# Patient Record
Sex: MALE | Race: WHITE | NOT HISPANIC OR LATINO | Employment: OTHER | URBAN - METROPOLITAN AREA
[De-identification: names, ages, dates, MRNs, and addresses within clinical notes are randomized per-mention and may not be internally consistent; named-entity substitution may affect disease eponyms.]

---

## 2017-01-27 ENCOUNTER — OFFICE VISIT (OUTPATIENT)
Dept: HEPATOLOGY | Facility: CLINIC | Age: 62
End: 2017-01-27
Payer: COMMERCIAL

## 2017-01-27 ENCOUNTER — HOSPITAL ENCOUNTER (OUTPATIENT)
Dept: RADIOLOGY | Facility: HOSPITAL | Age: 62
Discharge: HOME | End: 2017-01-27
Attending: INTERNAL MEDICINE
Payer: COMMERCIAL

## 2017-01-27 VITALS
WEIGHT: 235.44 LBS | SYSTOLIC BLOOD PRESSURE: 128 MMHG | HEART RATE: 72 BPM | BODY MASS INDEX: 29.27 KG/M2 | HEIGHT: 75 IN | OXYGEN SATURATION: 98 % | TEMPERATURE: 97 F | RESPIRATION RATE: 18 BRPM | DIASTOLIC BLOOD PRESSURE: 72 MMHG

## 2017-01-27 DIAGNOSIS — F10.10: ICD-10-CM

## 2017-01-27 DIAGNOSIS — K74.60 CIRRHOSIS OF LIVER WITHOUT ASCITES, UNSPECIFIED HEPATIC CIRRHOSIS TYPE: Primary | ICD-10-CM

## 2017-01-27 DIAGNOSIS — Z86.19 HISTORY OF HEPATITIS C: ICD-10-CM

## 2017-01-27 DIAGNOSIS — K74.69 OTHER CIRRHOSIS OF LIVER: ICD-10-CM

## 2017-01-27 PROCEDURE — 3078F DIAST BP <80 MM HG: CPT | Mod: S$GLB,,, | Performed by: PHYSICIAN ASSISTANT

## 2017-01-27 PROCEDURE — 76705 ECHO EXAM OF ABDOMEN: CPT | Mod: TC

## 2017-01-27 PROCEDURE — 1159F MED LIST DOCD IN RCRD: CPT | Mod: S$GLB,,, | Performed by: PHYSICIAN ASSISTANT

## 2017-01-27 PROCEDURE — 76705 ECHO EXAM OF ABDOMEN: CPT | Mod: 26,,, | Performed by: RADIOLOGY

## 2017-01-27 PROCEDURE — 99213 OFFICE O/P EST LOW 20 MIN: CPT | Mod: S$GLB,,, | Performed by: PHYSICIAN ASSISTANT

## 2017-01-27 PROCEDURE — 3074F SYST BP LT 130 MM HG: CPT | Mod: S$GLB,,, | Performed by: PHYSICIAN ASSISTANT

## 2017-01-27 PROCEDURE — 99999 PR PBB SHADOW E&M-EST. PATIENT-LVL IV: CPT | Mod: PBBFAC,,, | Performed by: PHYSICIAN ASSISTANT

## 2017-01-27 NOTE — PROGRESS NOTES
"HEPATOLOGY CLINIC VISIT NOTE - HCV CLINIC     CHIEF COMPLAINT: HCV Cirrhosis    HISTORY OF PRESENT ILLNESS: This is a 61-year-old white male with well-compensated cirrhosis secondary to HCV and alcohol abuse, here for routine cirrhotic f/u.     Since last visit he has successfully completed HCV antiviral therapy. He has also started drinking alcohol again.    Transaminases today mildly elevated AST 43, ALT 49. TBili elevated 1.1.   Previously liver panel had normalized w/ alcohol cessation and HCV clearance.  Pt notes he is drinking "more than he should be."    Today states he is feeling okay  Had dark urine recently while he had a sinus infection. This has resolved.  Denies jaundice, hematemesis, melena, slowed mentation, abdominal distention.   No lower extremity edema      HCV history:  Completed 12 weeks Harvoni + Ribavirin w/ SVR12 11/2016; Labs for SVR24 were drawn today  - Genotype 1a  - Prior treatment -- Relapse following 48 wks of Pegasys, Ribapak, and Victrelis (required ribavirin dose reductions)     -- Relapse following PegIntron and ribavirin > 10 years ago.      -- Non-responder to standard interferon and ribavirin in the distant past     Cirrhosis monitoring:  Well-compensated. No ascites, jaundice, HE  MELD-Na score: 7 at 1/27/2017  7:50 AM  MELD score: 7 at 1/27/2017  7:50 AM  Calculated from:  Serum Creatinine: 0.8 mg/dL (Rounded to 1) at 1/27/2017  7:50 AM  Serum Sodium: 142 mmol/L (Rounded to 137) at 1/27/2017  7:50 AM  Total Bilirubin: 1.1 mg/dL at 1/27/2017  7:50 AM  INR(ratio): 1.0 at 1/27/2017  7:50 AM  Age: 61 years    - HCC screening - U/S today w/ no liver lesions, AFP today pending  - Varices screening - EGD 5/2016 - no varices. Due again 5/2018      Alcohol history:   (+) hx of alcohol abuse, previously stopped drinking for 2 years in 3/2012 so he could be treated for HCV. Unfortunately virus relapsed and he returned to drinking in 5/2014.   Stopped drinking 3/2016 so he could be " treated w/ Harvoni. Has started drinking again as noted above      Past Medical History   Diagnosis Date    Anxiety     Cirrhosis     Glaucoma     History of hepatitis C, s/p successful Rx w/ SVR12 - 10/2016      genotype 1;  relapse following PegIFN+RBV+Victrelis; prior relapse following PegIFN+RBV S/p 12 weeks harvoni + RBV w/ SVR    Hypertension     Paresthesia      feet, bilaterally     No past surgical history on file.      FAMILY HISTORY: Negative for liver disease.     SOCIAL HISTORY: He resides locally. He is . He does not have any children. He works as a   -- Hx of daily alcohol. See above    MEDICATIONS AND ALLERGIES: reviewed    REVIEW OF SYSTEMS: As per the HPI.   No fever, chills, wt gain, fatigue  No chest pain, dyspnea, dyspnea, cough  No abdominal pain, swelling, nausea, vomiting  No skin rash.   No headache  No bleeding.       PHYSICAL EXAMINATION:   GENERAL: Friendly white male in no acute distress. He is alert and oriented.   VITALS: reviewed.   HEENT: Sclerae anicteric.   LUNGS: Normal respiratory effort. Clear bilaterally  CVS: Regular rate and rhythm. No murmurs  ABDOMEN: Nondistended. Soft. Nontender. No organomegaly or masses. Good bowel sounds. No ascites, hernias, masses.   EXTREMITIES: No edema.   SKIN: No jaundice or spider telangectasias. No rashes on exposed skin.   NEURO/PSYCH: Normal gait. Memory intact. Thought and speech patterns appropriate. No obvious depression or anxiety.      PERTINENT LABORATORY DATA:   Lab Results   Component Value Date    WBC 5.69 01/27/2017    GRAN 3.2 01/27/2017    GRAN 56.2 01/27/2017    HGB 15.3 01/27/2017     01/27/2017     Lab Results   Component Value Date    AST 43 (H) 01/27/2017    ALT 49 (H) 01/27/2017    BILITOT 1.1 (H) 01/27/2017    ALBUMIN 3.8 01/27/2017    ALKPHOS 84 01/27/2017    CREATININE 0.8 01/27/2017     01/27/2017    K 4.5 01/27/2017    INR 1.0 01/27/2017    AFP 2.4 07/15/2016       PERTINENT  IMAGING:  U/S ABDOMEN 1/27/17  Narrative   Reason for study: History of hepatitis    Comparison: Ultrasound abdomen limited 07/29/2016    Findings: The liver is enlarged measuring 17.0cm.  the hepatic parenchyma as attenuating, with the uterine measuring 2.38.  There are no focal hepatic masses.  No intra- or extrahepatic biliary ductal dilatation. The common bile duct measures 0.4 cm.  The gallbladder appears normal. No evidence for cholelithiasis.  Sonographic Shaffer's sign is negative. The visualized portions of the pancreas appear normal. The spleen is enlarged and measures 14.7 x 5.7 cm. There are again 2 calcified granulomas within the spleen, with the largest measuring 0.8 cm. No ascites.   Impression   Hepatosplenomegaly  Increased attenuation of the hepatic parenchyma, suggestive of hepatic steatosis.  2 splenic calcifications, consistent with granulomas         ASSESSMENT: A 61-year-old white male with:   1. HISTORY OF CHRONIC HEPATITIS C, GENOTYPE 1a, s/p successful Rx  -- completed 12 weeks of Harvoni + Ribavirin w/ SVR12 - 11/2016  -- Relapse following 48 weeks of Pegasys, Ribavirin, Victrelis (was HCV RNA neg 4wks after adding victrelis)  -- Relapse following PegIntron and ribavirin 10 years ago.   -- Non-responder to standard interferon and ribavirin in the distant past   2. WELL-COMPENSATED CIRRHOSIS  -- MELD score 7 - 1/2017   -- HCC screening - AFP pending. U/S today w/ no liver lesions  -- EGD 5/2016 - No varices   -- splenomegaly  3. PRIOR ANEMIA from HCV treatment - resolved  4. ELEVATED TRANSAMINASES  -- likely due to alcohol use  5. ALCOHOL USE  -- pt has resumed alcohol use      DISCUSSION:  Discussed risk of liver disease progression and hepatic decompensation, liver failure, death due to continued alcohol use. Advised pt to d/c all alcohol  Discussed continued risk of HCC and need for longterm HCC screening    -- Signs and symptoms of hepatic decompensation were reviewed, including  jaundice, ascites, and slowed mentation due to hepatic encephalopathy. The patient should seek medical attention if any of these things occur. We discussed the potential for bleeding from esophageal varices with symptoms of hematemesis and melena. The patient should report to the Emergency Department for these symptoms.   -- Discussed portal hypertension, including potential development of esophageal varices. Role of EGD in screening for varices was reviewed.       RECOMMENDATIONS:   1. Discontinue alcohol  2. Await pending AFP and HCV RNA (SVR24)  3. F/u 6months w/ CBC, CMP, INR, AFP, U/S abdomen      Duration of visit: 25 minutes.   Majority of visit spent counseling pt on cirrhosis and alcohol cessation

## 2017-02-16 ENCOUNTER — OFFICE VISIT (OUTPATIENT)
Dept: PSYCHIATRY | Facility: CLINIC | Age: 62
End: 2017-02-16
Payer: COMMERCIAL

## 2017-02-16 DIAGNOSIS — F10.10 ALCOHOL ABUSE: Primary | ICD-10-CM

## 2017-02-16 PROCEDURE — 90834 PSYTX W PT 45 MINUTES: CPT | Mod: S$GLB,,, | Performed by: SOCIAL WORKER

## 2017-02-16 NOTE — PROGRESS NOTES
Individual Psychotherapy (PhD/LCSW)    2/16/2017    Site:  [facility]         Therapeutic Intervention: Met with patient.  Outpatient - Insight oriented psychotherapy 45 min - CPT code 37439    Chief complaint/reason for encounter: addictive disorder, depression, mood swings, anxiety, sleep, appetite, behavior and interpersonal     Interval history and content of current session: discussed Richmond Donald, AUBREY referred him for his own therapy, he was not very certain what changes he needed or wants to make other than he thinks everyone wants him to stop drinking. And he is thinking about this, sees Mr. Donald with his wife for marriage counseling and they are improving and want to stay together, so working on this, also he will see me a few times and decide what he wants to do and work on and if wants to stop drinking.    Treatment plan:  · Target symptoms: alcohol abuse, depression, anxiety   · Why chosen therapy is appropriate versus another modality: relevant to diagnosis, patient responds to this modality, evidence based practice  · Outcome monitoring methods: self-report, observation  · Therapeutic intervention type: insight oriented psychotherapy, behavior modifying psychotherapy, supportive psychotherapy    Risk parameters:  Patient reports no suicidal ideation  Patient reports no homicidal ideation  Patient reports no self-injurious behavior  Patient reports no violent behavior    Verbal deficits: None    Patient's response to intervention:  The patient's response to intervention is accepting.    Progress toward goals and other mental status changes:  The patient's progress toward goals is limited.    Diagnosis:     ICD-10-CM ICD-9-CM   1. Alcohol abuse F10.10 305.00       Plan:  individual psychotherapy    Return to clinic: 2 weeks    Length of Service (minutes): 45

## 2017-03-27 ENCOUNTER — OFFICE VISIT (OUTPATIENT)
Dept: PSYCHIATRY | Facility: CLINIC | Age: 62
End: 2017-03-27
Payer: COMMERCIAL

## 2017-03-27 DIAGNOSIS — F10.10 ALCOHOL ABUSE: Primary | ICD-10-CM

## 2017-03-27 PROCEDURE — 90834 PSYTX W PT 45 MINUTES: CPT | Mod: S$GLB,,, | Performed by: SOCIAL WORKER

## 2017-03-27 NOTE — PROGRESS NOTES
Individual Psychotherapy (PhD/LCSW)    3/27/2017    Site:  [facility]         Therapeutic Intervention: Met with patient.  Outpatient - Insight oriented psychotherapy 45 min - CPT code 31873    Chief complaint/reason for encounter: addictive disorder, depression, anxiety and interpersonal     Interval history and content of current session: he and wife are doing better, also she was inpatient for 15 days recently at [facility], doing better the last few days, he thinks he is close to stopping drinking and will likely do this soon and sees several advantages to doing so, including an improve marriage, mood, and his health and saving money and feeling better, job is stressful and he is handling it, and he and wife doing better on playing together and communications and he related to me she has a mental health diagnosis of some type, how to take care of himself, coping skills and encouraged his decision to stop drinking soon all addressed, see one more time and then they can see Richmond Donald LCSW again.    Treatment plan:  · Target symptoms: alcohol abuse, depression, recurrent depression, anxiety , adjustment, work stress  · Why chosen therapy is appropriate versus another modality: relevant to diagnosis, patient responds to this modality, evidence based practice  · Outcome monitoring methods: self-report, observation  · Therapeutic intervention type: insight oriented psychotherapy, behavior modifying psychotherapy, supportive psychotherapy    Risk parameters:  Patient reports no suicidal ideation  Patient reports no homicidal ideation  Patient reports no self-injurious behavior  Patient reports no violent behavior    Verbal deficits: None    Patient's response to intervention:  The patient's response to intervention is accepting.    Progress toward goals and other mental status changes:  The patient's progress toward goals is limited.    Diagnosis:     ICD-10-CM ICD-9-CM   1. Alcohol abuse F10.10  305.00       Plan:  individual psychotherapy and family psychotherapy    Return to clinic: 2 weeks    Length of Service (minutes): 45

## 2017-08-02 ENCOUNTER — TELEPHONE (OUTPATIENT)
Dept: HEPATOLOGY | Facility: CLINIC | Age: 62
End: 2017-08-02

## 2017-08-02 NOTE — TELEPHONE ENCOUNTER
----- Message from Drea Bruce sent at 8/2/2017 12:09 PM CDT -----  Contact: patient   Calling to schedule his appt. Please call

## 2017-08-14 ENCOUNTER — HOSPITAL ENCOUNTER (OUTPATIENT)
Dept: RADIOLOGY | Facility: HOSPITAL | Age: 62
Discharge: HOME | End: 2017-08-14
Attending: INTERNAL MEDICINE
Payer: COMMERCIAL

## 2017-08-14 DIAGNOSIS — K74.60 CIRRHOSIS OF LIVER WITHOUT ASCITES, UNSPECIFIED HEPATIC CIRRHOSIS TYPE: ICD-10-CM

## 2017-08-14 DIAGNOSIS — F10.10 ALCOHOL ABUSE: ICD-10-CM

## 2017-08-14 DIAGNOSIS — Z86.19 HISTORY OF HEPATITIS C: ICD-10-CM

## 2017-08-14 PROCEDURE — 76705 ECHO EXAM OF ABDOMEN: CPT | Mod: TC

## 2017-08-14 PROCEDURE — 76705 ECHO EXAM OF ABDOMEN: CPT | Mod: 26,,, | Performed by: RADIOLOGY

## 2017-08-21 ENCOUNTER — OFFICE VISIT (OUTPATIENT)
Dept: HEPATOLOGY | Facility: CLINIC | Age: 62
End: 2017-08-21
Payer: COMMERCIAL

## 2017-08-21 VITALS
TEMPERATURE: 100 F | SYSTOLIC BLOOD PRESSURE: 149 MMHG | OXYGEN SATURATION: 95 % | HEART RATE: 82 BPM | DIASTOLIC BLOOD PRESSURE: 81 MMHG | WEIGHT: 239.88 LBS | BODY MASS INDEX: 29.83 KG/M2 | HEIGHT: 75 IN

## 2017-08-21 DIAGNOSIS — R74.8 ABNORMAL TRANSAMINASES: ICD-10-CM

## 2017-08-21 DIAGNOSIS — K70.30 ALCOHOLIC CIRRHOSIS OF LIVER WITHOUT ASCITES: Primary | ICD-10-CM

## 2017-08-21 DIAGNOSIS — Z86.19 HISTORY OF HEPATITIS C: ICD-10-CM

## 2017-08-21 PROCEDURE — 99999 PR PBB SHADOW E&M-EST. PATIENT-LVL III: CPT | Mod: PBBFAC,,, | Performed by: PHYSICIAN ASSISTANT

## 2017-08-21 PROCEDURE — 99213 OFFICE O/P EST LOW 20 MIN: CPT | Mod: S$GLB,,, | Performed by: PHYSICIAN ASSISTANT

## 2017-08-21 PROCEDURE — 3077F SYST BP >= 140 MM HG: CPT | Mod: S$GLB,,, | Performed by: PHYSICIAN ASSISTANT

## 2017-08-21 PROCEDURE — 3079F DIAST BP 80-89 MM HG: CPT | Mod: S$GLB,,, | Performed by: PHYSICIAN ASSISTANT

## 2017-08-21 PROCEDURE — 3008F BODY MASS INDEX DOCD: CPT | Mod: S$GLB,,, | Performed by: PHYSICIAN ASSISTANT

## 2017-08-21 RX ORDER — SERTRALINE HYDROCHLORIDE 100 MG/1
100 TABLET, FILM COATED ORAL DAILY
Qty: 90 TABLET | Refills: 1 | Status: SHIPPED | OUTPATIENT
Start: 2017-08-21 | End: 2018-04-04 | Stop reason: SDUPTHER

## 2017-08-21 NOTE — PROGRESS NOTES
HEPATOLOGY CLINIC VISIT NOTE - HCV CLINIC     CHIEF COMPLAINT: HCV Cirrhosis  (here w/ wife)    HISTORY OF PRESENT ILLNESS: This is a 62-year-old white male with well-compensated cirrhosis secondary to HCV (s/p successful antiviral therapy) and alcohol abuse, here for routine cirrhotic f/u.     Doing okay  Denies jaundice, dark urine, hematemesis, melena, slowed mentation, abdominal distention.   No lower extremity edema    States he is still drinking alcohol.   Has been attending weekly AA meetings w/ his wife for few months. They both think it will be easier to quit drinking if they do it together.   We discussed importance of him discontinuing alcohol completely, especially in setting of cirrhosis. Discussed that liver function is well preserved at present but continued alcohol will likely lead to decompensation / liver failure. Discussed periods of time in the past when he has refrained from alcohol for up to 2 years. Discussed options of increasing AA meeting attendance as well as addiction psychiatry.    Recent labs stable other than mild transaminase elevation.  (reviewed w/ pt that transaminases had previously normalized w/ HCV clearance and alcohol cessation)  U/S shows no liver lesions      Cirrhosis monitoring:  Well-compensated. No ascites, jaundice, HE  MELD-Na score: 6 at 8/14/2017  8:25 AM  MELD score: 6 at 8/14/2017  8:25 AM  Calculated from:  Serum Creatinine: 0.9 mg/dL (Rounded to 1) at 8/14/2017  8:25 AM  Serum Sodium: 139 mmol/L (Rounded to 137) at 8/14/2017  8:25 AM  Total Bilirubin: 0.7 mg/dL (Rounded to 1) at 8/14/2017  8:25 AM  INR(ratio): 0.9 (Rounded to 1) at 8/14/2017  8:25 AM  Age: 62 years    - HCC screening - U/S 8/14/17 w/ no liver lesions, AFP 8/14/17 3.4  - Varices screening - EGD 5/2016 - no varices.    HCV history:  Completed 12 weeks Harvoni + Ribavirin w/ SVR12 11/2016; Labs for SVR24 were drawn today  - Genotype 1a  - Prior treatment -- Relapse following 48 wks of Pegasys,  Ribapak, and Victrelis (required ribavirin dose reductions)     -- Relapse following PegIntron and ribavirin > 10 years ago.      -- Non-responder to standard interferon and ribavirin in the distant past       Past Medical History:   Diagnosis Date    Alcohol abuse     Anxiety     Cirrhosis     Glaucoma     History of hepatitis C, s/p successful Rx w/ SVR24 - 1/2017     genotype 1;  relapse following PegIFN+RBV+Victrelis; prior relapse following PegIFN+RBV S/p 12 weeks harvoni + RBV w/ SVR    Hypertension     Paresthesia     feet, bilaterally     No past surgical history on file.      FAMILY HISTORY: Negative for liver disease.     SOCIAL HISTORY: He resides locally. He is . He does not have any children. He works as a     MEDICATIONS AND ALLERGIES: reviewed    REVIEW OF SYSTEMS: As per the HPI.   No fever, chills, wt gain, fatigue  No chest pain, dyspnea, dyspnea, cough  No abdominal pain / swelling, nausea, vomiting  No skin rash.   No headache  No bleeding. Few bruises from bumping into things at work       PHYSICAL EXAMINATION:   GENERAL: Friendly white male in no acute distress. He is alert and oriented.   VITALS: reviewed.   HEENT: Sclerae anicteric.   LUNGS: Normal respiratory effort. Clear bilaterally  CVS: Regular rate and rhythm. No murmurs  ABDOMEN: Nondistended. Soft. Nontender. No organomegaly or masses. Good bowel sounds. No ascites, hernias, masses.   EXTREMITIES: No edema.   SKIN: No jaundice or spider telangectasias. No rashes on exposed skin. (+) bruise on left knee  NEURO/PSYCH: Normal gait. Memory intact. Thought and speech patterns appropriate. No obvious depression or anxiety.      PERTINENT LABORATORY DATA:   Lab Results   Component Value Date    WBC 6.47 08/14/2017    GRAN 3.7 08/14/2017    GRAN 57.5 08/14/2017    HGB 15.0 08/14/2017     08/14/2017     Lab Results   Component Value Date    AST 53 (H) 08/14/2017    ALT 67 (H) 08/14/2017    BILITOT 0.7 08/14/2017     ALBUMIN 3.5 08/14/2017    ALKPHOS 69 08/14/2017    CREATININE 0.9 08/14/2017     08/14/2017    K 5.1 08/14/2017    INR 0.9 08/14/2017    AFP 3.4 08/14/2017       PERTINENT IMAGING:  Results for orders placed during the hospital encounter of 08/14/17   US Abdomen Limited    Narrative ULTRASOUND ABDOMEN LIMITED  INDICATION: Liver cirrhosis.  COMPARISON: Ultrasound abdomen 1/27/ 17.      TECHNIQUE:  Transabdominal ultrasonographic images of the pancreas, liver, gallbladder, common bile duct, and spleen were obtained.    FINDINGS:   The visualized portion of the pancreas is unremarkable.  The IVC is visualized.  The gallbladder demonstrates no evidence of gallstones.  Gallbladder wall thickness is 0.2 cm.  No pericholecystic fluid or hyperemia identified.  Sonographic Shaffer's sign is negative.  No evidence of intra or extrahepatic biliary ductal dilatation.  The common bile duct measures 0.3 cm.  The liver is enlarged measuring 18.0 cm and demonstrates nodular contour, suggestive of cirrhosis.  No evidence of focal hepatic lesions.  The hepatorenal index is elevated measuring 1.4, suggestive greater than 5% hepatic steatosis. The spleen is enlarged 14.2 x 4.7 cm.  Two calcified areas in the spleen are noted, likely representing granulomas.    Impression Enlarged cirrhotic liver.    Hepatic steatosis.    Splenomegaly.    Splenic granulomas.  ______________________________________   Electronically signed by resident: FRANCO THRASHER MD  Date:     08/14/17  Time:    09:38  As the supervising and teaching physician, I personally reviewed the images and resident's interpretation and I agree with the findings.  Electronically signed by: ALESSANDRA LARKIN MD  Date:     08/14/17  Time:    10:09            ASSESSMENT: A 62-year-old white male with:   1. WELL-COMPENSATED CIRRHOSIS  -- MELD score 6 - 8/2017  -- HCC screening - AFP normal, U/S w/ no lesions - 8/2017  -- EGD 5/2016 - No varices   -- splenomegaly  2. HISTORY  OF CHRONIC HEPATITIS C, GENOTYPE 1a, s/p successful Rx  -- completed 12 weeks of Harvoni + Ribavirin w/ SVR12 - 11/2016  -- Relapse following 48 weeks of Pegasys, Ribavirin, Victrelis (was HCV RNA neg 4wks after adding victrelis)  -- Relapse following PegIntron and ribavirin 10 years ago.   -- Non-responder to standard interferon and ribavirin in the distant past   3. ELEVATED TRANSAMINASES  -- likely due to alcohol use  4. ALCOHOL USE        DISCUSSION:  Discussed risk of liver disease progression and hepatic decompensation, liver failure, death due to continued alcohol use. Advised pt to d/c all alcohol  Discussed continued risk of HCC and need for longterm HCC screening    -- Signs and symptoms of hepatic decompensation were reviewed, including jaundice, ascites, and slowed mentation due to hepatic encephalopathy. The patient should seek medical attention if any of these things occur. We discussed the potential for bleeding from esophageal varices with symptoms of hematemesis and melena. The patient should report to the Emergency Department for these symptoms.   -- Discussed portal hypertension, including potential development of esophageal varices. Role of EGD in screening for varices was reviewed.       RECOMMENDATIONS:   1. Discontinue alcohol  2. EGD for varices screening due 5/2018  3. F/u visit w/ 6months w/ HCV RNA, CBC, CMP, INR, AFP, U/S abdomen      Duration of visit: 30 minutes.   Majority of visit spent counseling pt on cirrhosis and alcohol cessation

## 2017-10-12 DIAGNOSIS — I10 ESSENTIAL HYPERTENSION: ICD-10-CM

## 2017-10-12 RX ORDER — LOSARTAN POTASSIUM 50 MG/1
50 TABLET ORAL DAILY
Qty: 90 TABLET | Refills: 0 | Status: SHIPPED | OUTPATIENT
Start: 2017-10-12 | End: 2018-02-20 | Stop reason: SDUPTHER

## 2017-10-22 DIAGNOSIS — N52.9 ERECTILE DYSFUNCTION, UNSPECIFIED ERECTILE DYSFUNCTION TYPE: ICD-10-CM

## 2017-10-23 RX ORDER — SILDENAFIL CITRATE 100 MG/1
TABLET, FILM COATED ORAL
Qty: 10 TABLET | Refills: 1 | Status: SHIPPED | OUTPATIENT
Start: 2017-10-23 | End: 2017-10-31 | Stop reason: SDUPTHER

## 2017-10-31 DIAGNOSIS — N52.9 ERECTILE DYSFUNCTION, UNSPECIFIED ERECTILE DYSFUNCTION TYPE: ICD-10-CM

## 2017-10-31 RX ORDER — SILDENAFIL 100 MG/1
100 TABLET, FILM COATED ORAL DAILY
Qty: 10 TABLET | Refills: 0 | Status: SHIPPED | OUTPATIENT
Start: 2017-10-31 | End: 2017-11-01

## 2017-10-31 NOTE — TELEPHONE ENCOUNTER
----- Message from Keshia Yuridia sent at 10/31/2017  2:20 PM CDT -----  Contact: pt [phone]  RX request - refill or new RX.  Is this a refill or new RX: refill   RX name and strength: VIAGRA 100 mg tablet  Directions:   Is this a 30 day or 90 day RX:    Pharmacy name and phone #[id number] @[phone]  Comments:  Pt said his insurance would not cover this pt want to know if the Dr can override it,please advise pt

## 2017-10-31 NOTE — LETTER
October 31, 2017    Froylan Franksy  [address]  [place] [address]             [facility]  [address]  [place] [address]  Phone: [phone]  Fax: [phone] Dear Mr. Borja:    Your health is important to us.  It has been over a year since you last visit with Dr. Almonte.  Please make an appointment to come in to touch base on your medical issues.  You can schedule this in [facility], respond to this message with days/times that work best for you or call our office at [phone].      If you have any questions or concerns, please don't hesitate to call.    Sincerely,        Silvana Valdez MA

## 2017-11-01 ENCOUNTER — TELEPHONE (OUTPATIENT)
Dept: INTERNAL MEDICINE | Facility: CLINIC | Age: 62
End: 2017-11-01

## 2017-11-01 RX ORDER — TADALAFIL 20 MG/1
TABLET ORAL
Qty: 10 TABLET | Refills: 11 | Status: SHIPPED | OUTPATIENT
Start: 2017-11-01 | End: 2018-10-31 | Stop reason: SDUPTHER

## 2017-11-07 ENCOUNTER — OFFICE VISIT (OUTPATIENT)
Dept: INTERNAL MEDICINE | Facility: CLINIC | Age: 62
End: 2017-11-07
Payer: COMMERCIAL

## 2017-11-07 VITALS
HEIGHT: 75 IN | DIASTOLIC BLOOD PRESSURE: 70 MMHG | WEIGHT: 231.25 LBS | BODY MASS INDEX: 28.75 KG/M2 | SYSTOLIC BLOOD PRESSURE: 140 MMHG

## 2017-11-07 DIAGNOSIS — N52.9 ERECTILE DYSFUNCTION, UNSPECIFIED ERECTILE DYSFUNCTION TYPE: Primary | ICD-10-CM

## 2017-11-07 PROCEDURE — 99212 OFFICE O/P EST SF 10 MIN: CPT | Mod: S$GLB,,, | Performed by: NURSE PRACTITIONER

## 2017-11-07 PROCEDURE — 99999 PR PBB SHADOW E&M-EST. PATIENT-LVL III: CPT | Mod: PBBFAC,,, | Performed by: NURSE PRACTITIONER

## 2017-11-07 NOTE — PROGRESS NOTES
"Subjective:       Patient ID: Froylan Borja is a 62 y.o. male.    Chief Complaint: Medication Refill    Pt here for refill on Viagra.  Pt aware that his insurance will not cover.  Home phone number updated  No other complaints        Past Medical History:   Diagnosis Date    Alcohol abuse     Anxiety     Cirrhosis     Glaucoma     History of hepatitis C, s/p successful Rx w/ SVR24 - 1/2017     genotype 1;  relapse following PegIFN+RBV+Victrelis; prior relapse following PegIFN+RBV S/p 12 weeks harvoni + RBV w/ SVR    Hypertension     Paresthesia     feet, bilaterally     History reviewed. No pertinent surgical history.  Social History     Social History Narrative    ,  at [employer]. No kids. 2 dogs. No exercise.     Family History   Problem Relation Age of Onset    Diabetes Mellitus Father     Hypertension Father     Cancer Father      ? CRC    Cancer Mother      colon    Cancer Sister      ? CRC     Vitals:    11/07/17 1104   BP: (!) 140/70   Weight: 104.9 kg (231 lb 4.2 oz)   Height: 6' 3" (1.905 m)   PainSc: 0-No pain     Outpatient Encounter Prescriptions as of 11/7/2017   Medication Sig Dispense Refill    cyanocobalamin (VITAMIN B-12) 1000 MCG tablet Take 1,000 mcg by mouth once daily.       gabapentin (NEURONTIN) 800 MG tablet TAKE 1 TABLET THREE TIMES A  tablet 0    latanoprost (XALATAN) 0.005 % ophthalmic solution 1 drop every evening.       losartan (COZAAR) 50 MG tablet TAKE 1 TABLET (50 MG TOTAL) BY MOUTH ONCE DAILY. 90 tablet 0    milk thistle 175 mg tablet Take 175 mg by mouth once daily.      multivitamin (MULTIVITAMIN) per tablet Take 1 tablet by mouth once daily.        sertraline (ZOLOFT) 100 MG tablet Take 1 tablet (100 mg total) by mouth once daily. 90 tablet 1    timolol maleate 0.5% (TIMOPTIC) 0.5 % Drop Place 1 drop into both eyes once daily.       tadalafil (CIALIS) 20 MG Tab Use one tablet every 36 hours 10 tablet 11     No facility-administered encounter " "medications on file as of 11/7/2017.      Wt Readings from Last 3 Encounters:   11/07/17 104.9 kg (231 lb 4.2 oz)   08/21/17 108.8 kg (239 lb 13.8 oz)   01/27/17 106.8 kg (235 lb 7.2 oz)     Last 3 sets of Vitals    Vitals - 1 value per visit 1/27/2017 8/21/2017 11/7/2017   SYSTOLIC 128 149 140   DIASTOLIC 72 81 70   PULSE 72 82 -   TEMPERATURE 96.8 99.9 -   RESPIRATIONS 18 - -   SPO2 98 95 -   Weight (lb) 235.45 239.86 231.26   Weight (kg) 106.8 108.8 104.9   HEIGHT 6' 3" 6' 3" 6' 3"   BODY MASS INDEX 29.43 29.98 28.91   VISIT REPORT - - -   Pain Score  0 0 0   Some recent data might be hidden     No results found for: CBC  Review of Systems   Constitutional: Negative for appetite change and chills.   Genitourinary: Negative for difficulty urinating and hematuria.       Objective:      Physical Exam   Constitutional: He appears well-developed and well-nourished. No distress.   Eyes: No scleral icterus.   Skin: Skin is warm and dry. Capillary refill takes less than 2 seconds. He is not diaphoretic.   Vitals reviewed.      Assessment:       1. Erectile dysfunction, unspecified erectile dysfunction type        Plan:       Froylan was seen today for medication refill.    Diagnoses and all orders for this visit:    Erectile dysfunction, unspecified erectile dysfunction type  Comments:  D/W pt insurance will cover Cialis and scritp sent in on 1 Nov to [facility] per Dr Almonte        "

## 2018-02-20 DIAGNOSIS — I10 ESSENTIAL HYPERTENSION: ICD-10-CM

## 2018-02-20 RX ORDER — LOSARTAN POTASSIUM 50 MG/1
50 TABLET ORAL DAILY
Qty: 90 TABLET | Refills: 0 | Status: SHIPPED | OUTPATIENT
Start: 2018-02-20 | End: 2018-06-14 | Stop reason: SDUPTHER

## 2018-02-20 NOTE — TELEPHONE ENCOUNTER
Hi, please call patient and let the patient know that at [facility] we have developed a hypertension registry of patients and I see that this patient is due to see me back and have the high blood pressure evaluated along with overall health. Please offer an appointment.  Thank you, Vignesh Almonte

## 2018-02-23 NOTE — TELEPHONE ENCOUNTER
Attempted to contact pt to schedule HTN f/u with PCP. No answer, left voice mail for return call.

## 2018-04-04 ENCOUNTER — TELEPHONE (OUTPATIENT)
Dept: HEPATOLOGY | Facility: CLINIC | Age: 63
End: 2018-04-04

## 2018-04-04 RX ORDER — SERTRALINE HYDROCHLORIDE 100 MG/1
100 TABLET, FILM COATED ORAL DAILY
Qty: 90 TABLET | Refills: 0 | Status: SHIPPED | OUTPATIENT
Start: 2018-04-04 | End: 2018-08-21 | Stop reason: SDUPTHER

## 2018-04-04 NOTE — TELEPHONE ENCOUNTER
I just refilled pt's zoloft  He is overdue for cirrhosis monitoring / HCC screening    pls schedule F/U visit w/ HCV RNA, CBC, CMP, INR, AFP, U/S abdomen

## 2018-04-27 ENCOUNTER — TELEPHONE (OUTPATIENT)
Dept: HEPATOLOGY | Facility: CLINIC | Age: 63
End: 2018-04-27

## 2018-04-27 NOTE — TELEPHONE ENCOUNTER
----- Message from Jennifer B. Scheuermann, PA sent at 4/26/2018  5:11 PM CDT -----  pls call to r/s his appts from 4/26  CBC, CMP, INR, AFP, U/S  Thanks

## 2018-04-27 NOTE — TELEPHONE ENCOUNTER
Attempt made to reach patient.  Unable to LVM.  Letter sent asking that patient call to reschedule missed appts.

## 2018-05-19 DIAGNOSIS — I10 ESSENTIAL HYPERTENSION: ICD-10-CM

## 2018-05-21 RX ORDER — LOSARTAN POTASSIUM 50 MG/1
50 TABLET ORAL DAILY
Qty: 90 TABLET | Refills: 0 | OUTPATIENT
Start: 2018-05-21

## 2018-06-14 ENCOUNTER — OFFICE VISIT (OUTPATIENT)
Dept: INTERNAL MEDICINE | Facility: CLINIC | Age: 63
End: 2018-06-14
Payer: COMMERCIAL

## 2018-06-14 VITALS
WEIGHT: 243.63 LBS | HEIGHT: 74 IN | DIASTOLIC BLOOD PRESSURE: 78 MMHG | TEMPERATURE: 98 F | OXYGEN SATURATION: 94 % | SYSTOLIC BLOOD PRESSURE: 142 MMHG | HEART RATE: 74 BPM | BODY MASS INDEX: 31.27 KG/M2

## 2018-06-14 DIAGNOSIS — L03.119 CELLULITIS OF LOWER EXTREMITY, UNSPECIFIED LATERALITY: Primary | ICD-10-CM

## 2018-06-14 DIAGNOSIS — I10 ESSENTIAL HYPERTENSION: ICD-10-CM

## 2018-06-14 PROCEDURE — 99999 PR PBB SHADOW E&M-EST. PATIENT-LVL III: CPT | Mod: PBBFAC,,, | Performed by: INTERNAL MEDICINE

## 2018-06-14 PROCEDURE — 99213 OFFICE O/P EST LOW 20 MIN: CPT | Mod: 25,S$GLB,, | Performed by: INTERNAL MEDICINE

## 2018-06-14 PROCEDURE — 96372 THER/PROPH/DIAG INJ SC/IM: CPT | Mod: S$GLB,,, | Performed by: INTERNAL MEDICINE

## 2018-06-14 RX ORDER — LOSARTAN POTASSIUM 50 MG/1
50 TABLET ORAL DAILY
Qty: 90 TABLET | Refills: 0 | Status: SHIPPED | OUTPATIENT
Start: 2018-06-14 | End: 2018-10-17 | Stop reason: SDUPTHER

## 2018-06-14 RX ORDER — CEFTRIAXONE 500 MG/1
500 INJECTION, POWDER, FOR SOLUTION INTRAMUSCULAR; INTRAVENOUS
Status: COMPLETED | OUTPATIENT
Start: 2018-06-14 | End: 2018-06-14

## 2018-06-14 RX ORDER — HYDROCHLOROTHIAZIDE 25 MG/1
25 TABLET ORAL DAILY
Qty: 5 TABLET | Refills: 0 | Status: SHIPPED | OUTPATIENT
Start: 2018-06-14 | End: 2018-10-31

## 2018-06-14 RX ORDER — CEPHALEXIN 500 MG/1
500 CAPSULE ORAL EVERY 6 HOURS
Qty: 40 CAPSULE | Refills: 0 | Status: SHIPPED | OUTPATIENT
Start: 2018-06-14 | End: 2018-10-31

## 2018-06-14 RX ADMIN — CEFTRIAXONE 500 MG: 500 INJECTION, POWDER, FOR SOLUTION INTRAMUSCULAR; INTRAVENOUS at 02:06

## 2018-06-14 NOTE — PROGRESS NOTES
Subjective:       Patient ID: Froylan Borja is a 63 y.o. male.    Chief Complaint: Leg Swelling (both legs red and painful, medication refil)    Both calves have been swollen for about 2 weeks and now are red and hot to touch.  NO fever  No known injury      Review of Systems   Constitutional: Negative for activity change, appetite change and fever.   HENT: Negative for congestion, postnasal drip and sore throat.    Respiratory: Negative for cough, shortness of breath and wheezing.    Cardiovascular: Negative for chest pain and palpitations.   Gastrointestinal: Negative for abdominal pain, blood in stool, constipation, diarrhea, nausea and vomiting.   Genitourinary: Negative for decreased urine volume, difficulty urinating, flank pain and frequency.   Musculoskeletal: Negative for arthralgias.   Neurological: Negative for dizziness, weakness and headaches.       Objective:      Physical Exam   Skin:            Assessment:       1. Cellulitis of lower extremity, unspecified laterality    2. Essential hypertension        Plan:   Froylan was seen today for leg swelling.    Diagnoses and all orders for this visit:    Cellulitis of lower extremity, unspecified laterality    Essential hypertension  -     losartan (COZAAR) 50 MG tablet; Take 1 tablet (50 mg total) by mouth once daily. Needs followup appointment for further refills.    Other orders  -     cefTRIAXone injection 500 mg; Inject 0.5 g (500 mg total) into the muscle one time.  -     cephALEXin (KEFLEX) 500 MG capsule; Take 1 capsule (500 mg total) by mouth every 6 (six) hours.  -     hydroCHLOROthiazide (HYDRODIURIL) 25 MG tablet; Take 1 tablet (25 mg total) by mouth once daily.

## 2018-08-21 ENCOUNTER — TELEPHONE (OUTPATIENT)
Dept: HEPATOLOGY | Facility: CLINIC | Age: 63
End: 2018-08-21

## 2018-08-21 DIAGNOSIS — K74.60 CIRRHOSIS OF LIVER WITHOUT ASCITES, UNSPECIFIED HEPATIC CIRRHOSIS TYPE: Primary | ICD-10-CM

## 2018-08-21 RX ORDER — SERTRALINE HYDROCHLORIDE 100 MG/1
100 TABLET, FILM COATED ORAL DAILY
Qty: 30 TABLET | Refills: 0 | Status: SHIPPED | OUTPATIENT
Start: 2018-08-21 | End: 2018-10-31

## 2018-08-22 ENCOUNTER — TELEPHONE (OUTPATIENT)
Dept: HEPATOLOGY | Facility: CLINIC | Age: 63
End: 2018-08-22

## 2018-08-22 NOTE — TELEPHONE ENCOUNTER
Attempt made to reach patient.  Unable to LVM.  Msg from provider mailed to patient.  I stressed that he contact for scheduling.

## 2018-08-22 NOTE — TELEPHONE ENCOUNTER
I received refill request for zoloft but he is overdue for lab monitoring and liver cancer screening. I can give ONE MONTH only    pls call to schedule - CBC, CMP, INR, AFP, U/S and VISIT

## 2018-09-09 DIAGNOSIS — I10 ESSENTIAL HYPERTENSION: ICD-10-CM

## 2018-09-12 RX ORDER — LOSARTAN POTASSIUM 50 MG/1
50 TABLET ORAL DAILY
Qty: 90 TABLET | Refills: 0 | OUTPATIENT
Start: 2018-09-12

## 2018-09-12 NOTE — TELEPHONE ENCOUNTER
Please let patient know he was told in June to find a new PCP and that there would be no more refills from me

## 2018-10-17 ENCOUNTER — OFFICE VISIT (OUTPATIENT)
Dept: INTERNAL MEDICINE | Facility: CLINIC | Age: 63
End: 2018-10-17
Payer: COMMERCIAL

## 2018-10-17 VITALS
WEIGHT: 243.19 LBS | OXYGEN SATURATION: 97 % | HEIGHT: 75 IN | SYSTOLIC BLOOD PRESSURE: 140 MMHG | TEMPERATURE: 99 F | DIASTOLIC BLOOD PRESSURE: 70 MMHG | BODY MASS INDEX: 30.24 KG/M2 | HEART RATE: 85 BPM

## 2018-10-17 DIAGNOSIS — J32.9 SINUSITIS, UNSPECIFIED CHRONICITY, UNSPECIFIED LOCATION: Primary | ICD-10-CM

## 2018-10-17 DIAGNOSIS — I10 ESSENTIAL HYPERTENSION: ICD-10-CM

## 2018-10-17 PROCEDURE — 99213 OFFICE O/P EST LOW 20 MIN: CPT | Mod: S$GLB,,, | Performed by: INTERNAL MEDICINE

## 2018-10-17 PROCEDURE — 99999 PR PBB SHADOW E&M-EST. PATIENT-LVL III: CPT | Mod: PBBFAC,,, | Performed by: INTERNAL MEDICINE

## 2018-10-17 RX ORDER — AMOXICILLIN 875 MG/1
875 TABLET, FILM COATED ORAL EVERY 12 HOURS
Qty: 20 TABLET | Refills: 0 | Status: SHIPPED | OUTPATIENT
Start: 2018-10-17 | End: 2018-10-31

## 2018-10-17 RX ORDER — PROMETHAZINE HYDROCHLORIDE AND CODEINE PHOSPHATE 6.25; 1 MG/5ML; MG/5ML
5 SOLUTION ORAL EVERY 4 HOURS PRN
Qty: 120 ML | Refills: 0 | Status: SHIPPED | OUTPATIENT
Start: 2018-10-17 | End: 2018-10-27

## 2018-10-17 RX ORDER — LOSARTAN POTASSIUM 50 MG/1
50 TABLET ORAL DAILY
Qty: 90 TABLET | Refills: 0 | Status: SHIPPED | OUTPATIENT
Start: 2018-10-17 | End: 2018-10-31 | Stop reason: SDUPTHER

## 2018-10-17 NOTE — PROGRESS NOTES
Subjective:       Patient ID: Froylan Borja is a 63 y.o. male.    Chief Complaint: Nasal Congestion (head congestion cough)    Complains of facial pain and congestion and now has cough productive of thick green mucus.  No fever or wheeaing      Review of Systems   Constitutional: Negative for activity change, appetite change and fever.   HENT: Negative for congestion, postnasal drip and sore throat.    Respiratory: Negative for cough, shortness of breath and wheezing.    Cardiovascular: Negative for chest pain and palpitations.   Gastrointestinal: Negative for abdominal pain, blood in stool, constipation, diarrhea, nausea and vomiting.   Genitourinary: Negative for decreased urine volume, difficulty urinating, flank pain and frequency.   Musculoskeletal: Negative for arthralgias.   Neurological: Negative for dizziness, weakness and headaches.       Objective:      Physical Exam   Constitutional: He is oriented to person, place, and time. He appears well-developed and well-nourished. No distress.   HENT:   Head: Normocephalic and atraumatic.   Right Ear: External ear normal.   Left Ear: External ear normal.   Nose: Right sinus exhibits maxillary sinus tenderness and frontal sinus tenderness. Left sinus exhibits maxillary sinus tenderness and frontal sinus tenderness.   Mouth/Throat:       Eyes: Conjunctivae and EOM are normal. Pupils are equal, round, and reactive to light.   Neck: Normal range of motion. Neck supple. No thyromegaly present.   Cardiovascular: Normal rate and regular rhythm.   Pulmonary/Chest: Effort normal and breath sounds normal.   Abdominal: Soft. Bowel sounds are normal. He exhibits no mass. There is no tenderness. There is no rebound and no guarding.   Musculoskeletal: Normal range of motion.   Lymphadenopathy:     He has no cervical adenopathy.   Neurological: He is alert and oriented to person, place, and time. He has normal reflexes. He displays normal reflexes. No cranial nerve deficit. He  exhibits normal muscle tone. Coordination normal.   Skin: Skin is warm and dry.       Assessment:       1. Sinusitis, unspecified chronicity, unspecified location    2. Essential hypertension        Plan:   Froylan was seen today for nasal congestion.    Diagnoses and all orders for this visit:    Sinusitis, unspecified chronicity, unspecified location    Essential hypertension  -     losartan (COZAAR) 50 MG tablet; Take 1 tablet (50 mg total) by mouth once daily. Needs followup appointment for further refills.    Other orders  -     amoxicillin (AMOXIL) 875 MG tablet; Take 1 tablet (875 mg total) by mouth every 12 (twelve) hours.  -     promethazine-codeine 6.25-10 mg/5 ml (PHENERGAN WITH CODEINE) 6.25-10 mg/5 mL syrup; Take 5 mLs by mouth every 4 (four) hours as needed for Cough.

## 2018-10-31 ENCOUNTER — OFFICE VISIT (OUTPATIENT)
Dept: INTERNAL MEDICINE | Facility: CLINIC | Age: 63
End: 2018-10-31
Payer: COMMERCIAL

## 2018-10-31 ENCOUNTER — IMMUNIZATION (OUTPATIENT)
Dept: PHARMACY | Facility: CLINIC | Age: 63
End: 2018-10-31

## 2018-10-31 ENCOUNTER — IMMUNIZATION (OUTPATIENT)
Dept: PHARMACY | Facility: CLINIC | Age: 63
End: 2018-10-31
Payer: COMMERCIAL

## 2018-10-31 ENCOUNTER — LAB VISIT (OUTPATIENT)
Dept: LAB | Facility: HOSPITAL | Age: 63
End: 2018-10-31
Attending: INTERNAL MEDICINE
Payer: COMMERCIAL

## 2018-10-31 VITALS
BODY MASS INDEX: 29.88 KG/M2 | HEIGHT: 75 IN | HEART RATE: 75 BPM | WEIGHT: 240.31 LBS | DIASTOLIC BLOOD PRESSURE: 72 MMHG | SYSTOLIC BLOOD PRESSURE: 134 MMHG

## 2018-10-31 DIAGNOSIS — F32.A DEPRESSION, UNSPECIFIED DEPRESSION TYPE: ICD-10-CM

## 2018-10-31 DIAGNOSIS — I10 ESSENTIAL HYPERTENSION: ICD-10-CM

## 2018-10-31 DIAGNOSIS — Z00.00 ROUTINE GENERAL MEDICAL EXAMINATION AT A HEALTH CARE FACILITY: Primary | ICD-10-CM

## 2018-10-31 DIAGNOSIS — Z00.00 ROUTINE GENERAL MEDICAL EXAMINATION AT A HEALTH CARE FACILITY: ICD-10-CM

## 2018-10-31 DIAGNOSIS — N52.9 ERECTILE DYSFUNCTION, UNSPECIFIED ERECTILE DYSFUNCTION TYPE: ICD-10-CM

## 2018-10-31 DIAGNOSIS — K74.60 CIRRHOSIS OF LIVER WITHOUT ASCITES, UNSPECIFIED HEPATIC CIRRHOSIS TYPE: ICD-10-CM

## 2018-10-31 LAB
ALBUMIN SERPL BCP-MCNC: 4 G/DL
ALP SERPL-CCNC: 80 U/L
ALT SERPL W/O P-5'-P-CCNC: 38 U/L
ANION GAP SERPL CALC-SCNC: 9 MMOL/L
AST SERPL-CCNC: 40 U/L
BASOPHILS # BLD AUTO: 0.09 K/UL
BASOPHILS NFR BLD: 1 %
BILIRUB SERPL-MCNC: 1 MG/DL
BUN SERPL-MCNC: 13 MG/DL
CALCIUM SERPL-MCNC: 10 MG/DL
CHLORIDE SERPL-SCNC: 103 MMOL/L
CHOLEST SERPL-MCNC: 183 MG/DL
CHOLEST/HDLC SERPL: 3 {RATIO}
CO2 SERPL-SCNC: 25 MMOL/L
CREAT SERPL-MCNC: 0.8 MG/DL
DIFFERENTIAL METHOD: ABNORMAL
EOSINOPHIL # BLD AUTO: 0.5 K/UL
EOSINOPHIL NFR BLD: 5.6 %
ERYTHROCYTE [DISTWIDTH] IN BLOOD BY AUTOMATED COUNT: 12.4 %
EST. GFR  (AFRICAN AMERICAN): >60 ML/MIN/1.73 M^2
EST. GFR  (NON AFRICAN AMERICAN): >60 ML/MIN/1.73 M^2
ESTIMATED AVG GLUCOSE: 103 MG/DL
GLUCOSE SERPL-MCNC: 81 MG/DL
HBA1C MFR BLD HPLC: 5.2 %
HCT VFR BLD AUTO: 45.1 %
HDLC SERPL-MCNC: 62 MG/DL
HDLC SERPL: 33.9 %
HGB BLD-MCNC: 15 G/DL
LDLC SERPL CALC-MCNC: 99.2 MG/DL
LYMPHOCYTES # BLD AUTO: 2.2 K/UL
LYMPHOCYTES NFR BLD: 24.2 %
MCH RBC QN AUTO: 31.9 PG
MCHC RBC AUTO-ENTMCNC: 33.3 G/DL
MCV RBC AUTO: 96 FL
MONOCYTES # BLD AUTO: 0.9 K/UL
MONOCYTES NFR BLD: 9.9 %
NEUTROPHILS # BLD AUTO: 5.4 K/UL
NEUTROPHILS NFR BLD: 58.8 %
NONHDLC SERPL-MCNC: 121 MG/DL
PLATELET # BLD AUTO: 250 K/UL
PMV BLD AUTO: 10.1 FL
POTASSIUM SERPL-SCNC: 4.5 MMOL/L
PROT SERPL-MCNC: 7.6 G/DL
RBC # BLD AUTO: 4.7 M/UL
SODIUM SERPL-SCNC: 137 MMOL/L
TRIGL SERPL-MCNC: 109 MG/DL
WBC # BLD AUTO: 9.17 K/UL

## 2018-10-31 PROCEDURE — 80061 LIPID PANEL: CPT

## 2018-10-31 PROCEDURE — 85025 COMPLETE CBC W/AUTO DIFF WBC: CPT

## 2018-10-31 PROCEDURE — 36415 COLL VENOUS BLD VENIPUNCTURE: CPT

## 2018-10-31 PROCEDURE — 80053 COMPREHEN METABOLIC PANEL: CPT

## 2018-10-31 PROCEDURE — 83036 HEMOGLOBIN GLYCOSYLATED A1C: CPT

## 2018-10-31 PROCEDURE — 99999 PR PBB SHADOW E&M-EST. PATIENT-LVL III: CPT | Mod: PBBFAC,,, | Performed by: INTERNAL MEDICINE

## 2018-10-31 PROCEDURE — 99396 PREV VISIT EST AGE 40-64: CPT | Mod: S$GLB,,, | Performed by: INTERNAL MEDICINE

## 2018-10-31 RX ORDER — GABAPENTIN 800 MG/1
800 TABLET ORAL 3 TIMES DAILY
Qty: 270 TABLET | Refills: 0 | Status: CANCELLED | OUTPATIENT
Start: 2018-10-31

## 2018-10-31 RX ORDER — TADALAFIL 20 MG/1
TABLET ORAL
Qty: 10 TABLET | Refills: 11 | Status: SHIPPED | OUTPATIENT
Start: 2018-10-31 | End: 2020-03-23

## 2018-10-31 RX ORDER — SERTRALINE HYDROCHLORIDE 100 MG/1
100 TABLET, FILM COATED ORAL DAILY
Qty: 90 TABLET | Refills: 1 | Status: SHIPPED | OUTPATIENT
Start: 2018-10-31 | End: 2019-10-18 | Stop reason: SDUPTHER

## 2018-10-31 RX ORDER — LOSARTAN POTASSIUM 50 MG/1
50 TABLET ORAL DAILY
Qty: 90 TABLET | Refills: 11 | Status: SHIPPED | OUTPATIENT
Start: 2018-10-31 | End: 2019-11-18 | Stop reason: SDUPTHER

## 2018-10-31 NOTE — PROGRESS NOTES
Subjective:       Patient ID: Froylan Borja is a 63 y.o. male.    Chief Complaint: Annual Exam    Here for annual exam.    No new complaints.    Still drinking etoh and occ cigars.    No new significnat issues.      Review of Systems   Constitutional: Negative for activity change, appetite change, fatigue, fever and unexpected weight change.   HENT: Negative for congestion.    Eyes: Negative for visual disturbance.   Respiratory: Negative for cough, chest tightness and shortness of breath.    Cardiovascular: Negative for chest pain, palpitations and leg swelling.   Gastrointestinal: Negative for abdominal distention, abdominal pain, nausea and vomiting.   Genitourinary: Negative for decreased urine volume.   Musculoskeletal: Negative for arthralgias.   Skin: Negative for rash and wound.   Neurological: Negative for tremors, syncope and weakness.   Hematological: Negative for adenopathy. Does not bruise/bleed easily.   Psychiatric/Behavioral: Negative for dysphoric mood. The patient is not nervous/anxious.        Objective:      Physical Exam   Constitutional: He is oriented to person, place, and time. He appears well-developed and well-nourished. No distress.   HENT:   Head: Normocephalic and atraumatic.   Eyes: No scleral icterus.   Neck: Normal range of motion. No thyromegaly present.   Cardiovascular: Normal rate, regular rhythm and normal heart sounds. Exam reveals no gallop and no friction rub.   No murmur heard.  Pulmonary/Chest: Effort normal and breath sounds normal. No respiratory distress. He has no wheezes. He has no rales.   Abdominal: Soft. Bowel sounds are normal. He exhibits distension. He exhibits no mass. There is no tenderness. There is no rebound and no guarding.   Mild distension with some swollen abd wall veins   Musculoskeletal: Normal range of motion. He exhibits no edema.   Lymphadenopathy:     He has no cervical adenopathy.   Neurological: He is alert and oriented to person, place, and time.    Skin: No lesion noted. He is not diaphoretic.   Psychiatric: He has a normal mood and affect. Thought content normal.       Assessment:       1. Routine general medical examination at a health care facility    2. Essential hypertension    3. Depression, unspecified depression type    4. Erectile dysfunction, unspecified erectile dysfunction type    5. Cirrhosis of liver without ascites, unspecified hepatic cirrhosis type        Plan:       Froylan was seen today for annual exam.    Diagnoses and all orders for this visit:    Routine general medical examination at a health care facility  -     CBC auto differential; Future  -     Comprehensive metabolic panel; Future  -     Lipid panel; Future  -     Hemoglobin A1c; Future    Essential hypertension  -     losartan (COZAAR) 50 MG tablet; Take 1 tablet (50 mg total) by mouth once daily. Needs followup appointment for further refills.  At goal    Depression, unspecified depression type  -     sertraline (ZOLOFT) 100 MG tablet; Take 1 tablet (100 mg total) by mouth once daily.    Erectile dysfunction, unspecified erectile dysfunction type  -     tadalafil (CIALIS) 20 MG Tab; Use one tablet every 36 hours    Cirrhosis of liver without ascites, unspecified hepatic cirrhosis type  -     Ambulatory Referral to Hepatology    Other orders  -     Cancel: gabapentin (NEURONTIN) 800 MG tablet; Take 1 tablet (800 mg total) by mouth 3 (three) times daily.  Try to stay off    etoh use --  Lengthy discussion re importance of cessation, given a sponsors number as well, recommended AA    Health Maintenance       Date Due Completion Date    TETANUS VACCINE 04/01/1973 ---    Pneumococcal PPSV23 (Medium Risk) (1) 04/01/1973 ---    Zoster Vaccine 04/01/2015 ---    Influenza Vaccine 08/01/2018 11/15/2013    Lipid Panel 07/28/2019 7/28/2014    Colonoscopy 08/14/2024 8/14/2014      Flu and pneumovax 23 please      Follow-up in about 6 months (around 4/30/2019).    Future Appointments   Date  Time Provider Department Center   11/9/2018  1:40 PM Bryan Davalos PA-C [facility]

## 2018-11-05 ENCOUNTER — TELEPHONE (OUTPATIENT)
Dept: HEPATOLOGY | Facility: CLINIC | Age: 63
End: 2018-11-05

## 2018-11-05 NOTE — TELEPHONE ENCOUNTER
Patient scheduled to see ASIYA Davalos in error on 11/9/18 in hepatology clinic.  Attempt made to reach him by phone to schedule visit with PA Scheuermann along with ultrasound and labs unsuccessful; unable to LVM.  E-mail invalid.  Sent letter informing him of 11/9/18 appt cancellation and the  to schedule hcc screening.

## 2018-11-27 ENCOUNTER — TELEPHONE (OUTPATIENT)
Dept: HEPATOLOGY | Facility: CLINIC | Age: 63
End: 2018-11-27

## 2018-11-27 NOTE — TELEPHONE ENCOUNTER
Attempt to reach pt however number is not accepting calls at this time will send out another letter to the pt today to call our office back to schedule asap.

## 2018-11-27 NOTE — TELEPHONE ENCOUNTER
----- Message from Rayna Ramon sent at 11/26/2018  3:23 PM CST -----  Nope   ----- Message -----  From: Tawana Correa MA  Sent: 11/26/2018   2:38 PM  To: Rayna Ramon    We have attempted to reach him to let him know he needs to follow up however we never hear back from him but he does need follow up with labs and u/s with Gloria. Has he contacted you all back?  ----- Message -----  From: Rayna Ramon  Sent: 11/26/2018   2:22 PM  To: [facility] Hepatitis C Staff    ? His follow up with Janice

## 2018-12-28 ENCOUNTER — HOSPITAL ENCOUNTER (EMERGENCY)
Facility: HOSPITAL | Age: 63
Discharge: HOME | End: 2018-12-28
Attending: EMERGENCY MEDICINE
Payer: COMMERCIAL

## 2018-12-28 VITALS
OXYGEN SATURATION: 96 % | HEIGHT: 75 IN | DIASTOLIC BLOOD PRESSURE: 72 MMHG | RESPIRATION RATE: 16 BRPM | BODY MASS INDEX: 32.33 KG/M2 | WEIGHT: 260 LBS | HEART RATE: 84 BPM | SYSTOLIC BLOOD PRESSURE: 126 MMHG

## 2018-12-28 DIAGNOSIS — F10.920 ALCOHOLIC INTOXICATION WITHOUT COMPLICATION: Primary | ICD-10-CM

## 2018-12-28 PROCEDURE — 99282 EMERGENCY DEPT VISIT SF MDM: CPT | Mod: ,,, | Performed by: NURSE PRACTITIONER

## 2018-12-28 PROCEDURE — 99284 EMERGENCY DEPT VISIT MOD MDM: CPT

## 2018-12-28 PROCEDURE — 99282 PR EMERGENCY DEPT VISIT,LEVEL II: ICD-10-PCS | Mod: ,,, | Performed by: NURSE PRACTITIONER

## 2018-12-29 NOTE — ED PROVIDER NOTES
Encounter Date: 12/28/2018       History     Chief Complaint   Patient presents with    Alcohol Intoxication     Pt found on floor at the bar. Pt has no obvious injuries     Patient is a 63-year-old male with medical history of alcohol abuse, anxiety, cirrhosis, hepatitis-C presenting to the ED for alcohol intoxication.  Pt was at a bar this afternoon and was found sitting on the floor.  Patient denies any falls or trauma. No injury noted. Patient does appear intoxicated.          Review of patient's allergies indicates:  No Known Allergies  Past Medical History:   Diagnosis Date    Alcohol abuse     Anxiety     Cirrhosis     Glaucoma     History of hepatitis C, s/p successful Rx w/ SVR24 - 1/2017     genotype 1;  relapse following PegIFN+RBV+Victrelis; prior relapse following PegIFN+RBV S/p 12 weeks harvoni + RBV w/ SVR    Hypertension     Paresthesia     feet, bilaterally     Past Surgical History:   Procedure Laterality Date    BIOPSY, LIVER, WITH US GUIDANCE N/A 3/7/2014    Performed by [facility] Provider at [facility] OR 2ND FLR    COLONOSCOPY N/A 8/14/2014    Performed by Luis Miguel Ash MD at [facility] ENDO (4TH FLR)    EGD (ESOPHAGOGASTRODUODENOSCOPY) N/A 5/1/2014    Performed by Rach Souza MD at [facility] ENDO (4TH FLR)    ESOPHAGOGASTRODUODENOSCOPY (EGD) - varices screening, needs labs prior - due 5/2016 N/A 5/6/2016    Performed by Jonatan Pollard MD at [facility] ENDO (4TH FLR)     Family History   Problem Relation Age of Onset    Diabetes Mellitus Father     Hypertension Father     Cancer Father         ? CRC    Cancer Mother         colon vs polyps    Cancer Sister         ? CRC    Colonic polyp Brother      Social History     Tobacco Use    Smoking status: Current Some Day Smoker     Types: Cigars    Smokeless tobacco: Never Used    Tobacco comment: smokes cigars 20 per month   Substance Use Topics    Alcohol use: Yes     Comment: 1-2 beers per day    Drug use: No     Review of Systems    Constitutional: Negative for chills and fever.   HENT: Negative for congestion and sore throat.    Respiratory: Negative for cough, choking, chest tightness, shortness of breath and wheezing.    Cardiovascular: Negative for chest pain and palpitations.   Gastrointestinal: Negative for abdominal pain, constipation, diarrhea, nausea and vomiting.   Genitourinary: Negative for decreased urine volume, difficulty urinating, dysuria and urgency.   Musculoskeletal: Negative for arthralgias, back pain and myalgias.   Skin: Negative for color change, pallor, rash and wound.   Neurological: Negative for dizziness, syncope, weakness and headaches.   Hematological: Does not bruise/bleed easily.   All other systems reviewed and are negative.      Physical Exam     Initial Vitals [12/28/18 1758]   BP Pulse Resp Temp SpO2   126/72 84 16 -- 96 %      MAP       --         Physical Exam    Nursing note and vitals reviewed.  Constitutional: Vital signs are normal. He appears well-developed and well-nourished. He is cooperative.   HENT:   Head: Normocephalic and atraumatic. Head is without abrasion and without contusion.   Cardiovascular: Normal rate and regular rhythm.   Pulmonary/Chest: Effort normal.   Abdominal: Normal appearance.   Musculoskeletal: Normal range of motion.   Neurological: He is alert and oriented to person, place, and time. GCS eye subscore is 4. GCS verbal subscore is 5. GCS motor subscore is 6.   Skin: Skin is warm, dry and intact. No rash noted. No cyanosis. Nails show no clubbing.         ED Course   Procedures  Labs Reviewed   POCT GLUCOSE MONITORING CONTINUOUS          Imaging Results          CT Head Without Contrast (Final result)  Result time 12/28/18 20:44:22    Final result by Jb Mix MD (12/28/18 20:44:22)                 Impression:      1. No acute intracranial abnormalities.  2. Mild soft tissue induration overlying the posterior left parietal calvarium, clinical  correlation      Electronically signed by: Jb Mix MD  Date:    12/28/2018  Time:    20:44             Narrative:    EXAMINATION:  CT HEAD WITHOUT CONTRAST    CLINICAL HISTORY:  intoxicated, reports he fell, found of the floor of the bar;    TECHNIQUE:  Low dose axial images were obtained through the head.  Coronal and sagittal reformations were also performed. Contrast was not administered.    COMPARISON:  None.    FINDINGS:  There is generalized cerebral volume loss.  There is hypoattenuation in a periventricular fashion, likely sequela of chronic microvascular ischemic change.  There is no evidence of acute major vascular territory infarct, hemorrhage, or mass.  There is no hydrocephalus.  There are no abnormal extra-axial fluid collections.  There is mild opacification of the left ethmoid sinuses, otherwise the visualized paranasal sinuses and mastoid air cells are clear, and there is no evidence of calvarial fracture.  The visualized soft tissues are remarkable for mild soft tissue induration overlying the posterior left parietal calvarium..                                       APC / Resident Notes:   Emergent evaluation of a 62 yo female patient presenting to the ER with chief complaint of alcohol intoxication.  Patient states he was at a bar drinking with his wife when he sat on the floor and EMS was called.  On exam patient A&O x3. No trauma noted. Patient ambulating with an unsteady gait.  Pupils equal round reactive 3-2 mm.  Breath sounds clear bilaterally. I will get a head CT due to possible trauma and reassess.  Differential diagnoses include but are not limited to alcohol intoxication, overdose, fall, CVA, SDH, SAH.  I discussed the care of this patient with my Supervising Physician.      Patient's head CT negative for any acute abnormalities.  Patient's wife at bedside.  Patient and wife both appear intoxicated.  Will call for a cab.  Patient states he will pay.  Security has patient's keys  until patient is in the cab.  Patient and family updated on plan of care.  All questions and concerns addressed.  Patient is hemodynamically stable, vital signs are normal. Discharge instructions given. Return to ED precautions discussed. Follow up as directed. Pt verbalized understanding of this plan. Pt is stable for discharge.                          Clinical Impression:   The encounter diagnosis was Alcoholic intoxication without complication.      Disposition:   Disposition: Discharged  Condition: Stable                        Areli Ramsey NP  12/28/18 [id number]

## 2018-12-29 NOTE — ED NOTES
Pt ambulated out of ED with  to ensure pt and spouse go into cab and did not drive home.  Pt remains wobbly on feet but can maintain a gait without using help.  Pt ambulated out of ED without letting this RN take exit vitals and did not want to wait any longer to be sent home.

## 2019-01-14 ENCOUNTER — HOSPITAL ENCOUNTER (OUTPATIENT)
Dept: RADIOLOGY | Facility: HOSPITAL | Age: 64
Discharge: HOME | End: 2019-01-14
Attending: PHYSICIAN ASSISTANT
Payer: COMMERCIAL

## 2019-01-14 ENCOUNTER — OFFICE VISIT (OUTPATIENT)
Dept: INTERNAL MEDICINE | Facility: CLINIC | Age: 64
End: 2019-01-14
Payer: COMMERCIAL

## 2019-01-14 VITALS
HEART RATE: 78 BPM | BODY MASS INDEX: 30.94 KG/M2 | WEIGHT: 248.88 LBS | DIASTOLIC BLOOD PRESSURE: 92 MMHG | HEIGHT: 75 IN | SYSTOLIC BLOOD PRESSURE: 160 MMHG | OXYGEN SATURATION: 99 %

## 2019-01-14 DIAGNOSIS — M25.552 LEFT HIP PAIN: Primary | ICD-10-CM

## 2019-01-14 DIAGNOSIS — M25.552 LEFT HIP PAIN: ICD-10-CM

## 2019-01-14 DIAGNOSIS — G62.1 ALCOHOL-INDUCED POLYNEUROPATHY: ICD-10-CM

## 2019-01-14 DIAGNOSIS — I10 ESSENTIAL HYPERTENSION: ICD-10-CM

## 2019-01-14 PROCEDURE — 99999 PR PBB SHADOW E&M-EST. PATIENT-LVL IV: CPT | Mod: PBBFAC,,, | Performed by: PHYSICIAN ASSISTANT

## 2019-01-14 PROCEDURE — 73502 X-RAY EXAM HIP UNI 2-3 VIEWS: CPT | Mod: TC,LT

## 2019-01-14 PROCEDURE — 73502 X-RAY EXAM HIP UNI 2-3 VIEWS: CPT | Mod: 26,LT,, | Performed by: RADIOLOGY

## 2019-01-14 PROCEDURE — 99213 PR OFFICE/OUTPT VISIT, EST, LEVL III, 20-29 MIN: ICD-10-PCS | Mod: S$GLB,,, | Performed by: PHYSICIAN ASSISTANT

## 2019-01-14 PROCEDURE — 73502 XR HIP 2 VIEW LEFT: ICD-10-PCS | Mod: 26,LT,, | Performed by: RADIOLOGY

## 2019-01-14 PROCEDURE — 99999 PR PBB SHADOW E&M-EST. PATIENT-LVL IV: ICD-10-PCS | Mod: PBBFAC,,, | Performed by: PHYSICIAN ASSISTANT

## 2019-01-14 PROCEDURE — 99213 OFFICE O/P EST LOW 20 MIN: CPT | Mod: S$GLB,,, | Performed by: PHYSICIAN ASSISTANT

## 2019-01-14 RX ORDER — GABAPENTIN 600 MG/1
600 TABLET ORAL 3 TIMES DAILY
Qty: 90 TABLET | Refills: 1 | Status: SHIPPED | OUTPATIENT
Start: 2019-01-14 | End: 2021-07-01 | Stop reason: SDUPTHER

## 2019-01-14 NOTE — PROGRESS NOTES
Subjective:       Patient ID: Froylan Borja is a 63 y.o. male.    Chief Complaint: Hip Pain (L hip pain, on and off for a few months)    HPI     Established pt of Vignesh Almonte MD    C/o left sided hip pain on and off for the past 3-4 months. Pain is described as stiffness/aching, worse with prolonged standing. States he had a fall on his left side at work, tripped over something within a doorway, a few weeks ago.  He has tried tylenol in the past with some relief but none lately. He mentions knee injections in the past and inquires about the same for his hip. No bruising, weakness, or inability to bear weight.     Has hx of peripheral neuropathy 2/2 alcohol use disorder per chart review, previously on gabapentin, in need of refill.     Review of patient's allergies indicates:  No Known Allergies    Social History     Tobacco Use    Smoking status: Current Some Day Smoker     Types: Cigars    Smokeless tobacco: Never Used    Tobacco comment: smokes cigars 20 per month   Substance Use Topics    Alcohol use: Yes     Comment: 1-2 beers per day    Drug use: No     Past Medical History:   Diagnosis Date    Alcohol abuse     Anxiety     Cirrhosis     Glaucoma     History of hepatitis C, s/p successful Rx w/ SVR24 - 1/2017     genotype 1;  relapse following PegIFN+RBV+Victrelis; prior relapse following PegIFN+RBV S/p 12 weeks harvoni + RBV w/ SVR    Hypertension     Paresthesia     feet, bilaterally     Patient Active Problem List   Diagnosis    Glaucoma    Hypertension    History of hepatitis C, s/p successful Rx w/ SVR24 - 1/2017    Cirrhosis    Anxiety    Special screening for malignant neoplasms, colon    Erectile dysfunction    Alcohol abuse, unspecified    Essential hypertension    Alcohol abuse         Review of Systems   Constitutional: Negative for chills, diaphoresis, fatigue, fever and unexpected weight change.   Eyes: Negative for visual disturbance.   Respiratory: Negative for cough,  "shortness of breath and wheezing.    Cardiovascular: Negative for chest pain and leg swelling.   Gastrointestinal: Negative for abdominal pain, nausea and vomiting.   Musculoskeletal: Positive for arthralgias.   Skin: Negative for rash.   Neurological: Negative for weakness, light-headedness and headaches.       Objective: BP (!) 160/92 (BP Location: Right arm, Patient Position: Sitting, BP Method: Large (Manual))   Pulse 78   Ht 6' 3" (1.905 m)   Wt 112.9 kg (248 lb 14.4 oz)   SpO2 99%   BMI 31.11 kg/m²         Physical Exam   Constitutional: He is oriented to person, place, and time. He appears well-developed and well-nourished. No distress.   HENT:   Head: Normocephalic and atraumatic.   Mouth/Throat: Oropharynx is clear and moist.   Eyes: Pupils are equal, round, and reactive to light.   Cardiovascular: Normal rate and regular rhythm. Exam reveals no friction rub.   No murmur heard.  Pulmonary/Chest: Effort normal and breath sounds normal. He has no wheezes. He has no rales.   Abdominal: Soft. Bowel sounds are normal. There is no tenderness.   Musculoskeletal: He exhibits no edema.        Right hip: Normal. He exhibits normal range of motion, normal strength, no tenderness, no bony tenderness, no swelling and no crepitus.        Left hip: He exhibits normal range of motion, no tenderness, no bony tenderness and no crepitus.   Ambulates without difficulty     Neurological: He is alert and oriented to person, place, and time.   Skin: Skin is warm and dry. Capillary refill takes less than 2 seconds. No rash noted.   Psychiatric: He has a normal mood and affect.   Vitals reviewed.      Assessment:       1. Left hip pain    2. Essential hypertension    3. Alcohol-induced polyneuropathy        Plan:         Froylan was seen today for hip pain.    Diagnoses and all orders for this visit:    Left hip pain  Suspect DJD  Pt interested in joint injection  Referral to Ortho placed.   -     X-Ray Hip 2 View Left; Future  - "     Ambulatory Referral to Orthopedics    Essential hypertension  Elevated today, typically well controled  Pt asymptomatic  Advised to monitor BP, if persistent, schedule PCP f/u in Approx 2 weeks  ED precautions discussed    Alcohol-induced polyneuropathy  -     gabapentin (NEURONTIN) 600 MG tablet; Take 1 tablet (600 mg total) by mouth 3 (three) times daily.      RTC if symptoms worsen or fail to improve and PCP f/u prn.     Maria A Jean PA-C

## 2019-01-15 ENCOUNTER — TELEPHONE (OUTPATIENT)
Dept: INTERNAL MEDICINE | Facility: CLINIC | Age: 64
End: 2019-01-15

## 2019-01-17 ENCOUNTER — OFFICE VISIT (OUTPATIENT)
Dept: ORTHOPEDICS | Facility: CLINIC | Age: 64
End: 2019-01-17
Payer: COMMERCIAL

## 2019-01-17 VITALS — WEIGHT: 248.88 LBS | HEIGHT: 75 IN | BODY MASS INDEX: 30.94 KG/M2

## 2019-01-17 DIAGNOSIS — M54.42 LEFT-SIDED LOW BACK PAIN WITH LEFT-SIDED SCIATICA, UNSPECIFIED CHRONICITY: Primary | ICD-10-CM

## 2019-01-17 PROCEDURE — 99203 PR OFFICE/OUTPT VISIT, NEW, LEVL III, 30-44 MIN: ICD-10-PCS | Mod: S$GLB,,, | Performed by: PHYSICIAN ASSISTANT

## 2019-01-17 PROCEDURE — 99999 PR PBB SHADOW E&M-EST. PATIENT-LVL III: ICD-10-PCS | Mod: PBBFAC,,, | Performed by: PHYSICIAN ASSISTANT

## 2019-01-17 PROCEDURE — 99999 PR PBB SHADOW E&M-EST. PATIENT-LVL III: CPT | Mod: PBBFAC,,, | Performed by: PHYSICIAN ASSISTANT

## 2019-01-17 PROCEDURE — 99203 OFFICE O/P NEW LOW 30 MIN: CPT | Mod: S$GLB,,, | Performed by: PHYSICIAN ASSISTANT

## 2019-01-17 NOTE — LETTER
January 17, 2019      Maria A Jean PA-C  [address]  [facility] [address]           [facility]  [address]  [facility] [address]  Phone: [phone]          Patient: Froylan Borja   MR Number: [id number]   YOB: 1955   Date of Visit: 1/17/2019       Dear Maria A Jean:    Thank you for referring Froylan Borja to me for evaluation. Attached you will find relevant portions of my assessment and plan of care.    If you have questions, please do not hesitate to call me. I look forward to following Froylan Borja along with you.    Sincerely,    Chapo Nath PA-C    Enclosure  CC:  No Recipients    If you would like to receive this communication electronically, please contact [email or web address] or [phone] to request more information on [employer] access.    For providers and/or their staff who would like to refer a patient to [facility], please contact us through our one-stop-shop provider referral line, [facility], at [phone].    If you feel you have received this communication in error or would no longer like to receive these types of communications, please e-mail [email or web address]

## 2019-01-17 NOTE — PROGRESS NOTES
SUBJECTIVE:     Chief Complaint & History of Present Illness:  Froylan Borja is a 63 y.o. year old male, new patient referred to Orthopedics by Maria A Jean PA-C, presenting today for constant left hip pain which started years ago.  There is a history of trauma.  Patient reports numerous falls over the last year. The pain is located in the left buttock region.  The pain is described as sharp, 3/10 today in clinic, however has been 10/10.  It is is worse with weight bearing.  There is radiation into the leg.  Previous treatments include OTC NSAIDs which have provided minimal relief.  There is not a history of previous injury or surgery to the hip.  The patient does not use an assistive device.      Past Medical History:   Diagnosis Date    Alcohol abuse     Anxiety     Cirrhosis     Glaucoma     History of hepatitis C, s/p successful Rx w/ SVR24 - 1/2017     genotype 1;  relapse following PegIFN+RBV+Victrelis; prior relapse following PegIFN+RBV S/p 12 weeks harvoni + RBV w/ SVR    Hypertension     Paresthesia     feet, bilaterally       Past Surgical History:   Procedure Laterality Date    BIOPSY, LIVER, WITH US GUIDANCE N/A 3/7/2014    Performed by [facility] at [facility] OR 2ND FLR    COLONOSCOPY N/A 8/14/2014    Performed by Luis Miguel Ash MD at [facility] ENDO (4TH FLR)    EGD (ESOPHAGOGASTRODUODENOSCOPY) N/A 5/1/2014    Performed by Rach Souza MD at [facility] ENDO (4TH FLR)    ESOPHAGOGASTRODUODENOSCOPY (EGD) - varices screening, needs labs prior - due 5/2016 N/A 5/6/2016    Performed by Jonatan Pollard MD at [facility] ENDO (4TH FLR)       Family History   Problem Relation Age of Onset    Diabetes Mellitus Father     Hypertension Father     Cancer Father         ? CRC    Cancer Mother         colon vs polyps    Cancer Sister         ? CRC    Colonic polyp Brother        Review of patient's allergies indicates:  No Known Allergies      Current Outpatient Medications:     cyanocobalamin (VITAMIN  "B-12) 1000 MCG tablet, Take 1,000 mcg by mouth once daily. , Disp: , Rfl:     gabapentin (NEURONTIN) 600 MG tablet, Take 1 tablet (600 mg total) by mouth 3 (three) times daily., Disp: 90 tablet, Rfl: 1    latanoprost (XALATAN) 0.005 % ophthalmic solution, 1 drop every evening. , Disp: , Rfl:     losartan (COZAAR) 50 MG tablet, Take 1 tablet (50 mg total) by mouth once daily. Needs followup appointment for further refills., Disp: 90 tablet, Rfl: 11    milk thistle 175 mg tablet, Take 175 mg by mouth once daily., Disp: , Rfl:     multivitamin (MULTIVITAMIN) per tablet, Take 1 tablet by mouth once daily.  , Disp: , Rfl:     sertraline (ZOLOFT) 100 MG tablet, Take 1 tablet (100 mg total) by mouth once daily., Disp: 90 tablet, Rfl: 1    tadalafil (CIALIS) 20 MG Tab, Use one tablet every 36 hours, Disp: 10 tablet, Rfl: 11    timolol maleate 0.5% (TIMOPTIC) 0.5 % Drop, Place 1 drop into both eyes once daily. , Disp: , Rfl:     Review of Systems:  ROS:  Constitutional: no fever or chills  Eyes: no visual changes, positive for glaucoma  ENT: no nasal congestion or sore throat  Respiratory: no cough or shortness of breath  Cardiovascular: no chest pain or palpitations, positive for HTN  Gastrointestinal: no nausea or vomiting, tolerating diet  Genitourinary: no hematuria or dysuria  Integument/Breast: no rash or pruritis  Hematologic/Lymphatic: no easy bruising or lymphadenopathy, positive for Hx Hepatitis C  Musculoskeletal: positive for left low back/left hip pain  Neurological: no seizures or tremors  Behavioral/Psych: no auditory or visual hallucinations, positive for anxiety and alcohol abuse  Endocrine: no heat or cold intolerance      PE:  Ht 6' 3" (1.905 m)   Wt 112.9 kg (248 lb 14.4 oz)   BMI 31.11 kg/m²   General: Pleasant, cooperative, NAD   HEENT: NCAT, sclera nonicteric   Lungs: Respirations are equal and unlabored.   Abdomen: Soft and non-tender.  CV: 2+ bilateral upper and lower extremity pulses. "   Skin: Intact throughout LE with no rashes, erythema, or lesions  Extremities: No LE edema, NVI lower extremities  Hip Exam:  Left:  120 degrees flexion  110 degrees extension   45 degrees internal rotation  45 degrees external rotation  45 degrees abduction  45 degrees adduction   0 flexion contracture    Back Exam:  full range of motion, palpable spasm  tenderness noted on left buttock sciatic nerve pathway region  negative straight-leg raise  no kyphosis or scoliosis    RADIOGRAPHS:  Xray of left hip taken in clinic on 1/14/19, reviewed by me, demonstrates minimal narrowing of inferior hip joint, with no evidence of fracture or dislocation.  Also noted degenerative changes of lumbar spine.    ASSESSMENT/PLAN:     Plan: We discussed with the patient at length all the different treatment options available for arthrosis of the hip including anti-inflammatories, acetaminophen, rest, ice, lower extremity strengthening exercise, occasional cortisone injections for temporary relief, and finally total hip arthroplasty.   I will prescribe Physical Therapy 2x/week for 4 weeks for left sided low back pain with left side sciatica.  Patient is to follow up in 4 weeks with Orthopedic Spine if symptoms persist or worsen.  Patient agrees with plan.    Final Diagnosis: Left side low back pain with left side sciatica

## 2019-02-04 DIAGNOSIS — M51.36 DDD (DEGENERATIVE DISC DISEASE), LUMBAR: Primary | ICD-10-CM

## 2019-02-12 ENCOUNTER — TELEPHONE (OUTPATIENT)
Dept: ORTHOPEDICS | Facility: CLINIC | Age: 64
End: 2019-02-12

## 2019-09-17 ENCOUNTER — TELEPHONE (OUTPATIENT)
Dept: INTERNAL MEDICINE | Facility: CLINIC | Age: 64
End: 2019-09-17

## 2019-09-19 ENCOUNTER — PATIENT OUTREACH (OUTPATIENT)
Dept: ADMINISTRATIVE | Facility: HOSPITAL | Age: 64
End: 2019-09-19

## 2019-09-19 NOTE — PROGRESS NOTES
Attempted outreach to pt RE: need to schedule follow up w/PCP. No answer, left voice mail for return call. Pt also due for colonoscopy, tetanus & shingles.

## 2019-10-18 DIAGNOSIS — F32.A DEPRESSION, UNSPECIFIED DEPRESSION TYPE: ICD-10-CM

## 2019-10-18 RX ORDER — SERTRALINE HYDROCHLORIDE 100 MG/1
TABLET, FILM COATED ORAL
Qty: 90 TABLET | Refills: 0 | Status: SHIPPED | OUTPATIENT
Start: 2019-10-18 | End: 2019-11-18 | Stop reason: SDUPTHER

## 2019-10-18 NOTE — TELEPHONE ENCOUNTER
Hi, please call patient --  I will send in one more prescription of this medicine, but I would need to see patient back for any further prescriptions.  Thank you, Vignesh Almonte

## 2019-11-13 RX ORDER — GABAPENTIN 600 MG/1
TABLET ORAL
Qty: 90 TABLET | Refills: 1 | OUTPATIENT
Start: 2019-11-13

## 2019-11-18 ENCOUNTER — LAB VISIT (OUTPATIENT)
Dept: LAB | Facility: HOSPITAL | Age: 64
End: 2019-11-18
Attending: INTERNAL MEDICINE
Payer: COMMERCIAL

## 2019-11-18 ENCOUNTER — OFFICE VISIT (OUTPATIENT)
Dept: INTERNAL MEDICINE | Facility: CLINIC | Age: 64
End: 2019-11-18
Payer: COMMERCIAL

## 2019-11-18 VITALS
OXYGEN SATURATION: 95 % | WEIGHT: 258.38 LBS | DIASTOLIC BLOOD PRESSURE: 70 MMHG | BODY MASS INDEX: 32.13 KG/M2 | HEART RATE: 98 BPM | HEIGHT: 75 IN | SYSTOLIC BLOOD PRESSURE: 120 MMHG

## 2019-11-18 DIAGNOSIS — Z00.00 ROUTINE GENERAL MEDICAL EXAMINATION AT A HEALTH CARE FACILITY: Primary | ICD-10-CM

## 2019-11-18 DIAGNOSIS — F32.A DEPRESSION, UNSPECIFIED DEPRESSION TYPE: ICD-10-CM

## 2019-11-18 DIAGNOSIS — Z12.11 COLON CANCER SCREENING: ICD-10-CM

## 2019-11-18 DIAGNOSIS — Z00.00 ROUTINE GENERAL MEDICAL EXAMINATION AT A HEALTH CARE FACILITY: ICD-10-CM

## 2019-11-18 DIAGNOSIS — K74.60 CIRRHOSIS OF LIVER WITHOUT ASCITES, UNSPECIFIED HEPATIC CIRRHOSIS TYPE: ICD-10-CM

## 2019-11-18 DIAGNOSIS — I10 ESSENTIAL HYPERTENSION: ICD-10-CM

## 2019-11-18 LAB
AFP SERPL-MCNC: 3.7 NG/ML (ref 0–8.4)
ALBUMIN SERPL BCP-MCNC: 4.1 G/DL (ref 3.5–5.2)
ALP SERPL-CCNC: 83 U/L (ref 55–135)
ALT SERPL W/O P-5'-P-CCNC: 34 U/L (ref 10–44)
ANION GAP SERPL CALC-SCNC: 9 MMOL/L (ref 8–16)
AST SERPL-CCNC: 32 U/L (ref 10–40)
BASOPHILS # BLD AUTO: 0.07 K/UL (ref 0–0.2)
BASOPHILS NFR BLD: 0.8 % (ref 0–1.9)
BILIRUB SERPL-MCNC: 0.8 MG/DL (ref 0.1–1)
BUN SERPL-MCNC: 14 MG/DL (ref 8–23)
CALCIUM SERPL-MCNC: 9.7 MG/DL (ref 8.7–10.5)
CHLORIDE SERPL-SCNC: 101 MMOL/L (ref 95–110)
CHOLEST SERPL-MCNC: 182 MG/DL (ref 120–199)
CHOLEST/HDLC SERPL: 3.4 {RATIO} (ref 2–5)
CO2 SERPL-SCNC: 27 MMOL/L (ref 23–29)
COMPLEXED PSA SERPL-MCNC: 1.6 NG/ML (ref 0–4)
CREAT SERPL-MCNC: 1 MG/DL (ref 0.5–1.4)
DIFFERENTIAL METHOD: ABNORMAL
EOSINOPHIL # BLD AUTO: 0.5 K/UL (ref 0–0.5)
EOSINOPHIL NFR BLD: 5.4 % (ref 0–8)
ERYTHROCYTE [DISTWIDTH] IN BLOOD BY AUTOMATED COUNT: 12.8 % (ref 11.5–14.5)
EST. GFR  (AFRICAN AMERICAN): >60 ML/MIN/1.73 M^2
EST. GFR  (NON AFRICAN AMERICAN): >60 ML/MIN/1.73 M^2
ESTIMATED AVG GLUCOSE: 100 MG/DL (ref 68–131)
GLUCOSE SERPL-MCNC: 79 MG/DL (ref 70–110)
HBA1C MFR BLD HPLC: 5.1 % (ref 4–5.6)
HCT VFR BLD AUTO: 45.6 % (ref 40–54)
HDLC SERPL-MCNC: 53 MG/DL (ref 40–75)
HDLC SERPL: 29.1 % (ref 20–50)
HGB BLD-MCNC: 15.5 G/DL (ref 14–18)
INR PPP: 0.9 (ref 0.8–1.2)
LDLC SERPL CALC-MCNC: 97.6 MG/DL (ref 63–159)
LYMPHOCYTES # BLD AUTO: 1.7 K/UL (ref 1–4.8)
LYMPHOCYTES NFR BLD: 20.1 % (ref 18–48)
MCH RBC QN AUTO: 32.6 PG (ref 27–31)
MCHC RBC AUTO-ENTMCNC: 34 G/DL (ref 32–36)
MCV RBC AUTO: 96 FL (ref 82–98)
MONOCYTES # BLD AUTO: 0.8 K/UL (ref 0.3–1)
MONOCYTES NFR BLD: 9.6 % (ref 4–15)
NEUTROPHILS # BLD AUTO: 5.3 K/UL (ref 1.8–7.7)
NEUTROPHILS NFR BLD: 64.1 % (ref 38–73)
NONHDLC SERPL-MCNC: 129 MG/DL
PLATELET # BLD AUTO: 190 K/UL (ref 150–350)
PMV BLD AUTO: 10.4 FL (ref 9.2–12.9)
POTASSIUM SERPL-SCNC: 4.4 MMOL/L (ref 3.5–5.1)
PROT SERPL-MCNC: 7.3 G/DL (ref 6–8.4)
PROTHROMBIN TIME: 9.7 SEC (ref 9–12.5)
RBC # BLD AUTO: 4.76 M/UL (ref 4.6–6.2)
SODIUM SERPL-SCNC: 137 MMOL/L (ref 136–145)
TRIGL SERPL-MCNC: 157 MG/DL (ref 30–150)
WBC # BLD AUTO: 8.36 K/UL (ref 3.9–12.7)

## 2019-11-18 PROCEDURE — 84153 ASSAY OF PSA TOTAL: CPT

## 2019-11-18 PROCEDURE — 80053 COMPREHEN METABOLIC PANEL: CPT

## 2019-11-18 PROCEDURE — 99999 PR PBB SHADOW E&M-EST. PATIENT-LVL IV: CPT | Mod: PBBFAC,,, | Performed by: INTERNAL MEDICINE

## 2019-11-18 PROCEDURE — 36415 COLL VENOUS BLD VENIPUNCTURE: CPT

## 2019-11-18 PROCEDURE — 99396 PR PREVENTIVE VISIT,EST,40-64: ICD-10-PCS | Mod: S$GLB,,, | Performed by: INTERNAL MEDICINE

## 2019-11-18 PROCEDURE — 99396 PREV VISIT EST AGE 40-64: CPT | Mod: S$GLB,,, | Performed by: INTERNAL MEDICINE

## 2019-11-18 PROCEDURE — 99999 PR PBB SHADOW E&M-EST. PATIENT-LVL IV: ICD-10-PCS | Mod: PBBFAC,,, | Performed by: INTERNAL MEDICINE

## 2019-11-18 PROCEDURE — 82105 ALPHA-FETOPROTEIN SERUM: CPT

## 2019-11-18 PROCEDURE — 80061 LIPID PANEL: CPT

## 2019-11-18 PROCEDURE — 85025 COMPLETE CBC W/AUTO DIFF WBC: CPT

## 2019-11-18 PROCEDURE — 83036 HEMOGLOBIN GLYCOSYLATED A1C: CPT

## 2019-11-18 PROCEDURE — 85610 PROTHROMBIN TIME: CPT

## 2019-11-18 RX ORDER — SERTRALINE HYDROCHLORIDE 100 MG/1
100 TABLET, FILM COATED ORAL DAILY
Qty: 90 TABLET | Refills: 1 | Status: SHIPPED | OUTPATIENT
Start: 2019-11-18 | End: 2020-01-10

## 2019-11-18 RX ORDER — LOSARTAN POTASSIUM 50 MG/1
50 TABLET ORAL DAILY
Qty: 90 TABLET | Refills: 11 | Status: SHIPPED | OUTPATIENT
Start: 2019-11-18 | End: 2019-12-17 | Stop reason: DRUGHIGH

## 2019-11-18 NOTE — PATIENT INSTRUCTIONS
You should hear from the gastro dept about scheduling sometime in the next 7-10 days, you can also call on your own to schedule -- [phone].

## 2019-11-18 NOTE — LETTER
November 18, 2019    Froylan Borja  [address]  [facility] [address]             [facility] - Internal Medicine  [address]  [place] [address]  Phone: [phone]  Fax: [phone] Dear Mr. Borja/To whom it may concern:    Mr. Froylan Borja's blood pressure was well controlled today at 120/70. He has been adherent to his daily pressure pill losartan 50mg daily.    If you have any questions or concerns, please don't hesitate to call.    Sincerely,        Vignesh Almonte MD

## 2019-11-18 NOTE — PROGRESS NOTES
Subjective:       Patient ID: Froylan Borja is a 64 y.o. male.    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    Much stress of late, evicted since last yr.    Would like scrotal cyst re-examined. Has been present at least one year and no recent changes or enlarging.    Review of Systems   Constitutional: Negative for activity change, appetite change, fatigue, fever and unexpected weight change.   HENT: Negative for congestion.    Eyes: Negative for visual disturbance.   Respiratory: Negative for cough, chest tightness and shortness of breath.    Cardiovascular: Positive for leg swelling (chronic). Negative for chest pain and palpitations.   Gastrointestinal: Negative for abdominal distention, abdominal pain, nausea and vomiting.   Genitourinary: Negative for decreased urine volume.   Musculoskeletal: Negative for arthralgias.   Skin: Negative for rash and wound.   Neurological: Negative for tremors, syncope and weakness.   Hematological: Negative for adenopathy. Does not bruise/bleed easily.   Psychiatric/Behavioral: Negative for dysphoric mood. The patient is not nervous/anxious.        Objective:      Physical Exam   Constitutional: He is oriented to person, place, and time. He appears well-developed and well-nourished. No distress.   HENT:   Head: Normocephalic and atraumatic.   Eyes: No scleral icterus.   Neck: Normal range of motion. No thyromegaly present.   Cardiovascular: Normal rate, regular rhythm and normal heart sounds. Exam reveals no gallop and no friction rub.   No murmur heard.  Pulmonary/Chest: Effort normal and breath sounds normal. No respiratory distress. He has no wheezes. He has no rales.   Abdominal: Soft. Bowel sounds are normal. He exhibits distension (mild). He exhibits no mass. There is no tenderness. There is no rebound and no guarding.   Genitourinary:   Genitourinary Comments: Cyst coming from scrotum not related  to testicle, not tender.   moveable, nontender, lima bean sized, not hard.     Musculoskeletal: Normal range of motion. He exhibits edema.   1+ bilat lower leg swelling with chronic venous stasis   Lymphadenopathy:     He has no cervical adenopathy.   Neurological: He is alert and oriented to person, place, and time.   Skin: No lesion noted. He is not diaphoretic.   Psychiatric: He has a normal mood and affect. Thought content normal.       Assessment:       1. Colon cancer screening    2. Routine general medical examination at a health care facility    3. Cirrhosis of liver without ascites, unspecified hepatic cirrhosis type    4. Essential hypertension    5. Depression, unspecified depression type        Plan:       Froylan was seen today for follow-up.    Diagnoses and all orders for this visit:    Colon cancer screening  -     Case request GI: COLONOSCOPY    Routine general medical examination at a health care facility  -     CBC auto differential; Future  -     Comprehensive metabolic panel; Future  -     Lipid panel; Future  -     Hemoglobin A1c; Future  -     PSA, Screening; Future    Cirrhosis of liver without ascites, unspecified hepatic cirrhosis type  -     AFP tumor marker; Future  -     Protime-INR; Future  -     Ambulatory Referral to Hepatology    Essential hypertension  -     losartan (COZAAR) 50 MG tablet; Take 1 tablet (50 mg total) by mouth once daily.   controlled  Note for work written    Depression, unspecified depression type  -     sertraline (ZOLOFT) 100 MG tablet; Take 1 tablet (100 mg total) by mouth once daily.    Scrotal cyst -- benign feeling. Offered urology referral, he declines for now.      Health Maintenance       Date Due Completion Date    TETANUS VACCINE 04/01/1973 ---    Shingles Vaccine (1 of 2) 04/01/2005 ---    Colonoscopy 08/14/2019 8/14/2014    Influenza Vaccine (1) 09/01/2019 10/31/2018    Lipid Panel 10/31/2023 10/31/2018      Declines flu vax    Follow up in about 1 year  (around 11/18/2020) for Shingles vaccine please.    Future Appointments   Date Time Provider Department Center   11/20/2019  1:00 PM Marlys Marte NP [facility]

## 2019-11-19 ENCOUNTER — PATIENT OUTREACH (OUTPATIENT)
Dept: ADMINISTRATIVE | Facility: OTHER | Age: 64
End: 2019-11-19

## 2019-11-20 ENCOUNTER — OFFICE VISIT (OUTPATIENT)
Dept: HEPATOLOGY | Facility: CLINIC | Age: 64
End: 2019-11-20
Payer: COMMERCIAL

## 2019-11-20 VITALS
HEIGHT: 75 IN | BODY MASS INDEX: 31.55 KG/M2 | OXYGEN SATURATION: 94 % | RESPIRATION RATE: 18 BRPM | HEART RATE: 86 BPM | SYSTOLIC BLOOD PRESSURE: 142 MMHG | WEIGHT: 253.75 LBS | DIASTOLIC BLOOD PRESSURE: 80 MMHG

## 2019-11-20 DIAGNOSIS — Z86.19 HISTORY OF HEPATITIS C: ICD-10-CM

## 2019-11-20 DIAGNOSIS — K74.60 CIRRHOSIS OF LIVER WITHOUT ASCITES, UNSPECIFIED HEPATIC CIRRHOSIS TYPE: Primary | ICD-10-CM

## 2019-11-20 DIAGNOSIS — I10 ESSENTIAL HYPERTENSION: ICD-10-CM

## 2019-11-20 DIAGNOSIS — E66.9 OBESITY (BMI 30-39.9): ICD-10-CM

## 2019-11-20 DIAGNOSIS — F10.10 ALCOHOL ABUSE: ICD-10-CM

## 2019-11-20 PROCEDURE — 99999 PR PBB SHADOW E&M-EST. PATIENT-LVL V: ICD-10-PCS | Mod: PBBFAC,,, | Performed by: NURSE PRACTITIONER

## 2019-11-20 PROCEDURE — 99214 PR OFFICE/OUTPT VISIT, EST, LEVL IV, 30-39 MIN: ICD-10-PCS | Mod: S$GLB,,, | Performed by: NURSE PRACTITIONER

## 2019-11-20 PROCEDURE — 99214 OFFICE O/P EST MOD 30 MIN: CPT | Mod: S$GLB,,, | Performed by: NURSE PRACTITIONER

## 2019-11-20 PROCEDURE — 99999 PR PBB SHADOW E&M-EST. PATIENT-LVL V: CPT | Mod: PBBFAC,,, | Performed by: NURSE PRACTITIONER

## 2019-11-20 NOTE — PATIENT INSTRUCTIONS
1. US now  2. Follow up in June with labs and US before   3. Please call the endoscopy department at [phone] to schedule your procedures (EGD and colonoscopy) - EGD ordered by me, colonoscopy ordered by Dr. Almonte  4. NO alcohol (beer, wine, or liquor) for life, let me know if you want a referral to the alcohol treatment program at [facility]      Because you have cirrhosis, it is important to attend clinic visits every 6 months with an Ultrasound and blood tests every 6 months to screen for liver cancer (you are at risk of developing liver cancer due to scar tissue in the liver)    Signs and symptoms of worsening liver disease include jaundice, fluid in the belly (ascites), and confusion/disorientation/slowed thought processes due to hepatic encephalopathy (toxins building up because of liver problems).   You should seek medical attention if any of these things occur.    Also, possible bleeding from esophageal varices (blood vessels in the stomach and foodpipe can burst and cause fatal bleeding).  Therefore, if you have symptoms of vomiting blood, blood in your stool, dark or black stools or vomiting coffee ground vomit, YOU SHOULD GO TO THE EMERGENCY ROOM IMMEDIATELY.     Cirrhosis can increase the risk of liver cancer, liver failure, and death. However, we will watch your liver function score (MELD score) closely with each clinic visit. A normal MELD score is 6, highest is 40. Your last one was an 6. We will check this with every clinic visit. A MELD 15 or higher is when we start to consider transplant because MELD 15 or higher indicates that the liver is not functioning as well     Cirrhosis Counseling  - NO alcohol use (includes beer, wine, and/or liquor)  - avoid non-steroidal anti-inflammatory drugs (NSAIDs) such as ibuprofen, Motrin, naprosyn, Alleve due to the risk of kidney damage  - can take acetaminophen (Tylenol), no more than 2000 mg per day, do NOT ever take Tylenol and drink alcohol   - low sodium  (salt) 2 gram per day diet  - high protein diet: 140 grams per day to prevent muscle mass loss. Drink at least 1 protein shake daily (Premier Protein is best option because it is very high protein and low sugar). Ok to use this as nighttime snack to fit it in   - resistance exercises for muscle strength  - avoid raw seafoods due to the risk of fatal Vibrio vulnificus infection  - ultrasound of the liver every 6 months for liver cancer screening (you are at risk of developing liver cancer due to scar tissue in the liver)  - Upper endoscopy every 1-2 years to screen for varices in the stomach and foodpipe which can burst and cause fatal bleeding

## 2019-11-20 NOTE — LETTER
November 20, 2019      Vignesh Almonte MD  [address]  [facility] [address]           [facility]atology  [address]  [facility] [address]  Phone: [phone]  Fax: [phone]          Patient: Froylan Borja   MR Number: [id number]   YOB: 1955   Date of Visit: 11/20/2019       Dear Dr. Vignesh Almonte:    Thank you for referring Froylan Borja to me for evaluation. Attached you will find relevant portions of my assessment and plan of care.    If you have questions, please do not hesitate to call me. I look forward to following Froylan Borja along with you.    Sincerely,    Marlys Marte, MOHINI    Enclosure  CC:  No Recipients    If you would like to receive this communication electronically, please contact [email or web address] or [phone] to request more information on [employer] access.    For providers and/or their staff who would like to refer a patient to [facility], please contact us through our one-stop-shop provider referral line, [facility], at [phone].    If you feel you have received this communication in error or would no longer like to receive these types of communications, please e-mail [email or web address]

## 2019-11-20 NOTE — PROGRESS NOTES
[facility] Established Patient Visit    Reason for Visit:  Cirrhosis     PCP: Vignesh Almonte    HPI:  This is a 64 y.o. male with PMH noted below, here for follow up of   Well-compensated cirrhosis due to Hep C and alcohol use    Diagnosis of cirrhosis based on biopsy in 2014      Heavy alcohol use for many years, stopped for approx 2 years during time of Hep C treatment with Interferon but then resumed despite recommendations for abstinence for life.     Last seen in 2017 by J. Scheuermann, PA, did not return for f/u as recommended     HCV history:  Completed 12 weeks Harvoni + Ribavirin w/ SVR12 11/2016; SVR24 1/2017  - Genotype 1a  - Prior treatment -- Relapse following 48 wks of Pegasys, Ribapak, and Victrelis (required ribavirin dose reductions)                             -- Relapse following PegIntron and ribavirin > 10 years ago.                              -- Non-responder to standard interferon and ribavirin in the distant past     Interval HPI: Presents today with significant other. Currently drinking 2-3 beers daily despite recommendations previously to abstain completely, reports now seeing counseling for alcohol use through work. No s/s of hepatic decomp: no variceal bleeding, HE. + abd distension but does not appear to be ascites on exam    Last abd U/S in 2017 showed hepatomegaly (18cm), fatty liver, nodular contour. No lesions    Lab Results   Component Value Date    ALT 34 11/18/2019    AST 32 11/18/2019    ALKPHOS 83 11/18/2019    BILITOT 0.8 11/18/2019    ALBUMIN 4.1 11/18/2019    INR 0.9 11/18/2019     11/18/2019     MELD-Na score: 6 at 11/18/2019  4:01 PM  MELD score: 6 at 11/18/2019  4:01 PM  Calculated from:  Serum Creatinine: 1.0 mg/dL at 11/18/2019  4:01 PM  Serum Sodium: 137 mmol/L at 11/18/2019  4:01 PM  Total Bilirubin: 0.8 mg/dL (Rounded to 1 mg/dL) at 11/18/2019  4:01 PM  INR(ratio): 0.9 (Rounded to 1) at 11/18/2019  4:01 PM  Age: 64 years  MELD has been low,  indicating no benefit to liver transplant currently     Previous serologic w/u showed negative ABDIRAHMAN  Elevated ferritin but iron sat in 2012 was near normal (actively drinking during this time, BLUSH HEPATOCYTE IRON)  Viral hep B negative in 2007    Cirrhosis Health Maintenance:   -- Last EGD in 2016 noted no varices, overdue for repeat  -- Last colonoscopy : lasti n 2014, overdue  -- HCC screening   U/S overdue   AFP WNL, next due 6/2020  -- Immunity to Hep A and B - completed vaccine series    Risk factors for NAFLD include obesity, HTN, HLD    Denies family history of liver disease . + significant alcohol consumption in the past, continues to drink currently   Social History     Substance and Sexual Activity   Alcohol Use Yes    Comment: heavy alcohol use in the past, now drinking daily, 2-3 beers daily     Denies jaundice, dark urine, abdominal distention, hematemesis, melena, slowed mentation. No abnormal skin rashes. No generalized joint or muscle pain.     PMHX:  has a past medical history of Alcohol abuse, Anxiety, Cirrhosis, Glaucoma, History of hepatitis C, s/p successful Rx w/ SVR24 - 1/2017, Hypertension, and Paresthesia.    PSHX:  has a past surgical history that includes Liver biopsy.    The patient's social and family histories were reviewed by me and updated in the appropriate section of the electronic medical record.    Review of patient's allergies indicates:  No Known Allergies    Current Outpatient Medications on File Prior to Visit   Medication Sig Dispense Refill    cyanocobalamin (VITAMIN B-12) 1000 MCG tablet Take 1,000 mcg by mouth once daily.       gabapentin (NEURONTIN) 600 MG tablet Take 1 tablet (600 mg total) by mouth 3 (three) times daily. 90 tablet 1    latanoprost (XALATAN) 0.005 % ophthalmic solution 1 drop every evening.       losartan (COZAAR) 50 MG tablet Take 1 tablet (50 mg total) by mouth once daily. Needs followup appointment for further refills. 90 tablet 11    milk  "thistle 175 mg tablet Take 175 mg by mouth once daily.      multivitamin (MULTIVITAMIN) per tablet Take 1 tablet by mouth once daily.        sertraline (ZOLOFT) 100 MG tablet Take 1 tablet (100 mg total) by mouth once daily. 90 tablet 1    tadalafil (CIALIS) 20 MG Tab Use one tablet every 36 hours 10 tablet 11    timolol maleate 0.5% (TIMOPTIC) 0.5 % Drop Place 1 drop into both eyes once daily.        No current facility-administered medications on file prior to visit.        SOCIAL HISTORY:   Social History     Tobacco Use   Smoking Status Current Some Day Smoker    Types: Cigars   Smokeless Tobacco Never Used   Tobacco Comment    smokes cigars 20 per month       Social History     Substance and Sexual Activity   Alcohol Use Yes    Comment: heavy alcohol use in the past, now drinking daily, 2-3 beers daily       Social History     Substance and Sexual Activity   Drug Use No       ROS:   GENERAL: Denies fever, chills, weight loss/gain, fatigue  HEENT: Denies headaches, dizziness, vision/hearing changes  CARDIOVASCULAR: Denies chest pain, palpitations, or edema  RESPIRATORY: Denies dyspnea, cough  GI: Denies abdominal pain, rectal bleeding, nausea, vomiting. No change in bowel pattern or color  : Denies dysuria, hematuria   SKIN: Denies rash, itching   NEURO: Denies confusion, memory loss, or mood changes  PSYCH: Denies depression or anxiety  HEME/LYMPH: Denies easy bruising or bleeding    Objective Findings:    PHYSICAL EXAM:   Friendly White male, in no acute distress; alert and oriented to person, place and time  VITALS: BP (!) 142/80 (BP Location: Left arm, Patient Position: Sitting, BP Method: Medium (Automatic))   Pulse 86   Resp 18   Ht 6' 3" (1.905 m)   Wt 115.1 kg (253 lb 12 oz)   SpO2 (!) 94%   BMI 31.72 kg/m²   HENT: Normocephalic, without obvious abnormality. Oral mucosa pink and moist. Dentition good.  EYES: Sclerae anicteric. No conjunctival pallor.   NECK: Supple. No masses or cervical " adenopathy.  CARDIOVASCULAR: Regular rate and rhythm. No murmurs.  RESPIRATORY: Normal respiratory effort. BBS CTA. No wheezes or crackles.  GI: Soft, non-tender, + protubant abd, likely r/t obesity. Does not appear to be ascites on exam. No hepatosplenomegaly. No masses palpable. No ascites.  EXTREMITIES:  No clubbing, cyanosis or edema.  SKIN: Warm and dry. No jaundice. No rashes noted to exposed skin. No telangectasias noted. No palmar erythema.  NEURO:  Normal gait. No asterixis.  PSYCH:  Memory intact. Thought and speech pattern appropriate. Behavior normal. No depression or anxiety noted.    DIAGNOSTIC STUDIES:  EGD-  Needs to schedule     COLONOSCOPY-   Needs to schedule     ABD. U/S-    lasti n 2017, needs to schedule repeat     LIVER BIOPSY-   In 2014  LIVER; NEEDLE BIOPSY:  CHRONIC HEPATITIS C WITH MINIMAL ACTIVITY (GRADE 1 OUT OF 4)  CIRRHOSIS (STAGE 4 OUT OF 4)  NO STEATOSIS.  BLUSH HEPATOCYTE IRON.  IRON AND TRICHROME WITH APPROPRIATE CONTROLS.        EDUCATION:  The disease process and manifestations of cirrhosis were discussed.    Discussed implications of a cirrhosis diagnosis with HCC screenings and EGD and why it is important for us to properly monitor for potential complications.     Signs and symptoms of hepatic decompensation were reviewed, including jaundice, ascites, and slowed mentation due to hepatic encephalopathy. The patient should seek medical attention if any of these things occur.  We discussed the potential for bleeding from esophageal varices with symptoms of hematemesis and melena. The patient should report to the Emergency Department for these symptoms.    We discussed the increased risk of hepatocellular carcinoma due to cirrhosis. Continued screening every six months with ultrasound and AFP is recommended, discussed with patient.     Cirrhosis Counseling  - strict abstinence of alcohol use (includes beer, wine, and/or liquor)  - avoid non-steroidal anti-inflammatory drugs  (NSAIDs) such as ibuprofen, Motrin, naprosyn, Alleve due to the risk of kidney damage  - can take acetaminophen (Tylenol), no more than 2000 mg per day  - low sodium (salt) 2 gram per day diet  - high protein diet: 140 grams per day to prevent muscle mass loss. Recommended at least 1 protein shake daily using Premier Protein shakes    - resistance exercises for muscle strength  - avoid raw seafoods due to the risk of fatal Vibrio vulnificus infection  - ultrasound of the liver every 6 months for liver cancer screening  - Upper endoscopy every 1-2 years to screen for varices in the stomach and esophagus      ASSESSMENT & PLAN:  64 y.o. White male with:  1.  Cirrhosis due to Hep C + alcohol, well compensated, Biopsy proven  -- MELD-Na score: 6 at 11/18/2019  4:01 PM  MELD score: 6 at 11/18/2019  4:01 PM  Calculated from:  Serum Creatinine: 1.0 mg/dL at 11/18/2019  4:01 PM  Serum Sodium: 137 mmol/L at 11/18/2019  4:01 PM  Total Bilirubin: 0.8 mg/dL (Rounded to 1 mg/dL) at 11/18/2019  4:01 PM  INR(ratio): 0.9 (Rounded to 1) at 11/18/2019  4:01 PM  Age: 64 years  -- HCC screening: AFP and abd. U/S.. AFP WNL, US due now, overdue  -- Immunity to Hep A and B - s/p vaccines  -- Next EGD Due now, overdue, order placed, pt instructed to call and schedule . Can do along with colonoscopy ordered by PCP  --- Previous serologic w/u showed negative ABDIRAHMAN  Elevated ferritin but iron sat in 2012 was near normal (actively drinking during this time, BLUSH HEPATOCYTE IRON)  Viral hep B negative in 2007  -- Cirrhosis counseling as noted above and discussed with patient. Discussed with patient that do not recommend any elective abdominal surgery due to risk of hepatic decompensation.      2. H/o Hep C with SVR    3. Fatty liver  -- risk factors for fatty liver: alcohol use, obesity, HTN, HLD  Recommend:  1. Weight loss goal of 25-35 lbs, referral for [facility] if interested  2. Low carb/sugar, high fiber and protein diet  3.  Exercise, 5 days per week, 30 minutes per day, as tolerated  4. Recommend good cholesterol, blood pressure, blood sugar levels   5. NO alcohol use for life     4. Alcohol abuse   -- reports currently seeing counselor for alcohol use through work, not interested in addiction psych consult at this time   -- Recommended NO alcohol for life (beer, wine and liquor), pt aware  -- PETH with next labs  Social History     Substance and Sexual Activity   Alcohol Use Yes    Comment: 1-2 beers per day       Follow up in about 7 months (around 6/20/2020). US now, then labs and US 5/2020, then f/u visit 6/2020    Thank you for allowing me to participate in the care of Froylan Marte, MOHINI-C    CC'ed note to:   Vignesh Almonte MD

## 2019-11-29 ENCOUNTER — HOSPITAL ENCOUNTER (OUTPATIENT)
Dept: RADIOLOGY | Facility: HOSPITAL | Age: 64
Discharge: HOME | End: 2019-11-29
Attending: NURSE PRACTITIONER
Payer: COMMERCIAL

## 2019-11-29 DIAGNOSIS — K74.60 CIRRHOSIS OF LIVER WITHOUT ASCITES, UNSPECIFIED HEPATIC CIRRHOSIS TYPE: ICD-10-CM

## 2019-11-29 PROCEDURE — 76700 US EXAM ABDOM COMPLETE: CPT | Mod: 26,,, | Performed by: RADIOLOGY

## 2019-11-29 PROCEDURE — 76700 US ABDOMEN COMPLETE: ICD-10-PCS | Mod: 26,,, | Performed by: RADIOLOGY

## 2019-11-29 PROCEDURE — 76700 US EXAM ABDOM COMPLETE: CPT | Mod: TC

## 2019-12-02 ENCOUNTER — TELEPHONE (OUTPATIENT)
Dept: HEPATOLOGY | Facility: CLINIC | Age: 64
End: 2019-12-02

## 2019-12-03 NOTE — TELEPHONE ENCOUNTER
----- Message from Marlys Marte NP sent at 12/2/2019  9:19 AM CST -----  Please mail to patient: Ultrasound stable, no lesions concerning for liver cancer. Enlarged spleen due to cirrhosis. Repeat in 6 months as discussed   [FreeTextEntry2] : S/P Left computer-assisted TKR DOS:12/19/18.

## 2019-12-11 ENCOUNTER — OFFICE VISIT (OUTPATIENT)
Dept: INTERNAL MEDICINE | Facility: CLINIC | Age: 64
End: 2019-12-11
Payer: COMMERCIAL

## 2019-12-11 VITALS
DIASTOLIC BLOOD PRESSURE: 84 MMHG | HEART RATE: 77 BPM | SYSTOLIC BLOOD PRESSURE: 148 MMHG | OXYGEN SATURATION: 96 % | WEIGHT: 262.56 LBS | BODY MASS INDEX: 32.82 KG/M2

## 2019-12-11 DIAGNOSIS — I10 ESSENTIAL HYPERTENSION: Primary | ICD-10-CM

## 2019-12-11 PROCEDURE — 99999 PR PBB SHADOW E&M-EST. PATIENT-LVL IV: ICD-10-PCS | Mod: PBBFAC,,, | Performed by: PHYSICIAN ASSISTANT

## 2019-12-11 PROCEDURE — 99999 PR PBB SHADOW E&M-EST. PATIENT-LVL IV: CPT | Mod: PBBFAC,,, | Performed by: PHYSICIAN ASSISTANT

## 2019-12-11 PROCEDURE — 99213 PR OFFICE/OUTPT VISIT, EST, LEVL III, 20-29 MIN: ICD-10-PCS | Mod: S$GLB,,, | Performed by: PHYSICIAN ASSISTANT

## 2019-12-11 PROCEDURE — 99213 OFFICE O/P EST LOW 20 MIN: CPT | Mod: S$GLB,,, | Performed by: PHYSICIAN ASSISTANT

## 2019-12-11 NOTE — Clinical Note
Gabriel Almonte, Mr. Borja is planning to enroll in outpatient rehab at [facility] in [place]. He states he needs a letter from his PCP stating it is okay for him to proceed with outpatient rehab given his elevated BP today. I am having him come back on Monday for a BP check as I believe his BP was elevated due to not taking meds this morning. If it is persistently elevated on RTC I plan to increase his losartan. Maria A Jean PA-C

## 2019-12-16 ENCOUNTER — CLINICAL SUPPORT (OUTPATIENT)
Dept: INTERNAL MEDICINE | Facility: CLINIC | Age: 64
End: 2019-12-16
Payer: COMMERCIAL

## 2019-12-16 ENCOUNTER — TELEPHONE (OUTPATIENT)
Dept: INTERNAL MEDICINE | Facility: CLINIC | Age: 64
End: 2019-12-16

## 2019-12-16 DIAGNOSIS — I10 ESSENTIAL HYPERTENSION: ICD-10-CM

## 2019-12-16 NOTE — TELEPHONE ENCOUNTER
Pt is here for b/p check, 167/98, p 87. Pt took Losartan 50 mg one tablet this am as prescribed. Pt is asymptomatic, denies headache, dizziness.

## 2019-12-16 NOTE — LETTER
December 17, 2019         [facility]  Internal Medicine  [address]  [facility] [address]  Phone: [phone]  Fax: [phone]   December 17, 2019     Patient: Froylan Borja   YOB: 1955   Date of Visit: 12/16/2019       To Whom it May Concern:    Froylan Borja was seen in my clinic on 12/11/19 and 12/1619. Blood pressure medication adjustments were made. He may participate in outpatient rehab.       If you have any questions or concerns, please don't hesitate to call.    Sincerely,           Maria A Jean PA-C

## 2019-12-17 RX ORDER — LOSARTAN POTASSIUM 100 MG/1
100 TABLET ORAL DAILY
Qty: 90 TABLET | Refills: 1 | Status: SHIPPED | OUTPATIENT
Start: 2019-12-17 | End: 2020-01-10 | Stop reason: SDUPTHER

## 2019-12-17 NOTE — TELEPHONE ENCOUNTER
Spoke to patient.   Advised to increase losartan 50-->100mg  F/u with PCP in 4 weeks for repeat BP check.   Note faxed to [facility].

## 2019-12-18 ENCOUNTER — TELEPHONE (OUTPATIENT)
Dept: INTERNAL MEDICINE | Facility: CLINIC | Age: 64
End: 2019-12-18

## 2019-12-18 NOTE — TELEPHONE ENCOUNTER
"I called and spoke to Mariella Licensed  professional counselor, patient went there for ETOH services- severe. 12 /11 and 12/16/ 2019, letter came from us patient was seen on the above dates for BP medications  Which were then adjusted, the letter also indicated that "he may participate in outpatient".   The Concerns are as follows;  Extensive h/o ETOH, recommended IOP by his job "[employer]", after assessment was done d/t age, BP, Wt, and longevity of ETOH consumption and possibility of Seizures if the withdrawal from ETOH is not monitored correctly/medically, Dru is now recommending complete  short term detox program prior to going to IOP, I shared my concerns of HTN, the patients response was , "I forgot my Losartan that day." D/t al of the factors i'm not accepting him at this time unless he is willing to be an inpatient..  He interpreted that as, if his BP is under control he can do outpatient rehab.  I feel strongly and am standing by my suggestion that he needs inpatient before we can proceed. The name of my facility is,  [facility].   Question for Provider,  did patient disclose the level of drinking to Ted. Did he said he was going to stop the alcohol on his on? Can Ms Ted call me so we can have a conversation on the care of this patient so we can develop a plan going forward. Maria A can you call him back today please Dru's number is in the previous message. Dr Suzy ORTIZ.  "

## 2019-12-18 NOTE — TELEPHONE ENCOUNTER
Hi Joann, will you please call back Dru. If Dru requests that he have inpatient detox then I stand by Dru's recommendation.  Let me know if Dru has any more questions.  Thank you, Vignesh Almonte

## 2019-12-18 NOTE — TELEPHONE ENCOUNTER
Pt seen by me for BP check. I was informed by patient that rehab facility recommended he come in his for eval of his HTN control so he may participate in outpatient rehab. That was the scope of our visit. I will defer further concerns to patient's PCP Dr. Almonte. I informed Dru via phone of the above as well.

## 2019-12-18 NOTE — TELEPHONE ENCOUNTER
----- Message from Melinda Espinoza sent at 12/18/2019  9:36 AM CST -----  Contact: Dru [phone]  Dru would like to speak to the nurse in regards to Outpatient Abuse, please advise

## 2019-12-26 NOTE — TELEPHONE ENCOUNTER
Hi, please call him -- I understand that his blood pressures have been running high.    Please call and offer him a followup appt with me for 2-3 weeks from now to check his pressures.    Let me know if patient has any questions.  Thank you, Vignesh Almonte

## 2020-01-10 ENCOUNTER — OFFICE VISIT (OUTPATIENT)
Dept: INTERNAL MEDICINE | Facility: CLINIC | Age: 65
End: 2020-01-10
Payer: COMMERCIAL

## 2020-01-10 VITALS
SYSTOLIC BLOOD PRESSURE: 135 MMHG | BODY MASS INDEX: 33.81 KG/M2 | HEART RATE: 90 BPM | DIASTOLIC BLOOD PRESSURE: 75 MMHG | HEIGHT: 74 IN | WEIGHT: 263.44 LBS | OXYGEN SATURATION: 98 %

## 2020-01-10 DIAGNOSIS — G62.1 ALCOHOL-INDUCED POLYNEUROPATHY: Primary | ICD-10-CM

## 2020-01-10 DIAGNOSIS — I10 ESSENTIAL HYPERTENSION: ICD-10-CM

## 2020-01-10 DIAGNOSIS — F32.A DEPRESSION, UNSPECIFIED DEPRESSION TYPE: ICD-10-CM

## 2020-01-10 DIAGNOSIS — F10.10 ALCOHOL ABUSE: ICD-10-CM

## 2020-01-10 PROCEDURE — 99999 PR PBB SHADOW E&M-EST. PATIENT-LVL III: CPT | Mod: PBBFAC,,, | Performed by: INTERNAL MEDICINE

## 2020-01-10 PROCEDURE — 99214 OFFICE O/P EST MOD 30 MIN: CPT | Mod: S$GLB,,, | Performed by: INTERNAL MEDICINE

## 2020-01-10 PROCEDURE — 99999 PR PBB SHADOW E&M-EST. PATIENT-LVL III: ICD-10-PCS | Mod: PBBFAC,,, | Performed by: INTERNAL MEDICINE

## 2020-01-10 PROCEDURE — 99214 PR OFFICE/OUTPT VISIT, EST, LEVL IV, 30-39 MIN: ICD-10-PCS | Mod: S$GLB,,, | Performed by: INTERNAL MEDICINE

## 2020-01-10 RX ORDER — SERTRALINE HYDROCHLORIDE 100 MG/1
100 TABLET, FILM COATED ORAL DAILY
Qty: 90 TABLET | Refills: 1 | Status: SHIPPED | OUTPATIENT
Start: 2020-01-10 | End: 2020-06-17

## 2020-01-10 RX ORDER — LOSARTAN POTASSIUM 100 MG/1
100 TABLET ORAL DAILY
Qty: 90 TABLET | Refills: 11 | Status: SHIPPED | OUTPATIENT
Start: 2020-01-10 | End: 2021-02-11 | Stop reason: SDUPTHER

## 2020-01-10 NOTE — PROGRESS NOTES
Subjective:       Patient ID: Froylan Borja is a 64 y.o. male.    Chief Complaint: Follow-up (B/p)    Patient is here for followup for chronic conditions.    2-3 mos ago work required he receive etoh treatment. ? [facility]. They did not think he was appropriate for outpt detox, he has not been able to do inpt detox due to caring for his wife.  He cannot recall last day he went without etoh. He drinks at least 6 beers per day, usu from a bar. Wife has been drinking as well. Both have been sober in past and did AA in past.    He c/o chronic neuropathy.    Pressures have been karen of late and losartan dose has been increased.    No actual complaints.        Review of Systems   Constitutional: Negative for activity change, appetite change, fatigue, fever and unexpected weight change.   HENT: Negative for congestion.    Eyes: Negative for visual disturbance.   Respiratory: Negative for cough, chest tightness and shortness of breath.    Cardiovascular: Positive for leg swelling (chronic). Negative for chest pain and palpitations.   Gastrointestinal: Negative for abdominal distention, abdominal pain, nausea and vomiting.   Genitourinary: Negative for decreased urine volume.   Musculoskeletal: Negative for arthralgias.   Skin: Negative for rash and wound.   Neurological: Negative for tremors, syncope and weakness.   Hematological: Negative for adenopathy. Does not bruise/bleed easily.   Psychiatric/Behavioral: Negative for dysphoric mood. The patient is not nervous/anxious.        Objective:      Physical Exam   Constitutional: He is oriented to person, place, and time. He appears well-developed and well-nourished. No distress.   HENT:   Head: Normocephalic and atraumatic.   Eyes: No scleral icterus.   Neck: Normal range of motion. No thyromegaly present.   Cardiovascular: Normal rate, regular rhythm and normal heart sounds. Exam reveals no gallop and no friction rub.   No murmur heard.  Pulmonary/Chest: Effort  normal and breath sounds normal. No respiratory distress. He has no wheezes. He has no rales.   Abdominal: Soft. Bowel sounds are normal. He exhibits distension (mild). He exhibits no mass. There is no tenderness. There is no rebound and no guarding.   Musculoskeletal: Normal range of motion. He exhibits edema.   1+ bilat lower leg swelling with chronic venous stasis   Lymphadenopathy:     He has no cervical adenopathy.   Neurological: He is alert and oriented to person, place, and time.   Skin: No lesion noted. He is not diaphoretic.   Psychiatric: He has a normal mood and affect. His behavior is normal. Judgment and thought content normal.   Calm appearing, not intoxicated       Assessment:       1. Essential hypertension    2. Depression, unspecified depression type        Plan:       Froylan was seen today for follow-up.    Diagnoses and all orders for this visit:    Essential hypertension  -     losartan (COZAAR) 100 MG tablet; Take 1 tablet (100 mg total) by mouth once daily.  Now btr controlled    Depression, unspecified depression type  -     sertraline (ZOLOFT) 100 MG tablet; Take 1 tablet (100 mg total) by mouth once daily.  Stay on, to help with mood and maybe help him with etoh.      He will try to wean off etoh. Since not eligibl;e for outpt detox and cannot leave wife for inpt  Declines SW referral    Neuropathy from etoh, cut back on etoh instead of gabapentin rx      Health Maintenance       Date Due Completion Date    TETANUS VACCINE 04/01/1973 ---    Shingles Vaccine (1 of 2) 04/01/2005 ---    Colonoscopy 08/14/2019 8/14/2014    Influenza Vaccine (1) 09/01/2019 10/31/2018    Lipid Panel 11/18/2024 11/18/2019      Will try to get colo through GI, declines vaccines    Follow up in about 6 months (around 7/10/2020).    Future Appointments   Date Time Provider Department Center   5/20/2020  8:00 AM [facility]I-US1 [facility]   5/20/2020  9:10 AM LAB, APPOINTMENT [facility]   7/2/2020  8:40 AM [facility]   7/10/2020  1:00 PM Vignesh Almonte MD [facility]

## 2020-01-29 ENCOUNTER — TELEPHONE (OUTPATIENT)
Dept: HEPATOLOGY | Facility: CLINIC | Age: 65
End: 2020-01-29

## 2020-03-22 DIAGNOSIS — N52.9 ERECTILE DYSFUNCTION, UNSPECIFIED ERECTILE DYSFUNCTION TYPE: ICD-10-CM

## 2020-03-23 RX ORDER — TADALAFIL 20 MG/1
TABLET ORAL
Qty: 6 TABLET | Refills: 19 | Status: SHIPPED | OUTPATIENT
Start: 2020-03-23 | End: 2020-12-11 | Stop reason: SDUPTHER

## 2020-05-19 ENCOUNTER — TELEPHONE (OUTPATIENT)
Dept: HEPATOLOGY | Facility: CLINIC | Age: 65
End: 2020-05-19

## 2020-05-19 NOTE — TELEPHONE ENCOUNTER
Contacted patient and rescheduled appointment as instructed by Mrs. Frankel. Sent reminders in the mail.

## 2020-05-20 ENCOUNTER — HOSPITAL ENCOUNTER (OUTPATIENT)
Dept: RADIOLOGY | Facility: HOSPITAL | Age: 65
Discharge: HOME | End: 2020-05-20
Attending: NURSE PRACTITIONER
Payer: COMMERCIAL

## 2020-05-20 DIAGNOSIS — K74.60 CIRRHOSIS OF LIVER WITHOUT ASCITES, UNSPECIFIED HEPATIC CIRRHOSIS TYPE: ICD-10-CM

## 2020-05-20 PROCEDURE — 76700 US EXAM ABDOM COMPLETE: CPT | Mod: TC

## 2020-05-20 PROCEDURE — 76700 US EXAM ABDOM COMPLETE: CPT | Mod: 26,,, | Performed by: RADIOLOGY

## 2020-05-20 PROCEDURE — 76700 US ABDOMEN COMPLETE: ICD-10-PCS | Mod: 26,,, | Performed by: RADIOLOGY

## 2020-05-22 ENCOUNTER — TELEPHONE (OUTPATIENT)
Dept: HEPATOLOGY | Facility: CLINIC | Age: 65
End: 2020-05-22

## 2020-05-22 NOTE — TELEPHONE ENCOUNTER
----- Message from Marlys Marte NP sent at 5/21/2020  6:43 PM CDT -----  Please mail to patient: stable ultrasound, no concerns for liver cancer. See you at your appt in July

## 2020-05-27 ENCOUNTER — OFFICE VISIT (OUTPATIENT)
Dept: INTERNAL MEDICINE | Facility: CLINIC | Age: 65
End: 2020-05-27
Payer: COMMERCIAL

## 2020-05-27 VITALS
SYSTOLIC BLOOD PRESSURE: 144 MMHG | HEART RATE: 93 BPM | HEIGHT: 74 IN | WEIGHT: 256.63 LBS | OXYGEN SATURATION: 98 % | BODY MASS INDEX: 32.94 KG/M2 | TEMPERATURE: 100 F | DIASTOLIC BLOOD PRESSURE: 66 MMHG

## 2020-05-27 DIAGNOSIS — R31.0 GROSS HEMATURIA: Primary | ICD-10-CM

## 2020-05-27 DIAGNOSIS — I10 ESSENTIAL HYPERTENSION: ICD-10-CM

## 2020-05-27 DIAGNOSIS — R35.1 NOCTURIA: ICD-10-CM

## 2020-05-27 DIAGNOSIS — R68.82 DECREASED LIBIDO: ICD-10-CM

## 2020-05-27 LAB
BILIRUB UR QL STRIP: NEGATIVE
CLARITY UR REFRACT.AUTO: ABNORMAL
COLOR UR AUTO: ABNORMAL
GLUCOSE UR QL STRIP: NEGATIVE
HGB UR QL STRIP: NEGATIVE
KETONES UR QL STRIP: NEGATIVE
LEUKOCYTE ESTERASE UR QL STRIP: NEGATIVE
NITRITE UR QL STRIP: NEGATIVE
PH UR STRIP: 5 [PH] (ref 5–8)
PROT UR QL STRIP: NEGATIVE
SP GR UR STRIP: 1.02 (ref 1–1.03)
URN SPEC COLLECT METH UR: ABNORMAL

## 2020-05-27 PROCEDURE — 87086 URINE CULTURE/COLONY COUNT: CPT

## 2020-05-27 PROCEDURE — 99999 PR PBB SHADOW E&M-EST. PATIENT-LVL IV: CPT | Mod: PBBFAC,,, | Performed by: FAMILY MEDICINE

## 2020-05-27 PROCEDURE — 3077F PR MOST RECENT SYSTOLIC BLOOD PRESSURE >= 140 MM HG: ICD-10-PCS | Mod: CPTII,S$GLB,, | Performed by: FAMILY MEDICINE

## 2020-05-27 PROCEDURE — 81003 URINALYSIS AUTO W/O SCOPE: CPT

## 2020-05-27 PROCEDURE — 99214 PR OFFICE/OUTPT VISIT, EST, LEVL IV, 30-39 MIN: ICD-10-PCS | Mod: S$GLB,,, | Performed by: FAMILY MEDICINE

## 2020-05-27 PROCEDURE — 87186 SC STD MICRODIL/AGAR DIL: CPT

## 2020-05-27 PROCEDURE — 87088 URINE BACTERIA CULTURE: CPT

## 2020-05-27 PROCEDURE — 3008F BODY MASS INDEX DOCD: CPT | Mod: CPTII,S$GLB,, | Performed by: FAMILY MEDICINE

## 2020-05-27 PROCEDURE — 1101F PR PT FALLS ASSESS DOC 0-1 FALLS W/OUT INJ PAST YR: ICD-10-PCS | Mod: CPTII,S$GLB,, | Performed by: FAMILY MEDICINE

## 2020-05-27 PROCEDURE — 3008F PR BODY MASS INDEX (BMI) DOCUMENTED: ICD-10-PCS | Mod: CPTII,S$GLB,, | Performed by: FAMILY MEDICINE

## 2020-05-27 PROCEDURE — 3078F PR MOST RECENT DIASTOLIC BLOOD PRESSURE < 80 MM HG: ICD-10-PCS | Mod: CPTII,S$GLB,, | Performed by: FAMILY MEDICINE

## 2020-05-27 PROCEDURE — 87077 CULTURE AEROBIC IDENTIFY: CPT

## 2020-05-27 PROCEDURE — 99999 PR PBB SHADOW E&M-EST. PATIENT-LVL IV: ICD-10-PCS | Mod: PBBFAC,,, | Performed by: FAMILY MEDICINE

## 2020-05-27 PROCEDURE — 3078F DIAST BP <80 MM HG: CPT | Mod: CPTII,S$GLB,, | Performed by: FAMILY MEDICINE

## 2020-05-27 PROCEDURE — 99214 OFFICE O/P EST MOD 30 MIN: CPT | Mod: S$GLB,,, | Performed by: FAMILY MEDICINE

## 2020-05-27 PROCEDURE — 3077F SYST BP >= 140 MM HG: CPT | Mod: CPTII,S$GLB,, | Performed by: FAMILY MEDICINE

## 2020-05-27 PROCEDURE — 1101F PT FALLS ASSESS-DOCD LE1/YR: CPT | Mod: CPTII,S$GLB,, | Performed by: FAMILY MEDICINE

## 2020-05-27 NOTE — PROGRESS NOTES
Subjective:      Patient ID: Froylan Borja is a 65 y.o. male.    Chief Complaint: Hematuria (Saturday pass 1x only )      HPI:  Froylan Borja is a 65 year old male with anxiety, alcohol abuse, cirrhosis, hypertension, history of hepatitis, and obesity who presents to clinic today with a chief complaint of blood coming from penis.    Patient is new to me; PCP is Dr. Vignesh Almonte    States he noticed blood in his urine this past Friday or Saturday.  New problem for patient.  Denies associated dysuria.  Denies associated flank pain.  States he thinks he urinates too much especially at night.  Denies any difficult starting urinary stream or voiding.  Denies any abnormal urine odor.  Urine was a little darker yellow than typical.  Denies any history of UTIs.  Denies history of kidney stones.  Denies any further recurrence of hematuria.  States he does smoke cigars (couple this past weekend).  States he does have what appears to be a cyst on his scrotum--stable in size.       Blood pressure significantly elevated today.  Prescribed losartan 100 mg by mouth daily.    States he doesn't have much libido anymore either.      Past Medical History:   Diagnosis Date    Alcohol abuse     Anxiety     Cirrhosis     Glaucoma     History of hepatitis C, s/p successful Rx w/ SVR24 - 1/2017     genotype 1;  relapse following PegIFN+RBV+Victrelis; prior relapse following PegIFN+RBV S/p 12 weeks harvoni + RBV w/ SVR    Hypertension     Paresthesia     feet, bilaterally       Past Surgical History:   Procedure Laterality Date    LIVER BIOPSY         Family History   Problem Relation Age of Onset    Diabetes Mellitus Father     Hypertension Father     Cancer Father         ? CRC    Diabetes Father     Cancer Mother         colon vs polyps, colectomy    Cancer Sister         ? CRC    Colonic polyp Brother     Cirrhosis Neg Hx        Social History     Socioeconomic History    Marital status:      Spouse name: Not on file     Number of children: Not on file    Years of education: Not on file    Highest education level: Not on file   Occupational History    Not on file   Social Needs    Financial resource strain: Not on file    Food insecurity:     Worry: Not on file     Inability: Not on file    Transportation needs:     Medical: Not on file     Non-medical: Not on file   Tobacco Use    Smoking status: Current Some Day Smoker     Types: Cigars    Smokeless tobacco: Never Used    Tobacco comment: smokes cigars 20 per month   Substance and Sexual Activity    Alcohol use: Yes     Comment: heavy alcohol use in the past, now drinking daily, 2-3 beers daily    Drug use: No    Sexual activity: Not on file   Lifestyle    Physical activity:     Days per week: Not on file     Minutes per session: Not on file    Stress: Not on file   Relationships    Social connections:     Talks on phone: Not on file     Gets together: Not on file     Attends Mosque service: Not on file     Active member of club or organization: Not on file     Attends meetings of clubs or organizations: Not on file     Relationship status: Not on file   Other Topics Concern    Not on file   Social History Narrative     (healthy),  at [employer]. No kids. 2 dogs. No exercise.       Review of Systems   Constitutional: Negative for chills, fatigue and fever.        + Decreased libido   HENT: Negative for congestion, hearing loss, nosebleeds, rhinorrhea, sore throat and trouble swallowing.    Eyes: Negative for pain and visual disturbance.   Respiratory: Negative for cough, shortness of breath and wheezing.    Cardiovascular: Negative for chest pain and palpitations.   Gastrointestinal: Negative for abdominal distention, abdominal pain, constipation, diarrhea, nausea and vomiting.   Genitourinary: Positive for frequency and hematuria. Negative for difficulty urinating, dysuria and urgency.        + Nocturia   Musculoskeletal: Negative for arthralgias,  "back pain and myalgias.   Skin: Negative for color change and rash.   Neurological: Negative for dizziness, syncope, speech difficulty, weakness, numbness and headaches.   Psychiatric/Behavioral: Negative for agitation, behavioral problems and confusion. The patient is not nervous/anxious.      Objective:     Vitals:    05/27/20 1505 05/27/20 1551   BP: (!) 190/90 (!) 144/66   BP Location: Left arm Left arm   Patient Position: Sitting Sitting   BP Method: Medium (Manual) Medium (Manual)   Pulse: 93    Temp: 99.7 °F (37.6 °C)    TempSrc: Oral    SpO2: 98%    Weight: 116.4 kg (256 lb 9.9 oz)    Height: 6' 2" (1.88 m)        Physical Exam   Constitutional: He appears well-developed and well-nourished. He is cooperative. No distress.   HENT:   Head: Normocephalic and atraumatic.   Right Ear: Hearing and external ear normal.   Left Ear: Hearing and external ear normal.   Nose: Nose normal. No rhinorrhea. No epistaxis.   Mouth/Throat: Mucous membranes are normal. No oral lesions.   Eyes: Pupils are equal, round, and reactive to light. Conjunctivae and lids are normal. Right eye exhibits no discharge. Left eye exhibits no discharge.   Neck: Trachea normal. Neck supple. No tracheal deviation present.   Cardiovascular: Normal rate, regular rhythm and normal heart sounds. Exam reveals no gallop and no friction rub.   No murmur heard.  Pulmonary/Chest: Effort normal and breath sounds normal. No respiratory distress. He has no wheezes. He has no rales.   Abdominal: Soft. Bowel sounds are normal. He exhibits no distension. There is no tenderness. There is no rebound, no guarding and no CVA tenderness.   Musculoskeletal: He exhibits no deformity.   Neurological: He is alert. He exhibits normal muscle tone.   Skin: Skin is warm and dry. No rash noted.   Psychiatric: He has a normal mood and affect. His speech is normal and behavior is normal. Judgment and thought content normal. Cognition and memory are normal.   Nursing note and " vitals reviewed.     Assessment:      1. Gross hematuria    2. Nocturia    3. Decreased libido    4. Essential hypertension      Plan:   Froylan was seen today for hematuria.    Diagnoses and all orders for this visit:    Gross hematuria  -     URINALYSIS  -     Urine culture  -     Ambulatory referral/consult to Urology; Future; consider cystoscope in patient with cigar use on weekends; recommended tobacco cessation  -     US Retroperitoneal Complete (Kidney and; Future to rule out nephrolithiasis    Nocturia  -     Ambulatory referral/consult to Urology; Future; suspect BPH, most recent PSA Nov 2019 within normal limits    Decreased libido  -     Testosterone; Future    Essential hypertension         -      Significantly improved on recheck, unsure of accuracy of first BP recorded per MA.  Continue current regimen.  Recommended patient start checking BP daily, keep a running log, and call to notify PCP in 1 week of daily values.  Counseled patient on the importance of a low sodium diet and daily aerobic exercise.

## 2020-05-28 ENCOUNTER — TELEPHONE (OUTPATIENT)
Dept: INTERNAL MEDICINE | Facility: CLINIC | Age: 65
End: 2020-05-28

## 2020-05-28 ENCOUNTER — LAB VISIT (OUTPATIENT)
Dept: LAB | Facility: HOSPITAL | Age: 65
End: 2020-05-28
Attending: FAMILY MEDICINE
Payer: COMMERCIAL

## 2020-05-28 DIAGNOSIS — R68.82 DECREASED LIBIDO: ICD-10-CM

## 2020-05-28 DIAGNOSIS — R79.89 LOW TESTOSTERONE: Primary | ICD-10-CM

## 2020-05-28 LAB — TESTOST SERPL-MCNC: 214 NG/DL (ref 304–1227)

## 2020-05-28 PROCEDURE — 36415 COLL VENOUS BLD VENIPUNCTURE: CPT

## 2020-05-28 PROCEDURE — 84403 ASSAY OF TOTAL TESTOSTERONE: CPT

## 2020-05-28 NOTE — TELEPHONE ENCOUNTER
----- Message from Jabier Rivera MD sent at 5/28/2020 11:54 AM CDT -----  Please notify patient of the following:    Testosterone low at 214; recommend repeat again in 1 month (ordered, please process/assist with scheduling) and if persistently low then, can then follow up with urology to discuss supplementation.

## 2020-05-29 ENCOUNTER — TELEPHONE (OUTPATIENT)
Dept: INTERNAL MEDICINE | Facility: CLINIC | Age: 65
End: 2020-05-29

## 2020-05-29 ENCOUNTER — TELEPHONE (OUTPATIENT)
Dept: PRIMARY CARE CLINIC | Facility: CLINIC | Age: 65
End: 2020-05-29

## 2020-05-29 DIAGNOSIS — R31.9 URINARY TRACT INFECTION WITH HEMATURIA, SITE UNSPECIFIED: Primary | ICD-10-CM

## 2020-05-29 DIAGNOSIS — N39.0 URINARY TRACT INFECTION WITH HEMATURIA, SITE UNSPECIFIED: Primary | ICD-10-CM

## 2020-05-29 LAB — BACTERIA UR CULT: ABNORMAL

## 2020-05-29 RX ORDER — CIPROFLOXACIN 500 MG/1
500 TABLET ORAL EVERY 12 HOURS
Qty: 10 TABLET | Refills: 0 | Status: SHIPPED | OUTPATIENT
Start: 2020-05-29 | End: 2020-06-03

## 2020-05-29 NOTE — TELEPHONE ENCOUNTER
----- Message from Jabier Rivera MD sent at 5/29/2020  7:56 AM CDT -----  Please notify patient of the following:    Urinalysis was normal but urine culture is growing out gram negative bacteria indicating possible UTI.  Start cipro 500 mg PO every 12 hours for 5 days (rx sent electronically).  Will tailor antibiotics as needed pending sensitivities.

## 2020-05-29 NOTE — TELEPHONE ENCOUNTER
Spoke with pt, pt stated he has frequency at night. I explained your message.     I explained to start the cipro and if antibiotics needed to be changed, he would be notified.

## 2020-06-01 ENCOUNTER — PATIENT OUTREACH (OUTPATIENT)
Dept: ADMINISTRATIVE | Facility: OTHER | Age: 65
End: 2020-06-01

## 2020-06-02 ENCOUNTER — OFFICE VISIT (OUTPATIENT)
Dept: UROLOGY | Facility: CLINIC | Age: 65
End: 2020-06-02
Payer: COMMERCIAL

## 2020-06-02 VITALS
DIASTOLIC BLOOD PRESSURE: 77 MMHG | WEIGHT: 257.5 LBS | BODY MASS INDEX: 32.02 KG/M2 | HEIGHT: 75 IN | HEART RATE: 95 BPM | SYSTOLIC BLOOD PRESSURE: 145 MMHG

## 2020-06-02 DIAGNOSIS — R31.0 GROSS HEMATURIA: Primary | ICD-10-CM

## 2020-06-02 DIAGNOSIS — A49.9 BACTERIAL UTI: ICD-10-CM

## 2020-06-02 DIAGNOSIS — R35.0 FREQUENCY OF URINATION: ICD-10-CM

## 2020-06-02 DIAGNOSIS — N39.0 BACTERIAL UTI: ICD-10-CM

## 2020-06-02 DIAGNOSIS — R35.1 NOCTURIA: ICD-10-CM

## 2020-06-02 DIAGNOSIS — R39.15 URGENCY OF URINATION: ICD-10-CM

## 2020-06-02 DIAGNOSIS — R79.89 LOW TESTOSTERONE IN MALE: ICD-10-CM

## 2020-06-02 PROCEDURE — 51798 US URINE CAPACITY MEASURE: CPT | Mod: S$GLB,,, | Performed by: PHYSICIAN ASSISTANT

## 2020-06-02 PROCEDURE — 3008F BODY MASS INDEX DOCD: CPT | Mod: CPTII,S$GLB,, | Performed by: PHYSICIAN ASSISTANT

## 2020-06-02 PROCEDURE — 81002 PR URINALYSIS NONAUTO W/O SCOPE: ICD-10-PCS | Mod: S$GLB,,, | Performed by: PHYSICIAN ASSISTANT

## 2020-06-02 PROCEDURE — 99999 PR PBB SHADOW E&M-EST. PATIENT-LVL IV: CPT | Mod: PBBFAC,,, | Performed by: PHYSICIAN ASSISTANT

## 2020-06-02 PROCEDURE — 1101F PT FALLS ASSESS-DOCD LE1/YR: CPT | Mod: CPTII,S$GLB,, | Performed by: PHYSICIAN ASSISTANT

## 2020-06-02 PROCEDURE — 81002 URINALYSIS NONAUTO W/O SCOPE: CPT | Mod: S$GLB,,, | Performed by: PHYSICIAN ASSISTANT

## 2020-06-02 PROCEDURE — 99999 PR PBB SHADOW E&M-EST. PATIENT-LVL IV: ICD-10-PCS | Mod: PBBFAC,,, | Performed by: PHYSICIAN ASSISTANT

## 2020-06-02 PROCEDURE — 3078F DIAST BP <80 MM HG: CPT | Mod: CPTII,S$GLB,, | Performed by: PHYSICIAN ASSISTANT

## 2020-06-02 PROCEDURE — 3077F PR MOST RECENT SYSTOLIC BLOOD PRESSURE >= 140 MM HG: ICD-10-PCS | Mod: CPTII,S$GLB,, | Performed by: PHYSICIAN ASSISTANT

## 2020-06-02 PROCEDURE — 99204 OFFICE O/P NEW MOD 45 MIN: CPT | Mod: 25,S$GLB,, | Performed by: PHYSICIAN ASSISTANT

## 2020-06-02 PROCEDURE — 51798 PR MEAS,POST-VOID RES,US,NON-IMAGING: ICD-10-PCS | Mod: S$GLB,,, | Performed by: PHYSICIAN ASSISTANT

## 2020-06-02 PROCEDURE — 1101F PR PT FALLS ASSESS DOC 0-1 FALLS W/OUT INJ PAST YR: ICD-10-PCS | Mod: CPTII,S$GLB,, | Performed by: PHYSICIAN ASSISTANT

## 2020-06-02 PROCEDURE — 3077F SYST BP >= 140 MM HG: CPT | Mod: CPTII,S$GLB,, | Performed by: PHYSICIAN ASSISTANT

## 2020-06-02 PROCEDURE — 3078F PR MOST RECENT DIASTOLIC BLOOD PRESSURE < 80 MM HG: ICD-10-PCS | Mod: CPTII,S$GLB,, | Performed by: PHYSICIAN ASSISTANT

## 2020-06-02 PROCEDURE — 99204 PR OFFICE/OUTPT VISIT, NEW, LEVL IV, 45-59 MIN: ICD-10-PCS | Mod: 25,S$GLB,, | Performed by: PHYSICIAN ASSISTANT

## 2020-06-02 PROCEDURE — 3008F PR BODY MASS INDEX (BMI) DOCUMENTED: ICD-10-PCS | Mod: CPTII,S$GLB,, | Performed by: PHYSICIAN ASSISTANT

## 2020-06-02 PROCEDURE — 88112 CYTOPATH CELL ENHANCE TECH: CPT | Performed by: PATHOLOGY

## 2020-06-02 PROCEDURE — 88112 CYTOPATH CELL ENHANCE TECH: CPT | Mod: 26,,, | Performed by: PATHOLOGY

## 2020-06-02 PROCEDURE — 88112 PR  CYTOPATH, CELL ENHANCE TECH: ICD-10-PCS | Mod: 26,,, | Performed by: PATHOLOGY

## 2020-06-02 RX ORDER — LIDOCAINE HYDROCHLORIDE 20 MG/ML
JELLY TOPICAL ONCE
Status: CANCELLED | OUTPATIENT
Start: 2020-06-02 | End: 2020-06-02

## 2020-06-02 RX ORDER — DOXYCYCLINE HYCLATE 100 MG
100 TABLET ORAL ONCE
Status: CANCELLED | OUTPATIENT
Start: 2020-06-02 | End: 2020-06-02

## 2020-06-02 RX ORDER — TAMSULOSIN HYDROCHLORIDE 0.4 MG/1
0.4 CAPSULE ORAL NIGHTLY
Qty: 30 CAPSULE | Refills: 11 | Status: SHIPPED | OUTPATIENT
Start: 2020-06-02 | End: 2021-07-20

## 2020-06-02 NOTE — LETTER
June 2, 2020      Jabier Rivera MD  [address]  [facility] [address]           [facility] - Urology 4th Floor  [address]  [facility] [address]  Phone: [phone]          Patient: Froylan Borja   MR Number: [id number]   YOB: 1955   Date of Visit: 6/2/2020       Dear Dr. Jabier Rivera:    Thank you for referring Froylan Borja to me for evaluation. Attached you will find relevant portions of my assessment and plan of care.    If you have questions, please do not hesitate to call me. I look forward to following Froylan Borja along with you.    Sincerely,    Erlinda Martin PA-C    Enclosure  CC:  No Recipients    If you would like to receive this communication electronically, please contact [email or web address] or [phone] to request more information on [employer] Link access.    For providers and/or their staff who would like to refer a patient to [facility], please contact us through our one-stop-shop provider referral line, [facility], at [phone].    If you feel you have received this communication in error or would no longer like to receive these types of communications, please e-mail [email or web address]

## 2020-06-02 NOTE — PROGRESS NOTES
CHIEF COMPLAINT:    Mr. Borja is a 65 y.o. male presenting for gross hematuria.   PRESENTING ILLNESS:    Froylan Borja is a 65 y.o. male with a PMH of HTN who presents for gross hematuria    He reports gross hematuria x 1 episode last week.  He followed up with his pcp on 5/27/20 who did a urine culture.  Urine culture was + for e. Coli and he was treated with cipro which he is currently taking.    He reports a history of  daytime frequency q 2 hours, nocturia q 2 hours, and  sometimes urgency.  He drinks a 20oz coke a day and a few beers a day.    No dysuria, hesitancy or intermittency. No kidney pain.        Previous/Current Smoker: smoke cigars every once in a while   Radiation therapy to pelvis: no  Chemotherapy: no  Personal/ family history of bladder/ kidney/ prostate cancer: no  Exposure to harmful chemicals: no  History of kidney stones: no    He reports loss of libido and low energy.  His pcp recently checked his testosterone level which was low.  Testosterone (5/28/20): 214  He takes cialis for ED.  Lately it has not been effective.  In the past it was.      REVIEW OF SYSTEMS:    Constitutional: Negative for fever and chills.   HENT: Negative for hearing loss.   Eyes: Negative for visual disturbance.   Respiratory: Negative for shortness of breath.   Cardiovascular: Negative for chest pain.   Gastrointestinal: Negative for vomiting, and constipation.   Genitourinary: See HPI  Neurological: Negative for dizziness.   Hematological: Does not bruise/bleed easily.   Psychiatric/Behavioral: Negative for confusion.       PATIENT HISTORY:    Past Medical History:   Diagnosis Date    Alcohol abuse     Anxiety     Cirrhosis     Glaucoma     History of hepatitis C, s/p successful Rx w/ SVR24 - 1/2017     genotype 1;  relapse following PegIFN+RBV+Victrelis; prior relapse following PegIFN+RBV S/p 12 weeks harvoni + RBV w/ SVR    Hypertension     Paresthesia     feet, bilaterally       Past Surgical History:    Procedure Laterality Date    LIVER BIOPSY         Family History   Problem Relation Age of Onset    Diabetes Mellitus Father     Hypertension Father     Cancer Father         ? CRC    Diabetes Father     Cancer Mother         colon vs polyps, colectomy    Colon cancer Mother     Cancer Sister         ? CRC    Colonic polyp Brother     Cirrhosis Neg Hx        Social History     Socioeconomic History    Marital status:      Spouse name: Not on file    Number of children: Not on file    Years of education: Not on file    Highest education level: Not on file   Occupational History    Not on file   Social Needs    Financial resource strain: Not on file    Food insecurity:     Worry: Not on file     Inability: Not on file    Transportation needs:     Medical: Not on file     Non-medical: Not on file   Tobacco Use    Smoking status: Current Some Day Smoker     Types: Cigars    Smokeless tobacco: Never Used    Tobacco comment: smokes cigars 20 per month   Substance and Sexual Activity    Alcohol use: Yes     Comment: heavy alcohol use in the past, now drinking daily, 2-3 beers daily    Drug use: No    Sexual activity: Not on file   Lifestyle    Physical activity:     Days per week: Not on file     Minutes per session: Not on file    Stress: Not on file   Relationships    Social connections:     Talks on phone: Not on file     Gets together: Not on file     Attends Confucianism service: Not on file     Active member of club or organization: Not on file     Attends meetings of clubs or organizations: Not on file     Relationship status: Not on file   Other Topics Concern    Not on file   Social History Narrative     (healthy),  at [employer]. No kids. 2 dogs. No exercise.       Allergies:  Patient has no known allergies.    Medications:    Current Outpatient Medications:     ciprofloxacin HCl (CIPRO) 500 MG tablet, Take 1 tablet (500 mg total) by mouth every 12 (twelve) hours.  for 5 days, Disp: 10 tablet, Rfl: 0    cyanocobalamin (VITAMIN B-12) 1000 MCG tablet, Take 1,000 mcg by mouth once daily. , Disp: , Rfl:     gabapentin (NEURONTIN) 600 MG tablet, Take 1 tablet (600 mg total) by mouth 3 (three) times daily., Disp: 90 tablet, Rfl: 1    latanoprost (XALATAN) 0.005 % ophthalmic solution, 1 drop every evening. , Disp: , Rfl:     losartan (COZAAR) 100 MG tablet, Take 1 tablet (100 mg total) by mouth once daily., Disp: 90 tablet, Rfl: 11    milk thistle 175 mg tablet, Take 175 mg by mouth once daily., Disp: , Rfl:     multivitamin (MULTIVITAMIN) per tablet, Take 1 tablet by mouth once daily.  , Disp: , Rfl:     sertraline (ZOLOFT) 100 MG tablet, Take 1 tablet (100 mg total) by mouth once daily., Disp: 90 tablet, Rfl: 1    tadalafiL (CIALIS) 20 MG Tab, USE ONE TABLET EVERY 36 HOURS, Disp: 6 tablet, Rfl: 19    timolol maleate 0.5% (TIMOPTIC) 0.5 % Drop, Place 1 drop into both eyes once daily. , Disp: , Rfl:     tamsulosin (FLOMAX) 0.4 mg Cap, Take 1 capsule (0.4 mg total) by mouth every evening., Disp: 30 capsule, Rfl: 11    PHYSICAL EXAMINATION:    Constitutional: He appears well-developed and well-nourished.  He is in no apparent distress.    Eyes: No scleral icterus noted bilaterally. No discharge bilaterally.    Cardiovascular: Normal rate.      Pulmonary/Chest: Effort normal. No respiratory distress.     Abdominal:  He exhibits no distension.     Neurological: He is alert and oriented to person, place, and time.     Skin: Skin is warm and dry.     Psych: Cooperative with normal affect.    Genitourinary: Normal anal sphincter tone. No rectal mass.    The prostate is 20 g.  Prostate is smooth, non tender with no nodules noted.    Physical Exam    Bladder scan performed by Nurse Davis.  PVR 2ml    LABS:  Creatinine 11/18/2019 0.5 - 1.4 mg/dL 1.0           eGFR if non African American  11/18/19 >60 mL/min/1.73 m^2 >60        U/a: 1.020, pH 5, negative.    Lab Results   Component  Value Date    PSA 1.6 11/18/2019    PSA 1.1 07/28/2014    PSA 0.39 05/06/2013    PSA 0.42 05/12/2012    PSA 0.60 04/08/2011    PSA 0.50 02/24/2010    PSA 0.59 10/13/2008    PSA 0.5 11/03/2005       IMPRESSION:    Encounter Diagnoses   Name Primary?    Gross hematuria Yes    Nocturia     Low testosterone in male     Frequency of urination     Bacterial UTI     Urgency of urination          PLAN:  Gross hematuria:  I explained to the patient that the causes of hematuria, whether it be gross hematuria or microhematuria, are many. Fortunately, for patients with microhematuria, the likelihood of finding an underlying  malignancy as the cause of the hematuria is very low at 1-2%. In patients with gross hematuria, the chances of an underlying  malignancy are higher but still low at 15-20%.      Nevertheless, I explained to the patient that the evaluation in both cases consists of upper tract imaging followed by flexible cystoscopy. I described the rationale and procedure for both and answered all questions.    Hematuria work up discussed in detail.  Will proceed with hematuria work up:  Creatinine prior to CT urogram to ensure adequate kidney function.   Cysto  CT urogram   Urine cytology    UTI:  Complete cipro for UTI. CT urogram and cysto will also evaluate for a cause for UTI.      Low testosterone:  Repeat testosterone with prolactin    Frequency, nocturia, urgency:    Trial of flomax.  Instructed patient to take 1st dose at night when in bed due to potential 1st dose syncope episode.  Patient was also informed and educated on side effects including retrograde ejaculation.  Avoid bladder irritants such as caffeine and alcohol  Limit fluid intake 3 hours before bed.     Follow up in 6-8 weeks to reassess urinary symptoms with flomax.       I spent over 45 minutes with the patient. Over 50% of the visit was spent in counseling.      Erlinda Martin PA-C

## 2020-06-03 ENCOUNTER — LAB VISIT (OUTPATIENT)
Dept: LAB | Facility: HOSPITAL | Age: 65
End: 2020-06-03
Attending: PHYSICIAN ASSISTANT
Payer: COMMERCIAL

## 2020-06-03 DIAGNOSIS — R31.0 GROSS HEMATURIA: ICD-10-CM

## 2020-06-03 DIAGNOSIS — R79.89 LOW TESTOSTERONE IN MALE: ICD-10-CM

## 2020-06-03 LAB
CREAT SERPL-MCNC: 1.1 MG/DL (ref 0.5–1.4)
EST. GFR  (AFRICAN AMERICAN): >60 ML/MIN/1.73 M^2
EST. GFR  (NON AFRICAN AMERICAN): >60 ML/MIN/1.73 M^2
PROLACTIN SERPL IA-MCNC: 9.2 NG/ML (ref 3.5–19.4)
TESTOST SERPL-MCNC: 248 NG/DL (ref 304–1227)

## 2020-06-03 PROCEDURE — 84146 ASSAY OF PROLACTIN: CPT

## 2020-06-03 PROCEDURE — 36415 COLL VENOUS BLD VENIPUNCTURE: CPT

## 2020-06-03 PROCEDURE — 84403 ASSAY OF TOTAL TESTOSTERONE: CPT

## 2020-06-03 PROCEDURE — 82565 ASSAY OF CREATININE: CPT

## 2020-06-04 ENCOUNTER — TELEPHONE (OUTPATIENT)
Dept: UROLOGY | Facility: CLINIC | Age: 65
End: 2020-06-04

## 2020-06-04 LAB — FINAL PATHOLOGIC DIAGNOSIS: NORMAL

## 2020-06-05 ENCOUNTER — TELEPHONE (OUTPATIENT)
Dept: UROLOGY | Facility: CLINIC | Age: 65
End: 2020-06-05

## 2020-06-05 NOTE — TELEPHONE ENCOUNTER
----- Message from Erlinda Martin PA-C sent at 6/4/2020  4:23 PM CDT -----  Please inform patient that no bladder cancer cells were seen in urine.

## 2020-06-08 ENCOUNTER — TELEPHONE (OUTPATIENT)
Dept: UROLOGY | Facility: CLINIC | Age: 65
End: 2020-06-08

## 2020-06-08 NOTE — TELEPHONE ENCOUNTER
Spoke with pt in regards to phone message. Pt and  wife is concerned about test results bc they did not hear from the provider today I explained to them that she is not in office today and I will relay the message to her so she can get back with them in regards to his labs and treatment options.     ----- Message from Ludmila Rosales sent at 6/8/2020  1:06 PM CDT -----  Contact: Wife:   Mary Borja  tel:   [phone]   Did you need to see him today.   Wants medication for his testosterone level.   [facility] [address].    Pls call and discuss w/ wife, as per caller.   Wife says you were supposed to call in medication for him and you did not.

## 2020-06-09 DIAGNOSIS — R79.89 LOW TESTOSTERONE IN MALE: Primary | ICD-10-CM

## 2020-06-09 RX ORDER — TESTOSTERONE GEL, 1% 10 MG/G
5 GEL TRANSDERMAL DAILY
Qty: 30 PACKET | Refills: 2 | Status: SHIPPED | OUTPATIENT
Start: 2020-06-09 | End: 2021-07-20

## 2020-06-09 NOTE — PROGRESS NOTES
Patient notified of testosterone and prolactin results.    Discussed Discussed that the potential side effects and risks of TRT.    Discussed the risks including but not limited to dyslipidemia, polycythemia, increase risk of CV events (MI, CVA), hepatitis.  He was informed that he will need to follow up semiannually to check his labs and NAHOMI.  Patient will like to proceed with TRT. He was instructed on how to apply medication.  He will repeat testosterone in 1 month.  He will follow up in 3 months for routine labs and NAHOMI.    Admitted

## 2020-06-10 ENCOUNTER — TELEPHONE (OUTPATIENT)
Dept: UROLOGY | Facility: CLINIC | Age: 65
End: 2020-06-10

## 2020-06-10 ENCOUNTER — HOSPITAL ENCOUNTER (OUTPATIENT)
Dept: RADIOLOGY | Facility: HOSPITAL | Age: 65
Discharge: HOME | End: 2020-06-10
Attending: PHYSICIAN ASSISTANT
Payer: COMMERCIAL

## 2020-06-10 DIAGNOSIS — R31.0 GROSS HEMATURIA: ICD-10-CM

## 2020-06-10 DIAGNOSIS — A49.9 BACTERIAL UTI: ICD-10-CM

## 2020-06-10 DIAGNOSIS — N39.0 BACTERIAL UTI: ICD-10-CM

## 2020-06-10 PROCEDURE — 25500020 PHARM REV CODE 255: Performed by: PHYSICIAN ASSISTANT

## 2020-06-10 PROCEDURE — 74178 CT ABD&PLV WO CNTR FLWD CNTR: CPT | Mod: TC

## 2020-06-10 PROCEDURE — 74178 CT ABD&PLV WO CNTR FLWD CNTR: CPT | Mod: 26,,, | Performed by: RADIOLOGY

## 2020-06-10 PROCEDURE — 74178 CT UROGRAM ABD PELVIS W WO: ICD-10-PCS | Mod: 26,,, | Performed by: RADIOLOGY

## 2020-06-10 RX ADMIN — IOHEXOL 125 ML: 350 INJECTION, SOLUTION INTRAVENOUS at 08:06

## 2020-06-10 NOTE — TELEPHONE ENCOUNTER
Patient notified of CT urogram results.  Pt with history of cirrhosis.  Recommend he follow up with his pcp for additional findings including his enlarged liver and spleen.

## 2020-06-17 ENCOUNTER — PROCEDURE VISIT (OUTPATIENT)
Dept: UROLOGY | Facility: CLINIC | Age: 65
End: 2020-06-17
Payer: COMMERCIAL

## 2020-06-17 VITALS
BODY MASS INDEX: 31.91 KG/M2 | DIASTOLIC BLOOD PRESSURE: 73 MMHG | SYSTOLIC BLOOD PRESSURE: 150 MMHG | HEIGHT: 75 IN | RESPIRATION RATE: 17 BRPM | HEART RATE: 94 BPM | WEIGHT: 256.63 LBS | TEMPERATURE: 98 F

## 2020-06-17 DIAGNOSIS — K74.60 CIRRHOSIS OF LIVER WITHOUT ASCITES, UNSPECIFIED HEPATIC CIRRHOSIS TYPE: Primary | ICD-10-CM

## 2020-06-17 DIAGNOSIS — Z86.19 HISTORY OF HEPATITIS C: ICD-10-CM

## 2020-06-17 DIAGNOSIS — R31.0 GROSS HEMATURIA: ICD-10-CM

## 2020-06-17 DIAGNOSIS — A49.9 BACTERIAL UTI: ICD-10-CM

## 2020-06-17 DIAGNOSIS — N39.0 BACTERIAL UTI: ICD-10-CM

## 2020-06-17 PROCEDURE — 52000 CYSTOSCOPY: ICD-10-PCS | Mod: S$GLB,,, | Performed by: UROLOGY

## 2020-06-17 PROCEDURE — 52000 CYSTOURETHROSCOPY: CPT | Mod: S$GLB,,, | Performed by: UROLOGY

## 2020-06-17 RX ORDER — DOXYCYCLINE HYCLATE 100 MG
100 TABLET ORAL
Status: COMPLETED | OUTPATIENT
Start: 2020-06-17 | End: 2020-06-17

## 2020-06-17 RX ORDER — LIDOCAINE HYDROCHLORIDE 20 MG/ML
JELLY TOPICAL ONCE
Status: COMPLETED | OUTPATIENT
Start: 2020-06-17 | End: 2020-06-17

## 2020-06-17 RX ADMIN — LIDOCAINE HYDROCHLORIDE: 20 JELLY TOPICAL at 08:06

## 2020-06-17 RX ADMIN — Medication 100 MG: at 09:06

## 2020-06-17 NOTE — PATIENT INSTRUCTIONS
What to Expect After a Cystoscopy  For the next 24-48 hours, you may feel a mild burning when you urinate. This burning is normal and expected. Drink plenty of water to dilute the urine to help relieve the burning sensation. You may also see a small amount of blood in your urine after the procedure.    Unless you are already taking antibiotics, you may be given an antibiotic after the test to prevent infection.    Signs and Symptoms to Report  Call the [facility] at [phone] if you develop any of the following:  · Fever of 101 degrees or higher  · Chills or persistent bleeding  · Inability to urinate

## 2020-06-17 NOTE — PROCEDURES
"Cystoscopy    Date/Time: 6/17/2020 8:45 AM  Performed by: Georgette Isaac MD  Authorized by: Erlinda Martin PA-C     Consent Done?:  Yes (Written)  Time out: Immediately prior to procedure a "time out" was called to verify the correct patient, procedure, equipment, support staff and site/side marked as required.    Indications: hematuria    Position:  Supine  Anesthesia:  Lidocaine jelly  Patient sedated?: No    Preparation: Patient was prepped and draped in usual sterile fashion      Scope type:  Flexible cystoscope  Stent inserted: No    Stent removed: No    External exam normal: Yes    Digital exam performed: No    Urethra normal: No         Urethral Internal Findings:  Stricture  Prostate normal: Yes  Bladder neck normal: Bladder neck normal   Bladder normal: Yes      Patient tolerance:  Patient tolerated the procedure well with no immediate complications     Bulbourethral stricture dilated with scope - was about 15F. Otherwise bladder normal. H/o chlamydia in the past.   Follow up in 6 months.       "

## 2020-06-30 ENCOUNTER — PATIENT OUTREACH (OUTPATIENT)
Dept: ADMINISTRATIVE | Facility: OTHER | Age: 65
End: 2020-06-30

## 2020-06-30 NOTE — PROGRESS NOTES
Requested updates within Care Everywhere.  Patient's chart was reviewed for overdue SUZAN topics.  Immunizations reconciled.

## 2020-09-08 ENCOUNTER — PATIENT OUTREACH (OUTPATIENT)
Dept: ADMINISTRATIVE | Facility: OTHER | Age: 65
End: 2020-09-08

## 2020-09-08 NOTE — PROGRESS NOTES
LINKS immunization registry updated  Care Everywhere updated  Health Maintenance updated  Chart reviewed for overdue Proactive [facility] Encounters (SUZAN) health maintenance testing (CRS, Breast Ca, Diabetic Eye Exam)   Orders entered:N/A

## 2020-09-28 ENCOUNTER — HOSPITAL ENCOUNTER (OUTPATIENT)
Dept: RADIOLOGY | Facility: HOSPITAL | Age: 65
Discharge: HOME | End: 2020-09-28
Attending: FAMILY MEDICINE
Payer: MEDICARE

## 2020-09-28 DIAGNOSIS — R31.0 GROSS HEMATURIA: ICD-10-CM

## 2020-09-28 PROCEDURE — 76770 US EXAM ABDO BACK WALL COMP: CPT | Mod: 26,,, | Performed by: RADIOLOGY

## 2020-09-28 PROCEDURE — 76770 US EXAM ABDO BACK WALL COMP: CPT | Mod: TC

## 2020-09-28 PROCEDURE — 76770 US RETROPERITONEAL COMPLETE: ICD-10-PCS | Mod: 26,,, | Performed by: RADIOLOGY

## 2020-09-30 ENCOUNTER — TELEPHONE (OUTPATIENT)
Dept: INTERNAL MEDICINE | Facility: CLINIC | Age: 65
End: 2020-09-30

## 2020-09-30 NOTE — TELEPHONE ENCOUNTER
----- Message from Jabier Rivera MD sent at 9/30/2020  9:22 AM CDT -----  Please notify patient of the following:    Ultrasound ordered in May completed 9/28/20 showing a non-mobile focus in the bladder approximately 0.5 cm which may be a stone but other things such as polyp or mass are not excluded.  Recommend he follow up with urology for this for further evaluation; may need cystoscope.  Stable lesion in spleen which is likely a granuloma--benign lesion not requiring follow up.

## 2020-12-11 ENCOUNTER — OFFICE VISIT (OUTPATIENT)
Dept: INTERNAL MEDICINE | Facility: CLINIC | Age: 65
End: 2020-12-11
Payer: MEDICARE

## 2020-12-11 VITALS
BODY MASS INDEX: 33.49 KG/M2 | DIASTOLIC BLOOD PRESSURE: 80 MMHG | WEIGHT: 269.38 LBS | OXYGEN SATURATION: 97 % | HEART RATE: 87 BPM | SYSTOLIC BLOOD PRESSURE: 138 MMHG | HEIGHT: 75 IN

## 2020-12-11 DIAGNOSIS — F41.9 ANXIETY: ICD-10-CM

## 2020-12-11 DIAGNOSIS — N52.9 ERECTILE DYSFUNCTION, UNSPECIFIED ERECTILE DYSFUNCTION TYPE: ICD-10-CM

## 2020-12-11 DIAGNOSIS — Z76.0 ENCOUNTER FOR MEDICATION REFILL: Primary | ICD-10-CM

## 2020-12-11 PROCEDURE — 99999 PR PBB SHADOW E&M-EST. PATIENT-LVL III: CPT | Mod: PBBFAC,GC,, | Performed by: STUDENT IN AN ORGANIZED HEALTH CARE EDUCATION/TRAINING PROGRAM

## 2020-12-11 PROCEDURE — 99999 PR PBB SHADOW E&M-EST. PATIENT-LVL III: ICD-10-PCS | Mod: PBBFAC,GC,, | Performed by: STUDENT IN AN ORGANIZED HEALTH CARE EDUCATION/TRAINING PROGRAM

## 2020-12-11 PROCEDURE — 99213 OFFICE O/P EST LOW 20 MIN: CPT | Mod: PBBFAC | Performed by: STUDENT IN AN ORGANIZED HEALTH CARE EDUCATION/TRAINING PROGRAM

## 2020-12-11 PROCEDURE — 99213 PR OFFICE/OUTPT VISIT, EST, LEVL III, 20-29 MIN: ICD-10-PCS | Mod: S$PBB,GC,, | Performed by: STUDENT IN AN ORGANIZED HEALTH CARE EDUCATION/TRAINING PROGRAM

## 2020-12-11 PROCEDURE — 99213 OFFICE O/P EST LOW 20 MIN: CPT | Mod: S$PBB,GC,, | Performed by: STUDENT IN AN ORGANIZED HEALTH CARE EDUCATION/TRAINING PROGRAM

## 2020-12-11 RX ORDER — SERTRALINE HYDROCHLORIDE 100 MG/1
100 TABLET, FILM COATED ORAL DAILY
Qty: 90 TABLET | Refills: 1 | Status: SHIPPED | OUTPATIENT
Start: 2020-12-11 | End: 2021-07-01 | Stop reason: SDUPTHER

## 2020-12-11 RX ORDER — TADALAFIL 20 MG/1
20 TABLET ORAL DAILY
Qty: 6 TABLET | Refills: 19 | Status: SHIPPED | OUTPATIENT
Start: 2020-12-11 | End: 2022-08-11

## 2020-12-11 NOTE — PROGRESS NOTES
RESIDENT CLINIC PROGRESS NOTE    Name: Froylan Borja Jr.  : 1955  Date of Service: 2020   PCP: Vignesh Almonte MD    Reason for visit:   Chief Complaint   Patient presents with    Medication Refill       HPI: Froylan Borja Jr. is a 65 y.o. male with HTN, alcohol abuse, anxiety and cirrhosis who presents to clinic for medication refill. Patient states he has taken sertraline for several years for his anxiety. He most recently took sertraline yesterday but has now ran out. Today, he denies further anxiety and does not describe any mood changes or suicidality/homicidality. Additionally, he would like to have a refill of his cialis. Of note, patient continues to drink 1-2 beers a day.     Medications:   Current Outpatient Medications:     cyanocobalamin (VITAMIN B-12) 1000 MCG tablet, Take 1,000 mcg by mouth once daily. , Disp: , Rfl:     gabapentin (NEURONTIN) 600 MG tablet, Take 1 tablet (600 mg total) by mouth 3 (three) times daily., Disp: 90 tablet, Rfl: 1    latanoprost (XALATAN) 0.005 % ophthalmic solution, 1 drop every evening. , Disp: , Rfl:     losartan (COZAAR) 100 MG tablet, Take 1 tablet (100 mg total) by mouth once daily., Disp: 90 tablet, Rfl: 11    milk thistle 175 mg tablet, Take 175 mg by mouth once daily., Disp: , Rfl:     multivitamin (MULTIVITAMIN) per tablet, Take 1 tablet by mouth once daily.  , Disp: , Rfl:     sertraline (ZOLOFT) 100 MG tablet, Take 1 tablet (100 mg total) by mouth once daily., Disp: 90 tablet, Rfl: 1    tadalafiL (CIALIS) 20 MG Tab, Take 1 tablet (20 mg total) by mouth once daily., Disp: 6 tablet, Rfl: 19    tamsulosin (FLOMAX) 0.4 mg Cap, Take 1 capsule (0.4 mg total) by mouth every evening., Disp: 30 capsule, Rfl: 11    testosterone (ANDROGEL) 1 % (50 mg/5 gram) GlPk, Apply 5 g topically once daily. Place gel in palm of the hand and immediately apply it to clean, dry, intact skin of the shoulders.  Let dry before dressing.  Wash hands thoroughly after  "application., Disp: 30 packet, Rfl: 2    timolol maleate 0.5% (TIMOPTIC) 0.5 % Drop, Place 1 drop into both eyes once daily. , Disp: , Rfl:      Review of Systems:   Review of Systems   Constitutional: Negative for chills, diaphoresis, fever, malaise/fatigue and weight loss.   HENT: Negative for congestion, hearing loss, sinus pain, sore throat and tinnitus.    Eyes: Negative for blurred vision, pain, discharge and redness.   Respiratory: Negative for cough, hemoptysis, sputum production, shortness of breath and wheezing.    Cardiovascular: Negative for chest pain, palpitations, orthopnea, claudication and leg swelling.   Gastrointestinal: Negative for abdominal pain, blood in stool, constipation, diarrhea, heartburn, melena, nausea and vomiting.   Genitourinary: Negative for dysuria, frequency and urgency.   Musculoskeletal: Negative for back pain, joint pain, myalgias and neck pain.   Skin: Negative for itching and rash.   Neurological: Negative for dizziness, tremors, speech change, focal weakness, seizures, weakness and headaches.   Endo/Heme/Allergies: Negative for environmental allergies and polydipsia. Does not bruise/bleed easily.   Psychiatric/Behavioral: Negative for depression and suicidal ideas.       Vitals:   Vitals:    12/11/20 1417   BP: 138/80   BP Location: Right arm   Patient Position: Sitting   BP Method: Large (Manual)   Pulse: 87   SpO2: 97%   Weight: 122.2 kg (269 lb 6.4 oz)   Height: 6' 3" (1.905 m)     Body mass index is 33.67 kg/m².    Physical Exam:   Physical Exam  Constitutional:       General: He is not in acute distress.     Appearance: Normal appearance. He is normal weight. He is not ill-appearing, toxic-appearing or diaphoretic.   HENT:      Head: Normocephalic and atraumatic.      Nose: No congestion or rhinorrhea.      Mouth/Throat:      Mouth: Mucous membranes are moist.      Pharynx: No oropharyngeal exudate or posterior oropharyngeal erythema.   Eyes:      General: No scleral " icterus.        Right eye: No discharge.         Left eye: No discharge.   Cardiovascular:      Rate and Rhythm: Normal rate and regular rhythm.      Pulses: Normal pulses.      Heart sounds: Normal heart sounds. No murmur. No friction rub. No gallop.    Pulmonary:      Effort: Pulmonary effort is normal. No respiratory distress.      Breath sounds: Normal breath sounds. No stridor. No wheezing, rhonchi or rales.   Chest:      Chest wall: No tenderness.   Abdominal:      General: Abdomen is flat. Bowel sounds are normal. There is no distension.      Palpations: Abdomen is soft. There is no mass.      Tenderness: There is no abdominal tenderness. There is no guarding.   Musculoskeletal:      Right lower leg: Edema (scant) present.      Left lower leg: Edema (scant) present.   Skin:     General: Skin is warm and dry.      Coloration: Skin is not jaundiced.   Neurological:      General: No focal deficit present.      Mental Status: He is alert and oriented to person, place, and time.   Psychiatric:         Mood and Affect: Mood normal.         Labs: Previous labs reviewed.    Imaging: Previous imaging reviewed.     Assessment/Plan:   Encounter for medication refill  -     sertraline (ZOLOFT) 100 MG tablet; Take 1 tablet (100 mg total) by mouth once daily.  Dispense: 90 tablet; Refill: 1  -     tadalafiL (CIALIS) 20 MG Tab; Take 1 tablet (20 mg total) by mouth once daily.  Dispense: 6 tablet; Refill: 19    Anxiety  Stable anxiety on sertraline - counseled patient on abstaining from alcohol and exercise as methods to further improve mood symptoms.  -     sertraline (ZOLOFT) 100 MG tablet; Take 1 tablet (100 mg total) by mouth once daily.  Dispense: 90 tablet; Refill: 1    Erectile dysfunction, unspecified erectile dysfunction type  -     tadalafiL (CIALIS) 20 MG Tab; Take 1 tablet (20 mg total) by mouth once daily.  Dispense: 6 tablet; Refill: 19      Discussed with Dr. Alvarado

## 2021-02-11 ENCOUNTER — LAB VISIT (OUTPATIENT)
Dept: INTERNAL MEDICINE | Facility: CLINIC | Age: 66
End: 2021-02-11
Payer: MEDICARE

## 2021-02-11 ENCOUNTER — OFFICE VISIT (OUTPATIENT)
Dept: INTERNAL MEDICINE | Facility: CLINIC | Age: 66
End: 2021-02-11
Payer: MEDICARE

## 2021-02-11 ENCOUNTER — TELEPHONE (OUTPATIENT)
Dept: INTERNAL MEDICINE | Facility: CLINIC | Age: 66
End: 2021-02-11

## 2021-02-11 DIAGNOSIS — R05.9 COUGH: ICD-10-CM

## 2021-02-11 DIAGNOSIS — I10 ESSENTIAL HYPERTENSION: Primary | ICD-10-CM

## 2021-02-11 PROCEDURE — 99442 PR PHYSICIAN TELEPHONE EVALUATION 11-20 MIN: ICD-10-PCS | Mod: 95,,, | Performed by: INTERNAL MEDICINE

## 2021-02-11 PROCEDURE — 99442 PR PHYSICIAN TELEPHONE EVALUATION 11-20 MIN: CPT | Mod: 95,,, | Performed by: INTERNAL MEDICINE

## 2021-02-11 PROCEDURE — U0003 INFECTIOUS AGENT DETECTION BY NUCLEIC ACID (DNA OR RNA); SEVERE ACUTE RESPIRATORY SYNDROME CORONAVIRUS 2 (SARS-COV-2) (CORONAVIRUS DISEASE [COVID-19]), AMPLIFIED PROBE TECHNIQUE, MAKING USE OF HIGH THROUGHPUT TECHNOLOGIES AS DESCRIBED BY CMS-2020-01-R: HCPCS

## 2021-02-11 PROCEDURE — U0005 INFEC AGEN DETEC AMPLI PROBE: HCPCS

## 2021-02-11 RX ORDER — LOSARTAN POTASSIUM 100 MG/1
100 TABLET ORAL DAILY
Qty: 30 TABLET | Refills: 1 | Status: SHIPPED | OUTPATIENT
Start: 2021-02-11 | End: 2021-07-01 | Stop reason: SDUPTHER

## 2021-02-11 RX ORDER — FLUTICASONE PROPIONATE 50 MCG
2 SPRAY, SUSPENSION (ML) NASAL DAILY
Qty: 16 G | Refills: 1 | Status: SHIPPED | OUTPATIENT
Start: 2021-02-11 | End: 2021-03-13

## 2021-02-11 RX ORDER — LEVOCETIRIZINE DIHYDROCHLORIDE 5 MG/1
5 TABLET, FILM COATED ORAL NIGHTLY
Qty: 30 TABLET | Refills: 1 | Status: SHIPPED | OUTPATIENT
Start: 2021-02-11 | End: 2021-02-12

## 2021-02-12 ENCOUNTER — TELEPHONE (OUTPATIENT)
Dept: INTERNAL MEDICINE | Facility: CLINIC | Age: 66
End: 2021-02-12

## 2021-02-12 LAB — SARS-COV-2 RNA RESP QL NAA+PROBE: NOT DETECTED

## 2021-02-12 RX ORDER — LEVOCETIRIZINE DIHYDROCHLORIDE 5 MG/1
TABLET, FILM COATED ORAL
Qty: 90 TABLET | Refills: 0 | Status: SHIPPED | OUTPATIENT
Start: 2021-02-12 | End: 2022-06-30

## 2021-07-01 ENCOUNTER — LAB VISIT (OUTPATIENT)
Dept: LAB | Facility: HOSPITAL | Age: 66
End: 2021-07-01
Attending: INTERNAL MEDICINE
Payer: MEDICARE

## 2021-07-01 ENCOUNTER — OFFICE VISIT (OUTPATIENT)
Dept: INTERNAL MEDICINE | Facility: CLINIC | Age: 66
End: 2021-07-01
Payer: MEDICARE

## 2021-07-01 VITALS
HEIGHT: 75 IN | DIASTOLIC BLOOD PRESSURE: 78 MMHG | HEART RATE: 76 BPM | WEIGHT: 256.63 LBS | BODY MASS INDEX: 31.91 KG/M2 | SYSTOLIC BLOOD PRESSURE: 142 MMHG

## 2021-07-01 DIAGNOSIS — Z20.822 EXPOSURE TO COVID-19 VIRUS: ICD-10-CM

## 2021-07-01 DIAGNOSIS — Z86.19 HISTORY OF HEPATITIS C: ICD-10-CM

## 2021-07-01 DIAGNOSIS — E78.2 MIXED HYPERLIPIDEMIA: ICD-10-CM

## 2021-07-01 DIAGNOSIS — Z12.5 ENCOUNTER FOR SCREENING FOR MALIGNANT NEOPLASM OF PROSTATE: ICD-10-CM

## 2021-07-01 DIAGNOSIS — E53.8 LOW SERUM VITAMIN B12: ICD-10-CM

## 2021-07-01 DIAGNOSIS — K57.90 DIVERTICULOSIS: ICD-10-CM

## 2021-07-01 DIAGNOSIS — F41.9 ANXIETY: ICD-10-CM

## 2021-07-01 DIAGNOSIS — I10 ESSENTIAL HYPERTENSION: Primary | ICD-10-CM

## 2021-07-01 DIAGNOSIS — Z76.0 ENCOUNTER FOR MEDICATION REFILL: ICD-10-CM

## 2021-07-01 DIAGNOSIS — R31.0 GROSS HEMATURIA: ICD-10-CM

## 2021-07-01 DIAGNOSIS — K74.60 CIRRHOSIS OF LIVER WITHOUT ASCITES, UNSPECIFIED HEPATIC CIRRHOSIS TYPE: ICD-10-CM

## 2021-07-01 LAB
AFP SERPL-MCNC: 3.5 NG/ML (ref 0–8.4)
ALBUMIN SERPL BCP-MCNC: 3.6 G/DL (ref 3.5–5.2)
ALP SERPL-CCNC: 68 U/L (ref 55–135)
ALT SERPL W/O P-5'-P-CCNC: 21 U/L (ref 10–44)
ANION GAP SERPL CALC-SCNC: 7 MMOL/L (ref 8–16)
AST SERPL-CCNC: 27 U/L (ref 10–40)
BASOPHILS # BLD AUTO: 0.09 K/UL (ref 0–0.2)
BASOPHILS NFR BLD: 1.1 % (ref 0–1.9)
BILIRUB SERPL-MCNC: 1 MG/DL (ref 0.1–1)
BUN SERPL-MCNC: 11 MG/DL (ref 8–23)
CALCIUM SERPL-MCNC: 9.7 MG/DL (ref 8.7–10.5)
CHLORIDE SERPL-SCNC: 100 MMOL/L (ref 95–110)
CHOLEST SERPL-MCNC: 144 MG/DL (ref 120–199)
CHOLEST/HDLC SERPL: 3.4 {RATIO} (ref 2–5)
CO2 SERPL-SCNC: 30 MMOL/L (ref 23–29)
COMPLEXED PSA SERPL-MCNC: 3.5 NG/ML (ref 0–4)
CREAT SERPL-MCNC: 0.9 MG/DL (ref 0.5–1.4)
DIFFERENTIAL METHOD: ABNORMAL
EOSINOPHIL # BLD AUTO: 0.2 K/UL (ref 0–0.5)
EOSINOPHIL NFR BLD: 2.8 % (ref 0–8)
ERYTHROCYTE [DISTWIDTH] IN BLOOD BY AUTOMATED COUNT: 13.2 % (ref 11.5–14.5)
EST. GFR  (AFRICAN AMERICAN): >60 ML/MIN/1.73 M^2
EST. GFR  (NON AFRICAN AMERICAN): >60 ML/MIN/1.73 M^2
GLUCOSE SERPL-MCNC: 116 MG/DL (ref 70–110)
HCT VFR BLD AUTO: 46.3 % (ref 40–54)
HDLC SERPL-MCNC: 42 MG/DL (ref 40–75)
HDLC SERPL: 29.2 % (ref 20–50)
HGB BLD-MCNC: 15.4 G/DL (ref 14–18)
IMM GRANULOCYTES # BLD AUTO: 0.08 K/UL (ref 0–0.04)
IMM GRANULOCYTES NFR BLD AUTO: 1 % (ref 0–0.5)
INR PPP: 1 (ref 0.8–1.2)
LDLC SERPL CALC-MCNC: 80.8 MG/DL (ref 63–159)
LYMPHOCYTES # BLD AUTO: 1.2 K/UL (ref 1–4.8)
LYMPHOCYTES NFR BLD: 15.2 % (ref 18–48)
MCH RBC QN AUTO: 32.6 PG (ref 27–31)
MCHC RBC AUTO-ENTMCNC: 33.3 G/DL (ref 32–36)
MCV RBC AUTO: 98 FL (ref 82–98)
MONOCYTES # BLD AUTO: 0.9 K/UL (ref 0.3–1)
MONOCYTES NFR BLD: 11.4 % (ref 4–15)
NEUTROPHILS # BLD AUTO: 5.4 K/UL (ref 1.8–7.7)
NEUTROPHILS NFR BLD: 68.5 % (ref 38–73)
NONHDLC SERPL-MCNC: 102 MG/DL
NRBC BLD-RTO: 0 /100 WBC
PLATELET # BLD AUTO: 205 K/UL (ref 150–450)
PMV BLD AUTO: 9.9 FL (ref 9.2–12.9)
POTASSIUM SERPL-SCNC: 4.6 MMOL/L (ref 3.5–5.1)
PROT SERPL-MCNC: 7.4 G/DL (ref 6–8.4)
PROTHROMBIN TIME: 10.6 SEC (ref 9–12.5)
RBC # BLD AUTO: 4.72 M/UL (ref 4.6–6.2)
SARS-COV-2 RNA RESP QL NAA+PROBE: NOT DETECTED
SODIUM SERPL-SCNC: 137 MMOL/L (ref 136–145)
TRIGL SERPL-MCNC: 106 MG/DL (ref 30–150)
VIT B12 SERPL-MCNC: 393 PG/ML (ref 210–950)
WBC # BLD AUTO: 7.91 K/UL (ref 3.9–12.7)

## 2021-07-01 PROCEDURE — 82105 ALPHA-FETOPROTEIN SERUM: CPT | Performed by: INTERNAL MEDICINE

## 2021-07-01 PROCEDURE — U0005 INFEC AGEN DETEC AMPLI PROBE: HCPCS | Performed by: INTERNAL MEDICINE

## 2021-07-01 PROCEDURE — 85025 COMPLETE CBC W/AUTO DIFF WBC: CPT | Performed by: INTERNAL MEDICINE

## 2021-07-01 PROCEDURE — 80061 LIPID PANEL: CPT | Performed by: INTERNAL MEDICINE

## 2021-07-01 PROCEDURE — 84153 ASSAY OF PSA TOTAL: CPT | Performed by: INTERNAL MEDICINE

## 2021-07-01 PROCEDURE — 80053 COMPREHEN METABOLIC PANEL: CPT | Performed by: INTERNAL MEDICINE

## 2021-07-01 PROCEDURE — 85610 PROTHROMBIN TIME: CPT | Performed by: INTERNAL MEDICINE

## 2021-07-01 PROCEDURE — 99999 PR PBB SHADOW E&M-EST. PATIENT-LVL III: CPT | Mod: PBBFAC,,, | Performed by: INTERNAL MEDICINE

## 2021-07-01 PROCEDURE — U0003 INFECTIOUS AGENT DETECTION BY NUCLEIC ACID (DNA OR RNA); SEVERE ACUTE RESPIRATORY SYNDROME CORONAVIRUS 2 (SARS-COV-2) (CORONAVIRUS DISEASE [COVID-19]), AMPLIFIED PROBE TECHNIQUE, MAKING USE OF HIGH THROUGHPUT TECHNOLOGIES AS DESCRIBED BY CMS-2020-01-R: HCPCS | Performed by: INTERNAL MEDICINE

## 2021-07-01 PROCEDURE — 99214 OFFICE O/P EST MOD 30 MIN: CPT | Mod: S$PBB,,, | Performed by: INTERNAL MEDICINE

## 2021-07-01 PROCEDURE — 99214 PR OFFICE/OUTPT VISIT, EST, LEVL IV, 30-39 MIN: ICD-10-PCS | Mod: S$PBB,,, | Performed by: INTERNAL MEDICINE

## 2021-07-01 PROCEDURE — 99213 OFFICE O/P EST LOW 20 MIN: CPT | Mod: PBBFAC | Performed by: INTERNAL MEDICINE

## 2021-07-01 PROCEDURE — 99999 PR PBB SHADOW E&M-EST. PATIENT-LVL III: ICD-10-PCS | Mod: PBBFAC,,, | Performed by: INTERNAL MEDICINE

## 2021-07-01 PROCEDURE — 82607 VITAMIN B-12: CPT | Performed by: INTERNAL MEDICINE

## 2021-07-01 PROCEDURE — 87522 HEPATITIS C REVRS TRNSCRPJ: CPT | Performed by: INTERNAL MEDICINE

## 2021-07-01 RX ORDER — SERTRALINE HYDROCHLORIDE 100 MG/1
100 TABLET, FILM COATED ORAL DAILY
Qty: 30 TABLET | Refills: 0 | Status: SHIPPED | OUTPATIENT
Start: 2021-07-01 | End: 2021-07-01 | Stop reason: SDUPTHER

## 2021-07-01 RX ORDER — LOSARTAN POTASSIUM 100 MG/1
100 TABLET ORAL DAILY
Qty: 30 TABLET | Refills: 0 | Status: SHIPPED | OUTPATIENT
Start: 2021-07-01 | End: 2021-07-01

## 2021-07-01 RX ORDER — GABAPENTIN 600 MG/1
600 TABLET ORAL 3 TIMES DAILY
Qty: 90 TABLET | Refills: 0 | Status: SHIPPED | OUTPATIENT
Start: 2021-07-01 | End: 2021-07-20

## 2021-07-01 RX ORDER — SERTRALINE HYDROCHLORIDE 100 MG/1
100 TABLET, FILM COATED ORAL DAILY
Qty: 30 TABLET | Refills: 0 | Status: SHIPPED | OUTPATIENT
Start: 2021-07-01 | End: 2021-07-20

## 2021-07-03 LAB — HEPATITIS C VIRUS (HCV) RNA DETECTION/QUANTIFICATION RT-PCR: NORMAL IU/ML

## 2021-07-08 ENCOUNTER — TELEPHONE (OUTPATIENT)
Dept: INTERNAL MEDICINE | Facility: CLINIC | Age: 66
End: 2021-07-08

## 2021-07-08 DIAGNOSIS — R97.20 INCREASED PROSTATE SPECIFIC ANTIGEN (PSA) VELOCITY: Primary | ICD-10-CM

## 2021-07-16 ENCOUNTER — HOSPITAL ENCOUNTER (OUTPATIENT)
Dept: RADIOLOGY | Facility: HOSPITAL | Age: 66
Discharge: HOME | End: 2021-07-16
Attending: INTERNAL MEDICINE
Payer: MEDICARE

## 2021-07-16 DIAGNOSIS — K74.60 CIRRHOSIS OF LIVER WITHOUT ASCITES, UNSPECIFIED HEPATIC CIRRHOSIS TYPE: ICD-10-CM

## 2021-07-16 PROCEDURE — 76700 US EXAM ABDOM COMPLETE: CPT | Mod: 26,,, | Performed by: RADIOLOGY

## 2021-07-16 PROCEDURE — 76700 US EXAM ABDOM COMPLETE: CPT | Mod: TC

## 2021-07-16 PROCEDURE — 76700 US ABDOMEN COMPLETE: ICD-10-PCS | Mod: 26,,, | Performed by: RADIOLOGY

## 2021-07-20 ENCOUNTER — OFFICE VISIT (OUTPATIENT)
Dept: INTERNAL MEDICINE | Facility: CLINIC | Age: 66
End: 2021-07-20

## 2021-07-20 VITALS
OXYGEN SATURATION: 96 % | HEART RATE: 82 BPM | DIASTOLIC BLOOD PRESSURE: 72 MMHG | SYSTOLIC BLOOD PRESSURE: 136 MMHG | WEIGHT: 264.75 LBS | HEIGHT: 75 IN | BODY MASS INDEX: 32.92 KG/M2

## 2021-07-20 DIAGNOSIS — F41.9 ANXIETY: ICD-10-CM

## 2021-07-20 DIAGNOSIS — Z12.11 COLON CANCER SCREENING: ICD-10-CM

## 2021-07-20 DIAGNOSIS — R35.1 NOCTURIA: ICD-10-CM

## 2021-07-20 DIAGNOSIS — Z76.0 ENCOUNTER FOR MEDICATION REFILL: ICD-10-CM

## 2021-07-20 DIAGNOSIS — R35.0 FREQUENCY OF URINATION: ICD-10-CM

## 2021-07-20 DIAGNOSIS — I10 ESSENTIAL HYPERTENSION: ICD-10-CM

## 2021-07-20 DIAGNOSIS — L08.9 WOUND INFECTION: Primary | ICD-10-CM

## 2021-07-20 DIAGNOSIS — G62.1 ALCOHOL-INDUCED POLYNEUROPATHY: ICD-10-CM

## 2021-07-20 DIAGNOSIS — T14.8XXA WOUND INFECTION: Primary | ICD-10-CM

## 2021-07-20 PROCEDURE — 99214 OFFICE O/P EST MOD 30 MIN: CPT | Mod: S$PBB,,, | Performed by: INTERNAL MEDICINE

## 2021-07-20 PROCEDURE — 99999 PR PBB SHADOW E&M-EST. PATIENT-LVL IV: CPT | Mod: PBBFAC,,, | Performed by: INTERNAL MEDICINE

## 2021-07-20 PROCEDURE — 99214 PR OFFICE/OUTPT VISIT, EST, LEVL IV, 30-39 MIN: ICD-10-PCS | Mod: S$PBB,,, | Performed by: INTERNAL MEDICINE

## 2021-07-20 PROCEDURE — 99999 PR PBB SHADOW E&M-EST. PATIENT-LVL IV: ICD-10-PCS | Mod: PBBFAC,,, | Performed by: INTERNAL MEDICINE

## 2021-07-20 PROCEDURE — 99214 OFFICE O/P EST MOD 30 MIN: CPT | Mod: PBBFAC | Performed by: INTERNAL MEDICINE

## 2021-07-20 RX ORDER — SERTRALINE HYDROCHLORIDE 100 MG/1
100 TABLET, FILM COATED ORAL DAILY
Qty: 90 TABLET | Refills: 0 | Status: SHIPPED | OUTPATIENT
Start: 2021-07-20 | End: 2021-10-08

## 2021-07-20 RX ORDER — CEPHALEXIN 500 MG/1
500 CAPSULE ORAL EVERY 8 HOURS
Qty: 21 CAPSULE | Refills: 0 | Status: SHIPPED | OUTPATIENT
Start: 2021-07-20 | End: 2021-07-27

## 2021-07-20 RX ORDER — GABAPENTIN 600 MG/1
600 TABLET ORAL 3 TIMES DAILY
Qty: 90 TABLET | Refills: 5 | Status: SHIPPED | OUTPATIENT
Start: 2021-07-20 | End: 2021-07-29

## 2021-07-20 RX ORDER — TAMSULOSIN HYDROCHLORIDE 0.4 MG/1
0.4 CAPSULE ORAL NIGHTLY
Qty: 90 CAPSULE | Refills: 11 | Status: SHIPPED | OUTPATIENT
Start: 2021-07-20 | End: 2022-01-27 | Stop reason: SDUPTHER

## 2021-07-20 RX ORDER — LOSARTAN POTASSIUM 100 MG/1
100 TABLET ORAL DAILY
Qty: 90 TABLET | Refills: 0 | Status: SHIPPED | OUTPATIENT
Start: 2021-07-20 | End: 2021-07-29

## 2021-07-26 ENCOUNTER — OFFICE VISIT (OUTPATIENT)
Dept: UROLOGY | Facility: CLINIC | Age: 66
End: 2021-07-26

## 2021-07-26 VITALS
HEIGHT: 75 IN | WEIGHT: 260.13 LBS | BODY MASS INDEX: 32.34 KG/M2 | SYSTOLIC BLOOD PRESSURE: 169 MMHG | HEART RATE: 88 BPM | DIASTOLIC BLOOD PRESSURE: 96 MMHG

## 2021-07-26 DIAGNOSIS — R31.0 GROSS HEMATURIA: ICD-10-CM

## 2021-07-26 DIAGNOSIS — R97.20 INCREASED PROSTATE SPECIFIC ANTIGEN (PSA) VELOCITY: ICD-10-CM

## 2021-07-26 PROCEDURE — 99214 OFFICE O/P EST MOD 30 MIN: CPT | Mod: S$PBB,,, | Performed by: UROLOGY

## 2021-07-26 PROCEDURE — 99999 PR PBB SHADOW E&M-EST. PATIENT-LVL IV: ICD-10-PCS | Mod: PBBFAC,,, | Performed by: UROLOGY

## 2021-07-26 PROCEDURE — 99214 OFFICE O/P EST MOD 30 MIN: CPT | Mod: PBBFAC | Performed by: UROLOGY

## 2021-07-26 PROCEDURE — 99999 PR PBB SHADOW E&M-EST. PATIENT-LVL IV: CPT | Mod: PBBFAC,,, | Performed by: UROLOGY

## 2021-07-26 PROCEDURE — 99214 PR OFFICE/OUTPT VISIT, EST, LEVL IV, 30-39 MIN: ICD-10-PCS | Mod: S$PBB,,, | Performed by: UROLOGY

## 2021-11-03 ENCOUNTER — HOSPITAL ENCOUNTER (INPATIENT)
Facility: HOSPITAL | Age: 66
LOS: 14 days | Discharge: NURSING, REHAB OR LONG-TERM CARE | DRG: 641 | End: 2021-11-17
Attending: EMERGENCY MEDICINE | Admitting: INTERNAL MEDICINE
Payer: MEDICARE
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
## 2021-11-03 DIAGNOSIS — I48.91 ATRIAL FIBRILLATION: ICD-10-CM

## 2021-11-03 DIAGNOSIS — F10.20 ALCOHOL USE DISORDER, SEVERE, DEPENDENCE: Chronic | ICD-10-CM

## 2021-11-03 DIAGNOSIS — E87.1 HYPONATREMIA: Primary | ICD-10-CM

## 2021-11-03 DIAGNOSIS — S01.01XD LACERATION OF SCALP, SUBSEQUENT ENCOUNTER: ICD-10-CM

## 2021-11-03 DIAGNOSIS — G47.34 NOCTURNAL HYPOXIA: ICD-10-CM

## 2021-11-03 DIAGNOSIS — L03.119 CELLULITIS OF LOWER EXTREMITY, UNSPECIFIED LATERALITY: ICD-10-CM

## 2021-11-03 DIAGNOSIS — F10.930 ALCOHOL WITHDRAWAL, UNCOMPLICATED: ICD-10-CM

## 2021-11-03 DIAGNOSIS — G62.1 ALCOHOL-INDUCED POLYNEUROPATHY: ICD-10-CM

## 2021-11-03 DIAGNOSIS — Z59.00 HOMELESSNESS: ICD-10-CM
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
## 2021-11-03 DIAGNOSIS — F32.A DEPRESSION, UNSPECIFIED DEPRESSION TYPE: Chronic | ICD-10-CM

## 2021-11-03 DIAGNOSIS — E66.9 OBESITY (BMI 30-39.9): ICD-10-CM

## 2021-11-03 DIAGNOSIS — T69.021A TRENCH FOOT OF RIGHT LOWER EXTREMITY, INITIAL ENCOUNTER: ICD-10-CM

## 2021-11-03 PROBLEM — I10 HTN (HYPERTENSION): Status: ACTIVE | Noted: 2021-11-03

## 2021-11-03 PROBLEM — M00.9 SEPTIC ARTHRITIS OF RIGHT ANKLE: Status: ACTIVE | Noted: 2021-11-03

## 2021-11-03 PROBLEM — H10.9 BACTERIAL CONJUNCTIVITIS: Status: ACTIVE | Noted: 2021-11-03

## 2021-11-03 LAB
ALBUMIN SERPL BCP-MCNC: 3.5 G/DL (ref 3.5–5.2)
ALP SERPL-CCNC: 84 U/L (ref 55–135)
ALT SERPL W/O P-5'-P-CCNC: 26 U/L (ref 10–44)
AMMONIA PLAS-SCNC: 25 UMOL/L (ref 10–50)
AMPHET+METHAMPHET UR QL: NEGATIVE
ANION GAP SERPL CALC-SCNC: 11 MMOL/L (ref 8–16)
ANION GAP SERPL CALC-SCNC: 6 MMOL/L (ref 8–16)
ANION GAP SERPL CALC-SCNC: 9 MMOL/L (ref 8–16)
AST SERPL-CCNC: 45 U/L (ref 10–40)
BACTERIA #/AREA URNS AUTO: NORMAL /HPF
BARBITURATES UR QL SCN>200 NG/ML: NEGATIVE
BASOPHILS # BLD AUTO: 0.09 K/UL (ref 0–0.2)
BASOPHILS NFR BLD: 0.7 % (ref 0–1.9)
BENZODIAZ UR QL SCN>200 NG/ML: NEGATIVE
BILIRUB SERPL-MCNC: 1.3 MG/DL (ref 0.1–1)
BILIRUB UR QL STRIP: NEGATIVE
BNP SERPL-MCNC: 16 PG/ML (ref 0–99)
BUN SERPL-MCNC: 10 MG/DL (ref 8–23)
BUN SERPL-MCNC: 12 MG/DL (ref 8–23)
BUN SERPL-MCNC: 12 MG/DL (ref 8–23)
BUN SERPL-MCNC: 13 MG/DL (ref 6–30)
BZE UR QL SCN: NEGATIVE
CALCIUM SERPL-MCNC: 7.5 MG/DL (ref 8.7–10.5)
CALCIUM SERPL-MCNC: 8 MG/DL (ref 8.7–10.5)
CALCIUM SERPL-MCNC: 9 MG/DL (ref 8.7–10.5)
CANNABINOIDS UR QL SCN: NEGATIVE
CHLORIDE SERPL-SCNC: 76 MMOL/L (ref 95–110)
CHLORIDE SERPL-SCNC: 78 MMOL/L (ref 95–110)
CHLORIDE SERPL-SCNC: 81 MMOL/L (ref 95–110)
CHLORIDE SERPL-SCNC: 84 MMOL/L (ref 95–110)
CK SERPL-CCNC: 351 U/L (ref 20–200)
CLARITY UR REFRACT.AUTO: CLEAR
CO2 SERPL-SCNC: 20 MMOL/L (ref 23–29)
CO2 SERPL-SCNC: 20 MMOL/L (ref 23–29)
CO2 SERPL-SCNC: 21 MMOL/L (ref 23–29)
COLOR UR AUTO: YELLOW
CREAT SERPL-MCNC: 0.9 MG/DL (ref 0.5–1.4)
CREAT SERPL-MCNC: 1 MG/DL (ref 0.5–1.4)
CREAT UR-MCNC: 44 MG/DL (ref 23–375)
CTP QC/QA: YES
DIFFERENTIAL METHOD: ABNORMAL
EOSINOPHIL # BLD AUTO: 0.1 K/UL (ref 0–0.5)
EOSINOPHIL NFR BLD: 0.4 % (ref 0–8)
ERYTHROCYTE [DISTWIDTH] IN BLOOD BY AUTOMATED COUNT: 14.1 % (ref 11.5–14.5)
EST. GFR  (AFRICAN AMERICAN): >60 ML/MIN/1.73 M^2
EST. GFR  (NON AFRICAN AMERICAN): >60 ML/MIN/1.73 M^2
ETHANOL SERPL-MCNC: 62 MG/DL
FOLATE SERPL-MCNC: 4.9 NG/ML (ref 4–24)
GLUCOSE SERPL-MCNC: 104 MG/DL (ref 70–110)
GLUCOSE SERPL-MCNC: 93 MG/DL (ref 70–110)
GLUCOSE SERPL-MCNC: 94 MG/DL (ref 70–110)
GLUCOSE SERPL-MCNC: 99 MG/DL (ref 70–110)
GLUCOSE UR QL STRIP: NEGATIVE
HCT VFR BLD AUTO: 34.8 % (ref 40–54)
HCT VFR BLD CALC: 38 %PCV (ref 36–54)
HGB BLD-MCNC: 12.6 G/DL (ref 14–18)
HGB UR QL STRIP: ABNORMAL
IMM GRANULOCYTES # BLD AUTO: 0.59 K/UL (ref 0–0.04)
IMM GRANULOCYTES NFR BLD AUTO: 4.4 % (ref 0–0.5)
INR PPP: 0.9 (ref 0.8–1.2)
KETONES UR QL STRIP: NEGATIVE
LACTATE SERPL-SCNC: 1.9 MMOL/L (ref 0.5–2.2)
LEUKOCYTE ESTERASE UR QL STRIP: NEGATIVE
LYMPHOCYTES # BLD AUTO: 0.9 K/UL (ref 1–4.8)
LYMPHOCYTES NFR BLD: 6.7 % (ref 18–48)
MCH RBC QN AUTO: 32.9 PG (ref 27–31)
MCHC RBC AUTO-ENTMCNC: 36.2 G/DL (ref 32–36)
MCV RBC AUTO: 91 FL (ref 82–98)
METHADONE UR QL SCN>300 NG/ML: NEGATIVE
MICROSCOPIC COMMENT: NORMAL
MONOCYTES # BLD AUTO: 1.3 K/UL (ref 0.3–1)
MONOCYTES NFR BLD: 9.9 % (ref 4–15)
NEUTROPHILS # BLD AUTO: 10.5 K/UL (ref 1.8–7.7)
NEUTROPHILS NFR BLD: 77.9 % (ref 38–73)
NITRITE UR QL STRIP: NEGATIVE
NRBC BLD-RTO: 0 /100 WBC
OPIATES UR QL SCN: NEGATIVE
OSMOLALITY SERPL: 250 MOSM/KG (ref 280–300)
OSMOLALITY UR: 178 MOSM/KG (ref 50–1200)
PCP UR QL SCN>25 NG/ML: NEGATIVE
PH UR STRIP: 6 [PH] (ref 5–8)
PLATELET # BLD AUTO: 202 K/UL (ref 150–450)
PMV BLD AUTO: 8.5 FL (ref 9.2–12.9)
POC IONIZED CALCIUM: 1.04 MMOL/L (ref 1.06–1.42)
POC TCO2 (MEASURED): 21 MMOL/L (ref 23–29)
POTASSIUM BLD-SCNC: 5.2 MMOL/L (ref 3.5–5.1)
POTASSIUM SERPL-SCNC: 4.6 MMOL/L (ref 3.5–5.1)
POTASSIUM SERPL-SCNC: 4.7 MMOL/L (ref 3.5–5.1)
POTASSIUM SERPL-SCNC: 5 MMOL/L (ref 3.5–5.1)
PROT SERPL-MCNC: 6.8 G/DL (ref 6–8.4)
PROT UR QL STRIP: NEGATIVE
PROTHROMBIN TIME: 10.3 SEC (ref 9–12.5)
RBC # BLD AUTO: 3.83 M/UL (ref 4.6–6.2)
RBC #/AREA URNS AUTO: 0 /HPF (ref 0–4)
SAMPLE: ABNORMAL
SARS-COV-2 RDRP RESP QL NAA+PROBE: NEGATIVE
SODIUM BLD-SCNC: 109 MMOL/L (ref 136–145)
SODIUM SERPL-SCNC: <110 MMOL/L (ref 136–145)
SODIUM UR-SCNC: 20 MMOL/L (ref 20–250)
SP GR UR STRIP: 1 (ref 1–1.03)
SQUAMOUS #/AREA URNS AUTO: 0 /HPF
TOXICOLOGY INFORMATION: NORMAL
TSH SERPL DL<=0.005 MIU/L-ACNC: 3.69 UIU/ML (ref 0.4–4)
URN SPEC COLLECT METH UR: ABNORMAL
WBC # BLD AUTO: 13.49 K/UL (ref 3.9–12.7)
WBC #/AREA URNS AUTO: 0 /HPF (ref 0–5)

## 2021-11-03 PROCEDURE — 93010 EKG 12-LEAD: ICD-10-PCS | Mod: HCNC,,, | Performed by: INTERNAL MEDICINE

## 2021-11-03 PROCEDURE — 84443 ASSAY THYROID STIM HORMONE: CPT | Mod: HCNC | Performed by: STUDENT IN AN ORGANIZED HEALTH CARE EDUCATION/TRAINING PROGRAM

## 2021-11-03 PROCEDURE — 99285 EMERGENCY DEPT VISIT HI MDM: CPT | Mod: 25,HCNC

## 2021-11-03 PROCEDURE — 99285 PR EMERGENCY DEPT VISIT,LEVEL V: ICD-10-PCS | Mod: HCNC,25,CS, | Performed by: PHYSICIAN ASSISTANT

## 2021-11-03 PROCEDURE — 80053 COMPREHEN METABOLIC PANEL: CPT | Mod: HCNC | Performed by: PHYSICIAN ASSISTANT

## 2021-11-03 PROCEDURE — 82077 ASSAY SPEC XCP UR&BREATH IA: CPT | Mod: HCNC | Performed by: PHYSICIAN ASSISTANT

## 2021-11-03 PROCEDURE — 81001 URINALYSIS AUTO W/SCOPE: CPT | Mod: HCNC | Performed by: PHYSICIAN ASSISTANT

## 2021-11-03 PROCEDURE — 20000000 HC ICU ROOM: Mod: HCNC

## 2021-11-03 PROCEDURE — 82140 ASSAY OF AMMONIA: CPT | Mod: HCNC | Performed by: PHYSICIAN ASSISTANT

## 2021-11-03 PROCEDURE — 36415 COLL VENOUS BLD VENIPUNCTURE: CPT | Mod: HCNC | Performed by: INTERNAL MEDICINE

## 2021-11-03 PROCEDURE — 12002 PR RESUP NPTERF WND BODY 2.6-7.5 CM: ICD-10-PCS | Mod: ,,, | Performed by: PHYSICIAN ASSISTANT

## 2021-11-03 PROCEDURE — 93010 ELECTROCARDIOGRAM REPORT: CPT | Mod: HCNC,,, | Performed by: INTERNAL MEDICINE

## 2021-11-03 PROCEDURE — 99291 PR CRITICAL CARE, E/M 30-74 MINUTES: ICD-10-PCS | Mod: HCNC,GC,, | Performed by: INTERNAL MEDICINE

## 2021-11-03 PROCEDURE — 84300 ASSAY OF URINE SODIUM: CPT | Mod: HCNC | Performed by: PHYSICIAN ASSISTANT

## 2021-11-03 PROCEDURE — 80307 DRUG TEST PRSMV CHEM ANLYZR: CPT | Mod: HCNC | Performed by: PHYSICIAN ASSISTANT

## 2021-11-03 PROCEDURE — 96361 HYDRATE IV INFUSION ADD-ON: CPT | Mod: HCNC

## 2021-11-03 PROCEDURE — 93005 ELECTROCARDIOGRAM TRACING: CPT | Mod: HCNC

## 2021-11-03 PROCEDURE — 63600175 PHARM REV CODE 636 W HCPCS: Mod: HCNC | Performed by: STUDENT IN AN ORGANIZED HEALTH CARE EDUCATION/TRAINING PROGRAM

## 2021-11-03 PROCEDURE — 83880 ASSAY OF NATRIURETIC PEPTIDE: CPT | Mod: HCNC | Performed by: PHYSICIAN ASSISTANT

## 2021-11-03 PROCEDURE — 99291 CRITICAL CARE FIRST HOUR: CPT | Mod: HCNC,GC,, | Performed by: INTERNAL MEDICINE

## 2021-11-03 PROCEDURE — 83930 ASSAY OF BLOOD OSMOLALITY: CPT | Mod: HCNC | Performed by: PHYSICIAN ASSISTANT

## 2021-11-03 PROCEDURE — 85610 PROTHROMBIN TIME: CPT | Mod: HCNC | Performed by: PHYSICIAN ASSISTANT

## 2021-11-03 PROCEDURE — 83935 ASSAY OF URINE OSMOLALITY: CPT | Mod: HCNC | Performed by: PHYSICIAN ASSISTANT

## 2021-11-03 PROCEDURE — 12002 RPR S/N/AX/GEN/TRNK2.6-7.5CM: CPT | Mod: ,,, | Performed by: PHYSICIAN ASSISTANT

## 2021-11-03 PROCEDURE — 25000003 PHARM REV CODE 250: Mod: HCNC | Performed by: STUDENT IN AN ORGANIZED HEALTH CARE EDUCATION/TRAINING PROGRAM

## 2021-11-03 PROCEDURE — 87389 HIV-1 AG W/HIV-1&-2 AB AG IA: CPT | Mod: HCNC | Performed by: EMERGENCY MEDICINE

## 2021-11-03 PROCEDURE — 87040 BLOOD CULTURE FOR BACTERIA: CPT | Mod: 59,HCNC | Performed by: PHYSICIAN ASSISTANT

## 2021-11-03 PROCEDURE — 82550 ASSAY OF CK (CPK): CPT | Mod: HCNC | Performed by: PHYSICIAN ASSISTANT

## 2021-11-03 PROCEDURE — 82746 ASSAY OF FOLIC ACID SERUM: CPT | Mod: HCNC | Performed by: STUDENT IN AN ORGANIZED HEALTH CARE EDUCATION/TRAINING PROGRAM

## 2021-11-03 PROCEDURE — U0002 COVID-19 LAB TEST NON-CDC: HCPCS | Mod: HCNC | Performed by: PHYSICIAN ASSISTANT

## 2021-11-03 PROCEDURE — 85025 COMPLETE CBC W/AUTO DIFF WBC: CPT | Mod: HCNC | Performed by: PHYSICIAN ASSISTANT

## 2021-11-03 PROCEDURE — 96365 THER/PROPH/DIAG IV INF INIT: CPT | Mod: HCNC

## 2021-11-03 PROCEDURE — 25000003 PHARM REV CODE 250: Mod: HCNC | Performed by: PHYSICIAN ASSISTANT

## 2021-11-03 PROCEDURE — 80048 BASIC METABOLIC PNL TOTAL CA: CPT | Mod: HCNC | Performed by: INTERNAL MEDICINE

## 2021-11-03 PROCEDURE — 99285 EMERGENCY DEPT VISIT HI MDM: CPT | Mod: HCNC,25,CS, | Performed by: PHYSICIAN ASSISTANT

## 2021-11-03 PROCEDURE — 12001 RPR S/N/AX/GEN/TRNK 2.5CM/<: CPT | Mod: HCNC

## 2021-11-03 PROCEDURE — 80048 BASIC METABOLIC PNL TOTAL CA: CPT | Mod: 91,HCNC | Performed by: STUDENT IN AN ORGANIZED HEALTH CARE EDUCATION/TRAINING PROGRAM

## 2021-11-03 PROCEDURE — 83605 ASSAY OF LACTIC ACID: CPT | Mod: HCNC | Performed by: PHYSICIAN ASSISTANT

## 2021-11-03 PROCEDURE — 80047 BASIC METABLC PNL IONIZED CA: CPT | Mod: HCNC

## 2021-11-03 RX ORDER — LORAZEPAM 2 MG/ML
2 INJECTION INTRAMUSCULAR EVERY 4 HOURS PRN
Status: DISCONTINUED | OUTPATIENT
Start: 2021-11-03 | End: 2021-11-11

## 2021-11-03 RX ORDER — SERTRALINE HYDROCHLORIDE 50 MG/1
100 TABLET, FILM COATED ORAL DAILY
Status: DISCONTINUED | OUTPATIENT
Start: 2021-11-04 | End: 2021-11-04

## 2021-11-03 RX ORDER — LORAZEPAM 2 MG/ML
2 INJECTION INTRAMUSCULAR
Status: DISCONTINUED | OUTPATIENT
Start: 2021-11-03 | End: 2021-11-03

## 2021-11-03 RX ORDER — TALC
6 POWDER (GRAM) TOPICAL NIGHTLY PRN
Status: DISCONTINUED | OUTPATIENT
Start: 2021-11-04 | End: 2021-11-11

## 2021-11-03 RX ORDER — GABAPENTIN 300 MG/1
300 CAPSULE ORAL 3 TIMES DAILY
Status: DISCONTINUED | OUTPATIENT
Start: 2021-11-03 | End: 2021-11-17 | Stop reason: HOSPADM

## 2021-11-03 RX ORDER — THIAMINE HCL 100 MG
100 TABLET ORAL DAILY
Status: DISCONTINUED | OUTPATIENT
Start: 2021-11-04 | End: 2021-11-17 | Stop reason: HOSPADM

## 2021-11-03 RX ORDER — 3% SODIUM CHLORIDE 3 G/100ML
50 INJECTION, SOLUTION INTRAVENOUS CONTINUOUS
Status: DISCONTINUED | OUTPATIENT
Start: 2021-11-04 | End: 2021-11-04

## 2021-11-03 RX ORDER — LANOLIN ALCOHOL/MO/W.PET/CERES
1000 CREAM (GRAM) TOPICAL DAILY
Status: DISCONTINUED | OUTPATIENT
Start: 2021-11-04 | End: 2021-11-17 | Stop reason: HOSPADM

## 2021-11-03 RX ORDER — TIMOLOL MALEATE 5 MG/ML
1 SOLUTION/ DROPS OPHTHALMIC DAILY
Status: DISCONTINUED | OUTPATIENT
Start: 2021-11-04 | End: 2021-11-17 | Stop reason: HOSPADM

## 2021-11-03 RX ORDER — CEFTRIAXONE 1 G/1
1 INJECTION, POWDER, FOR SOLUTION INTRAMUSCULAR; INTRAVENOUS
Status: DISCONTINUED | OUTPATIENT
Start: 2021-11-03 | End: 2021-11-05

## 2021-11-03 RX ORDER — ENOXAPARIN SODIUM 100 MG/ML
40 INJECTION SUBCUTANEOUS EVERY 24 HOURS
Status: DISCONTINUED | OUTPATIENT
Start: 2021-11-03 | End: 2021-11-17 | Stop reason: HOSPADM

## 2021-11-03 RX ORDER — LATANOPROST 50 UG/ML
1 SOLUTION/ DROPS OPHTHALMIC NIGHTLY
Status: DISCONTINUED | OUTPATIENT
Start: 2021-11-03 | End: 2021-11-17 | Stop reason: HOSPADM

## 2021-11-03 RX ORDER — CLINDAMYCIN PHOSPHATE 900 MG/50ML
900 INJECTION, SOLUTION INTRAVENOUS
Status: COMPLETED | OUTPATIENT
Start: 2021-11-03 | End: 2021-11-03

## 2021-11-03 RX ORDER — SODIUM CHLORIDE 0.9 % (FLUSH) 0.9 %
10 SYRINGE (ML) INJECTION
Status: DISCONTINUED | OUTPATIENT
Start: 2021-11-03 | End: 2021-11-17 | Stop reason: HOSPADM

## 2021-11-03 RX ORDER — ERYTHROMYCIN 5 MG/G
OINTMENT OPHTHALMIC EVERY 8 HOURS
Status: DISPENSED | OUTPATIENT
Start: 2021-11-03 | End: 2021-11-10

## 2021-11-03 RX ADMIN — SODIUM CHLORIDE 1000 ML: 0.9 INJECTION, SOLUTION INTRAVENOUS at 04:11

## 2021-11-03 RX ADMIN — SODIUM CHLORIDE 50 ML/HR: 3 INJECTION, SOLUTION INTRAVENOUS at 11:11

## 2021-11-03 RX ADMIN — MELATONIN TAB 3 MG 6 MG: 3 TAB at 11:11

## 2021-11-03 RX ADMIN — CEFTRIAXONE 1 G: 1 INJECTION, POWDER, FOR SOLUTION INTRAMUSCULAR; INTRAVENOUS at 09:11

## 2021-11-03 RX ADMIN — GABAPENTIN 300 MG: 300 CAPSULE ORAL at 09:11

## 2021-11-03 RX ADMIN — ERYTHROMYCIN: 5 OINTMENT OPHTHALMIC at 09:11

## 2021-11-03 RX ADMIN — ENOXAPARIN SODIUM 40 MG: 40 INJECTION SUBCUTANEOUS at 07:11

## 2021-11-03 RX ADMIN — CLINDAMYCIN IN 5 PERCENT DEXTROSE 900 MG: 18 INJECTION, SOLUTION INTRAVENOUS at 04:11

## 2021-11-03 RX ADMIN — LATANOPROST 1 DROP: 50 SOLUTION OPHTHALMIC at 11:11

## 2021-11-03 SDOH — SOCIAL DETERMINANTS OF HEALTH (SDOH): HOMELESSNESS UNSPECIFIED: Z59.00

## 2021-11-04 PROBLEM — S01.01XA SCALP LACERATION: Status: ACTIVE | Noted: 2021-11-04

## 2021-11-04 PROBLEM — F10.20 ALCOHOL USE DISORDER, SEVERE, DEPENDENCE: Chronic | Status: ACTIVE | Noted: 2017-03-27

## 2021-11-04 PROBLEM — F32.A DEPRESSION: Chronic | Status: ACTIVE | Noted: 2021-11-04

## 2021-11-04 PROBLEM — F10.930 ALCOHOL WITHDRAWAL, UNCOMPLICATED: Status: ACTIVE | Noted: 2021-11-04

## 2021-11-04 LAB
ALBUMIN SERPL BCP-MCNC: 2.5 G/DL (ref 3.5–5.2)
ALP SERPL-CCNC: 64 U/L (ref 55–135)
ALT SERPL W/O P-5'-P-CCNC: 17 U/L (ref 10–44)
ANION GAP SERPL CALC-SCNC: 6 MMOL/L (ref 8–16)
ANION GAP SERPL CALC-SCNC: 7 MMOL/L (ref 8–16)
ANION GAP SERPL CALC-SCNC: 8 MMOL/L (ref 8–16)
ANION GAP SERPL CALC-SCNC: 9 MMOL/L (ref 8–16)
AST SERPL-CCNC: 29 U/L (ref 10–40)
BASOPHILS # BLD AUTO: 0.04 K/UL (ref 0–0.2)
BASOPHILS NFR BLD: 0.5 % (ref 0–1.9)
BILIRUB SERPL-MCNC: 1.1 MG/DL (ref 0.1–1)
BUN SERPL-MCNC: 10 MG/DL (ref 8–23)
BUN SERPL-MCNC: 10 MG/DL (ref 8–23)
BUN SERPL-MCNC: 11 MG/DL (ref 8–23)
BUN SERPL-MCNC: 12 MG/DL (ref 8–23)
BUN SERPL-MCNC: 14 MG/DL (ref 8–23)
CALCIUM SERPL-MCNC: 6.8 MG/DL (ref 8.7–10.5)
CALCIUM SERPL-MCNC: 7.3 MG/DL (ref 8.7–10.5)
CALCIUM SERPL-MCNC: 7.4 MG/DL (ref 8.7–10.5)
CALCIUM SERPL-MCNC: 8.4 MG/DL (ref 8.7–10.5)
CALCIUM SERPL-MCNC: 8.5 MG/DL (ref 8.7–10.5)
CALCIUM SERPL-MCNC: 8.7 MG/DL (ref 8.7–10.5)
CALCIUM SERPL-MCNC: 8.7 MG/DL (ref 8.7–10.5)
CHLORIDE SERPL-SCNC: 87 MMOL/L (ref 95–110)
CHLORIDE SERPL-SCNC: 89 MMOL/L (ref 95–110)
CHLORIDE SERPL-SCNC: 90 MMOL/L (ref 95–110)
CHLORIDE SERPL-SCNC: 91 MMOL/L (ref 95–110)
CHLORIDE SERPL-SCNC: 93 MMOL/L (ref 95–110)
CO2 SERPL-SCNC: 17 MMOL/L (ref 23–29)
CO2 SERPL-SCNC: 18 MMOL/L (ref 23–29)
CO2 SERPL-SCNC: 20 MMOL/L (ref 23–29)
CO2 SERPL-SCNC: 21 MMOL/L (ref 23–29)
CO2 SERPL-SCNC: 21 MMOL/L (ref 23–29)
CO2 SERPL-SCNC: 22 MMOL/L (ref 23–29)
CO2 SERPL-SCNC: 22 MMOL/L (ref 23–29)
CO2 SERPL-SCNC: 23 MMOL/L (ref 23–29)
CORTIS SERPL-MCNC: 13.7 UG/DL (ref 4.3–22.4)
CREAT SERPL-MCNC: 0.7 MG/DL (ref 0.5–1.4)
CREAT SERPL-MCNC: 0.8 MG/DL (ref 0.5–1.4)
CRP SERPL-MCNC: 46.1 MG/L (ref 0–8.2)
DIFFERENTIAL METHOD: ABNORMAL
EOSINOPHIL # BLD AUTO: 0 K/UL (ref 0–0.5)
EOSINOPHIL NFR BLD: 0.4 % (ref 0–8)
ERYTHROCYTE [DISTWIDTH] IN BLOOD BY AUTOMATED COUNT: 13.9 % (ref 11.5–14.5)
ERYTHROCYTE [SEDIMENTATION RATE] IN BLOOD BY WESTERGREN METHOD: 3 MM/HR (ref 0–23)
EST. GFR  (AFRICAN AMERICAN): >60 ML/MIN/1.73 M^2
EST. GFR  (NON AFRICAN AMERICAN): >60 ML/MIN/1.73 M^2
GLUCOSE SERPL-MCNC: 106 MG/DL (ref 70–110)
GLUCOSE SERPL-MCNC: 107 MG/DL (ref 70–110)
GLUCOSE SERPL-MCNC: 108 MG/DL (ref 70–110)
GLUCOSE SERPL-MCNC: 112 MG/DL (ref 70–110)
GLUCOSE SERPL-MCNC: 113 MG/DL (ref 70–110)
GLUCOSE SERPL-MCNC: 119 MG/DL (ref 70–110)
GLUCOSE SERPL-MCNC: 121 MG/DL (ref 70–110)
GLUCOSE SERPL-MCNC: 133 MG/DL (ref 70–110)
GLUCOSE SERPL-MCNC: 99 MG/DL (ref 70–110)
HCT VFR BLD AUTO: 27.3 % (ref 40–54)
HGB BLD-MCNC: 9.7 G/DL (ref 14–18)
HIV 1+2 AB+HIV1 P24 AG SERPL QL IA: NEGATIVE
IMM GRANULOCYTES # BLD AUTO: 0.27 K/UL (ref 0–0.04)
IMM GRANULOCYTES NFR BLD AUTO: 3.7 % (ref 0–0.5)
LYMPHOCYTES # BLD AUTO: 0.7 K/UL (ref 1–4.8)
LYMPHOCYTES NFR BLD: 9.2 % (ref 18–48)
MAGNESIUM SERPL-MCNC: 1.7 MG/DL (ref 1.6–2.6)
MCH RBC QN AUTO: 32.6 PG (ref 27–31)
MCHC RBC AUTO-ENTMCNC: 35.5 G/DL (ref 32–36)
MCV RBC AUTO: 92 FL (ref 82–98)
MONOCYTES # BLD AUTO: 1 K/UL (ref 0.3–1)
MONOCYTES NFR BLD: 13.7 % (ref 4–15)
NEUTROPHILS # BLD AUTO: 5.4 K/UL (ref 1.8–7.7)
NEUTROPHILS NFR BLD: 72.5 % (ref 38–73)
NRBC BLD-RTO: 0 /100 WBC
PHOSPHATE SERPL-MCNC: 2.4 MG/DL (ref 2.7–4.5)
PLATELET # BLD AUTO: 142 K/UL (ref 150–450)
PMV BLD AUTO: 8.7 FL (ref 9.2–12.9)
POTASSIUM SERPL-SCNC: 4.1 MMOL/L (ref 3.5–5.1)
POTASSIUM SERPL-SCNC: 4.1 MMOL/L (ref 3.5–5.1)
POTASSIUM SERPL-SCNC: 4.3 MMOL/L (ref 3.5–5.1)
POTASSIUM SERPL-SCNC: 4.5 MMOL/L (ref 3.5–5.1)
POTASSIUM SERPL-SCNC: 4.7 MMOL/L (ref 3.5–5.1)
PROT SERPL-MCNC: 5 G/DL (ref 6–8.4)
RBC # BLD AUTO: 2.98 M/UL (ref 4.6–6.2)
SODIUM SERPL-SCNC: 113 MMOL/L (ref 136–145)
SODIUM SERPL-SCNC: 115 MMOL/L (ref 136–145)
SODIUM SERPL-SCNC: 116 MMOL/L (ref 136–145)
SODIUM SERPL-SCNC: 118 MMOL/L (ref 136–145)
SODIUM SERPL-SCNC: 119 MMOL/L (ref 136–145)
SODIUM SERPL-SCNC: 121 MMOL/L (ref 136–145)
SODIUM SERPL-SCNC: 122 MMOL/L (ref 136–145)
SODIUM SERPL-SCNC: 122 MMOL/L (ref 136–145)
WBC # BLD AUTO: 7.38 K/UL (ref 3.9–12.7)

## 2021-11-04 PROCEDURE — 25000003 PHARM REV CODE 250: Mod: HCNC | Performed by: STUDENT IN AN ORGANIZED HEALTH CARE EDUCATION/TRAINING PROGRAM

## 2021-11-04 PROCEDURE — 80053 COMPREHEN METABOLIC PANEL: CPT | Mod: HCNC | Performed by: STUDENT IN AN ORGANIZED HEALTH CARE EDUCATION/TRAINING PROGRAM

## 2021-11-04 PROCEDURE — 99291 CRITICAL CARE FIRST HOUR: CPT | Mod: HCNC,GC,, | Performed by: INTERNAL MEDICINE

## 2021-11-04 PROCEDURE — 36415 COLL VENOUS BLD VENIPUNCTURE: CPT | Mod: HCNC | Performed by: INTERNAL MEDICINE

## 2021-11-04 PROCEDURE — 85652 RBC SED RATE AUTOMATED: CPT | Mod: HCNC | Performed by: STUDENT IN AN ORGANIZED HEALTH CARE EDUCATION/TRAINING PROGRAM

## 2021-11-04 PROCEDURE — 83735 ASSAY OF MAGNESIUM: CPT | Mod: HCNC | Performed by: STUDENT IN AN ORGANIZED HEALTH CARE EDUCATION/TRAINING PROGRAM

## 2021-11-04 PROCEDURE — 82533 TOTAL CORTISOL: CPT | Mod: HCNC | Performed by: STUDENT IN AN ORGANIZED HEALTH CARE EDUCATION/TRAINING PROGRAM

## 2021-11-04 PROCEDURE — 99223 1ST HOSP IP/OBS HIGH 75: CPT | Mod: HCNC,,, | Performed by: PSYCHIATRY & NEUROLOGY

## 2021-11-04 PROCEDURE — 80048 BASIC METABOLIC PNL TOTAL CA: CPT | Mod: 91,HCNC | Performed by: INTERNAL MEDICINE

## 2021-11-04 PROCEDURE — 63600175 PHARM REV CODE 636 W HCPCS: Mod: HCNC | Performed by: STUDENT IN AN ORGANIZED HEALTH CARE EDUCATION/TRAINING PROGRAM

## 2021-11-04 PROCEDURE — 63600175 PHARM REV CODE 636 W HCPCS: Mod: JG,HCNC | Performed by: STUDENT IN AN ORGANIZED HEALTH CARE EDUCATION/TRAINING PROGRAM

## 2021-11-04 PROCEDURE — 20000000 HC ICU ROOM: Mod: HCNC

## 2021-11-04 PROCEDURE — 80048 BASIC METABOLIC PNL TOTAL CA: CPT | Mod: 91,HCNC | Performed by: STUDENT IN AN ORGANIZED HEALTH CARE EDUCATION/TRAINING PROGRAM

## 2021-11-04 PROCEDURE — 84100 ASSAY OF PHOSPHORUS: CPT | Mod: HCNC | Performed by: STUDENT IN AN ORGANIZED HEALTH CARE EDUCATION/TRAINING PROGRAM

## 2021-11-04 PROCEDURE — 85025 COMPLETE CBC W/AUTO DIFF WBC: CPT | Mod: HCNC | Performed by: STUDENT IN AN ORGANIZED HEALTH CARE EDUCATION/TRAINING PROGRAM

## 2021-11-04 PROCEDURE — 99223 PR INITIAL HOSPITAL CARE,LEVL III: ICD-10-PCS | Mod: HCNC,,, | Performed by: PSYCHIATRY & NEUROLOGY

## 2021-11-04 PROCEDURE — 80048 BASIC METABOLIC PNL TOTAL CA: CPT | Mod: HCNC | Performed by: STUDENT IN AN ORGANIZED HEALTH CARE EDUCATION/TRAINING PROGRAM

## 2021-11-04 PROCEDURE — 86140 C-REACTIVE PROTEIN: CPT | Mod: HCNC | Performed by: STUDENT IN AN ORGANIZED HEALTH CARE EDUCATION/TRAINING PROGRAM

## 2021-11-04 PROCEDURE — 94761 N-INVAS EAR/PLS OXIMETRY MLT: CPT | Mod: HCNC

## 2021-11-04 PROCEDURE — 86480 TB TEST CELL IMMUN MEASURE: CPT | Mod: HCNC | Performed by: STUDENT IN AN ORGANIZED HEALTH CARE EDUCATION/TRAINING PROGRAM

## 2021-11-04 PROCEDURE — 63600175 PHARM REV CODE 636 W HCPCS: Mod: HCNC | Performed by: INTERNAL MEDICINE

## 2021-11-04 PROCEDURE — 99291 PR CRITICAL CARE, E/M 30-74 MINUTES: ICD-10-PCS | Mod: HCNC,GC,, | Performed by: INTERNAL MEDICINE

## 2021-11-04 RX ORDER — DESMOPRESSIN ACETATE 4 UG/ML
1 INJECTION, SOLUTION INTRAVENOUS; SUBCUTANEOUS ONCE
Status: COMPLETED | OUTPATIENT
Start: 2021-11-04 | End: 2021-11-04

## 2021-11-04 RX ORDER — CLINDAMYCIN HYDROCHLORIDE 150 MG/1
450 CAPSULE ORAL EVERY 8 HOURS
Status: DISCONTINUED | OUTPATIENT
Start: 2021-11-04 | End: 2021-11-11

## 2021-11-04 RX ORDER — DEXTROSE MONOHYDRATE 50 MG/ML
INJECTION, SOLUTION INTRAVENOUS CONTINUOUS
Status: ACTIVE | OUTPATIENT
Start: 2021-11-04 | End: 2021-11-04

## 2021-11-04 RX ORDER — AMMONIUM LACTATE 12 G/100G
LOTION TOPICAL 2 TIMES DAILY PRN
Status: DISCONTINUED | OUTPATIENT
Start: 2021-11-04 | End: 2021-11-17 | Stop reason: HOSPADM

## 2021-11-04 RX ORDER — DEXTROSE MONOHYDRATE 50 MG/ML
INJECTION, SOLUTION INTRAVENOUS CONTINUOUS
Status: DISCONTINUED | OUTPATIENT
Start: 2021-11-04 | End: 2021-11-04

## 2021-11-04 RX ADMIN — CYANOCOBALAMIN TAB 1000 MCG 1000 MCG: 1000 TAB at 09:11

## 2021-11-04 RX ADMIN — GABAPENTIN 300 MG: 300 CAPSULE ORAL at 02:11

## 2021-11-04 RX ADMIN — MULTIPLE VITAMINS W/ MINERALS TAB 1 TABLET: TAB at 09:11

## 2021-11-04 RX ADMIN — ERYTHROMYCIN: 5 OINTMENT OPHTHALMIC at 09:11

## 2021-11-04 RX ADMIN — CLINDAMYCIN HYDROCHLORIDE 450 MG: 150 CAPSULE ORAL at 05:11

## 2021-11-04 RX ADMIN — CLINDAMYCIN HYDROCHLORIDE 450 MG: 150 CAPSULE ORAL at 02:11

## 2021-11-04 RX ADMIN — ERYTHROMYCIN: 5 OINTMENT OPHTHALMIC at 02:11

## 2021-11-04 RX ADMIN — DESMOPRESSIN ACETATE 1 MCG: 4 INJECTION INTRAVENOUS at 09:11

## 2021-11-04 RX ADMIN — MELATONIN TAB 3 MG 6 MG: 3 TAB at 09:11

## 2021-11-04 RX ADMIN — ENOXAPARIN SODIUM 40 MG: 40 INJECTION SUBCUTANEOUS at 06:11

## 2021-11-04 RX ADMIN — GABAPENTIN 300 MG: 300 CAPSULE ORAL at 09:11

## 2021-11-04 RX ADMIN — DESMOPRESSIN ACETATE 1 MCG: 4 INJECTION INTRAVENOUS at 12:11

## 2021-11-04 RX ADMIN — CLINDAMYCIN HYDROCHLORIDE 450 MG: 150 CAPSULE ORAL at 09:11

## 2021-11-04 RX ADMIN — CEFTRIAXONE 1 G: 1 INJECTION, POWDER, FOR SOLUTION INTRAMUSCULAR; INTRAVENOUS at 09:11

## 2021-11-04 RX ADMIN — DEXTROSE: 5 SOLUTION INTRAVENOUS at 12:11

## 2021-11-04 RX ADMIN — TIMOLOL MALEATE 1 DROP: 5 SOLUTION OPHTHALMIC at 09:11

## 2021-11-04 RX ADMIN — THIAMINE HCL TAB 100 MG 100 MG: 100 TAB at 09:11

## 2021-11-04 RX ADMIN — ERYTHROMYCIN: 5 OINTMENT OPHTHALMIC at 05:11

## 2021-11-04 RX ADMIN — DEXTROSE: 5 SOLUTION INTRAVENOUS at 07:11

## 2021-11-04 RX ADMIN — LATANOPROST 1 DROP: 50 SOLUTION OPHTHALMIC at 09:11

## 2021-11-04 RX ADMIN — CALCIUM GLUCONATE 2 G: 98 INJECTION, SOLUTION INTRAVENOUS at 04:11

## 2021-11-05 LAB
ALBUMIN SERPL BCP-MCNC: 2.7 G/DL (ref 3.5–5.2)
ALP SERPL-CCNC: 66 U/L (ref 55–135)
ALT SERPL W/O P-5'-P-CCNC: 19 U/L (ref 10–44)
ANION GAP SERPL CALC-SCNC: 10 MMOL/L (ref 8–16)
ANION GAP SERPL CALC-SCNC: 10 MMOL/L (ref 8–16)
ANION GAP SERPL CALC-SCNC: 11 MMOL/L (ref 8–16)
ANION GAP SERPL CALC-SCNC: 5 MMOL/L (ref 8–16)
ANION GAP SERPL CALC-SCNC: 7 MMOL/L (ref 8–16)
ANION GAP SERPL CALC-SCNC: 8 MMOL/L (ref 8–16)
ANION GAP SERPL CALC-SCNC: 8 MMOL/L (ref 8–16)
ANION GAP SERPL CALC-SCNC: 9 MMOL/L (ref 8–16)
ANION GAP SERPL CALC-SCNC: 9 MMOL/L (ref 8–16)
AST SERPL-CCNC: 29 U/L (ref 10–40)
BASOPHILS # BLD AUTO: 0.06 K/UL (ref 0–0.2)
BASOPHILS NFR BLD: 0.8 % (ref 0–1.9)
BILIRUB SERPL-MCNC: 0.7 MG/DL (ref 0.1–1)
BUN SERPL-MCNC: 10 MG/DL (ref 8–23)
BUN SERPL-MCNC: 8 MG/DL (ref 8–23)
BUN SERPL-MCNC: 8 MG/DL (ref 8–23)
BUN SERPL-MCNC: 9 MG/DL (ref 8–23)
CALCIUM SERPL-MCNC: 6.7 MG/DL (ref 8.7–10.5)
CALCIUM SERPL-MCNC: 8 MG/DL (ref 8.7–10.5)
CALCIUM SERPL-MCNC: 8.1 MG/DL (ref 8.7–10.5)
CALCIUM SERPL-MCNC: 8.2 MG/DL (ref 8.7–10.5)
CALCIUM SERPL-MCNC: 8.3 MG/DL (ref 8.7–10.5)
CALCIUM SERPL-MCNC: 8.6 MG/DL (ref 8.7–10.5)
CALCIUM SERPL-MCNC: 8.7 MG/DL (ref 8.7–10.5)
CHLORIDE SERPL-SCNC: 90 MMOL/L (ref 95–110)
CHLORIDE SERPL-SCNC: 91 MMOL/L (ref 95–110)
CHLORIDE SERPL-SCNC: 98 MMOL/L (ref 95–110)
CO2 SERPL-SCNC: 18 MMOL/L (ref 23–29)
CO2 SERPL-SCNC: 19 MMOL/L (ref 23–29)
CO2 SERPL-SCNC: 21 MMOL/L (ref 23–29)
CO2 SERPL-SCNC: 22 MMOL/L (ref 23–29)
CO2 SERPL-SCNC: 23 MMOL/L (ref 23–29)
CO2 SERPL-SCNC: 24 MMOL/L (ref 23–29)
CO2 SERPL-SCNC: 24 MMOL/L (ref 23–29)
CREAT SERPL-MCNC: 0.6 MG/DL (ref 0.5–1.4)
CREAT SERPL-MCNC: 0.7 MG/DL (ref 0.5–1.4)
CREAT SERPL-MCNC: 0.8 MG/DL (ref 0.5–1.4)
CREAT SERPL-MCNC: 0.8 MG/DL (ref 0.5–1.4)
DIFFERENTIAL METHOD: ABNORMAL
EOSINOPHIL # BLD AUTO: 0 K/UL (ref 0–0.5)
EOSINOPHIL NFR BLD: 0.6 % (ref 0–8)
ERYTHROCYTE [DISTWIDTH] IN BLOOD BY AUTOMATED COUNT: 13.9 % (ref 11.5–14.5)
EST. GFR  (AFRICAN AMERICAN): >60 ML/MIN/1.73 M^2
EST. GFR  (NON AFRICAN AMERICAN): >60 ML/MIN/1.73 M^2
GLUCOSE SERPL-MCNC: 111 MG/DL (ref 70–110)
GLUCOSE SERPL-MCNC: 114 MG/DL (ref 70–110)
GLUCOSE SERPL-MCNC: 115 MG/DL (ref 70–110)
GLUCOSE SERPL-MCNC: 115 MG/DL (ref 70–110)
GLUCOSE SERPL-MCNC: 116 MG/DL (ref 70–110)
GLUCOSE SERPL-MCNC: 117 MG/DL (ref 70–110)
GLUCOSE SERPL-MCNC: 119 MG/DL (ref 70–110)
GLUCOSE SERPL-MCNC: 133 MG/DL (ref 70–110)
GLUCOSE SERPL-MCNC: 87 MG/DL (ref 70–110)
HCT VFR BLD AUTO: 29.3 % (ref 40–54)
HGB BLD-MCNC: 10.5 G/DL (ref 14–18)
IMM GRANULOCYTES # BLD AUTO: 0.27 K/UL (ref 0–0.04)
IMM GRANULOCYTES NFR BLD AUTO: 3.7 % (ref 0–0.5)
LYMPHOCYTES # BLD AUTO: 0.9 K/UL (ref 1–4.8)
LYMPHOCYTES NFR BLD: 12.5 % (ref 18–48)
MAGNESIUM SERPL-MCNC: 1.7 MG/DL (ref 1.6–2.6)
MCH RBC QN AUTO: 33.1 PG (ref 27–31)
MCHC RBC AUTO-ENTMCNC: 35.8 G/DL (ref 32–36)
MCV RBC AUTO: 92 FL (ref 82–98)
MONOCYTES # BLD AUTO: 1 K/UL (ref 0.3–1)
MONOCYTES NFR BLD: 13.8 % (ref 4–15)
NEUTROPHILS # BLD AUTO: 5 K/UL (ref 1.8–7.7)
NEUTROPHILS NFR BLD: 68.6 % (ref 38–73)
NRBC BLD-RTO: 0 /100 WBC
PHOSPHATE SERPL-MCNC: 2.6 MG/DL (ref 2.7–4.5)
PLATELET # BLD AUTO: 165 K/UL (ref 150–450)
PMV BLD AUTO: 9 FL (ref 9.2–12.9)
POTASSIUM SERPL-SCNC: 3.8 MMOL/L (ref 3.5–5.1)
POTASSIUM SERPL-SCNC: 4 MMOL/L (ref 3.5–5.1)
POTASSIUM SERPL-SCNC: 4.1 MMOL/L (ref 3.5–5.1)
POTASSIUM SERPL-SCNC: 4.2 MMOL/L (ref 3.5–5.1)
POTASSIUM SERPL-SCNC: 4.2 MMOL/L (ref 3.5–5.1)
POTASSIUM SERPL-SCNC: 4.3 MMOL/L (ref 3.5–5.1)
POTASSIUM SERPL-SCNC: 4.4 MMOL/L (ref 3.5–5.1)
POTASSIUM SERPL-SCNC: 4.5 MMOL/L (ref 3.5–5.1)
POTASSIUM SERPL-SCNC: 4.7 MMOL/L (ref 3.5–5.1)
PROT SERPL-MCNC: 5.7 G/DL (ref 6–8.4)
RBC # BLD AUTO: 3.17 M/UL (ref 4.6–6.2)
SODIUM SERPL-SCNC: 120 MMOL/L (ref 136–145)
SODIUM SERPL-SCNC: 121 MMOL/L (ref 136–145)
SODIUM SERPL-SCNC: 122 MMOL/L (ref 136–145)
SODIUM SERPL-SCNC: 123 MMOL/L (ref 136–145)
SODIUM SERPL-SCNC: 123 MMOL/L (ref 136–145)
SODIUM SERPL-SCNC: 126 MMOL/L (ref 136–145)
WBC # BLD AUTO: 7.27 K/UL (ref 3.9–12.7)

## 2021-11-05 PROCEDURE — 63600175 PHARM REV CODE 636 W HCPCS: Mod: HCNC | Performed by: STUDENT IN AN ORGANIZED HEALTH CARE EDUCATION/TRAINING PROGRAM

## 2021-11-05 PROCEDURE — 25000003 PHARM REV CODE 250: Mod: HCNC | Performed by: STUDENT IN AN ORGANIZED HEALTH CARE EDUCATION/TRAINING PROGRAM

## 2021-11-05 PROCEDURE — 80053 COMPREHEN METABOLIC PANEL: CPT | Mod: HCNC | Performed by: STUDENT IN AN ORGANIZED HEALTH CARE EDUCATION/TRAINING PROGRAM

## 2021-11-05 PROCEDURE — 99232 PR SUBSEQUENT HOSPITAL CARE,LEVL II: ICD-10-PCS | Mod: HCNC,,, | Performed by: PSYCHIATRY & NEUROLOGY

## 2021-11-05 PROCEDURE — 97116 GAIT TRAINING THERAPY: CPT | Mod: HCNC

## 2021-11-05 PROCEDURE — 99233 PR SUBSEQUENT HOSPITAL CARE,LEVL III: ICD-10-PCS | Mod: HCNC,GC,, | Performed by: INTERNAL MEDICINE

## 2021-11-05 PROCEDURE — 83735 ASSAY OF MAGNESIUM: CPT | Mod: HCNC | Performed by: STUDENT IN AN ORGANIZED HEALTH CARE EDUCATION/TRAINING PROGRAM

## 2021-11-05 PROCEDURE — 99232 SBSQ HOSP IP/OBS MODERATE 35: CPT | Mod: HCNC,,, | Performed by: PSYCHIATRY & NEUROLOGY

## 2021-11-05 PROCEDURE — 80048 BASIC METABOLIC PNL TOTAL CA: CPT | Mod: 91,HCNC | Performed by: STUDENT IN AN ORGANIZED HEALTH CARE EDUCATION/TRAINING PROGRAM

## 2021-11-05 PROCEDURE — 99233 SBSQ HOSP IP/OBS HIGH 50: CPT | Mod: HCNC,GC,, | Performed by: INTERNAL MEDICINE

## 2021-11-05 PROCEDURE — 85025 COMPLETE CBC W/AUTO DIFF WBC: CPT | Mod: HCNC | Performed by: STUDENT IN AN ORGANIZED HEALTH CARE EDUCATION/TRAINING PROGRAM

## 2021-11-05 PROCEDURE — 20000000 HC ICU ROOM: Mod: HCNC

## 2021-11-05 PROCEDURE — 97161 PT EVAL LOW COMPLEX 20 MIN: CPT | Mod: HCNC

## 2021-11-05 PROCEDURE — 97165 OT EVAL LOW COMPLEX 30 MIN: CPT | Mod: HCNC

## 2021-11-05 PROCEDURE — 84100 ASSAY OF PHOSPHORUS: CPT | Mod: HCNC | Performed by: STUDENT IN AN ORGANIZED HEALTH CARE EDUCATION/TRAINING PROGRAM

## 2021-11-05 PROCEDURE — 80048 BASIC METABOLIC PNL TOTAL CA: CPT | Mod: HCNC | Performed by: STUDENT IN AN ORGANIZED HEALTH CARE EDUCATION/TRAINING PROGRAM

## 2021-11-05 PROCEDURE — 97535 SELF CARE MNGMENT TRAINING: CPT | Mod: HCNC

## 2021-11-05 RX ADMIN — MELATONIN TAB 3 MG 6 MG: 3 TAB at 09:11

## 2021-11-05 RX ADMIN — THIAMINE HCL TAB 100 MG 100 MG: 100 TAB at 08:11

## 2021-11-05 RX ADMIN — CLINDAMYCIN HYDROCHLORIDE 450 MG: 150 CAPSULE ORAL at 02:11

## 2021-11-05 RX ADMIN — GABAPENTIN 300 MG: 300 CAPSULE ORAL at 02:11

## 2021-11-05 RX ADMIN — GABAPENTIN 300 MG: 300 CAPSULE ORAL at 09:11

## 2021-11-05 RX ADMIN — ERYTHROMYCIN: 5 OINTMENT OPHTHALMIC at 02:11

## 2021-11-05 RX ADMIN — CLINDAMYCIN HYDROCHLORIDE 450 MG: 150 CAPSULE ORAL at 06:11

## 2021-11-05 RX ADMIN — CLINDAMYCIN HYDROCHLORIDE 450 MG: 150 CAPSULE ORAL at 09:11

## 2021-11-05 RX ADMIN — ERYTHROMYCIN: 5 OINTMENT OPHTHALMIC at 09:11

## 2021-11-05 RX ADMIN — ENOXAPARIN SODIUM 40 MG: 40 INJECTION SUBCUTANEOUS at 06:11

## 2021-11-05 RX ADMIN — MULTIPLE VITAMINS W/ MINERALS TAB 1 TABLET: TAB at 08:11

## 2021-11-05 RX ADMIN — TIMOLOL MALEATE 1 DROP: 5 SOLUTION OPHTHALMIC at 08:11

## 2021-11-05 RX ADMIN — LATANOPROST 1 DROP: 50 SOLUTION OPHTHALMIC at 09:11

## 2021-11-05 RX ADMIN — GABAPENTIN 300 MG: 300 CAPSULE ORAL at 08:11

## 2021-11-05 RX ADMIN — CYANOCOBALAMIN TAB 1000 MCG 1000 MCG: 1000 TAB at 08:11

## 2021-11-05 RX ADMIN — ERYTHROMYCIN: 5 OINTMENT OPHTHALMIC at 06:11

## 2021-11-06 LAB
ALBUMIN SERPL BCP-MCNC: 2.6 G/DL (ref 3.5–5.2)
ALP SERPL-CCNC: 62 U/L (ref 55–135)
ALT SERPL W/O P-5'-P-CCNC: 17 U/L (ref 10–44)
ANION GAP SERPL CALC-SCNC: 7 MMOL/L (ref 8–16)
ANION GAP SERPL CALC-SCNC: 8 MMOL/L (ref 8–16)
AST SERPL-CCNC: 22 U/L (ref 10–40)
BASOPHILS # BLD AUTO: 0.07 K/UL (ref 0–0.2)
BASOPHILS NFR BLD: 1.1 % (ref 0–1.9)
BILIRUB SERPL-MCNC: 0.5 MG/DL (ref 0.1–1)
BUN SERPL-MCNC: 11 MG/DL (ref 8–23)
BUN SERPL-MCNC: 12 MG/DL (ref 8–23)
CALCIUM SERPL-MCNC: 8.3 MG/DL (ref 8.7–10.5)
CALCIUM SERPL-MCNC: 8.5 MG/DL (ref 8.7–10.5)
CHLORIDE SERPL-SCNC: 94 MMOL/L (ref 95–110)
CHLORIDE SERPL-SCNC: 94 MMOL/L (ref 95–110)
CO2 SERPL-SCNC: 23 MMOL/L (ref 23–29)
CO2 SERPL-SCNC: 24 MMOL/L (ref 23–29)
CREAT SERPL-MCNC: 0.7 MG/DL (ref 0.5–1.4)
CREAT SERPL-MCNC: 0.8 MG/DL (ref 0.5–1.4)
DIFFERENTIAL METHOD: ABNORMAL
EOSINOPHIL # BLD AUTO: 0.1 K/UL (ref 0–0.5)
EOSINOPHIL NFR BLD: 0.9 % (ref 0–8)
ERYTHROCYTE [DISTWIDTH] IN BLOOD BY AUTOMATED COUNT: 14.4 % (ref 11.5–14.5)
EST. GFR  (AFRICAN AMERICAN): >60 ML/MIN/1.73 M^2
EST. GFR  (AFRICAN AMERICAN): >60 ML/MIN/1.73 M^2
EST. GFR  (NON AFRICAN AMERICAN): >60 ML/MIN/1.73 M^2
EST. GFR  (NON AFRICAN AMERICAN): >60 ML/MIN/1.73 M^2
GAMMA INTERFERON BACKGROUND BLD IA-ACNC: 0.02 IU/ML
GLUCOSE SERPL-MCNC: 113 MG/DL (ref 70–110)
GLUCOSE SERPL-MCNC: 131 MG/DL (ref 70–110)
HCT VFR BLD AUTO: 26.8 % (ref 40–54)
HGB BLD-MCNC: 9.4 G/DL (ref 14–18)
IMM GRANULOCYTES # BLD AUTO: 0.27 K/UL (ref 0–0.04)
IMM GRANULOCYTES NFR BLD AUTO: 4.2 % (ref 0–0.5)
LYMPHOCYTES # BLD AUTO: 1 K/UL (ref 1–4.8)
LYMPHOCYTES NFR BLD: 15.2 % (ref 18–48)
M TB IFN-G CD4+ BCKGRND COR BLD-ACNC: 0 IU/ML
MAGNESIUM SERPL-MCNC: 1.7 MG/DL (ref 1.6–2.6)
MCH RBC QN AUTO: 32.8 PG (ref 27–31)
MCHC RBC AUTO-ENTMCNC: 35.1 G/DL (ref 32–36)
MCV RBC AUTO: 93 FL (ref 82–98)
MITOGEN IGNF BCKGRD COR BLD-ACNC: 5.11 IU/ML
MONOCYTES # BLD AUTO: 0.8 K/UL (ref 0.3–1)
MONOCYTES NFR BLD: 11.9 % (ref 4–15)
NEUTROPHILS # BLD AUTO: 4.2 K/UL (ref 1.8–7.7)
NEUTROPHILS NFR BLD: 66.7 % (ref 38–73)
NRBC BLD-RTO: 0 /100 WBC
PHOSPHATE SERPL-MCNC: 3 MG/DL (ref 2.7–4.5)
PLATELET # BLD AUTO: 151 K/UL (ref 150–450)
PMV BLD AUTO: 8.9 FL (ref 9.2–12.9)
POTASSIUM SERPL-SCNC: 4.1 MMOL/L (ref 3.5–5.1)
POTASSIUM SERPL-SCNC: 4.2 MMOL/L (ref 3.5–5.1)
PROT SERPL-MCNC: 5.4 G/DL (ref 6–8.4)
RBC # BLD AUTO: 2.87 M/UL (ref 4.6–6.2)
SODIUM SERPL-SCNC: 125 MMOL/L (ref 136–145)
SODIUM SERPL-SCNC: 125 MMOL/L (ref 136–145)
TB GOLD PLUS: NEGATIVE
TB2 - NIL: 0 IU/ML
WBC # BLD AUTO: 6.37 K/UL (ref 3.9–12.7)

## 2021-11-06 PROCEDURE — 83735 ASSAY OF MAGNESIUM: CPT | Mod: HCNC | Performed by: STUDENT IN AN ORGANIZED HEALTH CARE EDUCATION/TRAINING PROGRAM

## 2021-11-06 PROCEDURE — 27000190 HC CPAP FULL FACE MASK W/VALVE: Mod: HCNC

## 2021-11-06 PROCEDURE — 20600001 HC STEP DOWN PRIVATE ROOM: Mod: HCNC

## 2021-11-06 PROCEDURE — 25000003 PHARM REV CODE 250: Mod: HCNC | Performed by: STUDENT IN AN ORGANIZED HEALTH CARE EDUCATION/TRAINING PROGRAM

## 2021-11-06 PROCEDURE — 94761 N-INVAS EAR/PLS OXIMETRY MLT: CPT | Mod: HCNC

## 2021-11-06 PROCEDURE — 99900035 HC TECH TIME PER 15 MIN (STAT): Mod: HCNC

## 2021-11-06 PROCEDURE — 84100 ASSAY OF PHOSPHORUS: CPT | Mod: HCNC | Performed by: STUDENT IN AN ORGANIZED HEALTH CARE EDUCATION/TRAINING PROGRAM

## 2021-11-06 PROCEDURE — 80048 BASIC METABOLIC PNL TOTAL CA: CPT | Mod: HCNC | Performed by: STUDENT IN AN ORGANIZED HEALTH CARE EDUCATION/TRAINING PROGRAM

## 2021-11-06 PROCEDURE — 63600175 PHARM REV CODE 636 W HCPCS: Mod: HCNC | Performed by: STUDENT IN AN ORGANIZED HEALTH CARE EDUCATION/TRAINING PROGRAM

## 2021-11-06 PROCEDURE — 94660 CPAP INITIATION&MGMT: CPT | Mod: HCNC

## 2021-11-06 PROCEDURE — 80053 COMPREHEN METABOLIC PANEL: CPT | Mod: HCNC | Performed by: STUDENT IN AN ORGANIZED HEALTH CARE EDUCATION/TRAINING PROGRAM

## 2021-11-06 PROCEDURE — 27000221 HC OXYGEN, UP TO 24 HOURS: Mod: HCNC

## 2021-11-06 PROCEDURE — 85025 COMPLETE CBC W/AUTO DIFF WBC: CPT | Mod: HCNC | Performed by: STUDENT IN AN ORGANIZED HEALTH CARE EDUCATION/TRAINING PROGRAM

## 2021-11-06 RX ADMIN — THIAMINE HCL TAB 100 MG 100 MG: 100 TAB at 10:11

## 2021-11-06 RX ADMIN — ENOXAPARIN SODIUM 40 MG: 40 INJECTION SUBCUTANEOUS at 06:11

## 2021-11-06 RX ADMIN — GABAPENTIN 300 MG: 300 CAPSULE ORAL at 02:11

## 2021-11-06 RX ADMIN — MULTIPLE VITAMINS W/ MINERALS TAB 1 TABLET: TAB at 10:11

## 2021-11-06 RX ADMIN — CYANOCOBALAMIN TAB 1000 MCG 1000 MCG: 1000 TAB at 10:11

## 2021-11-06 RX ADMIN — GABAPENTIN 300 MG: 300 CAPSULE ORAL at 08:11

## 2021-11-06 RX ADMIN — GABAPENTIN 300 MG: 300 CAPSULE ORAL at 10:11

## 2021-11-06 RX ADMIN — MELATONIN TAB 3 MG 6 MG: 3 TAB at 08:11

## 2021-11-06 RX ADMIN — LATANOPROST 1 DROP: 50 SOLUTION OPHTHALMIC at 08:11

## 2021-11-06 RX ADMIN — CLINDAMYCIN HYDROCHLORIDE 450 MG: 150 CAPSULE ORAL at 05:11

## 2021-11-06 RX ADMIN — CLINDAMYCIN HYDROCHLORIDE 450 MG: 150 CAPSULE ORAL at 08:11

## 2021-11-06 RX ADMIN — CLINDAMYCIN HYDROCHLORIDE 450 MG: 150 CAPSULE ORAL at 02:11

## 2021-11-06 RX ADMIN — ERYTHROMYCIN: 5 OINTMENT OPHTHALMIC at 02:11

## 2021-11-06 RX ADMIN — TIMOLOL MALEATE 1 DROP: 5 SOLUTION OPHTHALMIC at 10:11

## 2021-11-06 RX ADMIN — ERYTHROMYCIN: 5 OINTMENT OPHTHALMIC at 05:11

## 2021-11-07 LAB
ALBUMIN SERPL BCP-MCNC: 2.6 G/DL (ref 3.5–5.2)
ALP SERPL-CCNC: 66 U/L (ref 55–135)
ALT SERPL W/O P-5'-P-CCNC: 21 U/L (ref 10–44)
ANION GAP SERPL CALC-SCNC: 9 MMOL/L (ref 8–16)
AST SERPL-CCNC: 25 U/L (ref 10–40)
BASOPHILS # BLD AUTO: 0.09 K/UL (ref 0–0.2)
BASOPHILS NFR BLD: 1.4 % (ref 0–1.9)
BILIRUB SERPL-MCNC: 0.5 MG/DL (ref 0.1–1)
BUN SERPL-MCNC: 9 MG/DL (ref 8–23)
CALCIUM SERPL-MCNC: 8.7 MG/DL (ref 8.7–10.5)
CHLORIDE SERPL-SCNC: 95 MMOL/L (ref 95–110)
CO2 SERPL-SCNC: 25 MMOL/L (ref 23–29)
CREAT SERPL-MCNC: 0.6 MG/DL (ref 0.5–1.4)
DIFFERENTIAL METHOD: ABNORMAL
EOSINOPHIL # BLD AUTO: 0.1 K/UL (ref 0–0.5)
EOSINOPHIL NFR BLD: 2.2 % (ref 0–8)
ERYTHROCYTE [DISTWIDTH] IN BLOOD BY AUTOMATED COUNT: 14.3 % (ref 11.5–14.5)
EST. GFR  (AFRICAN AMERICAN): >60 ML/MIN/1.73 M^2
EST. GFR  (NON AFRICAN AMERICAN): >60 ML/MIN/1.73 M^2
GLUCOSE SERPL-MCNC: 114 MG/DL (ref 70–110)
HCT VFR BLD AUTO: 29.1 % (ref 40–54)
HGB BLD-MCNC: 9.9 G/DL (ref 14–18)
IMM GRANULOCYTES # BLD AUTO: 0.19 K/UL (ref 0–0.04)
IMM GRANULOCYTES NFR BLD AUTO: 3 % (ref 0–0.5)
LYMPHOCYTES # BLD AUTO: 0.9 K/UL (ref 1–4.8)
LYMPHOCYTES NFR BLD: 14.3 % (ref 18–48)
MAGNESIUM SERPL-MCNC: 1.8 MG/DL (ref 1.6–2.6)
MCH RBC QN AUTO: 33.7 PG (ref 27–31)
MCHC RBC AUTO-ENTMCNC: 34 G/DL (ref 32–36)
MCV RBC AUTO: 99 FL (ref 82–98)
MONOCYTES # BLD AUTO: 0.7 K/UL (ref 0.3–1)
MONOCYTES NFR BLD: 10.8 % (ref 4–15)
NEUTROPHILS # BLD AUTO: 4.3 K/UL (ref 1.8–7.7)
NEUTROPHILS NFR BLD: 68.3 % (ref 38–73)
NRBC BLD-RTO: 0 /100 WBC
PHOSPHATE SERPL-MCNC: 3.7 MG/DL (ref 2.7–4.5)
PLATELET # BLD AUTO: 183 K/UL (ref 150–450)
PMV BLD AUTO: 8.9 FL (ref 9.2–12.9)
POTASSIUM SERPL-SCNC: 4.3 MMOL/L (ref 3.5–5.1)
PROT SERPL-MCNC: 5.7 G/DL (ref 6–8.4)
RBC # BLD AUTO: 2.94 M/UL (ref 4.6–6.2)
SODIUM SERPL-SCNC: 129 MMOL/L (ref 136–145)
WBC # BLD AUTO: 6.36 K/UL (ref 3.9–12.7)

## 2021-11-07 PROCEDURE — 85025 COMPLETE CBC W/AUTO DIFF WBC: CPT | Mod: HCNC | Performed by: STUDENT IN AN ORGANIZED HEALTH CARE EDUCATION/TRAINING PROGRAM

## 2021-11-07 PROCEDURE — 36415 COLL VENOUS BLD VENIPUNCTURE: CPT | Mod: HCNC | Performed by: STUDENT IN AN ORGANIZED HEALTH CARE EDUCATION/TRAINING PROGRAM

## 2021-11-07 PROCEDURE — 25000003 PHARM REV CODE 250: Mod: HCNC | Performed by: HOSPITALIST

## 2021-11-07 PROCEDURE — 25000003 PHARM REV CODE 250: Mod: HCNC | Performed by: STUDENT IN AN ORGANIZED HEALTH CARE EDUCATION/TRAINING PROGRAM

## 2021-11-07 PROCEDURE — 20600001 HC STEP DOWN PRIVATE ROOM: Mod: HCNC

## 2021-11-07 PROCEDURE — 83735 ASSAY OF MAGNESIUM: CPT | Mod: HCNC | Performed by: STUDENT IN AN ORGANIZED HEALTH CARE EDUCATION/TRAINING PROGRAM

## 2021-11-07 PROCEDURE — 84100 ASSAY OF PHOSPHORUS: CPT | Mod: HCNC | Performed by: STUDENT IN AN ORGANIZED HEALTH CARE EDUCATION/TRAINING PROGRAM

## 2021-11-07 PROCEDURE — 80053 COMPREHEN METABOLIC PANEL: CPT | Mod: HCNC | Performed by: STUDENT IN AN ORGANIZED HEALTH CARE EDUCATION/TRAINING PROGRAM

## 2021-11-07 PROCEDURE — 63600175 PHARM REV CODE 636 W HCPCS: Mod: HCNC | Performed by: STUDENT IN AN ORGANIZED HEALTH CARE EDUCATION/TRAINING PROGRAM

## 2021-11-07 RX ORDER — LABETALOL HCL 20 MG/4 ML
20 SYRINGE (ML) INTRAVENOUS EVERY 6 HOURS PRN
Status: DISCONTINUED | OUTPATIENT
Start: 2021-11-07 | End: 2021-11-17 | Stop reason: HOSPADM

## 2021-11-07 RX ORDER — MUPIROCIN 20 MG/G
OINTMENT TOPICAL 2 TIMES DAILY
Status: COMPLETED | OUTPATIENT
Start: 2021-11-07 | End: 2021-11-12

## 2021-11-07 RX ADMIN — MELATONIN TAB 3 MG 6 MG: 3 TAB at 08:11

## 2021-11-07 RX ADMIN — GABAPENTIN 300 MG: 300 CAPSULE ORAL at 02:11

## 2021-11-07 RX ADMIN — THIAMINE HCL TAB 100 MG 100 MG: 100 TAB at 09:11

## 2021-11-07 RX ADMIN — MULTIPLE VITAMINS W/ MINERALS TAB 1 TABLET: TAB at 09:11

## 2021-11-07 RX ADMIN — ERYTHROMYCIN: 5 OINTMENT OPHTHALMIC at 02:11

## 2021-11-07 RX ADMIN — MUPIROCIN: 20 OINTMENT TOPICAL at 08:11

## 2021-11-07 RX ADMIN — ERYTHROMYCIN 1 INCH: 5 OINTMENT OPHTHALMIC at 05:11

## 2021-11-07 RX ADMIN — ENOXAPARIN SODIUM 40 MG: 40 INJECTION SUBCUTANEOUS at 06:11

## 2021-11-07 RX ADMIN — ERYTHROMYCIN 1 INCH: 5 OINTMENT OPHTHALMIC at 08:11

## 2021-11-07 RX ADMIN — CLINDAMYCIN HYDROCHLORIDE 450 MG: 150 CAPSULE ORAL at 05:11

## 2021-11-07 RX ADMIN — TIMOLOL MALEATE 1 DROP: 5 SOLUTION OPHTHALMIC at 09:11

## 2021-11-07 RX ADMIN — CYANOCOBALAMIN TAB 1000 MCG 1000 MCG: 1000 TAB at 09:11

## 2021-11-07 RX ADMIN — CLINDAMYCIN HYDROCHLORIDE 450 MG: 150 CAPSULE ORAL at 08:11

## 2021-11-07 RX ADMIN — GABAPENTIN 300 MG: 300 CAPSULE ORAL at 09:11

## 2021-11-07 RX ADMIN — CLINDAMYCIN HYDROCHLORIDE 450 MG: 150 CAPSULE ORAL at 02:11

## 2021-11-07 RX ADMIN — LATANOPROST 1 DROP: 50 SOLUTION OPHTHALMIC at 08:11

## 2021-11-07 RX ADMIN — LABETALOL HYDROCHLORIDE 20 MG: 5 INJECTION, SOLUTION INTRAVENOUS at 08:11

## 2021-11-07 RX ADMIN — GABAPENTIN 300 MG: 300 CAPSULE ORAL at 08:11

## 2021-11-08 LAB
ALBUMIN SERPL BCP-MCNC: 2.7 G/DL (ref 3.5–5.2)
ALP SERPL-CCNC: 63 U/L (ref 55–135)
ALT SERPL W/O P-5'-P-CCNC: 19 U/L (ref 10–44)
ANION GAP SERPL CALC-SCNC: 6 MMOL/L (ref 8–16)
AST SERPL-CCNC: 27 U/L (ref 10–40)
BACTERIA BLD CULT: NORMAL
BACTERIA BLD CULT: NORMAL
BASOPHILS # BLD AUTO: 0.08 K/UL (ref 0–0.2)
BASOPHILS NFR BLD: 1.2 % (ref 0–1.9)
BILIRUB SERPL-MCNC: 0.5 MG/DL (ref 0.1–1)
BUN SERPL-MCNC: 11 MG/DL (ref 8–23)
CALCIUM SERPL-MCNC: 8.4 MG/DL (ref 8.7–10.5)
CHLORIDE SERPL-SCNC: 96 MMOL/L (ref 95–110)
CO2 SERPL-SCNC: 28 MMOL/L (ref 23–29)
CREAT SERPL-MCNC: 0.8 MG/DL (ref 0.5–1.4)
DIFFERENTIAL METHOD: ABNORMAL
EOSINOPHIL # BLD AUTO: 0.2 K/UL (ref 0–0.5)
EOSINOPHIL NFR BLD: 2.8 % (ref 0–8)
ERYTHROCYTE [DISTWIDTH] IN BLOOD BY AUTOMATED COUNT: 14.4 % (ref 11.5–14.5)
EST. GFR  (AFRICAN AMERICAN): >60 ML/MIN/1.73 M^2
EST. GFR  (NON AFRICAN AMERICAN): >60 ML/MIN/1.73 M^2
GLUCOSE SERPL-MCNC: 106 MG/DL (ref 70–110)
HCT VFR BLD AUTO: 28.9 % (ref 40–54)
HGB BLD-MCNC: 9.5 G/DL (ref 14–18)
IMM GRANULOCYTES # BLD AUTO: 0.18 K/UL (ref 0–0.04)
IMM GRANULOCYTES NFR BLD AUTO: 2.6 % (ref 0–0.5)
LYMPHOCYTES # BLD AUTO: 1.2 K/UL (ref 1–4.8)
LYMPHOCYTES NFR BLD: 16.9 % (ref 18–48)
MAGNESIUM SERPL-MCNC: 1.8 MG/DL (ref 1.6–2.6)
MCH RBC QN AUTO: 33.1 PG (ref 27–31)
MCHC RBC AUTO-ENTMCNC: 32.9 G/DL (ref 32–36)
MCV RBC AUTO: 101 FL (ref 82–98)
MONOCYTES # BLD AUTO: 0.9 K/UL (ref 0.3–1)
MONOCYTES NFR BLD: 12.8 % (ref 4–15)
NEUTROPHILS # BLD AUTO: 4.4 K/UL (ref 1.8–7.7)
NEUTROPHILS NFR BLD: 63.7 % (ref 38–73)
NRBC BLD-RTO: 0 /100 WBC
PHOSPHATE SERPL-MCNC: 3.6 MG/DL (ref 2.7–4.5)
PLATELET # BLD AUTO: 187 K/UL (ref 150–450)
PMV BLD AUTO: 8.9 FL (ref 9.2–12.9)
POTASSIUM SERPL-SCNC: 4.6 MMOL/L (ref 3.5–5.1)
PROT SERPL-MCNC: 5.2 G/DL (ref 6–8.4)
RBC # BLD AUTO: 2.87 M/UL (ref 4.6–6.2)
SODIUM SERPL-SCNC: 130 MMOL/L (ref 136–145)
WBC # BLD AUTO: 6.87 K/UL (ref 3.9–12.7)

## 2021-11-08 PROCEDURE — 99232 PR SUBSEQUENT HOSPITAL CARE,LEVL II: ICD-10-PCS | Mod: HCNC,,, | Performed by: PSYCHIATRY & NEUROLOGY

## 2021-11-08 PROCEDURE — 63600175 PHARM REV CODE 636 W HCPCS: Mod: HCNC | Performed by: STUDENT IN AN ORGANIZED HEALTH CARE EDUCATION/TRAINING PROGRAM

## 2021-11-08 PROCEDURE — 80053 COMPREHEN METABOLIC PANEL: CPT | Mod: HCNC | Performed by: STUDENT IN AN ORGANIZED HEALTH CARE EDUCATION/TRAINING PROGRAM

## 2021-11-08 PROCEDURE — 25000003 PHARM REV CODE 250: Mod: HCNC | Performed by: STUDENT IN AN ORGANIZED HEALTH CARE EDUCATION/TRAINING PROGRAM

## 2021-11-08 PROCEDURE — 84100 ASSAY OF PHOSPHORUS: CPT | Mod: HCNC | Performed by: STUDENT IN AN ORGANIZED HEALTH CARE EDUCATION/TRAINING PROGRAM

## 2021-11-08 PROCEDURE — 99900035 HC TECH TIME PER 15 MIN (STAT): Mod: HCNC

## 2021-11-08 PROCEDURE — 20600001 HC STEP DOWN PRIVATE ROOM: Mod: HCNC

## 2021-11-08 PROCEDURE — 99232 SBSQ HOSP IP/OBS MODERATE 35: CPT | Mod: HCNC,,, | Performed by: PSYCHIATRY & NEUROLOGY

## 2021-11-08 PROCEDURE — 83735 ASSAY OF MAGNESIUM: CPT | Mod: HCNC | Performed by: STUDENT IN AN ORGANIZED HEALTH CARE EDUCATION/TRAINING PROGRAM

## 2021-11-08 PROCEDURE — 36415 COLL VENOUS BLD VENIPUNCTURE: CPT | Mod: HCNC | Performed by: STUDENT IN AN ORGANIZED HEALTH CARE EDUCATION/TRAINING PROGRAM

## 2021-11-08 PROCEDURE — 85025 COMPLETE CBC W/AUTO DIFF WBC: CPT | Mod: HCNC | Performed by: STUDENT IN AN ORGANIZED HEALTH CARE EDUCATION/TRAINING PROGRAM

## 2021-11-08 PROCEDURE — 97535 SELF CARE MNGMENT TRAINING: CPT | Mod: HCNC

## 2021-11-08 PROCEDURE — 27000221 HC OXYGEN, UP TO 24 HOURS: Mod: HCNC

## 2021-11-08 PROCEDURE — 97530 THERAPEUTIC ACTIVITIES: CPT | Mod: HCNC

## 2021-11-08 PROCEDURE — 25000003 PHARM REV CODE 250: Mod: HCNC | Performed by: HOSPITALIST

## 2021-11-08 RX ORDER — LOSARTAN POTASSIUM 50 MG/1
100 TABLET ORAL DAILY
Status: DISCONTINUED | OUTPATIENT
Start: 2021-11-08 | End: 2021-11-17 | Stop reason: HOSPADM

## 2021-11-08 RX ORDER — AMLODIPINE BESYLATE 5 MG/1
5 TABLET ORAL DAILY
Status: DISCONTINUED | OUTPATIENT
Start: 2021-11-08 | End: 2021-11-09

## 2021-11-08 RX ADMIN — MELATONIN TAB 3 MG 6 MG: 3 TAB at 08:11

## 2021-11-08 RX ADMIN — TIMOLOL MALEATE 1 DROP: 5 SOLUTION OPHTHALMIC at 09:11

## 2021-11-08 RX ADMIN — LOSARTAN POTASSIUM 100 MG: 50 TABLET, FILM COATED ORAL at 02:11

## 2021-11-08 RX ADMIN — GABAPENTIN 300 MG: 300 CAPSULE ORAL at 02:11

## 2021-11-08 RX ADMIN — THIAMINE HCL TAB 100 MG 100 MG: 100 TAB at 09:11

## 2021-11-08 RX ADMIN — CLINDAMYCIN HYDROCHLORIDE 450 MG: 150 CAPSULE ORAL at 05:11

## 2021-11-08 RX ADMIN — GABAPENTIN 300 MG: 300 CAPSULE ORAL at 09:11

## 2021-11-08 RX ADMIN — MUPIROCIN: 20 OINTMENT TOPICAL at 09:11

## 2021-11-08 RX ADMIN — MUPIROCIN: 20 OINTMENT TOPICAL at 08:11

## 2021-11-08 RX ADMIN — MULTIPLE VITAMINS W/ MINERALS TAB 1 TABLET: TAB at 09:11

## 2021-11-08 RX ADMIN — ENOXAPARIN SODIUM 40 MG: 40 INJECTION SUBCUTANEOUS at 06:11

## 2021-11-08 RX ADMIN — CLINDAMYCIN HYDROCHLORIDE 450 MG: 150 CAPSULE ORAL at 09:11

## 2021-11-08 RX ADMIN — CYANOCOBALAMIN TAB 1000 MCG 1000 MCG: 1000 TAB at 09:11

## 2021-11-08 RX ADMIN — CLINDAMYCIN HYDROCHLORIDE 450 MG: 150 CAPSULE ORAL at 02:11

## 2021-11-08 RX ADMIN — ERYTHROMYCIN: 5 OINTMENT OPHTHALMIC at 08:11

## 2021-11-08 RX ADMIN — ERYTHROMYCIN: 5 OINTMENT OPHTHALMIC at 02:11

## 2021-11-08 RX ADMIN — ERYTHROMYCIN 1 INCH: 5 OINTMENT OPHTHALMIC at 05:11

## 2021-11-08 RX ADMIN — LATANOPROST 1 DROP: 50 SOLUTION OPHTHALMIC at 08:11

## 2021-11-08 RX ADMIN — GABAPENTIN 300 MG: 300 CAPSULE ORAL at 08:11

## 2021-11-08 RX ADMIN — AMLODIPINE BESYLATE 5 MG: 5 TABLET ORAL at 02:11

## 2021-11-09 LAB
ALBUMIN SERPL BCP-MCNC: 2.9 G/DL (ref 3.5–5.2)
ALP SERPL-CCNC: 63 U/L (ref 55–135)
ALT SERPL W/O P-5'-P-CCNC: 19 U/L (ref 10–44)
ANION GAP SERPL CALC-SCNC: 7 MMOL/L (ref 8–16)
AST SERPL-CCNC: 30 U/L (ref 10–40)
BASOPHILS # BLD AUTO: 0.09 K/UL (ref 0–0.2)
BASOPHILS NFR BLD: 1.5 % (ref 0–1.9)
BILIRUB SERPL-MCNC: 0.6 MG/DL (ref 0.1–1)
BUN SERPL-MCNC: 9 MG/DL (ref 8–23)
CALCIUM SERPL-MCNC: 8.7 MG/DL (ref 8.7–10.5)
CHLORIDE SERPL-SCNC: 96 MMOL/L (ref 95–110)
CO2 SERPL-SCNC: 29 MMOL/L (ref 23–29)
CREAT SERPL-MCNC: 0.7 MG/DL (ref 0.5–1.4)
DIFFERENTIAL METHOD: ABNORMAL
EOSINOPHIL # BLD AUTO: 0.2 K/UL (ref 0–0.5)
EOSINOPHIL NFR BLD: 3.4 % (ref 0–8)
ERYTHROCYTE [DISTWIDTH] IN BLOOD BY AUTOMATED COUNT: 14.6 % (ref 11.5–14.5)
EST. GFR  (AFRICAN AMERICAN): >60 ML/MIN/1.73 M^2
EST. GFR  (NON AFRICAN AMERICAN): >60 ML/MIN/1.73 M^2
GLUCOSE SERPL-MCNC: 111 MG/DL (ref 70–110)
HCT VFR BLD AUTO: 30.4 % (ref 40–54)
HGB BLD-MCNC: 9.8 G/DL (ref 14–18)
IMM GRANULOCYTES # BLD AUTO: 0.13 K/UL (ref 0–0.04)
IMM GRANULOCYTES NFR BLD AUTO: 2.2 % (ref 0–0.5)
LYMPHOCYTES # BLD AUTO: 0.9 K/UL (ref 1–4.8)
LYMPHOCYTES NFR BLD: 16.2 % (ref 18–48)
MAGNESIUM SERPL-MCNC: 1.8 MG/DL (ref 1.6–2.6)
MCH RBC QN AUTO: 32.6 PG (ref 27–31)
MCHC RBC AUTO-ENTMCNC: 32.2 G/DL (ref 32–36)
MCV RBC AUTO: 101 FL (ref 82–98)
MONOCYTES # BLD AUTO: 0.6 K/UL (ref 0.3–1)
MONOCYTES NFR BLD: 10.7 % (ref 4–15)
NEUTROPHILS # BLD AUTO: 3.8 K/UL (ref 1.8–7.7)
NEUTROPHILS NFR BLD: 66 % (ref 38–73)
NRBC BLD-RTO: 0 /100 WBC
PHOSPHATE SERPL-MCNC: 3.4 MG/DL (ref 2.7–4.5)
PLATELET # BLD AUTO: 196 K/UL (ref 150–450)
PMV BLD AUTO: 8.8 FL (ref 9.2–12.9)
POTASSIUM SERPL-SCNC: 4.8 MMOL/L (ref 3.5–5.1)
PROT SERPL-MCNC: 5.6 G/DL (ref 6–8.4)
RBC # BLD AUTO: 3.01 M/UL (ref 4.6–6.2)
SODIUM SERPL-SCNC: 132 MMOL/L (ref 136–145)
WBC # BLD AUTO: 5.81 K/UL (ref 3.9–12.7)

## 2021-11-09 PROCEDURE — 94660 CPAP INITIATION&MGMT: CPT | Mod: HCNC

## 2021-11-09 PROCEDURE — 36415 COLL VENOUS BLD VENIPUNCTURE: CPT | Mod: HCNC | Performed by: STUDENT IN AN ORGANIZED HEALTH CARE EDUCATION/TRAINING PROGRAM

## 2021-11-09 PROCEDURE — 25000003 PHARM REV CODE 250: Mod: HCNC | Performed by: HOSPITALIST

## 2021-11-09 PROCEDURE — 99232 PR SUBSEQUENT HOSPITAL CARE,LEVL II: ICD-10-PCS | Mod: HCNC,,, | Performed by: HOSPITALIST

## 2021-11-09 PROCEDURE — 25000003 PHARM REV CODE 250: Mod: HCNC | Performed by: STUDENT IN AN ORGANIZED HEALTH CARE EDUCATION/TRAINING PROGRAM

## 2021-11-09 PROCEDURE — 83735 ASSAY OF MAGNESIUM: CPT | Mod: HCNC | Performed by: STUDENT IN AN ORGANIZED HEALTH CARE EDUCATION/TRAINING PROGRAM

## 2021-11-09 PROCEDURE — 99900035 HC TECH TIME PER 15 MIN (STAT): Mod: HCNC

## 2021-11-09 PROCEDURE — 97116 GAIT TRAINING THERAPY: CPT | Mod: HCNC

## 2021-11-09 PROCEDURE — 99232 SBSQ HOSP IP/OBS MODERATE 35: CPT | Mod: HCNC,,, | Performed by: HOSPITALIST

## 2021-11-09 PROCEDURE — 84100 ASSAY OF PHOSPHORUS: CPT | Mod: HCNC | Performed by: STUDENT IN AN ORGANIZED HEALTH CARE EDUCATION/TRAINING PROGRAM

## 2021-11-09 PROCEDURE — 94761 N-INVAS EAR/PLS OXIMETRY MLT: CPT | Mod: HCNC

## 2021-11-09 PROCEDURE — 63600175 PHARM REV CODE 636 W HCPCS: Mod: HCNC | Performed by: STUDENT IN AN ORGANIZED HEALTH CARE EDUCATION/TRAINING PROGRAM

## 2021-11-09 PROCEDURE — 20600001 HC STEP DOWN PRIVATE ROOM: Mod: HCNC

## 2021-11-09 PROCEDURE — 85025 COMPLETE CBC W/AUTO DIFF WBC: CPT | Mod: HCNC | Performed by: STUDENT IN AN ORGANIZED HEALTH CARE EDUCATION/TRAINING PROGRAM

## 2021-11-09 PROCEDURE — 80053 COMPREHEN METABOLIC PANEL: CPT | Mod: HCNC | Performed by: STUDENT IN AN ORGANIZED HEALTH CARE EDUCATION/TRAINING PROGRAM

## 2021-11-09 RX ORDER — AMLODIPINE BESYLATE 10 MG/1
10 TABLET ORAL DAILY
Qty: 30 TABLET | Refills: 2 | Status: SHIPPED | OUTPATIENT
Start: 2021-11-09 | End: 2022-01-27

## 2021-11-09 RX ORDER — CLINDAMYCIN HYDROCHLORIDE 300 MG/1
300 CAPSULE ORAL EVERY 8 HOURS
Qty: 21 CAPSULE | Refills: 0 | Status: SHIPPED | OUTPATIENT
Start: 2021-11-09 | End: 2021-11-17 | Stop reason: HOSPADM

## 2021-11-09 RX ORDER — AMLODIPINE BESYLATE 5 MG/1
10 TABLET ORAL DAILY
Qty: 60 TABLET | Refills: 11 | Status: SHIPPED | OUTPATIENT
Start: 2021-11-09 | End: 2021-11-09 | Stop reason: HOSPADM

## 2021-11-09 RX ORDER — CLINDAMYCIN HYDROCHLORIDE 150 MG/1
300 CAPSULE ORAL EVERY 8 HOURS
Qty: 42 CAPSULE | Refills: 0 | Status: SHIPPED | OUTPATIENT
Start: 2021-11-09 | End: 2021-11-09

## 2021-11-09 RX ORDER — ERYTHROMYCIN 5 MG/G
OINTMENT OPHTHALMIC EVERY 8 HOURS
Qty: 1 EACH | Refills: 0 | Status: SHIPPED | OUTPATIENT
Start: 2021-11-09 | End: 2021-11-17 | Stop reason: HOSPADM

## 2021-11-09 RX ORDER — AMMONIUM LACTATE 12 G/100G
LOTION TOPICAL 2 TIMES DAILY
Status: DISCONTINUED | OUTPATIENT
Start: 2021-11-09 | End: 2021-11-17 | Stop reason: HOSPADM

## 2021-11-09 RX ORDER — LANOLIN ALCOHOL/MO/W.PET/CERES
100 CREAM (GRAM) TOPICAL DAILY
Qty: 30 TABLET | Refills: 0 | Status: SHIPPED | OUTPATIENT
Start: 2021-11-09 | End: 2021-12-17

## 2021-11-09 RX ORDER — AMMONIUM LACTATE 12 G/100G
LOTION TOPICAL 2 TIMES DAILY PRN
Qty: 400 G | Refills: 0 | Status: SHIPPED | OUTPATIENT
Start: 2021-11-09 | End: 2021-11-17

## 2021-11-09 RX ORDER — AMLODIPINE BESYLATE 10 MG/1
10 TABLET ORAL DAILY
Status: DISCONTINUED | OUTPATIENT
Start: 2021-11-09 | End: 2021-11-17 | Stop reason: HOSPADM

## 2021-11-09 RX ADMIN — TIMOLOL MALEATE 1 DROP: 5 SOLUTION OPHTHALMIC at 08:11

## 2021-11-09 RX ADMIN — ENOXAPARIN SODIUM 40 MG: 40 INJECTION SUBCUTANEOUS at 06:11

## 2021-11-09 RX ADMIN — MUPIROCIN: 20 OINTMENT TOPICAL at 08:11

## 2021-11-09 RX ADMIN — CLINDAMYCIN HYDROCHLORIDE 450 MG: 150 CAPSULE ORAL at 05:11

## 2021-11-09 RX ADMIN — THIAMINE HCL TAB 100 MG 100 MG: 100 TAB at 08:11

## 2021-11-09 RX ADMIN — CLINDAMYCIN HYDROCHLORIDE 450 MG: 150 CAPSULE ORAL at 08:11

## 2021-11-09 RX ADMIN — ERYTHROMYCIN 1 INCH: 5 OINTMENT OPHTHALMIC at 03:11

## 2021-11-09 RX ADMIN — GABAPENTIN 300 MG: 300 CAPSULE ORAL at 08:11

## 2021-11-09 RX ADMIN — GABAPENTIN 300 MG: 300 CAPSULE ORAL at 04:11

## 2021-11-09 RX ADMIN — ERYTHROMYCIN: 5 OINTMENT OPHTHALMIC at 06:11

## 2021-11-09 RX ADMIN — AMMONIUM LACTATE: 12 LOTION TOPICAL at 08:11

## 2021-11-09 RX ADMIN — MELATONIN TAB 3 MG 6 MG: 3 TAB at 08:11

## 2021-11-09 RX ADMIN — LOSARTAN POTASSIUM 100 MG: 50 TABLET, FILM COATED ORAL at 08:11

## 2021-11-09 RX ADMIN — CYANOCOBALAMIN TAB 1000 MCG 1000 MCG: 1000 TAB at 08:11

## 2021-11-09 RX ADMIN — LATANOPROST 1 DROP: 50 SOLUTION OPHTHALMIC at 08:11

## 2021-11-09 RX ADMIN — AMLODIPINE BESYLATE 10 MG: 10 TABLET ORAL at 08:11

## 2021-11-09 RX ADMIN — CLINDAMYCIN HYDROCHLORIDE 450 MG: 150 CAPSULE ORAL at 03:11

## 2021-11-09 RX ADMIN — ERYTHROMYCIN: 5 OINTMENT OPHTHALMIC at 08:11

## 2021-11-09 RX ADMIN — MULTIPLE VITAMINS W/ MINERALS TAB 1 TABLET: TAB at 08:11

## 2021-11-09 RX ADMIN — AMMONIUM LACTATE: 12 LOTION TOPICAL at 01:11

## 2021-11-10 LAB
ALBUMIN SERPL BCP-MCNC: 2.7 G/DL (ref 3.5–5.2)
ALP SERPL-CCNC: 59 U/L (ref 55–135)
ALT SERPL W/O P-5'-P-CCNC: 19 U/L (ref 10–44)
ANION GAP SERPL CALC-SCNC: 6 MMOL/L (ref 8–16)
AST SERPL-CCNC: 29 U/L (ref 10–40)
BASOPHILS # BLD AUTO: 0.09 K/UL (ref 0–0.2)
BASOPHILS NFR BLD: 1.4 % (ref 0–1.9)
BILIRUB SERPL-MCNC: 0.5 MG/DL (ref 0.1–1)
BUN SERPL-MCNC: 10 MG/DL (ref 8–23)
CALCIUM SERPL-MCNC: 8.6 MG/DL (ref 8.7–10.5)
CHLORIDE SERPL-SCNC: 96 MMOL/L (ref 95–110)
CO2 SERPL-SCNC: 29 MMOL/L (ref 23–29)
CREAT SERPL-MCNC: 0.7 MG/DL (ref 0.5–1.4)
DIFFERENTIAL METHOD: ABNORMAL
EOSINOPHIL # BLD AUTO: 0.3 K/UL (ref 0–0.5)
EOSINOPHIL NFR BLD: 4.3 % (ref 0–8)
ERYTHROCYTE [DISTWIDTH] IN BLOOD BY AUTOMATED COUNT: 14.5 % (ref 11.5–14.5)
EST. GFR  (AFRICAN AMERICAN): >60 ML/MIN/1.73 M^2
EST. GFR  (NON AFRICAN AMERICAN): >60 ML/MIN/1.73 M^2
GLUCOSE SERPL-MCNC: 112 MG/DL (ref 70–110)
HCT VFR BLD AUTO: 30 % (ref 40–54)
HGB BLD-MCNC: 9.6 G/DL (ref 14–18)
IMM GRANULOCYTES # BLD AUTO: 0.14 K/UL (ref 0–0.04)
IMM GRANULOCYTES NFR BLD AUTO: 2.2 % (ref 0–0.5)
LYMPHOCYTES # BLD AUTO: 1.1 K/UL (ref 1–4.8)
LYMPHOCYTES NFR BLD: 18.2 % (ref 18–48)
MAGNESIUM SERPL-MCNC: 1.9 MG/DL (ref 1.6–2.6)
MCH RBC QN AUTO: 32.5 PG (ref 27–31)
MCHC RBC AUTO-ENTMCNC: 32 G/DL (ref 32–36)
MCV RBC AUTO: 102 FL (ref 82–98)
MONOCYTES # BLD AUTO: 0.7 K/UL (ref 0.3–1)
MONOCYTES NFR BLD: 11.2 % (ref 4–15)
NEUTROPHILS # BLD AUTO: 3.9 K/UL (ref 1.8–7.7)
NEUTROPHILS NFR BLD: 62.7 % (ref 38–73)
NRBC BLD-RTO: 0 /100 WBC
PHOSPHATE SERPL-MCNC: 3.3 MG/DL (ref 2.7–4.5)
PLATELET # BLD AUTO: 178 K/UL (ref 150–450)
PMV BLD AUTO: 8.7 FL (ref 9.2–12.9)
POTASSIUM SERPL-SCNC: 4.6 MMOL/L (ref 3.5–5.1)
PROT SERPL-MCNC: 5.1 G/DL (ref 6–8.4)
RBC # BLD AUTO: 2.95 M/UL (ref 4.6–6.2)
SODIUM SERPL-SCNC: 131 MMOL/L (ref 136–145)
WBC # BLD AUTO: 6.25 K/UL (ref 3.9–12.7)

## 2021-11-10 PROCEDURE — 25000003 PHARM REV CODE 250: Mod: HCNC | Performed by: STUDENT IN AN ORGANIZED HEALTH CARE EDUCATION/TRAINING PROGRAM

## 2021-11-10 PROCEDURE — 83735 ASSAY OF MAGNESIUM: CPT | Mod: HCNC | Performed by: STUDENT IN AN ORGANIZED HEALTH CARE EDUCATION/TRAINING PROGRAM

## 2021-11-10 PROCEDURE — 94761 N-INVAS EAR/PLS OXIMETRY MLT: CPT | Mod: HCNC

## 2021-11-10 PROCEDURE — 20600001 HC STEP DOWN PRIVATE ROOM: Mod: HCNC

## 2021-11-10 PROCEDURE — 25000003 PHARM REV CODE 250: Mod: HCNC | Performed by: HOSPITALIST

## 2021-11-10 PROCEDURE — 85025 COMPLETE CBC W/AUTO DIFF WBC: CPT | Mod: HCNC | Performed by: STUDENT IN AN ORGANIZED HEALTH CARE EDUCATION/TRAINING PROGRAM

## 2021-11-10 PROCEDURE — 97535 SELF CARE MNGMENT TRAINING: CPT | Mod: HCNC,CO

## 2021-11-10 PROCEDURE — 84100 ASSAY OF PHOSPHORUS: CPT | Mod: HCNC | Performed by: STUDENT IN AN ORGANIZED HEALTH CARE EDUCATION/TRAINING PROGRAM

## 2021-11-10 PROCEDURE — 36415 COLL VENOUS BLD VENIPUNCTURE: CPT | Mod: HCNC | Performed by: STUDENT IN AN ORGANIZED HEALTH CARE EDUCATION/TRAINING PROGRAM

## 2021-11-10 PROCEDURE — 99232 SBSQ HOSP IP/OBS MODERATE 35: CPT | Mod: HCNC,,, | Performed by: HOSPITALIST

## 2021-11-10 PROCEDURE — 99900035 HC TECH TIME PER 15 MIN (STAT): Mod: HCNC

## 2021-11-10 PROCEDURE — 63600175 PHARM REV CODE 636 W HCPCS: Mod: HCNC | Performed by: STUDENT IN AN ORGANIZED HEALTH CARE EDUCATION/TRAINING PROGRAM

## 2021-11-10 PROCEDURE — 99232 PR SUBSEQUENT HOSPITAL CARE,LEVL II: ICD-10-PCS | Mod: HCNC,,, | Performed by: HOSPITALIST

## 2021-11-10 PROCEDURE — 27000221 HC OXYGEN, UP TO 24 HOURS: Mod: HCNC

## 2021-11-10 PROCEDURE — 80053 COMPREHEN METABOLIC PANEL: CPT | Mod: HCNC | Performed by: STUDENT IN AN ORGANIZED HEALTH CARE EDUCATION/TRAINING PROGRAM

## 2021-11-10 RX ADMIN — CLINDAMYCIN HYDROCHLORIDE 450 MG: 150 CAPSULE ORAL at 05:11

## 2021-11-10 RX ADMIN — MUPIROCIN: 20 OINTMENT TOPICAL at 09:11

## 2021-11-10 RX ADMIN — LOSARTAN POTASSIUM 100 MG: 50 TABLET, FILM COATED ORAL at 08:11

## 2021-11-10 RX ADMIN — TIMOLOL MALEATE 1 DROP: 5 SOLUTION OPHTHALMIC at 08:11

## 2021-11-10 RX ADMIN — ERYTHROMYCIN: 5 OINTMENT OPHTHALMIC at 01:11

## 2021-11-10 RX ADMIN — CLINDAMYCIN HYDROCHLORIDE 450 MG: 150 CAPSULE ORAL at 08:11

## 2021-11-10 RX ADMIN — CYANOCOBALAMIN TAB 1000 MCG 1000 MCG: 1000 TAB at 08:11

## 2021-11-10 RX ADMIN — MELATONIN TAB 3 MG 6 MG: 3 TAB at 08:11

## 2021-11-10 RX ADMIN — MUPIROCIN: 20 OINTMENT TOPICAL at 08:11

## 2021-11-10 RX ADMIN — CLINDAMYCIN HYDROCHLORIDE 450 MG: 150 CAPSULE ORAL at 01:11

## 2021-11-10 RX ADMIN — AMMONIUM LACTATE: 12 LOTION TOPICAL at 08:11

## 2021-11-10 RX ADMIN — GABAPENTIN 300 MG: 300 CAPSULE ORAL at 08:11

## 2021-11-10 RX ADMIN — GABAPENTIN 300 MG: 300 CAPSULE ORAL at 02:11

## 2021-11-10 RX ADMIN — AMMONIUM LACTATE: 12 LOTION TOPICAL at 09:11

## 2021-11-10 RX ADMIN — AMLODIPINE BESYLATE 10 MG: 10 TABLET ORAL at 08:11

## 2021-11-10 RX ADMIN — THIAMINE HCL TAB 100 MG 100 MG: 100 TAB at 08:11

## 2021-11-10 RX ADMIN — MULTIPLE VITAMINS W/ MINERALS TAB 1 TABLET: TAB at 08:11

## 2021-11-10 RX ADMIN — LATANOPROST 1 DROP: 50 SOLUTION OPHTHALMIC at 09:11

## 2021-11-10 RX ADMIN — ENOXAPARIN SODIUM 40 MG: 40 INJECTION SUBCUTANEOUS at 04:11

## 2021-11-11 LAB
ANION GAP SERPL CALC-SCNC: 7 MMOL/L (ref 8–16)
BUN SERPL-MCNC: 11 MG/DL (ref 8–23)
CALCIUM SERPL-MCNC: 9.2 MG/DL (ref 8.7–10.5)
CHLORIDE SERPL-SCNC: 100 MMOL/L (ref 95–110)
CO2 SERPL-SCNC: 28 MMOL/L (ref 23–29)
CREAT SERPL-MCNC: 0.7 MG/DL (ref 0.5–1.4)
EST. GFR  (AFRICAN AMERICAN): >60 ML/MIN/1.73 M^2
EST. GFR  (NON AFRICAN AMERICAN): >60 ML/MIN/1.73 M^2
GLUCOSE SERPL-MCNC: 107 MG/DL (ref 70–110)
POTASSIUM SERPL-SCNC: 4.9 MMOL/L (ref 3.5–5.1)
SODIUM SERPL-SCNC: 135 MMOL/L (ref 136–145)

## 2021-11-11 PROCEDURE — 20600001 HC STEP DOWN PRIVATE ROOM: Mod: HCNC

## 2021-11-11 PROCEDURE — 99232 SBSQ HOSP IP/OBS MODERATE 35: CPT | Mod: HCNC,,, | Performed by: HOSPITALIST

## 2021-11-11 PROCEDURE — 80048 BASIC METABOLIC PNL TOTAL CA: CPT | Mod: HCNC | Performed by: HOSPITALIST

## 2021-11-11 PROCEDURE — 97110 THERAPEUTIC EXERCISES: CPT | Mod: HCNC,CQ

## 2021-11-11 PROCEDURE — 25000003 PHARM REV CODE 250: Mod: HCNC | Performed by: HOSPITALIST

## 2021-11-11 PROCEDURE — 63600175 PHARM REV CODE 636 W HCPCS: Mod: HCNC | Performed by: STUDENT IN AN ORGANIZED HEALTH CARE EDUCATION/TRAINING PROGRAM

## 2021-11-11 PROCEDURE — 36415 COLL VENOUS BLD VENIPUNCTURE: CPT | Mod: HCNC | Performed by: HOSPITALIST

## 2021-11-11 PROCEDURE — 25000003 PHARM REV CODE 250: Mod: HCNC | Performed by: STUDENT IN AN ORGANIZED HEALTH CARE EDUCATION/TRAINING PROGRAM

## 2021-11-11 PROCEDURE — 99232 PR SUBSEQUENT HOSPITAL CARE,LEVL II: ICD-10-PCS | Mod: HCNC,,, | Performed by: HOSPITALIST

## 2021-11-11 PROCEDURE — 97116 GAIT TRAINING THERAPY: CPT | Mod: HCNC,CQ

## 2021-11-11 RX ORDER — TALC
6 POWDER (GRAM) TOPICAL NIGHTLY
Status: DISCONTINUED | OUTPATIENT
Start: 2021-11-11 | End: 2021-11-17 | Stop reason: HOSPADM

## 2021-11-11 RX ORDER — CLINDAMYCIN HYDROCHLORIDE 150 MG/1
450 CAPSULE ORAL EVERY 8 HOURS
Status: DISCONTINUED | OUTPATIENT
Start: 2021-11-11 | End: 2021-11-13

## 2021-11-11 RX ORDER — MICONAZOLE NITRATE 2 %
POWDER (GRAM) TOPICAL 2 TIMES DAILY
Status: DISCONTINUED | OUTPATIENT
Start: 2021-11-11 | End: 2021-11-17 | Stop reason: HOSPADM

## 2021-11-11 RX ADMIN — AMLODIPINE BESYLATE 10 MG: 10 TABLET ORAL at 09:11

## 2021-11-11 RX ADMIN — GABAPENTIN 300 MG: 300 CAPSULE ORAL at 09:11

## 2021-11-11 RX ADMIN — THIAMINE HCL TAB 100 MG 100 MG: 100 TAB at 09:11

## 2021-11-11 RX ADMIN — MUPIROCIN: 20 OINTMENT TOPICAL at 09:11

## 2021-11-11 RX ADMIN — AMMONIUM LACTATE: 12 LOTION TOPICAL at 09:11

## 2021-11-11 RX ADMIN — CLINDAMYCIN HYDROCHLORIDE 450 MG: 150 CAPSULE ORAL at 05:11

## 2021-11-11 RX ADMIN — CLINDAMYCIN HYDROCHLORIDE 450 MG: 150 CAPSULE ORAL at 09:11

## 2021-11-11 RX ADMIN — CLINDAMYCIN HYDROCHLORIDE 450 MG: 150 CAPSULE ORAL at 03:11

## 2021-11-11 RX ADMIN — MULTIPLE VITAMINS W/ MINERALS TAB 1 TABLET: TAB at 09:11

## 2021-11-11 RX ADMIN — CYANOCOBALAMIN TAB 1000 MCG 1000 MCG: 1000 TAB at 09:11

## 2021-11-11 RX ADMIN — MICONAZOLE NITRATE: 20 POWDER TOPICAL at 09:11

## 2021-11-11 RX ADMIN — GABAPENTIN 300 MG: 300 CAPSULE ORAL at 04:11

## 2021-11-11 RX ADMIN — LOSARTAN POTASSIUM 100 MG: 50 TABLET, FILM COATED ORAL at 09:11

## 2021-11-11 RX ADMIN — ENOXAPARIN SODIUM 40 MG: 40 INJECTION SUBCUTANEOUS at 05:11

## 2021-11-11 RX ADMIN — LATANOPROST 1 DROP: 50 SOLUTION OPHTHALMIC at 09:11

## 2021-11-11 RX ADMIN — TIMOLOL MALEATE 1 DROP: 5 SOLUTION OPHTHALMIC at 09:11

## 2021-11-11 RX ADMIN — Medication 6 MG: at 09:11

## 2021-11-12 PROBLEM — Z74.09 IMPAIRED FUNCTIONAL MOBILITY AND ENDURANCE: Status: ACTIVE | Noted: 2021-11-12

## 2021-11-12 LAB
ANION GAP SERPL CALC-SCNC: 7 MMOL/L (ref 8–16)
BUN SERPL-MCNC: 12 MG/DL (ref 8–23)
CALCIUM SERPL-MCNC: 9.1 MG/DL (ref 8.7–10.5)
CHLORIDE SERPL-SCNC: 98 MMOL/L (ref 95–110)
CO2 SERPL-SCNC: 29 MMOL/L (ref 23–29)
CREAT SERPL-MCNC: 0.7 MG/DL (ref 0.5–1.4)
EST. GFR  (AFRICAN AMERICAN): >60 ML/MIN/1.73 M^2
EST. GFR  (NON AFRICAN AMERICAN): >60 ML/MIN/1.73 M^2
GLUCOSE SERPL-MCNC: 104 MG/DL (ref 70–110)
POTASSIUM SERPL-SCNC: 5 MMOL/L (ref 3.5–5.1)
SODIUM SERPL-SCNC: 134 MMOL/L (ref 136–145)

## 2021-11-12 PROCEDURE — 99233 PR SUBSEQUENT HOSPITAL CARE,LEVL III: ICD-10-PCS | Mod: HCNC,95,, | Performed by: INTERNAL MEDICINE

## 2021-11-12 PROCEDURE — 27000221 HC OXYGEN, UP TO 24 HOURS: Mod: HCNC

## 2021-11-12 PROCEDURE — 25000003 PHARM REV CODE 250: Mod: HCNC | Performed by: STUDENT IN AN ORGANIZED HEALTH CARE EDUCATION/TRAINING PROGRAM

## 2021-11-12 PROCEDURE — 94761 N-INVAS EAR/PLS OXIMETRY MLT: CPT | Mod: HCNC

## 2021-11-12 PROCEDURE — 25000003 PHARM REV CODE 250: Mod: HCNC | Performed by: HOSPITALIST

## 2021-11-12 PROCEDURE — 99900035 HC TECH TIME PER 15 MIN (STAT): Mod: HCNC

## 2021-11-12 PROCEDURE — 99222 1ST HOSP IP/OBS MODERATE 55: CPT | Mod: HCNC,,, | Performed by: NURSE PRACTITIONER

## 2021-11-12 PROCEDURE — 20600001 HC STEP DOWN PRIVATE ROOM: Mod: HCNC

## 2021-11-12 PROCEDURE — 99222 PR INITIAL HOSPITAL CARE,LEVL II: ICD-10-PCS | Mod: HCNC,,, | Performed by: NURSE PRACTITIONER

## 2021-11-12 PROCEDURE — 97116 GAIT TRAINING THERAPY: CPT | Mod: HCNC,CQ

## 2021-11-12 PROCEDURE — 63600175 PHARM REV CODE 636 W HCPCS: Mod: HCNC | Performed by: STUDENT IN AN ORGANIZED HEALTH CARE EDUCATION/TRAINING PROGRAM

## 2021-11-12 PROCEDURE — 36415 COLL VENOUS BLD VENIPUNCTURE: CPT | Mod: HCNC | Performed by: HOSPITALIST

## 2021-11-12 PROCEDURE — 80048 BASIC METABOLIC PNL TOTAL CA: CPT | Mod: HCNC | Performed by: HOSPITALIST

## 2021-11-12 PROCEDURE — 99233 SBSQ HOSP IP/OBS HIGH 50: CPT | Mod: HCNC,95,, | Performed by: INTERNAL MEDICINE

## 2021-11-12 RX ADMIN — ENOXAPARIN SODIUM 40 MG: 40 INJECTION SUBCUTANEOUS at 04:11

## 2021-11-12 RX ADMIN — AMMONIUM LACTATE: 12 LOTION TOPICAL at 08:11

## 2021-11-12 RX ADMIN — TIMOLOL MALEATE 1 DROP: 5 SOLUTION OPHTHALMIC at 08:11

## 2021-11-12 RX ADMIN — Medication 6 MG: at 08:11

## 2021-11-12 RX ADMIN — GABAPENTIN 300 MG: 300 CAPSULE ORAL at 08:11

## 2021-11-12 RX ADMIN — AMLODIPINE BESYLATE 10 MG: 10 TABLET ORAL at 08:11

## 2021-11-12 RX ADMIN — LABETALOL HYDROCHLORIDE 20 MG: 5 INJECTION, SOLUTION INTRAVENOUS at 04:11

## 2021-11-12 RX ADMIN — GABAPENTIN 300 MG: 300 CAPSULE ORAL at 03:11

## 2021-11-12 RX ADMIN — LATANOPROST 1 DROP: 50 SOLUTION OPHTHALMIC at 09:11

## 2021-11-12 RX ADMIN — MUPIROCIN: 20 OINTMENT TOPICAL at 08:11

## 2021-11-12 RX ADMIN — CLINDAMYCIN HYDROCHLORIDE 450 MG: 150 CAPSULE ORAL at 08:11

## 2021-11-12 RX ADMIN — CLINDAMYCIN HYDROCHLORIDE 450 MG: 150 CAPSULE ORAL at 06:11

## 2021-11-12 RX ADMIN — LOSARTAN POTASSIUM 100 MG: 50 TABLET, FILM COATED ORAL at 08:11

## 2021-11-12 RX ADMIN — MULTIPLE VITAMINS W/ MINERALS TAB 1 TABLET: TAB at 08:11

## 2021-11-12 RX ADMIN — MICONAZOLE NITRATE: 20 POWDER TOPICAL at 08:11

## 2021-11-12 RX ADMIN — CYANOCOBALAMIN TAB 1000 MCG 1000 MCG: 1000 TAB at 08:11

## 2021-11-12 RX ADMIN — THIAMINE HCL TAB 100 MG 100 MG: 100 TAB at 08:11

## 2021-11-12 RX ADMIN — CLINDAMYCIN HYDROCHLORIDE 450 MG: 150 CAPSULE ORAL at 03:11

## 2021-11-13 LAB
ANION GAP SERPL CALC-SCNC: 7 MMOL/L (ref 8–16)
BASOPHILS # BLD AUTO: 0.05 K/UL (ref 0–0.2)
BASOPHILS NFR BLD: 0.6 % (ref 0–1.9)
BILIRUB UR QL STRIP: NEGATIVE
BUN SERPL-MCNC: 17 MG/DL (ref 8–23)
CALCIUM SERPL-MCNC: 9.2 MG/DL (ref 8.7–10.5)
CHLORIDE SERPL-SCNC: 97 MMOL/L (ref 95–110)
CLARITY UR REFRACT.AUTO: CLEAR
CO2 SERPL-SCNC: 28 MMOL/L (ref 23–29)
COLOR UR AUTO: YELLOW
CREAT SERPL-MCNC: 0.8 MG/DL (ref 0.5–1.4)
DIFFERENTIAL METHOD: ABNORMAL
EOSINOPHIL # BLD AUTO: 0 K/UL (ref 0–0.5)
EOSINOPHIL NFR BLD: 0.5 % (ref 0–8)
ERYTHROCYTE [DISTWIDTH] IN BLOOD BY AUTOMATED COUNT: 14.6 % (ref 11.5–14.5)
EST. GFR  (AFRICAN AMERICAN): >60 ML/MIN/1.73 M^2
EST. GFR  (NON AFRICAN AMERICAN): >60 ML/MIN/1.73 M^2
GLUCOSE SERPL-MCNC: 109 MG/DL (ref 70–110)
GLUCOSE UR QL STRIP: NEGATIVE
HCT VFR BLD AUTO: 29.4 % (ref 40–54)
HGB BLD-MCNC: 9.5 G/DL (ref 14–18)
HGB UR QL STRIP: ABNORMAL
IMM GRANULOCYTES # BLD AUTO: 0.09 K/UL (ref 0–0.04)
IMM GRANULOCYTES NFR BLD AUTO: 1 % (ref 0–0.5)
KETONES UR QL STRIP: NEGATIVE
LACTATE SERPL-SCNC: 1.5 MMOL/L (ref 0.5–2.2)
LEUKOCYTE ESTERASE UR QL STRIP: NEGATIVE
LYMPHOCYTES # BLD AUTO: 0.4 K/UL (ref 1–4.8)
LYMPHOCYTES NFR BLD: 4.2 % (ref 18–48)
MCH RBC QN AUTO: 32.5 PG (ref 27–31)
MCHC RBC AUTO-ENTMCNC: 32.3 G/DL (ref 32–36)
MCV RBC AUTO: 101 FL (ref 82–98)
MICROSCOPIC COMMENT: ABNORMAL
MONOCYTES # BLD AUTO: 0.6 K/UL (ref 0.3–1)
MONOCYTES NFR BLD: 6.3 % (ref 4–15)
NEUTROPHILS # BLD AUTO: 7.7 K/UL (ref 1.8–7.7)
NEUTROPHILS NFR BLD: 87.4 % (ref 38–73)
NITRITE UR QL STRIP: NEGATIVE
NRBC BLD-RTO: 0 /100 WBC
PH UR STRIP: 7 [PH] (ref 5–8)
PLATELET # BLD AUTO: 172 K/UL (ref 150–450)
PMV BLD AUTO: 9 FL (ref 9.2–12.9)
POTASSIUM SERPL-SCNC: 5.2 MMOL/L (ref 3.5–5.1)
PROCALCITONIN SERPL IA-MCNC: 0.76 NG/ML
PROT UR QL STRIP: NEGATIVE
RBC # BLD AUTO: 2.92 M/UL (ref 4.6–6.2)
RBC #/AREA URNS AUTO: 5 /HPF (ref 0–4)
SODIUM SERPL-SCNC: 132 MMOL/L (ref 136–145)
SP GR UR STRIP: 1.01 (ref 1–1.03)
SQUAMOUS #/AREA URNS AUTO: 1 /HPF
URN SPEC COLLECT METH UR: ABNORMAL
WBC # BLD AUTO: 8.79 K/UL (ref 3.9–12.7)
WBC #/AREA URNS AUTO: 1 /HPF (ref 0–5)

## 2021-11-13 PROCEDURE — 81001 URINALYSIS AUTO W/SCOPE: CPT | Mod: HCNC | Performed by: INTERNAL MEDICINE

## 2021-11-13 PROCEDURE — 83605 ASSAY OF LACTIC ACID: CPT | Mod: HCNC | Performed by: INTERNAL MEDICINE

## 2021-11-13 PROCEDURE — 99900035 HC TECH TIME PER 15 MIN (STAT): Mod: HCNC

## 2021-11-13 PROCEDURE — 25000003 PHARM REV CODE 250: Mod: HCNC | Performed by: INTERNAL MEDICINE

## 2021-11-13 PROCEDURE — 87040 BLOOD CULTURE FOR BACTERIA: CPT | Mod: HCNC | Performed by: INTERNAL MEDICINE

## 2021-11-13 PROCEDURE — 99233 PR SUBSEQUENT HOSPITAL CARE,LEVL III: ICD-10-PCS | Mod: HCNC,95,, | Performed by: INTERNAL MEDICINE

## 2021-11-13 PROCEDURE — 94660 CPAP INITIATION&MGMT: CPT | Mod: HCNC

## 2021-11-13 PROCEDURE — 36415 COLL VENOUS BLD VENIPUNCTURE: CPT | Mod: HCNC | Performed by: INTERNAL MEDICINE

## 2021-11-13 PROCEDURE — 80048 BASIC METABOLIC PNL TOTAL CA: CPT | Mod: HCNC | Performed by: HOSPITALIST

## 2021-11-13 PROCEDURE — 99233 SBSQ HOSP IP/OBS HIGH 50: CPT | Mod: HCNC,95,, | Performed by: INTERNAL MEDICINE

## 2021-11-13 PROCEDURE — 36415 COLL VENOUS BLD VENIPUNCTURE: CPT | Mod: HCNC | Performed by: HOSPITALIST

## 2021-11-13 PROCEDURE — 25000003 PHARM REV CODE 250: Mod: HCNC | Performed by: HOSPITALIST

## 2021-11-13 PROCEDURE — 63600175 PHARM REV CODE 636 W HCPCS: Mod: HCNC | Performed by: INTERNAL MEDICINE

## 2021-11-13 PROCEDURE — 85025 COMPLETE CBC W/AUTO DIFF WBC: CPT | Mod: HCNC | Performed by: INTERNAL MEDICINE

## 2021-11-13 PROCEDURE — 27000221 HC OXYGEN, UP TO 24 HOURS: Mod: HCNC

## 2021-11-13 PROCEDURE — 94761 N-INVAS EAR/PLS OXIMETRY MLT: CPT | Mod: HCNC

## 2021-11-13 PROCEDURE — 20600001 HC STEP DOWN PRIVATE ROOM: Mod: HCNC

## 2021-11-13 PROCEDURE — 25000003 PHARM REV CODE 250: Mod: HCNC | Performed by: STUDENT IN AN ORGANIZED HEALTH CARE EDUCATION/TRAINING PROGRAM

## 2021-11-13 PROCEDURE — 84145 PROCALCITONIN (PCT): CPT | Mod: HCNC | Performed by: INTERNAL MEDICINE

## 2021-11-13 PROCEDURE — 63600175 PHARM REV CODE 636 W HCPCS: Mod: HCNC | Performed by: STUDENT IN AN ORGANIZED HEALTH CARE EDUCATION/TRAINING PROGRAM

## 2021-11-13 RX ORDER — SODIUM CHLORIDE 9 MG/ML
INJECTION, SOLUTION INTRAVENOUS
Status: DISCONTINUED | OUTPATIENT
Start: 2021-11-13 | End: 2021-11-17 | Stop reason: HOSPADM

## 2021-11-13 RX ORDER — ACETAMINOPHEN 500 MG
500 TABLET ORAL EVERY 6 HOURS PRN
Status: DISCONTINUED | OUTPATIENT
Start: 2021-11-13 | End: 2021-11-17 | Stop reason: HOSPADM

## 2021-11-13 RX ORDER — SODIUM CHLORIDE 9 MG/ML
INJECTION, SOLUTION INTRAVENOUS CONTINUOUS
Status: DISCONTINUED | OUTPATIENT
Start: 2021-11-13 | End: 2021-11-13

## 2021-11-13 RX ORDER — CEFAZOLIN SODIUM 1 G/3ML
1 INJECTION, POWDER, FOR SOLUTION INTRAMUSCULAR; INTRAVENOUS
Status: DISCONTINUED | OUTPATIENT
Start: 2021-11-13 | End: 2021-11-17 | Stop reason: HOSPADM

## 2021-11-13 RX ORDER — POLYETHYLENE GLYCOL 3350 17 G/17G
17 POWDER, FOR SOLUTION ORAL 2 TIMES DAILY
Status: DISCONTINUED | OUTPATIENT
Start: 2021-11-13 | End: 2021-11-17 | Stop reason: HOSPADM

## 2021-11-13 RX ORDER — AMOXICILLIN 250 MG
1 CAPSULE ORAL 2 TIMES DAILY
Status: DISCONTINUED | OUTPATIENT
Start: 2021-11-13 | End: 2021-11-17 | Stop reason: HOSPADM

## 2021-11-13 RX ADMIN — AMLODIPINE BESYLATE 10 MG: 10 TABLET ORAL at 08:11

## 2021-11-13 RX ADMIN — MICONAZOLE NITRATE: 20 POWDER TOPICAL at 09:11

## 2021-11-13 RX ADMIN — Medication 6 MG: at 08:11

## 2021-11-13 RX ADMIN — MULTIPLE VITAMINS W/ MINERALS TAB 1 TABLET: TAB at 08:11

## 2021-11-13 RX ADMIN — ENOXAPARIN SODIUM 40 MG: 40 INJECTION SUBCUTANEOUS at 05:11

## 2021-11-13 RX ADMIN — GABAPENTIN 300 MG: 300 CAPSULE ORAL at 02:11

## 2021-11-13 RX ADMIN — TIMOLOL MALEATE 1 DROP: 5 SOLUTION OPHTHALMIC at 10:11

## 2021-11-13 RX ADMIN — VANCOMYCIN HYDROCHLORIDE 2000 MG: 10 INJECTION, POWDER, LYOPHILIZED, FOR SOLUTION INTRAVENOUS at 11:11

## 2021-11-13 RX ADMIN — MICONAZOLE NITRATE: 20 POWDER TOPICAL at 08:11

## 2021-11-13 RX ADMIN — GABAPENTIN 300 MG: 300 CAPSULE ORAL at 08:11

## 2021-11-13 RX ADMIN — ACETAMINOPHEN 500 MG: 500 TABLET ORAL at 02:11

## 2021-11-13 RX ADMIN — LOSARTAN POTASSIUM 100 MG: 50 TABLET, FILM COATED ORAL at 08:11

## 2021-11-13 RX ADMIN — CYANOCOBALAMIN TAB 1000 MCG 1000 MCG: 1000 TAB at 08:11

## 2021-11-13 RX ADMIN — AMMONIUM LACTATE: 12 LOTION TOPICAL at 08:11

## 2021-11-13 RX ADMIN — CEFAZOLIN 1 G: 330 INJECTION, POWDER, FOR SOLUTION INTRAMUSCULAR; INTRAVENOUS at 08:11

## 2021-11-13 RX ADMIN — LATANOPROST 1 DROP: 50 SOLUTION OPHTHALMIC at 08:11

## 2021-11-13 RX ADMIN — AMMONIUM LACTATE: 12 LOTION TOPICAL at 09:11

## 2021-11-13 RX ADMIN — ACETAMINOPHEN 500 MG: 500 TABLET ORAL at 09:11

## 2021-11-13 RX ADMIN — CLINDAMYCIN HYDROCHLORIDE 450 MG: 150 CAPSULE ORAL at 06:11

## 2021-11-13 RX ADMIN — THIAMINE HCL TAB 100 MG 100 MG: 100 TAB at 09:11

## 2021-11-13 RX ADMIN — CEFAZOLIN 1 G: 330 INJECTION, POWDER, FOR SOLUTION INTRAMUSCULAR; INTRAVENOUS at 10:11

## 2021-11-14 LAB
ALBUMIN SERPL BCP-MCNC: 2.6 G/DL (ref 3.5–5.2)
ANION GAP SERPL CALC-SCNC: 5 MMOL/L (ref 8–16)
BUN SERPL-MCNC: 16 MG/DL (ref 8–23)
CALCIUM SERPL-MCNC: 8.5 MG/DL (ref 8.7–10.5)
CHLORIDE SERPL-SCNC: 96 MMOL/L (ref 95–110)
CO2 SERPL-SCNC: 28 MMOL/L (ref 23–29)
CREAT SERPL-MCNC: 0.8 MG/DL (ref 0.5–1.4)
EST. GFR  (AFRICAN AMERICAN): >60 ML/MIN/1.73 M^2
EST. GFR  (NON AFRICAN AMERICAN): >60 ML/MIN/1.73 M^2
GLUCOSE SERPL-MCNC: 114 MG/DL (ref 70–110)
PHOSPHATE SERPL-MCNC: 3.1 MG/DL (ref 2.7–4.5)
POTASSIUM SERPL-SCNC: 4.5 MMOL/L (ref 3.5–5.1)
SODIUM SERPL-SCNC: 129 MMOL/L (ref 136–145)

## 2021-11-14 PROCEDURE — 80069 RENAL FUNCTION PANEL: CPT | Mod: HCNC | Performed by: INTERNAL MEDICINE

## 2021-11-14 PROCEDURE — 25000003 PHARM REV CODE 250: Mod: HCNC | Performed by: INTERNAL MEDICINE

## 2021-11-14 PROCEDURE — 25000003 PHARM REV CODE 250: Mod: HCNC | Performed by: HOSPITALIST

## 2021-11-14 PROCEDURE — 63600175 PHARM REV CODE 636 W HCPCS: Mod: HCNC | Performed by: STUDENT IN AN ORGANIZED HEALTH CARE EDUCATION/TRAINING PROGRAM

## 2021-11-14 PROCEDURE — 25000003 PHARM REV CODE 250: Mod: HCNC | Performed by: STUDENT IN AN ORGANIZED HEALTH CARE EDUCATION/TRAINING PROGRAM

## 2021-11-14 PROCEDURE — 36415 COLL VENOUS BLD VENIPUNCTURE: CPT | Mod: HCNC | Performed by: INTERNAL MEDICINE

## 2021-11-14 PROCEDURE — 99900035 HC TECH TIME PER 15 MIN (STAT): Mod: HCNC

## 2021-11-14 PROCEDURE — 20600001 HC STEP DOWN PRIVATE ROOM: Mod: HCNC

## 2021-11-14 PROCEDURE — 94761 N-INVAS EAR/PLS OXIMETRY MLT: CPT | Mod: HCNC

## 2021-11-14 PROCEDURE — 27000221 HC OXYGEN, UP TO 24 HOURS: Mod: HCNC

## 2021-11-14 PROCEDURE — 99233 PR SUBSEQUENT HOSPITAL CARE,LEVL III: ICD-10-PCS | Mod: HCNC,95,, | Performed by: INTERNAL MEDICINE

## 2021-11-14 PROCEDURE — 63600175 PHARM REV CODE 636 W HCPCS: Mod: HCNC | Performed by: INTERNAL MEDICINE

## 2021-11-14 PROCEDURE — 99233 SBSQ HOSP IP/OBS HIGH 50: CPT | Mod: HCNC,95,, | Performed by: INTERNAL MEDICINE

## 2021-11-14 RX ADMIN — GABAPENTIN 300 MG: 300 CAPSULE ORAL at 09:11

## 2021-11-14 RX ADMIN — CEFAZOLIN 1 G: 330 INJECTION, POWDER, FOR SOLUTION INTRAMUSCULAR; INTRAVENOUS at 06:11

## 2021-11-14 RX ADMIN — ENOXAPARIN SODIUM 40 MG: 40 INJECTION SUBCUTANEOUS at 05:11

## 2021-11-14 RX ADMIN — AMMONIUM LACTATE: 12 LOTION TOPICAL at 10:11

## 2021-11-14 RX ADMIN — LOSARTAN POTASSIUM 100 MG: 50 TABLET, FILM COATED ORAL at 09:11

## 2021-11-14 RX ADMIN — GABAPENTIN 300 MG: 300 CAPSULE ORAL at 10:11

## 2021-11-14 RX ADMIN — DOCUSATE SODIUM AND SENNOSIDES 1 TABLET: 8.6; 5 TABLET, FILM COATED ORAL at 10:11

## 2021-11-14 RX ADMIN — MULTIPLE VITAMINS W/ MINERALS TAB 1 TABLET: TAB at 09:11

## 2021-11-14 RX ADMIN — LATANOPROST 1 DROP: 50 SOLUTION OPHTHALMIC at 10:11

## 2021-11-14 RX ADMIN — CEFAZOLIN 1 G: 330 INJECTION, POWDER, FOR SOLUTION INTRAMUSCULAR; INTRAVENOUS at 11:11

## 2021-11-14 RX ADMIN — AMMONIUM LACTATE: 12 LOTION TOPICAL at 09:11

## 2021-11-14 RX ADMIN — MICONAZOLE NITRATE: 20 POWDER TOPICAL at 09:11

## 2021-11-14 RX ADMIN — TIMOLOL MALEATE 1 DROP: 5 SOLUTION OPHTHALMIC at 09:11

## 2021-11-14 RX ADMIN — GABAPENTIN 300 MG: 300 CAPSULE ORAL at 02:11

## 2021-11-14 RX ADMIN — CYANOCOBALAMIN TAB 1000 MCG 1000 MCG: 1000 TAB at 09:11

## 2021-11-14 RX ADMIN — MICONAZOLE NITRATE: 20 POWDER TOPICAL at 10:11

## 2021-11-14 RX ADMIN — CEFAZOLIN 1 G: 330 INJECTION, POWDER, FOR SOLUTION INTRAMUSCULAR; INTRAVENOUS at 04:11

## 2021-11-14 RX ADMIN — POLYETHYLENE GLYCOL 3350 17 G: 17 POWDER, FOR SOLUTION ORAL at 10:11

## 2021-11-14 RX ADMIN — THIAMINE HCL TAB 100 MG 100 MG: 100 TAB at 09:11

## 2021-11-14 RX ADMIN — Medication 6 MG: at 09:11

## 2021-11-15 LAB
ALBUMIN SERPL BCP-MCNC: 2.6 G/DL (ref 3.5–5.2)
ANION GAP SERPL CALC-SCNC: 7 MMOL/L (ref 8–16)
BASOPHILS # BLD AUTO: 0.07 K/UL (ref 0–0.2)
BASOPHILS NFR BLD: 1.3 % (ref 0–1.9)
BUN SERPL-MCNC: 19 MG/DL (ref 8–23)
CALCIUM SERPL-MCNC: 8.2 MG/DL (ref 8.7–10.5)
CHLORIDE SERPL-SCNC: 96 MMOL/L (ref 95–110)
CO2 SERPL-SCNC: 29 MMOL/L (ref 23–29)
CREAT SERPL-MCNC: 0.7 MG/DL (ref 0.5–1.4)
DIFFERENTIAL METHOD: ABNORMAL
EOSINOPHIL # BLD AUTO: 0.2 K/UL (ref 0–0.5)
EOSINOPHIL NFR BLD: 3.1 % (ref 0–8)
ERYTHROCYTE [DISTWIDTH] IN BLOOD BY AUTOMATED COUNT: 14.8 % (ref 11.5–14.5)
EST. GFR  (AFRICAN AMERICAN): >60 ML/MIN/1.73 M^2
EST. GFR  (NON AFRICAN AMERICAN): >60 ML/MIN/1.73 M^2
GLUCOSE SERPL-MCNC: 109 MG/DL (ref 70–110)
HCT VFR BLD AUTO: 27.2 % (ref 40–54)
HGB BLD-MCNC: 9 G/DL (ref 14–18)
IMM GRANULOCYTES # BLD AUTO: 0.07 K/UL (ref 0–0.04)
IMM GRANULOCYTES NFR BLD AUTO: 1.3 % (ref 0–0.5)
LYMPHOCYTES # BLD AUTO: 1 K/UL (ref 1–4.8)
LYMPHOCYTES NFR BLD: 17.3 % (ref 18–48)
MCH RBC QN AUTO: 32.7 PG (ref 27–31)
MCHC RBC AUTO-ENTMCNC: 33.1 G/DL (ref 32–36)
MCV RBC AUTO: 99 FL (ref 82–98)
MONOCYTES # BLD AUTO: 0.7 K/UL (ref 0.3–1)
MONOCYTES NFR BLD: 12.8 % (ref 4–15)
NEUTROPHILS # BLD AUTO: 3.5 K/UL (ref 1.8–7.7)
NEUTROPHILS NFR BLD: 64.2 % (ref 38–73)
NRBC BLD-RTO: 0 /100 WBC
PHOSPHATE SERPL-MCNC: 2.9 MG/DL (ref 2.7–4.5)
PLATELET # BLD AUTO: 157 K/UL (ref 150–450)
PMV BLD AUTO: 10.1 FL (ref 9.2–12.9)
POTASSIUM SERPL-SCNC: 4.5 MMOL/L (ref 3.5–5.1)
RBC # BLD AUTO: 2.75 M/UL (ref 4.6–6.2)
SARS-COV-2 RNA RESP QL NAA+PROBE: NOT DETECTED
SODIUM SERPL-SCNC: 132 MMOL/L (ref 136–145)
WBC # BLD AUTO: 5.49 K/UL (ref 3.9–12.7)

## 2021-11-15 PROCEDURE — U0003 INFECTIOUS AGENT DETECTION BY NUCLEIC ACID (DNA OR RNA); SEVERE ACUTE RESPIRATORY SYNDROME CORONAVIRUS 2 (SARS-COV-2) (CORONAVIRUS DISEASE [COVID-19]), AMPLIFIED PROBE TECHNIQUE, MAKING USE OF HIGH THROUGHPUT TECHNOLOGIES AS DESCRIBED BY CMS-2020-01-R: HCPCS | Mod: HCNC | Performed by: INTERNAL MEDICINE

## 2021-11-15 PROCEDURE — 99900035 HC TECH TIME PER 15 MIN (STAT): Mod: HCNC

## 2021-11-15 PROCEDURE — 99233 PR SUBSEQUENT HOSPITAL CARE,LEVL III: ICD-10-PCS | Mod: HCNC,95,, | Performed by: INTERNAL MEDICINE

## 2021-11-15 PROCEDURE — 97530 THERAPEUTIC ACTIVITIES: CPT | Mod: HCNC,CQ

## 2021-11-15 PROCEDURE — 97116 GAIT TRAINING THERAPY: CPT | Mod: HCNC,CQ

## 2021-11-15 PROCEDURE — 63600175 PHARM REV CODE 636 W HCPCS: Mod: HCNC | Performed by: STUDENT IN AN ORGANIZED HEALTH CARE EDUCATION/TRAINING PROGRAM

## 2021-11-15 PROCEDURE — U0005 INFEC AGEN DETEC AMPLI PROBE: HCPCS | Performed by: INTERNAL MEDICINE

## 2021-11-15 PROCEDURE — 25000003 PHARM REV CODE 250: Mod: HCNC | Performed by: HOSPITALIST

## 2021-11-15 PROCEDURE — 36415 COLL VENOUS BLD VENIPUNCTURE: CPT | Mod: HCNC | Performed by: INTERNAL MEDICINE

## 2021-11-15 PROCEDURE — 80069 RENAL FUNCTION PANEL: CPT | Mod: HCNC | Performed by: INTERNAL MEDICINE

## 2021-11-15 PROCEDURE — 63600175 PHARM REV CODE 636 W HCPCS: Mod: HCNC | Performed by: INTERNAL MEDICINE

## 2021-11-15 PROCEDURE — 97535 SELF CARE MNGMENT TRAINING: CPT | Mod: HCNC,CO

## 2021-11-15 PROCEDURE — 25000003 PHARM REV CODE 250: Mod: HCNC | Performed by: STUDENT IN AN ORGANIZED HEALTH CARE EDUCATION/TRAINING PROGRAM

## 2021-11-15 PROCEDURE — 25000003 PHARM REV CODE 250: Mod: HCNC | Performed by: INTERNAL MEDICINE

## 2021-11-15 PROCEDURE — 99233 SBSQ HOSP IP/OBS HIGH 50: CPT | Mod: HCNC,95,, | Performed by: INTERNAL MEDICINE

## 2021-11-15 PROCEDURE — 97110 THERAPEUTIC EXERCISES: CPT | Mod: HCNC,CO

## 2021-11-15 PROCEDURE — 20600001 HC STEP DOWN PRIVATE ROOM: Mod: HCNC

## 2021-11-15 PROCEDURE — 85025 COMPLETE CBC W/AUTO DIFF WBC: CPT | Mod: HCNC | Performed by: INTERNAL MEDICINE

## 2021-11-15 RX ADMIN — LOSARTAN POTASSIUM 100 MG: 50 TABLET, FILM COATED ORAL at 09:11

## 2021-11-15 RX ADMIN — AMLODIPINE BESYLATE 10 MG: 10 TABLET ORAL at 09:11

## 2021-11-15 RX ADMIN — CEFAZOLIN 1 G: 330 INJECTION, POWDER, FOR SOLUTION INTRAMUSCULAR; INTRAVENOUS at 08:11

## 2021-11-15 RX ADMIN — MULTIPLE VITAMINS W/ MINERALS TAB 1 TABLET: TAB at 09:11

## 2021-11-15 RX ADMIN — AMMONIUM LACTATE: 12 LOTION TOPICAL at 09:11

## 2021-11-15 RX ADMIN — AMMONIUM LACTATE: 12 LOTION TOPICAL at 08:11

## 2021-11-15 RX ADMIN — GABAPENTIN 300 MG: 300 CAPSULE ORAL at 09:11

## 2021-11-15 RX ADMIN — CYANOCOBALAMIN TAB 1000 MCG 1000 MCG: 1000 TAB at 09:11

## 2021-11-15 RX ADMIN — MICONAZOLE NITRATE: 20 POWDER TOPICAL at 09:11

## 2021-11-15 RX ADMIN — CEFAZOLIN 1 G: 330 INJECTION, POWDER, FOR SOLUTION INTRAMUSCULAR; INTRAVENOUS at 01:11

## 2021-11-15 RX ADMIN — ENOXAPARIN SODIUM 40 MG: 40 INJECTION SUBCUTANEOUS at 06:11

## 2021-11-15 RX ADMIN — GABAPENTIN 300 MG: 300 CAPSULE ORAL at 06:11

## 2021-11-15 RX ADMIN — Medication 6 MG: at 08:11

## 2021-11-15 RX ADMIN — LATANOPROST 1 DROP: 50 SOLUTION OPHTHALMIC at 08:11

## 2021-11-15 RX ADMIN — CEFAZOLIN 1 G: 330 INJECTION, POWDER, FOR SOLUTION INTRAMUSCULAR; INTRAVENOUS at 03:11

## 2021-11-15 RX ADMIN — DOCUSATE SODIUM AND SENNOSIDES 1 TABLET: 8.6; 5 TABLET, FILM COATED ORAL at 09:11

## 2021-11-15 RX ADMIN — MICONAZOLE NITRATE: 20 POWDER TOPICAL at 08:11

## 2021-11-15 RX ADMIN — TIMOLOL MALEATE 1 DROP: 5 SOLUTION OPHTHALMIC at 09:11

## 2021-11-15 RX ADMIN — THIAMINE HCL TAB 100 MG 100 MG: 100 TAB at 09:11

## 2021-11-16 LAB
ALBUMIN SERPL BCP-MCNC: 2.7 G/DL (ref 3.5–5.2)
ANION GAP SERPL CALC-SCNC: 7 MMOL/L (ref 8–16)
BUN SERPL-MCNC: 14 MG/DL (ref 8–23)
CALCIUM SERPL-MCNC: 8.3 MG/DL (ref 8.7–10.5)
CHLORIDE SERPL-SCNC: 98 MMOL/L (ref 95–110)
CO2 SERPL-SCNC: 29 MMOL/L (ref 23–29)
CREAT SERPL-MCNC: 0.6 MG/DL (ref 0.5–1.4)
EST. GFR  (AFRICAN AMERICAN): >60 ML/MIN/1.73 M^2
EST. GFR  (NON AFRICAN AMERICAN): >60 ML/MIN/1.73 M^2
GLUCOSE SERPL-MCNC: 114 MG/DL (ref 70–110)
PHOSPHATE SERPL-MCNC: 2.7 MG/DL (ref 2.7–4.5)
POTASSIUM SERPL-SCNC: 4.6 MMOL/L (ref 3.5–5.1)
SODIUM SERPL-SCNC: 134 MMOL/L (ref 136–145)

## 2021-11-16 PROCEDURE — 27000221 HC OXYGEN, UP TO 24 HOURS: Mod: HCNC

## 2021-11-16 PROCEDURE — 25000003 PHARM REV CODE 250: Mod: HCNC | Performed by: HOSPITALIST

## 2021-11-16 PROCEDURE — 20600001 HC STEP DOWN PRIVATE ROOM: Mod: HCNC

## 2021-11-16 PROCEDURE — 97116 GAIT TRAINING THERAPY: CPT | Mod: HCNC,CQ

## 2021-11-16 PROCEDURE — 99233 SBSQ HOSP IP/OBS HIGH 50: CPT | Mod: HCNC,95,, | Performed by: INTERNAL MEDICINE

## 2021-11-16 PROCEDURE — 99233 PR SUBSEQUENT HOSPITAL CARE,LEVL III: ICD-10-PCS | Mod: HCNC,95,, | Performed by: INTERNAL MEDICINE

## 2021-11-16 PROCEDURE — 25000003 PHARM REV CODE 250: Mod: HCNC | Performed by: STUDENT IN AN ORGANIZED HEALTH CARE EDUCATION/TRAINING PROGRAM

## 2021-11-16 PROCEDURE — 94761 N-INVAS EAR/PLS OXIMETRY MLT: CPT | Mod: HCNC

## 2021-11-16 PROCEDURE — 63600175 PHARM REV CODE 636 W HCPCS: Mod: HCNC | Performed by: STUDENT IN AN ORGANIZED HEALTH CARE EDUCATION/TRAINING PROGRAM

## 2021-11-16 PROCEDURE — 97110 THERAPEUTIC EXERCISES: CPT | Mod: HCNC,CQ

## 2021-11-16 PROCEDURE — 36415 COLL VENOUS BLD VENIPUNCTURE: CPT | Mod: HCNC | Performed by: INTERNAL MEDICINE

## 2021-11-16 PROCEDURE — 80069 RENAL FUNCTION PANEL: CPT | Mod: HCNC | Performed by: INTERNAL MEDICINE

## 2021-11-16 PROCEDURE — 63600175 PHARM REV CODE 636 W HCPCS: Mod: HCNC | Performed by: INTERNAL MEDICINE

## 2021-11-16 PROCEDURE — 99900035 HC TECH TIME PER 15 MIN (STAT): Mod: HCNC

## 2021-11-16 PROCEDURE — 25000003 PHARM REV CODE 250: Mod: HCNC | Performed by: INTERNAL MEDICINE

## 2021-11-16 RX ORDER — DOXYCYCLINE HYCLATE 100 MG
100 TABLET ORAL EVERY 12 HOURS
Status: DISCONTINUED | OUTPATIENT
Start: 2021-11-16 | End: 2021-11-17 | Stop reason: HOSPADM

## 2021-11-16 RX ADMIN — LOSARTAN POTASSIUM 100 MG: 50 TABLET, FILM COATED ORAL at 08:11

## 2021-11-16 RX ADMIN — CEFAZOLIN 1 G: 330 INJECTION, POWDER, FOR SOLUTION INTRAMUSCULAR; INTRAVENOUS at 08:11

## 2021-11-16 RX ADMIN — AMMONIUM LACTATE: 12 LOTION TOPICAL at 09:11

## 2021-11-16 RX ADMIN — Medication 6 MG: at 09:11

## 2021-11-16 RX ADMIN — MICONAZOLE NITRATE: 20 POWDER TOPICAL at 09:11

## 2021-11-16 RX ADMIN — CEFAZOLIN 1 G: 330 INJECTION, POWDER, FOR SOLUTION INTRAMUSCULAR; INTRAVENOUS at 04:11

## 2021-11-16 RX ADMIN — ENOXAPARIN SODIUM 40 MG: 40 INJECTION SUBCUTANEOUS at 04:11

## 2021-11-16 RX ADMIN — CYANOCOBALAMIN TAB 1000 MCG 1000 MCG: 1000 TAB at 08:11

## 2021-11-16 RX ADMIN — MULTIPLE VITAMINS W/ MINERALS TAB 1 TABLET: TAB at 08:11

## 2021-11-16 RX ADMIN — LATANOPROST 1 DROP: 50 SOLUTION OPHTHALMIC at 09:11

## 2021-11-16 RX ADMIN — CEFAZOLIN 1 G: 330 INJECTION, POWDER, FOR SOLUTION INTRAMUSCULAR; INTRAVENOUS at 11:11

## 2021-11-16 RX ADMIN — ACETAMINOPHEN 500 MG: 500 TABLET ORAL at 09:11

## 2021-11-16 RX ADMIN — GABAPENTIN 300 MG: 300 CAPSULE ORAL at 04:11

## 2021-11-16 RX ADMIN — THIAMINE HCL TAB 100 MG 100 MG: 100 TAB at 08:11

## 2021-11-16 RX ADMIN — AMLODIPINE BESYLATE 10 MG: 10 TABLET ORAL at 08:11

## 2021-11-16 RX ADMIN — DOXYCYCLINE HYCLATE 100 MG: 100 TABLET, COATED ORAL at 10:11

## 2021-11-16 RX ADMIN — AMMONIUM LACTATE: 12 LOTION TOPICAL at 08:11

## 2021-11-16 RX ADMIN — TIMOLOL MALEATE 1 DROP: 5 SOLUTION OPHTHALMIC at 09:11

## 2021-11-16 RX ADMIN — GABAPENTIN 300 MG: 300 CAPSULE ORAL at 09:11

## 2021-11-16 RX ADMIN — MICONAZOLE NITRATE: 20 POWDER TOPICAL at 08:11

## 2021-11-16 RX ADMIN — GABAPENTIN 300 MG: 300 CAPSULE ORAL at 08:11

## 2021-11-17 VITALS
BODY MASS INDEX: 31.93 KG/M2 | HEART RATE: 97 BPM | WEIGHT: 256.81 LBS | SYSTOLIC BLOOD PRESSURE: 141 MMHG | OXYGEN SATURATION: 96 % | TEMPERATURE: 98 F | HEIGHT: 75 IN | RESPIRATION RATE: 18 BRPM | DIASTOLIC BLOOD PRESSURE: 74 MMHG

## 2021-11-17 PROBLEM — G47.34 NOCTURNAL HYPOXIA: Status: ACTIVE | Noted: 2021-11-17

## 2021-11-17 LAB
ALBUMIN SERPL BCP-MCNC: 2.8 G/DL (ref 3.5–5.2)
ANION GAP SERPL CALC-SCNC: 5 MMOL/L (ref 8–16)
BASOPHILS # BLD AUTO: 0.06 K/UL (ref 0–0.2)
BASOPHILS NFR BLD: 1.2 % (ref 0–1.9)
BUN SERPL-MCNC: 10 MG/DL (ref 8–23)
CALCIUM SERPL-MCNC: 8.9 MG/DL (ref 8.7–10.5)
CHLORIDE SERPL-SCNC: 99 MMOL/L (ref 95–110)
CO2 SERPL-SCNC: 30 MMOL/L (ref 23–29)
CREAT SERPL-MCNC: 0.7 MG/DL (ref 0.5–1.4)
DIFFERENTIAL METHOD: ABNORMAL
EOSINOPHIL # BLD AUTO: 0.2 K/UL (ref 0–0.5)
EOSINOPHIL NFR BLD: 3.7 % (ref 0–8)
ERYTHROCYTE [DISTWIDTH] IN BLOOD BY AUTOMATED COUNT: 14.6 % (ref 11.5–14.5)
EST. GFR  (AFRICAN AMERICAN): >60 ML/MIN/1.73 M^2
EST. GFR  (NON AFRICAN AMERICAN): >60 ML/MIN/1.73 M^2
GLUCOSE SERPL-MCNC: 116 MG/DL (ref 70–110)
HCT VFR BLD AUTO: 27.5 % (ref 40–54)
HGB BLD-MCNC: 9.3 G/DL (ref 14–18)
IMM GRANULOCYTES # BLD AUTO: 0.11 K/UL (ref 0–0.04)
IMM GRANULOCYTES NFR BLD AUTO: 2.2 % (ref 0–0.5)
LYMPHOCYTES # BLD AUTO: 1.1 K/UL (ref 1–4.8)
LYMPHOCYTES NFR BLD: 21.9 % (ref 18–48)
MCH RBC QN AUTO: 32.7 PG (ref 27–31)
MCHC RBC AUTO-ENTMCNC: 33.8 G/DL (ref 32–36)
MCV RBC AUTO: 97 FL (ref 82–98)
MONOCYTES # BLD AUTO: 0.5 K/UL (ref 0.3–1)
MONOCYTES NFR BLD: 10.7 % (ref 4–15)
NEUTROPHILS # BLD AUTO: 3.1 K/UL (ref 1.8–7.7)
NEUTROPHILS NFR BLD: 60.3 % (ref 38–73)
NRBC BLD-RTO: 0 /100 WBC
PHOSPHATE SERPL-MCNC: 2.8 MG/DL (ref 2.7–4.5)
PLATELET # BLD AUTO: 218 K/UL (ref 150–450)
PMV BLD AUTO: 9.6 FL (ref 9.2–12.9)
POTASSIUM SERPL-SCNC: 4.4 MMOL/L (ref 3.5–5.1)
RBC # BLD AUTO: 2.84 M/UL (ref 4.6–6.2)
SARS-COV-2 RDRP RESP QL NAA+PROBE: NEGATIVE
SODIUM SERPL-SCNC: 134 MMOL/L (ref 136–145)
WBC # BLD AUTO: 5.07 K/UL (ref 3.9–12.7)

## 2021-11-17 PROCEDURE — 1111F DSCHRG MED/CURRENT MED MERGE: CPT | Mod: HCNC,95,CPTII, | Performed by: INTERNAL MEDICINE

## 2021-11-17 PROCEDURE — 1111F PR DISCHARGE MEDS RECONCILED W/ CURRENT OUTPATIENT MED LIST: ICD-10-PCS | Mod: HCNC,95,CPTII, | Performed by: INTERNAL MEDICINE

## 2021-11-17 PROCEDURE — 25000003 PHARM REV CODE 250: Mod: HCNC | Performed by: HOSPITALIST

## 2021-11-17 PROCEDURE — U0002 COVID-19 LAB TEST NON-CDC: HCPCS | Mod: HCNC | Performed by: INTERNAL MEDICINE

## 2021-11-17 PROCEDURE — 94761 N-INVAS EAR/PLS OXIMETRY MLT: CPT | Mod: HCNC

## 2021-11-17 PROCEDURE — 25000003 PHARM REV CODE 250: Mod: HCNC | Performed by: STUDENT IN AN ORGANIZED HEALTH CARE EDUCATION/TRAINING PROGRAM

## 2021-11-17 PROCEDURE — 63600175 PHARM REV CODE 636 W HCPCS: Mod: HCNC | Performed by: INTERNAL MEDICINE

## 2021-11-17 PROCEDURE — 36415 COLL VENOUS BLD VENIPUNCTURE: CPT | Mod: HCNC | Performed by: INTERNAL MEDICINE

## 2021-11-17 PROCEDURE — 25000003 PHARM REV CODE 250: Mod: HCNC | Performed by: INTERNAL MEDICINE

## 2021-11-17 PROCEDURE — 99900035 HC TECH TIME PER 15 MIN (STAT): Mod: HCNC

## 2021-11-17 PROCEDURE — 99239 PR HOSPITAL DISCHARGE DAY,>30 MIN: ICD-10-PCS | Mod: HCNC,95,, | Performed by: INTERNAL MEDICINE

## 2021-11-17 PROCEDURE — 99239 HOSP IP/OBS DSCHRG MGMT >30: CPT | Mod: HCNC,95,, | Performed by: INTERNAL MEDICINE

## 2021-11-17 PROCEDURE — 85025 COMPLETE CBC W/AUTO DIFF WBC: CPT | Mod: HCNC | Performed by: INTERNAL MEDICINE

## 2021-11-17 PROCEDURE — 80069 RENAL FUNCTION PANEL: CPT | Mod: HCNC | Performed by: INTERNAL MEDICINE

## 2021-11-17 RX ORDER — TALC
6 POWDER (GRAM) TOPICAL NIGHTLY
Refills: 0
Start: 2021-11-17 | End: 2023-12-15

## 2021-11-17 RX ORDER — AMMONIUM LACTATE 12 G/100G
LOTION TOPICAL 2 TIMES DAILY PRN
Qty: 400 G | Refills: 0
Start: 2021-11-17 | End: 2023-12-15

## 2021-11-17 RX ORDER — DOXYCYCLINE HYCLATE 100 MG
100 TABLET ORAL EVERY 12 HOURS
Qty: 20 TABLET | Refills: 0
Start: 2021-11-17 | End: 2021-11-27

## 2021-11-17 RX ORDER — GABAPENTIN 600 MG/1
300 TABLET ORAL 3 TIMES DAILY
Qty: 90 TABLET | Refills: 5
Start: 2021-11-17 | End: 2022-01-27

## 2021-11-17 RX ORDER — MICONAZOLE NITRATE 2 %
POWDER (GRAM) TOPICAL 2 TIMES DAILY
Refills: 0
Start: 2021-11-17 | End: 2022-08-11

## 2021-11-17 RX ADMIN — MICONAZOLE NITRATE: 20 POWDER TOPICAL at 08:11

## 2021-11-17 RX ADMIN — TIMOLOL MALEATE 1 DROP: 5 SOLUTION OPHTHALMIC at 08:11

## 2021-11-17 RX ADMIN — GABAPENTIN 300 MG: 300 CAPSULE ORAL at 03:11

## 2021-11-17 RX ADMIN — CYANOCOBALAMIN TAB 1000 MCG 1000 MCG: 1000 TAB at 08:11

## 2021-11-17 RX ADMIN — GABAPENTIN 300 MG: 300 CAPSULE ORAL at 08:11

## 2021-11-17 RX ADMIN — LOSARTAN POTASSIUM 100 MG: 50 TABLET, FILM COATED ORAL at 08:11

## 2021-11-17 RX ADMIN — DOXYCYCLINE HYCLATE 100 MG: 100 TABLET, COATED ORAL at 08:11

## 2021-11-17 RX ADMIN — AMLODIPINE BESYLATE 10 MG: 10 TABLET ORAL at 08:11

## 2021-11-17 RX ADMIN — AMMONIUM LACTATE: 12 LOTION TOPICAL at 08:11

## 2021-11-17 RX ADMIN — DOCUSATE SODIUM AND SENNOSIDES 1 TABLET: 8.6; 5 TABLET, FILM COATED ORAL at 08:11

## 2021-11-17 RX ADMIN — CEFAZOLIN 1 G: 330 INJECTION, POWDER, FOR SOLUTION INTRAMUSCULAR; INTRAVENOUS at 03:11

## 2021-11-17 RX ADMIN — MULTIPLE VITAMINS W/ MINERALS TAB 1 TABLET: TAB at 08:11

## 2021-11-17 RX ADMIN — CEFAZOLIN 1 G: 330 INJECTION, POWDER, FOR SOLUTION INTRAMUSCULAR; INTRAVENOUS at 12:11

## 2021-11-17 RX ADMIN — THIAMINE HCL TAB 100 MG 100 MG: 100 TAB at 08:11

## 2021-11-18 LAB
BACTERIA BLD CULT: NORMAL
BACTERIA BLD CULT: NORMAL

## 2022-01-24 ENCOUNTER — OFFICE VISIT (OUTPATIENT)
Dept: INTERNAL MEDICINE | Facility: CLINIC | Age: 67
End: 2022-01-24
Payer: MEDICARE

## 2022-01-24 ENCOUNTER — HOSPITAL ENCOUNTER (OUTPATIENT)
Facility: HOSPITAL | Age: 67
Discharge: HOME | End: 2022-01-24
Attending: EMERGENCY MEDICINE | Admitting: INTERNAL MEDICINE
Payer: MEDICARE

## 2022-01-24 VITALS
DIASTOLIC BLOOD PRESSURE: 88 MMHG | BODY MASS INDEX: 32.95 KG/M2 | HEIGHT: 75 IN | OXYGEN SATURATION: 99 % | RESPIRATION RATE: 16 BRPM | HEART RATE: 98 BPM | WEIGHT: 265 LBS | TEMPERATURE: 99 F | SYSTOLIC BLOOD PRESSURE: 186 MMHG

## 2022-01-24 VITALS
HEIGHT: 75 IN | SYSTOLIC BLOOD PRESSURE: 130 MMHG | HEART RATE: 82 BPM | BODY MASS INDEX: 32.83 KG/M2 | DIASTOLIC BLOOD PRESSURE: 88 MMHG | WEIGHT: 264 LBS | OXYGEN SATURATION: 98 %

## 2022-01-24 DIAGNOSIS — R60.0 LEG EDEMA: Primary | ICD-10-CM

## 2022-01-24 DIAGNOSIS — R23.8 REDNESS AND SWELLING OF LOWER LEG: Primary | ICD-10-CM

## 2022-01-24 DIAGNOSIS — I10 HTN (HYPERTENSION): ICD-10-CM

## 2022-01-24 DIAGNOSIS — M79.89 REDNESS AND SWELLING OF LOWER LEG: Primary | ICD-10-CM

## 2022-01-24 LAB
ALBUMIN SERPL BCP-MCNC: 3.4 G/DL (ref 3.5–5.2)
ALP SERPL-CCNC: 89 U/L (ref 55–135)
ALT SERPL W/O P-5'-P-CCNC: 17 U/L (ref 10–44)
ANION GAP SERPL CALC-SCNC: 8 MMOL/L (ref 8–16)
AST SERPL-CCNC: 25 U/L (ref 10–40)
BASOPHILS # BLD AUTO: 0.11 K/UL (ref 0–0.2)
BASOPHILS NFR BLD: 1.3 % (ref 0–1.9)
BILIRUB SERPL-MCNC: 0.7 MG/DL (ref 0.1–1)
BNP SERPL-MCNC: 150 PG/ML (ref 0–99)
BUN SERPL-MCNC: 6 MG/DL (ref 8–23)
CALCIUM SERPL-MCNC: 9.2 MG/DL (ref 8.7–10.5)
CHLORIDE SERPL-SCNC: 102 MMOL/L (ref 95–110)
CO2 SERPL-SCNC: 24 MMOL/L (ref 23–29)
CREAT SERPL-MCNC: 0.8 MG/DL (ref 0.5–1.4)
CTP QC/QA: YES
DIFFERENTIAL METHOD: ABNORMAL
EOSINOPHIL # BLD AUTO: 0.6 K/UL (ref 0–0.5)
EOSINOPHIL NFR BLD: 7.6 % (ref 0–8)
ERYTHROCYTE [DISTWIDTH] IN BLOOD BY AUTOMATED COUNT: 13.9 % (ref 11.5–14.5)
EST. GFR  (AFRICAN AMERICAN): >60 ML/MIN/1.73 M^2
EST. GFR  (NON AFRICAN AMERICAN): >60 ML/MIN/1.73 M^2
GLUCOSE SERPL-MCNC: 90 MG/DL (ref 70–110)
HCT VFR BLD AUTO: 35.7 % (ref 40–54)
HGB BLD-MCNC: 11.3 G/DL (ref 14–18)
IMM GRANULOCYTES # BLD AUTO: 0.03 K/UL (ref 0–0.04)
IMM GRANULOCYTES NFR BLD AUTO: 0.4 % (ref 0–0.5)
LYMPHOCYTES # BLD AUTO: 1.7 K/UL (ref 1–4.8)
LYMPHOCYTES NFR BLD: 19.7 % (ref 18–48)
MCH RBC QN AUTO: 27.3 PG (ref 27–31)
MCHC RBC AUTO-ENTMCNC: 31.7 G/DL (ref 32–36)
MCV RBC AUTO: 86 FL (ref 82–98)
MONOCYTES # BLD AUTO: 1.1 K/UL (ref 0.3–1)
MONOCYTES NFR BLD: 12.5 % (ref 4–15)
NEUTROPHILS # BLD AUTO: 4.9 K/UL (ref 1.8–7.7)
NEUTROPHILS NFR BLD: 58.5 % (ref 38–73)
NRBC BLD-RTO: 0 /100 WBC
PLATELET # BLD AUTO: 272 K/UL (ref 150–450)
PMV BLD AUTO: 9.7 FL (ref 9.2–12.9)
POTASSIUM SERPL-SCNC: 4.4 MMOL/L (ref 3.5–5.1)
PROT SERPL-MCNC: 7.1 G/DL (ref 6–8.4)
RBC # BLD AUTO: 4.14 M/UL (ref 4.6–6.2)
SARS-COV-2 RDRP RESP QL NAA+PROBE: NEGATIVE
SODIUM SERPL-SCNC: 134 MMOL/L (ref 136–145)
WBC # BLD AUTO: 8.39 K/UL (ref 3.9–12.7)

## 2022-01-24 PROCEDURE — 4010F ACE/ARB THERAPY RXD/TAKEN: CPT | Mod: HCNC,CPTII,S$GLB, | Performed by: INTERNAL MEDICINE

## 2022-01-24 PROCEDURE — 25000003 PHARM REV CODE 250: Mod: HCNC | Performed by: STUDENT IN AN ORGANIZED HEALTH CARE EDUCATION/TRAINING PROGRAM

## 2022-01-24 PROCEDURE — 3075F SYST BP GE 130 - 139MM HG: CPT | Mod: HCNC,CPTII,S$GLB, | Performed by: INTERNAL MEDICINE

## 2022-01-24 PROCEDURE — 3288F PR FALLS RISK ASSESSMENT DOCUMENTED: ICD-10-PCS | Mod: HCNC,CPTII,S$GLB, | Performed by: INTERNAL MEDICINE

## 2022-01-24 PROCEDURE — 3066F NEPHROPATHY DOC TX: CPT | Mod: HCNC,CPTII,S$GLB, | Performed by: INTERNAL MEDICINE

## 2022-01-24 PROCEDURE — 99213 OFFICE O/P EST LOW 20 MIN: CPT | Mod: HCNC,S$GLB,, | Performed by: INTERNAL MEDICINE

## 2022-01-24 PROCEDURE — 83880 ASSAY OF NATRIURETIC PEPTIDE: CPT | Mod: HCNC | Performed by: EMERGENCY MEDICINE

## 2022-01-24 PROCEDURE — 3061F NEG MICROALBUMINURIA REV: CPT | Mod: HCNC,CPTII,S$GLB, | Performed by: INTERNAL MEDICINE

## 2022-01-24 PROCEDURE — 85025 COMPLETE CBC W/AUTO DIFF WBC: CPT | Mod: HCNC | Performed by: STUDENT IN AN ORGANIZED HEALTH CARE EDUCATION/TRAINING PROGRAM

## 2022-01-24 PROCEDURE — 96374 THER/PROPH/DIAG INJ IV PUSH: CPT | Mod: HCNC

## 2022-01-24 PROCEDURE — 99285 EMERGENCY DEPT VISIT HI MDM: CPT | Mod: HCNC,GC,CS, | Performed by: EMERGENCY MEDICINE

## 2022-01-24 PROCEDURE — 3066F PR DOCUMENTATION OF TREATMENT FOR NEPHROPATHY: ICD-10-PCS | Mod: HCNC,CPTII,S$GLB, | Performed by: INTERNAL MEDICINE

## 2022-01-24 PROCEDURE — 99213 PR OFFICE/OUTPT VISIT, EST, LEVL III, 20-29 MIN: ICD-10-PCS | Mod: HCNC,S$GLB,, | Performed by: INTERNAL MEDICINE

## 2022-01-24 PROCEDURE — 63600175 PHARM REV CODE 636 W HCPCS: Mod: HCNC | Performed by: EMERGENCY MEDICINE

## 2022-01-24 PROCEDURE — 3075F PR MOST RECENT SYSTOLIC BLOOD PRESS GE 130-139MM HG: ICD-10-PCS | Mod: HCNC,CPTII,S$GLB, | Performed by: INTERNAL MEDICINE

## 2022-01-24 PROCEDURE — 3288F FALL RISK ASSESSMENT DOCD: CPT | Mod: HCNC,CPTII,S$GLB, | Performed by: INTERNAL MEDICINE

## 2022-01-24 PROCEDURE — 3008F BODY MASS INDEX DOCD: CPT | Mod: HCNC,CPTII,S$GLB, | Performed by: INTERNAL MEDICINE

## 2022-01-24 PROCEDURE — 99999 PR PBB SHADOW E&M-EST. PATIENT-LVL III: CPT | Mod: PBBFAC,HCNC,, | Performed by: INTERNAL MEDICINE

## 2022-01-24 PROCEDURE — U0002 COVID-19 LAB TEST NON-CDC: HCPCS | Mod: HCNC | Performed by: EMERGENCY MEDICINE

## 2022-01-24 PROCEDURE — 3079F DIAST BP 80-89 MM HG: CPT | Mod: HCNC,CPTII,S$GLB, | Performed by: INTERNAL MEDICINE

## 2022-01-24 PROCEDURE — 4010F PR ACE/ARB THEARPY RXD/TAKEN: ICD-10-PCS | Mod: HCNC,CPTII,S$GLB, | Performed by: INTERNAL MEDICINE

## 2022-01-24 PROCEDURE — 1101F PT FALLS ASSESS-DOCD LE1/YR: CPT | Mod: HCNC,CPTII,S$GLB, | Performed by: INTERNAL MEDICINE

## 2022-01-24 PROCEDURE — 1125F PR PAIN SEVERITY QUANTIFIED, PAIN PRESENT: ICD-10-PCS | Mod: HCNC,CPTII,S$GLB, | Performed by: INTERNAL MEDICINE

## 2022-01-24 PROCEDURE — 3061F PR NEG MICROALBUMINURIA RESULT DOCUMENTED/REVIEW: ICD-10-PCS | Mod: HCNC,CPTII,S$GLB, | Performed by: INTERNAL MEDICINE

## 2022-01-24 PROCEDURE — 3079F PR MOST RECENT DIASTOLIC BLOOD PRESSURE 80-89 MM HG: ICD-10-PCS | Mod: HCNC,CPTII,S$GLB, | Performed by: INTERNAL MEDICINE

## 2022-01-24 PROCEDURE — 3008F PR BODY MASS INDEX (BMI) DOCUMENTED: ICD-10-PCS | Mod: HCNC,CPTII,S$GLB, | Performed by: INTERNAL MEDICINE

## 2022-01-24 PROCEDURE — 1125F AMNT PAIN NOTED PAIN PRSNT: CPT | Mod: HCNC,CPTII,S$GLB, | Performed by: INTERNAL MEDICINE

## 2022-01-24 PROCEDURE — G0378 HOSPITAL OBSERVATION PER HR: HCPCS | Mod: HCNC

## 2022-01-24 PROCEDURE — 99999 PR PBB SHADOW E&M-EST. PATIENT-LVL III: ICD-10-PCS | Mod: PBBFAC,HCNC,, | Performed by: INTERNAL MEDICINE

## 2022-01-24 PROCEDURE — 1101F PR PT FALLS ASSESS DOC 0-1 FALLS W/OUT INJ PAST YR: ICD-10-PCS | Mod: HCNC,CPTII,S$GLB, | Performed by: INTERNAL MEDICINE

## 2022-01-24 PROCEDURE — 80053 COMPREHEN METABOLIC PANEL: CPT | Mod: HCNC | Performed by: STUDENT IN AN ORGANIZED HEALTH CARE EDUCATION/TRAINING PROGRAM

## 2022-01-24 PROCEDURE — 99284 EMERGENCY DEPT VISIT MOD MDM: CPT | Mod: 25,HCNC

## 2022-01-24 PROCEDURE — 99285 PR EMERGENCY DEPT VISIT,LEVEL V: ICD-10-PCS | Mod: HCNC,GC,CS, | Performed by: EMERGENCY MEDICINE

## 2022-01-24 RX ORDER — FUROSEMIDE 20 MG/1
20 TABLET ORAL DAILY
Qty: 30 TABLET | Refills: 0 | Status: SHIPPED | OUTPATIENT
Start: 2022-01-24 | End: 2022-12-09

## 2022-01-24 RX ORDER — LOSARTAN POTASSIUM 50 MG/1
100 TABLET ORAL
Status: COMPLETED | OUTPATIENT
Start: 2022-01-24 | End: 2022-01-24

## 2022-01-24 RX ORDER — TAMSULOSIN HYDROCHLORIDE 0.4 MG/1
0.4 CAPSULE ORAL
Status: COMPLETED | OUTPATIENT
Start: 2022-01-24 | End: 2022-01-24

## 2022-01-24 RX ORDER — FUROSEMIDE 10 MG/ML
40 INJECTION INTRAMUSCULAR; INTRAVENOUS
Status: COMPLETED | OUTPATIENT
Start: 2022-01-24 | End: 2022-01-24

## 2022-01-24 RX ADMIN — TAMSULOSIN HYDROCHLORIDE 0.4 MG: 0.4 CAPSULE ORAL at 04:01

## 2022-01-24 RX ADMIN — LOSARTAN POTASSIUM 100 MG: 50 TABLET, FILM COATED ORAL at 04:01

## 2022-01-24 RX ADMIN — FUROSEMIDE 40 MG: 10 INJECTION, SOLUTION INTRAVENOUS at 05:01

## 2022-01-24 NOTE — Clinical Note
Diagnosis: Leg edema [507590]  Future Attending Provider: ERVIN LAWRENCE [[id number]]  Admitting Provider:: ERVIN LAWRENCE [[id number]]

## 2022-01-24 NOTE — ED NOTES
Froylan Borja Jr., a 66 y.o. male presents to the ED w/ complaint of bilateral leg pain and swelling with redness, denies any shortness of breath or chest pain at this time.      Triage note:  Chief Complaint   Patient presents with    Leg Pain     Hx cellulitis, both legs pain and swelling sent from clinic     Review of patient's allergies indicates:  No Known Allergies  Past Medical History:   Diagnosis Date    Alcohol abuse     Anxiety     Cirrhosis     Glaucoma     History of hepatitis C, s/p successful Rx w/ SVR24 - 1/2017     genotype 1;  relapse following PegIFN+RBV+Victrelis; prior relapse following PegIFN+RBV S/p 12 weeks harvoni + RBV w/ SVR    Hypertension     Paresthesia     feet, bilaterally     LOC: Patient name and date of birth verified. The patient is awake, alert and aware of environment with an appropriate affect, the patient is oriented x 3 and speaking appropriately.   APPEARANCE: Patient resting comfortably, patient is clean and well groomed, patient's clothing is properly fastened.  SKIN: The skin is warm and dry, color consistent with ethnicity, patient has normal skin turgor and moist mucus membranes,bilateral redness and swelling noted to lower legs   MUSCULOSKELETAL: Patient moving all extremities well, no obvious swelling or deformities noted.   RESPIRATORY: Respirations are spontaneous, patient has a normal effort and rate, no accessory muscle use noted.  CARDIAC: Patient has a normal rate and rhythm, no periphreal edema noted, capillary refill < 3 seconds.  ABDOMEN: Soft and non tender to palpation, no distention noted. Bowel sounds present in all four quadrants.  NEUROLOGIC: Eyes open spontaneously, behavior appropriate to situation, follows commands, facial expression symmetrical, bilateral hand grasp equal and even, purposeful motor response noted, normal sensation in all extremities when touched with a finger.

## 2022-01-24 NOTE — PROGRESS NOTES
I discussed the patient in detail with the student and agree with the above note. I confirmed essential parts of the history with the patient and performed an appropriate focused physical examination. I discussed the management plan with the patient while the resident was present.    Pt referred to ED to eval for cellulitis ?CHF due to significant edema even in thighs.

## 2022-01-24 NOTE — PROGRESS NOTES
Subjective:       Patient ID: Froylan Borja Jr. is a 66 y.o. male.    Chief Complaint: Recurrent Skin Infections    Patient is a 66 year old male with a past medical history of cirrhosis, hypertension, history of hep c, anxiety, alcohol abuse, and glaucoma presents to clinic with complaints of bilateral leg swelling with accompanied redness. Patient says his symptoms have persisted over the past 2 weeks and is not as severe as a recent episode 11/21, which resolved with hospitilization and antibiotics. Patient denies any associated nausea, vomiting, fever, chills, or diarrhea with the swelling and redness bilaterally. His previous episode of cellulitis was managed with a in hospital stay and antibiotics, which resolved his symptoms. Patient was then discharged home, where he has been living in and out of his car and in hotels over the past month since discharge. The patient reports that he has had persistent swelling and erythema of both legs (R>L) that goes up to just above the knee and the posterior thighs. His symptoms have gotten progressively worse over time and admits his previous episode was similar but more severe and over more time.    Past Medical History:   Diagnosis Date    Alcohol abuse     Anxiety     Cirrhosis     Glaucoma     History of hepatitis C, s/p successful Rx w/ SVR24 - 1/2017     genotype 1;  relapse following PegIFN+RBV+Victrelis; prior relapse following PegIFN+RBV S/p 12 weeks harvoni + RBV w/ SVR    Hypertension     Paresthesia     feet, bilaterally     Past Surgical History:   Procedure Laterality Date    LIVER BIOPSY       Family History   Problem Relation Age of Onset    Diabetes Mellitus Father     Hypertension Father     Cancer Father         ? CRC    Diabetes Father     Cancer Mother         colon vs polyps, colectomy    Colon cancer Mother     Cancer Sister         ? CRC    Colonic polyp Brother     Cirrhosis Neg Hx      Social History     Socioeconomic History     Marital status:    Tobacco Use    Smoking status: Current Some Day Smoker     Types: Cigars    Smokeless tobacco: Never Used    Tobacco comment: smokes cigars 20 per month   Substance and Sexual Activity    Alcohol use: Yes     Comment: heavy alcohol use in the past, now drinking daily, 2-3 beers daily    Drug use: No   Social History Narrative     (mental health issues), retired  at [employer]. No kids. 2 dogs. No exercise.     Current Outpatient Medications   Medication Instructions    amLODIPine (NORVASC) 10 mg, Oral, Daily    ammonium lactate (LAC-HYDRIN) 12 % lotion Topical (Top), 2 times daily PRN, Apply to BLE and feet    cyanocobalamin (VITAMIN B-12) 1,000 mcg, Oral, Daily    gabapentin (NEURONTIN) 300 mg, Oral, 3 times daily    latanoprost (XALATAN) 0.005 % ophthalmic solution 1 drop, Nightly    levocetirizine (XYZAL) 5 MG tablet TAKE 1 TABLET(5 MG) BY MOUTH EVERY EVENING    losartan (COZAAR) 100 MG tablet TAKE 1 TABLET(100 MG) BY MOUTH EVERY DAY    melatonin (MELATIN) 6 mg, Oral, Nightly    miconazole NITRATE 2 % (MICOTIN) 2 % top powder Topical (Top), 2 times daily, Apply to groin    multivitamin (MULTIVITAMIN) per tablet 1 tablet, Daily    tadalafiL (CIALIS) 20 mg, Oral, Daily    tamsulosin (FLOMAX) 0.4 mg, Oral, Nightly    timolol maleate 0.5% (TIMOPTIC) 0.5 % Drop 1 drop, Both Eyes, Daily     Review of patient's allergies indicates:  No Known Allergies    Review of Systems   Constitutional: Positive for activity change. Negative for chills, fatigue and fever.   HENT: Negative for nasal congestion and sore throat.    Eyes: Negative for pain.   Respiratory: Negative for chest tightness and shortness of breath.    Cardiovascular: Positive for leg swelling. Negative for chest pain.   Gastrointestinal: Negative for constipation, diarrhea, nausea and vomiting.   Genitourinary: Positive for frequency.   Musculoskeletal: Positive for leg pain. Negative for arthralgias.  "  Integumentary:  Positive for rash.   Neurological: Negative for dizziness.   All other systems reviewed and are negative.        Objective:       Vitals:    01/24/22 1259   BP: 130/88   Pulse: 82   SpO2: 98%   Weight: 119.7 kg (264 lb)   Height: 6' 3" (1.905 m)     Physical Exam  Vitals reviewed.   Constitutional:       Appearance: Normal appearance.   HENT:      Right Ear: Tympanic membrane normal.      Left Ear: Tympanic membrane normal.      Nose: Nose normal.      Mouth/Throat:      Mouth: Mucous membranes are moist.      Pharynx: Oropharynx is clear.   Eyes:      Conjunctiva/sclera: Conjunctivae normal.   Cardiovascular:      Rate and Rhythm: Normal rate and regular rhythm.      Pulses: Normal pulses.      Heart sounds: Normal heart sounds.   Pulmonary:      Effort: Pulmonary effort is normal.      Breath sounds: Normal breath sounds.   Musculoskeletal:         General: Swelling present.      Cervical back: Normal range of motion.      Right lower leg: Edema present.      Left lower leg: Edema present.   Skin:     General: Skin is warm.      Findings: Erythema (bilateral legs below the knee and bilateral posterior thighs) present.   Neurological:      General: No focal deficit present.      Mental Status: He is alert and oriented to person, place, and time.   Psychiatric:         Mood and Affect: Mood normal.         Behavior: Behavior normal.             Assessment:       Problem List Items Addressed This Visit    None         Plan:     Redness and Swelling of Bilateral Lower Limbs   -referred to ED for further work up   -Doppler US to rule out DVT   -empiric antibiotics   -CBC for leukocytosis     Esvin Rodrigues MS4  "

## 2022-01-24 NOTE — ED PROVIDER NOTES
Encounter Date: 1/24/2022       History     Chief Complaint   Patient presents with    Leg Pain     Hx cellulitis, both legs pain and swelling sent from clinic     66 year old male with PMH of HTN, hep C cirrhosis (treated 2017), alcohol abuse who presents to the ED for worsening bilateral leg swelling over last couple weeks, associated with discomfort with applied pressure, and discomfort when wearing shoes.  He reports being homeless and sleeping in his car.  Reports taking his losartan, gabapentin medications but is currently out of his Zoloft, Flomax prescriptions.  He denies any history of thyroid issues, fevers, chills, chest pain, palpitations, shortness of breath.  He reports mild alleviation of his symptoms with ambulation, but has been unable to elevate his feet while sleeping as he sleeps in his car. He reports decreasing his alcohol intake since last admission and attends [facility] with current use at roughly 2 beers a day.    The history is provided by the patient.     Review of patient's allergies indicates:  No Known Allergies  Past Medical History:   Diagnosis Date    Alcohol abuse     Anxiety     Cirrhosis     Glaucoma     History of hepatitis C, s/p successful Rx w/ SVR24 - 1/2017     genotype 1;  relapse following PegIFN+RBV+Victrelis; prior relapse following PegIFN+RBV S/p 12 weeks harvoni + RBV w/ SVR    Hypertension     Paresthesia     feet, bilaterally     Past Surgical History:   Procedure Laterality Date    LIVER BIOPSY       Family History   Problem Relation Age of Onset    Diabetes Mellitus Father     Hypertension Father     Cancer Father         ? CRC    Diabetes Father     Cancer Mother         colon vs polyps, colectomy    Colon cancer Mother     Cancer Sister         ? CRC    Colonic polyp Brother     Cirrhosis Neg Hx      Social History     Tobacco Use    Smoking status: Current Some Day Smoker     Types: Cigars    Smokeless tobacco: Never Used     Tobacco comment: smokes cigars 20 per month   Substance Use Topics    Alcohol use: Yes     Comment: heavy alcohol use in the past, now drinking daily, 2-3 beers daily    Drug use: No     Review of Systems   Constitutional: Negative for activity change, appetite change, chills and fever.   HENT: Positive for congestion. Negative for rhinorrhea and sore throat.    Eyes: Negative.    Respiratory: Negative for cough, choking, shortness of breath and wheezing.    Cardiovascular: Positive for leg swelling. Negative for chest pain and palpitations.   Gastrointestinal: Negative for abdominal distention, abdominal pain, constipation, diarrhea, nausea and vomiting.   Endocrine: Negative.    Genitourinary: Negative for difficulty urinating and dysuria.   Musculoskeletal: Negative for arthralgias and back pain.   Skin: Negative for rash.   Neurological: Negative for dizziness, weakness, light-headedness, numbness and headaches.   Hematological: Does not bruise/bleed easily.       Physical Exam     Initial Vitals [01/24/22 1428]   BP Pulse Resp Temp SpO2   (!) 208/91 97 18 99.2 °F (37.3 °C) 96 %      MAP       --         Physical Exam    Nursing note and vitals reviewed.  Constitutional: He appears well-developed and well-nourished. He is not diaphoretic. No distress.   HENT:   Head: Normocephalic and atraumatic.   Eyes: Conjunctivae and EOM are normal. Pupils are equal, round, and reactive to light. Right eye exhibits no discharge. Left eye exhibits no discharge. No scleral icterus.   Neck: Neck supple. No tracheal deviation present. No JVD present.   Normal range of motion.  Cardiovascular: Normal rate, regular rhythm and intact distal pulses.   Murmur heard.  Pulmonary/Chest: Breath sounds normal. No stridor. No respiratory distress. He has no wheezes. He has no rhonchi. He has no rales. He exhibits no tenderness.   Abdominal: Abdomen is soft. Bowel sounds are normal. He exhibits no distension and no mass. There is no  abdominal tenderness.   Musculoskeletal:         General: Edema present. No tenderness. Normal range of motion.      Cervical back: Normal range of motion and neck supple.      Right lower leg: No deformity, lacerations, tenderness or bony tenderness. 2+ Pitting Edema present.      Left lower leg: No deformity, lacerations, tenderness or bony tenderness. 2+ Pitting Edema present.     Neurological: He is alert and oriented to person, place, and time. He has normal strength. No sensory deficit.   Skin: Skin is warm and dry. No rash and no abscess noted. There is erythema (Bilateral lower extremities, see attached picture). No pallor.              ED Course   Procedures  Labs Reviewed   COMPREHENSIVE METABOLIC PANEL - Abnormal; Notable for the following components:       Result Value    Sodium 134 (*)     BUN 6 (*)     Albumin 3.4 (*)     All other components within normal limits   CBC W/ AUTO DIFFERENTIAL - Abnormal; Notable for the following components:    RBC 4.14 (*)     Hemoglobin 11.3 (*)     Hematocrit 35.7 (*)     MCHC 31.7 (*)     Mono # 1.1 (*)     Eos # 0.6 (*)     All other components within normal limits   B-TYPE NATRIURETIC PEPTIDE - Abnormal; Notable for the following components:     (*)     All other components within normal limits   SARS-COV-2 RDRP GENE    Narrative:     This test utilizes isothermal nucleic acid amplification   technology to detect the SARS-CoV-2 RdRp nucleic acid segment.   The analytical sensitivity (limit of detection) is 125 genome   equivalents/mL.   A POSITIVE result implies infection with the SARS-CoV-2 virus;   the patient is presumed to be contagious.     A NEGATIVE result means that SARS-CoV-2 nucleic acids are not   present above the limit of detection. A NEGATIVE result should be   treated as presumptive. It does not rule out the possibility of   COVID-19 and should not be the sole basis for treatment decisions.   If COVID-19 is strongly suspected based on clinical  and exposure   history, re-testing using an alternate molecular assay should be   considered.   This test is only for use under the Food and Drug   Administration s Emergency Use Authorization (EUA).   Commercial kits are provided by [employer].   Performance characteristics of the EUA have been independently   verified by [facility] Department of   Pathology and Laboratory Medicine.   _________________________________________________________________   The authorized Fact Sheet for Healthcare Providers and the authorized Fact   Sheet for Patients of the ID NOW COVID-19 are available on the FDA   website:     https://www.fda.gov/media/726714/download  https://www.fda.gov/media/272770/download                Imaging Results          X-Ray Chest PA And Lateral (Final result)  Result time 01/24/22 16:56:54    Final result by Jb Mix MD (01/24/22 16:56:54)                 Impression:      1. Interstitial findings are concerning for edema however viral process can present in this fashion and correlation is needed.  No large focal consolidation.      Electronically signed by: Jb Mix MD  Date:    01/24/2022  Time:    16:56             Narrative:    EXAMINATION:  XR CHEST PA AND LATERAL    CLINICAL HISTORY:  Localized edema    TECHNIQUE:  PA and lateral views of the chest were performed.    COMPARISON:  11/13/2021    FINDINGS:  The cardiomediastinal silhouette is not enlarged..  There is no pleural effusion.  The trachea is midline.  The lungs are symmetrically expanded bilaterally with coarse interstitial attenuation primarily in a perihilar distribution.  There is bilateral basilar subsegmental atelectasis..  No large focal consolidation seen.  There is no pneumothorax.  The osseous structures are remarkable for degenerative changes..                                 Medications   losartan tablet 100 mg (100 mg Oral Given 1/24/22 9271)   tamsulosin 24 hr capsule 0.4 mg (0.4 mg Oral  Given 1/24/22 1651)   furosemide injection 40 mg (40 mg Intravenous Given 1/24/22 1707)     Medical Decision Making:   Initial Assessment:   66 year old male with PMH of HTN, hep C cirrhosis (treated 2017), alcohol abuse who presents to the ED for worsening bilateral leg swelling over last couple weeks, associated with discomfort with applied pressure, and discomfort when wearing shoes.  Differential Diagnosis:   Differential diagnoses include, but not limited to:  Dependent leg edema  Chronic venous insufficiency  Cellulitis  CHF  Clinical Tests:   Lab Tests: Ordered and Reviewed  The following lab test(s) were unremarkable: CBC and CMP  Radiological Study: Ordered  Medical Tests: Ordered and Reviewed  ED Management:  -On physical exam, 2+ pitting edema bilaterally in the lower extremities with generalized discomfort with palpation.  Low concerns of cellulitis as patient does not have any fever, chills and symptoms are bilateral.    -DVT highly unlikely, as patient's symptoms are consistent with his chronic leg edema.  He denies acute pain other than discomfort with swelling  -High suspicion his symptoms are secondary to dependent leg edema, exacerbated by his sleep position in car.  He would benefit from addition of a diuretic to his medication regimen.  -No JVD, no rales noted on physical exam, will evaluate with BNP, chest x-ray, CMP to rule out heart failure  -No history of renal disease or nephrotic syndrome, renal function at baseline on CMP.  Renal etiology unlikely  -BP was elevated at admission to 208/91.  Home BP meds administered  -He was prescribed amlodipine on discharge in November 2021, but has not been compliant with his prescription so unlikely as an etiology behind his leg swelling  -Although no shortness of breath/cough reported, BNP elevation to 150, chest x-ray findings of edema raise concerns for CHF.  Patient would likely benefit from further evaluation via cardiac imaging and IV  diuresis.  -Patient initially agreed to admit to hospital medicine, but changed his mind on advice of his wife.  Counseled on importance of further evaluation for potential CHF exacerbation.  Does not want to stay in the hospital overnight.  Recommended patient at least follow-up with his PCP ASAP.  Patient verbalized understanding, and left AMA with furosemide 20 mg prescription.             ED Course as of 01/24/22 1905 Mon Jan 24, 2022   1642 Hemoglobin(!): 11.3  Baseline [SH]   1642 Hematocrit(!): 35.7  Baseline [SH]   1701 Sodium(!): 134  Baseline [SH]   1733 Albumin(!): 3.4  Baseline [SH]   1733 BNP(!): 150  BNP elevated, concerning for CHF exacerbation [SH]      ED Course User Index  [SH] Ray Jauregui DO             Clinical Impression:   Final diagnoses:  [I10] HTN (hypertension)  [R60.0] Leg edema (Primary)          ED Disposition Condition    AMA               Ray Jauregui DO  Resident  01/24/22 1905

## 2022-01-25 NOTE — DISCHARGE INSTRUCTIONS
You were seen and evaluated in the ED today for concerns of leg swelling.  Workup was concerning for possible cardiac cause behind your leg swelling.  We were unable to do further workup due to your preference for outpatient workup instead.  Please follow-up with your PCP as soon as possible for further evaluation and testing.

## 2022-01-26 ENCOUNTER — TELEPHONE (OUTPATIENT)
Dept: INTERNAL MEDICINE | Facility: CLINIC | Age: 67
End: 2022-01-26
Payer: MEDICARE

## 2022-01-26 NOTE — TELEPHONE ENCOUNTER
Left pt a voicemail instructing him to call back to see if he'll accept appt on 2/4 with pcp    Alexus POOLE

## 2022-01-26 NOTE — TELEPHONE ENCOUNTER
----- Message from Taylor Maria sent at 1/26/2022  3:14 PM CST -----  Contact: STEVE WRIGHT JR. [[id number]] @[phone]  Patient went to the ER and was told to spend the night there and have an Echo. He refused and left but want you to order an out patient Echo. Please call him.

## 2022-01-26 NOTE — TELEPHONE ENCOUNTER
Hi, please offer him a followup with me on 2/4, in person in the morning  Then I could decide if he needs an echocardiogram  Thank you, Vignesh Almonte

## 2022-01-27 ENCOUNTER — OFFICE VISIT (OUTPATIENT)
Dept: INTERNAL MEDICINE | Facility: CLINIC | Age: 67
End: 2022-01-27
Payer: MEDICARE

## 2022-01-27 VITALS
HEIGHT: 75 IN | OXYGEN SATURATION: 97 % | WEIGHT: 258.19 LBS | SYSTOLIC BLOOD PRESSURE: 134 MMHG | BODY MASS INDEX: 32.1 KG/M2 | DIASTOLIC BLOOD PRESSURE: 70 MMHG | HEART RATE: 96 BPM | TEMPERATURE: 98 F

## 2022-01-27 DIAGNOSIS — R35.1 NOCTURIA: ICD-10-CM

## 2022-01-27 DIAGNOSIS — E53.8 B12 DEFICIENCY: ICD-10-CM

## 2022-01-27 DIAGNOSIS — I10 ESSENTIAL HYPERTENSION: ICD-10-CM

## 2022-01-27 DIAGNOSIS — R35.0 FREQUENCY OF URINATION: ICD-10-CM

## 2022-01-27 DIAGNOSIS — R60.9 EDEMA, UNSPECIFIED TYPE: ICD-10-CM

## 2022-01-27 DIAGNOSIS — I50.9 CONGESTIVE HEART FAILURE, UNSPECIFIED HF CHRONICITY, UNSPECIFIED HEART FAILURE TYPE: Primary | ICD-10-CM

## 2022-01-27 PROCEDURE — 3075F PR MOST RECENT SYSTOLIC BLOOD PRESS GE 130-139MM HG: ICD-10-PCS | Mod: HCNC,CPTII,S$GLB, | Performed by: INTERNAL MEDICINE

## 2022-01-27 PROCEDURE — 1159F MED LIST DOCD IN RCRD: CPT | Mod: HCNC,CPTII,S$GLB, | Performed by: INTERNAL MEDICINE

## 2022-01-27 PROCEDURE — 3066F PR DOCUMENTATION OF TREATMENT FOR NEPHROPATHY: ICD-10-PCS | Mod: HCNC,CPTII,S$GLB, | Performed by: INTERNAL MEDICINE

## 2022-01-27 PROCEDURE — 3066F NEPHROPATHY DOC TX: CPT | Mod: HCNC,CPTII,S$GLB, | Performed by: INTERNAL MEDICINE

## 2022-01-27 PROCEDURE — 3288F PR FALLS RISK ASSESSMENT DOCUMENTED: ICD-10-PCS | Mod: HCNC,CPTII,S$GLB, | Performed by: INTERNAL MEDICINE

## 2022-01-27 PROCEDURE — 99499 UNLISTED E&M SERVICE: CPT | Mod: S$GLB,,, | Performed by: INTERNAL MEDICINE

## 2022-01-27 PROCEDURE — 3075F SYST BP GE 130 - 139MM HG: CPT | Mod: HCNC,CPTII,S$GLB, | Performed by: INTERNAL MEDICINE

## 2022-01-27 PROCEDURE — 99213 PR OFFICE/OUTPT VISIT, EST, LEVL III, 20-29 MIN: ICD-10-PCS | Mod: HCNC,S$GLB,, | Performed by: INTERNAL MEDICINE

## 2022-01-27 PROCEDURE — 4010F PR ACE/ARB THEARPY RXD/TAKEN: ICD-10-PCS | Mod: HCNC,CPTII,S$GLB, | Performed by: INTERNAL MEDICINE

## 2022-01-27 PROCEDURE — 81003 URINALYSIS AUTO W/O SCOPE: CPT | Mod: HCNC | Performed by: INTERNAL MEDICINE

## 2022-01-27 PROCEDURE — 99213 OFFICE O/P EST LOW 20 MIN: CPT | Mod: HCNC,S$GLB,, | Performed by: INTERNAL MEDICINE

## 2022-01-27 PROCEDURE — 3008F PR BODY MASS INDEX (BMI) DOCUMENTED: ICD-10-PCS | Mod: HCNC,CPTII,S$GLB, | Performed by: INTERNAL MEDICINE

## 2022-01-27 PROCEDURE — 4010F ACE/ARB THERAPY RXD/TAKEN: CPT | Mod: HCNC,CPTII,S$GLB, | Performed by: INTERNAL MEDICINE

## 2022-01-27 PROCEDURE — 3008F BODY MASS INDEX DOCD: CPT | Mod: HCNC,CPTII,S$GLB, | Performed by: INTERNAL MEDICINE

## 2022-01-27 PROCEDURE — 99999 PR PBB SHADOW E&M-EST. PATIENT-LVL IV: CPT | Mod: PBBFAC,HCNC,, | Performed by: INTERNAL MEDICINE

## 2022-01-27 PROCEDURE — 87186 SC STD MICRODIL/AGAR DIL: CPT | Mod: HCNC | Performed by: INTERNAL MEDICINE

## 2022-01-27 PROCEDURE — 3061F PR NEG MICROALBUMINURIA RESULT DOCUMENTED/REVIEW: ICD-10-PCS | Mod: HCNC,CPTII,S$GLB, | Performed by: INTERNAL MEDICINE

## 2022-01-27 PROCEDURE — 82043 UR ALBUMIN QUANTITATIVE: CPT | Mod: HCNC | Performed by: INTERNAL MEDICINE

## 2022-01-27 PROCEDURE — 1101F PT FALLS ASSESS-DOCD LE1/YR: CPT | Mod: HCNC,CPTII,S$GLB, | Performed by: INTERNAL MEDICINE

## 2022-01-27 PROCEDURE — 82570 ASSAY OF URINE CREATININE: CPT | Mod: HCNC | Performed by: INTERNAL MEDICINE

## 2022-01-27 PROCEDURE — 3078F DIAST BP <80 MM HG: CPT | Mod: HCNC,CPTII,S$GLB, | Performed by: INTERNAL MEDICINE

## 2022-01-27 PROCEDURE — 99499 RISK ADDL DX/OHS AUDIT: ICD-10-PCS | Mod: S$GLB,,, | Performed by: INTERNAL MEDICINE

## 2022-01-27 PROCEDURE — 87077 CULTURE AEROBIC IDENTIFY: CPT | Mod: HCNC | Performed by: INTERNAL MEDICINE

## 2022-01-27 PROCEDURE — 3078F PR MOST RECENT DIASTOLIC BLOOD PRESSURE < 80 MM HG: ICD-10-PCS | Mod: HCNC,CPTII,S$GLB, | Performed by: INTERNAL MEDICINE

## 2022-01-27 PROCEDURE — 1159F PR MEDICATION LIST DOCUMENTED IN MEDICAL RECORD: ICD-10-PCS | Mod: HCNC,CPTII,S$GLB, | Performed by: INTERNAL MEDICINE

## 2022-01-27 PROCEDURE — 87086 URINE CULTURE/COLONY COUNT: CPT | Mod: HCNC | Performed by: INTERNAL MEDICINE

## 2022-01-27 PROCEDURE — 99999 PR PBB SHADOW E&M-EST. PATIENT-LVL IV: ICD-10-PCS | Mod: PBBFAC,HCNC,, | Performed by: INTERNAL MEDICINE

## 2022-01-27 PROCEDURE — 1101F PR PT FALLS ASSESS DOC 0-1 FALLS W/OUT INJ PAST YR: ICD-10-PCS | Mod: HCNC,CPTII,S$GLB, | Performed by: INTERNAL MEDICINE

## 2022-01-27 PROCEDURE — 87088 URINE BACTERIA CULTURE: CPT | Mod: HCNC | Performed by: INTERNAL MEDICINE

## 2022-01-27 PROCEDURE — 3288F FALL RISK ASSESSMENT DOCD: CPT | Mod: HCNC,CPTII,S$GLB, | Performed by: INTERNAL MEDICINE

## 2022-01-27 PROCEDURE — 3061F NEG MICROALBUMINURIA REV: CPT | Mod: HCNC,CPTII,S$GLB, | Performed by: INTERNAL MEDICINE

## 2022-01-27 RX ORDER — AMLODIPINE BESYLATE 5 MG/1
5 TABLET ORAL DAILY
Qty: 90 TABLET | Refills: 3 | Status: SHIPPED | OUTPATIENT
Start: 2022-01-27 | End: 2022-12-09 | Stop reason: SDUPTHER

## 2022-01-27 RX ORDER — TAMSULOSIN HYDROCHLORIDE 0.4 MG/1
0.4 CAPSULE ORAL NIGHTLY
Qty: 90 CAPSULE | Refills: 3 | Status: SHIPPED | OUTPATIENT
Start: 2022-01-27 | End: 2022-08-11

## 2022-01-27 RX ORDER — GABAPENTIN 300 MG/1
300 CAPSULE ORAL NIGHTLY
Qty: 30 CAPSULE | Refills: 2 | Status: SHIPPED | OUTPATIENT
Start: 2022-01-27 | End: 2022-07-29 | Stop reason: SDUPTHER

## 2022-01-27 RX ORDER — LOSARTAN POTASSIUM 100 MG/1
100 TABLET ORAL DAILY
Qty: 90 TABLET | Refills: 3 | Status: SHIPPED | OUTPATIENT
Start: 2022-01-27 | End: 2022-12-09 | Stop reason: SDUPTHER

## 2022-01-27 RX ORDER — BUPROPION HYDROCHLORIDE 150 MG/1
150 TABLET, EXTENDED RELEASE ORAL DAILY
Qty: 30 TABLET | Refills: 3 | Status: SHIPPED | OUTPATIENT
Start: 2022-01-27 | End: 2022-12-09 | Stop reason: SDUPTHER

## 2022-01-27 NOTE — PROGRESS NOTES
Subjective:       Patient ID: Froylan Borja Jr. is a 66 y.o. male.    Chief Complaint: Follow-up    [facility] saw this gentleman on Monday and referred him to ED for edema into his thighs.  He is doing better on lasix.  Legs are improved and he is in an apartment and has been sleeping in a bed with his legs up (previously sleeping in his car).  No CP or SOB. No fever or chills.   Review of Systems   Respiratory: Negative for shortness of breath.    Cardiovascular: Positive for leg swelling. Negative for chest pain.   Gastrointestinal: Negative for abdominal pain, diarrhea, nausea and vomiting.   Genitourinary: Positive for frequency. Negative for dysuria.   Neurological: Negative for seizures, syncope and headaches.       Objective:      Physical Exam  Constitutional:       General: He is not in acute distress.     Appearance: He is well-developed and well-nourished.   HENT:      Mouth/Throat:      Mouth: Oropharynx is clear and moist.   Eyes:      Extraocular Movements: EOM normal.   Neck:      Thyroid: No thyromegaly.      Vascular: No JVD.   Cardiovascular:      Rate and Rhythm: Normal rate and regular rhythm.      Pulses: Intact distal pulses.      Heart sounds: Normal heart sounds. No murmur heard.  No friction rub. No gallop.    Pulmonary:      Effort: Pulmonary effort is normal.      Breath sounds: Normal breath sounds. No wheezing or rales.   Abdominal:      General: Bowel sounds are normal. There is no distension.      Palpations: Abdomen is soft. There is no mass.      Tenderness: There is no abdominal tenderness. There is no guarding or rebound.   Musculoskeletal:         General: Swelling (2-3 plus but better than 4 days ago - has chronic rubor from chronic edema) present. No edema.      Cervical back: Neck supple.   Lymphadenopathy:      Cervical: No cervical adenopathy.   Skin:     General: Skin is warm and dry.   Neurological:      Mental Status: He is alert and oriented to person, place, and time.    Psychiatric:         Mood and Affect: Mood and affect normal.         Behavior: Behavior normal.         Thought Content: Thought content normal.         Judgment: Judgment normal.         Assessment:       1. Congestive heart failure, unspecified HF chronicity, unspecified heart failure type    2. Nocturia    3. Frequency of urination    4. Essential hypertension    5. Edema, unspecified type     6. B12 deficiency        Plan:   Congestive heart failure, unspecified HF chronicity, unspecified heart failure type  -     Echo Saline Bubble? No  -     US Lower Extremity Veins Bilateral; Future; Expected date: 01/27/2022  -     BNP; Future; Expected date: 01/27/2022    Nocturia  -     tamsulosin (FLOMAX) 0.4 mg Cap; Take 1 capsule (0.4 mg total) by mouth every evening.  Dispense: 90 capsule; Refill: 3    Frequency of urination  -     tamsulosin (FLOMAX) 0.4 mg Cap; Take 1 capsule (0.4 mg total) by mouth every evening.  Dispense: 90 capsule; Refill: 3  -     Urine culture    Essential hypertension  -     losartan (COZAAR) 100 MG tablet; Take 1 tablet (100 mg total) by mouth once daily.  Dispense: 90 tablet; Refill: 3  -     Urinalysis  -     Microalbumin/Creatinine Ratio, Urine  -     CBC Auto Differential; Future; Expected date: 01/27/2022  -     Comprehensive Metabolic Panel; Future; Expected date: 01/27/2022  -     TSH; Future; Expected date: 01/27/2022    Edema, unspecified type   -     US Lower Extremity Veins Bilateral; Future; Expected date: 01/27/2022    B12 deficiency  -     Vitamin B12; Future; Expected date: 02/27/2022  -     Methylmalonic Acid, Serum; Future; Expected date: 01/27/2022    Other orders  -     buPROPion (WELLBUTRIN SR) 150 MG TBSR 12 hr tablet; Take 1 tablet (150 mg total) by mouth once daily.  Dispense: 30 tablet; Refill: 3  -     amLODIPine (NORVASC) 5 MG tablet; Take 1 tablet (5 mg total) by mouth once daily.  Dispense: 90 tablet; Refill: 3  -     gabapentin (NEURONTIN) 300 MG capsule; Take  1 capsule (300 mg total) by mouth every evening.  Dispense: 30 capsule; Refill: 2      HTN - decrease amlodipien to 5mg which may help edema    Add bupropion due to hx of severe  hyponatremia with SSRIs    Gabapentin for neuropathy

## 2022-01-28 LAB
ALBUMIN/CREAT UR: ABNORMAL UG/MG (ref 0–30)
BILIRUB UR QL STRIP: NEGATIVE
CLARITY UR REFRACT.AUTO: CLEAR
COLOR UR AUTO: NORMAL
CREAT UR-MCNC: 21 MG/DL (ref 23–375)
GLUCOSE UR QL STRIP: NEGATIVE
HGB UR QL STRIP: NEGATIVE
KETONES UR QL STRIP: NEGATIVE
LEUKOCYTE ESTERASE UR QL STRIP: NEGATIVE
MICROALBUMIN UR DL<=1MG/L-MCNC: <5 UG/ML
NITRITE UR QL STRIP: NEGATIVE
PH UR STRIP: 6 [PH] (ref 5–8)
PROT UR QL STRIP: NEGATIVE
SP GR UR STRIP: 1 (ref 1–1.03)
URN SPEC COLLECT METH UR: NORMAL

## 2022-01-31 LAB — BACTERIA UR CULT: ABNORMAL

## 2022-02-01 ENCOUNTER — TELEPHONE (OUTPATIENT)
Dept: INTERNAL MEDICINE | Facility: CLINIC | Age: 67
End: 2022-02-01
Payer: MEDICARE

## 2022-02-01 RX ORDER — AMOXICILLIN AND CLAVULANATE POTASSIUM 500; 125 MG/1; MG/1
1 TABLET, FILM COATED ORAL 2 TIMES DAILY
Qty: 14 TABLET | Refills: 0 | Status: SHIPPED | OUTPATIENT
Start: 2022-02-01 | End: 2022-06-30

## 2022-02-03 ENCOUNTER — TELEPHONE (OUTPATIENT)
Dept: INTERNAL MEDICINE | Facility: CLINIC | Age: 67
End: 2022-02-03
Payer: MEDICARE

## 2022-02-03 NOTE — TELEPHONE ENCOUNTER
----- Message from Janki Tamez MD sent at 2/1/2022 11:14 AM CST -----  Please inform pt of UTI and that antibiotics were sent in.

## 2022-02-04 ENCOUNTER — HOSPITAL ENCOUNTER (OUTPATIENT)
Dept: RADIOLOGY | Facility: OTHER | Age: 67
Discharge: HOME | End: 2022-02-04
Attending: INTERNAL MEDICINE
Payer: MEDICARE

## 2022-02-04 ENCOUNTER — HOSPITAL ENCOUNTER (OUTPATIENT)
Dept: CARDIOLOGY | Facility: OTHER | Age: 67
Discharge: HOME | End: 2022-02-04
Attending: INTERNAL MEDICINE
Payer: MEDICARE

## 2022-02-04 VITALS
HEIGHT: 75 IN | DIASTOLIC BLOOD PRESSURE: 70 MMHG | WEIGHT: 258 LBS | BODY MASS INDEX: 32.08 KG/M2 | HEART RATE: 96 BPM | SYSTOLIC BLOOD PRESSURE: 134 MMHG

## 2022-02-04 DIAGNOSIS — R60.9 EDEMA, UNSPECIFIED TYPE: ICD-10-CM

## 2022-02-04 DIAGNOSIS — I50.9 CONGESTIVE HEART FAILURE, UNSPECIFIED HF CHRONICITY, UNSPECIFIED HEART FAILURE TYPE: ICD-10-CM

## 2022-02-04 LAB
ASCENDING AORTA: 3.01 CM
AV INDEX (PROSTH): 0.98
AV MEAN GRADIENT: 4 MMHG
AV PEAK GRADIENT: 8 MMHG
AV VALVE AREA: 3.71 CM2
AV VELOCITY RATIO: 0.9
BSA FOR ECHO PROCEDURE: 2.49 M2
CV ECHO LV RWT: 0.42 CM
DOP CALC AO PEAK VEL: 1.39 M/S
DOP CALC AO VTI: 24.06 CM
DOP CALC LVOT AREA: 3.8 CM2
DOP CALC LVOT DIAMETER: 2.2 CM
DOP CALC LVOT PEAK VEL: 1.25 M/S
DOP CALC LVOT STROKE VOLUME: 89.17 CM3
DOP CALCLVOT PEAK VEL VTI: 23.47 CM
E WAVE DECELERATION TIME: 182.74 MSEC
E/A RATIO: 1.1
E/E' RATIO: 10 M/S
ECHO LV POSTERIOR WALL: 1.15 CM (ref 0.6–1.1)
EJECTION FRACTION: 55 %
FRACTIONAL SHORTENING: 21 % (ref 28–44)
INTERVENTRICULAR SEPTUM: 1.18 CM (ref 0.6–1.1)
IVRT: 79.58 MSEC
LA MAJOR: 4.85 CM
LA MINOR: 5.51 CM
LA WIDTH: 3.74 CM
LEFT ATRIUM SIZE: 3.83 CM
LEFT ATRIUM VOLUME INDEX MOD: 29 ML/M2
LEFT ATRIUM VOLUME INDEX: 25.6 ML/M2
LEFT ATRIUM VOLUME MOD: 71 CM3
LEFT ATRIUM VOLUME: 62.81 CM3
LEFT INTERNAL DIMENSION IN SYSTOLE: 4.32 CM (ref 2.1–4)
LEFT VENTRICLE DIASTOLIC VOLUME INDEX: 59.12 ML/M2
LEFT VENTRICLE DIASTOLIC VOLUME: 144.84 ML
LEFT VENTRICLE MASS INDEX: 106 G/M2
LEFT VENTRICLE SYSTOLIC VOLUME INDEX: 34.2 ML/M2
LEFT VENTRICLE SYSTOLIC VOLUME: 83.91 ML
LEFT VENTRICULAR INTERNAL DIMENSION IN DIASTOLE: 5.46 CM (ref 3.5–6)
LEFT VENTRICULAR MASS: 258.48 G
LV LATERAL E/E' RATIO: 10 M/S
LV SEPTAL E/E' RATIO: 10 M/S
MV A" WAVE DURATION": 8.3 MSEC
MV PEAK A VEL: 0.82 M/S
MV PEAK E VEL: 0.9 M/S
MV STENOSIS PRESSURE HALF TIME: 53 MS
MV VALVE AREA P 1/2 METHOD: 4.15 CM2
PISA MRMAX VEL: 0.03 M/S
PISA TR MAX VEL: 1.81 M/S
PULM VEIN S/D RATIO: 1.2
PV PEAK D VEL: 0.54 M/S
PV PEAK S VEL: 0.65 M/S
PV PEAK VELOCITY: 0.86 CM/S
RA MAJOR: 4.61 CM
RA PRESSURE: 3 MMHG
RA WIDTH: 3.5 CM
RIGHT VENTRICULAR END-DIASTOLIC DIMENSION: 3.04 CM
RV TISSUE DOPPLER FREE WALL SYSTOLIC VELOCITY 1 (APICAL 4 CHAMBER VIEW): 11.46 CM/S
SINUS: 3.41 CM
STJ: 3.23 CM
TDI LATERAL: 0.09 M/S
TDI SEPTAL: 0.09 M/S
TDI: 0.09 M/S
TR MAX PG: 13 MMHG
TRICUSPID ANNULAR PLANE SYSTOLIC EXCURSION: 2.14 CM
TV REST PULMONARY ARTERY PRESSURE: 16 MMHG

## 2022-02-04 PROCEDURE — 93306 ECHO (CUPID ONLY): ICD-10-PCS | Mod: 26,HCNC,, | Performed by: INTERNAL MEDICINE

## 2022-02-04 PROCEDURE — 93970 EXTREMITY STUDY: CPT | Mod: 26,HCNC,, | Performed by: STUDENT IN AN ORGANIZED HEALTH CARE EDUCATION/TRAINING PROGRAM

## 2022-02-04 PROCEDURE — 93970 EXTREMITY STUDY: CPT | Mod: TC,HCNC

## 2022-02-04 PROCEDURE — 93306 TTE W/DOPPLER COMPLETE: CPT | Mod: HCNC

## 2022-02-04 PROCEDURE — 93306 TTE W/DOPPLER COMPLETE: CPT | Mod: 26,HCNC,, | Performed by: INTERNAL MEDICINE

## 2022-02-04 PROCEDURE — 93970 US LOWER EXTREMITY VEINS BILATERAL: ICD-10-PCS | Mod: 26,HCNC,, | Performed by: STUDENT IN AN ORGANIZED HEALTH CARE EDUCATION/TRAINING PROGRAM

## 2022-02-08 ENCOUNTER — TELEPHONE (OUTPATIENT)
Dept: INTERNAL MEDICINE | Facility: CLINIC | Age: 67
End: 2022-02-08
Payer: MEDICARE

## 2022-02-10 ENCOUNTER — LAB VISIT (OUTPATIENT)
Dept: LAB | Facility: HOSPITAL | Age: 67
End: 2022-02-10
Attending: INTERNAL MEDICINE
Payer: MEDICARE

## 2022-02-10 DIAGNOSIS — I10 ESSENTIAL HYPERTENSION: ICD-10-CM

## 2022-02-10 DIAGNOSIS — I50.9 CONGESTIVE HEART FAILURE, UNSPECIFIED HF CHRONICITY, UNSPECIFIED HEART FAILURE TYPE: ICD-10-CM

## 2022-02-10 DIAGNOSIS — E53.8 B12 DEFICIENCY: ICD-10-CM

## 2022-02-10 LAB
ALBUMIN SERPL BCP-MCNC: 3.5 G/DL (ref 3.5–5.2)
ALP SERPL-CCNC: 84 U/L (ref 55–135)
ALT SERPL W/O P-5'-P-CCNC: 15 U/L (ref 10–44)
ANION GAP SERPL CALC-SCNC: 10 MMOL/L (ref 8–16)
AST SERPL-CCNC: 24 U/L (ref 10–40)
BASOPHILS # BLD AUTO: 0.12 K/UL (ref 0–0.2)
BASOPHILS NFR BLD: 1.6 % (ref 0–1.9)
BILIRUB SERPL-MCNC: 0.5 MG/DL (ref 0.1–1)
BNP SERPL-MCNC: 64 PG/ML (ref 0–99)
BUN SERPL-MCNC: 10 MG/DL (ref 8–23)
CALCIUM SERPL-MCNC: 10 MG/DL (ref 8.7–10.5)
CHLORIDE SERPL-SCNC: 103 MMOL/L (ref 95–110)
CO2 SERPL-SCNC: 27 MMOL/L (ref 23–29)
CREAT SERPL-MCNC: 0.8 MG/DL (ref 0.5–1.4)
DIFFERENTIAL METHOD: ABNORMAL
EOSINOPHIL # BLD AUTO: 0.8 K/UL (ref 0–0.5)
EOSINOPHIL NFR BLD: 11.3 % (ref 0–8)
ERYTHROCYTE [DISTWIDTH] IN BLOOD BY AUTOMATED COUNT: 14.1 % (ref 11.5–14.5)
EST. GFR  (AFRICAN AMERICAN): >60 ML/MIN/1.73 M^2
EST. GFR  (NON AFRICAN AMERICAN): >60 ML/MIN/1.73 M^2
GLUCOSE SERPL-MCNC: 126 MG/DL (ref 70–110)
HCT VFR BLD AUTO: 42.9 % (ref 40–54)
HGB BLD-MCNC: 12.9 G/DL (ref 14–18)
IMM GRANULOCYTES # BLD AUTO: 0.04 K/UL (ref 0–0.04)
IMM GRANULOCYTES NFR BLD AUTO: 0.5 % (ref 0–0.5)
LYMPHOCYTES # BLD AUTO: 1.8 K/UL (ref 1–4.8)
LYMPHOCYTES NFR BLD: 25 % (ref 18–48)
MCH RBC QN AUTO: 25.9 PG (ref 27–31)
MCHC RBC AUTO-ENTMCNC: 30.1 G/DL (ref 32–36)
MCV RBC AUTO: 86 FL (ref 82–98)
MONOCYTES # BLD AUTO: 0.7 K/UL (ref 0.3–1)
MONOCYTES NFR BLD: 9.6 % (ref 4–15)
NEUTROPHILS # BLD AUTO: 3.8 K/UL (ref 1.8–7.7)
NEUTROPHILS NFR BLD: 52 % (ref 38–73)
NRBC BLD-RTO: 0 /100 WBC
PLATELET # BLD AUTO: 347 K/UL (ref 150–450)
PMV BLD AUTO: 9.7 FL (ref 9.2–12.9)
POTASSIUM SERPL-SCNC: 4.9 MMOL/L (ref 3.5–5.1)
PROT SERPL-MCNC: 7.5 G/DL (ref 6–8.4)
RBC # BLD AUTO: 4.99 M/UL (ref 4.6–6.2)
SODIUM SERPL-SCNC: 140 MMOL/L (ref 136–145)
TSH SERPL DL<=0.005 MIU/L-ACNC: 3.57 UIU/ML (ref 0.4–4)
VIT B12 SERPL-MCNC: 423 PG/ML (ref 210–950)
WBC # BLD AUTO: 7.32 K/UL (ref 3.9–12.7)

## 2022-02-10 PROCEDURE — 80053 COMPREHEN METABOLIC PANEL: CPT | Mod: HCNC | Performed by: INTERNAL MEDICINE

## 2022-02-10 PROCEDURE — 84443 ASSAY THYROID STIM HORMONE: CPT | Mod: HCNC | Performed by: INTERNAL MEDICINE

## 2022-02-10 PROCEDURE — 82607 VITAMIN B-12: CPT | Mod: HCNC | Performed by: INTERNAL MEDICINE

## 2022-02-10 PROCEDURE — 83921 ORGANIC ACID SINGLE QUANT: CPT | Mod: HCNC | Performed by: INTERNAL MEDICINE

## 2022-02-10 PROCEDURE — 83880 ASSAY OF NATRIURETIC PEPTIDE: CPT | Mod: HCNC | Performed by: INTERNAL MEDICINE

## 2022-02-10 PROCEDURE — 85025 COMPLETE CBC W/AUTO DIFF WBC: CPT | Mod: HCNC | Performed by: INTERNAL MEDICINE

## 2022-02-15 LAB — METHYLMALONATE SERPL-SCNC: 0.77 UMOL/L

## 2022-03-04 ENCOUNTER — OFFICE VISIT (OUTPATIENT)
Dept: ORTHOPEDICS | Facility: CLINIC | Age: 67
End: 2022-03-04
Payer: MEDICARE

## 2022-03-04 ENCOUNTER — HOSPITAL ENCOUNTER (OUTPATIENT)
Dept: RADIOLOGY | Facility: HOSPITAL | Age: 67
Discharge: HOME | End: 2022-03-04
Attending: ORTHOPAEDIC SURGERY
Payer: MEDICARE

## 2022-03-04 VITALS — HEIGHT: 75 IN | BODY MASS INDEX: 32.54 KG/M2 | WEIGHT: 261.69 LBS

## 2022-03-04 DIAGNOSIS — M25.561 PAIN IN BOTH KNEES, UNSPECIFIED CHRONICITY: ICD-10-CM

## 2022-03-04 DIAGNOSIS — M25.562 PAIN IN BOTH KNEES, UNSPECIFIED CHRONICITY: Primary | ICD-10-CM

## 2022-03-04 DIAGNOSIS — M17.0 PRIMARY OSTEOARTHRITIS OF BOTH KNEES: Primary | ICD-10-CM

## 2022-03-04 DIAGNOSIS — M25.562 PAIN IN BOTH KNEES, UNSPECIFIED CHRONICITY: ICD-10-CM

## 2022-03-04 DIAGNOSIS — M25.561 PAIN IN BOTH KNEES, UNSPECIFIED CHRONICITY: Primary | ICD-10-CM

## 2022-03-04 PROCEDURE — 1160F RVW MEDS BY RX/DR IN RCRD: CPT | Mod: CPTII,S$GLB,, | Performed by: ORTHOPAEDIC SURGERY

## 2022-03-04 PROCEDURE — 99203 OFFICE O/P NEW LOW 30 MIN: CPT | Mod: 25,S$GLB,, | Performed by: ORTHOPAEDIC SURGERY

## 2022-03-04 PROCEDURE — 99999 PR PBB SHADOW E&M-EST. PATIENT-LVL III: CPT | Mod: PBBFAC,,, | Performed by: ORTHOPAEDIC SURGERY

## 2022-03-04 PROCEDURE — 3066F PR DOCUMENTATION OF TREATMENT FOR NEPHROPATHY: ICD-10-PCS | Mod: CPTII,S$GLB,, | Performed by: ORTHOPAEDIC SURGERY

## 2022-03-04 PROCEDURE — 73564 X-RAY EXAM KNEE 4 OR MORE: CPT | Mod: TC,50

## 2022-03-04 PROCEDURE — 3288F FALL RISK ASSESSMENT DOCD: CPT | Mod: CPTII,S$GLB,, | Performed by: ORTHOPAEDIC SURGERY

## 2022-03-04 PROCEDURE — 1160F PR REVIEW ALL MEDS BY PRESCRIBER/CLIN PHARMACIST DOCUMENTED: ICD-10-PCS | Mod: CPTII,S$GLB,, | Performed by: ORTHOPAEDIC SURGERY

## 2022-03-04 PROCEDURE — 20610 DRAIN/INJ JOINT/BURSA W/O US: CPT | Mod: 50,S$GLB,, | Performed by: ORTHOPAEDIC SURGERY

## 2022-03-04 PROCEDURE — 1100F PTFALLS ASSESS-DOCD GE2>/YR: CPT | Mod: CPTII,S$GLB,, | Performed by: ORTHOPAEDIC SURGERY

## 2022-03-04 PROCEDURE — 1159F MED LIST DOCD IN RCRD: CPT | Mod: CPTII,S$GLB,, | Performed by: ORTHOPAEDIC SURGERY

## 2022-03-04 PROCEDURE — 73564 XR KNEE ORTHO BILAT WITH FLEXION: ICD-10-PCS | Mod: 26,50,, | Performed by: RADIOLOGY

## 2022-03-04 PROCEDURE — 1125F AMNT PAIN NOTED PAIN PRSNT: CPT | Mod: CPTII,S$GLB,, | Performed by: ORTHOPAEDIC SURGERY

## 2022-03-04 PROCEDURE — 3008F PR BODY MASS INDEX (BMI) DOCUMENTED: ICD-10-PCS | Mod: CPTII,S$GLB,, | Performed by: ORTHOPAEDIC SURGERY

## 2022-03-04 PROCEDURE — 99203 PR OFFICE/OUTPT VISIT, NEW, LEVL III, 30-44 MIN: ICD-10-PCS | Mod: 25,S$GLB,, | Performed by: ORTHOPAEDIC SURGERY

## 2022-03-04 PROCEDURE — 4010F PR ACE/ARB THEARPY RXD/TAKEN: ICD-10-PCS | Mod: CPTII,S$GLB,, | Performed by: ORTHOPAEDIC SURGERY

## 2022-03-04 PROCEDURE — 3008F BODY MASS INDEX DOCD: CPT | Mod: CPTII,S$GLB,, | Performed by: ORTHOPAEDIC SURGERY

## 2022-03-04 PROCEDURE — 3061F NEG MICROALBUMINURIA REV: CPT | Mod: CPTII,S$GLB,, | Performed by: ORTHOPAEDIC SURGERY

## 2022-03-04 PROCEDURE — 1125F PR PAIN SEVERITY QUANTIFIED, PAIN PRESENT: ICD-10-PCS | Mod: CPTII,S$GLB,, | Performed by: ORTHOPAEDIC SURGERY

## 2022-03-04 PROCEDURE — 99999 PR PBB SHADOW E&M-EST. PATIENT-LVL III: ICD-10-PCS | Mod: PBBFAC,,, | Performed by: ORTHOPAEDIC SURGERY

## 2022-03-04 PROCEDURE — 1100F PR PT FALLS ASSESS DOC 2+ FALLS/FALL W/INJURY/YR: ICD-10-PCS | Mod: CPTII,S$GLB,, | Performed by: ORTHOPAEDIC SURGERY

## 2022-03-04 PROCEDURE — 3066F NEPHROPATHY DOC TX: CPT | Mod: CPTII,S$GLB,, | Performed by: ORTHOPAEDIC SURGERY

## 2022-03-04 PROCEDURE — 73564 X-RAY EXAM KNEE 4 OR MORE: CPT | Mod: 26,50,, | Performed by: RADIOLOGY

## 2022-03-04 PROCEDURE — 1159F PR MEDICATION LIST DOCUMENTED IN MEDICAL RECORD: ICD-10-PCS | Mod: CPTII,S$GLB,, | Performed by: ORTHOPAEDIC SURGERY

## 2022-03-04 PROCEDURE — 3061F PR NEG MICROALBUMINURIA RESULT DOCUMENTED/REVIEW: ICD-10-PCS | Mod: CPTII,S$GLB,, | Performed by: ORTHOPAEDIC SURGERY

## 2022-03-04 PROCEDURE — 4010F ACE/ARB THERAPY RXD/TAKEN: CPT | Mod: CPTII,S$GLB,, | Performed by: ORTHOPAEDIC SURGERY

## 2022-03-04 PROCEDURE — 20610 LARGE JOINT ASPIRATION/INJECTION: BILATERAL KNEE: ICD-10-PCS | Mod: 50,S$GLB,, | Performed by: ORTHOPAEDIC SURGERY

## 2022-03-04 PROCEDURE — 3288F PR FALLS RISK ASSESSMENT DOCUMENTED: ICD-10-PCS | Mod: CPTII,S$GLB,, | Performed by: ORTHOPAEDIC SURGERY

## 2022-03-04 RX ADMIN — TRIAMCINOLONE ACETONIDE 40 MG: 40 INJECTION, SUSPENSION INTRA-ARTICULAR; INTRAMUSCULAR at 03:03

## 2022-03-08 RX ORDER — TRIAMCINOLONE ACETONIDE 40 MG/ML
40 INJECTION, SUSPENSION INTRA-ARTICULAR; INTRAMUSCULAR
Status: DISCONTINUED | OUTPATIENT
Start: 2022-03-04 | End: 2022-03-08 | Stop reason: HOSPADM

## 2022-03-08 NOTE — PROCEDURES
Large Joint Aspiration/Injection: bilateral knee    Date/Time: 3/4/2022 3:00 PM  Performed by: Dimitry Patel MD  Authorized by: Dimitry Patel MD     Consent Done?:  Yes (Verbal)  Indications:  Pain and arthritis  Site marked: the procedure site was marked    Timeout: prior to procedure the correct patient, procedure, and site was verified    Prep: patient was prepped and draped in usual sterile fashion      Local anesthesia used?: Yes    Local anesthetic:  Bupivacaine 0.25% without epinephrine  Anesthetic total (ml):  5      Details:  Needle Size:  25 G  Ultrasonic Guidance for needle placement?: No    Approach:  Anterolateral  Location:  Knee  Laterality:  Bilateral  Site:  Bilateral knee  Medications (Right):  40 mg triamcinolone acetonide 40 mg/mL  Medications (Left):  40 mg triamcinolone acetonide 40 mg/mL  Patient tolerance:  Patient tolerated the procedure well with no immediate complications

## 2022-03-08 NOTE — PROGRESS NOTES
Subjective:       Patient ID: Froylan Borja is a 66 y.o. male.    Chief Complaint:   Pain of the Right Knee and Pain of the Left Knee  He comes in for bilateral knee pain for a couple of years.  He has some giving way with falls.  He has trouble standing up from a low seat, and has to use his arms.  He has a positive movie theater sign.  He had corticosteroid injections in his knees years ago which did seem to help him.  He has a history of alcohol abuse and cirrhosis, but says he has stopped drinking.  He has a history of successfully treated hepatitis-C.  He does not feel that any specific fall, accident, injury is responsible for his current knee pain.  No groin pain, distal numbness or tingling.    Past Medical History:   Diagnosis Date    Alcohol abuse     Anxiety     Cirrhosis     Glaucoma     History of hepatitis C, s/p successful Rx w/ SVR24 - 1/2017     genotype 1;  relapse following PegIFN+RBV+Victrelis; prior relapse following PegIFN+RBV S/p 12 weeks harvoni + RBV w/ SVR    Hypertension     Paresthesia     feet, bilaterally     Past Surgical History:   Procedure Laterality Date    LIVER BIOPSY       Family History   Problem Relation Age of Onset    Diabetes Mellitus Father     Hypertension Father     Cancer Father         ? CRC    Diabetes Father     Cancer Mother         colon vs polyps, colectomy    Colon cancer Mother     Cancer Sister         ? CRC    Colonic polyp Brother     Cirrhosis Neg Hx      Social History     Socioeconomic History    Marital status:    Tobacco Use    Smoking status: Current Some Day Smoker     Types: Cigars    Smokeless tobacco: Never Used    Tobacco comment: smokes cigars 20 per month   Substance and Sexual Activity    Alcohol use: Yes     Comment: heavy alcohol use in the past, now drinking daily, 2-3 beers daily    Drug use: No   Social History Narrative     (mental health issues), retired  at [employer]. No kids. 2 dogs. No exercise.        Current Outpatient Medications   Medication Sig Dispense Refill    amLODIPine (NORVASC) 5 MG tablet Take 1 tablet (5 mg total) by mouth once daily. 90 tablet 3    ammonium lactate (LAC-HYDRIN) 12 % lotion Apply topically 2 (two) times daily as needed for Dry Skin. Apply to BLE and feet 400 g 0    amoxicillin-clavulanate 500-125mg (AUGMENTIN) 500-125 mg Tab Take 1 tablet (500 mg total) by mouth 2 (two) times daily. 14 tablet 0    cyanocobalamin (VITAMIN B-12) 1000 MCG tablet Take 1,000 mcg by mouth once daily.      gabapentin (NEURONTIN) 300 MG capsule Take 1 capsule (300 mg total) by mouth every evening. 30 capsule 2    latanoprost (XALATAN) 0.005 % ophthalmic solution 1 drop every evening.      levocetirizine (XYZAL) 5 MG tablet TAKE 1 TABLET(5 MG) BY MOUTH EVERY EVENING 90 tablet 0    losartan (COZAAR) 100 MG tablet Take 1 tablet (100 mg total) by mouth once daily. 90 tablet 3    melatonin (MELATIN) 3 mg tablet Take 2 tablets (6 mg total) by mouth nightly.  0    miconazole NITRATE 2 % (MICOTIN) 2 % top powder Apply topically 2 (two) times daily. Apply to groin  0    multivitamin (THERAGRAN) per tablet Take 1 tablet by mouth once daily.      tadalafiL (CIALIS) 20 MG Tab Take 1 tablet (20 mg total) by mouth once daily. 6 tablet 19    tamsulosin (FLOMAX) 0.4 mg Cap Take 1 capsule (0.4 mg total) by mouth every evening. 90 capsule 3    timolol maleate 0.5% (TIMOPTIC) 0.5 % Drop Place 1 drop into both eyes once daily.       buPROPion (WELLBUTRIN SR) 150 MG TBSR 12 hr tablet Take 1 tablet (150 mg total) by mouth once daily. 30 tablet 3    furosemide (LASIX) 20 MG tablet Take 1 tablet (20 mg total) by mouth once daily. 30 tablet 0     No current facility-administered medications for this visit.     Review of patient's allergies indicates:   Allergen Reactions    Zoloft [sertraline] Other (See Comments)     hyponatremia         Objective:      Vitals:    03/04/22 1448   Weight: 118.7 kg (261 lb 11  "oz)   Height: 6' 3" (1.905 m)     Physical Exam  Bilateral knees There is no significant coronal plane deformity, and a symmetric wobbling gait.  Active range of motion is 0-115 degrees.  Anterior crepitus with active extension.  Patellar mobility is limited.  Boggy synovitis without effusion.  Medial and lateral joint line tenderness predominates.  No instability to varus/valgus/Lachman's stressing.  No pain in the groin with flexion and internal rotation of the hip which is not limited.  Skin intact.  Distal neurovascular intact.  Flip test negative.    Imaging Review:   Weightbearing x-rays show advanced tricompartmental arthrosis bilaterally, but not bone-on-bone.  There are marginal osteophytes.  No focal bone defects.  Assessment:   Advanced DJD, knees  Plan:       We did inject his knees with cortisone again today since this was helpful for him before.    The patient's pathophysiology was explained in detail with reference to x-rays, models, other visual aids as appropriate.  Treatment options were discussed in detail.  Questions were invited and answered to the patient's satisfaction.    "

## 2022-05-23 ENCOUNTER — TELEPHONE (OUTPATIENT)
Dept: SPORTS MEDICINE | Facility: CLINIC | Age: 67
End: 2022-05-23
Payer: MEDICARE

## 2022-05-23 NOTE — TELEPHONE ENCOUNTER
Called Pt and no answer and voicemail is not set. I resheduled pt for Friday, Sandra 3, 2022 @ 11:30am and mailed appointment letter out

## 2022-05-25 ENCOUNTER — TELEPHONE (OUTPATIENT)
Dept: ORTHOPEDICS | Facility: CLINIC | Age: 67
End: 2022-05-25
Payer: MEDICARE

## 2022-05-25 NOTE — TELEPHONE ENCOUNTER
Tried contacting patient regarding scheduling an appointment with Debbie Monzon in orthopedics , no answer , could not leave a message ,voice mail not set up

## 2022-06-03 ENCOUNTER — OFFICE VISIT (OUTPATIENT)
Dept: ORTHOPEDICS | Facility: CLINIC | Age: 67
End: 2022-06-03
Payer: MEDICARE

## 2022-06-03 VITALS — BODY MASS INDEX: 31.99 KG/M2 | WEIGHT: 257.25 LBS | HEIGHT: 75 IN

## 2022-06-03 DIAGNOSIS — M17.11 PRIMARY OSTEOARTHRITIS OF RIGHT KNEE: ICD-10-CM

## 2022-06-03 DIAGNOSIS — M17.12 PRIMARY OSTEOARTHRITIS OF LEFT KNEE: Primary | ICD-10-CM

## 2022-06-03 PROCEDURE — 1159F MED LIST DOCD IN RCRD: CPT | Mod: CPTII,S$GLB,, | Performed by: ORTHOPAEDIC SURGERY

## 2022-06-03 PROCEDURE — 3288F PR FALLS RISK ASSESSMENT DOCUMENTED: ICD-10-PCS | Mod: CPTII,S$GLB,, | Performed by: ORTHOPAEDIC SURGERY

## 2022-06-03 PROCEDURE — 3061F PR NEG MICROALBUMINURIA RESULT DOCUMENTED/REVIEW: ICD-10-PCS | Mod: CPTII,S$GLB,, | Performed by: ORTHOPAEDIC SURGERY

## 2022-06-03 PROCEDURE — 3061F NEG MICROALBUMINURIA REV: CPT | Mod: CPTII,S$GLB,, | Performed by: ORTHOPAEDIC SURGERY

## 2022-06-03 PROCEDURE — 1160F PR REVIEW ALL MEDS BY PRESCRIBER/CLIN PHARMACIST DOCUMENTED: ICD-10-PCS | Mod: CPTII,S$GLB,, | Performed by: ORTHOPAEDIC SURGERY

## 2022-06-03 PROCEDURE — 99999 PR PBB SHADOW E&M-EST. PATIENT-LVL II: CPT | Mod: PBBFAC,,, | Performed by: ORTHOPAEDIC SURGERY

## 2022-06-03 PROCEDURE — 4010F PR ACE/ARB THEARPY RXD/TAKEN: ICD-10-PCS | Mod: CPTII,S$GLB,, | Performed by: ORTHOPAEDIC SURGERY

## 2022-06-03 PROCEDURE — 99213 PR OFFICE/OUTPT VISIT, EST, LEVL III, 20-29 MIN: ICD-10-PCS | Mod: 25,S$GLB,, | Performed by: ORTHOPAEDIC SURGERY

## 2022-06-03 PROCEDURE — 1125F PR PAIN SEVERITY QUANTIFIED, PAIN PRESENT: ICD-10-PCS | Mod: CPTII,S$GLB,, | Performed by: ORTHOPAEDIC SURGERY

## 2022-06-03 PROCEDURE — 3066F NEPHROPATHY DOC TX: CPT | Mod: CPTII,S$GLB,, | Performed by: ORTHOPAEDIC SURGERY

## 2022-06-03 PROCEDURE — 99999 PR PBB SHADOW E&M-EST. PATIENT-LVL II: ICD-10-PCS | Mod: PBBFAC,,, | Performed by: ORTHOPAEDIC SURGERY

## 2022-06-03 PROCEDURE — 3008F PR BODY MASS INDEX (BMI) DOCUMENTED: ICD-10-PCS | Mod: CPTII,S$GLB,, | Performed by: ORTHOPAEDIC SURGERY

## 2022-06-03 PROCEDURE — 1101F PT FALLS ASSESS-DOCD LE1/YR: CPT | Mod: CPTII,S$GLB,, | Performed by: ORTHOPAEDIC SURGERY

## 2022-06-03 PROCEDURE — 99213 OFFICE O/P EST LOW 20 MIN: CPT | Mod: 25,S$GLB,, | Performed by: ORTHOPAEDIC SURGERY

## 2022-06-03 PROCEDURE — 3008F BODY MASS INDEX DOCD: CPT | Mod: CPTII,S$GLB,, | Performed by: ORTHOPAEDIC SURGERY

## 2022-06-03 PROCEDURE — 1101F PR PT FALLS ASSESS DOC 0-1 FALLS W/OUT INJ PAST YR: ICD-10-PCS | Mod: CPTII,S$GLB,, | Performed by: ORTHOPAEDIC SURGERY

## 2022-06-03 PROCEDURE — 4010F ACE/ARB THERAPY RXD/TAKEN: CPT | Mod: CPTII,S$GLB,, | Performed by: ORTHOPAEDIC SURGERY

## 2022-06-03 PROCEDURE — 1160F RVW MEDS BY RX/DR IN RCRD: CPT | Mod: CPTII,S$GLB,, | Performed by: ORTHOPAEDIC SURGERY

## 2022-06-03 PROCEDURE — 3066F PR DOCUMENTATION OF TREATMENT FOR NEPHROPATHY: ICD-10-PCS | Mod: CPTII,S$GLB,, | Performed by: ORTHOPAEDIC SURGERY

## 2022-06-03 PROCEDURE — 1159F PR MEDICATION LIST DOCUMENTED IN MEDICAL RECORD: ICD-10-PCS | Mod: CPTII,S$GLB,, | Performed by: ORTHOPAEDIC SURGERY

## 2022-06-03 PROCEDURE — 1125F AMNT PAIN NOTED PAIN PRSNT: CPT | Mod: CPTII,S$GLB,, | Performed by: ORTHOPAEDIC SURGERY

## 2022-06-03 PROCEDURE — 3288F FALL RISK ASSESSMENT DOCD: CPT | Mod: CPTII,S$GLB,, | Performed by: ORTHOPAEDIC SURGERY

## 2022-06-03 NOTE — PROGRESS NOTES
Subjective:       Patient ID: Froylan Borja is a 67 y.o. male.    Chief Complaint:   Injections of the Left Knee and Injections of the Right Knee  He comes in for bilateral knee pain for a couple of years.  He has some giving way with falls.  He has trouble standing up from a low seat, and has to use his arms.  He has a positive movie theater sign.  He had corticosteroid injections in his knees years ago which did seem to help him.  He has a history of alcohol abuse and cirrhosis, but says he has stopped drinking.  He has a history of successfully treated hepatitis-C.  He does not feel that any specific fall, accident, injury is responsible for his current knee pain.  No groin pain, distal numbness or tingling.  Interval history 6/3/22:   He reports relief from his previous B/L knee steroid injections for up to two months and would like to have an injection in both knees again today. No acute changes in medical history or condition since last appointment.    Past Medical History:   Diagnosis Date    Alcohol abuse     Anxiety     Cirrhosis     Glaucoma     History of hepatitis C, s/p successful Rx w/ SVR24 - 1/2017     genotype 1;  relapse following PegIFN+RBV+Victrelis; prior relapse following PegIFN+RBV S/p 12 weeks harvoni + RBV w/ SVR    Hypertension     Paresthesia     feet, bilaterally     Past Surgical History:   Procedure Laterality Date    LIVER BIOPSY       Family History   Problem Relation Age of Onset    Diabetes Mellitus Father     Hypertension Father     Cancer Father         ? CRC    Diabetes Father     Cancer Mother         colon vs polyps, colectomy    Colon cancer Mother     Cancer Sister         ? CRC    Colonic polyp Brother     Cirrhosis Neg Hx      Social History     Socioeconomic History    Marital status:    Tobacco Use    Smoking status: Current Some Day Smoker     Types: Cigars    Smokeless tobacco: Never Used    Tobacco comment: smokes cigars 20 per month    Substance and Sexual Activity    Alcohol use: Yes     Comment: heavy alcohol use in the past, now drinking daily, 2-3 beers daily    Drug use: No   Social History Narrative     (mental health issues), retired  at [employer]. No kids. 2 dogs. No exercise.       Current Outpatient Medications   Medication Sig Dispense Refill    amLODIPine (NORVASC) 5 MG tablet Take 1 tablet (5 mg total) by mouth once daily. 90 tablet 3    ammonium lactate (LAC-HYDRIN) 12 % lotion Apply topically 2 (two) times daily as needed for Dry Skin. Apply to BLE and feet 400 g 0    amoxicillin-clavulanate 500-125mg (AUGMENTIN) 500-125 mg Tab Take 1 tablet (500 mg total) by mouth 2 (two) times daily. 14 tablet 0    buPROPion (WELLBUTRIN SR) 150 MG TBSR 12 hr tablet Take 1 tablet (150 mg total) by mouth once daily. 30 tablet 3    cyanocobalamin (VITAMIN B-12) 1000 MCG tablet Take 1,000 mcg by mouth once daily.      furosemide (LASIX) 20 MG tablet Take 1 tablet (20 mg total) by mouth once daily. 30 tablet 0    gabapentin (NEURONTIN) 300 MG capsule Take 1 capsule (300 mg total) by mouth every evening. 30 capsule 2    latanoprost (XALATAN) 0.005 % ophthalmic solution 1 drop every evening.      levocetirizine (XYZAL) 5 MG tablet TAKE 1 TABLET(5 MG) BY MOUTH EVERY EVENING 90 tablet 0    losartan (COZAAR) 100 MG tablet Take 1 tablet (100 mg total) by mouth once daily. 90 tablet 3    melatonin (MELATIN) 3 mg tablet Take 2 tablets (6 mg total) by mouth nightly.  0    miconazole NITRATE 2 % (MICOTIN) 2 % top powder Apply topically 2 (two) times daily. Apply to groin  0    multivitamin (THERAGRAN) per tablet Take 1 tablet by mouth once daily.      tadalafiL (CIALIS) 20 MG Tab Take 1 tablet (20 mg total) by mouth once daily. 6 tablet 19    tamsulosin (FLOMAX) 0.4 mg Cap Take 1 capsule (0.4 mg total) by mouth every evening. 90 capsule 3    timolol maleate 0.5% (TIMOPTIC) 0.5 % Drop Place 1 drop into both eyes once daily.     "    No current facility-administered medications for this visit.     Review of patient's allergies indicates:   Allergen Reactions    Zoloft [sertraline] Other (See Comments)     hyponatremia         Objective:      Vitals:    22 1108   Weight: 116.7 kg (257 lb 4.4 oz)   Height: 6' 3" (1.905 m)     Physical Exam  Bilateral knees There is no significant coronal plane deformity.  Active range of motion is 0-115 degrees.  Anterior crepitus with active extension.  Patellar mobility is limited.  Boggy synovitis without effusion.  Medial and lateral joint line tenderness predominates. No pain in the groin with flexion and internal rotation of the hip which is not limited.  Skin intact.  Distal neurovascular intact.      Imaging Review:   Weightbearing x-rays show advanced tricompartmental arthrosis bilaterally, but not bone-on-bone.  There are marginal osteophytes.  No focal bone defects.  Assessment:   Advanced DJD, knees  Plan:       We did inject his knees with cortisone again today since this was helpful for him before.    The patient's pathophysiology was explained in detail with reference to x-rays, models, other visual aids as appropriate.  Treatment options were discussed in detail.  Questions were invited and answered to the patient's satisfaction.    Procedure Note: Right and left knee CSI injeciton  Patient was explained risks, benefits, and alternatives to treatment and verbalized consent to proceed. Time out was performed and patient name, , site, and procedure were confirmed. Skin of the right knee was sterilely prepared with alcohol solution. 25 gauge needle on a 5cc syringe was used for injection through anterolateral approach. 1cc of 40mg/mL of kenalog and 4 cc of 0.25% bupivacaine was injected into the right knee joint. The site was covered with a bandaid. This was repeated again for the left knee.          "

## 2022-06-04 PROBLEM — M17.11 PRIMARY OSTEOARTHRITIS OF RIGHT KNEE: Status: ACTIVE | Noted: 2022-06-04

## 2022-06-04 PROBLEM — M17.12 PRIMARY OSTEOARTHRITIS OF LEFT KNEE: Status: ACTIVE | Noted: 2022-06-04

## 2022-06-17 ENCOUNTER — PES CALL (OUTPATIENT)
Dept: ADMINISTRATIVE | Facility: CLINIC | Age: 67
End: 2022-06-17
Payer: MEDICARE

## 2022-06-27 ENCOUNTER — OFFICE VISIT (OUTPATIENT)
Dept: OPTOMETRY | Facility: CLINIC | Age: 67
End: 2022-06-27
Payer: MEDICARE

## 2022-06-27 DIAGNOSIS — I10 ESSENTIAL HYPERTENSION: Primary | ICD-10-CM

## 2022-06-27 DIAGNOSIS — I10 PRIMARY HYPERTENSION: ICD-10-CM

## 2022-06-27 DIAGNOSIS — H40.10X0 OPEN-ANGLE GLAUCOMA, UNSPECIFIED GLAUCOMA STAGE, UNSPECIFIED LATERALITY, UNSPECIFIED OPEN-ANGLE GLAUCOMA TYPE: ICD-10-CM

## 2022-06-27 DIAGNOSIS — H25.13 NS (NUCLEAR SCLEROSIS), BILATERAL: ICD-10-CM

## 2022-06-27 DIAGNOSIS — H47.092 ASYMMETRY OF OPTIC NERVE OF LEFT EYE: ICD-10-CM

## 2022-06-27 DIAGNOSIS — H52.4 PRESBYOPIA: ICD-10-CM

## 2022-06-27 DIAGNOSIS — H43.393 VITREOUS FLOATERS, BILATERAL: ICD-10-CM

## 2022-06-27 DIAGNOSIS — H40.023 OPEN ANGLE WITH BORDERLINE FINDINGS, HIGH RISK, BILATERAL: ICD-10-CM

## 2022-06-27 PROCEDURE — 92004 COMPRE OPH EXAM NEW PT 1/>: CPT | Mod: S$GLB,,, | Performed by: OPTOMETRIST

## 2022-06-27 PROCEDURE — 3066F PR DOCUMENTATION OF TREATMENT FOR NEPHROPATHY: ICD-10-PCS | Mod: CPTII,S$GLB,, | Performed by: OPTOMETRIST

## 2022-06-27 PROCEDURE — 92250 COLOR FUNDUS PHOTOGRAPHY - OU - BOTH EYES: ICD-10-PCS | Mod: S$GLB,,, | Performed by: OPTOMETRIST

## 2022-06-27 PROCEDURE — 3288F PR FALLS RISK ASSESSMENT DOCUMENTED: ICD-10-PCS | Mod: CPTII,S$GLB,, | Performed by: OPTOMETRIST

## 2022-06-27 PROCEDURE — 1159F PR MEDICATION LIST DOCUMENTED IN MEDICAL RECORD: ICD-10-PCS | Mod: CPTII,S$GLB,, | Performed by: OPTOMETRIST

## 2022-06-27 PROCEDURE — 1101F PR PT FALLS ASSESS DOC 0-1 FALLS W/OUT INJ PAST YR: ICD-10-PCS | Mod: CPTII,S$GLB,, | Performed by: OPTOMETRIST

## 2022-06-27 PROCEDURE — 92004 PR EYE EXAM, NEW PATIENT,COMPREHESV: ICD-10-PCS | Mod: S$GLB,,, | Performed by: OPTOMETRIST

## 2022-06-27 PROCEDURE — 4010F ACE/ARB THERAPY RXD/TAKEN: CPT | Mod: CPTII,S$GLB,, | Performed by: OPTOMETRIST

## 2022-06-27 PROCEDURE — 92015 DETERMINE REFRACTIVE STATE: CPT | Mod: S$GLB,,, | Performed by: OPTOMETRIST

## 2022-06-27 PROCEDURE — 99999 PR PBB SHADOW E&M-EST. PATIENT-LVL III: CPT | Mod: PBBFAC,,, | Performed by: OPTOMETRIST

## 2022-06-27 PROCEDURE — 1126F PR PAIN SEVERITY QUANTIFIED, NO PAIN PRESENT: ICD-10-PCS | Mod: CPTII,S$GLB,, | Performed by: OPTOMETRIST

## 2022-06-27 PROCEDURE — 3066F NEPHROPATHY DOC TX: CPT | Mod: CPTII,S$GLB,, | Performed by: OPTOMETRIST

## 2022-06-27 PROCEDURE — 3061F PR NEG MICROALBUMINURIA RESULT DOCUMENTED/REVIEW: ICD-10-PCS | Mod: CPTII,S$GLB,, | Performed by: OPTOMETRIST

## 2022-06-27 PROCEDURE — 1159F MED LIST DOCD IN RCRD: CPT | Mod: CPTII,S$GLB,, | Performed by: OPTOMETRIST

## 2022-06-27 PROCEDURE — 4010F PR ACE/ARB THEARPY RXD/TAKEN: ICD-10-PCS | Mod: CPTII,S$GLB,, | Performed by: OPTOMETRIST

## 2022-06-27 PROCEDURE — 3061F NEG MICROALBUMINURIA REV: CPT | Mod: CPTII,S$GLB,, | Performed by: OPTOMETRIST

## 2022-06-27 PROCEDURE — 92015 PR REFRACTION: ICD-10-PCS | Mod: S$GLB,,, | Performed by: OPTOMETRIST

## 2022-06-27 PROCEDURE — 1101F PT FALLS ASSESS-DOCD LE1/YR: CPT | Mod: CPTII,S$GLB,, | Performed by: OPTOMETRIST

## 2022-06-27 PROCEDURE — 3288F FALL RISK ASSESSMENT DOCD: CPT | Mod: CPTII,S$GLB,, | Performed by: OPTOMETRIST

## 2022-06-27 PROCEDURE — 99999 PR PBB SHADOW E&M-EST. PATIENT-LVL III: ICD-10-PCS | Mod: PBBFAC,,, | Performed by: OPTOMETRIST

## 2022-06-27 PROCEDURE — 92250 FUNDUS PHOTOGRAPHY W/I&R: CPT | Mod: S$GLB,,, | Performed by: OPTOMETRIST

## 2022-06-27 PROCEDURE — 1126F AMNT PAIN NOTED NONE PRSNT: CPT | Mod: CPTII,S$GLB,, | Performed by: OPTOMETRIST

## 2022-06-27 RX ORDER — TIMOLOL MALEATE 5 MG/ML
1 SOLUTION/ DROPS OPHTHALMIC 2 TIMES DAILY
Qty: 15 ML | Refills: 3 | Status: SHIPPED | OUTPATIENT
Start: 2022-06-27 | End: 2023-12-15

## 2022-06-27 RX ORDER — LATANOPROST 50 UG/ML
1 SOLUTION/ DROPS OPHTHALMIC NIGHTLY
Qty: 6 ML | Refills: 3 | Status: SHIPPED | OUTPATIENT
Start: 2022-06-27 | End: 2023-12-15

## 2022-06-27 NOTE — PROGRESS NOTES
HPI     68 Y/o male is here for routine eye exam with C/o pt states he has not   seen an eye doctor in a long time and may need a new Rx for glasses   Pt denies pain and discomfort   No f/f    Eye med: no gtt     Last edited by Stephanie Parks MA on 6/27/2022  3:06 PM. (History)            Assessment /Plan     For exam results, see Encounter Report.    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  Dispense: 6 mL; Refill: 3  -     timolol maleate 0.5% (TIMOPTIC) 0.5 % Drop; Place 1 drop into both eyes 2 (two) times daily.  Dispense: 15 mL; Refill: 3

## 2022-06-28 NOTE — PROGRESS NOTES
"INTERNAL MEDICINE CLINIC - SAME DAY APPOINTMENT  Progress Note    PRESENTING HISTORY     PCP: Vignesh Almonte MD    Chief Complaint/Reason for Visit:   No chief complaint on file.    His Direct Number: [phone]    History of Present Illness & ROS : Mr. Froylan Borja is a 67 y.o. male.    Same day appt.   New to me. Reports that has been going to AA meeting and has had COVID positive exposures and they don't wear mask, and his wife 'may have it;but, she thinks I may have pneumonia or it's my asthma; not taking anything'. Has been having post nasal drip , dry cough and sometimes it has color; but mostly phelgm"  No fever, no chills or Diarrhea. No SOB,leg swelling, chest pain or headaches.   Did have some sweating during the night.     *Vaccinated for COVID, not boostered.   *Not flu Vaccinated.     Review of Systems:  Eyes: denies visual changes at this time denies floaters   ENT: no nasal congestion or sore throat  Gastrointestinal: no nausea or vomiting, no abdominal pain or change in bowel habits  Genitourinary: no hematuria or dysuria; denies frequency  Hematologic/Lymphatic: no easy bruising or lymphadenopathy  Musculoskeletal: no arthralgias or myalgias  Neurological: no seizures or tremors  Endocrine: no heat or cold intolerance      PAST HISTORY:     Past Medical History:   Diagnosis Date    Alcohol abuse     Anxiety     Cirrhosis     Glaucoma     History of hepatitis C, s/p successful Rx w/ SVR24 - 1/2017     genotype 1;  relapse following PegIFN+RBV+Victrelis; prior relapse following PegIFN+RBV S/p 12 weeks harvoni + RBV w/ SVR    Hypertension     Paresthesia     feet, bilaterally       Past Surgical History:   Procedure Laterality Date    LIVER BIOPSY         Family History   Problem Relation Age of Onset    Diabetes Mellitus Father     Hypertension Father     Cancer Father         ? CRC    Diabetes Father     Cancer Mother         colon vs polyps, colectomy    Colon cancer Mother     Cancer " Sister         ? CRC    Colonic polyp Brother     Cirrhosis Neg Hx        Social History     Socioeconomic History    Marital status:    Tobacco Use    Smoking status: Current Some Day Smoker     Types: Cigars    Smokeless tobacco: Never Used    Tobacco comment: smokes cigars 20 per month   Substance and Sexual Activity    Alcohol use: Yes     Comment: heavy alcohol use in the past, now drinking daily, 2-3 beers daily    Drug use: No   Social History Narrative     (mental health issues), retired  at [employer]. No kids. 2 dogs. No exercise.       MEDICATIONS & ALLERGIES:     Current Outpatient Medications on File Prior to Visit   Medication Sig Dispense Refill    amLODIPine (NORVASC) 5 MG tablet Take 1 tablet (5 mg total) by mouth once daily. 90 tablet 3    ammonium lactate (LAC-HYDRIN) 12 % lotion Apply topically 2 (two) times daily as needed for Dry Skin. Apply to BLE and feet 400 g 0    amoxicillin-clavulanate 500-125mg (AUGMENTIN) 500-125 mg Tab Take 1 tablet (500 mg total) by mouth 2 (two) times daily. 14 tablet 0    buPROPion (WELLBUTRIN SR) 150 MG TBSR 12 hr tablet Take 1 tablet (150 mg total) by mouth once daily. 30 tablet 3    cyanocobalamin (VITAMIN B-12) 1000 MCG tablet Take 1,000 mcg by mouth once daily.      furosemide (LASIX) 20 MG tablet Take 1 tablet (20 mg total) by mouth once daily. 30 tablet 0    gabapentin (NEURONTIN) 300 MG capsule Take 1 capsule (300 mg total) by mouth every evening. 30 capsule 2    latanoprost 0.005 % ophthalmic solution Place 1 drop into both eyes every evening. 6 mL 3    levocetirizine (XYZAL) 5 MG tablet TAKE 1 TABLET(5 MG) BY MOUTH EVERY EVENING 90 tablet 0    losartan (COZAAR) 100 MG tablet Take 1 tablet (100 mg total) by mouth once daily. 90 tablet 3    melatonin (MELATIN) 3 mg tablet Take 2 tablets (6 mg total) by mouth nightly.  0    miconazole NITRATE 2 % (MICOTIN) 2 % top powder Apply topically 2 (two) times daily. Apply to  groin  0    multivitamin (THERAGRAN) per tablet Take 1 tablet by mouth once daily.      tadalafiL (CIALIS) 20 MG Tab Take 1 tablet (20 mg total) by mouth once daily. 6 tablet 19    tamsulosin (FLOMAX) 0.4 mg Cap Take 1 capsule (0.4 mg total) by mouth every evening. 90 capsule 3    timolol maleate 0.5% (TIMOPTIC) 0.5 % Drop Place 1 drop into both eyes 2 (two) times daily. 15 mL 3     No current facility-administered medications on file prior to visit.        Review of patient's allergies indicates:   Allergen Reactions    Zoloft [sertraline] Other (See Comments)     hyponatremia       Medications Reconciliation:   I have reconciled the patient's home medications with the patient/family. I have updated all changes.  Refer to After-Visit Medication List.    OBJECTIVE:     Vital Signs:  There were no vitals filed for this visit.  Wt Readings from Last 3 Encounters:   06/03/22 1108 116.7 kg (257 lb 4.4 oz)   03/04/22 1448 118.7 kg (261 lb 11 oz)   02/04/22 1521 117 kg (258 lb)     There is no height or weight on file to calculate BMI.   Wt Readings from Last 3 Encounters:   06/30/22 115 kg (253 lb 8.5 oz)   06/03/22 116.7 kg (257 lb 4.4 oz)   03/04/22 118.7 kg (261 lb 11 oz)     Temp Readings from Last 3 Encounters:   01/27/22 98.4 °F (36.9 °C)   01/24/22 99.2 °F (37.3 °C) (Oral)   11/17/21 98.1 °F (36.7 °C) (Oral)     BP Readings from Last 3 Encounters:   06/30/22 (!) 144/78   02/04/22 134/70   01/27/22 134/70     Pulse Readings from Last 3 Encounters:   06/30/22 77   02/04/22 96   01/27/22 96       Physical Exam:  (Focused Exam)  General: Well developed, well nourished. No distress.  HEENT: Head is normocephalic, atraumatic; ears are normal.   Eyes: Clear conjunctiva.  Neck: Supple, symmetrical neck; trachea midline. No JVD  Lungs: Clear to auscultation bilaterally and normal respiratory effort.   Cardiovascular: Heart with regular rate and rhythm. No murmurs, gallops or rubs  Extremities: No LE edema. Pulses 2+  and symmetric.   Abdomen: Abdomen is soft, non-tender non-distended with normal bowel sounds.  Skin: Skin color, texture, turgor normal. No rashes.  Musculoskeletal: Noral gait.   Lymph Nodes: No cervical or supraclavicular adenopathy.  Neurologic:  No focal numbness or weakness.   Psychiatric: Not depressed.        Laboratory  Lab Results   Component Value Date    WBC 7.32 02/10/2022    HGB 12.9 (L) 02/10/2022    HCT 42.9 02/10/2022     02/10/2022    CHOL 144 2021    TRIG 106 2021    HDL 42 2021    ALT 15 02/10/2022    AST 24 02/10/2022     02/10/2022    K 4.9 02/10/2022     02/10/2022    CREATININE 0.8 02/10/2022    BUN 10 02/10/2022    CO2 27 02/10/2022    TSH 3.566 02/10/2022    PSA 3.5 2021    INR 0.9 2021    HGBA1C 5.1 2019       ASSESSMENT & PLAN:     Same day appt.     PMH: Cirrhosis and CHF  Med: Lasix in 2022 for CHF exacerbation  TTE 2022: 55% reserve, + left DD    Cough    Wheezing (not appreciated on clinical exam today)    Close exposure to COVID-19 virus  Several risk factors for 'cause'  ? Related to Vol overload vs of Viral Syndrome  Satin%  Wt today: 115 kg (no wt gain)  -     X-Ray Chest PA And Lateral; Future; Expected date: 2022  -     Comprehensive Metabolic Panel; Future; Expected date: 2022  -     BNP; Future; Expected date: 2022  -     Influenza A & B by Molecular  -     CBC Auto Differential; Future; Expected date: 2022    Other orders  -     benzonatate (TESSALON) 100 MG capsule; Take 1 capsule (100 mg total) by mouth 3 (three) times daily as needed for Cough.  Dispense: 30 capsule; Refill: 0  -     albuterol (PROVENTIL HFA) 90 mcg/actuation inhaler; Inhale 2 puffs into the lungs every 6 (six) hours as needed for Wheezing. Rescue  Dispense: 18 g; Refill: 2      *Await labs to determine next step.     Future Appointments   Date Time Provider Department Center   2022  8:30 AM Dimitry Patel MD  [facility]        Medication List          Accurate as of June 30, 2022  9:40 AM. If you have any questions, ask your nurse or doctor.            START taking these medications    albuterol 90 mcg/actuation inhaler  Commonly known as: PROVENTIL HFA  Inhale 2 puffs into the lungs every 6 (six) hours as needed for Wheezing. Rescue  Started by: FILOMENA Cerda     benzonatate 100 MG capsule  Commonly known as: TESSALON  Take 1 capsule (100 mg total) by mouth 3 (three) times daily as needed for Cough.  Started by: FILOMENA Cerda        CONTINUE taking these medications    amLODIPine 5 MG tablet  Commonly known as: NORVASC  Take 1 tablet (5 mg total) by mouth once daily.     ammonium lactate 12 % lotion  Commonly known as: LAC-HYDRIN  Apply topically 2 (two) times daily as needed for Dry Skin. Apply to BLE and feet     buPROPion 150 MG TBSR 12 hr tablet  Commonly known as: WELLBUTRIN SR  Take 1 tablet (150 mg total) by mouth once daily.     cyanocobalamin 1000 MCG tablet  Commonly known as: VITAMIN B-12     furosemide 20 MG tablet  Commonly known as: LASIX  Take 1 tablet (20 mg total) by mouth once daily.     gabapentin 300 MG capsule  Commonly known as: NEURONTIN  Take 1 capsule (300 mg total) by mouth every evening.     latanoprost 0.005 % ophthalmic solution  Place 1 drop into both eyes every evening.     losartan 100 MG tablet  Commonly known as: COZAAR  Take 1 tablet (100 mg total) by mouth once daily.     melatonin 3 mg tablet  Commonly known as: MELATIN  Take 2 tablets (6 mg total) by mouth nightly.     miconazole NITRATE 2 % 2 % top powder  Commonly known as: MICOTIN  Apply topically 2 (two) times daily. Apply to groin     multivitamin per tablet  Commonly known as: THERAGRAN     tadalafiL 20 MG Tab  Commonly known as: CIALIS  Take 1 tablet (20 mg total) by mouth once daily.     tamsulosin 0.4 mg Cap  Commonly known as: FLOMAX  Take 1 capsule (0.4 mg total) by mouth every  evening.     timolol maleate 0.5% 0.5 % Drop  Commonly known as: TIMOPTIC  Place 1 drop into both eyes 2 (two) times daily.        STOP taking these medications    amoxicillin-clavulanate 500-125mg 500-125 mg Tab  Commonly known as: AUGMENTIN  Stopped by: FILOMENA Cerda     levocetirizine 5 MG tablet  Commonly known as: XYZAL  Stopped by: FILOMENA Cerda           Where to Get Your Medications      These medications were sent to [id number] - [place] - [facility] [address] AT [facility] [address]  [facility] [address] [place] [address]    Phone: [phone]   · albuterol 90 mcg/actuation inhaler  · benzonatate 100 MG capsule         Signing Physician:  FILOMENA Cerda

## 2022-06-30 ENCOUNTER — OFFICE VISIT (OUTPATIENT)
Dept: INTERNAL MEDICINE | Facility: CLINIC | Age: 67
End: 2022-06-30
Payer: MEDICARE

## 2022-06-30 ENCOUNTER — HOSPITAL ENCOUNTER (OUTPATIENT)
Dept: RADIOLOGY | Facility: HOSPITAL | Age: 67
Discharge: HOME | End: 2022-06-30
Attending: NURSE PRACTITIONER
Payer: MEDICARE

## 2022-06-30 ENCOUNTER — TELEPHONE (OUTPATIENT)
Dept: INTERNAL MEDICINE | Facility: CLINIC | Age: 67
End: 2022-06-30

## 2022-06-30 VITALS
WEIGHT: 253.5 LBS | DIASTOLIC BLOOD PRESSURE: 78 MMHG | OXYGEN SATURATION: 97 % | HEART RATE: 77 BPM | HEIGHT: 75 IN | SYSTOLIC BLOOD PRESSURE: 144 MMHG | BODY MASS INDEX: 31.52 KG/M2

## 2022-06-30 DIAGNOSIS — Z20.822 CLOSE EXPOSURE TO COVID-19 VIRUS: ICD-10-CM

## 2022-06-30 DIAGNOSIS — R05.9 COUGH: Primary | ICD-10-CM

## 2022-06-30 DIAGNOSIS — R05.9 COUGH: ICD-10-CM

## 2022-06-30 DIAGNOSIS — U07.1 COVID-19 VIRUS DETECTED: ICD-10-CM

## 2022-06-30 DIAGNOSIS — R06.2 WHEEZING: ICD-10-CM

## 2022-06-30 LAB
INFLUENZA A, MOLECULAR: NEGATIVE
INFLUENZA B, MOLECULAR: NEGATIVE
SARS-COV-2 RNA RESP QL NAA+PROBE: DETECTED
SARS-COV-2- CYCLE NUMBER: 39
SPECIMEN SOURCE: NORMAL

## 2022-06-30 PROCEDURE — 71046 X-RAY EXAM CHEST 2 VIEWS: CPT | Mod: 26,,, | Performed by: RADIOLOGY

## 2022-06-30 PROCEDURE — 99214 OFFICE O/P EST MOD 30 MIN: CPT | Mod: S$GLB,,, | Performed by: NURSE PRACTITIONER

## 2022-06-30 PROCEDURE — 3066F NEPHROPATHY DOC TX: CPT | Mod: CPTII,S$GLB,, | Performed by: NURSE PRACTITIONER

## 2022-06-30 PROCEDURE — 3077F PR MOST RECENT SYSTOLIC BLOOD PRESSURE >= 140 MM HG: ICD-10-PCS | Mod: CPTII,S$GLB,, | Performed by: NURSE PRACTITIONER

## 2022-06-30 PROCEDURE — U0005 INFEC AGEN DETEC AMPLI PROBE: HCPCS | Performed by: NURSE PRACTITIONER

## 2022-06-30 PROCEDURE — 3288F FALL RISK ASSESSMENT DOCD: CPT | Mod: CPTII,S$GLB,, | Performed by: NURSE PRACTITIONER

## 2022-06-30 PROCEDURE — 3008F BODY MASS INDEX DOCD: CPT | Mod: CPTII,S$GLB,, | Performed by: NURSE PRACTITIONER

## 2022-06-30 PROCEDURE — 3061F PR NEG MICROALBUMINURIA RESULT DOCUMENTED/REVIEW: ICD-10-PCS | Mod: CPTII,S$GLB,, | Performed by: NURSE PRACTITIONER

## 2022-06-30 PROCEDURE — 71046 X-RAY EXAM CHEST 2 VIEWS: CPT | Mod: TC

## 2022-06-30 PROCEDURE — 3008F PR BODY MASS INDEX (BMI) DOCUMENTED: ICD-10-PCS | Mod: CPTII,S$GLB,, | Performed by: NURSE PRACTITIONER

## 2022-06-30 PROCEDURE — 3288F PR FALLS RISK ASSESSMENT DOCUMENTED: ICD-10-PCS | Mod: CPTII,S$GLB,, | Performed by: NURSE PRACTITIONER

## 2022-06-30 PROCEDURE — 3061F NEG MICROALBUMINURIA REV: CPT | Mod: CPTII,S$GLB,, | Performed by: NURSE PRACTITIONER

## 2022-06-30 PROCEDURE — 87502 INFLUENZA DNA AMP PROBE: CPT | Performed by: NURSE PRACTITIONER

## 2022-06-30 PROCEDURE — 4010F PR ACE/ARB THEARPY RXD/TAKEN: ICD-10-PCS | Mod: CPTII,S$GLB,, | Performed by: NURSE PRACTITIONER

## 2022-06-30 PROCEDURE — 3078F DIAST BP <80 MM HG: CPT | Mod: CPTII,S$GLB,, | Performed by: NURSE PRACTITIONER

## 2022-06-30 PROCEDURE — 99999 PR PBB SHADOW E&M-EST. PATIENT-LVL IV: ICD-10-PCS | Mod: PBBFAC,,, | Performed by: NURSE PRACTITIONER

## 2022-06-30 PROCEDURE — 1159F PR MEDICATION LIST DOCUMENTED IN MEDICAL RECORD: ICD-10-PCS | Mod: CPTII,S$GLB,, | Performed by: NURSE PRACTITIONER

## 2022-06-30 PROCEDURE — 1101F PT FALLS ASSESS-DOCD LE1/YR: CPT | Mod: CPTII,S$GLB,, | Performed by: NURSE PRACTITIONER

## 2022-06-30 PROCEDURE — 99214 PR OFFICE/OUTPT VISIT, EST, LEVL IV, 30-39 MIN: ICD-10-PCS | Mod: S$GLB,,, | Performed by: NURSE PRACTITIONER

## 2022-06-30 PROCEDURE — 1126F PR PAIN SEVERITY QUANTIFIED, NO PAIN PRESENT: ICD-10-PCS | Mod: CPTII,S$GLB,, | Performed by: NURSE PRACTITIONER

## 2022-06-30 PROCEDURE — 3077F SYST BP >= 140 MM HG: CPT | Mod: CPTII,S$GLB,, | Performed by: NURSE PRACTITIONER

## 2022-06-30 PROCEDURE — U0003 INFECTIOUS AGENT DETECTION BY NUCLEIC ACID (DNA OR RNA); SEVERE ACUTE RESPIRATORY SYNDROME CORONAVIRUS 2 (SARS-COV-2) (CORONAVIRUS DISEASE [COVID-19]), AMPLIFIED PROBE TECHNIQUE, MAKING USE OF HIGH THROUGHPUT TECHNOLOGIES AS DESCRIBED BY CMS-2020-01-R: HCPCS | Performed by: NURSE PRACTITIONER

## 2022-06-30 PROCEDURE — 99999 PR PBB SHADOW E&M-EST. PATIENT-LVL IV: CPT | Mod: PBBFAC,,, | Performed by: NURSE PRACTITIONER

## 2022-06-30 PROCEDURE — 1126F AMNT PAIN NOTED NONE PRSNT: CPT | Mod: CPTII,S$GLB,, | Performed by: NURSE PRACTITIONER

## 2022-06-30 PROCEDURE — 3066F PR DOCUMENTATION OF TREATMENT FOR NEPHROPATHY: ICD-10-PCS | Mod: CPTII,S$GLB,, | Performed by: NURSE PRACTITIONER

## 2022-06-30 PROCEDURE — 4010F ACE/ARB THERAPY RXD/TAKEN: CPT | Mod: CPTII,S$GLB,, | Performed by: NURSE PRACTITIONER

## 2022-06-30 PROCEDURE — 1101F PR PT FALLS ASSESS DOC 0-1 FALLS W/OUT INJ PAST YR: ICD-10-PCS | Mod: CPTII,S$GLB,, | Performed by: NURSE PRACTITIONER

## 2022-06-30 PROCEDURE — 1159F MED LIST DOCD IN RCRD: CPT | Mod: CPTII,S$GLB,, | Performed by: NURSE PRACTITIONER

## 2022-06-30 PROCEDURE — 71046 XR CHEST PA AND LATERAL: ICD-10-PCS | Mod: 26,,, | Performed by: RADIOLOGY

## 2022-06-30 PROCEDURE — 3078F PR MOST RECENT DIASTOLIC BLOOD PRESSURE < 80 MM HG: ICD-10-PCS | Mod: CPTII,S$GLB,, | Performed by: NURSE PRACTITIONER

## 2022-06-30 RX ORDER — BENZONATATE 100 MG/1
100 CAPSULE ORAL 3 TIMES DAILY PRN
Qty: 30 CAPSULE | Refills: 0 | Status: SHIPPED | OUTPATIENT
Start: 2022-06-30 | End: 2022-07-10

## 2022-06-30 RX ORDER — ALBUTEROL SULFATE 90 UG/1
2 AEROSOL, METERED RESPIRATORY (INHALATION) EVERY 6 HOURS PRN
Qty: 18 G | Refills: 2 | Status: ON HOLD | OUTPATIENT
Start: 2022-06-30 | End: 2024-01-03 | Stop reason: HOSPADM

## 2022-06-30 NOTE — TELEPHONE ENCOUNTER
----- Message from FILOMENA Holland sent at 6/30/2022 10:39 AM CDT -----  Please call and let him know that FLU is negative.      His Direct Number: [phone]    xs

## 2022-06-30 NOTE — TELEPHONE ENCOUNTER
----- Message from FILOMENA Holland sent at 6/30/2022 10:39 AM CDT -----  Please call and let him know that FLU is negative.      His Direct Number: [phone]    xs

## 2022-07-01 ENCOUNTER — TELEPHONE (OUTPATIENT)
Dept: INTERNAL MEDICINE | Facility: CLINIC | Age: 67
End: 2022-07-01
Payer: MEDICARE

## 2022-07-01 DIAGNOSIS — U07.1 COVID: ICD-10-CM

## 2022-07-01 DIAGNOSIS — U07.1 COVID-19 VIRUS INFECTION: Primary | ICD-10-CM

## 2022-07-01 RX ORDER — LEVOFLOXACIN 500 MG/1
750 TABLET, FILM COATED ORAL DAILY
Qty: 5 TABLET | Refills: 0 | Status: SHIPPED | OUTPATIENT
Start: 2022-07-01 | End: 2022-08-11

## 2022-07-02 ENCOUNTER — NURSE TRIAGE (OUTPATIENT)
Dept: ADMINISTRATIVE | Facility: CLINIC | Age: 67
End: 2022-07-02
Payer: MEDICARE

## 2022-07-02 NOTE — TELEPHONE ENCOUNTER
Pt escalated in [facility] queue. Pt states his cough has improved in the last day or two. Still having mild productive cough of clear sputum. Denies any CP, SOB, or fever. Dispo is home care. Gave care advice and answered questions about quarantine. Advised to call back with further concerns.    Reason for Disposition   [1] COVID-19 infection diagnosed or suspected AND [2] mild symptoms (fever, cough) AND [3] no trouble breathing or other complications    Additional Information   Negative: Severe difficulty breathing (e.g., struggling for each breath, speaks in single words)   Negative: Difficult to awaken or acting confused (e.g., disoriented, slurred speech)   Negative: Bluish (or gray) lips or face now   Negative: Shock suspected (e.g., cold/pale/clammy skin, too weak to stand, low BP, rapid pulse)   Negative: Sounds like a life-threatening emergency to the triager   Negative: SEVERE or constant chest pain (Exception: mild central chest pain, present only when coughing)   Negative: MODERATE difficulty breathing (e.g., speaks in phrases, SOB even at rest, pulse 100-120)   Negative: Patient sounds very sick or weak to the triager   Negative: MILD difficulty breathing (e.g., minimal/no SOB at rest, SOB with walking, pulse <100)   Negative: Chest pain   Negative: Fever > 103 F (39.4 C)   Negative: [1] Fever > 101 F (38.3 C) AND [2] age > 60   Negative: [1] Fever > 100.0 F (37.8 C) AND [2] bedridden (e.g., nursing home patient, CVA, chronic illness, recovering from surgery)   Negative: HIGH RISK patient (e.g., age > 64 years, diabetes, heart or lung disease, weak immune system)   Negative: Fever present > 3 days (72 hours)   Negative: [1] Fever returns after gone for over 24 hours AND [2] symptoms worse or not improved   Negative: [1] Continuous (nonstop) coughing interferes with work or school AND [2] no improvement using cough treatment per protocol   Negative: Cough present > 3 weeks    Protocols used:  CORONAVIRUS (COVID-19) - DIAGNOSED OR [id number]

## 2022-07-04 ENCOUNTER — TELEPHONE (OUTPATIENT)
Dept: PRIMARY CARE CLINIC | Facility: CLINIC | Age: 67
End: 2022-07-04
Payer: MEDICARE

## 2022-07-05 ENCOUNTER — TELEPHONE (OUTPATIENT)
Dept: INTERNAL MEDICINE | Facility: CLINIC | Age: 67
End: 2022-07-05
Payer: MEDICARE

## 2022-07-05 NOTE — TELEPHONE ENCOUNTER
I tried to reach pt by telephone. No answer, did not leave  A message because it was a young girl's voice on the voicemail. I will call again.

## 2022-07-05 NOTE — TELEPHONE ENCOUNTER
Hi, please call him -- I just received a message about him having covid infection. I think his symptoms started over 5 days ago, we are usually offering a pill for covid within the first 5 days. Please ask how he is feeling, how are his symptoms? Is he short of breath?  Please let me know,  Thank you, Vignesh Almonte

## 2022-07-06 NOTE — TELEPHONE ENCOUNTER
I tried to reach pt by telephone. No answer, did not leave  A message because it was a young girl's voice on the voicemail.

## 2022-08-10 NOTE — PROGRESS NOTES
INTERNAL MEDICINE CLINIC - SAME DAY APPOINTMENT  Progress Note    PRESENTING HISTORY     PCP: Vignesh Almonte MD    Chief Complaint/Reason for Visit:   No chief complaint on file.    History of Present Illness & ROS : Mr. Froylan Borja is a 67 y.o. male.    Same day appt. Reports that he fell on Tuesday and hit his head. Having pain to back and muscle pain.   Denies 'blacking out' ,headaches, change in vision, dizziness or N/V. No light sensitivity. He refused to go to the ER. Reportedly happened while on the job the evening of Tuesday, his wife wanted him to go to the ER but 'I'm stubborn and refused'. Able to stand after the fall.   Fell onto buttock and having pain with sitting. No pain into groin region, hips and / or either LE.   Has tried taking Ibuprofen for the discomforts, 'helps a little'.     *Did undergo 'drug testing with the company'.     Review of Systems:  Eyes: denies visual changes at this time denies floaters   ENT: no nasal congestion or sore throat  Respiratory: no cough or shorness of breath  Cardiovascular: no chest pain or palpitations  Gastrointestinal: no nausea or vomiting, no abdominal pain or change in bowel habits  Genitourinary: no hematuria or dysuria; denies frequency  Hematologic/Lymphatic: no easy bruising or lymphadenopathy  Musculoskeletal: no arthralgias or myalgias  Neurological: no seizures or tremors  Endocrine: no heat or cold intolerance      PAST HISTORY:     Past Medical History:   Diagnosis Date    Alcohol abuse     Anxiety     Cirrhosis     Glaucoma     History of hepatitis C, s/p successful Rx w/ SVR24 - 1/2017     genotype 1;  relapse following PegIFN+RBV+Victrelis; prior relapse following PegIFN+RBV S/p 12 weeks harvoni + RBV w/ SVR    Hypertension     Paresthesia     feet, bilaterally       Past Surgical History:   Procedure Laterality Date    LIVER BIOPSY         Family History   Problem Relation Age of Onset    Diabetes Mellitus Father     Hypertension  Father     Cancer Father         ? CRC    Diabetes Father     Cancer Mother         colon vs polyps, colectomy    Colon cancer Mother     Cancer Sister         ? CRC    Colonic polyp Brother     Cirrhosis Neg Hx        Social History     Socioeconomic History    Marital status:    Tobacco Use    Smoking status: Current Some Day Smoker     Types: Cigars    Smokeless tobacco: Never Used    Tobacco comment: smokes cigars 20 per month   Substance and Sexual Activity    Alcohol use: Yes     Comment: heavy alcohol use in the past, now drinking daily, 2-3 beers daily    Drug use: No   Social History Narrative     (mental health issues), retired  at [employer]. No kids. 2 dogs. No exercise.       MEDICATIONS & ALLERGIES:     Current Outpatient Medications on File Prior to Visit   Medication Sig Dispense Refill    albuterol (PROVENTIL HFA) 90 mcg/actuation inhaler Inhale 2 puffs into the lungs every 6 (six) hours as needed for Wheezing. Rescue 18 g 2    amLODIPine (NORVASC) 5 MG tablet Take 1 tablet (5 mg total) by mouth once daily. 90 tablet 3    ammonium lactate (LAC-HYDRIN) 12 % lotion Apply topically 2 (two) times daily as needed for Dry Skin. Apply to BLE and feet 400 g 0    buPROPion (WELLBUTRIN SR) 150 MG TBSR 12 hr tablet Take 1 tablet (150 mg total) by mouth once daily. 30 tablet 3    cyanocobalamin (VITAMIN B-12) 1000 MCG tablet Take 1,000 mcg by mouth once daily.      furosemide (LASIX) 20 MG tablet Take 1 tablet (20 mg total) by mouth once daily. 30 tablet 0    gabapentin (NEURONTIN) 300 MG capsule Take 1 capsule (300 mg total) by mouth every evening. 30 capsule 2    latanoprost 0.005 % ophthalmic solution Place 1 drop into both eyes every evening. (Patient not taking: Reported on 6/30/2022) 6 mL 3    levoFLOXacin (LEVAQUIN) 500 MG tablet Take 1.5 tablets (750 mg total) by mouth once daily. 5 tablet 0    losartan (COZAAR) 100 MG tablet Take 1 tablet (100 mg total) by  mouth once daily. 90 tablet 3    melatonin (MELATIN) 3 mg tablet Take 2 tablets (6 mg total) by mouth nightly.  0    miconazole NITRATE 2 % (MICOTIN) 2 % top powder Apply topically 2 (two) times daily. Apply to groin  0    multivitamin (THERAGRAN) per tablet Take 1 tablet by mouth once daily.      tadalafiL (CIALIS) 20 MG Tab Take 1 tablet (20 mg total) by mouth once daily. 6 tablet 19    tamsulosin (FLOMAX) 0.4 mg Cap Take 1 capsule (0.4 mg total) by mouth every evening. 90 capsule 3    timolol maleate 0.5% (TIMOPTIC) 0.5 % Drop Place 1 drop into both eyes 2 (two) times daily. 15 mL 3     No current facility-administered medications on file prior to visit.        Review of patient's allergies indicates:   Allergen Reactions    Zoloft [sertraline] Other (See Comments)     hyponatremia       Medications Reconciliation:   I have reconciled the patient's home medications with the patient/family. I have updated all changes.  Refer to After-Visit Medication List.    OBJECTIVE:     Vital Signs:  There were no vitals filed for this visit.  Wt Readings from Last 3 Encounters:   06/30/22 0901 115 kg (253 lb 8.5 oz)   06/03/22 1108 116.7 kg (257 lb 4.4 oz)   03/04/22 1448 118.7 kg (261 lb 11 oz)     There is no height or weight on file to calculate BMI.   Wt Readings from Last 3 Encounters:   08/11/22 120.8 kg (266 lb 5.1 oz)   06/30/22 115 kg (253 lb 8.5 oz)   06/03/22 116.7 kg (257 lb 4.4 oz)     Temp Readings from Last 3 Encounters:   01/27/22 98.4 °F (36.9 °C)   01/24/22 99.2 °F (37.3 °C) (Oral)   11/17/21 98.1 °F (36.7 °C) (Oral)     BP Readings from Last 3 Encounters:   08/11/22 (!) 142/62   06/30/22 (!) 144/78   02/04/22 134/70     Pulse Readings from Last 3 Encounters:   08/11/22 101   06/30/22 77   02/04/22 96       Physical Exam:  General: Well developed, well nourished. No distress.  HEENT: Head is normocephalic, atraumatic; ears are normal.   Eyes: Clear conjunctiva.  Neck: Supple, symmetrical neck; trachea  midline.  Lungs: Clear to auscultation bilaterally and normal respiratory effort.  Cardiovascular: Heart with regular rate and rhythm. No murmurs, gallops or rubs  Extremities: No LE edema. Pulses 2+ and symmetric.   Abdomen: Abdomen is soft, non-tender non-distended with normal bowel sounds.  Skin: Skin color, texture, turgor normal. No rashes.  Musculoskeletal: Normal gait.   Lymph Nodes: No cervical or supraclavicular adenopathy.  Neurologic: Normal strength and tone. No focal numbness or weakness.   Psychiatric: Not depressed.        Laboratory  Lab Results   Component Value Date    WBC 6.23 06/30/2022    HGB 14.5 06/30/2022    HCT 45.8 06/30/2022     06/30/2022    CHOL 144 07/01/2021    TRIG 106 07/01/2021    HDL 42 07/01/2021    ALT 19 06/30/2022    AST 29 06/30/2022     06/30/2022    K 5.2 (H) 06/30/2022     06/30/2022    CREATININE 1.0 06/30/2022    BUN 7 (L) 06/30/2022    CO2 27 06/30/2022    TSH 3.566 02/10/2022    PSA 3.5 07/01/2021    INR 0.9 11/03/2021    HGBA1C 5.1 11/18/2019     2016:   Normal EGD    2014:   Diverticulosis on C Scope    Creat , serum: 1.0 in 06/2022    ASSESSMENT & PLAN:     Same day appt.     Fall, initial encounter  -     CT Head Without Contrast; Future; Expected date: 08/11/2022  -     X-Ray Lumbar Spine 5 View; Future; Expected date: 08/11/2022  -     X-Ray Sacroiliac Joints Complete; Future; Expected date: 08/11/2022  -     X-Ray Pelvis Complete min 3 views; Future; Expected date: 08/11/2022  -     X-Ray Cervical Spine 5 View With Flex And Ext; Future; Expected date: 08/11/2022    Muscle pain  -     CT Head Without Contrast; Future; Expected date: 08/11/2022  -     X-Ray Lumbar Spine 5 View; Future; Expected date: 08/11/2022  -     X-Ray Sacroiliac Joints Complete; Future; Expected date: 08/11/2022  -     X-Ray Pelvis Complete min 3 views; Future; Expected date: 08/11/2022  -     X-Ray Cervical Spine 5 View With Flex And Ext; Future; Expected date:  08/11/2022    Acute midline low back pain without sciatica  -     CT Head Without Contrast; Future; Expected date: 08/11/2022  -     X-Ray Lumbar Spine 5 View; Future; Expected date: 08/11/2022  -     X-Ray Sacroiliac Joints Complete; Future; Expected date: 08/11/2022  -     X-Ray Pelvis Complete min 3 views; Future; Expected date: 08/11/2022  -     X-Ray Cervical Spine 5 View With Flex And Ext; Future; Expected date: 08/11/2022    Other orders  -     methocarbamoL (ROBAXIN) 500 MG Tab; Take 1 tablet (500 mg total) by mouth every 8 (eight) hours as needed (Muscle Spasm).  Dispense: 30 tablet; Refill: 0  -     meloxicam (MOBIC) 15 MG tablet; Take 1 tablet (15 mg total) by mouth daily as needed for Pain.  Dispense: 30 each; Refill: 0  -     ketorolac injection 30 mg    *He has been that advised that with an acute onset of dizziness, headaches, change in vision or uncontrolled pain needs to report to the ER immediately.   Requesting note for work and has been given.        August 11, 2022    Froylan Borja  [address] [place] [address] [address]  [facility] [id number]             [facility]  [address] [place] [address]  [facility] [address]  Phone: [phone]  Fax: [phone]  Froylan Borja was seen in my Same Day Internal Medicine clinic today (08/11/2022) on behalf of his Primary Care Provider, Dr. BRAIN Almonte.     He declined an ER workup post a reported mechanical fall at work that reportedly occurred on Tuesday, 08/09/2022.   He is being sent for Xrays and a CT of his head to further evaluate the reported physical impacts from the fall.       Thanks, in advance, for your understanding during his time of illness.     Sincerely,        FILOMENA Holland   (w/ Dr. VIRGILIO Alcantara and Dr. NIGHAT Lam)  [phone]       Future Appointments   Date Time Provider Department Center   9/8/2022  8:30 AM Dimitry Patel MD [facility] [place]        Medication List          Accurate as of August 11, 2022  3:36  PM. If you have any questions, ask your nurse or doctor.            START taking these medications    meloxicam 15 MG tablet  Commonly known as: MOBIC  Take 1 tablet (15 mg total) by mouth daily as needed for Pain.  Started by: FILOMENA Cerda     methocarbamoL 500 MG Tab  Commonly known as: ROBAXIN  Take 1 tablet (500 mg total) by mouth every 8 (eight) hours as needed (Muscle Spasm).  Started by: FILOMENA Cerda        CONTINUE taking these medications    albuterol 90 mcg/actuation inhaler  Commonly known as: PROVENTIL HFA  Inhale 2 puffs into the lungs every 6 (six) hours as needed for Wheezing. Rescue     amLODIPine 5 MG tablet  Commonly known as: NORVASC  Take 1 tablet (5 mg total) by mouth once daily.     ammonium lactate 12 % lotion  Commonly known as: LAC-HYDRIN  Apply topically 2 (two) times daily as needed for Dry Skin. Apply to BLE and feet     buPROPion 150 MG TBSR 12 hr tablet  Commonly known as: WELLBUTRIN SR  Take 1 tablet (150 mg total) by mouth once daily.     cyanocobalamin 1000 MCG tablet  Commonly known as: VITAMIN B-12     furosemide 20 MG tablet  Commonly known as: LASIX  Take 1 tablet (20 mg total) by mouth once daily.     gabapentin 300 MG capsule  Commonly known as: NEURONTIN  Take 1 capsule (300 mg total) by mouth every evening.     latanoprost 0.005 % ophthalmic solution  Place 1 drop into both eyes every evening.     losartan 100 MG tablet  Commonly known as: COZAAR  Take 1 tablet (100 mg total) by mouth once daily.     melatonin 3 mg tablet  Commonly known as: MELATIN  Take 2 tablets (6 mg total) by mouth nightly.     multivitamin per tablet  Commonly known as: THERAGRAN     timolol maleate 0.5% 0.5 % Drop  Commonly known as: TIMOPTIC  Place 1 drop into both eyes 2 (two) times daily.        STOP taking these medications    levoFLOXacin 500 MG tablet  Commonly known as: LEVAQUIN  Stopped by: FILOMENA Cerda     miconazole NITRATE 2 % 2 % top  powder  Commonly known as: MICOTIN  Stopped by: FILOMENA Cerda     tadalafiL 20 MG Tab  Commonly known as: CIALIS  Stopped by: FILOMENA Cerda     tamsulosin 0.4 mg Cap  Commonly known as: FLOMAX  Stopped by: FILOMENA Cerda           Where to Get Your Medications      These medications were sent to [id number] - [place], [place]  [facility] [address] [facility] [facility] [place] [address]    Phone: [phone]   · meloxicam 15 MG tablet  · methocarbamoL 500 MG Tab         Signing Physician:  FILOMENA Cerda

## 2022-08-11 ENCOUNTER — OFFICE VISIT (OUTPATIENT)
Dept: INTERNAL MEDICINE | Facility: CLINIC | Age: 67
End: 2022-08-11
Payer: MEDICARE

## 2022-08-11 VITALS
OXYGEN SATURATION: 97 % | WEIGHT: 266.31 LBS | BODY MASS INDEX: 33.29 KG/M2 | SYSTOLIC BLOOD PRESSURE: 142 MMHG | HEART RATE: 101 BPM | DIASTOLIC BLOOD PRESSURE: 62 MMHG

## 2022-08-11 DIAGNOSIS — M79.10 MUSCLE PAIN: ICD-10-CM

## 2022-08-11 DIAGNOSIS — M54.50 ACUTE MIDLINE LOW BACK PAIN WITHOUT SCIATICA: ICD-10-CM
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
## 2022-08-11 PROCEDURE — 3066F PR DOCUMENTATION OF TREATMENT FOR NEPHROPATHY: ICD-10-PCS | Mod: CPTII,S$GLB,, | Performed by: NURSE PRACTITIONER

## 2022-08-11 PROCEDURE — 96372 PR INJECTION,THERAP/PROPH/DIAG2ST, IM OR SUBCUT: ICD-10-PCS | Mod: S$GLB,,, | Performed by: NURSE PRACTITIONER

## 2022-08-11 PROCEDURE — 99214 OFFICE O/P EST MOD 30 MIN: CPT | Mod: 25,S$GLB,, | Performed by: NURSE PRACTITIONER

## 2022-08-11 PROCEDURE — 4010F ACE/ARB THERAPY RXD/TAKEN: CPT | Mod: CPTII,S$GLB,, | Performed by: NURSE PRACTITIONER

## 2022-08-11 PROCEDURE — 1125F AMNT PAIN NOTED PAIN PRSNT: CPT | Mod: CPTII,S$GLB,, | Performed by: NURSE PRACTITIONER

## 2022-08-11 PROCEDURE — 1159F MED LIST DOCD IN RCRD: CPT | Mod: CPTII,S$GLB,, | Performed by: NURSE PRACTITIONER

## 2022-08-11 PROCEDURE — 3288F FALL RISK ASSESSMENT DOCD: CPT | Mod: CPTII,S$GLB,, | Performed by: NURSE PRACTITIONER

## 2022-08-11 PROCEDURE — 99999 PR PBB SHADOW E&M-EST. PATIENT-LVL IV: ICD-10-PCS | Mod: PBBFAC,,, | Performed by: NURSE PRACTITIONER

## 2022-08-11 PROCEDURE — 96372 THER/PROPH/DIAG INJ SC/IM: CPT | Mod: S$GLB,,, | Performed by: NURSE PRACTITIONER

## 2022-08-11 PROCEDURE — 3008F PR BODY MASS INDEX (BMI) DOCUMENTED: ICD-10-PCS | Mod: CPTII,S$GLB,, | Performed by: NURSE PRACTITIONER

## 2022-08-11 PROCEDURE — 1101F PR PT FALLS ASSESS DOC 0-1 FALLS W/OUT INJ PAST YR: ICD-10-PCS | Mod: CPTII,S$GLB,, | Performed by: NURSE PRACTITIONER

## 2022-08-11 PROCEDURE — 3077F PR MOST RECENT SYSTOLIC BLOOD PRESSURE >= 140 MM HG: ICD-10-PCS | Mod: CPTII,S$GLB,, | Performed by: NURSE PRACTITIONER

## 2022-08-11 PROCEDURE — 99999 PR PBB SHADOW E&M-EST. PATIENT-LVL IV: CPT | Mod: PBBFAC,,, | Performed by: NURSE PRACTITIONER

## 2022-08-11 PROCEDURE — 99214 PR OFFICE/OUTPT VISIT, EST, LEVL IV, 30-39 MIN: ICD-10-PCS | Mod: 25,S$GLB,, | Performed by: NURSE PRACTITIONER

## 2022-08-11 PROCEDURE — 3078F DIAST BP <80 MM HG: CPT | Mod: CPTII,S$GLB,, | Performed by: NURSE PRACTITIONER

## 2022-08-11 PROCEDURE — 1125F PR PAIN SEVERITY QUANTIFIED, PAIN PRESENT: ICD-10-PCS | Mod: CPTII,S$GLB,, | Performed by: NURSE PRACTITIONER

## 2022-08-11 PROCEDURE — 3066F NEPHROPATHY DOC TX: CPT | Mod: CPTII,S$GLB,, | Performed by: NURSE PRACTITIONER

## 2022-08-11 PROCEDURE — 3078F PR MOST RECENT DIASTOLIC BLOOD PRESSURE < 80 MM HG: ICD-10-PCS | Mod: CPTII,S$GLB,, | Performed by: NURSE PRACTITIONER

## 2022-08-11 PROCEDURE — 3061F NEG MICROALBUMINURIA REV: CPT | Mod: CPTII,S$GLB,, | Performed by: NURSE PRACTITIONER

## 2022-08-11 PROCEDURE — 4010F PR ACE/ARB THEARPY RXD/TAKEN: ICD-10-PCS | Mod: CPTII,S$GLB,, | Performed by: NURSE PRACTITIONER

## 2022-08-11 PROCEDURE — 3061F PR NEG MICROALBUMINURIA RESULT DOCUMENTED/REVIEW: ICD-10-PCS | Mod: CPTII,S$GLB,, | Performed by: NURSE PRACTITIONER

## 2022-08-11 PROCEDURE — 1101F PT FALLS ASSESS-DOCD LE1/YR: CPT | Mod: CPTII,S$GLB,, | Performed by: NURSE PRACTITIONER

## 2022-08-11 PROCEDURE — 3008F BODY MASS INDEX DOCD: CPT | Mod: CPTII,S$GLB,, | Performed by: NURSE PRACTITIONER

## 2022-08-11 PROCEDURE — 3077F SYST BP >= 140 MM HG: CPT | Mod: CPTII,S$GLB,, | Performed by: NURSE PRACTITIONER

## 2022-08-11 PROCEDURE — 3288F PR FALLS RISK ASSESSMENT DOCUMENTED: ICD-10-PCS | Mod: CPTII,S$GLB,, | Performed by: NURSE PRACTITIONER

## 2022-08-11 PROCEDURE — 1159F PR MEDICATION LIST DOCUMENTED IN MEDICAL RECORD: ICD-10-PCS | Mod: CPTII,S$GLB,, | Performed by: NURSE PRACTITIONER

## 2022-08-11 RX ORDER — METHOCARBAMOL 500 MG/1
500 TABLET, FILM COATED ORAL EVERY 8 HOURS PRN
Qty: 30 TABLET | Refills: 0 | Status: SHIPPED | OUTPATIENT
Start: 2022-08-11 | End: 2022-12-09

## 2022-08-11 RX ORDER — MELOXICAM 15 MG/1
15 TABLET ORAL DAILY PRN
Qty: 30 EACH | Refills: 0 | Status: SHIPPED | OUTPATIENT
Start: 2022-08-11 | End: 2022-12-09

## 2022-08-11 RX ORDER — KETOROLAC TROMETHAMINE 30 MG/ML
30 INJECTION, SOLUTION INTRAMUSCULAR; INTRAVENOUS
Status: COMPLETED | OUTPATIENT
Start: 2022-08-11 | End: 2022-08-11

## 2022-08-11 RX ADMIN — KETOROLAC TROMETHAMINE 30 MG: 30 INJECTION, SOLUTION INTRAMUSCULAR; INTRAVENOUS at 03:08

## 2022-08-11 NOTE — PROGRESS NOTES
After obtaining consent, and per orders of MOHINI Del Angel, injection of Ketoralac 30 mg IM  given by Evon Nieves LPN. Patient instructed to remain in clinic for 15 minutes afterwards, and to report any adverse reaction to staff immediately.

## 2022-08-12 ENCOUNTER — HOSPITAL ENCOUNTER (OUTPATIENT)
Dept: RADIOLOGY | Facility: HOSPITAL | Age: 67
Discharge: HOME | End: 2022-08-12
Attending: NURSE PRACTITIONER
Payer: MEDICARE

## 2022-08-12 ENCOUNTER — TELEPHONE (OUTPATIENT)
Dept: INTERNAL MEDICINE | Facility: CLINIC | Age: 67
End: 2022-08-12
Payer: MEDICARE

## 2022-08-12 DIAGNOSIS — M79.10 MUSCLE PAIN: ICD-10-CM

## 2022-08-12 DIAGNOSIS — M54.50 ACUTE MIDLINE LOW BACK PAIN WITHOUT SCIATICA: ICD-10-CM
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
## 2022-08-12 DIAGNOSIS — R93.7 ABNORMAL X-RAY OF CERVICAL SPINE: Primary | ICD-10-CM

## 2022-08-12 PROCEDURE — 72202 X-RAY EXAM SI JOINTS 3/> VWS: CPT | Mod: 26,,, | Performed by: RADIOLOGY

## 2022-08-12 PROCEDURE — 72052 XR CERVICAL SPINE 5 VIEW WITH FLEX AND EXT: ICD-10-PCS | Mod: 26,,, | Performed by: RADIOLOGY

## 2022-08-12 PROCEDURE — 72110 XR LUMBAR SPINE COMPLETE 5 VIEW: ICD-10-PCS | Mod: 26,,, | Performed by: RADIOLOGY

## 2022-08-12 PROCEDURE — 72052 X-RAY EXAM NECK SPINE 6/>VWS: CPT | Mod: 26,,, | Performed by: RADIOLOGY

## 2022-08-12 PROCEDURE — 72110 X-RAY EXAM L-2 SPINE 4/>VWS: CPT | Mod: 26,,, | Performed by: RADIOLOGY

## 2022-08-12 PROCEDURE — 72110 X-RAY EXAM L-2 SPINE 4/>VWS: CPT | Mod: TC

## 2022-08-12 PROCEDURE — 72202 XR SACROILIAC JOINTS COMPLETE: ICD-10-PCS | Mod: 26,,, | Performed by: RADIOLOGY

## 2022-08-12 PROCEDURE — 73521 X-RAY EXAM HIPS BI 2 VIEWS: CPT | Mod: 26,,, | Performed by: RADIOLOGY

## 2022-08-12 PROCEDURE — 72202 X-RAY EXAM SI JOINTS 3/> VWS: CPT | Mod: TC

## 2022-08-12 PROCEDURE — 72052 X-RAY EXAM NECK SPINE 6/>VWS: CPT | Mod: TC

## 2022-08-12 PROCEDURE — 73521 XR HIPS BILATERAL 2 VIEW INCL AP PELVIS: ICD-10-PCS | Mod: 26,,, | Performed by: RADIOLOGY

## 2022-08-12 PROCEDURE — 73521 X-RAY EXAM HIPS BI 2 VIEWS: CPT | Mod: TC

## 2022-08-12 NOTE — TELEPHONE ENCOUNTER
Changes on cervical spine films, uncertain if this is new and related to recent injury.   Referral to Back and spine at this time.       SDJ

## 2022-08-16 ENCOUNTER — TELEPHONE (OUTPATIENT)
Dept: INTERNAL MEDICINE | Facility: CLINIC | Age: 67
End: 2022-08-16

## 2022-08-16 ENCOUNTER — HOSPITAL ENCOUNTER (OUTPATIENT)
Dept: RADIOLOGY | Facility: HOSPITAL | Age: 67
Discharge: HOME | End: 2022-08-16
Attending: NURSE PRACTITIONER
Payer: MEDICARE

## 2022-08-16 DIAGNOSIS — M54.50 ACUTE MIDLINE LOW BACK PAIN WITHOUT SCIATICA: ICD-10-CM
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
## 2022-08-16 DIAGNOSIS — M79.10 MUSCLE PAIN: ICD-10-CM

## 2022-08-16 PROCEDURE — 70450 CT HEAD/BRAIN W/O DYE: CPT | Mod: TC

## 2022-08-16 PROCEDURE — 70450 CT HEAD/BRAIN W/O DYE: CPT | Mod: 26,,, | Performed by: RADIOLOGY

## 2022-08-16 PROCEDURE — 70450 CT HEAD WITHOUT CONTRAST: ICD-10-PCS | Mod: 26,,, | Performed by: RADIOLOGY

## 2022-08-16 NOTE — TELEPHONE ENCOUNTER
----- Message from FILOMENA Holland sent at 8/16/2022 11:21 AM CDT -----  Please let Mr. Galeano know that his CT head is negative.     Thanks

## 2022-09-08 ENCOUNTER — OFFICE VISIT (OUTPATIENT)
Dept: ORTHOPEDICS | Facility: CLINIC | Age: 67
End: 2022-09-08
Payer: MEDICARE

## 2022-09-08 VITALS — WEIGHT: 261.44 LBS | HEIGHT: 75 IN | BODY MASS INDEX: 32.51 KG/M2

## 2022-09-08 DIAGNOSIS — M17.12 PRIMARY OSTEOARTHRITIS OF LEFT KNEE: Primary | ICD-10-CM

## 2022-09-08 DIAGNOSIS — M17.11 PRIMARY OSTEOARTHRITIS OF RIGHT KNEE: ICD-10-CM

## 2022-09-08 PROCEDURE — 1160F PR REVIEW ALL MEDS BY PRESCRIBER/CLIN PHARMACIST DOCUMENTED: ICD-10-PCS | Mod: CPTII,S$GLB,, | Performed by: ORTHOPAEDIC SURGERY

## 2022-09-08 PROCEDURE — 3066F PR DOCUMENTATION OF TREATMENT FOR NEPHROPATHY: ICD-10-PCS | Mod: CPTII,S$GLB,, | Performed by: ORTHOPAEDIC SURGERY

## 2022-09-08 PROCEDURE — 99999 PR PBB SHADOW E&M-EST. PATIENT-LVL III: ICD-10-PCS | Mod: PBBFAC,,, | Performed by: ORTHOPAEDIC SURGERY

## 2022-09-08 PROCEDURE — 1101F PR PT FALLS ASSESS DOC 0-1 FALLS W/OUT INJ PAST YR: ICD-10-PCS | Mod: CPTII,S$GLB,, | Performed by: ORTHOPAEDIC SURGERY

## 2022-09-08 PROCEDURE — 1159F PR MEDICATION LIST DOCUMENTED IN MEDICAL RECORD: ICD-10-PCS | Mod: CPTII,S$GLB,, | Performed by: ORTHOPAEDIC SURGERY

## 2022-09-08 PROCEDURE — 1160F RVW MEDS BY RX/DR IN RCRD: CPT | Mod: CPTII,S$GLB,, | Performed by: ORTHOPAEDIC SURGERY

## 2022-09-08 PROCEDURE — 4010F PR ACE/ARB THEARPY RXD/TAKEN: ICD-10-PCS | Mod: CPTII,S$GLB,, | Performed by: ORTHOPAEDIC SURGERY

## 2022-09-08 PROCEDURE — 3061F PR NEG MICROALBUMINURIA RESULT DOCUMENTED/REVIEW: ICD-10-PCS | Mod: CPTII,S$GLB,, | Performed by: ORTHOPAEDIC SURGERY

## 2022-09-08 PROCEDURE — 99499 NO LOS: ICD-10-PCS | Mod: S$GLB,,, | Performed by: ORTHOPAEDIC SURGERY

## 2022-09-08 PROCEDURE — 3008F BODY MASS INDEX DOCD: CPT | Mod: CPTII,S$GLB,, | Performed by: ORTHOPAEDIC SURGERY

## 2022-09-08 PROCEDURE — 99999 PR PBB SHADOW E&M-EST. PATIENT-LVL III: CPT | Mod: PBBFAC,,, | Performed by: ORTHOPAEDIC SURGERY

## 2022-09-08 PROCEDURE — 1101F PT FALLS ASSESS-DOCD LE1/YR: CPT | Mod: CPTII,S$GLB,, | Performed by: ORTHOPAEDIC SURGERY

## 2022-09-08 PROCEDURE — 1159F MED LIST DOCD IN RCRD: CPT | Mod: CPTII,S$GLB,, | Performed by: ORTHOPAEDIC SURGERY

## 2022-09-08 PROCEDURE — 3288F PR FALLS RISK ASSESSMENT DOCUMENTED: ICD-10-PCS | Mod: CPTII,S$GLB,, | Performed by: ORTHOPAEDIC SURGERY

## 2022-09-08 PROCEDURE — 4010F ACE/ARB THERAPY RXD/TAKEN: CPT | Mod: CPTII,S$GLB,, | Performed by: ORTHOPAEDIC SURGERY

## 2022-09-08 PROCEDURE — 99499 UNLISTED E&M SERVICE: CPT | Mod: S$GLB,,, | Performed by: ORTHOPAEDIC SURGERY

## 2022-09-08 PROCEDURE — 20610 DRAIN/INJ JOINT/BURSA W/O US: CPT | Mod: 50,S$GLB,, | Performed by: ORTHOPAEDIC SURGERY

## 2022-09-08 PROCEDURE — 20610 LARGE JOINT ASPIRATION/INJECTION: BILATERAL KNEE: ICD-10-PCS | Mod: 50,S$GLB,, | Performed by: ORTHOPAEDIC SURGERY

## 2022-09-08 PROCEDURE — 1125F PR PAIN SEVERITY QUANTIFIED, PAIN PRESENT: ICD-10-PCS | Mod: CPTII,S$GLB,, | Performed by: ORTHOPAEDIC SURGERY

## 2022-09-08 PROCEDURE — 3061F NEG MICROALBUMINURIA REV: CPT | Mod: CPTII,S$GLB,, | Performed by: ORTHOPAEDIC SURGERY

## 2022-09-08 PROCEDURE — 1125F AMNT PAIN NOTED PAIN PRSNT: CPT | Mod: CPTII,S$GLB,, | Performed by: ORTHOPAEDIC SURGERY

## 2022-09-08 PROCEDURE — 3066F NEPHROPATHY DOC TX: CPT | Mod: CPTII,S$GLB,, | Performed by: ORTHOPAEDIC SURGERY

## 2022-09-08 PROCEDURE — 3008F PR BODY MASS INDEX (BMI) DOCUMENTED: ICD-10-PCS | Mod: CPTII,S$GLB,, | Performed by: ORTHOPAEDIC SURGERY

## 2022-09-08 PROCEDURE — 3288F FALL RISK ASSESSMENT DOCD: CPT | Mod: CPTII,S$GLB,, | Performed by: ORTHOPAEDIC SURGERY

## 2022-09-08 RX ADMIN — TRIAMCINOLONE ACETONIDE 40 MG: 40 INJECTION, SUSPENSION INTRA-ARTICULAR; INTRAMUSCULAR at 08:09

## 2022-09-10 RX ORDER — TRIAMCINOLONE ACETONIDE 40 MG/ML
40 INJECTION, SUSPENSION INTRA-ARTICULAR; INTRAMUSCULAR
Status: DISCONTINUED | OUTPATIENT
Start: 2022-09-08 | End: 2022-09-10 | Stop reason: HOSPADM

## 2022-09-10 NOTE — PROCEDURES
Large Joint Aspiration/Injection: bilateral knee    Date/Time: 9/8/2022 8:30 AM  Performed by: Dimitry Patel MD  Authorized by: Dimitry Patel MD     Consent Done?:  Yes (Verbal)  Indications:  Pain and arthritis  Site marked: the procedure site was marked    Timeout: prior to procedure the correct patient, procedure, and site was verified    Prep: patient was prepped and draped in usual sterile fashion      Local anesthesia used?: Yes    Local anesthetic:  Lidocaine 1% without epinephrine  Anesthetic total (ml):  5      Details:  Needle Size:  25 G  Ultrasonic Guidance for needle placement?: No    Approach:  Anterolateral  Location:  Knee  Laterality:  Bilateral  Site:  Bilateral knee  Medications (Right):  40 mg triamcinolone acetonide 40 mg/mL  Medications (Left):  40 mg triamcinolone acetonide 40 mg/mL  Patient tolerance:  Patient tolerated the procedure well with no immediate complications

## 2022-09-10 NOTE — PROGRESS NOTES
Subjective:      Patient ID: Froylan Borja is a 67 y.o. male.    Chief Complaint:   Pain of the Right Knee and Pain of the Left Knee    HPI    The patient got good results cortisone injection previously for osteoarthritis knee pain, and made this appointment for the purpose of repeating this treatment.  No evaluation/management charge for this visit.      Dimitry Patel MD  Orthopedic Surgery

## 2022-10-07 ENCOUNTER — OFFICE VISIT (OUTPATIENT)
Dept: INTERNAL MEDICINE | Facility: CLINIC | Age: 67
End: 2022-10-07
Payer: MEDICARE

## 2022-10-07 VITALS
SYSTOLIC BLOOD PRESSURE: 155 MMHG | BODY MASS INDEX: 32.32 KG/M2 | HEART RATE: 86 BPM | WEIGHT: 259.94 LBS | OXYGEN SATURATION: 96 % | HEIGHT: 75 IN | DIASTOLIC BLOOD PRESSURE: 90 MMHG

## 2022-10-07 DIAGNOSIS — I10 PRIMARY HYPERTENSION: ICD-10-CM
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
## 2022-10-07 DIAGNOSIS — Z48.02 VISIT FOR SUTURE REMOVAL: ICD-10-CM

## 2022-10-07 PROCEDURE — 1159F PR MEDICATION LIST DOCUMENTED IN MEDICAL RECORD: ICD-10-PCS | Mod: CPTII,S$GLB,, | Performed by: INTERNAL MEDICINE

## 2022-10-07 PROCEDURE — 4010F PR ACE/ARB THEARPY RXD/TAKEN: ICD-10-PCS | Mod: CPTII,S$GLB,, | Performed by: INTERNAL MEDICINE

## 2022-10-07 PROCEDURE — 3061F NEG MICROALBUMINURIA REV: CPT | Mod: CPTII,S$GLB,, | Performed by: INTERNAL MEDICINE

## 2022-10-07 PROCEDURE — 1100F PTFALLS ASSESS-DOCD GE2>/YR: CPT | Mod: CPTII,S$GLB,, | Performed by: INTERNAL MEDICINE

## 2022-10-07 PROCEDURE — 3077F SYST BP >= 140 MM HG: CPT | Mod: CPTII,S$GLB,, | Performed by: INTERNAL MEDICINE

## 2022-10-07 PROCEDURE — 3288F FALL RISK ASSESSMENT DOCD: CPT | Mod: CPTII,S$GLB,, | Performed by: INTERNAL MEDICINE

## 2022-10-07 PROCEDURE — 99213 PR OFFICE/OUTPT VISIT, EST, LEVL III, 20-29 MIN: ICD-10-PCS | Mod: S$GLB,,, | Performed by: INTERNAL MEDICINE

## 2022-10-07 PROCEDURE — 1126F AMNT PAIN NOTED NONE PRSNT: CPT | Mod: CPTII,S$GLB,, | Performed by: INTERNAL MEDICINE

## 2022-10-07 PROCEDURE — 3080F PR MOST RECENT DIASTOLIC BLOOD PRESSURE >= 90 MM HG: ICD-10-PCS | Mod: CPTII,S$GLB,, | Performed by: INTERNAL MEDICINE

## 2022-10-07 PROCEDURE — 3066F NEPHROPATHY DOC TX: CPT | Mod: CPTII,S$GLB,, | Performed by: INTERNAL MEDICINE

## 2022-10-07 PROCEDURE — 1159F MED LIST DOCD IN RCRD: CPT | Mod: CPTII,S$GLB,, | Performed by: INTERNAL MEDICINE

## 2022-10-07 PROCEDURE — 3061F PR NEG MICROALBUMINURIA RESULT DOCUMENTED/REVIEW: ICD-10-PCS | Mod: CPTII,S$GLB,, | Performed by: INTERNAL MEDICINE

## 2022-10-07 PROCEDURE — 1126F PR PAIN SEVERITY QUANTIFIED, NO PAIN PRESENT: ICD-10-PCS | Mod: CPTII,S$GLB,, | Performed by: INTERNAL MEDICINE

## 2022-10-07 PROCEDURE — 3008F PR BODY MASS INDEX (BMI) DOCUMENTED: ICD-10-PCS | Mod: CPTII,S$GLB,, | Performed by: INTERNAL MEDICINE

## 2022-10-07 PROCEDURE — 3288F PR FALLS RISK ASSESSMENT DOCUMENTED: ICD-10-PCS | Mod: CPTII,S$GLB,, | Performed by: INTERNAL MEDICINE

## 2022-10-07 PROCEDURE — 3080F DIAST BP >= 90 MM HG: CPT | Mod: CPTII,S$GLB,, | Performed by: INTERNAL MEDICINE

## 2022-10-07 PROCEDURE — 99999 PR PBB SHADOW E&M-EST. PATIENT-LVL IV: ICD-10-PCS | Mod: PBBFAC,,, | Performed by: INTERNAL MEDICINE

## 2022-10-07 PROCEDURE — 99999 PR PBB SHADOW E&M-EST. PATIENT-LVL IV: CPT | Mod: PBBFAC,,, | Performed by: INTERNAL MEDICINE

## 2022-10-07 PROCEDURE — 3066F PR DOCUMENTATION OF TREATMENT FOR NEPHROPATHY: ICD-10-PCS | Mod: CPTII,S$GLB,, | Performed by: INTERNAL MEDICINE

## 2022-10-07 PROCEDURE — 3008F BODY MASS INDEX DOCD: CPT | Mod: CPTII,S$GLB,, | Performed by: INTERNAL MEDICINE

## 2022-10-07 PROCEDURE — 3077F PR MOST RECENT SYSTOLIC BLOOD PRESSURE >= 140 MM HG: ICD-10-PCS | Mod: CPTII,S$GLB,, | Performed by: INTERNAL MEDICINE

## 2022-10-07 PROCEDURE — 4010F ACE/ARB THERAPY RXD/TAKEN: CPT | Mod: CPTII,S$GLB,, | Performed by: INTERNAL MEDICINE

## 2022-10-07 PROCEDURE — 99213 OFFICE O/P EST LOW 20 MIN: CPT | Mod: S$GLB,,, | Performed by: INTERNAL MEDICINE

## 2022-10-07 PROCEDURE — 1100F PR PT FALLS ASSESS DOC 2+ FALLS/FALL W/INJURY/YR: ICD-10-PCS | Mod: CPTII,S$GLB,, | Performed by: INTERNAL MEDICINE

## 2022-10-07 NOTE — PROGRESS NOTES
"Subjective:       Patient ID: Froylan Borja is a 67 y.o. male.    Chief Complaint: Suture / Staple Removal  This is a 67-year-old who presents today for suture removal he reports had a fall and went to [facility] ER where they placed sutures to the right eyebrow area few weeks ago.  He reports he did know when he was post to have them out but he made appointment to have the removed the wound seems to be healing well patient reports he had a fall when he was intoxicated and fell on the forehead his glasses broke and injured the right eyebrow area he has been using some topical antibiotic ointment with improvement    Suture / Staple Removal    Review of Systems   HENT:          Laceration right eyebrow  With sutures  Healing    Skin:         Abrasion right arm    Neurological:  Negative for headaches.     Objective:    Blood pressure (!) 155/90, pulse 86, height 6' 3" (1.905 m), weight 117.9 kg (259 lb 14.8 oz), SpO2 96 %.  No  Physical Exam  Constitutional:       General: He is not in acute distress.  HENT:      Head: Normocephalic.      Comments: Laceration right forehead  Near eyebrow  Healed well crusting scab   Cardiovascular:      Rate and Rhythm: Normal rate and regular rhythm.      Heart sounds: Normal heart sounds. No murmur heard.    No friction rub. No gallop.   Pulmonary:      Effort: Pulmonary effort is normal. No respiratory distress.      Breath sounds: Normal breath sounds.   Musculoskeletal:      Cervical back: Neck supple.      Comments: Abrasion right forearm no drainage  Scab mireya    Skin:     Findings: No erythema.   Neurological:      Mental Status: He is alert.       Assessment:       1. Visit for suture removal    2. Fall, sequela    3. Primary hypertension          Plan:       Froylan was seen today for suture / staple removal.    Diagnoses and all orders for this visit:    Visit for suture removal  Fall, sequela  Recent fall when he was intoxicated and injured his right forehead patient is sutures " were removed can continue to use topical antibiotic ointment      Primary hypertension  Blood pressure discussed he reports not been taking his medication consistently and will resume he will follow-up with PCP is recommended

## 2022-12-06 ENCOUNTER — PES CALL (OUTPATIENT)
Dept: ADMINISTRATIVE | Facility: CLINIC | Age: 67
End: 2022-12-06
Payer: MEDICARE

## 2022-12-09 ENCOUNTER — OFFICE VISIT (OUTPATIENT)
Dept: INTERNAL MEDICINE | Facility: CLINIC | Age: 67
End: 2022-12-09
Payer: MEDICARE

## 2022-12-09 ENCOUNTER — LAB VISIT (OUTPATIENT)
Dept: LAB | Facility: HOSPITAL | Age: 67
End: 2022-12-09
Payer: MEDICARE

## 2022-12-09 VITALS
HEIGHT: 75 IN | SYSTOLIC BLOOD PRESSURE: 170 MMHG | WEIGHT: 261.44 LBS | DIASTOLIC BLOOD PRESSURE: 80 MMHG | BODY MASS INDEX: 32.51 KG/M2 | HEART RATE: 92 BPM | OXYGEN SATURATION: 97 % | RESPIRATION RATE: 18 BRPM

## 2022-12-09 DIAGNOSIS — Z12.11 COLON CANCER SCREENING: ICD-10-CM

## 2022-12-09 DIAGNOSIS — E66.9 OBESITY (BMI 30-39.9): ICD-10-CM

## 2022-12-09 DIAGNOSIS — I50.9 CONGESTIVE HEART FAILURE, UNSPECIFIED HF CHRONICITY, UNSPECIFIED HEART FAILURE TYPE: ICD-10-CM

## 2022-12-09 DIAGNOSIS — E78.2 MIXED HYPERLIPIDEMIA: ICD-10-CM

## 2022-12-09 DIAGNOSIS — G62.1 ALCOHOL-INDUCED POLYNEUROPATHY: ICD-10-CM

## 2022-12-09 DIAGNOSIS — I10 ESSENTIAL HYPERTENSION: ICD-10-CM

## 2022-12-09 DIAGNOSIS — K74.69 OTHER CIRRHOSIS OF LIVER: ICD-10-CM

## 2022-12-09 DIAGNOSIS — F32.0 CURRENT MILD EPISODE OF MAJOR DEPRESSIVE DISORDER, UNSPECIFIED WHETHER RECURRENT: Chronic | ICD-10-CM

## 2022-12-09 DIAGNOSIS — Z23 NEED FOR PNEUMOCOCCAL VACCINATION: ICD-10-CM

## 2022-12-09 DIAGNOSIS — Z23 NEED FOR INFLUENZA VACCINATION: ICD-10-CM

## 2022-12-09 DIAGNOSIS — Z23 NEED FOR SHINGLES VACCINE: ICD-10-CM

## 2022-12-09 DIAGNOSIS — Z86.19 HISTORY OF HEPATITIS C: ICD-10-CM

## 2022-12-09 DIAGNOSIS — Z78.9 ALCOHOL USE: ICD-10-CM

## 2022-12-09 DIAGNOSIS — F41.9 ANXIETY: ICD-10-CM

## 2022-12-09 DIAGNOSIS — I10 ESSENTIAL HYPERTENSION: Primary | ICD-10-CM

## 2022-12-09 LAB
ALBUMIN SERPL BCP-MCNC: 4.1 G/DL (ref 3.5–5.2)
ALP SERPL-CCNC: 117 U/L (ref 55–135)
ALT SERPL W/O P-5'-P-CCNC: 36 U/L (ref 10–44)
ANION GAP SERPL CALC-SCNC: 9 MMOL/L (ref 8–16)
AST SERPL-CCNC: 46 U/L (ref 10–40)
BASOPHILS # BLD AUTO: 0.09 K/UL (ref 0–0.2)
BASOPHILS NFR BLD: 1.2 % (ref 0–1.9)
BILIRUB SERPL-MCNC: 1.3 MG/DL (ref 0.1–1)
BNP SERPL-MCNC: 37 PG/ML (ref 0–99)
BUN SERPL-MCNC: 13 MG/DL (ref 8–23)
CALCIUM SERPL-MCNC: 9.6 MG/DL (ref 8.7–10.5)
CHLORIDE SERPL-SCNC: 98 MMOL/L (ref 95–110)
CHOLEST SERPL-MCNC: 201 MG/DL (ref 120–199)
CHOLEST/HDLC SERPL: 2.2 {RATIO} (ref 2–5)
CO2 SERPL-SCNC: 26 MMOL/L (ref 23–29)
CREAT SERPL-MCNC: 1.2 MG/DL (ref 0.5–1.4)
DIFFERENTIAL METHOD: ABNORMAL
EOSINOPHIL # BLD AUTO: 0.3 K/UL (ref 0–0.5)
EOSINOPHIL NFR BLD: 4.2 % (ref 0–8)
ERYTHROCYTE [DISTWIDTH] IN BLOOD BY AUTOMATED COUNT: 14.7 % (ref 11.5–14.5)
EST. GFR  (NO RACE VARIABLE): >60 ML/MIN/1.73 M^2
GLUCOSE SERPL-MCNC: 117 MG/DL (ref 70–110)
HCT VFR BLD AUTO: 46.9 % (ref 40–54)
HDLC SERPL-MCNC: 93 MG/DL (ref 40–75)
HDLC SERPL: 46.3 % (ref 20–50)
HGB BLD-MCNC: 15.7 G/DL (ref 14–18)
IMM GRANULOCYTES # BLD AUTO: 0.1 K/UL (ref 0–0.04)
IMM GRANULOCYTES NFR BLD AUTO: 1.4 % (ref 0–0.5)
LDLC SERPL CALC-MCNC: 94.2 MG/DL (ref 63–159)
LYMPHOCYTES # BLD AUTO: 1.3 K/UL (ref 1–4.8)
LYMPHOCYTES NFR BLD: 17.5 % (ref 18–48)
MCH RBC QN AUTO: 30.2 PG (ref 27–31)
MCHC RBC AUTO-ENTMCNC: 33.5 G/DL (ref 32–36)
MCV RBC AUTO: 90 FL (ref 82–98)
MONOCYTES # BLD AUTO: 0.6 K/UL (ref 0.3–1)
MONOCYTES NFR BLD: 8.9 % (ref 4–15)
NEUTROPHILS # BLD AUTO: 4.8 K/UL (ref 1.8–7.7)
NEUTROPHILS NFR BLD: 66.8 % (ref 38–73)
NONHDLC SERPL-MCNC: 108 MG/DL
NRBC BLD-RTO: 0 /100 WBC
PLATELET # BLD AUTO: 192 K/UL (ref 150–450)
PMV BLD AUTO: 9.2 FL (ref 9.2–12.9)
POTASSIUM SERPL-SCNC: 5.3 MMOL/L (ref 3.5–5.1)
PROT SERPL-MCNC: 7.7 G/DL (ref 6–8.4)
RBC # BLD AUTO: 5.2 M/UL (ref 4.6–6.2)
SODIUM SERPL-SCNC: 133 MMOL/L (ref 136–145)
T4 FREE SERPL-MCNC: 0.95 NG/DL (ref 0.71–1.51)
TRIGL SERPL-MCNC: 69 MG/DL (ref 30–150)
TSH SERPL DL<=0.005 MIU/L-ACNC: 4.16 UIU/ML (ref 0.4–4)
WBC # BLD AUTO: 7.22 K/UL (ref 3.9–12.7)

## 2022-12-09 PROCEDURE — 84439 ASSAY OF FREE THYROXINE: CPT | Performed by: NURSE PRACTITIONER

## 2022-12-09 PROCEDURE — 36415 COLL VENOUS BLD VENIPUNCTURE: CPT | Performed by: NURSE PRACTITIONER

## 2022-12-09 PROCEDURE — 99999 PR PBB SHADOW E&M-EST. PATIENT-LVL V: ICD-10-PCS | Mod: PBBFAC,,, | Performed by: NURSE PRACTITIONER

## 2022-12-09 PROCEDURE — 1160F RVW MEDS BY RX/DR IN RCRD: CPT | Mod: CPTII,S$GLB,, | Performed by: NURSE PRACTITIONER

## 2022-12-09 PROCEDURE — 99999 PR PBB SHADOW E&M-EST. PATIENT-LVL V: CPT | Mod: PBBFAC,,, | Performed by: NURSE PRACTITIONER

## 2022-12-09 PROCEDURE — 3066F NEPHROPATHY DOC TX: CPT | Mod: CPTII,S$GLB,, | Performed by: NURSE PRACTITIONER

## 2022-12-09 PROCEDURE — 3061F NEG MICROALBUMINURIA REV: CPT | Mod: CPTII,S$GLB,, | Performed by: NURSE PRACTITIONER

## 2022-12-09 PROCEDURE — 1126F PR PAIN SEVERITY QUANTIFIED, NO PAIN PRESENT: ICD-10-PCS | Mod: CPTII,S$GLB,, | Performed by: NURSE PRACTITIONER

## 2022-12-09 PROCEDURE — 80053 COMPREHEN METABOLIC PANEL: CPT | Performed by: NURSE PRACTITIONER

## 2022-12-09 PROCEDURE — 3066F PR DOCUMENTATION OF TREATMENT FOR NEPHROPATHY: ICD-10-PCS | Mod: CPTII,S$GLB,, | Performed by: NURSE PRACTITIONER

## 2022-12-09 PROCEDURE — 3077F PR MOST RECENT SYSTOLIC BLOOD PRESSURE >= 140 MM HG: ICD-10-PCS | Mod: CPTII,S$GLB,, | Performed by: NURSE PRACTITIONER

## 2022-12-09 PROCEDURE — 1159F MED LIST DOCD IN RCRD: CPT | Mod: CPTII,S$GLB,, | Performed by: NURSE PRACTITIONER

## 2022-12-09 PROCEDURE — 1159F PR MEDICATION LIST DOCUMENTED IN MEDICAL RECORD: ICD-10-PCS | Mod: CPTII,S$GLB,, | Performed by: NURSE PRACTITIONER

## 2022-12-09 PROCEDURE — 80061 LIPID PANEL: CPT | Performed by: NURSE PRACTITIONER

## 2022-12-09 PROCEDURE — 85025 COMPLETE CBC W/AUTO DIFF WBC: CPT | Performed by: NURSE PRACTITIONER

## 2022-12-09 PROCEDURE — 3288F PR FALLS RISK ASSESSMENT DOCUMENTED: ICD-10-PCS | Mod: CPTII,S$GLB,, | Performed by: NURSE PRACTITIONER

## 2022-12-09 PROCEDURE — 1101F PT FALLS ASSESS-DOCD LE1/YR: CPT | Mod: CPTII,S$GLB,, | Performed by: NURSE PRACTITIONER

## 2022-12-09 PROCEDURE — 1101F PR PT FALLS ASSESS DOC 0-1 FALLS W/OUT INJ PAST YR: ICD-10-PCS | Mod: CPTII,S$GLB,, | Performed by: NURSE PRACTITIONER

## 2022-12-09 PROCEDURE — 3061F PR NEG MICROALBUMINURIA RESULT DOCUMENTED/REVIEW: ICD-10-PCS | Mod: CPTII,S$GLB,, | Performed by: NURSE PRACTITIONER

## 2022-12-09 PROCEDURE — 3008F PR BODY MASS INDEX (BMI) DOCUMENTED: ICD-10-PCS | Mod: CPTII,S$GLB,, | Performed by: NURSE PRACTITIONER

## 2022-12-09 PROCEDURE — 3079F PR MOST RECENT DIASTOLIC BLOOD PRESSURE 80-89 MM HG: ICD-10-PCS | Mod: CPTII,S$GLB,, | Performed by: NURSE PRACTITIONER

## 2022-12-09 PROCEDURE — 3077F SYST BP >= 140 MM HG: CPT | Mod: CPTII,S$GLB,, | Performed by: NURSE PRACTITIONER

## 2022-12-09 PROCEDURE — 99214 OFFICE O/P EST MOD 30 MIN: CPT | Mod: S$GLB,,, | Performed by: NURSE PRACTITIONER

## 2022-12-09 PROCEDURE — 1126F AMNT PAIN NOTED NONE PRSNT: CPT | Mod: CPTII,S$GLB,, | Performed by: NURSE PRACTITIONER

## 2022-12-09 PROCEDURE — 84443 ASSAY THYROID STIM HORMONE: CPT | Performed by: NURSE PRACTITIONER

## 2022-12-09 PROCEDURE — 83880 ASSAY OF NATRIURETIC PEPTIDE: CPT | Performed by: NURSE PRACTITIONER

## 2022-12-09 PROCEDURE — 99214 PR OFFICE/OUTPT VISIT, EST, LEVL IV, 30-39 MIN: ICD-10-PCS | Mod: S$GLB,,, | Performed by: NURSE PRACTITIONER

## 2022-12-09 PROCEDURE — 3288F FALL RISK ASSESSMENT DOCD: CPT | Mod: CPTII,S$GLB,, | Performed by: NURSE PRACTITIONER

## 2022-12-09 PROCEDURE — 4010F PR ACE/ARB THEARPY RXD/TAKEN: ICD-10-PCS | Mod: CPTII,S$GLB,, | Performed by: NURSE PRACTITIONER

## 2022-12-09 PROCEDURE — 1160F PR REVIEW ALL MEDS BY PRESCRIBER/CLIN PHARMACIST DOCUMENTED: ICD-10-PCS | Mod: CPTII,S$GLB,, | Performed by: NURSE PRACTITIONER

## 2022-12-09 PROCEDURE — 3079F DIAST BP 80-89 MM HG: CPT | Mod: CPTII,S$GLB,, | Performed by: NURSE PRACTITIONER

## 2022-12-09 PROCEDURE — 4010F ACE/ARB THERAPY RXD/TAKEN: CPT | Mod: CPTII,S$GLB,, | Performed by: NURSE PRACTITIONER

## 2022-12-09 PROCEDURE — 3008F BODY MASS INDEX DOCD: CPT | Mod: CPTII,S$GLB,, | Performed by: NURSE PRACTITIONER

## 2022-12-09 RX ORDER — LOSARTAN POTASSIUM 100 MG/1
100 TABLET ORAL DAILY
Qty: 90 TABLET | Refills: 3 | Status: SHIPPED | OUTPATIENT
Start: 2022-12-09 | End: 2024-03-05 | Stop reason: SDUPTHER

## 2022-12-09 RX ORDER — GABAPENTIN 300 MG/1
300 CAPSULE ORAL 3 TIMES DAILY
Qty: 270 CAPSULE | Refills: 3 | Status: SHIPPED | OUTPATIENT
Start: 2022-12-09 | End: 2023-04-24

## 2022-12-09 RX ORDER — BUPROPION HYDROCHLORIDE 150 MG/1
150 TABLET, EXTENDED RELEASE ORAL DAILY
Qty: 90 TABLET | Refills: 3 | Status: ON HOLD | OUTPATIENT
Start: 2022-12-09 | End: 2024-01-03 | Stop reason: HOSPADM

## 2022-12-09 RX ORDER — AMLODIPINE BESYLATE 5 MG/1
5 TABLET ORAL DAILY
Qty: 90 TABLET | Refills: 3 | Status: SHIPPED | OUTPATIENT
Start: 2022-12-09 | End: 2023-11-29

## 2022-12-09 NOTE — PATIENT INSTRUCTIONS
Check labs, will message with results, if stable repeat in 6 months    Endoscopy will contact you to schedule your colonoscopy    Refilled meds, make sure you take your 2 BP meds as soon as you get home amlodipine and losartan as your BP was very high today    Encouraged to reduce alcohol intake    Declined vaccines today

## 2022-12-09 NOTE — PROGRESS NOTES
Subjective:       Patient ID: Froylan Borja is a 67 y.o. male.    Chief Complaint: Medication Refill and Follow-up    Pt of Dr. Tamez, here for medication refill and follow up    Has been out of BP meds a few weeks and his Wellbutrin. Also his gabapentin. Was switched to Wellbutrin from Zoloft by PCP in January due to swelling in the ER.No longer having the swelling issue.    Hx of Hep C, was on interferon years ago. Still drinking ETOH.    Review of Systems   Constitutional:  Negative for activity change, appetite change and unexpected weight change.   HENT:  Negative for hearing loss and voice change.    Eyes:  Negative for visual disturbance.   Respiratory:  Negative for apnea, cough, chest tightness and shortness of breath.    Cardiovascular:  Negative for chest pain, palpitations and leg swelling.   Gastrointestinal:  Negative for abdominal distention, abdominal pain, blood in stool, constipation, diarrhea, nausea and vomiting.   Endocrine: Negative for cold intolerance, heat intolerance, polydipsia, polyphagia and polyuria.   Genitourinary:  Negative for difficulty urinating, dysuria and penile pain.   Musculoskeletal:  Negative for arthralgias and myalgias.   Integumentary:  Negative for color change, pallor, rash and wound.   Allergic/Immunologic: Negative for environmental allergies and food allergies.   Neurological:  Negative for dizziness, weakness, light-headedness, numbness and headaches.   Hematological:  Negative for adenopathy. Does not bruise/bleed easily.   Psychiatric/Behavioral:  Negative for agitation, behavioral problems, sleep disturbance and suicidal ideas.        Review of patient's allergies indicates:   Allergen Reactions    Zoloft [sertraline] Other (See Comments)     hyponatremia     Current Outpatient Medications:     albuterol (PROVENTIL HFA) 90 mcg/actuation inhaler, Inhale 2 puffs into the lungs every 6 (six) hours as needed for Wheezing. Rescue, Disp: 18 g, Rfl: 2    amLODIPine  (NORVASC) 5 MG tablet, Take 1 tablet (5 mg total) by mouth once daily., Disp: 90 tablet, Rfl: 3    ammonium lactate (LAC-HYDRIN) 12 % lotion, Apply topically 2 (two) times daily as needed for Dry Skin. Apply to BLE and feet, Disp: 400 g, Rfl: 0    buPROPion (WELLBUTRIN SR) 150 MG TBSR 12 hr tablet, Take 1 tablet (150 mg total) by mouth once daily., Disp: 30 tablet, Rfl: 3    cyanocobalamin (VITAMIN B-12) 1000 MCG tablet, Take 1,000 mcg by mouth once daily., Disp: , Rfl:     gabapentin (NEURONTIN) 300 MG capsule, Take 1 capsule (300 mg total) by mouth every evening., Disp: 30 capsule, Rfl: 2    latanoprost 0.005 % ophthalmic solution, Place 1 drop into both eyes every evening., Disp: 6 mL, Rfl: 3    losartan (COZAAR) 100 MG tablet, Take 1 tablet (100 mg total) by mouth once daily., Disp: 90 tablet, Rfl: 3    melatonin (MELATIN) 3 mg tablet, Take 2 tablets (6 mg total) by mouth nightly., Disp: , Rfl: 0    multivitamin (THERAGRAN) per tablet, Take 1 tablet by mouth once daily., Disp: , Rfl:     timolol maleate 0.5% (TIMOPTIC) 0.5 % Drop, Place 1 drop into both eyes 2 (two) times daily., Disp: 15 mL, Rfl: 3    Patient Active Problem List   Diagnosis    Glaucoma    History of hepatitis C, s/p successful Rx w/ SVR24 - 1/2017    Cirrhosis    Anxiety    Erectile dysfunction    Essential hypertension    Alcohol use disorder, severe, dependence    Obesity (BMI 30-39.9)    Gross hematuria    Diverticulosis: seen on colonoscopy 2014    Hyponatremia    Septic arthritis of right ankle    Bacterial conjunctivitis    Cellulitis of leg    HTN (hypertension)    Depression    Alcohol withdrawal, uncomplicated    Fall    Homelessness    Scalp laceration    Impaired functional mobility and endurance    Nocturnal hypoxia    Primary osteoarthritis of right knee    Primary osteoarthritis of left knee    COVID     Past Medical History:   Diagnosis Date    Alcohol abuse     Anxiety     Cirrhosis     Glaucoma     History of hepatitis C, s/p  "successful Rx w/ SVR24 - 1/2017     genotype 1;  relapse following PegIFN+RBV+Victrelis; prior relapse following PegIFN+RBV S/p 12 weeks harvoni + RBV w/ SVR    Hypertension     Paresthesia     feet, bilaterally     Past Surgical History:   Procedure Laterality Date    LIVER BIOPSY       Social History     Socioeconomic History    Marital status:    Tobacco Use    Smoking status: Some Days     Types: Cigars    Smokeless tobacco: Never    Tobacco comments:     smokes cigars 20 per month   Substance and Sexual Activity    Alcohol use: Yes     Comment: heavy alcohol use in the past, now drinking daily, 2-3 beers daily    Drug use: No   Social History Narrative     (mental health issues), retired  at [employer]. No kids. 2 dogs. No exercise.     Family History   Problem Relation Age of Onset    Diabetes Mellitus Father     Hypertension Father     Cancer Father         ? CRC    Diabetes Father     Cancer Mother         colon vs polyps, colectomy    Colon cancer Mother     Cancer Sister         ? CRC    Colonic polyp Brother     Cirrhosis Neg Hx        Objective:      Vitals:    12/09/22 0805 12/09/22 0812   BP:  (!) 170/80   Pulse:  92   Resp:  18   SpO2:  97%   Weight: 118.6 kg (261 lb 7.5 oz) 118.6 kg (261 lb 7.5 oz)   Height: 6' 3" (1.905 m) 6' 3" (1.905 m)   PainSc:  0-No pain     Body mass index is 32.68 kg/m².    Physical Exam  Vitals and nursing note reviewed.   Constitutional:       Appearance: He is well-developed. He is obese.      Comments: Disheveled appearance   HENT:      Head: Normocephalic.      Right Ear: Tympanic membrane, ear canal and external ear normal.      Left Ear: Tympanic membrane, ear canal and external ear normal.      Nose: Nose normal.      Mouth/Throat:      Mouth: Mucous membranes are moist.      Pharynx: Oropharynx is clear.   Eyes:      General: Lids are normal. Lids are everted, no foreign bodies appreciated.      Extraocular Movements: Extraocular movements intact. "      Conjunctiva/sclera: Conjunctivae normal.      Pupils: Pupils are equal, round, and reactive to light.   Neck:      Vascular: No carotid bruit or JVD.      Trachea: Trachea normal.   Cardiovascular:      Rate and Rhythm: Normal rate and regular rhythm.      Pulses: Normal pulses.      Heart sounds: Normal heart sounds.   Pulmonary:      Effort: Pulmonary effort is normal.      Breath sounds: Normal breath sounds.   Abdominal:      General: Bowel sounds are normal.      Palpations: Abdomen is soft.   Musculoskeletal:         General: Normal range of motion.      Cervical back: Full passive range of motion without pain, normal range of motion and neck supple.      Right lower leg: No edema.      Left lower leg: No edema.   Skin:     General: Skin is warm and dry.      Capillary Refill: Capillary refill takes less than 2 seconds.      Comments: Discoloration to lower legs    Some superficial skin tears noted to left arm   Neurological:      General: No focal deficit present.      Mental Status: He is alert and oriented to person, place, and time.   Psychiatric:         Mood and Affect: Mood normal.         Speech: Speech normal.         Behavior: Behavior normal.         Thought Content: Thought content normal.         Judgment: Judgment normal.       Assessment:       Problem List Items Addressed This Visit          Psychiatric    Depression (Chronic)    Relevant Medications    buPROPion (WELLBUTRIN SR) 150 MG TBSR 12 hr tablet    Other Relevant Orders    TSH    Anxiety    Relevant Medications    buPROPion (WELLBUTRIN SR) 150 MG TBSR 12 hr tablet    Other Relevant Orders    TSH       Cardiac/Vascular    Essential hypertension - Primary    Relevant Medications    losartan (COZAAR) 100 MG tablet    amLODIPine (NORVASC) 5 MG tablet    Other Relevant Orders    CBC Auto Differential    Comprehensive Metabolic Panel       Endocrine    Obesity (BMI 30-39.9)       GI    History of hepatitis C, s/p successful Rx w/ SVR24 -  1/2017    Cirrhosis     Other Visit Diagnoses       Colon cancer screening        Relevant Orders    Ambulatory referral/consult to Endo Procedure     Need for pneumococcal vaccination        Need for influenza vaccination        Need for shingles vaccine        BMI 32.0-32.9,adult        Alcohol-induced polyneuropathy        Relevant Medications    gabapentin (NEURONTIN) 300 MG capsule    Mixed hyperlipidemia        Relevant Orders    Lipid Panel    Congestive heart failure, unspecified HF chronicity, unspecified heart failure type        Relevant Orders    B-TYPE NATRIURETIC PEPTIDE    Alcohol use                Plan:       Froylan was seen today for medication refill and follow-up.    Diagnoses and all orders for this visit:    Essential hypertension  -     losartan (COZAAR) 100 MG tablet; Take 1 tablet (100 mg total) by mouth once daily.  -     amLODIPine (NORVASC) 5 MG tablet; Take 1 tablet (5 mg total) by mouth once daily.  -     CBC Auto Differential; Future  -     Comprehensive Metabolic Panel; Future    Current mild episode of major depressive disorder, unspecified whether recurrent  -     buPROPion (WELLBUTRIN SR) 150 MG TBSR 12 hr tablet; Take 1 tablet (150 mg total) by mouth once daily.  -     TSH; Future    Anxiety  -     buPROPion (WELLBUTRIN SR) 150 MG TBSR 12 hr tablet; Take 1 tablet (150 mg total) by mouth once daily.  -     TSH; Future    Other cirrhosis of liver  History of hepatitis C, s/p successful Rx w/ SVR24 - 1/2017  Resolved    Colon cancer screening  -     Ambulatory referral/consult to Endo Procedure ; Future    Endoscopy will contact you to schedule your colonoscopy    Need for pneumococcal vaccination  Need for influenza vaccination  Need for shingles vaccine  Declined vaccines today    BMI 32.0-32.9,adult  BMI reviewed    Obesity (BMI 30-39.9)  BMI reviewed.    Diet and exercise to lose weight.    Alcohol-induced polyneuropathy  -     gabapentin (NEURONTIN) 300 MG  capsule; Take 1 capsule (300 mg total) by mouth 3 (three) times daily.    Mixed hyperlipidemia  -     Lipid Panel; Future    Follow low fat, low carb, high fiber diet and exercise.    Congestive heart failure, unspecified HF chronicity, unspecified heart failure type  -     B-TYPE NATRIURETIC PEPTIDE; Future    Alcohol use  Encouraged to reduce alcohol intake    Check labs, will message with results, if stable repeat in 6 months    Refilled meds, make sure you take your 2 BP meds as soon as you get home amlodipine and losartan as your BP was very high today    Follow up in about 6 months (around 6/9/2023) for with PCP Dr. Tamez for f/u.

## 2022-12-12 NOTE — PROGRESS NOTES
Call pt with labs. His Potassium was a little elevated, could be from the losartan, however he needs to take his BP meds. I noticed he had mild elevation below. He will need to repeat in 1 month with PCP Dr. Tamez. TSH was also mildly elevated, not terrible and not concerning since free T4 level was normal, we can repeat this also in a month with PCP.recommend follow up appt with labs with PCP to recheck these levels.    .Kecia Alvarado DNP, FNP-C

## 2022-12-14 ENCOUNTER — TELEPHONE (OUTPATIENT)
Dept: INTERNAL MEDICINE | Facility: CLINIC | Age: 67
End: 2022-12-14
Payer: MEDICARE

## 2022-12-14 NOTE — TELEPHONE ENCOUNTER
----- Message from Kecia Alvarado DNP sent at 12/12/2022  3:51 PM CST -----  Call pt with labs. His Potassium was a little elevated, could be from the losartan, however he needs to take his BP meds. I noticed he had mild elevation below. He will need to repeat in 1 month with PCP Dr. Tamez. TSH was also mildly elevated, not terrible and not concerning since free T4 level was normal, we can repeat this also in a month with PCP.recommend follow up appt with labs with PCP to recheck these levels.    .Kecia Alvarado DNP, FNP-C

## 2023-02-17 ENCOUNTER — OFFICE VISIT (OUTPATIENT)
Dept: ORTHOPEDICS | Facility: CLINIC | Age: 68
End: 2023-02-17
Payer: MEDICARE

## 2023-02-17 ENCOUNTER — HOSPITAL ENCOUNTER (OUTPATIENT)
Dept: RADIOLOGY | Facility: HOSPITAL | Age: 68
Discharge: HOME | End: 2023-02-17
Attending: ORTHOPAEDIC SURGERY
Payer: MEDICARE

## 2023-02-17 VITALS — WEIGHT: 261.44 LBS | HEIGHT: 75 IN | BODY MASS INDEX: 32.51 KG/M2

## 2023-02-17 DIAGNOSIS — M25.562 PAIN IN BOTH KNEES, UNSPECIFIED CHRONICITY: ICD-10-CM

## 2023-02-17 DIAGNOSIS — M25.561 PAIN IN BOTH KNEES, UNSPECIFIED CHRONICITY: Primary | ICD-10-CM

## 2023-02-17 DIAGNOSIS — M25.562 PAIN IN BOTH KNEES, UNSPECIFIED CHRONICITY: Primary | ICD-10-CM

## 2023-02-17 DIAGNOSIS — M25.561 PAIN IN BOTH KNEES, UNSPECIFIED CHRONICITY: ICD-10-CM

## 2023-02-17 DIAGNOSIS — M17.0 PRIMARY OSTEOARTHRITIS OF BOTH KNEES: ICD-10-CM

## 2023-02-17 PROCEDURE — 3008F PR BODY MASS INDEX (BMI) DOCUMENTED: ICD-10-PCS | Mod: HCNC,CPTII,S$GLB, | Performed by: ORTHOPAEDIC SURGERY

## 2023-02-17 PROCEDURE — 99999 PR PBB SHADOW E&M-EST. PATIENT-LVL III: CPT | Mod: PBBFAC,HCNC,, | Performed by: ORTHOPAEDIC SURGERY

## 2023-02-17 PROCEDURE — 3008F BODY MASS INDEX DOCD: CPT | Mod: HCNC,CPTII,S$GLB, | Performed by: ORTHOPAEDIC SURGERY

## 2023-02-17 PROCEDURE — 1125F AMNT PAIN NOTED PAIN PRSNT: CPT | Mod: HCNC,CPTII,S$GLB, | Performed by: ORTHOPAEDIC SURGERY

## 2023-02-17 PROCEDURE — 20610 DRAIN/INJ JOINT/BURSA W/O US: CPT | Mod: 50,HCNC,S$GLB, | Performed by: ORTHOPAEDIC SURGERY

## 2023-02-17 PROCEDURE — 73564 X-RAY EXAM KNEE 4 OR MORE: CPT | Mod: 26,50,HCNC, | Performed by: RADIOLOGY

## 2023-02-17 PROCEDURE — 1159F PR MEDICATION LIST DOCUMENTED IN MEDICAL RECORD: ICD-10-PCS | Mod: HCNC,CPTII,S$GLB, | Performed by: ORTHOPAEDIC SURGERY

## 2023-02-17 PROCEDURE — 99999 PR PBB SHADOW E&M-EST. PATIENT-LVL III: ICD-10-PCS | Mod: PBBFAC,HCNC,, | Performed by: ORTHOPAEDIC SURGERY

## 2023-02-17 PROCEDURE — 20610 LARGE JOINT ASPIRATION/INJECTION: BILATERAL KNEE: ICD-10-PCS | Mod: 50,HCNC,S$GLB, | Performed by: ORTHOPAEDIC SURGERY

## 2023-02-17 PROCEDURE — 99499 NO LOS: ICD-10-PCS | Mod: HCNC,S$GLB,, | Performed by: ORTHOPAEDIC SURGERY

## 2023-02-17 PROCEDURE — 1159F MED LIST DOCD IN RCRD: CPT | Mod: HCNC,CPTII,S$GLB, | Performed by: ORTHOPAEDIC SURGERY

## 2023-02-17 PROCEDURE — 1125F PR PAIN SEVERITY QUANTIFIED, PAIN PRESENT: ICD-10-PCS | Mod: HCNC,CPTII,S$GLB, | Performed by: ORTHOPAEDIC SURGERY

## 2023-02-17 PROCEDURE — 1160F PR REVIEW ALL MEDS BY PRESCRIBER/CLIN PHARMACIST DOCUMENTED: ICD-10-PCS | Mod: HCNC,CPTII,S$GLB, | Performed by: ORTHOPAEDIC SURGERY

## 2023-02-17 PROCEDURE — 73564 X-RAY EXAM KNEE 4 OR MORE: CPT | Mod: TC,50,HCNC

## 2023-02-17 PROCEDURE — 99499 UNLISTED E&M SERVICE: CPT | Mod: HCNC,S$GLB,, | Performed by: ORTHOPAEDIC SURGERY

## 2023-02-17 PROCEDURE — 1160F RVW MEDS BY RX/DR IN RCRD: CPT | Mod: HCNC,CPTII,S$GLB, | Performed by: ORTHOPAEDIC SURGERY

## 2023-02-17 PROCEDURE — 73564 XR KNEE ORTHO BILAT WITH FLEXION: ICD-10-PCS | Mod: 26,50,HCNC, | Performed by: RADIOLOGY

## 2023-02-17 RX ADMIN — TRIAMCINOLONE ACETONIDE 40 MG: 40 INJECTION, SUSPENSION INTRA-ARTICULAR; INTRAMUSCULAR at 08:02

## 2023-02-23 RX ORDER — TRIAMCINOLONE ACETONIDE 40 MG/ML
40 INJECTION, SUSPENSION INTRA-ARTICULAR; INTRAMUSCULAR
Status: DISCONTINUED | OUTPATIENT
Start: 2023-02-17 | End: 2023-02-23 | Stop reason: HOSPADM

## 2023-02-24 NOTE — PROCEDURES
Large Joint Aspiration/Injection: bilateral knee    Date/Time: 2/17/2023 8:30 AM  Performed by: Dimitry Patel MD  Authorized by: Dimitry Patel MD     Consent Done?:  Yes (Verbal)  Indications:  Pain and arthritis  Site marked: the procedure site was marked    Timeout: prior to procedure the correct patient, procedure, and site was verified    Prep: patient was prepped and draped in usual sterile fashion      Local anesthesia used?: Yes    Local anesthetic:  Bupivacaine 0.25% without epinephrine  Anesthetic total (ml):  5      Details:  Needle Size:  25 G  Ultrasonic Guidance for needle placement?: No    Approach:  Anterolateral  Location:  Knee  Laterality:  Bilateral  Site:  Bilateral knee  Medications (Right):  40 mg triamcinolone acetonide 40 mg/mL  Medications (Left):  40 mg triamcinolone acetonide 40 mg/mL  Patient tolerance:  Patient tolerated the procedure well with no immediate complications

## 2023-02-24 NOTE — PROGRESS NOTES
Subjective:      Patient ID: Froylan Borja is a 67 y.o. male.    Chief Complaint:   Pain of the Left Knee and Pain of the Right Knee    HPI    The patient got good results cortisone injection previously for osteoarthritis knee pain, and made this appointment for the purpose of repeating this treatment.  No evaluation/management charge for this visit.      Dimitry Patel MD  Orthopedic Surgery

## 2023-04-26 ENCOUNTER — CLINICAL SUPPORT (OUTPATIENT)
Dept: ENDOSCOPY | Facility: HOSPITAL | Age: 68
End: 2023-04-26
Attending: INTERNAL MEDICINE
Payer: MEDICARE

## 2023-04-26 VITALS — WEIGHT: 261 LBS | HEIGHT: 75 IN | BODY MASS INDEX: 32.45 KG/M2

## 2023-04-26 DIAGNOSIS — Z12.11 COLON CANCER SCREENING: ICD-10-CM

## 2023-04-26 NOTE — PLAN OF CARE
Pt. Needs to see Hepatology and possible EGD added. PMH Cirrhosis. Hasnt seen Hepatology since 2019. New PAT on 5/30/23.

## 2023-05-01 NOTE — PROGRESS NOTES
INTERNAL MEDICINE VISIT NOTE     PRESENTING HISTORY     Reason for Visit:  Internal Medicine visit.    No chief complaint on file.    History of Present Illness & ROS: Mr. Froylan Borja is a 68 y.o. male.  Same day apt.   He is with some voiced frustration today as to the 'what needs to be done today about getting all scopes scheduled and not really needing to have an Annual today; not why I'm really here; being told I have to be seen by a Hepatologist,no being followed for Hep C, not opposed to going back'.   Would like to be seen by his primary for his annual in June as scheduled.,     We discussed having  consult with Hepatology at this time and with notations made to being referred to Endo per Dr. Tamez for pre admit with Endo team on 5/30 which was reportedly not aware of, but will keep this keep as encouraged to do so today.  Will place orders for C Scope.   *Not seen by Hepatology since 2020 and missed to follow ups in 6/2020....  *Of note, missed to 1 month follow up with PCP in 01/2023; noticed abnormal levels on labs....  No pain or acute complaints today.     Review of Systems:  Eyes: denies visual changes at this time denies floaters   ENT: no nasal congestion or sore throat  Respiratory: no cough or shorness of breath  Cardiovascular: no chest pain or palpitations  Gastrointestinal: no nausea or vomiting, no abdominal pain or change in bowel habits  Genitourinary: no hematuria or dysuria; denies frequency  Hematologic/Lymphatic: no easy bruising or lymphadenopathy  Musculoskeletal: no arthralgias or myalgias  Neurological: no seizures or tremors  Endocrine: no heat or cold intolerance    PAST HISTORY:     Past Medical History:   Diagnosis Date    Alcohol abuse     Anxiety     Cirrhosis     Glaucoma     History of hepatitis C, s/p successful Rx w/ SVR24 - 1/2017     genotype 1;  relapse following PegIFN+RBV+Victrelis; prior relapse following PegIFN+RBV S/p 12 weeks harvoni + RBV w/ SVR    Hypertension      Paresthesia     feet, bilaterally       Past Surgical History:   Procedure Laterality Date    LIVER BIOPSY         Family History   Problem Relation Age of Onset    Diabetes Mellitus Father     Hypertension Father     Cancer Father         ? CRC    Diabetes Father     Cancer Mother         colon vs polyps, colectomy    Colon cancer Mother     Cancer Sister         ? CRC    Colonic polyp Brother     Cirrhosis Neg Hx        Social History     Socioeconomic History    Marital status:    Tobacco Use    Smoking status: Some Days     Types: Cigars    Smokeless tobacco: Never    Tobacco comments:     smokes cigars 20 per month   Substance and Sexual Activity    Alcohol use: Yes     Comment: heavy alcohol use in the past, now drinking daily, 2-3 beers daily    Drug use: No   Social History Narrative     (mental health issues), retired  at [employer]. No kids. 2 dogs. No exercise.       MEDICATIONS & ALLERGIES:     Current Outpatient Medications on File Prior to Visit   Medication Sig Dispense Refill    albuterol (PROVENTIL HFA) 90 mcg/actuation inhaler Inhale 2 puffs into the lungs every 6 (six) hours as needed for Wheezing. Rescue 18 g 2    amLODIPine (NORVASC) 5 MG tablet Take 1 tablet (5 mg total) by mouth once daily. 90 tablet 3    ammonium lactate (LAC-HYDRIN) 12 % lotion Apply topically 2 (two) times daily as needed for Dry Skin. Apply to BLE and feet 400 g 0    buPROPion (WELLBUTRIN SR) 150 MG TBSR 12 hr tablet Take 1 tablet (150 mg total) by mouth once daily. 90 tablet 3    cyanocobalamin (VITAMIN B-12) 1000 MCG tablet Take 1,000 mcg by mouth once daily.      gabapentin (NEURONTIN) 300 MG capsule TAKE 1 CAPSULE(300 MG) BY MOUTH EVERY EVENING 30 capsule 1    latanoprost 0.005 % ophthalmic solution Place 1 drop into both eyes every evening. 6 mL 3    losartan (COZAAR) 100 MG tablet Take 1 tablet (100 mg total) by mouth once daily. 90 tablet 3    melatonin (MELATIN) 3 mg tablet Take 2 tablets (6  mg total) by mouth nightly.  0    multivitamin (THERAGRAN) per tablet Take 1 tablet by mouth once daily.      timolol maleate 0.5% (TIMOPTIC) 0.5 % Drop Place 1 drop into both eyes 2 (two) times daily. 15 mL 3     No current facility-administered medications on file prior to visit.        Review of patient's allergies indicates:   Allergen Reactions    Zoloft [sertraline] Other (See Comments)     hyponatremia       Medications Reconciliation:   I have reconciled the patient's home medications and discharge medications with the patient/family. I have updated all changes.  Refer to After-Visit Medication List.    OBJECTIVE:     Vital Signs:  There were no vitals filed for this visit.  Wt Readings from Last 3 Encounters:   04/26/23 0909 118.4 kg (261 lb)   02/17/23 0823 118.6 kg (261 lb 7.5 oz)   12/09/22 0812 118.6 kg (261 lb 7.5 oz)   12/09/22 0805 118.6 kg (261 lb 7.5 oz)     There is no height or weight on file to calculate BMI.   Wt Readings from Last 3 Encounters:   05/04/23 119.5 kg (263 lb 7.2 oz)   04/26/23 118.4 kg (261 lb)   02/17/23 118.6 kg (261 lb 7.5 oz)     Temp Readings from Last 3 Encounters:   01/27/22 98.4 °F (36.9 °C)   01/24/22 99.2 °F (37.3 °C) (Oral)   11/17/21 98.1 °F (36.7 °C) (Oral)     BP Readings from Last 3 Encounters:   05/04/23 (!) 142/88   12/09/22 (!) 170/80   10/07/22 (!) 155/90     Pulse Readings from Last 3 Encounters:   05/04/23 84   12/09/22 92   10/07/22 86       Physical Exam:  General: Well developed, well nourished. No distress.  HEENT: Head is normocephalic, atraumatic  Eyes: Clear conjunctiva.  Neck: Supple, symmetrical neck; trachea midline.  Lungs: Clear to auscultation bilaterally and normal respiratory effort.  Cardiovascular: Heart with regular rate and rhythm. No murmurs, gallops or rubs  Extremities: No LE edema. Pulses 2+ and symmetric.   Abdomen: Abdomen is soft, non-tender non-distended with normal bowel sounds.  Skin: Skin color, texture, turgor normal. No  rashes.  Musculoskeletal: Normal gait.   Neurologic: Normal strength and tone. No focal numbness or weakness.   Psychiatric: Not depressed.    Laboratory  Lab Results   Component Value Date    WBC 7.22 12/09/2022    HGB 15.7 12/09/2022    HCT 46.9 12/09/2022     12/09/2022    CHOL 201 (H) 12/09/2022    TRIG 69 12/09/2022    HDL 93 (H) 12/09/2022    ALT 36 12/09/2022    AST 46 (H) 12/09/2022     (L) 12/09/2022    K 5.3 (H) 12/09/2022    CL 98 12/09/2022    CREATININE 1.2 12/09/2022    BUN 13 12/09/2022    CO2 26 12/09/2022    TSH 4.159 (H) 12/09/2022    PSA 3.5 07/01/2021    INR 0.9 11/03/2021    HGBA1C 5.1 11/18/2019           ASSESSMENT & PLAN:     Same day  / follow up appt.     Essential hypertension  Today: 142/88  ` Norvasc  ` Losartan     History of hepatitis C, s/p successful Rx w/ SVR24 - 1/2017  -     Ambulatory referral/consult to Endo Procedure ; Future; Expected date: 05/05/2023  -     Ambulatory referral/consult to Hepatology; Future; Expected date: 05/11/2023    Other cirrhosis of liver  -     Ambulatory referral/consult to Hepatology; Future; Expected date: 05/11/2023  -     PROTIME-INR; Future; Expected date: 05/04/2023  -     CBC Auto Differential; Future; Expected date: 05/04/2023    Esophageal varices without bleeding, unspecified esophageal varices type  -     Ambulatory referral/consult to Endo Procedure ; Future; Expected date: 05/05/2023  -     Ambulatory referral/consult to Hepatology; Future; Expected date: 05/11/2023    Abnormal thyroid stimulating hormone (TSH) level  Lab Results   Component Value Date    TSH 4.159 (H) 12/09/2022    -     TSH; Future; Expected date: 05/04/2023      Serum potassium elevated  5.3 in 12/2022 and had no follow up  -     TSH; Future; Expected date: 05/04/2023  -     Comprehensive Metabolic Panel; Future; Expected date: 05/04/2023  -     PROTIME-INR; Future; Expected date: 05/04/2023  -     CBC Auto Differential; Future; Expected  date: 05/04/2023    Screening for colon cancer  Order placed for piggy backing with possible EGD; will be meeting with Endo team on 5/30 and referral placed to Hepatology; he has missed his follow ups since 2020  -     Ambulatory referral/consult to Endo Procedure ; Future; Expected date: 05/05/2023    Glaucoma:   ` Timoptic  ` Latanoprost    OTTO:   ` Wellbutrin     *Follow up with PCP in 6/2023 for annual PE. Last seen in 1/2022.        Medication List            Accurate as of May 4, 2023  9:12 AM. If you have any questions, ask your nurse or doctor.                CONTINUE taking these medications      albuterol 90 mcg/actuation inhaler  Commonly known as: PROVENTIL HFA  Inhale 2 puffs into the lungs every 6 (six) hours as needed for Wheezing. Rescue     amLODIPine 5 MG tablet  Commonly known as: NORVASC  Take 1 tablet (5 mg total) by mouth once daily.     ammonium lactate 12 % lotion  Commonly known as: LAC-HYDRIN  Apply topically 2 (two) times daily as needed for Dry Skin. Apply to BLE and feet     buPROPion 150 MG TBSR 12 hr tablet  Commonly known as: WELLBUTRIN SR  Take 1 tablet (150 mg total) by mouth once daily.     cyanocobalamin 1000 MCG tablet  Commonly known as: VITAMIN B-12     gabapentin 300 MG capsule  Commonly known as: NEURONTIN  TAKE 1 CAPSULE(300 MG) BY MOUTH EVERY EVENING     latanoprost 0.005 % ophthalmic solution  Place 1 drop into both eyes every evening.     losartan 100 MG tablet  Commonly known as: COZAAR  Take 1 tablet (100 mg total) by mouth once daily.     melatonin 3 mg tablet  Commonly known as: MELATIN  Take 2 tablets (6 mg total) by mouth nightly.     multivitamin per tablet  Commonly known as: THERAGRAN     timolol maleate 0.5% 0.5 % Drop  Commonly known as: TIMOPTIC  Place 1 drop into both eyes 2 (two) times daily.              Future Appointments   Date Time Provider Department Center   5/4/2023  9:20 AM LAB, APPOINTMENT [facility]   5/30/2023   8:00 AM PRE-ADMIT, ENDO -[facility] ENDOPP[facility]   6/6/2023  2:30 PM Maximiliano Morris MD [facility]   7/3/2023  1:30 PM Janki Tamez MD [facility]     Total time spent: > 90 mins on face to face with assessment, coordination of care, interventions and educating.   Signing Physician:  FILOMENA Cerda

## 2023-05-04 ENCOUNTER — OFFICE VISIT (OUTPATIENT)
Dept: INTERNAL MEDICINE | Facility: CLINIC | Age: 68
End: 2023-05-04
Payer: MEDICARE

## 2023-05-04 ENCOUNTER — LAB VISIT (OUTPATIENT)
Dept: LAB | Facility: HOSPITAL | Age: 68
End: 2023-05-04
Payer: MEDICARE

## 2023-05-04 VITALS
WEIGHT: 263.44 LBS | DIASTOLIC BLOOD PRESSURE: 88 MMHG | HEIGHT: 75 IN | OXYGEN SATURATION: 97 % | HEART RATE: 84 BPM | BODY MASS INDEX: 32.75 KG/M2 | SYSTOLIC BLOOD PRESSURE: 142 MMHG

## 2023-05-04 DIAGNOSIS — Z12.11 SCREENING FOR COLON CANCER: ICD-10-CM

## 2023-05-04 DIAGNOSIS — E87.5 SERUM POTASSIUM ELEVATED: ICD-10-CM

## 2023-05-04 DIAGNOSIS — R79.89 ABNORMAL THYROID STIMULATING HORMONE (TSH) LEVEL: ICD-10-CM

## 2023-05-04 DIAGNOSIS — I10 ESSENTIAL HYPERTENSION: Primary | ICD-10-CM

## 2023-05-04 DIAGNOSIS — I85.00 ESOPHAGEAL VARICES WITHOUT BLEEDING, UNSPECIFIED ESOPHAGEAL VARICES TYPE: ICD-10-CM

## 2023-05-04 DIAGNOSIS — K74.69 OTHER CIRRHOSIS OF LIVER: ICD-10-CM

## 2023-05-04 DIAGNOSIS — Z86.19 HISTORY OF HEPATITIS C: ICD-10-CM

## 2023-05-04 DIAGNOSIS — I10 ESSENTIAL HYPERTENSION: ICD-10-CM

## 2023-05-04 LAB
ALBUMIN SERPL BCP-MCNC: 3.8 G/DL (ref 3.5–5.2)
ALP SERPL-CCNC: 84 U/L (ref 55–135)
ALT SERPL W/O P-5'-P-CCNC: 29 U/L (ref 10–44)
ANION GAP SERPL CALC-SCNC: 11 MMOL/L (ref 8–16)
AST SERPL-CCNC: 31 U/L (ref 10–40)
BASOPHILS # BLD AUTO: 0.11 K/UL (ref 0–0.2)
BASOPHILS NFR BLD: 1.3 % (ref 0–1.9)
BILIRUB SERPL-MCNC: 0.9 MG/DL (ref 0.1–1)
BUN SERPL-MCNC: 12 MG/DL (ref 8–23)
CALCIUM SERPL-MCNC: 9.6 MG/DL (ref 8.7–10.5)
CHLORIDE SERPL-SCNC: 103 MMOL/L (ref 95–110)
CO2 SERPL-SCNC: 24 MMOL/L (ref 23–29)
CREAT SERPL-MCNC: 1 MG/DL (ref 0.5–1.4)
DIFFERENTIAL METHOD: ABNORMAL
EOSINOPHIL # BLD AUTO: 0.2 K/UL (ref 0–0.5)
EOSINOPHIL NFR BLD: 2.2 % (ref 0–8)
ERYTHROCYTE [DISTWIDTH] IN BLOOD BY AUTOMATED COUNT: 14 % (ref 11.5–14.5)
EST. GFR  (NO RACE VARIABLE): >60 ML/MIN/1.73 M^2
GLUCOSE SERPL-MCNC: 110 MG/DL (ref 70–110)
HCT VFR BLD AUTO: 45.1 % (ref 40–54)
HGB BLD-MCNC: 14.9 G/DL (ref 14–18)
IMM GRANULOCYTES # BLD AUTO: 0.14 K/UL (ref 0–0.04)
IMM GRANULOCYTES NFR BLD AUTO: 1.6 % (ref 0–0.5)
INR PPP: 1 (ref 0.8–1.2)
LYMPHOCYTES # BLD AUTO: 1.4 K/UL (ref 1–4.8)
LYMPHOCYTES NFR BLD: 15.9 % (ref 18–48)
MCH RBC QN AUTO: 33.1 PG (ref 27–31)
MCHC RBC AUTO-ENTMCNC: 33 G/DL (ref 32–36)
MCV RBC AUTO: 100 FL (ref 82–98)
MONOCYTES # BLD AUTO: 0.8 K/UL (ref 0.3–1)
MONOCYTES NFR BLD: 8.8 % (ref 4–15)
NEUTROPHILS # BLD AUTO: 6 K/UL (ref 1.8–7.7)
NEUTROPHILS NFR BLD: 70.2 % (ref 38–73)
NRBC BLD-RTO: 0 /100 WBC
PLATELET # BLD AUTO: 246 K/UL (ref 150–450)
PMV BLD AUTO: 9.5 FL (ref 9.2–12.9)
POTASSIUM SERPL-SCNC: 5.2 MMOL/L (ref 3.5–5.1)
PROT SERPL-MCNC: 7 G/DL (ref 6–8.4)
PROTHROMBIN TIME: 10.7 SEC (ref 9–12.5)
RBC # BLD AUTO: 4.5 M/UL (ref 4.6–6.2)
SODIUM SERPL-SCNC: 138 MMOL/L (ref 136–145)
TSH SERPL DL<=0.005 MIU/L-ACNC: 2.97 UIU/ML (ref 0.4–4)
WBC # BLD AUTO: 8.54 K/UL (ref 3.9–12.7)

## 2023-05-04 PROCEDURE — 3288F FALL RISK ASSESSMENT DOCD: CPT | Mod: CPTII,S$GLB,, | Performed by: NURSE PRACTITIONER

## 2023-05-04 PROCEDURE — 3288F PR FALLS RISK ASSESSMENT DOCUMENTED: ICD-10-PCS | Mod: CPTII,S$GLB,, | Performed by: NURSE PRACTITIONER

## 2023-05-04 PROCEDURE — 1159F MED LIST DOCD IN RCRD: CPT | Mod: CPTII,S$GLB,, | Performed by: NURSE PRACTITIONER

## 2023-05-04 PROCEDURE — 36415 COLL VENOUS BLD VENIPUNCTURE: CPT | Performed by: NURSE PRACTITIONER

## 2023-05-04 PROCEDURE — 99999 PR PBB SHADOW E&M-EST. PATIENT-LVL IV: CPT | Mod: PBBFAC,,, | Performed by: NURSE PRACTITIONER

## 2023-05-04 PROCEDURE — 3077F SYST BP >= 140 MM HG: CPT | Mod: CPTII,S$GLB,, | Performed by: NURSE PRACTITIONER

## 2023-05-04 PROCEDURE — 1101F PR PT FALLS ASSESS DOC 0-1 FALLS W/OUT INJ PAST YR: ICD-10-PCS | Mod: CPTII,S$GLB,, | Performed by: NURSE PRACTITIONER

## 2023-05-04 PROCEDURE — 3079F DIAST BP 80-89 MM HG: CPT | Mod: CPTII,S$GLB,, | Performed by: NURSE PRACTITIONER

## 2023-05-04 PROCEDURE — 99215 PR OFFICE/OUTPT VISIT, EST, LEVL V, 40-54 MIN: ICD-10-PCS | Mod: S$GLB,,, | Performed by: NURSE PRACTITIONER

## 2023-05-04 PROCEDURE — 4010F ACE/ARB THERAPY RXD/TAKEN: CPT | Mod: CPTII,S$GLB,, | Performed by: NURSE PRACTITIONER

## 2023-05-04 PROCEDURE — 3079F PR MOST RECENT DIASTOLIC BLOOD PRESSURE 80-89 MM HG: ICD-10-PCS | Mod: CPTII,S$GLB,, | Performed by: NURSE PRACTITIONER

## 2023-05-04 PROCEDURE — 80053 COMPREHEN METABOLIC PANEL: CPT | Performed by: NURSE PRACTITIONER

## 2023-05-04 PROCEDURE — 99999 PR PBB SHADOW E&M-EST. PATIENT-LVL IV: ICD-10-PCS | Mod: PBBFAC,,, | Performed by: NURSE PRACTITIONER

## 2023-05-04 PROCEDURE — 3008F BODY MASS INDEX DOCD: CPT | Mod: CPTII,S$GLB,, | Performed by: NURSE PRACTITIONER

## 2023-05-04 PROCEDURE — 1126F AMNT PAIN NOTED NONE PRSNT: CPT | Mod: CPTII,S$GLB,, | Performed by: NURSE PRACTITIONER

## 2023-05-04 PROCEDURE — 3077F PR MOST RECENT SYSTOLIC BLOOD PRESSURE >= 140 MM HG: ICD-10-PCS | Mod: CPTII,S$GLB,, | Performed by: NURSE PRACTITIONER

## 2023-05-04 PROCEDURE — 1159F PR MEDICATION LIST DOCUMENTED IN MEDICAL RECORD: ICD-10-PCS | Mod: CPTII,S$GLB,, | Performed by: NURSE PRACTITIONER

## 2023-05-04 PROCEDURE — 1126F PR PAIN SEVERITY QUANTIFIED, NO PAIN PRESENT: ICD-10-PCS | Mod: CPTII,S$GLB,, | Performed by: NURSE PRACTITIONER

## 2023-05-04 PROCEDURE — 4010F PR ACE/ARB THEARPY RXD/TAKEN: ICD-10-PCS | Mod: CPTII,S$GLB,, | Performed by: NURSE PRACTITIONER

## 2023-05-04 PROCEDURE — 3008F PR BODY MASS INDEX (BMI) DOCUMENTED: ICD-10-PCS | Mod: CPTII,S$GLB,, | Performed by: NURSE PRACTITIONER

## 2023-05-04 PROCEDURE — 85025 COMPLETE CBC W/AUTO DIFF WBC: CPT | Performed by: NURSE PRACTITIONER

## 2023-05-04 PROCEDURE — 85610 PROTHROMBIN TIME: CPT | Performed by: NURSE PRACTITIONER

## 2023-05-04 PROCEDURE — 84443 ASSAY THYROID STIM HORMONE: CPT | Performed by: NURSE PRACTITIONER

## 2023-05-04 PROCEDURE — 1101F PT FALLS ASSESS-DOCD LE1/YR: CPT | Mod: CPTII,S$GLB,, | Performed by: NURSE PRACTITIONER

## 2023-05-04 PROCEDURE — 99215 OFFICE O/P EST HI 40 MIN: CPT | Mod: S$GLB,,, | Performed by: NURSE PRACTITIONER

## 2023-05-11 NOTE — PROGRESS NOTES
Subjective:       Patient ID: Froylan Borja is a 64 y.o. male.    Chief Complaint: Hypertension    HPI   Established pt of Vignesh Almonte MD     Here for BP check.    States he went to Outpatient rehab center today for enrollment for alcohol use and his BP was high [id number]. He admits he did not take BP meds this AM. He also notes he took a few dose of OTC sinus decongestant meds the past few days. He denies cp, sob, ha, edema or vision changes. He was seen by PCP Dr. Almonte one month ago and BP was 120/70. Currently on losartan 50mg daily.     /84 today in clinic.     He states he needs a letter from his PCP stating it is okay for him to participant in outpatient rehab.     [facility]      BP Readings from Last 3 Encounters:   11/20/19 (!) 142/80   11/18/19 120/70   01/14/19 (!) 160/92         Review of Systems   Constitutional: Negative for chills, fever and unexpected weight change.   Respiratory: Negative for cough and shortness of breath.    Cardiovascular: Negative for chest pain and leg swelling.   Gastrointestinal: Negative for abdominal pain, nausea and vomiting.   Skin: Negative for rash.   Neurological: Negative for weakness and headaches.       Objective: BP (!) 148/84 Comment: manual recheck  Pulse 77   Wt 119.1 kg (262 lb 9.1 oz)   SpO2 96%   BMI 32.82 kg/m²         Physical Exam   Constitutional: He is oriented to person, place, and time. He appears well-developed and well-nourished. No distress.   HENT:   Head: Normocephalic and atraumatic.   Mouth/Throat: Oropharynx is clear and moist.   Eyes: Pupils are equal, round, and reactive to light.   Cardiovascular: Normal rate and regular rhythm. Exam reveals no friction rub.   No murmur heard.  Pulmonary/Chest: Effort normal and breath sounds normal. He has no wheezes. He has no rales.   Abdominal: Soft. Bowel sounds are normal. There is no tenderness.   Musculoskeletal: Normal range of motion.   Neurological: He is  alert and oriented to person, place, and time.   Skin: Skin is warm and dry. No rash noted.   Psychiatric: He has a normal mood and affect.   Vitals reviewed.      Assessment:       1. Essential hypertension        Plan:         Froylan was seen today for hypertension.    Diagnoses and all orders for this visit:    Essential hypertension  One isolated elevated reading today in the setting of no meds and recent use of OTC sinus meds.   Will have patient monitor BP  RTC on Monday for nurse BP check  If persistently elevated will increase losartan 100mg  Note FWD to PCP    Maria A Jean PA-C         Bilateral Helical Rim Advancement Flap Text: The defect edges were debeveled with a #15 blade scalpel.  Given the location of the defect and the proximity to free margins (helical rim) a bilateral helical rim advancement flap was deemed most appropriate.  Using a sterile surgical marker, the appropriate advancement flaps were drawn incorporating the defect and placing the expected incisions between the helical rim and antihelix where possible.  The area thus outlined was incised through and through with a #15 scalpel blade.  With a skin hook and iris scissors, the flaps were gently and sharply undermined and freed up.

## 2023-05-30 ENCOUNTER — TELEPHONE (OUTPATIENT)
Dept: ENDOSCOPY | Facility: HOSPITAL | Age: 68
End: 2023-05-30

## 2023-05-30 ENCOUNTER — CLINICAL SUPPORT (OUTPATIENT)
Dept: ENDOSCOPY | Facility: HOSPITAL | Age: 68
End: 2023-05-30
Attending: INTERNAL MEDICINE
Payer: MEDICARE

## 2023-05-30 ENCOUNTER — PES CALL (OUTPATIENT)
Dept: ADMINISTRATIVE | Facility: CLINIC | Age: 68
End: 2023-05-30
Payer: MEDICARE

## 2023-05-30 DIAGNOSIS — Z12.11 SCREENING FOR COLON CANCER: ICD-10-CM

## 2023-05-30 DIAGNOSIS — Z86.19 HISTORY OF HEPATITIS C: ICD-10-CM

## 2023-05-30 DIAGNOSIS — I85.00 ESOPHAGEAL VARICES WITHOUT BLEEDING, UNSPECIFIED ESOPHAGEAL VARICES TYPE: ICD-10-CM

## 2023-05-30 DIAGNOSIS — Z12.11 COLON CANCER SCREENING: ICD-10-CM

## 2023-05-30 NOTE — PLAN OF CARE
We atempted to reach you for  your PAT appt but you were not available. Please contact our Endoscopy Scheduling Dept.  Dept. at [phone] to reschedule.

## 2023-06-30 ENCOUNTER — PES CALL (OUTPATIENT)
Dept: ADMINISTRATIVE | Facility: CLINIC | Age: 68
End: 2023-06-30
Payer: MEDICARE

## 2023-08-02 ENCOUNTER — TELEPHONE (OUTPATIENT)
Dept: HEPATOLOGY | Facility: CLINIC | Age: 68
End: 2023-08-02
Payer: MEDICARE

## 2023-08-02 NOTE — TELEPHONE ENCOUNTER
FILOMENA Rios ordered that patient be scheduled again with hepatology.  Patient last seen by NP Scroggs in 2020.  He was a no-show for scheduled visit with provider on 7/17/23.  I spoke with him.  He reports having car trouble and being unable to keep appt with NP Scroggs.   Appt with provider scheduled again on 9/27/23; appt reminder notice mailed.

## 2023-11-16 ENCOUNTER — OFFICE VISIT (OUTPATIENT)
Dept: ORTHOPEDICS | Facility: CLINIC | Age: 68
End: 2023-11-16
Payer: MEDICARE

## 2023-11-16 VITALS — WEIGHT: 257.25 LBS | HEIGHT: 75 IN | BODY MASS INDEX: 31.99 KG/M2

## 2023-11-16 DIAGNOSIS — M17.0 PRIMARY OSTEOARTHRITIS OF BOTH KNEES: Primary | ICD-10-CM

## 2023-11-16 PROCEDURE — 99499 NO LOS: ICD-10-PCS | Mod: HCNC,S$GLB,, | Performed by: ORTHOPAEDIC SURGERY

## 2023-11-16 PROCEDURE — 99499 UNLISTED E&M SERVICE: CPT | Mod: HCNC,S$GLB,, | Performed by: ORTHOPAEDIC SURGERY

## 2023-11-16 PROCEDURE — 20610 LARGE JOINT ASPIRATION/INJECTION: BILATERAL KNEE: ICD-10-PCS | Mod: 50,HCNC,S$GLB, | Performed by: ORTHOPAEDIC SURGERY

## 2023-11-16 PROCEDURE — 99999 PR PBB SHADOW E&M-EST. PATIENT-LVL III: CPT | Mod: PBBFAC,HCNC,, | Performed by: ORTHOPAEDIC SURGERY

## 2023-11-16 PROCEDURE — 99999 PR PBB SHADOW E&M-EST. PATIENT-LVL III: ICD-10-PCS | Mod: PBBFAC,HCNC,, | Performed by: ORTHOPAEDIC SURGERY

## 2023-11-16 PROCEDURE — 20610 DRAIN/INJ JOINT/BURSA W/O US: CPT | Mod: 50,HCNC,S$GLB, | Performed by: ORTHOPAEDIC SURGERY

## 2023-11-16 RX ADMIN — TRIAMCINOLONE ACETONIDE 40 MG: 40 INJECTION, SUSPENSION INTRA-ARTICULAR; INTRAMUSCULAR at 09:11

## 2023-11-18 RX ORDER — TRIAMCINOLONE ACETONIDE 40 MG/ML
40 INJECTION, SUSPENSION INTRA-ARTICULAR; INTRAMUSCULAR
Status: DISCONTINUED | OUTPATIENT
Start: 2023-11-16 | End: 2023-11-18 | Stop reason: HOSPADM

## 2023-11-18 NOTE — PROCEDURES
Large Joint Aspiration/Injection: bilateral knee    Date/Time: 11/16/2023 9:15 AM    Performed by: Dimitry Patel MD  Authorized by: Dimitry Patel MD    Consent Done?:  Yes (Verbal)  Indications:  Pain and arthritis  Site marked: the procedure site was marked    Timeout: prior to procedure the correct patient, procedure, and site was verified    Prep: patient was prepped and draped in usual sterile fashion      Local anesthesia used?: Yes    Local anesthetic:  Lidocaine 1% without epinephrine  Anesthetic total (ml):  5      Details:  Needle Size:  25 G  Ultrasonic Guidance for needle placement?: No    Approach:  Anterolateral  Location:  Knee  Laterality:  Bilateral  Site:  Bilateral knee  Medications (Right):  40 mg triamcinolone acetonide 40 mg/mL  Medications (Left):  40 mg triamcinolone acetonide 40 mg/mL  Patient tolerance:  Patient tolerated the procedure well with no immediate complications

## 2023-12-15 ENCOUNTER — HOSPITAL ENCOUNTER (INPATIENT)
Facility: HOSPITAL | Age: 68
LOS: 19 days | Discharge: HOME HEALTH | DRG: 896 | End: 2024-01-03
Attending: EMERGENCY MEDICINE | Admitting: FAMILY MEDICINE
Payer: MEDICARE

## 2023-12-15 DIAGNOSIS — E87.8 LOW BICARBONATE: ICD-10-CM

## 2023-12-15 DIAGNOSIS — L24.A2 IRRITANT CONTACT DERMATITIS DUE TO FECAL, URINARY OR DUAL INCONTINENCE: ICD-10-CM

## 2023-12-15 DIAGNOSIS — R53.81 DEBILITY: ICD-10-CM

## 2023-12-15 DIAGNOSIS — T14.90XA TRAUMA: ICD-10-CM

## 2023-12-15 DIAGNOSIS — R53.1 WEAKNESS: ICD-10-CM

## 2023-12-15 DIAGNOSIS — R07.9 CHEST PAIN: ICD-10-CM

## 2023-12-15 DIAGNOSIS — E87.1 HYPONATREMIA: Primary | ICD-10-CM

## 2023-12-15 DIAGNOSIS — N17.9 AKI (ACUTE KIDNEY INJURY): ICD-10-CM

## 2023-12-15 DIAGNOSIS — F10.10 ETOH ABUSE: ICD-10-CM

## 2023-12-15 PROBLEM — M62.82 RHABDOMYOLYSIS: Status: ACTIVE | Noted: 2023-12-15

## 2023-12-15 PROBLEM — J69.0 ASPIRATION PNEUMONIA: Status: ACTIVE | Noted: 2023-12-15

## 2023-12-15 LAB
ALBUMIN SERPL BCP-MCNC: 3 G/DL (ref 3.5–5.2)
ALP SERPL-CCNC: 70 U/L (ref 55–135)
ALT SERPL W/O P-5'-P-CCNC: 80 U/L (ref 10–44)
AMMONIA PLAS-SCNC: 23 UMOL/L (ref 10–50)
AMORPH CRY UR QL COMP ASSIST: ABNORMAL
AMPHET+METHAMPHET UR QL: NEGATIVE
ANION GAP SERPL CALC-SCNC: 11 MMOL/L (ref 8–16)
ANION GAP SERPL CALC-SCNC: 13 MMOL/L (ref 8–16)
AST SERPL-CCNC: 177 U/L (ref 10–40)
BACTERIA #/AREA URNS AUTO: ABNORMAL /HPF
BARBITURATES UR QL SCN>200 NG/ML: NEGATIVE
BASOPHILS # BLD AUTO: 0.04 K/UL (ref 0–0.2)
BASOPHILS NFR BLD: 0.4 % (ref 0–1.9)
BENZODIAZ UR QL SCN>200 NG/ML: NEGATIVE
BILIRUB SERPL-MCNC: 0.9 MG/DL (ref 0.1–1)
BILIRUB UR QL STRIP: NEGATIVE
BNP SERPL-MCNC: 19 PG/ML (ref 0–99)
BUN SERPL-MCNC: 31 MG/DL (ref 6–30)
BUN SERPL-MCNC: 32 MG/DL (ref 8–23)
BUN SERPL-MCNC: 34 MG/DL (ref 8–23)
BZE UR QL SCN: NEGATIVE
CALCIUM SERPL-MCNC: 8.2 MG/DL (ref 8.7–10.5)
CALCIUM SERPL-MCNC: 8.5 MG/DL (ref 8.7–10.5)
CANNABINOIDS UR QL SCN: NEGATIVE
CHLORIDE SERPL-SCNC: 90 MMOL/L (ref 95–110)
CHLORIDE SERPL-SCNC: 91 MMOL/L (ref 95–110)
CHLORIDE SERPL-SCNC: 91 MMOL/L (ref 95–110)
CK SERPL-CCNC: 3960 U/L (ref 20–200)
CLARITY UR REFRACT.AUTO: ABNORMAL
CO2 SERPL-SCNC: 16 MMOL/L (ref 23–29)
CO2 SERPL-SCNC: 19 MMOL/L (ref 23–29)
COLOR UR AUTO: YELLOW
CREAT SERPL-MCNC: 2.2 MG/DL (ref 0.5–1.4)
CREAT SERPL-MCNC: 2.3 MG/DL (ref 0.5–1.4)
CREAT SERPL-MCNC: 2.8 MG/DL (ref 0.5–1.4)
CREAT UR-MCNC: 157 MG/DL (ref 23–375)
DIFFERENTIAL METHOD BLD: ABNORMAL
EOSINOPHIL # BLD AUTO: 0 K/UL (ref 0–0.5)
EOSINOPHIL NFR BLD: 0 % (ref 0–8)
ERYTHROCYTE [DISTWIDTH] IN BLOOD BY AUTOMATED COUNT: 13.2 % (ref 11.5–14.5)
EST. GFR  (NO RACE VARIABLE): 30.2 ML/MIN/1.73 M^2
EST. GFR  (NO RACE VARIABLE): 31.8 ML/MIN/1.73 M^2
GLUCOSE SERPL-MCNC: 116 MG/DL (ref 70–110)
GLUCOSE SERPL-MCNC: 83 MG/DL (ref 70–110)
GLUCOSE SERPL-MCNC: 88 MG/DL (ref 70–110)
GLUCOSE UR QL STRIP: NEGATIVE
GRAN CASTS UR QL COMP ASSIST: 21 /LPF
HCT VFR BLD AUTO: 41.1 % (ref 40–54)
HCT VFR BLD CALC: 44 %PCV (ref 36–54)
HGB BLD-MCNC: 15 G/DL (ref 14–18)
HGB UR QL STRIP: ABNORMAL
HIV 1+2 AB+HIV1 P24 AG SERPL QL IA: NORMAL
HYALINE CASTS UR QL AUTO: 6 /LPF
IMM GRANULOCYTES # BLD AUTO: 0.22 K/UL (ref 0–0.04)
IMM GRANULOCYTES NFR BLD AUTO: 2.2 % (ref 0–0.5)
INFLUENZA A, MOLECULAR: NOT DETECTED
INFLUENZA B, MOLECULAR: NOT DETECTED
INR PPP: 1 (ref 0.8–1.2)
KETONES UR QL STRIP: ABNORMAL
LACTATE SERPL-SCNC: 1.3 MMOL/L (ref 0.5–2.2)
LACTATE SERPL-SCNC: 3.3 MMOL/L (ref 0.5–2.2)
LEUKOCYTE ESTERASE UR QL STRIP: NEGATIVE
LIPASE SERPL-CCNC: 50 U/L (ref 4–60)
LYMPHOCYTES # BLD AUTO: 0.4 K/UL (ref 1–4.8)
LYMPHOCYTES NFR BLD: 4.1 % (ref 18–48)
MAGNESIUM SERPL-MCNC: 2.2 MG/DL (ref 1.6–2.6)
MCH RBC QN AUTO: 33.8 PG (ref 27–31)
MCHC RBC AUTO-ENTMCNC: 36.5 G/DL (ref 32–36)
MCV RBC AUTO: 93 FL (ref 82–98)
METHADONE UR QL SCN>300 NG/ML: NEGATIVE
MICROSCOPIC COMMENT: ABNORMAL
MONOCYTES # BLD AUTO: 0.7 K/UL (ref 0.3–1)
MONOCYTES NFR BLD: 6.9 % (ref 4–15)
NEUTROPHILS # BLD AUTO: 8.7 K/UL (ref 1.8–7.7)
NEUTROPHILS NFR BLD: 86.4 % (ref 38–73)
NITRITE UR QL STRIP: NEGATIVE
NRBC BLD-RTO: 0 /100 WBC
OPIATES UR QL SCN: NEGATIVE
OSMOLALITY SERPL: 281 MOSM/KG (ref 280–300)
OSMOLALITY UR: 529 MOSM/KG (ref 50–1200)
PCP UR QL SCN>25 NG/ML: NEGATIVE
PH UR STRIP: 5 [PH] (ref 5–8)
PLATELET # BLD AUTO: 143 K/UL (ref 150–450)
PMV BLD AUTO: 10.1 FL (ref 9.2–12.9)
POC IONIZED CALCIUM: 1.01 MMOL/L (ref 1.06–1.42)
POC TCO2 (MEASURED): 18 MMOL/L (ref 23–29)
POTASSIUM BLD-SCNC: 3.8 MMOL/L (ref 3.5–5.1)
POTASSIUM SERPL-SCNC: 3.7 MMOL/L (ref 3.5–5.1)
POTASSIUM SERPL-SCNC: 3.9 MMOL/L (ref 3.5–5.1)
PROT SERPL-MCNC: 6.9 G/DL (ref 6–8.4)
PROT UR QL STRIP: ABNORMAL
PROTHROMBIN TIME: 10.6 SEC (ref 9–12.5)
RBC # BLD AUTO: 4.44 M/UL (ref 4.6–6.2)
RBC #/AREA URNS AUTO: 3 /HPF (ref 0–4)
RSV AG BY MOLECULAR METHOD: NOT DETECTED
SAMPLE: ABNORMAL
SARS-COV-2 RNA RESP QL NAA+PROBE: NOT DETECTED
SODIUM BLD-SCNC: 120 MMOL/L (ref 136–145)
SODIUM SERPL-SCNC: 120 MMOL/L (ref 136–145)
SODIUM SERPL-SCNC: 120 MMOL/L (ref 136–145)
SODIUM UR-SCNC: 15 MMOL/L (ref 20–250)
SP GR UR STRIP: 1.02 (ref 1–1.03)
SQUAMOUS #/AREA URNS AUTO: 0 /HPF
TOXICOLOGY INFORMATION: NORMAL
TROPONIN I SERPL DL<=0.01 NG/ML-MCNC: 0.04 NG/ML (ref 0–0.03)
TSH SERPL DL<=0.005 MIU/L-ACNC: 2.14 UIU/ML (ref 0.4–4)
URN SPEC COLLECT METH UR: ABNORMAL
WBC # BLD AUTO: 10.11 K/UL (ref 3.9–12.7)
WBC #/AREA URNS AUTO: 13 /HPF (ref 0–5)

## 2023-12-15 PROCEDURE — 0241U SARS-COV2 (COVID) WITH FLU/RSV BY PCR: CPT | Mod: HCNC | Performed by: EMERGENCY MEDICINE

## 2023-12-15 PROCEDURE — 36415 COLL VENOUS BLD VENIPUNCTURE: CPT | Mod: HCNC | Performed by: FAMILY MEDICINE

## 2023-12-15 PROCEDURE — 99285 EMERGENCY DEPT VISIT HI MDM: CPT | Mod: 25,HCNC

## 2023-12-15 PROCEDURE — 84300 ASSAY OF URINE SODIUM: CPT | Mod: HCNC | Performed by: EMERGENCY MEDICINE

## 2023-12-15 PROCEDURE — 84484 ASSAY OF TROPONIN QUANT: CPT | Mod: HCNC | Performed by: EMERGENCY MEDICINE

## 2023-12-15 PROCEDURE — 63600175 PHARM REV CODE 636 W HCPCS: Mod: HCNC | Performed by: FAMILY MEDICINE

## 2023-12-15 PROCEDURE — 25000003 PHARM REV CODE 250: Mod: HCNC | Performed by: EMERGENCY MEDICINE

## 2023-12-15 PROCEDURE — 87077 CULTURE AEROBIC IDENTIFY: CPT | Mod: HCNC | Performed by: EMERGENCY MEDICINE

## 2023-12-15 PROCEDURE — 85610 PROTHROMBIN TIME: CPT | Mod: HCNC | Performed by: EMERGENCY MEDICINE

## 2023-12-15 PROCEDURE — 87186 SC STD MICRODIL/AGAR DIL: CPT | Mod: HCNC | Performed by: EMERGENCY MEDICINE

## 2023-12-15 PROCEDURE — 25000003 PHARM REV CODE 250: Mod: HCNC | Performed by: FAMILY MEDICINE

## 2023-12-15 PROCEDURE — 80053 COMPREHEN METABOLIC PANEL: CPT | Mod: HCNC | Performed by: EMERGENCY MEDICINE

## 2023-12-15 PROCEDURE — 87040 BLOOD CULTURE FOR BACTERIA: CPT | Mod: HCNC | Performed by: FAMILY MEDICINE

## 2023-12-15 PROCEDURE — 83735 ASSAY OF MAGNESIUM: CPT | Mod: HCNC | Performed by: EMERGENCY MEDICINE

## 2023-12-15 PROCEDURE — 93010 ELECTROCARDIOGRAM REPORT: CPT | Mod: HCNC,,, | Performed by: INTERNAL MEDICINE

## 2023-12-15 PROCEDURE — 84443 ASSAY THYROID STIM HORMONE: CPT | Mod: HCNC | Performed by: EMERGENCY MEDICINE

## 2023-12-15 PROCEDURE — 87389 HIV-1 AG W/HIV-1&-2 AB AG IA: CPT | Mod: HCNC | Performed by: PHYSICIAN ASSISTANT

## 2023-12-15 PROCEDURE — 83930 ASSAY OF BLOOD OSMOLALITY: CPT | Mod: HCNC | Performed by: EMERGENCY MEDICINE

## 2023-12-15 PROCEDURE — 83605 ASSAY OF LACTIC ACID: CPT | Mod: 91,HCNC | Performed by: EMERGENCY MEDICINE

## 2023-12-15 PROCEDURE — 63600175 PHARM REV CODE 636 W HCPCS: Mod: HCNC | Performed by: EMERGENCY MEDICINE

## 2023-12-15 PROCEDURE — 85025 COMPLETE CBC W/AUTO DIFF WBC: CPT | Mod: HCNC | Performed by: EMERGENCY MEDICINE

## 2023-12-15 PROCEDURE — 93005 ELECTROCARDIOGRAM TRACING: CPT | Mod: HCNC

## 2023-12-15 PROCEDURE — 81001 URINALYSIS AUTO W/SCOPE: CPT | Mod: HCNC,XB | Performed by: EMERGENCY MEDICINE

## 2023-12-15 PROCEDURE — 82550 ASSAY OF CK (CPK): CPT | Mod: HCNC | Performed by: EMERGENCY MEDICINE

## 2023-12-15 PROCEDURE — 80307 DRUG TEST PRSMV CHEM ANLYZR: CPT | Mod: HCNC | Performed by: EMERGENCY MEDICINE

## 2023-12-15 PROCEDURE — 96365 THER/PROPH/DIAG IV INF INIT: CPT | Mod: HCNC

## 2023-12-15 PROCEDURE — 96361 HYDRATE IV INFUSION ADD-ON: CPT | Mod: HCNC

## 2023-12-15 PROCEDURE — 83690 ASSAY OF LIPASE: CPT | Mod: HCNC | Performed by: EMERGENCY MEDICINE

## 2023-12-15 PROCEDURE — 80048 BASIC METABOLIC PNL TOTAL CA: CPT | Mod: HCNC,XB | Performed by: FAMILY MEDICINE

## 2023-12-15 PROCEDURE — 83880 ASSAY OF NATRIURETIC PEPTIDE: CPT | Mod: HCNC | Performed by: PHYSICIAN ASSISTANT

## 2023-12-15 PROCEDURE — 96367 TX/PROPH/DG ADDL SEQ IV INF: CPT | Mod: HCNC

## 2023-12-15 PROCEDURE — 82140 ASSAY OF AMMONIA: CPT | Mod: HCNC | Performed by: EMERGENCY MEDICINE

## 2023-12-15 PROCEDURE — 21400001 HC TELEMETRY ROOM: Mod: HCNC

## 2023-12-15 PROCEDURE — 87086 URINE CULTURE/COLONY COUNT: CPT | Mod: HCNC | Performed by: EMERGENCY MEDICINE

## 2023-12-15 PROCEDURE — 83935 ASSAY OF URINE OSMOLALITY: CPT | Mod: HCNC | Performed by: EMERGENCY MEDICINE

## 2023-12-15 PROCEDURE — 87088 URINE BACTERIA CULTURE: CPT | Mod: HCNC | Performed by: EMERGENCY MEDICINE

## 2023-12-15 PROCEDURE — 83605 ASSAY OF LACTIC ACID: CPT | Mod: HCNC | Performed by: EMERGENCY MEDICINE

## 2023-12-15 RX ORDER — GLUCAGON 1 MG
1 KIT INJECTION
Status: DISCONTINUED | OUTPATIENT
Start: 2023-12-15 | End: 2024-01-03 | Stop reason: HOSPADM

## 2023-12-15 RX ORDER — LORAZEPAM 0.5 MG/1
2 TABLET ORAL EVERY 4 HOURS PRN
Status: DISCONTINUED | OUTPATIENT
Start: 2023-12-15 | End: 2024-01-03 | Stop reason: HOSPADM

## 2023-12-15 RX ORDER — SODIUM CHLORIDE 0.9 % (FLUSH) 0.9 %
10 SYRINGE (ML) INJECTION EVERY 12 HOURS PRN
Status: DISCONTINUED | OUTPATIENT
Start: 2023-12-15 | End: 2024-01-03 | Stop reason: HOSPADM

## 2023-12-15 RX ORDER — SODIUM CHLORIDE 9 MG/ML
500 INJECTION, SOLUTION INTRAVENOUS
Status: COMPLETED | OUTPATIENT
Start: 2023-12-15 | End: 2023-12-15

## 2023-12-15 RX ORDER — IBUPROFEN 200 MG
16 TABLET ORAL
Status: DISCONTINUED | OUTPATIENT
Start: 2023-12-15 | End: 2024-01-03 | Stop reason: HOSPADM

## 2023-12-15 RX ORDER — FOLIC ACID 1 MG/1
1 TABLET ORAL DAILY
Status: DISCONTINUED | OUTPATIENT
Start: 2023-12-16 | End: 2024-01-03 | Stop reason: HOSPADM

## 2023-12-15 RX ORDER — OXYCODONE HYDROCHLORIDE 5 MG/1
5 TABLET ORAL EVERY 6 HOURS PRN
Status: DISCONTINUED | OUTPATIENT
Start: 2023-12-15 | End: 2024-01-03 | Stop reason: HOSPADM

## 2023-12-15 RX ORDER — ALBUTEROL SULFATE 90 UG/1
2 AEROSOL, METERED RESPIRATORY (INHALATION) EVERY 6 HOURS PRN
Status: DISCONTINUED | OUTPATIENT
Start: 2023-12-15 | End: 2024-01-03 | Stop reason: HOSPADM

## 2023-12-15 RX ORDER — HEPARIN SODIUM 5000 [USP'U]/ML
5000 INJECTION, SOLUTION INTRAVENOUS; SUBCUTANEOUS EVERY 8 HOURS
Status: DISCONTINUED | OUTPATIENT
Start: 2023-12-15 | End: 2023-12-17

## 2023-12-15 RX ORDER — MAG HYDROX/ALUMINUM HYD/SIMETH 200-200-20
30 SUSPENSION, ORAL (FINAL DOSE FORM) ORAL 4 TIMES DAILY PRN
Status: DISCONTINUED | OUTPATIENT
Start: 2023-12-15 | End: 2024-01-03 | Stop reason: HOSPADM

## 2023-12-15 RX ORDER — IBUPROFEN 200 MG
24 TABLET ORAL
Status: DISCONTINUED | OUTPATIENT
Start: 2023-12-15 | End: 2024-01-03 | Stop reason: HOSPADM

## 2023-12-15 RX ORDER — DIAZEPAM 5 MG/1
10 TABLET ORAL EVERY 8 HOURS
Status: DISCONTINUED | OUTPATIENT
Start: 2023-12-15 | End: 2023-12-19

## 2023-12-15 RX ORDER — IBUPROFEN 400 MG/1
400 TABLET ORAL ONCE
Status: COMPLETED | OUTPATIENT
Start: 2023-12-16 | End: 2023-12-15

## 2023-12-15 RX ORDER — ONDANSETRON 2 MG/ML
4 INJECTION INTRAMUSCULAR; INTRAVENOUS EVERY 8 HOURS PRN
Status: DISCONTINUED | OUTPATIENT
Start: 2023-12-15 | End: 2024-01-03 | Stop reason: HOSPADM

## 2023-12-15 RX ORDER — ACETAMINOPHEN 500 MG
500 TABLET ORAL EVERY 4 HOURS PRN
Status: DISCONTINUED | OUTPATIENT
Start: 2023-12-15 | End: 2024-01-03 | Stop reason: HOSPADM

## 2023-12-15 RX ORDER — TALC
6 POWDER (GRAM) TOPICAL NIGHTLY PRN
Status: DISCONTINUED | OUTPATIENT
Start: 2023-12-15 | End: 2024-01-03 | Stop reason: HOSPADM

## 2023-12-15 RX ORDER — SODIUM CHLORIDE 9 MG/ML
INJECTION, SOLUTION INTRAVENOUS CONTINUOUS
Status: ACTIVE | OUTPATIENT
Start: 2023-12-15 | End: 2023-12-16

## 2023-12-15 RX ORDER — NALOXONE HCL 0.4 MG/ML
0.02 VIAL (ML) INJECTION
Status: DISCONTINUED | OUTPATIENT
Start: 2023-12-15 | End: 2024-01-03 | Stop reason: HOSPADM

## 2023-12-15 RX ORDER — THIAMINE HCL 100 MG
100 TABLET ORAL DAILY
Status: DISCONTINUED | OUTPATIENT
Start: 2023-12-16 | End: 2024-01-03 | Stop reason: HOSPADM

## 2023-12-15 RX ORDER — DIAZEPAM 10 MG/2ML
10 INJECTION INTRAMUSCULAR ONCE
Status: COMPLETED | OUTPATIENT
Start: 2023-12-15 | End: 2023-12-15

## 2023-12-15 RX ADMIN — IBUPROFEN 400 MG: 400 TABLET ORAL at 11:12

## 2023-12-15 RX ADMIN — DIAZEPAM 10 MG: 5 TABLET ORAL at 09:12

## 2023-12-15 RX ADMIN — SODIUM CHLORIDE 500 ML: 9 INJECTION, SOLUTION INTRAVENOUS at 03:12

## 2023-12-15 RX ADMIN — PIPERACILLIN SODIUM AND TAZOBACTAM SODIUM 4.5 G: 4; .5 INJECTION, POWDER, FOR SOLUTION INTRAVENOUS at 09:12

## 2023-12-15 RX ADMIN — FOLIC ACID 1 MG: 5 INJECTION, SOLUTION INTRAMUSCULAR; INTRAVENOUS; SUBCUTANEOUS at 04:12

## 2023-12-15 RX ADMIN — ACETAMINOPHEN 500 MG: 500 TABLET ORAL at 07:12

## 2023-12-15 RX ADMIN — SODIUM CHLORIDE: 9 INJECTION, SOLUTION INTRAVENOUS at 06:12

## 2023-12-15 RX ADMIN — THIAMINE HYDROCHLORIDE 300 MG: 100 INJECTION, SOLUTION INTRAMUSCULAR; INTRAVENOUS at 06:12

## 2023-12-15 RX ADMIN — DIAZEPAM 10 MG: 10 INJECTION, SOLUTION INTRAMUSCULAR; INTRAVENOUS at 06:12

## 2023-12-15 RX ADMIN — HEPARIN SODIUM 5000 UNITS: 5000 INJECTION INTRAVENOUS; SUBCUTANEOUS at 09:12

## 2023-12-15 RX ADMIN — THIAMINE HYDROCHLORIDE 100 MG: 100 INJECTION, SOLUTION INTRAMUSCULAR; INTRAVENOUS at 04:12

## 2023-12-15 NOTE — ED NOTES
Patient identifiers for Froylan Borja 68 y.o. male checked and correct.  Chief Complaint   Patient presents with    Alcohol Intoxication     Patient was drinking at the bar with wife. Police where called because patient was getting belligerent and bothering patients. The police sent him here for stroke like symptoms. No visible physical deficits. Patient does have slurred speach     Past Medical History:   Diagnosis Date    Alcohol abuse     Anxiety     Cirrhosis     Glaucoma     History of hepatitis C, s/p successful Rx w/ SVR24 - 1/2017     genotype 1;  relapse following PegIFN+RBV+Victrelis; prior relapse following PegIFN+RBV S/p 12 weeks harvoni + RBV w/ SVR    Hypertension     Paresthesia     feet, bilaterally     Allergies reported:   Review of patient's allergies indicates:   Allergen Reactions    Zoloft [sertraline] Other (See Comments)     hyponatremia       Appearance: Pt awake, alert & oriented to person, place & time. Pt in no acute distress at present time. Pt is clean and well groomed with clothes appropriately fastened.   Skin: Skin warm, dry & intact. Color consistent with ethnicity. Mucous membranes moist. Bruising noted to HUBERT arms.   Musculoskeletal: Patient moving all extremities well, no obvious swelling or deformities noted.   Respiratory: Respirations spontaneous, even, and non-labored. RR increased to 24. Visible chest rise noted. Airway is open and patent. No accessory muscle use noted.   Neurologic: Sensation is intact. Speech is clear and appropriate. Eyes open spontaneously, behavior appropriate to situation, follows commands, facial expression symmetrical, bilateral hand grasp equal and even, purposeful motor response noted.  Cardiac: All peripheral pulses present. No Bilateral lower extremity edema. Cap refill is <3 seconds.  Abdomen: Abdomen soft, distended, non tender to palpation.   : Pt voids independently, denies dysuria, hematuria, frequency.

## 2023-12-15 NOTE — PROVIDER PROGRESS NOTES - EMERGENCY DEPT.
Encounter Date: 12/15/2023    ED Physician Progress Notes          ED staff SIGN OUT NOTE    Patient s/o to me by Dr. Dey pending CTH, admission     CTH [neg]    I was not alerted of any changes in patient condition during the duration of my care for this patient.

## 2023-12-15 NOTE — ED PROVIDER NOTES
CC: Alcohol Intoxication (Patient was drinking at the bar with wife. Police where called because patient was getting belligerent and bothering patients. The police sent him here for stroke like symptoms. No visible physical deficits. Patient does have slurred speach)      History provided by:   Patient   Family wife    HPI: Froylan Borja is a 68 y.o. year old male PMH of alcohol abuse, depression, HTN, hypoNa, hematuria, cirrhosis, diverticulosis who presents to the ED for Weakness    Hx of etoh use, he had 3 shots of hard liquour today    Patient here with wife, his wife reports that he has been weak for a while but recently also confused, unsteady gait and difficulty ambulating at home    No n/v/, no falls, no lower back pain    Denies hx of alcohol withdrawals seizures or DTs      PHYSICAL EXAM:  There were no vitals filed for this visit.  VS: triage VS reviewed    general: intoxicated,in no acute distress, pleasant  HENT: neck symmetric, trachea midline  Eyes: PERRL,no conjunctival injection and symmetrical eyelids  CV: tachycardic regular, no gallops, no LE edema  Resp: comfortable breathing, speaks in full sentences, CTAB, no wheezing, no crackles, no ronchi  ABD:  soft, ND, no masses, + normal BS, NT  Renal: No CVAT  Neuro: AAO x 3, 5/5 strength x UE, 3/5 in the LE, sensation intact, face symmetric, mild slurred speech, no asterixis, no tremor  MSK: NC/AT, extremities w/out deformity   O midline CTL ttp  Skin: warm, dry      MDM:  Froylan Borja is a 68 y.o. year old male who presents to the ED for alcohol intoxication, confusion, not able to ambulate well at home. No focal deficit to suggest CVA    DDX includes but not limited to: alcohol intoxication, myopathy, Wernicke korsakoff, ICH, electrolyte abnormality, infectious    Labs ordered and reviewed:   Istat Na 120, Cr 2.8, ca mildly decreased at 1.01  CMP with na 120, cr 2.3 (baseline Cr of  1), bicarb 16  CBC no gross abn  CBC wbc wnl, plt 143  TSH wnl  Trop  0.036 (no chest pain, + alessandra)  Inr wnl  CPK 3960  Mg, lipase wnl  Lactate 3.3  Ammonia wnl    Medication given in the ED: thiamine, folic acid, IVF    CXR (ordered and pending    EKG (independantly reviewed): , sinus tach    Patient was signed-out to Dr. Patel at the change of shift with plan for: CT h, CXR, likley admit to     IMPRESSION:  1.) AMS, alcohol intoxication  2.) hyponatremia, ALESSANDRA    Dispo: likely admit    Aggregate Critical Care Time by Attending (exclusive of procedural time) = 30 minutes         During the Emergency Depment visit, there was a high probability of imminent or life threatening deterioration in the patient's condition necessitating medical decision  making of a high complexity. Organ systems that were compromised/potentially compromised  central nervous system   Renal  electrolytes     and/or there was potential for sepsis or metabolic failure.     Pt required constant monitoring because of the potential for them to deteriorate at any moment. Critical care was time spent personally by me on the following activities:  Development of treatment plan with patient or surrogate; discussion with consultant, evaluation of patient's response to treatment, examination of patient, obtaining history from patient or surrogate, ordering and performing treatments and interventions, ordering and reviewing of laboratory studies, ordering and review of radiographic studies, vitals, re-evaluation of patient' condition and review of old charts            The failure to initiate the interventions performed in the Emergency Department on an urgent basis would have likely resulted in sudden, clinically significant or life threatening deterioration in the patient's condition.                                 Past Medical History:   Diagnosis Date    Alcohol abuse     Anxiety     Cirrhosis     Glaucoma     History of hepatitis C, s/p successful Rx w/ SVR24 - 1/2017     genotype 1;  relapse following  PegIFN+RBV+Victrelis; prior relapse following PegIFN+RBV S/p 12 weeks harvoni + RBV w/ SVR    Hypertension     Paresthesia     feet, bilaterally     Past Surgical History:   Procedure Laterality Date    LIVER BIOPSY       Family History   Problem Relation Age of Onset    Diabetes Mellitus Father     Hypertension Father     Cancer Father         ? CRC    Diabetes Father     Cancer Mother         colon vs polyps, colectomy    Colon cancer Mother     Cancer Sister         ? CRC    Colonic polyp Brother     Cirrhosis Neg Hx      No current facility-administered medications on file prior to encounter.     Current Outpatient Medications on File Prior to Encounter   Medication Sig Dispense Refill    albuterol (PROVENTIL HFA) 90 mcg/actuation inhaler Inhale 2 puffs into the lungs every 6 (six) hours as needed for Wheezing. Rescue 18 g 2    amLODIPine (NORVASC) 5 MG tablet Take 1 tablet (5 mg total) by mouth once daily. 30 tablet 1    ammonium lactate (LAC-HYDRIN) 12 % lotion Apply topically 2 (two) times daily as needed for Dry Skin. Apply to BLE and feet 400 g 0    buPROPion (WELLBUTRIN SR) 150 MG TBSR 12 hr tablet Take 1 tablet (150 mg total) by mouth once daily. 90 tablet 3    cyanocobalamin (VITAMIN B-12) 1000 MCG tablet Take 1,000 mcg by mouth once daily.      gabapentin (NEURONTIN) 300 MG capsule TAKE 1 CAPSULE(300 MG) BY MOUTH EVERY EVENING 30 capsule 1    latanoprost 0.005 % ophthalmic solution Place 1 drop into both eyes every evening. 6 mL 3    losartan (COZAAR) 100 MG tablet Take 1 tablet (100 mg total) by mouth once daily. 90 tablet 3    melatonin (MELATIN) 3 mg tablet Take 2 tablets (6 mg total) by mouth nightly.  0    multivitamin (THERAGRAN) per tablet Take 1 tablet by mouth once daily.      timolol maleate 0.5% (TIMOPTIC) 0.5 % Drop Place 1 drop into both eyes 2 (two) times daily. 15 mL 3     Zoloft [sertraline]  Social History     Socioeconomic History    Marital status:    Tobacco Use    Smoking  status: Some Days     Types: Cigars    Smokeless tobacco: Never    Tobacco comments:     smokes cigars 20 per month   Substance and Sexual Activity    Alcohol use: Yes     Comment: heavy alcohol use in the past, now drinking daily, 2-3 beers daily    Drug use: No   Social History Narrative     (mental health issues), retired  at [employer]. No kids. 2 dogs. No exercise.                 DATA & INTERVENTIONS:    LABS reviewed:  Labs Reviewed   COMPREHENSIVE METABOLIC PANEL   CBC W/ AUTO DIFFERENTIAL   URINALYSIS, REFLEX TO URINE CULTURE   TSH   TROPONIN I   PROTIME-INR   MAGNESIUM   LIPASE   LACTIC ACID, PLASMA   CK   DRUG SCREEN PANEL, URINE EMERGENCY   SARS-COV2 (COVID) WITH FLU/RSV BY PCR   ISTAT CHEM8       RADIOLOGY reviewed:  Imaging Results    None         MEDICATIONS/FLUIDS:  Medications - No data to display           Rachel Dey MD  12/15/23 2043

## 2023-12-15 NOTE — ED NOTES
Patient was brought in for possible stroke, MD informed, Olaru at bedside, stroke code not called.

## 2023-12-16 LAB
ALLENS TEST: ABNORMAL
ANION GAP SERPL CALC-SCNC: 10 MMOL/L (ref 8–16)
ANION GAP SERPL CALC-SCNC: 11 MMOL/L (ref 8–16)
ANION GAP SERPL CALC-SCNC: 8 MMOL/L (ref 8–16)
BASOPHILS # BLD AUTO: 0.04 K/UL (ref 0–0.2)
BASOPHILS NFR BLD: 0.5 % (ref 0–1.9)
BUN SERPL-MCNC: 34 MG/DL (ref 8–23)
BUN SERPL-MCNC: 35 MG/DL (ref 8–23)
BUN SERPL-MCNC: 35 MG/DL (ref 8–23)
BUN SERPL-MCNC: 36 MG/DL (ref 8–23)
BUN SERPL-MCNC: 36 MG/DL (ref 8–23)
CALCIUM SERPL-MCNC: 7.5 MG/DL (ref 8.7–10.5)
CALCIUM SERPL-MCNC: 7.6 MG/DL (ref 8.7–10.5)
CALCIUM SERPL-MCNC: 7.7 MG/DL (ref 8.7–10.5)
CALCIUM SERPL-MCNC: 7.8 MG/DL (ref 8.7–10.5)
CALCIUM SERPL-MCNC: 7.9 MG/DL (ref 8.7–10.5)
CHLORIDE SERPL-SCNC: 90 MMOL/L (ref 95–110)
CHLORIDE SERPL-SCNC: 92 MMOL/L (ref 95–110)
CHLORIDE SERPL-SCNC: 94 MMOL/L (ref 95–110)
CHLORIDE SERPL-SCNC: 94 MMOL/L (ref 95–110)
CHLORIDE SERPL-SCNC: 96 MMOL/L (ref 95–110)
CK SERPL-CCNC: 2974 U/L (ref 20–200)
CO2 SERPL-SCNC: 13 MMOL/L (ref 23–29)
CO2 SERPL-SCNC: 15 MMOL/L (ref 23–29)
CO2 SERPL-SCNC: 17 MMOL/L (ref 23–29)
CO2 SERPL-SCNC: 18 MMOL/L (ref 23–29)
CO2 SERPL-SCNC: 19 MMOL/L (ref 23–29)
CREAT SERPL-MCNC: 1.9 MG/DL (ref 0.5–1.4)
CREAT SERPL-MCNC: 2 MG/DL (ref 0.5–1.4)
DELSYS: ABNORMAL
DIFFERENTIAL METHOD BLD: ABNORMAL
EOSINOPHIL # BLD AUTO: 0 K/UL (ref 0–0.5)
EOSINOPHIL NFR BLD: 0.1 % (ref 0–8)
ERYTHROCYTE [DISTWIDTH] IN BLOOD BY AUTOMATED COUNT: 13.9 % (ref 11.5–14.5)
EST. GFR  (NO RACE VARIABLE): 35.7 ML/MIN/1.73 M^2
EST. GFR  (NO RACE VARIABLE): 37.9 ML/MIN/1.73 M^2
FIO2: 28
FLOW: 2
GLUCOSE SERPL-MCNC: 103 MG/DL (ref 70–110)
GLUCOSE SERPL-MCNC: 135 MG/DL (ref 70–110)
GLUCOSE SERPL-MCNC: 88 MG/DL (ref 70–110)
GLUCOSE SERPL-MCNC: 95 MG/DL (ref 70–110)
GLUCOSE SERPL-MCNC: 98 MG/DL (ref 70–110)
HCO3 UR-SCNC: 14.9 MMOL/L (ref 24–28)
HCT VFR BLD AUTO: 35.4 % (ref 40–54)
HCT VFR BLD CALC: 37 %PCV (ref 36–54)
HGB BLD-MCNC: 12.8 G/DL (ref 14–18)
IMM GRANULOCYTES # BLD AUTO: 0.17 K/UL (ref 0–0.04)
IMM GRANULOCYTES NFR BLD AUTO: 2.1 % (ref 0–0.5)
LYMPHOCYTES # BLD AUTO: 0.6 K/UL (ref 1–4.8)
LYMPHOCYTES NFR BLD: 7.3 % (ref 18–48)
MAGNESIUM SERPL-MCNC: 2 MG/DL (ref 1.6–2.6)
MCH RBC QN AUTO: 33.8 PG (ref 27–31)
MCHC RBC AUTO-ENTMCNC: 36.2 G/DL (ref 32–36)
MCV RBC AUTO: 93 FL (ref 82–98)
MODE: ABNORMAL
MONOCYTES # BLD AUTO: 0.8 K/UL (ref 0.3–1)
MONOCYTES NFR BLD: 9.9 % (ref 4–15)
NEUTROPHILS # BLD AUTO: 6.4 K/UL (ref 1.8–7.7)
NEUTROPHILS NFR BLD: 80.1 % (ref 38–73)
NRBC BLD-RTO: 0 /100 WBC
OSMOLALITY SERPL: 265 MOSM/KG (ref 280–300)
OSMOLALITY UR: 504 MOSM/KG (ref 50–1200)
PCO2 BLDA: 26.2 MMHG (ref 35–45)
PH SMN: 7.36 [PH] (ref 7.35–7.45)
PHOSPHATE SERPL-MCNC: 3.1 MG/DL (ref 2.7–4.5)
PLATELET # BLD AUTO: 112 K/UL (ref 150–450)
PMV BLD AUTO: 10.5 FL (ref 9.2–12.9)
PO2 BLDA: 95 MMHG (ref 80–100)
POC BE: -10 MMOL/L
POC IONIZED CALCIUM: 1.11 MMOL/L (ref 1.06–1.42)
POC SATURATED O2: 97 % (ref 95–100)
POC TCO2: 16 MMOL/L (ref 23–27)
POTASSIUM BLD-SCNC: 3.6 MMOL/L (ref 3.5–5.1)
POTASSIUM SERPL-SCNC: 3.9 MMOL/L (ref 3.5–5.1)
POTASSIUM SERPL-SCNC: 4 MMOL/L (ref 3.5–5.1)
POTASSIUM SERPL-SCNC: 4.2 MMOL/L (ref 3.5–5.1)
RBC # BLD AUTO: 3.79 M/UL (ref 4.6–6.2)
SAMPLE: ABNORMAL
SITE: ABNORMAL
SODIUM BLD-SCNC: 121 MMOL/L (ref 136–145)
SODIUM SERPL-SCNC: 118 MMOL/L (ref 136–145)
SODIUM SERPL-SCNC: 120 MMOL/L (ref 136–145)
SODIUM SERPL-SCNC: 121 MMOL/L (ref 136–145)
SODIUM UR-SCNC: <10 MMOL/L (ref 20–250)
WBC # BLD AUTO: 7.95 K/UL (ref 3.9–12.7)

## 2023-12-16 PROCEDURE — 85025 COMPLETE CBC W/AUTO DIFF WBC: CPT | Mod: HCNC | Performed by: FAMILY MEDICINE

## 2023-12-16 PROCEDURE — 99223 1ST HOSP IP/OBS HIGH 75: CPT | Mod: HCNC,,, | Performed by: INTERNAL MEDICINE

## 2023-12-16 PROCEDURE — 63600175 PHARM REV CODE 636 W HCPCS: Mod: HCNC | Performed by: FAMILY MEDICINE

## 2023-12-16 PROCEDURE — 36600 WITHDRAWAL OF ARTERIAL BLOOD: CPT | Mod: HCNC

## 2023-12-16 PROCEDURE — 84100 ASSAY OF PHOSPHORUS: CPT | Mod: HCNC | Performed by: FAMILY MEDICINE

## 2023-12-16 PROCEDURE — 82803 BLOOD GASES ANY COMBINATION: CPT | Mod: HCNC

## 2023-12-16 PROCEDURE — 82550 ASSAY OF CK (CPK): CPT | Mod: HCNC | Performed by: FAMILY MEDICINE

## 2023-12-16 PROCEDURE — 36415 COLL VENOUS BLD VENIPUNCTURE: CPT | Mod: HCNC | Performed by: FAMILY MEDICINE

## 2023-12-16 PROCEDURE — 63600175 PHARM REV CODE 636 W HCPCS: Mod: HCNC | Performed by: INTERNAL MEDICINE

## 2023-12-16 PROCEDURE — 21400001 HC TELEMETRY ROOM: Mod: HCNC

## 2023-12-16 PROCEDURE — 25000003 PHARM REV CODE 250: Mod: HCNC | Performed by: INTERNAL MEDICINE

## 2023-12-16 PROCEDURE — 83930 ASSAY OF BLOOD OSMOLALITY: CPT | Mod: HCNC | Performed by: INTERNAL MEDICINE

## 2023-12-16 PROCEDURE — 84300 ASSAY OF URINE SODIUM: CPT | Mod: HCNC | Performed by: INTERNAL MEDICINE

## 2023-12-16 PROCEDURE — 99900035 HC TECH TIME PER 15 MIN (STAT): Mod: HCNC

## 2023-12-16 PROCEDURE — 80048 BASIC METABOLIC PNL TOTAL CA: CPT | Mod: 91,HCNC | Performed by: FAMILY MEDICINE

## 2023-12-16 PROCEDURE — 25000003 PHARM REV CODE 250: Mod: HCNC | Performed by: FAMILY MEDICINE

## 2023-12-16 PROCEDURE — 36415 COLL VENOUS BLD VENIPUNCTURE: CPT | Mod: HCNC | Performed by: INTERNAL MEDICINE

## 2023-12-16 PROCEDURE — 80048 BASIC METABOLIC PNL TOTAL CA: CPT | Mod: 91,HCNC | Performed by: INTERNAL MEDICINE

## 2023-12-16 PROCEDURE — 83935 ASSAY OF URINE OSMOLALITY: CPT | Mod: HCNC | Performed by: INTERNAL MEDICINE

## 2023-12-16 PROCEDURE — 83735 ASSAY OF MAGNESIUM: CPT | Mod: HCNC | Performed by: FAMILY MEDICINE

## 2023-12-16 RX ORDER — FUROSEMIDE 10 MG/ML
120 INJECTION INTRAMUSCULAR; INTRAVENOUS
Status: DISCONTINUED | OUTPATIENT
Start: 2023-12-16 | End: 2023-12-16

## 2023-12-16 RX ADMIN — DIAZEPAM 10 MG: 5 TABLET ORAL at 09:12

## 2023-12-16 RX ADMIN — FUROSEMIDE 160 MG: 10 INJECTION, SOLUTION INTRAMUSCULAR; INTRAVENOUS at 11:12

## 2023-12-16 RX ADMIN — PIPERACILLIN SODIUM AND TAZOBACTAM SODIUM 4.5 G: 4; .5 INJECTION, POWDER, FOR SOLUTION INTRAVENOUS at 06:12

## 2023-12-16 RX ADMIN — HEPARIN SODIUM 5000 UNITS: 5000 INJECTION INTRAVENOUS; SUBCUTANEOUS at 06:12

## 2023-12-16 RX ADMIN — Medication 100 MG: at 08:12

## 2023-12-16 RX ADMIN — HEPARIN SODIUM 5000 UNITS: 5000 INJECTION INTRAVENOUS; SUBCUTANEOUS at 09:12

## 2023-12-16 RX ADMIN — DIAZEPAM 10 MG: 5 TABLET ORAL at 06:12

## 2023-12-16 RX ADMIN — HEPARIN SODIUM 5000 UNITS: 5000 INJECTION INTRAVENOUS; SUBCUTANEOUS at 01:12

## 2023-12-16 RX ADMIN — ACETAMINOPHEN 500 MG: 500 TABLET ORAL at 09:12

## 2023-12-16 RX ADMIN — VANCOMYCIN HYDROCHLORIDE 2000 MG: 500 INJECTION, POWDER, LYOPHILIZED, FOR SOLUTION INTRAVENOUS at 01:12

## 2023-12-16 RX ADMIN — DIAZEPAM 10 MG: 5 TABLET ORAL at 01:12

## 2023-12-16 RX ADMIN — SODIUM CHLORIDE: 9 INJECTION, SOLUTION INTRAVENOUS at 01:12

## 2023-12-16 RX ADMIN — PIPERACILLIN SODIUM AND TAZOBACTAM SODIUM 4.5 G: 4; .5 INJECTION, POWDER, FOR SOLUTION INTRAVENOUS at 09:12

## 2023-12-16 RX ADMIN — THERA TABS 1 TABLET: TAB at 08:12

## 2023-12-16 RX ADMIN — PIPERACILLIN SODIUM AND TAZOBACTAM SODIUM 4.5 G: 4; .5 INJECTION, POWDER, FOR SOLUTION INTRAVENOUS at 01:12

## 2023-12-16 RX ADMIN — LORAZEPAM 2 MG: 0.5 TABLET ORAL at 03:12

## 2023-12-16 RX ADMIN — FOLIC ACID 1 MG: 1 TABLET ORAL at 08:12

## 2023-12-16 RX ADMIN — SODIUM CHLORIDE: 9 INJECTION, SOLUTION INTRAVENOUS at 08:12

## 2023-12-16 NOTE — PHARMACY MED REC
"Admission Medication History     The home medication history was taken by Anabela Cabrera.    You may go to "Admission" then "Reconcile Home Medications" tabs to review and/or act upon these items.     The home medication list has been updated by the Pharmacy department.   Please read ALL comments highlighted in yellow.   Please address this information as you see fit.    Feel free to contact us if you have any questions or require assistance.      The medications listed below were removed from the home medication list. Please reorder if appropriate:  Patient reports no longer taking the following medication(s):  AMMONIUM LACTATE 12 % LOTION  CYANOCOBALAMIN 1000 MG TABLET  LATANOPROST 0.005 % OPHTHALMIC SOLUTION  MELATONIN 3 MG TABLET  MULTIVITAMIN TABLET  TIMOLOL MALEATE 0.5 % OPHTHALMIC SOLUTION      Medications listed below were obtained from: Patient/family and Analytic software- [employer]  Current Outpatient Medications on File Prior to Encounter   Medication Sig    buPROPion (WELLBUTRIN SR) 150 MG TBSR 12 hr tablet   Take 1 tablet (150 mg total) by mouth once daily.    gabapentin (NEURONTIN) 300 MG capsule   Take 1 capsule by mouth every evening.    losartan (COZAAR) 100 MG tablet   Take 1 tablet (100 mg total) by mouth once daily.    albuterol (PROVENTIL HFA) 90 mcg/actuation inhaler     Inhale 2 puffs into the lungs every 6 (six) hours as needed for wheezing. Rescue   (Patient not taking: Reported on 12/15/2023)    amLODIPine (NORVASC) 5 MG tablet   Take 1 tablet (5 mg total) by mouth once daily. (Patient not taking: Reported on 12/15/2023)         Potential issues to be addressed PRIOR TO DISCHARGE  Please discuss with the patient barriers to adherence with medication treatment plans  Patient requires education regarding drug therapies     Anabela Cabrera  EXT [id number]                  .          "

## 2023-12-16 NOTE — H&P
[facility] ([address] Medicine  History & Physical    Patient Name: Froylan Borja  MRN: [id number]  Patient Class: IP- Inpatient  Admission Date: 12/15/2023  Attending Physician: Dru Razo MD   Primary Care Provider: Janki Tamez MD         Patient information was obtained from patient, past medical records, and ER records.     Subjective:     Principal Problem:Hyponatremia    Chief Complaint:   Chief Complaint   Patient presents with    Alcohol Intoxication     Patient was drinking at the bar with wife. Police where called because patient was getting belligerent and bothering patients. The police sent him here for stroke like symptoms. No visible physical deficits. Patient does have slurred speach        HPI: This is a 69yo male with a past medical history of alcohol abuse, depression, hyponatremia severe requiring prior MICU admit, HTN, HLD, cirrhosis, diverticulosis who presents to the ED with AMS. Patient reportedly at the bar with his wife earlier today and became confused and unsteady and difficulty ambulating. Per chart review police called because of beligerance and were concerned about stroke symptoms and brought patient to ED for further evaluation. Patient alert and oriented to person and aware in hospital. Unable to contribute meaningfully to history but does admit heavy daily alcohol use.     In the ED patient initially afebrile, and hemodynamically stable saturating well on room air at rest. WBC 10.1 with leukocytosis. Na 120. Creatinine 2.3 (baseline 1.0). CPK 3960. CXR with concern for aspiration pneumonia. Patient diaphoretic, tremulous, tachycardic concerning for developing withdrawal. Started on IVFs, benzodiazepines, and abx for rhabdo, hyponatremia, aspiration pna, alcohol withdrawal. Admitted to the care of medicine for further evaluation and management.     Past Medical History:   Diagnosis Date    Alcohol abuse     Anxiety     Cirrhosis      Glaucoma     History of hepatitis C, s/p successful Rx w/ SVR24 - 1/2017     genotype 1;  relapse following PegIFN+RBV+Victrelis; prior relapse following PegIFN+RBV S/p 12 weeks harvoni + RBV w/ SVR    Hypertension     Paresthesia     feet, bilaterally       Past Surgical History:   Procedure Laterality Date    LIVER BIOPSY         Review of patient's allergies indicates:   Allergen Reactions    Zoloft [sertraline] Other (See Comments)     hyponatremia       No current facility-administered medications on file prior to encounter.     Current Outpatient Medications on File Prior to Encounter   Medication Sig    buPROPion (WELLBUTRIN SR) 150 MG TBSR 12 hr tablet Take 1 tablet (150 mg total) by mouth once daily.    gabapentin (NEURONTIN) 300 MG capsule TAKE 1 CAPSULE(300 MG) BY MOUTH EVERY EVENING    losartan (COZAAR) 100 MG tablet Take 1 tablet (100 mg total) by mouth once daily.    albuterol (PROVENTIL HFA) 90 mcg/actuation inhaler Inhale 2 puffs into the lungs every 6 (six) hours as needed for Wheezing. Rescue (Patient not taking: Reported on 12/15/2023)    amLODIPine (NORVASC) 5 MG tablet Take 1 tablet (5 mg total) by mouth once daily. (Patient not taking: Reported on 12/15/2023)    [DISCONTINUED] ammonium lactate (LAC-HYDRIN) 12 % lotion Apply topically 2 (two) times daily as needed for Dry Skin. Apply to BLE and feet    [DISCONTINUED] cyanocobalamin (VITAMIN B-12) 1000 MCG tablet Take 1,000 mcg by mouth once daily.    [DISCONTINUED] latanoprost 0.005 % ophthalmic solution Place 1 drop into both eyes every evening.    [DISCONTINUED] melatonin (MELATIN) 3 mg tablet Take 2 tablets (6 mg total) by mouth nightly.    [DISCONTINUED] multivitamin (THERAGRAN) per tablet Take 1 tablet by mouth once daily.    [DISCONTINUED] timolol maleate 0.5% (TIMOPTIC) 0.5 % Drop Place 1 drop into both eyes 2 (two) times daily.     Family History       Problem Relation (Age of Onset)    Cancer Father, Mother, Sister    Colon cancer  Mother    Colonic polyp Brother    Diabetes Father    Diabetes Mellitus Father    Hypertension Father          Tobacco Use    Smoking status: Some Days     Types: Cigars    Smokeless tobacco: Never    Tobacco comments:     smokes cigars 20 per month   Substance and Sexual Activity    Alcohol use: Yes     Comment: heavy alcohol use in the past, now drinking daily, 2-3 beers daily    Drug use: No    Sexual activity: Not on file     Review of Systems   Unable to perform ROS: Mental status change     Objective:     Vital Signs (Most Recent):  Temp: (!) 102.2 °F (39 °C) (12/15/23 2123)  Pulse: 103 (12/15/23 2123)  Resp: (!) 28 (12/15/23 2123)  BP: (!) 142/71 (12/15/23 2123)  SpO2: (!) 94 % (12/15/23 2123) Vital Signs (24h Range):  Temp:  [98.4 °F (36.9 °C)-103 °F (39.4 °C)] 102.2 °F (39 °C)  Pulse:  [103-118] 103  Resp:  [20-28] 28  SpO2:  [94 %-100 %] 94 %  BP: (120-154)/(66-88) 142/71     Weight: 116.6 kg (257 lb)  Body mass index is 32.12 kg/m².     Physical Exam  Constitutional:       General: He is not in acute distress.     Appearance: He is diaphoretic. He is not toxic-appearing.   HENT:      Head: Normocephalic and atraumatic.      Nose: Nose normal.   Eyes:      General: No scleral icterus.     Extraocular Movements: Extraocular movements intact.      Pupils: Pupils are equal, round, and reactive to light.   Cardiovascular:      Rate and Rhythm: Regular rhythm. Tachycardia present.   Pulmonary:      Effort: Pulmonary effort is normal. No respiratory distress.      Breath sounds: Rales present. No wheezing.   Abdominal:      General: Abdomen is flat. There is no distension.      Palpations: Abdomen is soft.      Tenderness: There is no abdominal tenderness. There is no guarding.   Musculoskeletal:         General: Normal range of motion.      Cervical back: Normal range of motion and neck supple.      Right lower leg: Edema present.      Left lower leg: Edema present.   Skin:     General: Skin is warm.       "Coloration: Skin is not jaundiced or pale.   Neurological:      General: No focal deficit present.      Mental Status: He is alert. He is disoriented.      Cranial Nerves: No cranial nerve deficit.      Comments: tremulous              CRANIAL NERVES     CN III, IV, VI   Pupils are equal, round, and reactive to light.       Significant Labs: All pertinent labs within the past 24 hours have been reviewed.  CBC:   Recent Labs   Lab 12/15/23  1416 12/15/23  1422   WBC 10.11  --    HGB 15.0  --    HCT 41.1 44   *  --      CMP:   Recent Labs   Lab 12/15/23  1416 12/15/23  1859   * 120*   K 3.9 3.7   CL 91* 90*   CO2 16* 19*   GLU 83 116*   BUN 32* 34*   CREATININE 2.3* 2.2*   CALCIUM 8.5* 8.2*   PROT 6.9  --    ALBUMIN 3.0*  --    BILITOT 0.9  --    ALKPHOS 70  --    *  --    ALT 80*  --    ANIONGAP 13 11     Lactic Acid:   Recent Labs   Lab 12/15/23  1416 12/15/23  1751   LACTATE 3.3* 1.3     Lipase:   Recent Labs   Lab 12/15/23  1416   LIPASE 50     Urine Studies: No results for input(s): "COLORU", "APPEARANCEUA", "PHUR", "SPECGRAV", "PROTEINUA", "GLUCUA", "KETONESU", "BILIRUBINUA", "OCCULTUA", "NITRITE", "UROBILINOGEN", "LEUKOCYTESUR", "RBCUA", "WBCUA", "BACTERIA", "SQUAMEPITHEL", "HYALINECASTS" in the last 48 hours.    Invalid input(s): "WRIGHTSUR"    Significant Imaging: I have reviewed all pertinent imaging results/findings within the past 24 hours.  Assessment/Plan:     * Hyponatremia  - Na 120 on presentation  ;  history of severe hyponatremia requiring ICU. From chart review appears largely secondary to beer potemania in the past  - NS 150ml/hr continuous  (IVF also benefiting for rhabdo)  - fluid restriction 1500cc daily  - BMP q4h  - urine studies pending  - neurochecks q4h  - seizure precuations  - goal Na correction 0.2meq/L/hr  - further management pending clinical course and future study review      ALESSANDRA (acute kidney injury)  - Creatinine 2.3 on presentation ; baseline 1.0  - suspect " secondary to dehydration with severe alcohol use/dependence and inadequate water/hydration  - IVFs  - bladder scan in ED without evidence of retention  - strict I/Os  - follow up urine studies  - avoid nephrotoxic agents as appropriate  - continue to monitor    Rhabdomyolysis  - CPK 4k on presentation  - IVFs  - monitor kidney function  - repeat CPK in am to ensure down trending      Aspiration pneumonia  - developed fever shortly after admission  ;  Tmax 103  - CXR with concern for aspiration pna  - start vanc and zosyn  - follow up blood cultures  - follow up UA  - further management pending clinical course and future study review      Depression  - holding home meds for now as seems he hasn't been taking for some time        Alcohol use disorder, severe, dependence  - start diazepam 10mg q8h scheduled  - CIWA q4h  - ativan prn CIWA  - daily thiamine/FA/MV  - would benefit from addiction psych eval once clinically appropriate      Cirrhosis  MELD-Na score calculated; MELD 3.0: 25 at 12/15/2023  6:59 PM  MELD-Na: 24 at 12/15/2023  6:59 PM  Calculated from:  Serum Creatinine: 2.2 mg/dL at 12/15/2023  6:59 PM  Serum Sodium: 120 mmol/L (Using min of 125 mmol/L) at 12/15/2023  6:59 PM  Total Bilirubin: 0.9 mg/dL (Using min of 1 mg/dL) at 12/15/2023  2:16 PM  Serum Albumin: 3.0 g/dL at 12/15/2023  2:16 PM  INR(ratio): 1.0 at 12/15/2023  2:16 PM  Age at listing (hypothetical): 68 years  Sex: Male at 12/15/2023  6:59 PM      - continues to use alcohol persistantly  - consider Hepatology/liver transplant consult/eval pending clinical course      VTE Risk Mitigation (From admission, onward)           Ordered     heparin (porcine) injection 5,000 Units  Every 8 hours         12/15/23 1817     IP VTE HIGH RISK PATIENT  Once         12/15/23 1817     Place sequential compression device  Until discontinued         12/15/23 1817                                   Dru Razo MD  Department of Hospital Medicine  [facility] ([place] [address])

## 2023-12-16 NOTE — PLAN OF CARE
Problem: Adult Inpatient Plan of Care  Goal: Plan of Care Review  12/16/2023 0715 by Marifer Millard RN  Outcome: Ongoing, Progressing  12/16/2023 0059 by Marifer Millard RN  Outcome: Ongoing, Progressing  Goal: Patient-Specific Goal (Individualized)  12/16/2023 0715 by Marifer Millard RN  Outcome: Ongoing, Progressing  12/16/2023 0059 by Marifer Millard RN  Outcome: Ongoing, Progressing  Goal: Absence of Hospital-Acquired Illness or Injury  12/16/2023 0715 by Marifer Millard RN  Outcome: Ongoing, Progressing  12/16/2023 0059 by Marifer Millard RN  Outcome: Ongoing, Progressing  Goal: Optimal Comfort and Wellbeing  12/16/2023 0715 by Marifer Millard RN  Outcome: Ongoing, Progressing  12/16/2023 0059 by Marifer Millard RN  Outcome: Ongoing, Progressing  Goal: Readiness for Transition of Care  12/16/2023 0715 by Marifer Millard RN  Outcome: Ongoing, Progressing  12/16/2023 0059 by Marifer Millard RN  Outcome: Ongoing, Progressing     Problem: Fluid and Electrolyte Imbalance (Acute Kidney Injury/Impairment)  Goal: Fluid and Electrolyte Balance  12/16/2023 0715 by Marifer Millard RN  Outcome: Ongoing, Progressing  12/16/2023 0059 by Marifer Millard RN  Outcome: Ongoing, Progressing     Problem: Oral Intake Inadequate (Acute Kidney Injury/Impairment)  Goal: Optimal Nutrition Intake  12/16/2023 0715 by Marifer Millard RN  Outcome: Ongoing, Progressing  12/16/2023 0059 by Marifer Millard RN  Outcome: Ongoing, Progressing     Problem: Renal Function Impairment (Acute Kidney Injury/Impairment)  Goal: Effective Renal Function  12/16/2023 0715 by Marifer Millard RN  Outcome: Ongoing, Progressing  12/16/2023 0059 by Marifer Millard RN  Outcome: Ongoing, Progressing    Pt had an uneventful night. VSS. Pt was febrile but prn ibruophen given and effective.  Neuro WNL. Tele and cont pulse ox remains in place  Pt remains on O2 at 1L Pt is free of falls and injuries Bed in lowest position and calll  light wtihin reach safety maintained

## 2023-12-16 NOTE — SUBJECTIVE & OBJECTIVE
Interval History: Pt seen and examined this morning on chantal [place]. Patient is Aox3, complains of cough. Reports binging alcohol daily. He reports history of feeling shaky with ETOH cessation, denies seizures. Care plan reviewed. Otherwise, doing well and with no further complaints at this time.        Objective:     Vital Signs (Most Recent):  Temp: 98.1 °F (36.7 °C) (12/16/23 0822)  Pulse: 73 (12/16/23 0822)  Resp: 16 (12/16/23 0430)  BP: (!) 109/59 (12/16/23 0822)  SpO2: 97 % (12/16/23 0822) Vital Signs (24h Range):  Temp:  [97.8 °F (36.6 °C)-103 °F (39.4 °C)] 98.1 °F (36.7 °C)  Pulse:  [] 73  Resp:  [16-28] 16  SpO2:  [94 %-100 %] 97 %  BP: (109-154)/(57-88) 109/59     Weight: 112 kg (247 lb)  Body mass index is 30.87 kg/m².    Intake/Output Summary (Last 24 hours) at 12/16/2023 1015  Last data filed at 12/16/2023 0140  Gross per 24 hour   Intake 609.41 ml   Output --   Net 609.41 ml      Physical Exam  Gen: in NAD, appears stated age, central obesity  Neuro: AAOx4, CN2-12 grossly intact BL; motor, sensory, and strength grossly intact BL  HEENT: NTNC, EOMI, PERRLA, MMM; no thyromegaly or lymphadenopathy; no JVD appreciated  CVS: RRR, no m/r/g; S1/S2 auscultated with no S3 or S4; capillary refill < 2 sec  Resp: Wet cough, diminished breath sounds left lower lungfield. 2L NC. No belabored breathing or accessory muscle use appreciated   Abd: BS+ in all 4 quadrants; NTND, soft to palpation; no organomegaly appreciated   Extrem: Chronic venous stasis skin findings. pulses full, equal, and regular over all 4 extremities; no UE or LE edema BL      MELD 3.0: 24 at 12/16/2023  3:12 AM  MELD-Na: 24 at 12/16/2023  3:12 AM  Calculated from:  Serum Creatinine: 2.0 mg/dL at 12/16/2023  3:12 AM  Serum Sodium: 118 mmol/L (Using min of 125 mmol/L) at 12/16/2023  3:12 AM  Total Bilirubin: 0.9 mg/dL (Using min of 1 mg/dL) at 12/15/2023  2:16 PM  Serum Albumin: 3.0 g/dL at 12/15/2023  2:16 PM  INR(ratio): 1.0 at 12/15/2023   2:16 PM  Age at listing (hypothetical): 68 years  Sex: Male at 12/16/2023  3:12 AM      Significant Labs:  CBC:  Recent Labs   Lab 12/15/23  1416 12/15/23  1422 12/16/23  0312   WBC 10.11  --  7.95   HGB 15.0  --  12.8*   HCT 41.1 44 35.4*   *  --  112*     CMP:  Recent Labs   Lab 12/15/23  1416 12/15/23  1859 12/15/23  2242 12/16/23 0312   * 120* 120* 118*   K 3.9 3.7 4.0 3.9   CL 91* 90* 90* 92*   CO2 16* 19* 19* 18*   GLU 83 116* 98 135*   BUN 32* 34* 35* 35*   CREATININE 2.3* 2.2* 1.9* 2.0*   CALCIUM 8.5* 8.2* 7.9* 7.6*   PROT 6.9  --   --   --    ALBUMIN 3.0*  --   --   --    BILITOT 0.9  --   --   --    ALKPHOS 70  --   --   --    *  --   --   --    ALT 80*  --   --   --    ANIONGAP 13 11 11 8     PTINR:  Recent Labs   Lab 12/15/23  1416   INR 1.0       Significant Procedures:   Dobutamine Stress Test with Color Flow: No results found for this or any previous visit.

## 2023-12-16 NOTE — ASSESSMENT & PLAN NOTE
History of severe hyponatremia requiring ICU. From chart review appears largely secondary to beer potemania in the past  - Na 120 on presentation, downtrended to 118 overnight and nephrology was consulted.   - NS 150ml/hr continuous  (IVF also benefiting for rhabdo)  - Na 121 this morning  - BMP q4h  - Nephrology consulted for hyponatremia overnight  - Urine sodium 15, Urine osmolality 529, Serum osmolality 281  - neurochecks q4h  - seizure precuations  - goal Na correction 0.2meq/L/hr  - further management pending clinical course and future study review

## 2023-12-16 NOTE — ED NOTES
Telemetry Verification   Patient placed on Telemetry Box  Verified with War Room  Box # [id number]   Monitor Tech    Rate 107   Rhythm Sinus Tach

## 2023-12-16 NOTE — ASSESSMENT & PLAN NOTE
- start diazepam 10mg q8h scheduled  - CIWA q4h  - ativan prn CIWA  - daily thiamine/FA/MV  - would benefit from addiction psych eval once clinically appropriate

## 2023-12-16 NOTE — CONSULTS
New Consultation Report  Nephrology      Consult Requested By: Moy Cantu MD  Reason for Consult: ALESSANDRA, hyponatremia    History of Present Illness:  Patient is a 68 y.o. male presents for a new consultation for evaluation of elevated serum creatinine and hyponatremia. The patient was found to have an elevated serum creatinine during routine laboratory testing, at 2.3 mg/dL.     The patient has been admitted with confusion of acute onset. He has ESLD, HTN, HLD, diverticulosis. Admission revealed concerns for CAP and rhabdomyolysis. Found to have a serum  mEq/L.     The patient was taking NSAIDs or new antibiotics, recreational drugs, recent episode of dehydration, diarrhea, nausea or vomiting, acute illness, or exposure to IV radiocontrast.     Past Medical History:   Diagnosis Date    Alcohol abuse     Anxiety     Cirrhosis     Glaucoma     History of hepatitis C, s/p successful Rx w/ SVR24 - 1/2017     genotype 1;  relapse following PegIFN+RBV+Victrelis; prior relapse following PegIFN+RBV S/p 12 weeks harvoni + RBV w/ SVR    Hypertension     Paresthesia     feet, bilaterally         Current Facility-Administered Medications:     0.9%  NaCl infusion, , Intravenous, Continuous, Dru Razo MD, Last Rate: 150 mL/hr at 12/16/23 0808, New Bag at 12/16/23 0808    acetaminophen tablet 500 mg, 500 mg, Oral, Q4H PRN, Dru Razo MD, 500 mg at 12/15/23 1921    albuterol inhaler 2 puff, 2 puff, Inhalation, Q6H PRN **AND** MDI Q6H PRN, , , Q6H PRN, Dru Razo MD    aluminum-magnesium hydroxide-simethicone 200-200-20 mg/5 mL suspension 30 mL, 30 mL, Oral, QID PRN, Dru Razo MD    dextrose 10% bolus 125 mL 125 mL, 12.5 g, Intravenous, PRN, Dru Razo MD    dextrose 10% bolus 250 mL 250 mL, 25 g, Intravenous, PRN, Dru Razo MD    diazePAM tablet 10 mg, 10 mg, Oral, Q8H, Dru Razo MD, 10 mg at 12/16/23 1347    folic acid tablet 1 mg, 1 mg, Oral, Daily,  Dru Razo MD, 1 mg at 12/16/23 0849    glucagon (human recombinant) injection 1 mg, 1 mg, Intramuscular, PRN, Dru Razo MD    glucose chewable tablet 16 g, 16 g, Oral, PRN, Dru Razo MD    glucose chewable tablet 24 g, 24 g, Oral, PRN, Dru Razo MD    heparin (porcine) injection 5,000 Units, 5,000 Units, Subcutaneous, Q8H, Dru Razo MD, 5,000 Units at 12/16/23 1347    LORazepam tablet 2 mg, 2 mg, Oral, Q4H PRN, Dru Razo MD    melatonin tablet 6 mg, 6 mg, Oral, Nightly PRN, Dru Razo MD    multivitamin tablet, 1 tablet, Oral, Daily, Dru Razo MD, 1 tablet at 12/16/23 0848    naloxone 0.4 mg/mL injection 0.02 mg, 0.02 mg, Intravenous, PRN, Dru Razo MD    ondansetron injection 4 mg, 4 mg, Intravenous, Q8H PRN, Dru Razo MD    oxyCODONE immediate release tablet 5 mg, 5 mg, Oral, Q6H PRN, Dru Razo MD    piperacillin-tazobactam (ZOSYN) 4.5 g in dextrose 5 % in water (D5W) 100 mL IVPB (MB+), 4.5 g, Intravenous, Q8H, Dru Razo MD, Last Rate: 25 mL/hr at 12/16/23 1345, 4.5 g at 12/16/23 1345    sodium chloride 0.9% flush 10 mL, 10 mL, Intravenous, Q12H PRN, Dru Razo MD    thiamine tablet 100 mg, 100 mg, Oral, Daily, Dru Razo MD, 100 mg at 12/16/23 0849    Pharmacy to dose Vancomycin consult, , , Once **AND** vancomycin - pharmacy to dose, , Intravenous, pharmacy to manage frequency, Dru Razo MD  Review of patient's allergies indicates:   Allergen Reactions    Zoloft [sertraline] Other (See Comments)     hyponatremia        Past Surgical History:   Procedure Laterality Date    LIVER BIOPSY       Family History   Problem Relation Age of Onset    Diabetes Mellitus Father     Hypertension Father     Cancer Father         ? CRC    Diabetes Father     Cancer Mother         colon vs polyps, colectomy    Colon cancer Mother     Cancer Sister         ? CRC    Colonic polyp Brother  "    Cirrhosis Neg Hx      Social History     Tobacco Use    Smoking status: Some Days     Types: Cigars    Smokeless tobacco: Never    Tobacco comments:     smokes cigars 20 per month   Substance Use Topics    Alcohol use: Yes     Comment: heavy alcohol use in the past, now drinking daily, 2-3 beers daily    Drug use: No       Review of Systems   Unable to perform ROS: Mental status change       Vitals:    12/16/23 1123   BP: 124/60   Pulse: 75   Resp: 17   Temp: 98.6 °F (37 °C)       PHYSICAL EXAMINATION:  General: restlessness, well nourished, disoriented, elderly  Skin: color, texture, turgor normal. No rash or lesions  HEENT: Eyes: reactive pupils, normal conjunctiva. Oral mucosa moist, no ulcers.   Neck: supple, symmetrical, trachea midline, no JVD, no carotid bruit  Lungs: normal respiratory effort, decreased BS at bases  Cardiovascular: Heart: regular rate and rhythm, S1, S2 normal, no murmur, rub or gallop.   Abdomen: bowel sounds present, no abdominal bruit, soft, non-tender non-distented; no ascites.   Musculoskeletal: no pitting edema in lower extremities, no clubbing or cyanosis  Lymph Nodes: No cervical or supraclavicular adenopathy  Neurologic: No focal deficit. No asterixis.       LABORATORY DATA:  Lab Results   Component Value Date    CREATININE 1.9 (H) 12/16/2023       No results found for: "UTPCR"    Lab Results   Component Value Date     (L) 12/16/2023    K 3.9 12/16/2023    CO2 15 (L) 12/16/2023       Lab Results   Component Value Date    CALCIUM 7.5 (L) 12/16/2023    PHOS 3.1 12/16/2023       Lab Results   Component Value Date    HGB 12.8 (L) 12/16/2023        Lab Results   Component Value Date    HGBA1C 5.1 11/18/2019       Lab Results   Component Value Date    LDLCALC 94.2 12/09/2022         IMAGING STUDIES  CXR reviewed    IMPRESSION / RECOMMENDATIONS:     ALESSANDRA. In the context of delirium, CAP and rhabdomyolysis, possible ischemic + toxic ATN. FENa 0.18%/ Urine sediment with copious " amount of muddy brown granular casts w/wo renal tubular epithelial cells (see images below). This picture is not suggestive of hepatorenal syndrome. Serum Cr stabilizing.  ml so far today. On IVF NaCl. No indication for RRT.   Hyponatremia. Due to combination of CAP (possible SIADH factor), background of ESLD (ADH-dependent H20 retention), hypovolemia (rhabdo) and ALESSANDRA itself (reduce water clearance). No clear improvement with current IVF. U Osm still elevated. Recommend adding IV Lasix 160 mg IV q12 h to force urinary dilution and water excretion. OK to continue IV NS since the goal is not to achieve net Na loss.   Low bicarbonate: recommend VBG/ABG to determine if metabolic acidosis (ALESSANDRA) vs respiratory alkalosis (pneumonia). If acidemia confirmed, will recommend switching IVF to a bicarb drip  Confusion: possibly triggered by pneumonia or underlying ESLD.  No evidence of uremia  LLL pneumonia. Possible aspiration. On IV atbx pip-tazo + vanco. As per HM.           SUMMARY OF RECS:  Lasix 160 mg IV q 12h  Continue IVF  ml/hr  VBG  Renal US  Repeat BMP, U Na, U Osm in am  No need for RRT

## 2023-12-16 NOTE — SUBJECTIVE & OBJECTIVE
Past Medical History:   Diagnosis Date    Alcohol abuse     Anxiety     Cirrhosis     Glaucoma     History of hepatitis C, s/p successful Rx w/ SVR24 - 1/2017     genotype 1;  relapse following PegIFN+RBV+Victrelis; prior relapse following PegIFN+RBV S/p 12 weeks harvoni + RBV w/ SVR    Hypertension     Paresthesia     feet, bilaterally       Past Surgical History:   Procedure Laterality Date    LIVER BIOPSY         Review of patient's allergies indicates:   Allergen Reactions    Zoloft [sertraline] Other (See Comments)     hyponatremia       No current facility-administered medications on file prior to encounter.     Current Outpatient Medications on File Prior to Encounter   Medication Sig    buPROPion (WELLBUTRIN SR) 150 MG TBSR 12 hr tablet Take 1 tablet (150 mg total) by mouth once daily.    gabapentin (NEURONTIN) 300 MG capsule TAKE 1 CAPSULE(300 MG) BY MOUTH EVERY EVENING    losartan (COZAAR) 100 MG tablet Take 1 tablet (100 mg total) by mouth once daily.    albuterol (PROVENTIL HFA) 90 mcg/actuation inhaler Inhale 2 puffs into the lungs every 6 (six) hours as needed for Wheezing. Rescue (Patient not taking: Reported on 12/15/2023)    amLODIPine (NORVASC) 5 MG tablet Take 1 tablet (5 mg total) by mouth once daily. (Patient not taking: Reported on 12/15/2023)    [DISCONTINUED] ammonium lactate (LAC-HYDRIN) 12 % lotion Apply topically 2 (two) times daily as needed for Dry Skin. Apply to BLE and feet    [DISCONTINUED] cyanocobalamin (VITAMIN B-12) 1000 MCG tablet Take 1,000 mcg by mouth once daily.    [DISCONTINUED] latanoprost 0.005 % ophthalmic solution Place 1 drop into both eyes every evening.    [DISCONTINUED] melatonin (MELATIN) 3 mg tablet Take 2 tablets (6 mg total) by mouth nightly.    [DISCONTINUED] multivitamin (THERAGRAN) per tablet Take 1 tablet by mouth once daily.    [DISCONTINUED] timolol maleate 0.5% (TIMOPTIC) 0.5 % Drop Place 1 drop into both eyes 2 (two) times daily.     Family History        Problem Relation (Age of Onset)    Cancer Father, Mother, Sister    Colon cancer Mother    Colonic polyp Brother    Diabetes Father    Diabetes Mellitus Father    Hypertension Father          Tobacco Use    Smoking status: Some Days     Types: Cigars    Smokeless tobacco: Never    Tobacco comments:     smokes cigars 20 per month   Substance and Sexual Activity    Alcohol use: Yes     Comment: heavy alcohol use in the past, now drinking daily, 2-3 beers daily    Drug use: No    Sexual activity: Not on file     Review of Systems   Unable to perform ROS: Mental status change     Objective:     Vital Signs (Most Recent):  Temp: (!) 102.2 °F (39 °C) (12/15/23 2123)  Pulse: 103 (12/15/23 2123)  Resp: (!) 28 (12/15/23 2123)  BP: (!) 142/71 (12/15/23 2123)  SpO2: (!) 94 % (12/15/23 2123) Vital Signs (24h Range):  Temp:  [98.4 °F (36.9 °C)-103 °F (39.4 °C)] 102.2 °F (39 °C)  Pulse:  [103-118] 103  Resp:  [20-28] 28  SpO2:  [94 %-100 %] 94 %  BP: (120-154)/(66-88) 142/71     Weight: 116.6 kg (257 lb)  Body mass index is 32.12 kg/m².     Physical Exam  Constitutional:       General: He is not in acute distress.     Appearance: He is diaphoretic. He is not toxic-appearing.   HENT:      Head: Normocephalic and atraumatic.      Nose: Nose normal.   Eyes:      General: No scleral icterus.     Extraocular Movements: Extraocular movements intact.      Pupils: Pupils are equal, round, and reactive to light.   Cardiovascular:      Rate and Rhythm: Regular rhythm. Tachycardia present.   Pulmonary:      Effort: Pulmonary effort is normal. No respiratory distress.      Breath sounds: Rales present. No wheezing.   Abdominal:      General: Abdomen is flat. There is no distension.      Palpations: Abdomen is soft.      Tenderness: There is no abdominal tenderness. There is no guarding.   Musculoskeletal:         General: Normal range of motion.      Cervical back: Normal range of motion and neck supple.      Right lower leg: Edema present.  "     Left lower leg: Edema present.   Skin:     General: Skin is warm.      Coloration: Skin is not jaundiced or pale.   Neurological:      General: No focal deficit present.      Mental Status: He is alert. He is disoriented.      Cranial Nerves: No cranial nerve deficit.      Comments: tremulous              CRANIAL NERVES     CN III, IV, VI   Pupils are equal, round, and reactive to light.       Significant Labs: All pertinent labs within the past 24 hours have been reviewed.  CBC:   Recent Labs   Lab 12/15/23  1416 12/15/23  1422   WBC 10.11  --    HGB 15.0  --    HCT 41.1 44   *  --      CMP:   Recent Labs   Lab 12/15/23  1416 12/15/23  1859   * 120*   K 3.9 3.7   CL 91* 90*   CO2 16* 19*   GLU 83 116*   BUN 32* 34*   CREATININE 2.3* 2.2*   CALCIUM 8.5* 8.2*   PROT 6.9  --    ALBUMIN 3.0*  --    BILITOT 0.9  --    ALKPHOS 70  --    *  --    ALT 80*  --    ANIONGAP 13 11     Lactic Acid:   Recent Labs   Lab 12/15/23  1416 12/15/23  1751   LACTATE 3.3* 1.3     Lipase:   Recent Labs   Lab 12/15/23  1416   LIPASE 50     Urine Studies: No results for input(s): "COLORU", "APPEARANCEUA", "PHUR", "SPECGRAV", "PROTEINUA", "GLUCUA", "KETONESU", "BILIRUBINUA", "OCCULTUA", "NITRITE", "UROBILINOGEN", "LEUKOCYTESUR", "RBCUA", "WBCUA", "BACTERIA", "SQUAMEPITHEL", "HYALINECASTS" in the last 48 hours.    Invalid input(s): "WRIGHTSUR"    Significant Imaging: I have reviewed all pertinent imaging results/findings within the past 24 hours.  "

## 2023-12-16 NOTE — AI DETERIORATION ALERT
"RAPID RESPONSE NURSE AI ALERT       AI alert received.    Chart Reviewed: 12/16/2023, 7:05 AM    MRN: [id number]  Bed: 91785/56985 A    Dx: Hyponatremia    Froylan Borja has a past medical history of Alcohol abuse, Anxiety, Cirrhosis, Glaucoma, History of hepatitis C, s/p successful Rx w/ SVR24 - 1/2017, Hypertension, and Paresthesia.    Last VS: BP (!) 115/57 (BP Location: Left arm, Patient Position: Lying)   Pulse 83   Temp 97.8 °F (36.6 °C) (Oral)   Resp 16   Ht 6' 3" (1.905 m)   Wt 112 kg (247 lb)   SpO2 95%   BMI 30.87 kg/m²     24H Vital Sign Range:  Temp:  [97.8 °F (36.6 °C)-103 °F (39.4 °C)]   Pulse:  []   Resp:  [16-28]   BP: (115-154)/(57-88)   SpO2:  [94 %-100 %]     Level of Consciousness (AVPU): alert    Recent Labs     12/15/23  1416 12/15/23  1422 12/16/23  0312   WBC 10.11  --  7.95   HGB 15.0  --  12.8*   HCT 41.1 44 35.4*   *  --  112*       Recent Labs     12/15/23  1416 12/15/23  1859 12/15/23  2242 12/16/23  0312   * 120* 120* 118*   K 3.9 3.7 4.0 3.9   CL 91* 90* 90* 92*   CO2 16* 19* 19* 18*   BUN 32* 34* 35* 35*   CREATININE 2.3* 2.2* 1.9* 2.0*   GLU 83 116* 98 135*   PHOS  --   --   --  3.1   MG 2.2  --   --  2.0        No results for input(s): "PH", "PCO2", "PO2", "HCO3", "POCSATURATED", "BE" in the last 72 hours.     OXYGEN:  Flow (L/min): 2          MEWS score: 2    Rounding completed with charge GERDA Sanchez. No concerns verbalized at this time. Instructed to call [phone] for further concerns or assistance.    Saida Kearney RN        "

## 2023-12-16 NOTE — ASSESSMENT & PLAN NOTE
- Na 120 on presentation  ;  history of severe hyponatremia requiring ICU. From chart review appears largely secondary to beer potemania in the past  - NS 150ml/hr continuous  (IVF also benefiting for rhabdo)  - fluid restriction 1500cc daily  - BMP q4h  - urine studies pending  - neurochecks q4h  - seizure precuations  - goal Na correction 0.2meq/L/hr  - further management pending clinical course and future study review

## 2023-12-16 NOTE — ASSESSMENT & PLAN NOTE
MELD-Na score calculated; MELD 3.0: 23 at 12/16/2023  9:08 AM  MELD-Na: 24 at 12/16/2023  9:08 AM  Calculated from:  Serum Creatinine: 1.9 mg/dL at 12/16/2023  9:08 AM  Serum Sodium: 121 mmol/L (Using min of 125 mmol/L) at 12/16/2023  9:08 AM  Total Bilirubin: 0.9 mg/dL (Using min of 1 mg/dL) at 12/15/2023  2:16 PM  Serum Albumin: 3.0 g/dL at 12/15/2023  2:16 PM  INR(ratio): 1.0 at 12/15/2023  2:16 PM  Age at listing (hypothetical): 68 years  Sex: Male at 12/16/2023  9:08 AM    - continues to use alcohol persistently  - AST > ALT x2 consistent with ETOH use.  - Trend hepatic function panel  - Appears well compensated on physical exam

## 2023-12-16 NOTE — ASSESSMENT & PLAN NOTE
- developed fever shortly after admission  ;  Tmax 103  - CXR with concern for aspiration pna  - start vanc and zosyn  - follow up blood cultures  - follow up UA  - further management pending clinical course and future study review

## 2023-12-16 NOTE — HOSPITAL COURSE
[place] [facility] [place] with severe hyponatremia in setting of ETOH abuse, ETOH withdrawals, ALESSANDRA, and aspiration pneumonia. He was started on Vanc/zosyn for aspiration pneumonia. Received IVF bolus and continued on maintenance IVF, Nephrology consulted for hyponatremia and ALESSANDRA. Urine was spun muddy brown casts observed indicative of ATN. Nephrology recommending NS continued @ 150ml/hr and lasix 160mg IV BID for sodium excretion. Na slowing improving at appropriate rate. Blood cultures NGTD. UA growing GNR's. MRSA swab pending. Patient continues on Vanc/zosyn. Platelets decreased from 140 -> 112 -> 86. Likely secondary to infection however was receiving subcutaneous heparin. HIT panel sent and transitioned to lovenox. Continues to have symptoms of alcohol withdrawal and continued on CIWA and scheduled/PRN benzodiazepines. UA growing ESBL E coli and Dc'd zosyn and started ertapenem. Vanc discontinued. Urine output remained good with diuresis, Cr stable. IV lasix Dc'd per nephrology recommendations. Sodium improved to 135. PT/OT consulted.    12/21/23 and onwards, patient completed IV ertapenem.  Kidney functions improved and Nephrology signed off.  He is awaiting placement at this time to SNF. Wound care consulted for moisture associated dermatitis on sacrum. PT/OT continued to recommend SNF however he was unfortunately rejected from all SNF's. Patient does not want to wait in the hospital to wait for out of network SNF referrals and decided he wants to go home with home health.  Patient able to ambulate with walker. Patient medically stable for discharge. He and wife live on 2nd floor of apartment but are able to use a 1st floor apartment per wife after she spoke with her landlord. Will order rolling walker to bedside as well as thiamine, MV, and folic acid prior to discharge. Patient also extensively counseled on alcohol cessation. Referred to addiction psychiatry clinic.      The mobility limitation cannot  be sufficiently resolved by the use of a cane. Patient's functional mobility deficit can be sufficiently resolved with the use of a Rolling Walker. Patient's mobility limitation significantly impairs their ability to participate in one of more activities of daily living. The use of a rolling walker will significantly improve the patient's ability to participate in MRADLS and the patient will use it on regular basis in the home.

## 2023-12-16 NOTE — CARE UPDATE
RAPID RESPONSE NURSE ROUND       Rounding completed with charge RNJeny for fever, tachycardia,  reports patient just arrived to floor, bedside RN in the process of checking VS now.   Repeat labs, septic workup including blood cultures, UA, ordered by admitting MD.   No additional concerns verbalized at this time. Instructed to call [phone] for further concerns or assistance.

## 2023-12-16 NOTE — PROGRESS NOTES
[facility] ([address] Medicine  Progress Note    Patient Name: Froylan Borja  MRN: [id number]  Patient Class: IP- Inpatient   Admission Date: 12/15/2023  Length of Stay: 1 days  Attending Physician: Moy Cantu MD  Primary Care Provider: Janki Tamez MD        Subjective:     Principal Problem:Hyponatremia        HPI:  This is a 69yo male with a past medical history of alcohol abuse, depression, hyponatremia severe requiring prior MICU admit, HTN, HLD, cirrhosis, diverticulosis who presents to the ED with AMS. Patient reportedly at the bar with his wife earlier today and became confused and unsteady and difficulty ambulating. Per chart review police called because of beligerance and were concerned about stroke symptoms and brought patient to ED for further evaluation. Patient alert and oriented to person and aware in hospital. Unable to contribute meaningfully to history but does admit heavy daily alcohol use.     In the ED patient initially afebrile, and hemodynamically stable saturating well on room air at rest. WBC 10.1 with leukocytosis. Na 120. Creatinine 2.3 (baseline 1.0). CPK 3960. CXR with concern for aspiration pneumonia. Patient diaphoretic, tremulous, tachycardic concerning for developing withdrawal. Started on IVFs, benzodiazepines, and abx for rhabdo, hyponatremia, aspiration pna, alcohol withdrawal. Admitted to the care of medicine for further evaluation and management.     Overview/Hospital Course:  [place] [facility] [place] severe hyponatremia in setting of ETOH abuse, ETOH withdrawals, ALESSANDRA, and aspiration pneumonia. He was started on Vanc/zosyn for aspiration pneumonia. Received IVF bolus and continued on maintenance, Nephrology consulted for hyponatremia and ALESSANDRA. NS Blood cultured NGTD.    Interval History: Pt seen and examined this morning on rounds. PAT. Patient is Aox3, complains of cough. Reports binging alcohol daily. He reports history of  feeling shaky with ETOH cessation, denies seizures. Care plan reviewed. Otherwise, doing well and with no further complaints at this time.        Objective:     Vital Signs (Most Recent):  Temp: 98.1 °F (36.7 °C) (12/16/23 0822)  Pulse: 73 (12/16/23 0822)  Resp: 16 (12/16/23 0430)  BP: (!) 109/59 (12/16/23 0822)  SpO2: 97 % (12/16/23 0822) Vital Signs (24h Range):  Temp:  [97.8 °F (36.6 °C)-103 °F (39.4 °C)] 98.1 °F (36.7 °C)  Pulse:  [] 73  Resp:  [16-28] 16  SpO2:  [94 %-100 %] 97 %  BP: (109-154)/(57-88) 109/59     Weight: 112 kg (247 lb)  Body mass index is 30.87 kg/m².    Intake/Output Summary (Last 24 hours) at 12/16/2023 1015  Last data filed at 12/16/2023 0140  Gross per 24 hour   Intake 609.41 ml   Output --   Net 609.41 ml      Physical Exam  Gen: in NAD, appears stated age, central obesity  Neuro: AAOx4, CN2-12 grossly intact BL; motor, sensory, and strength grossly intact BL  HEENT: NTNC, EOMI, PERRLA, MMM; no thyromegaly or lymphadenopathy; no JVD appreciated  CVS: RRR, no m/r/g; S1/S2 auscultated with no S3 or S4; capillary refill < 2 sec  Resp: Wet cough, diminished breath sounds left lower lungfield. 2L NC. No belabored breathing or accessory muscle use appreciated   Abd: BS+ in all 4 quadrants; NTND, soft to palpation; no organomegaly appreciated   Extrem: Chronic venous stasis skin findings. pulses full, equal, and regular over all 4 extremities; no UE or LE edema BL      MELD 3.0: 24 at 12/16/2023  3:12 AM  MELD-Na: 24 at 12/16/2023  3:12 AM  Calculated from:  Serum Creatinine: 2.0 mg/dL at 12/16/2023  3:12 AM  Serum Sodium: 118 mmol/L (Using min of 125 mmol/L) at 12/16/2023  3:12 AM  Total Bilirubin: 0.9 mg/dL (Using min of 1 mg/dL) at 12/15/2023  2:16 PM  Serum Albumin: 3.0 g/dL at 12/15/2023  2:16 PM  INR(ratio): 1.0 at 12/15/2023  2:16 PM  Age at listing (hypothetical): 68 years  Sex: Male at 12/16/2023  3:12 AM      Significant Labs:  CBC:  Recent Labs   Lab 12/15/23  1416  12/15/23  1422 12/16/23  0312   WBC 10.11  --  7.95   HGB 15.0  --  12.8*   HCT 41.1 44 35.4*   *  --  112*     CMP:  Recent Labs   Lab 12/15/23  1416 12/15/23  1859 12/15/23  2242 12/16/23  0312   * 120* 120* 118*   K 3.9 3.7 4.0 3.9   CL 91* 90* 90* 92*   CO2 16* 19* 19* 18*   GLU 83 116* 98 135*   BUN 32* 34* 35* 35*   CREATININE 2.3* 2.2* 1.9* 2.0*   CALCIUM 8.5* 8.2* 7.9* 7.6*   PROT 6.9  --   --   --    ALBUMIN 3.0*  --   --   --    BILITOT 0.9  --   --   --    ALKPHOS 70  --   --   --    *  --   --   --    ALT 80*  --   --   --    ANIONGAP 13 11 11 8     PTINR:  Recent Labs   Lab 12/15/23  1416   INR 1.0       Significant Procedures:   Dobutamine Stress Test with Color Flow: No results found for this or any previous visit.    Assessment/Plan:      * Hyponatremia  History of severe hyponatremia requiring ICU. From chart review appears largely secondary to beer potemania in the past  - Na 120 on presentation, downtrended to 118 overnight and nephrology was consulted.   - NS 150ml/hr continuous  (IVF also benefiting for rhabdo)  - Na 121 this morning  - BMP q4h  - Nephrology consulted for hyponatremia overnight  - Urine sodium 15, Urine osmolality 529, Serum osmolality 281  - neurochecks q4h  - seizure precuations  - goal Na correction 0.2meq/L/hr  - further management pending clinical course and future study review      Aspiration pneumonia  - developed fever shortly after admission  ;  Tmax 103  - CXR with concern for aspiration pna left basilar lungfield  - Continue vanc and zosyn,  - follow up blood cultures  - further management pending clinical course and future study review    Alcohol use disorder, severe, dependence  - start diazepam 10mg q8h scheduled  - CIWA q4h  - ativan prn CIWA  - daily thiamine/FA/MV  - would benefit from addiction psych eval once clinically appropriate    ALESSANDRA (acute kidney injury)  - Creatinine 2.3 on presentation ; baseline 1.0  - suspect secondary to  dehydration with severe alcohol use/dependence and inadequate water/hydration  - Continue IVFs  - bladder scan in ED without evidence of retention  - strict I/Os  - follow up urine studies  - avoid nephrotoxic agents as appropriate  - continue to monitor  - Cr slowly improving.    Rhabdomyolysis  - CPK 4k on presentation  - IVFs  - monitor kidney function  - Improving with IVF      Cirrhosis  MELD-Na score calculated; MELD 3.0: 23 at 12/16/2023  9:08 AM  MELD-Na: 24 at 12/16/2023  9:08 AM  Calculated from:  Serum Creatinine: 1.9 mg/dL at 12/16/2023  9:08 AM  Serum Sodium: 121 mmol/L (Using min of 125 mmol/L) at 12/16/2023  9:08 AM  Total Bilirubin: 0.9 mg/dL (Using min of 1 mg/dL) at 12/15/2023  2:16 PM  Serum Albumin: 3.0 g/dL at 12/15/2023  2:16 PM  INR(ratio): 1.0 at 12/15/2023  2:16 PM  Age at listing (hypothetical): 68 years  Sex: Male at 12/16/2023  9:08 AM    - continues to use alcohol persistently  - AST > ALT x2 consistent with ETOH use.  - Trend hepatic function panel  - Appears well compensated on physical exam    Depression  - holding home meds for now as seems he hasn't been taking for some time          VTE Risk Mitigation (From admission, onward)           Ordered     heparin (porcine) injection 5,000 Units  Every 8 hours         12/15/23 1817     IP VTE HIGH RISK PATIENT  Once         12/15/23 1817     Place sequential compression device  Until discontinued         12/15/23 1817                    Discharge Planning   KENNETH:      Code Status: Full Code   Is the patient medically ready for discharge?: No    Reason for patient still in hospital (select all that apply): Treatment                     Moy Cantu MD  Department of Hospital Medicine   [facility] - Intensive Care ([place]

## 2023-12-16 NOTE — HPI
This is a 69yo male with a past medical history of alcohol abuse, depression, hyponatremia severe requiring prior MICU admit, HTN, HLD, cirrhosis, diverticulosis who presents to the ED with AMS. Patient reportedly at the bar with his wife earlier today and became confused and unsteady and difficulty ambulating. Per chart review police called because of beligerance and were concerned about stroke symptoms and brought patient to ED for further evaluation. Patient alert and oriented to person and aware in hospital. Unable to contribute meaningfully to history but does admit heavy daily alcohol use.     In the ED patient initially afebrile, and hemodynamically stable saturating well on room air at rest. WBC 10.1 with leukocytosis. Na 120. Creatinine 2.3 (baseline 1.0). CPK 3960. CXR with concern for aspiration pneumonia. Patient diaphoretic, tremulous, tachycardic concerning for developing withdrawal. Started on IVFs, benzodiazepines, and abx for rhabdo, hyponatremia, aspiration pna, alcohol withdrawal. Admitted to the care of medicine for further evaluation and management.

## 2023-12-16 NOTE — PROGRESS NOTES
12/15/23 2123   Vital Signs   Temp (!) 102.2 °F (39 °C)   Temp Source Oral   Pulse 103   Heart Rate Source Monitor   Resp (!) 28   SpO2 (!) 94 %   BP (!) 142/71   MAP (mmHg) 98   BP Location Right arm   Patient Position Lying     Pt arrived to floor via stretcher . Pt denied any pain or discomfort. Pt febrile and tachypnea. MD notified and new orders were placed(SEE MAR ) Pt AAOx3 . NEURO WNL NACL is infusing to lt piv at 150 ml/hr. Pt IV abx intiated. Pt oriented to room and call light. POC reviewed with pt. Bed in lowest position and call light within reach. Safety maintained

## 2023-12-16 NOTE — NURSING
.Nurses Note -- 4 Eyes      12/15/23  7:13 AM      Skin assessed during: Admit      [] No Altered Skin Integrity Present    [x]Prevention Measures Documented      [] Yes- Altered Skin Integrity Present or Discovered   [] LDA Added if Not in Epic (Describe Wound)   [] New Altered Skin Integrity was Present on Admit and Documented in LDA   [] Wound Image Taken    Wound Care Consulted? No    Attending Nurse:  Radha Altman RN/Staff Member:  GERDA Caicedo RN

## 2023-12-16 NOTE — ASSESSMENT & PLAN NOTE
- developed fever shortly after admission  ;  Tmax 103  - CXR with concern for aspiration pna left basilar lungfield  - Continue vanc and zosyn,  - follow up blood cultures  - further management pending clinical course and future study review

## 2023-12-16 NOTE — ASSESSMENT & PLAN NOTE
MELD-Na score calculated; MELD 3.0: 25 at 12/15/2023  6:59 PM  MELD-Na: 24 at 12/15/2023  6:59 PM  Calculated from:  Serum Creatinine: 2.2 mg/dL at 12/15/2023  6:59 PM  Serum Sodium: 120 mmol/L (Using min of 125 mmol/L) at 12/15/2023  6:59 PM  Total Bilirubin: 0.9 mg/dL (Using min of 1 mg/dL) at 12/15/2023  2:16 PM  Serum Albumin: 3.0 g/dL at 12/15/2023  2:16 PM  INR(ratio): 1.0 at 12/15/2023  2:16 PM  Age at listing (hypothetical): 68 years  Sex: Male at 12/15/2023  6:59 PM      - continues to use alcohol persistantly  - consider Hepatology/liver transplant consult/eval pending clinical course

## 2023-12-16 NOTE — PROGRESS NOTES
"Pharmacokinetic Initial Assessment: IV Vancomycin    Assessment/Plan:    Initiate intravenous vancomycin with loading dose of 2000 mg once with subsequent doses when random concentrations are less than 20 mcg/mL  Desired empiric serum trough concentration is 10 to 15 mcg/mL  Draw vancomycin random level on 12/16/23 at 2130.  Pharmacy will continue to follow and monitor vancomycin.      Please contact pharmacy at extension [phone] with any questions regarding this assessment.     Thank you for the consult,   Cori Chavez       Patient brief summary:  Froylan Borja is a 68 y.o. male initiated on antimicrobial therapy with IV Vancomycin for treatment of suspected  pneumonia    Drug Allergies:   Review of patient's allergies indicates:   Allergen Reactions    Zoloft [sertraline] Other (See Comments)     hyponatremia       Actual Body Weight:   116.6 kg    Renal Function:   Estimated Creatinine Clearance: 44.2 mL/min (A) (based on SCr of 2.2 mg/dL (H)).,     Dialysis Method (if applicable):  N/A    CBC (last 72 hours):  Recent Labs   Lab Result Units 12/15/23  1416   WBC K/uL 10.11   Hemoglobin g/dL 15.0   Hematocrit % 41.1   Platelets K/uL 143*   Gran % % 86.4*   Lymph % % 4.1*   Mono % % 6.9   Eosinophil % % 0.0   Basophil % % 0.4   Differential Method  Automated       Metabolic Panel (last 72 hours):  Recent Labs   Lab Result Units 12/15/23  1416 12/15/23  1859 12/15/23  1938   Sodium mmol/L 120* 120*  --    Sodium, Urine mmol/L  --   --  15*   Potassium mmol/L 3.9 3.7  --    Chloride mmol/L 91* 90*  --    CO2 mmol/L 16* 19*  --    Glucose mg/dL 83 116*  --    BUN mg/dL 32* 34*  --    Creatinine mg/dL 2.3* 2.2*  --    Creatinine, Urine mg/dL  --   --  157.0   Albumin g/dL 3.0*  --   --    Total Bilirubin mg/dL 0.9  --   --    Alkaline Phosphatase U/L 70  --   --    AST U/L 177*  --   --    ALT U/L 80*  --   --    Magnesium mg/dL 2.2  --   --        Drug levels (last 3 results):  No results for input(s): "VANCOMYCINRA", " ""VANCORANDOM", "VANCOMYCINPE", "VANCOPEAK", "VANCOMYCINTR", "VANCOTROUGH" in the last 72 hours.    Microbiologic Results:  Microbiology Results (last 7 days)       ** No results found for the last 168 hours. **            "

## 2023-12-16 NOTE — ASSESSMENT & PLAN NOTE
- CPK 4k on presentation  - IVFs  - monitor kidney function  - repeat CPK in am to ensure down trending

## 2023-12-16 NOTE — PROGRESS NOTES
12/16/23 0547   Critical Value Communication   Date Result Received 12/16/23   Time Result Received 0547   Resulting Department of Critical Value LAB   Who communicated critical value from resulting department? LAB   Critical Test #1 NA   Critical Test #1 Result 118   Provider Notification   Reason for Communication Other (Comment)   Provider Name    Provider Role Hospitalist   Method of Communication Other (Comment)   Response See orders   Notification Time 0548     DARLEEN notified of critical sodium level. Pt with new orders for nephrology consult

## 2023-12-16 NOTE — ASSESSMENT & PLAN NOTE
- Creatinine 2.3 on presentation ; baseline 1.0  - suspect secondary to dehydration with severe alcohol use/dependence and inadequate water/hydration  - Continue IVFs  - bladder scan in ED without evidence of retention  - strict I/Os  - follow up urine studies  - avoid nephrotoxic agents as appropriate  - continue to monitor  - Cr slowly improving.

## 2023-12-16 NOTE — ASSESSMENT & PLAN NOTE
- Creatinine 2.3 on presentation ; baseline 1.0  - suspect secondary to dehydration with severe alcohol use/dependence and inadequate water/hydration  - IVFs  - bladder scan in ED without evidence of retention  - strict I/Os  - follow up urine studies  - avoid nephrotoxic agents as appropriate  - continue to monitor

## 2023-12-17 PROBLEM — N30.00 ACUTE CYSTITIS: Status: ACTIVE | Noted: 2023-12-17

## 2023-12-17 PROBLEM — E87.20 METABOLIC ACIDOSIS: Status: ACTIVE | Noted: 2023-12-17

## 2023-12-17 PROBLEM — E87.6 HYPOKALEMIA: Status: ACTIVE | Noted: 2023-12-17

## 2023-12-17 PROBLEM — D69.6 THROMBOCYTOPENIA: Status: ACTIVE | Noted: 2023-12-17

## 2023-12-17 LAB
ALBUMIN SERPL BCP-MCNC: 2.4 G/DL (ref 3.5–5.2)
ALP SERPL-CCNC: 57 U/L (ref 55–135)
ALT SERPL W/O P-5'-P-CCNC: 70 U/L (ref 10–44)
ANION GAP SERPL CALC-SCNC: 12 MMOL/L (ref 8–16)
ANION GAP SERPL CALC-SCNC: 13 MMOL/L (ref 8–16)
ANION GAP SERPL CALC-SCNC: 9 MMOL/L (ref 8–16)
AST SERPL-CCNC: 139 U/L (ref 10–40)
BACTERIA UR CULT: ABNORMAL
BASOPHILS # BLD AUTO: 0.03 K/UL (ref 0–0.2)
BASOPHILS NFR BLD: 0.8 % (ref 0–1.9)
BILIRUB DIRECT SERPL-MCNC: 0.3 MG/DL (ref 0.1–0.3)
BILIRUB SERPL-MCNC: 0.7 MG/DL (ref 0.1–1)
BUN SERPL-MCNC: 36 MG/DL (ref 8–23)
BUN SERPL-MCNC: 37 MG/DL (ref 8–23)
BUN SERPL-MCNC: 40 MG/DL (ref 8–23)
C DIFF GDH STL QL: NEGATIVE
C DIFF TOX A+B STL QL IA: NEGATIVE
CALCIUM SERPL-MCNC: 7.8 MG/DL (ref 8.7–10.5)
CALCIUM SERPL-MCNC: 7.9 MG/DL (ref 8.7–10.5)
CALCIUM SERPL-MCNC: 8 MG/DL (ref 8.7–10.5)
CHLORIDE SERPL-SCNC: 95 MMOL/L (ref 95–110)
CO2 SERPL-SCNC: 15 MMOL/L (ref 23–29)
CO2 SERPL-SCNC: 18 MMOL/L (ref 23–29)
CO2 SERPL-SCNC: 21 MMOL/L (ref 23–29)
CREAT SERPL-MCNC: 1.9 MG/DL (ref 0.5–1.4)
CREAT SERPL-MCNC: 2 MG/DL (ref 0.5–1.4)
CREAT SERPL-MCNC: 2.4 MG/DL (ref 0.5–1.4)
DIFFERENTIAL METHOD BLD: ABNORMAL
EOSINOPHIL # BLD AUTO: 0 K/UL (ref 0–0.5)
EOSINOPHIL NFR BLD: 0 % (ref 0–8)
ERYTHROCYTE [DISTWIDTH] IN BLOOD BY AUTOMATED COUNT: 13.9 % (ref 11.5–14.5)
EST. GFR  (NO RACE VARIABLE): 28.7 ML/MIN/1.73 M^2
EST. GFR  (NO RACE VARIABLE): 35.7 ML/MIN/1.73 M^2
EST. GFR  (NO RACE VARIABLE): 37.9 ML/MIN/1.73 M^2
GLUCOSE SERPL-MCNC: 75 MG/DL (ref 70–110)
GLUCOSE SERPL-MCNC: 93 MG/DL (ref 70–110)
GLUCOSE SERPL-MCNC: 93 MG/DL (ref 70–110)
HCT VFR BLD AUTO: 36.9 % (ref 40–54)
HGB BLD-MCNC: 12.9 G/DL (ref 14–18)
IMM GRANULOCYTES # BLD AUTO: 0.09 K/UL (ref 0–0.04)
IMM GRANULOCYTES NFR BLD AUTO: 2.5 % (ref 0–0.5)
LYMPHOCYTES # BLD AUTO: 0.3 K/UL (ref 1–4.8)
LYMPHOCYTES NFR BLD: 9.1 % (ref 18–48)
MAGNESIUM SERPL-MCNC: 1.9 MG/DL (ref 1.6–2.6)
MCH RBC QN AUTO: 32.6 PG (ref 27–31)
MCHC RBC AUTO-ENTMCNC: 35 G/DL (ref 32–36)
MCV RBC AUTO: 93 FL (ref 82–98)
MONOCYTES # BLD AUTO: 0.4 K/UL (ref 0.3–1)
MONOCYTES NFR BLD: 11.9 % (ref 4–15)
NEUTROPHILS # BLD AUTO: 2.7 K/UL (ref 1.8–7.7)
NEUTROPHILS NFR BLD: 75.7 % (ref 38–73)
NRBC BLD-RTO: 0 /100 WBC
OSMOLALITY UR: 265 MOSM/KG (ref 50–1200)
PHOSPHATE SERPL-MCNC: 3.1 MG/DL (ref 2.7–4.5)
PLATELET # BLD AUTO: 86 K/UL (ref 150–450)
PMV BLD AUTO: 11.1 FL (ref 9.2–12.9)
POTASSIUM SERPL-SCNC: 2.8 MMOL/L (ref 3.5–5.1)
POTASSIUM SERPL-SCNC: 2.9 MMOL/L (ref 3.5–5.1)
POTASSIUM SERPL-SCNC: 3.5 MMOL/L (ref 3.5–5.1)
PROT SERPL-MCNC: 5.7 G/DL (ref 6–8.4)
RBC # BLD AUTO: 3.96 M/UL (ref 4.6–6.2)
SODIUM SERPL-SCNC: 123 MMOL/L (ref 136–145)
SODIUM SERPL-SCNC: 125 MMOL/L (ref 136–145)
SODIUM SERPL-SCNC: 125 MMOL/L (ref 136–145)
SODIUM UR-SCNC: 61 MMOL/L (ref 20–250)
VANCOMYCIN SERPL-MCNC: 12.3 UG/ML
WBC # BLD AUTO: 3.61 K/UL (ref 3.9–12.7)

## 2023-12-17 PROCEDURE — 85025 COMPLETE CBC W/AUTO DIFF WBC: CPT | Mod: HCNC | Performed by: INTERNAL MEDICINE

## 2023-12-17 PROCEDURE — 80076 HEPATIC FUNCTION PANEL: CPT | Mod: HCNC | Performed by: INTERNAL MEDICINE

## 2023-12-17 PROCEDURE — 21400001 HC TELEMETRY ROOM: Mod: HCNC

## 2023-12-17 PROCEDURE — 99233 SBSQ HOSP IP/OBS HIGH 50: CPT | Mod: HCNC,,, | Performed by: INTERNAL MEDICINE

## 2023-12-17 PROCEDURE — 83935 ASSAY OF URINE OSMOLALITY: CPT | Mod: HCNC | Performed by: INTERNAL MEDICINE

## 2023-12-17 PROCEDURE — 84100 ASSAY OF PHOSPHORUS: CPT | Mod: HCNC | Performed by: FAMILY MEDICINE

## 2023-12-17 PROCEDURE — 84300 ASSAY OF URINE SODIUM: CPT | Mod: HCNC | Performed by: INTERNAL MEDICINE

## 2023-12-17 PROCEDURE — 63600175 PHARM REV CODE 636 W HCPCS: Mod: HCNC | Performed by: INTERNAL MEDICINE

## 2023-12-17 PROCEDURE — 87081 CULTURE SCREEN ONLY: CPT | Mod: HCNC | Performed by: INTERNAL MEDICINE

## 2023-12-17 PROCEDURE — 80048 BASIC METABOLIC PNL TOTAL CA: CPT | Mod: 91,HCNC | Performed by: INTERNAL MEDICINE

## 2023-12-17 PROCEDURE — 36415 COLL VENOUS BLD VENIPUNCTURE: CPT | Mod: HCNC | Performed by: INTERNAL MEDICINE

## 2023-12-17 PROCEDURE — 83735 ASSAY OF MAGNESIUM: CPT | Mod: HCNC | Performed by: FAMILY MEDICINE

## 2023-12-17 PROCEDURE — 87449 NOS EACH ORGANISM AG IA: CPT | Mod: HCNC | Performed by: INTERNAL MEDICINE

## 2023-12-17 PROCEDURE — 25000003 PHARM REV CODE 250: Mod: HCNC | Performed by: INTERNAL MEDICINE

## 2023-12-17 PROCEDURE — 80202 ASSAY OF VANCOMYCIN: CPT | Mod: HCNC | Performed by: INTERNAL MEDICINE

## 2023-12-17 PROCEDURE — 27000207 HC ISOLATION: Mod: HCNC

## 2023-12-17 PROCEDURE — 86022 PLATELET ANTIBODIES: CPT | Mod: HCNC | Performed by: INTERNAL MEDICINE

## 2023-12-17 PROCEDURE — 63600175 PHARM REV CODE 636 W HCPCS: Mod: HCNC | Performed by: FAMILY MEDICINE

## 2023-12-17 PROCEDURE — 25000003 PHARM REV CODE 250: Mod: HCNC | Performed by: FAMILY MEDICINE

## 2023-12-17 RX ORDER — POTASSIUM CHLORIDE 20 MEQ/1
40 TABLET, EXTENDED RELEASE ORAL ONCE
Status: COMPLETED | OUTPATIENT
Start: 2023-12-17 | End: 2023-12-17

## 2023-12-17 RX ORDER — SODIUM CHLORIDE 9 MG/ML
INJECTION, SOLUTION INTRAVENOUS CONTINUOUS
Status: ACTIVE | OUTPATIENT
Start: 2023-12-17 | End: 2023-12-17

## 2023-12-17 RX ORDER — ENOXAPARIN SODIUM 100 MG/ML
40 INJECTION SUBCUTANEOUS EVERY 24 HOURS
Status: DISCONTINUED | OUTPATIENT
Start: 2023-12-17 | End: 2024-01-03 | Stop reason: HOSPADM

## 2023-12-17 RX ADMIN — SODIUM CHLORIDE: 9 INJECTION, SOLUTION INTRAVENOUS at 01:12

## 2023-12-17 RX ADMIN — POTASSIUM CHLORIDE 40 MEQ: 1500 TABLET, EXTENDED RELEASE ORAL at 02:12

## 2023-12-17 RX ADMIN — DIAZEPAM 10 MG: 5 TABLET ORAL at 09:12

## 2023-12-17 RX ADMIN — HEPARIN SODIUM 5000 UNITS: 5000 INJECTION INTRAVENOUS; SUBCUTANEOUS at 06:12

## 2023-12-17 RX ADMIN — DIAZEPAM 10 MG: 5 TABLET ORAL at 02:12

## 2023-12-17 RX ADMIN — PIPERACILLIN SODIUM AND TAZOBACTAM SODIUM 4.5 G: 4; .5 INJECTION, POWDER, FOR SOLUTION INTRAVENOUS at 06:12

## 2023-12-17 RX ADMIN — FUROSEMIDE 160 MG: 10 INJECTION, SOLUTION INTRAMUSCULAR; INTRAVENOUS at 09:12

## 2023-12-17 RX ADMIN — DIAZEPAM 10 MG: 5 TABLET ORAL at 06:12

## 2023-12-17 RX ADMIN — PIPERACILLIN SODIUM AND TAZOBACTAM SODIUM 4.5 G: 4; .5 INJECTION, POWDER, FOR SOLUTION INTRAVENOUS at 10:12

## 2023-12-17 RX ADMIN — Medication 100 MG: at 08:12

## 2023-12-17 RX ADMIN — PIPERACILLIN SODIUM AND TAZOBACTAM SODIUM 4.5 G: 4; .5 INJECTION, POWDER, FOR SOLUTION INTRAVENOUS at 02:12

## 2023-12-17 RX ADMIN — THERA TABS 1 TABLET: TAB at 08:12

## 2023-12-17 RX ADMIN — FUROSEMIDE 160 MG: 10 INJECTION, SOLUTION INTRAMUSCULAR; INTRAVENOUS at 10:12

## 2023-12-17 RX ADMIN — VANCOMYCIN HYDROCHLORIDE 1500 MG: 1.5 INJECTION, POWDER, LYOPHILIZED, FOR SOLUTION INTRAVENOUS at 03:12

## 2023-12-17 RX ADMIN — FOLIC ACID 1 MG: 1 TABLET ORAL at 08:12

## 2023-12-17 RX ADMIN — ENOXAPARIN SODIUM 40 MG: 40 INJECTION SUBCUTANEOUS at 04:12

## 2023-12-17 NOTE — ASSESSMENT & PLAN NOTE
HCO3 decreased, likely due to poor H+ clearance in setting of ALESSANDRA  VBG with pH 7.36, pCO2 26, consistent with appropriate respiratory compensation  Anticipate further improvement as renal function improves

## 2023-12-17 NOTE — SUBJECTIVE & OBJECTIVE
Interval History: Fever to 100.9 overnight. BP remained stable, MAP in last 24 hours ranged from . Comfortable on 2L NC. Received 150ml/hr NS x 10 hours yesterday. No evidence of edema or overload on exam. Cr stable at 1.9 today. Ucx with GNRs. UOP 1.37 L. Urine Na < 10, urine osm concentrated at 504, serum osm 265. VBG with pH 7.36, pCO2 26. RP US pending    Review of patient's allergies indicates:   Allergen Reactions    Zoloft [sertraline] Other (See Comments)     hyponatremia     Current Facility-Administered Medications   Medication Frequency    acetaminophen tablet 500 mg Q4H PRN    albuterol inhaler 2 puff Q6H PRN    aluminum-magnesium hydroxide-simethicone 200-200-20 mg/5 mL suspension 30 mL QID PRN    dextrose 10% bolus 125 mL 125 mL PRN    dextrose 10% bolus 250 mL 250 mL PRN    diazePAM tablet 10 mg Q8H    folic acid tablet 1 mg Daily    furosemide (LASIX) 160 mg in sodium chloride 0.9% 100 mL IVPB Q12H    glucagon (human recombinant) injection 1 mg PRN    glucose chewable tablet 16 g PRN    glucose chewable tablet 24 g PRN    heparin (porcine) injection 5,000 Units Q8H    LORazepam tablet 2 mg Q4H PRN    melatonin tablet 6 mg Nightly PRN    multivitamin tablet Daily    naloxone 0.4 mg/mL injection 0.02 mg PRN    ondansetron injection 4 mg Q8H PRN    oxyCODONE immediate release tablet 5 mg Q6H PRN    piperacillin-tazobactam (ZOSYN) 4.5 g in dextrose 5 % in water (D5W) 100 mL IVPB (MB+) Q8H    sodium chloride 0.9% flush 10 mL Q12H PRN    thiamine tablet 100 mg Daily    vancomycin - pharmacy to dose pharmacy to manage frequency       Objective:     Vital Signs (Most Recent):  Temp: 98.6 °F (37 °C) (12/17/23 1119)  Pulse: 90 (12/17/23 1119)  Resp: 18 (12/17/23 1119)  BP: 122/66 (12/17/23 1119)  SpO2: 96 % (12/17/23 1119) Vital Signs (24h Range):  Temp:  [97.1 °F (36.2 °C)-100.9 °F (38.3 °C)] 98.6 °F (37 °C)  Pulse:  [] 90  Resp:  [16-20] 18  SpO2:  [92 %-98 %] 96 %  BP: (122-171)/(60-90) 122/66      Weight: 112 kg (247 lb) (12/16/23 0400)  Body mass index is 30.87 kg/m².  Body surface area is 2.43 meters squared.    I/O last 3 completed shifts:  In: 1060 [P.O.:1060]  Out: 1371 [Urine:1370; Stool:1]     Physical Exam  Vitals and nursing note reviewed.   Constitutional:       General: He is not in acute distress.     Appearance: Normal appearance. He is obese. He is not ill-appearing.   HENT:      Head: Normocephalic and atraumatic.   Cardiovascular:      Rate and Rhythm: Normal rate and regular rhythm.      Heart sounds: Normal heart sounds. No murmur heard.  Pulmonary:      Effort: Pulmonary effort is normal. No respiratory distress.      Comments: Breath sounds decreased bilaterally, difficult to assess 2/2 body habitus  Abdominal:      General: There is no distension.      Palpations: Abdomen is soft.      Tenderness: There is no abdominal tenderness.   Musculoskeletal:         General: Normal range of motion.      Cervical back: Normal range of motion. No rigidity.      Right lower leg: No edema.      Left lower leg: No edema.   Skin:     General: Skin is warm and dry.   Neurological:      General: No focal deficit present.      Mental Status: He is alert. Mental status is at baseline.   Psychiatric:         Mood and Affect: Mood normal.         Behavior: Behavior normal.          Significant Labs:  All labs within the past 24 hours have been reviewed.     Significant Imaging:  Reviewed

## 2023-12-17 NOTE — SUBJECTIVE & OBJECTIVE
"Interval History: Pt seen and examined this morning on rounds. When asked how he was feeling he stated "hanging in there." Continues to have wet cough and signs of alcohol withdrawal (tremors). Febrile overnight Tmax 100.9. Sodium is improving. Na 123 this morning. Cr stable at 1.9. Platelets decreased from 140 -> 112 -> 86. No evidence of bleeding. Blood cultures remain NGTD, UA growing GNR. Continues on Vanc/zosyn. MRSA swab pending.       Objective:     Vital Signs (Most Recent):  Temp: 98.6 °F (37 °C) (12/17/23 1119)  Pulse: 90 (12/17/23 1119)  Resp: 18 (12/17/23 1119)  BP: 122/66 (12/17/23 1119)  SpO2: 96 % (12/17/23 1119) Vital Signs (24h Range):  Temp:  [97.1 °F (36.2 °C)-100.9 °F (38.3 °C)] 98.6 °F (37 °C)  Pulse:  [] 90  Resp:  [16-20] 18  SpO2:  [92 %-98 %] 96 %  BP: (122-171)/(60-90) 122/66     Weight: 112 kg (247 lb)  Body mass index is 30.87 kg/m².    Intake/Output Summary (Last 24 hours) at 12/17/2023 1243  Last data filed at 12/17/2023 1225  Gross per 24 hour   Intake 960 ml   Output 1691 ml   Net -731 ml      Physical Exam  Gen: in NAD, appears stated age, central obesity  Neuro: AAOx4, CN2-12 grossly intact BL; motor, sensory, and strength grossly intact BL  HEENT: NTNC, EOMI, PERRLA, MMM; no thyromegaly or lymphadenopathy; no JVD appreciated  CVS: RRR, no m/r/g; S1/S2 auscultated with no S3 or S4; capillary refill < 2 sec  Resp: Wet cough, diminished breath sounds left lower lungfield. 2L NC. No belabored breathing or accessory muscle use appreciated   Abd: BS+ in all 4 quadrants; NTND, soft to palpation; no organomegaly appreciated   Extrem: Chronic venous stasis skin findings. pulses full, equal, and regular over all 4 extremities; no UE or LE edema BL      MELD 3.0: 24 at 12/17/2023  9:04 AM  MELD-Na: 24 at 12/17/2023  9:04 AM  Calculated from:  Serum Creatinine: 1.9 mg/dL at 12/17/2023  9:04 AM  Serum Sodium: 123 mmol/L (Using min of 125 mmol/L) at 12/17/2023  9:04 AM  Total Bilirubin: " 0.7 mg/dL (Using min of 1 mg/dL) at 12/17/2023  9:04 AM  Serum Albumin: 2.4 g/dL at 12/17/2023  9:04 AM  INR(ratio): 1.0 at 12/15/2023  2:16 PM  Age at listing (hypothetical): 68 years  Sex: Male at 12/17/2023  9:04 AM      Significant Labs:  CBC:  Recent Labs   Lab 12/15/23  1416 12/15/23  1422 12/16/23  0312 12/16/23  1733 12/17/23  0904   WBC 10.11  --  7.95  --  3.61*   HGB 15.0  --  12.8*  --  12.9*   HCT 41.1   < > 35.4* 37 36.9*   *  --  112*  --  86*    < > = values in this interval not displayed.     CMP:  Recent Labs   Lab 12/15/23  1416 12/15/23  1859 12/16/23  1045 12/16/23  1351 12/17/23  0904   *   < > 120* 120* 123*   K 3.9   < > 3.9 3.9 3.5   CL 91*   < > 94* 96 95   CO2 16*   < > 15* 13* 15*   GLU 83   < > 95 88 75   BUN 32*   < > 34* 36* 36*   CREATININE 2.3*   < > 1.9* 1.9* 1.9*   CALCIUM 8.5*   < > 7.5* 7.8* 8.0*   PROT 6.9  --   --   --  5.7*   ALBUMIN 3.0*  --   --   --  2.4*   BILITOT 0.9  --   --   --  0.7   ALKPHOS 70  --   --   --  57   *  --   --   --  139*   ALT 80*  --   --   --  70*   ANIONGAP 13   < > 11 11 13    < > = values in this interval not displayed.     PTINR:  Recent Labs   Lab 12/15/23  1416   INR 1.0       Significant Procedures:   Dobutamine Stress Test with Color Flow: No results found for this or any previous visit.

## 2023-12-17 NOTE — ASSESSMENT & PLAN NOTE
- developed fever shortly after admission  ;  Tmax 103  - CXR with concern for aspiration pna left basilar lungfield  - Continue vanc and zosyn  - MRSA swab pending  - Blood cultures NGTD  - further management pending clinical course and future study review

## 2023-12-17 NOTE — ASSESSMENT & PLAN NOTE
History of severe hyponatremia requiring ICU. From chart review appears largely secondary to beer potemania in the past  - Na 120 on presentation, downtrended to 118 overnight and nephrology was consulted.   - Na improving at appropriate rate with IVF and IV diuresis  - BMP q12h  - neurochecks q4h  - seizure precuations  - further management pending clinical course and future study review

## 2023-12-17 NOTE — ASSESSMENT & PLAN NOTE
Patient was found to have thrombocytopenia, the likely etiology is secondary to sepsis/infection, will monitor the platelets Daily. Will transfuse if platelet count is <10k. Hold DVT prophylaxis if platelets are <50k. The patient's platelet results have been reviewed and are listed below.  Recent Labs   Lab 12/17/23  0904   PLT 86*     - Platelets decreased from Likely secondary to infection however was receiving subcutaneous heparin. HIT panel sent and changed DVT Ppx to lovenox.

## 2023-12-17 NOTE — HPI
Patient is a 68 y.o. male presents for a new consultation for evaluation of elevated serum creatinine and hyponatremia. The patient was found to have an elevated serum creatinine during routine laboratory testing, at 2.3 mg/dL.      The patient has been admitted with confusion of acute onset. He has ESLD, HTN, HLD, diverticulosis. Admission revealed concerns for CAP and rhabdomyolysis. Found to have a serum  mEq/L.      The patient was taking NSAIDs or new antibiotics, recreational drugs, recent episode of dehydration, diarrhea, nausea or vomiting, acute illness, or exposure to IV radiocontrast.

## 2023-12-17 NOTE — ASSESSMENT & PLAN NOTE
67yo M w ho alcoholic cirrhosis and baseline hyponatremia admitted 12/15 with AMS. Work up concerning for alcohol withdrawal, aspiration pneumonia, and rhabdomyolysis with CK 4k. Nephrology has been consulted for ALESSANDRA with Cr of 2.3, 1.0 at baseline.   - UA with 2+ protein, 3+ blood, 3 RBCs, 13 WBCs, 21 granular casts  - Ucx with GNRs  - U Na < 10, U osm 504. U osm decrease to 265 after diuresis  - Urine microscopy with copious amounts of muddy brown granular casts    Etiology likely ATN given muddy brown casts on microscopy. Likely due to rhabdomyolysis vs ischemia due to volume depletion. Possible component of infectious GN given GNRs on UA. Cr currently stable with improving UOP    Recommendations:  - Continue lasix 160mg IV BID   - F/u RP US  - Daily Cr, strict intake and output  - No need for RRT at this time

## 2023-12-17 NOTE — PLAN OF CARE
Problem: Adult Inpatient Plan of Care  Goal: Plan of Care Review  Outcome: Ongoing, Progressing  Goal: Patient-Specific Goal (Individualized)  Outcome: Ongoing, Progressing  Goal: Absence of Hospital-Acquired Illness or Injury  Outcome: Ongoing, Progressing  Goal: Optimal Comfort and Wellbeing  Outcome: Ongoing, Progressing  Goal: Readiness for Transition of Care  Outcome: Ongoing, Progressing     Problem: Fluid and Electrolyte Imbalance (Acute Kidney Injury/Impairment)  Goal: Fluid and Electrolyte Balance  Outcome: Ongoing, Progressing     Problem: Oral Intake Inadequate (Acute Kidney Injury/Impairment)  Goal: Optimal Nutrition Intake  Outcome: Ongoing, Progressing     Problem: Renal Function Impairment (Acute Kidney Injury/Impairment)  Goal: Effective Renal Function  Outcome: Ongoing, Progressing       Pt had an uneventful night. VSS. Pt remains on O2 at2L and NACL@150 ml/hr. Pt given prn albuterol for exp wheezing. Pt had great urinary output after lasix. Pt is free of falls and injuries. Bed in lowest position and call light within reach. Safety maintained

## 2023-12-17 NOTE — ASSESSMENT & PLAN NOTE
- Interval history and physical exam findings as described above  - UA findings reviewed  - UCx growing GNR, speciation/susceptibilities pending  - Continue Vanc/Zosyn  - Febrile overnight  - Empiric Abx provided pending final C&S results   - Blood cultures NGTD

## 2023-12-17 NOTE — ASSESSMENT & PLAN NOTE
Na 123 this morning, improved from 120.   Urine Na initially < 10, consistent with dehydration vs low effective circulating volume. Consider also component of free water retention in setting of ESLD and ALESSANDRA.   Improving with IVF and diuresis.     - Continue with 1L NS and lasix for further free water diuresis  - Correction by no more than 6-8 mEq in 24 hours

## 2023-12-17 NOTE — ASSESSMENT & PLAN NOTE
- Creatinine 2.3 on presentation ; baseline 1.0  - suspect secondary to dehydration with severe alcohol use/dependence and inadequate water/hydration  - Nephrology consulted, urine spun and multiple muddy brown casts indicative of ATN  - Continue IVFs, IV diuresis (currently on 160mg BID) per nephrology recommendations  - strict I/Os  - avoid nephrotoxic agents as appropriate  - continue to monitor  - Cr slowly improving / stable  - F/u RP US

## 2023-12-17 NOTE — PROGRESS NOTES
[facility] ([address] Medicine  Progress Note    Patient Name: Froylan Borja  MRN: [id number]  Patient Class: IP- Inpatient   Admission Date: 12/15/2023  Length of Stay: 2 days  Attending Physician: Moy Cantu MD  Primary Care Provider: Janki Tamez MD        Subjective:     Principal Problem:ALESSANDRA (acute kidney injury)        HPI:  This is a 67yo male with a past medical history of alcohol abuse, depression, hyponatremia severe requiring prior MICU admit, HTN, HLD, cirrhosis, diverticulosis who presents to the ED with AMS. Patient reportedly at the bar with his wife earlier today and became confused and unsteady and difficulty ambulating. Per chart review police called because of beligerance and were concerned about stroke symptoms and brought patient to ED for further evaluation. Patient alert and oriented to person and aware in hospital. Unable to contribute meaningfully to history but does admit heavy daily alcohol use.     In the ED patient initially afebrile, and hemodynamically stable saturating well on room air at rest. WBC 10.1 with leukocytosis. Na 120. Creatinine 2.3 (baseline 1.0). CPK 3960. CXR with concern for aspiration pneumonia. Patient diaphoretic, tremulous, tachycardic concerning for developing withdrawal. Started on IVFs, benzodiazepines, and abx for rhabdo, hyponatremia, aspiration pna, alcohol withdrawal. Admitted to the care of medicine for further evaluation and management.     Overview/Hospital Course:  Patient admitted to [facility] for AMS with severe hyponatremia in setting of ETOH abuse, ETOH withdrawals, ALESSANDRA, and aspiration pneumonia. He was started on Vanc/zosyn for aspiration pneumonia. Received IVF bolus and continued on maintenance IVF, Nephrology consulted for hyponatremia and ALESSANDRA. Urine was spun muddy brown casts observed indicative of ATN. Nephrology recommending NS continued @ 150ml/hr and lasix 160mg IV BID for sodium excretion. Na slowing  "improving at appropriate rate. Blood cultures NGTD. UA growing GNR's. MRSA swab pending. Patient continues on Vanc/zosyn. Platelets decreased from 140 -> 112 -> 86. Likely secondary to infection however was receiving subcutaneous heparin. HIT panel sent and changed to lovenox. ALESSANDRA slowly improving. Continues to have symptoms of alcohol withdrawal and continues on CIWA and scheduled/PRN benzodiazepines.     Interval History: Pt seen and examined this morning on rounds. When asked how he was feeling he stated "hanging in there." Continues to have wet cough and signs of alcohol withdrawal (tremors). Febrile overnight Tmax 100.9. Sodium is improving. Na 123 this morning. Cr stable at 1.9. Platelets decreased from 140 -> 112 -> 86. No evidence of bleeding. Blood cultures remain NGTD, UA growing GNR. Continues on Vanc/zosyn. MRSA swab pending.       Objective:     Vital Signs (Most Recent):  Temp: 98.6 °F (37 °C) (12/17/23 1119)  Pulse: 90 (12/17/23 1119)  Resp: 18 (12/17/23 1119)  BP: 122/66 (12/17/23 1119)  SpO2: 96 % (12/17/23 1119) Vital Signs (24h Range):  Temp:  [97.1 °F (36.2 °C)-100.9 °F (38.3 °C)] 98.6 °F (37 °C)  Pulse:  [] 90  Resp:  [16-20] 18  SpO2:  [92 %-98 %] 96 %  BP: (122-171)/(60-90) 122/66     Weight: 112 kg (247 lb)  Body mass index is 30.87 kg/m².    Intake/Output Summary (Last 24 hours) at 12/17/2023 1243  Last data filed at 12/17/2023 1225  Gross per 24 hour   Intake 960 ml   Output 1691 ml   Net -731 ml      Physical Exam  Gen: in NAD, appears stated age, central obesity  Neuro: AAOx4, CN2-12 grossly intact BL; motor, sensory, and strength grossly intact BL  HEENT: NTNC, EOMI, PERRLA, MMM; no thyromegaly or lymphadenopathy; no JVD appreciated  CVS: RRR, no m/r/g; S1/S2 auscultated with no S3 or S4; capillary refill < 2 sec  Resp: Wet cough, diminished breath sounds left lower lungfield. 2L NC. No belabored breathing or accessory muscle use appreciated   Abd: BS+ in all 4 quadrants; NTND, " soft to palpation; no organomegaly appreciated   Extrem: Chronic venous stasis skin findings. pulses full, equal, and regular over all 4 extremities; no UE or LE edema BL      MELD 3.0: 24 at 12/17/2023  9:04 AM  MELD-Na: 24 at 12/17/2023  9:04 AM  Calculated from:  Serum Creatinine: 1.9 mg/dL at 12/17/2023  9:04 AM  Serum Sodium: 123 mmol/L (Using min of 125 mmol/L) at 12/17/2023  9:04 AM  Total Bilirubin: 0.7 mg/dL (Using min of 1 mg/dL) at 12/17/2023  9:04 AM  Serum Albumin: 2.4 g/dL at 12/17/2023  9:04 AM  INR(ratio): 1.0 at 12/15/2023  2:16 PM  Age at listing (hypothetical): 68 years  Sex: Male at 12/17/2023  9:04 AM      Significant Labs:  CBC:  Recent Labs   Lab 12/15/23  1416 12/15/23  1422 12/16/23  0312 12/16/23  1733 12/17/23  0904   WBC 10.11  --  7.95  --  3.61*   HGB 15.0  --  12.8*  --  12.9*   HCT 41.1   < > 35.4* 37 36.9*   *  --  112*  --  86*    < > = values in this interval not displayed.     CMP:  Recent Labs   Lab 12/15/23  1416 12/15/23  1859 12/16/23  1045 12/16/23  1351 12/17/23  0904   *   < > 120* 120* 123*   K 3.9   < > 3.9 3.9 3.5   CL 91*   < > 94* 96 95   CO2 16*   < > 15* 13* 15*   GLU 83   < > 95 88 75   BUN 32*   < > 34* 36* 36*   CREATININE 2.3*   < > 1.9* 1.9* 1.9*   CALCIUM 8.5*   < > 7.5* 7.8* 8.0*   PROT 6.9  --   --   --  5.7*   ALBUMIN 3.0*  --   --   --  2.4*   BILITOT 0.9  --   --   --  0.7   ALKPHOS 70  --   --   --  57   *  --   --   --  139*   ALT 80*  --   --   --  70*   ANIONGAP 13   < > 11 11 13    < > = values in this interval not displayed.     PTINR:  Recent Labs   Lab 12/15/23  1416   INR 1.0       Significant Procedures:   Dobutamine Stress Test with Color Flow: No results found for this or any previous visit.    Assessment/Plan:      * ALESSANDRA (acute kidney injury)  - Creatinine 2.3 on presentation ; baseline 1.0  - suspect secondary to dehydration with severe alcohol use/dependence and inadequate water/hydration  - Nephrology consulted, urine  spun and multiple muddy brown casts indicative of ATN  - Continue IVFs, IV diuresis (currently on 160mg BID) per nephrology recommendations  - strict I/Os  - avoid nephrotoxic agents as appropriate  - continue to monitor  - Cr slowly improving / stable  - F/u RP US    Hyponatremia  History of severe hyponatremia requiring ICU. From chart review appears largely secondary to beer potemania in the past  - Na 120 on presentation, downtrended to 118 overnight and nephrology was consulted.   - Na improving at appropriate rate with IVF and IV diuresis  - BMP q12h  - neurochecks q4h  - seizure precuations  - further management pending clinical course and future study review      Acute cystitis  - Interval history and physical exam findings as described above  - UA findings reviewed  - UCx growing GNR, speciation/susceptibilities pending  - Continue Vanc/Zosyn  - Febrile overnight  - Empiric Abx provided pending final C&S results   - Blood cultures NGTD    Aspiration pneumonia  - developed fever shortly after admission  ;  Tmax 103  - CXR with concern for aspiration pna left basilar lungfield  - Continue vanc and zosyn  - MRSA swab pending  - Blood cultures NGTD  - further management pending clinical course and future study review    Alcohol use disorder, severe, dependence  - start diazepam 10mg q8h scheduled  - CIWA q4h  - ativan prn CIWA  - daily thiamine/FA/MV  - would benefit from addiction psych eval once clinically appropriate    Rhabdomyolysis  - CPK 4k on presentation  - IVFs  - monitor kidney function  - Improving with IVF      Cirrhosis  MELD-Na score calculated; MELD 3.0: 24 at 12/17/2023 12:00 PM  MELD-Na: 24 at 12/17/2023 12:00 PM  Calculated from:  Serum Creatinine: 2.0 mg/dL at 12/17/2023 12:00 PM  Serum Sodium: 125 mmol/L at 12/17/2023 12:00 PM  Total Bilirubin: 0.7 mg/dL (Using min of 1 mg/dL) at 12/17/2023  9:04 AM  Serum Albumin: 2.4 g/dL at 12/17/2023  9:04 AM  INR(ratio): 1.0 at 12/15/2023  2:16 PM  Age  at listing (hypothetical): 68 years  Sex: Male at 12/17/2023 12:00 PM    - continues to use alcohol persistently  - AST > ALT x2 consistent with ETOH use.  - Trend hepatic function panel  - Appears well compensated on physical exam    Thrombocytopenia  Patient was found to have thrombocytopenia, the likely etiology is secondary to sepsis/infection, will monitor the platelets Daily. Will transfuse if platelet count is <10k. Hold DVT prophylaxis if platelets are <50k. The patient's platelet results have been reviewed and are listed below.  Recent Labs   Lab 12/17/23  0904   PLT 86*     - Platelets decreased from Likely secondary to infection however was receiving subcutaneous heparin. HIT panel sent and changed DVT Ppx to lovenox.    Hypokalemia  - K 2.9 this morning, likely 2/2 diuresis  - Replete as needed    Depression  - holding home meds for now as seems he hasn't been taking for some time      VTE Risk Mitigation (From admission, onward)           Ordered     enoxaparin injection 40 mg  Every 24 hours         12/17/23 1239     IP VTE HIGH RISK PATIENT  Once         12/15/23 1817     Place sequential compression device  Until discontinued         12/15/23 1817                    Discharge Planning   KENNETH: 12/22/2023     Code Status: Full Code   Is the patient medically ready for discharge?: No    Reason for patient still in hospital (select all that apply): Treatment                     Moy Cantu MD  Department of Hospital Medicine   [facility] - Intensive Care ([place]

## 2023-12-17 NOTE — PROGRESS NOTES
Pharmacokinetic Assessment Follow Up: IV Vancomycin    Vancomycin serum concentration assessment(s):    The random level was drawn correctly and can be used to guide therapy at this time. The measurement is within the desired definitive target range of 10 to 15 mcg/mL.    Vancomycin Regimen Plan:  Give Vancomycin 1500 mg IV x1 dose  Re-dose when the random level is less than 20 mcg/mL, next level to be drawn at 01:30 on 12/18/23    Drug levels (last 3 results):  Recent Labs   Lab Result Units 12/17/23  0033   Vancomycin, Random ug/mL 12.3       Pharmacy will continue to follow and monitor vancomycin.    Please contact pharmacy at extension [phone] for questions regarding this assessment.    Thank you for the consult,   Conrad Sanders       Patient brief summary:  Froylan Borja is a 68 y.o. male initiated on antimicrobial therapy with IV Vancomycin for treatment of  Pneumonia    The patient's current regimen is pulse dosing    Drug Allergies:   Review of patient's allergies indicates:   Allergen Reactions    Zoloft [sertraline] Other (See Comments)     hyponatremia       Actual Body Weight:   112 kg    Renal Function:   Estimated Creatinine Clearance: 50.3 mL/min (A) (based on SCr of 1.9 mg/dL (H)).,     Dialysis Method (if applicable):  N/A    CBC (last 72 hours):  Recent Labs   Lab Result Units 12/15/23  1416 12/16/23  0312   WBC K/uL 10.11 7.95   Hemoglobin g/dL 15.0 12.8*   Hematocrit % 41.1 35.4*   Platelets K/uL 143* 112*   Gran % % 86.4* 80.1*   Lymph % % 4.1* 7.3*   Mono % % 6.9 9.9   Eosinophil % % 0.0 0.1   Basophil % % 0.4 0.5   Differential Method  Automated Automated       Metabolic Panel (last 72 hours):  Recent Labs   Lab Result Units 12/15/23  1416 12/15/23  1859 12/15/23  1938 12/15/23  2242 12/16/23  0312 12/16/23  0908 12/16/23  1044 12/16/23  1045 12/16/23  1351   Sodium mmol/L 120* 120*  --  120* 118* 121*  --  120* 120*   Sodium, Urine mmol/L  --   --  15*  --   --   --  <10*  --   --    Potassium mmol/L  3.9 3.7  --  4.0 3.9 4.2  --  3.9 3.9   Chloride mmol/L 91* 90*  --  90* 92* 94*  --  94* 96   CO2 mmol/L 16* 19*  --  19* 18* 17*  --  15* 13*   Glucose mg/dL 83 116*  --  98 135* 103  --  95 88   Glucose, UA   --   --  Negative  --   --   --   --   --   --    BUN mg/dL 32* 34*  --  35* 35* 36*  --  34* 36*   Creatinine mg/dL 2.3* 2.2*  --  1.9* 2.0* 1.9*  --  1.9* 1.9*   Creatinine, Urine mg/dL  --   --  157.0  --   --   --   --   --   --    Albumin g/dL 3.0*  --   --   --   --   --   --   --   --    Total Bilirubin mg/dL 0.9  --   --   --   --   --   --   --   --    Alkaline Phosphatase U/L 70  --   --   --   --   --   --   --   --    AST U/L 177*  --   --   --   --   --   --   --   --    ALT U/L 80*  --   --   --   --   --   --   --   --    Magnesium mg/dL 2.2  --   --   --  2.0  --   --   --   --    Phosphorus mg/dL  --   --   --   --  3.1  --   --   --   --        Vancomycin Administrations:  vancomycin given in the last 96 hours                     vancomycin 2 g in dextrose 5 % 500 mL IVPB (mg) 2,000 mg New Bag 12/16/23 0139                    Microbiologic Results:  Microbiology Results (last 7 days)       Procedure Component Value Units Date/Time    Urine culture [[id number]]  (Abnormal) Collected: 12/15/23 1938    Order Status: Completed Specimen: Urine Updated: 12/16/23 2000     Urine Culture, Routine GRAM NEGATIVE LINDA  50,000 - 99,999 cfu/ml  Identification and susceptibility pending      Narrative:      Specimen Source->Urine    Blood culture [[id number]] Collected: 12/15/23 2641    Order Status: Completed Specimen: Blood Updated: 12/16/23 0715     Blood Culture, Routine No Growth to date    Blood culture [[id number]] Collected: 12/15/23 2241    Order Status: Completed Specimen: Blood Updated: 12/16/23 0715     Blood Culture, Routine No Growth to date

## 2023-12-17 NOTE — ASSESSMENT & PLAN NOTE
MELD-Na score calculated; MELD 3.0: 24 at 12/17/2023 12:00 PM  MELD-Na: 24 at 12/17/2023 12:00 PM  Calculated from:  Serum Creatinine: 2.0 mg/dL at 12/17/2023 12:00 PM  Serum Sodium: 125 mmol/L at 12/17/2023 12:00 PM  Total Bilirubin: 0.7 mg/dL (Using min of 1 mg/dL) at 12/17/2023  9:04 AM  Serum Albumin: 2.4 g/dL at 12/17/2023  9:04 AM  INR(ratio): 1.0 at 12/15/2023  2:16 PM  Age at listing (hypothetical): 68 years  Sex: Male at 12/17/2023 12:00 PM    - continues to use alcohol persistently  - AST > ALT x2 consistent with ETOH use.  - Trend hepatic function panel  - Appears well compensated on physical exam

## 2023-12-17 NOTE — PROGRESS NOTES
[facility] ([place] [address])  Nephrology  Progress Note    Patient Name: Froylan Borja  MRN: [id number]  Admission Date: 12/15/2023  Hospital Length of Stay: 2 days  Attending Provider: Moy Cantu MD   Primary Care Physician: Janki Tamez MD  Principal Problem:Hyponatremia    Subjective:     HPI: Patient is a 68 y.o. male presents for a new consultation for evaluation of elevated serum creatinine and hyponatremia. The patient was found to have an elevated serum creatinine during routine laboratory testing, at 2.3 mg/dL.      The patient has been admitted with confusion of acute onset. He has ESLD, HTN, HLD, diverticulosis. Admission revealed concerns for CAP and rhabdomyolysis. Found to have a serum  mEq/L.      The patient was taking NSAIDs or new antibiotics, recreational drugs, recent episode of dehydration, diarrhea, nausea or vomiting, acute illness, or exposure to IV radiocontrast.     Interval History: Fever to 100.9 overnight. BP remained stable, MAP in last 24 hours ranged from . Comfortable on 2L NC. Received 150ml/hr NS x 10 hours yesterday. No evidence of edema or overload on exam. Cr stable at 1.9 today. Ucx with GNRs. UOP 1.37 L. Urine Na < 10, urine osm concentrated at 504, serum osm 265. VBG with pH 7.36, pCO2 26. RP US pending    Review of patient's allergies indicates:   Allergen Reactions    Zoloft [sertraline] Other (See Comments)     hyponatremia     Current Facility-Administered Medications   Medication Frequency    acetaminophen tablet 500 mg Q4H PRN    albuterol inhaler 2 puff Q6H PRN    aluminum-magnesium hydroxide-simethicone 200-200-20 mg/5 mL suspension 30 mL QID PRN    dextrose 10% bolus 125 mL 125 mL PRN    dextrose 10% bolus 250 mL 250 mL PRN    diazePAM tablet 10 mg Q8H    folic acid tablet 1 mg Daily    furosemide (LASIX) 160 mg in sodium chloride 0.9% 100 mL IVPB Q12H    glucagon (human recombinant) injection 1 mg PRN    glucose chewable tablet 16  g PRN    glucose chewable tablet 24 g PRN    heparin (porcine) injection 5,000 Units Q8H    LORazepam tablet 2 mg Q4H PRN    melatonin tablet 6 mg Nightly PRN    multivitamin tablet Daily    naloxone 0.4 mg/mL injection 0.02 mg PRN    ondansetron injection 4 mg Q8H PRN    oxyCODONE immediate release tablet 5 mg Q6H PRN    piperacillin-tazobactam (ZOSYN) 4.5 g in dextrose 5 % in water (D5W) 100 mL IVPB (MB+) Q8H    sodium chloride 0.9% flush 10 mL Q12H PRN    thiamine tablet 100 mg Daily    vancomycin - pharmacy to dose pharmacy to manage frequency       Objective:     Vital Signs (Most Recent):  Temp: 98.6 °F (37 °C) (12/17/23 1119)  Pulse: 90 (12/17/23 1119)  Resp: 18 (12/17/23 1119)  BP: 122/66 (12/17/23 1119)  SpO2: 96 % (12/17/23 1119) Vital Signs (24h Range):  Temp:  [97.1 °F (36.2 °C)-100.9 °F (38.3 °C)] 98.6 °F (37 °C)  Pulse:  [] 90  Resp:  [16-20] 18  SpO2:  [92 %-98 %] 96 %  BP: (122-171)/(60-90) 122/66     Weight: 112 kg (247 lb) (12/16/23 0400)  Body mass index is 30.87 kg/m².  Body surface area is 2.43 meters squared.    I/O last 3 completed shifts:  In: 1060 [P.O.:1060]  Out: 1371 [Urine:1370; Stool:1]     Physical Exam  Vitals and nursing note reviewed.   Constitutional:       General: He is not in acute distress.     Appearance: Normal appearance. He is obese. He is not ill-appearing.   HENT:      Head: Normocephalic and atraumatic.   Cardiovascular:      Rate and Rhythm: Normal rate and regular rhythm.      Heart sounds: Normal heart sounds. No murmur heard.  Pulmonary:      Effort: Pulmonary effort is normal. No respiratory distress.      Comments: Breath sounds decreased bilaterally, difficult to assess 2/2 body habitus  Abdominal:      General: There is no distension.      Palpations: Abdomen is soft.      Tenderness: There is no abdominal tenderness.   Musculoskeletal:         General: Normal range of motion.      Cervical back: Normal range of motion. No rigidity.      Right lower leg: No  edema.      Left lower leg: No edema.   Skin:     General: Skin is warm and dry.   Neurological:      General: No focal deficit present.      Mental Status: He is alert. Mental status is at baseline.   Psychiatric:         Mood and Affect: Mood normal.         Behavior: Behavior normal.          Significant Labs:  All labs within the past 24 hours have been reviewed.     Significant Imaging:  Reviewed  Assessment/Plan:     Renal/  * ALESSANDRA (acute kidney injury)  67yo M w ho alcoholic cirrhosis and baseline hyponatremia admitted 12/15 with AMS. Work up concerning for alcohol withdrawal, aspiration pneumonia, and rhabdomyolysis with CK 4k. Nephrology has been consulted for ALESSANDRA with Cr of 2.3, 1.0 at baseline.   - UA with 2+ protein, 3+ blood, 3 RBCs, 13 WBCs, 21 granular casts  - Ucx with GNRs  - U Na < 10, U osm 504. U osm decrease to 265 after diuresis  - Urine microscopy with copious amounts of muddy brown granular casts    Etiology likely ATN given muddy brown casts on microscopy. Likely due to rhabdomyolysis vs ischemia due to volume depletion. Possible component of infectious GN given GNRs on UA. Cr currently stable with improving UOP    Recommendations:  - Continue lasix 160mg IV BID   - F/u RP US  - Daily Cr, strict intake and output  - No need for RRT at this time    Hypokalemia  Likely due to active diuresis    - Replete prn    Metabolic acidosis  HCO3 decreased, likely due to poor H+ clearance in setting of ALESSANDRA  VBG with pH 7.36, pCO2 26, consistent with appropriate respiratory compensation  Anticipate further improvement as renal function improves    Hyponatremia  Na 123 this morning, improved from 120.   Urine Na initially < 10, consistent with dehydration vs low effective circulating volume. Consider also component of free water retention in setting of ESLD and ALESSANDRA.   Improving with IVF and diuresis.     - Continue with 1L NS and lasix for further free water diuresis  - Correction by no more than 6-8 mEq  in 24 hours        Thank you for your consult. I will follow-up with patient. Please contact us if you have any additional questions.    Esthela Galvan,   Nephrology  [facility] ([facility]    ATTENDING PHYSICIAN ATTESTATION  I have personally verified the history and examined the patient. I thoroughly reviewed the demographic, clinical, laboratorial and imaging information available in medical records. I agree with the assessment and recommendations provided by the subspecialty resident who was under my supervision.

## 2023-12-18 LAB
ALBUMIN SERPL BCP-MCNC: 2.4 G/DL (ref 3.5–5.2)
ALP SERPL-CCNC: 58 U/L (ref 55–135)
ALT SERPL W/O P-5'-P-CCNC: 67 U/L (ref 10–44)
ANION GAP SERPL CALC-SCNC: 10 MMOL/L (ref 8–16)
ANION GAP SERPL CALC-SCNC: 9 MMOL/L (ref 8–16)
AST SERPL-CCNC: 113 U/L (ref 10–40)
BASOPHILS # BLD AUTO: 0.04 K/UL (ref 0–0.2)
BASOPHILS NFR BLD: 1.1 % (ref 0–1.9)
BILIRUB DIRECT SERPL-MCNC: 0.4 MG/DL (ref 0.1–0.3)
BILIRUB SERPL-MCNC: 0.7 MG/DL (ref 0.1–1)
BUN SERPL-MCNC: 39 MG/DL (ref 8–23)
BUN SERPL-MCNC: 40 MG/DL (ref 8–23)
CALCIUM SERPL-MCNC: 8.2 MG/DL (ref 8.7–10.5)
CALCIUM SERPL-MCNC: 8.3 MG/DL (ref 8.7–10.5)
CHLORIDE SERPL-SCNC: 95 MMOL/L (ref 95–110)
CHLORIDE SERPL-SCNC: 99 MMOL/L (ref 95–110)
CK SERPL-CCNC: 779 U/L (ref 20–200)
CO2 SERPL-SCNC: 20 MMOL/L (ref 23–29)
CO2 SERPL-SCNC: 21 MMOL/L (ref 23–29)
CREAT SERPL-MCNC: 2.3 MG/DL (ref 0.5–1.4)
CREAT SERPL-MCNC: 2.3 MG/DL (ref 0.5–1.4)
DIFFERENTIAL METHOD BLD: ABNORMAL
EOSINOPHIL # BLD AUTO: 0 K/UL (ref 0–0.5)
EOSINOPHIL NFR BLD: 0.6 % (ref 0–8)
ERYTHROCYTE [DISTWIDTH] IN BLOOD BY AUTOMATED COUNT: 13.6 % (ref 11.5–14.5)
EST. GFR  (NO RACE VARIABLE): 30.2 ML/MIN/1.73 M^2
EST. GFR  (NO RACE VARIABLE): 30.2 ML/MIN/1.73 M^2
GLUCOSE SERPL-MCNC: 106 MG/DL (ref 70–110)
GLUCOSE SERPL-MCNC: 94 MG/DL (ref 70–110)
HCT VFR BLD AUTO: 39.6 % (ref 40–54)
HGB BLD-MCNC: 14 G/DL (ref 14–18)
IMM GRANULOCYTES # BLD AUTO: 0.16 K/UL (ref 0–0.04)
IMM GRANULOCYTES NFR BLD AUTO: 4.5 % (ref 0–0.5)
LYMPHOCYTES # BLD AUTO: 0.5 K/UL (ref 1–4.8)
LYMPHOCYTES NFR BLD: 14 % (ref 18–48)
MAGNESIUM SERPL-MCNC: 1.8 MG/DL (ref 1.6–2.6)
MCH RBC QN AUTO: 32.4 PG (ref 27–31)
MCHC RBC AUTO-ENTMCNC: 35.4 G/DL (ref 32–36)
MCV RBC AUTO: 92 FL (ref 82–98)
MONOCYTES # BLD AUTO: 0.4 K/UL (ref 0.3–1)
MONOCYTES NFR BLD: 11.2 % (ref 4–15)
NEUTROPHILS # BLD AUTO: 2.4 K/UL (ref 1.8–7.7)
NEUTROPHILS NFR BLD: 68.6 % (ref 38–73)
NRBC BLD-RTO: 0 /100 WBC
PF4 HEPARIN CMPLX AB SER QL: 0.15 OD (ref 0–0.4)
PHOSPHATE SERPL-MCNC: 2.9 MG/DL (ref 2.7–4.5)
PLATELET # BLD AUTO: 103 K/UL (ref 150–450)
PMV BLD AUTO: 11 FL (ref 9.2–12.9)
POTASSIUM SERPL-SCNC: 2.8 MMOL/L (ref 3.5–5.1)
POTASSIUM SERPL-SCNC: 3.4 MMOL/L (ref 3.5–5.1)
PROT SERPL-MCNC: 6.1 G/DL (ref 6–8.4)
RBC # BLD AUTO: 4.32 M/UL (ref 4.6–6.2)
SODIUM SERPL-SCNC: 125 MMOL/L (ref 136–145)
SODIUM SERPL-SCNC: 129 MMOL/L (ref 136–145)
VANCOMYCIN SERPL-MCNC: 19.3 UG/ML
WBC # BLD AUTO: 3.56 K/UL (ref 3.9–12.7)

## 2023-12-18 PROCEDURE — 84100 ASSAY OF PHOSPHORUS: CPT | Mod: HCNC | Performed by: FAMILY MEDICINE

## 2023-12-18 PROCEDURE — 27000221 HC OXYGEN, UP TO 24 HOURS: Mod: HCNC

## 2023-12-18 PROCEDURE — 80202 ASSAY OF VANCOMYCIN: CPT | Mod: HCNC | Performed by: INTERNAL MEDICINE

## 2023-12-18 PROCEDURE — 94761 N-INVAS EAR/PLS OXIMETRY MLT: CPT | Mod: HCNC

## 2023-12-18 PROCEDURE — 80048 BASIC METABOLIC PNL TOTAL CA: CPT | Mod: 91,HCNC | Performed by: INTERNAL MEDICINE

## 2023-12-18 PROCEDURE — 85025 COMPLETE CBC W/AUTO DIFF WBC: CPT | Mod: HCNC | Performed by: INTERNAL MEDICINE

## 2023-12-18 PROCEDURE — 99233 SBSQ HOSP IP/OBS HIGH 50: CPT | Mod: HCNC,,, | Performed by: HOSPITALIST

## 2023-12-18 PROCEDURE — 25000003 PHARM REV CODE 250: Mod: HCNC | Performed by: FAMILY MEDICINE

## 2023-12-18 PROCEDURE — 36415 COLL VENOUS BLD VENIPUNCTURE: CPT | Mod: HCNC | Performed by: FAMILY MEDICINE

## 2023-12-18 PROCEDURE — 82550 ASSAY OF CK (CPK): CPT | Mod: HCNC | Performed by: INTERNAL MEDICINE

## 2023-12-18 PROCEDURE — 63600175 PHARM REV CODE 636 W HCPCS: Mod: HCNC | Performed by: FAMILY MEDICINE

## 2023-12-18 PROCEDURE — 63600175 PHARM REV CODE 636 W HCPCS: Mod: HCNC | Performed by: INTERNAL MEDICINE

## 2023-12-18 PROCEDURE — 25000003 PHARM REV CODE 250: Mod: HCNC | Performed by: INTERNAL MEDICINE

## 2023-12-18 PROCEDURE — 80076 HEPATIC FUNCTION PANEL: CPT | Mod: HCNC | Performed by: INTERNAL MEDICINE

## 2023-12-18 PROCEDURE — 21400001 HC TELEMETRY ROOM: Mod: HCNC

## 2023-12-18 PROCEDURE — 36415 COLL VENOUS BLD VENIPUNCTURE: CPT | Mod: HCNC | Performed by: INTERNAL MEDICINE

## 2023-12-18 PROCEDURE — 83735 ASSAY OF MAGNESIUM: CPT | Mod: HCNC | Performed by: FAMILY MEDICINE

## 2023-12-18 RX ORDER — POTASSIUM CHLORIDE 7.45 MG/ML
10 INJECTION INTRAVENOUS
Status: COMPLETED | OUTPATIENT
Start: 2023-12-18 | End: 2023-12-18

## 2023-12-18 RX ORDER — POTASSIUM CHLORIDE 20 MEQ/1
40 TABLET, EXTENDED RELEASE ORAL ONCE
Status: COMPLETED | OUTPATIENT
Start: 2023-12-18 | End: 2023-12-18

## 2023-12-18 RX ADMIN — DIAZEPAM 10 MG: 5 TABLET ORAL at 03:12

## 2023-12-18 RX ADMIN — SODIUM CHLORIDE 1000 ML: 9 INJECTION, SOLUTION INTRAVENOUS at 09:12

## 2023-12-18 RX ADMIN — POTASSIUM CHLORIDE 40 MEQ: 1500 TABLET, EXTENDED RELEASE ORAL at 09:12

## 2023-12-18 RX ADMIN — DIAZEPAM 10 MG: 5 TABLET ORAL at 05:12

## 2023-12-18 RX ADMIN — ENOXAPARIN SODIUM 40 MG: 40 INJECTION SUBCUTANEOUS at 05:12

## 2023-12-18 RX ADMIN — FUROSEMIDE 160 MG: 10 INJECTION, SOLUTION INTRAMUSCULAR; INTRAVENOUS at 10:12

## 2023-12-18 RX ADMIN — Medication 100 MG: at 09:12

## 2023-12-18 RX ADMIN — THERA TABS 1 TABLET: TAB at 09:12

## 2023-12-18 RX ADMIN — DIAZEPAM 10 MG: 5 TABLET ORAL at 10:12

## 2023-12-18 RX ADMIN — ERTAPENEM 1 G: 1 INJECTION INTRAMUSCULAR; INTRAVENOUS at 11:12

## 2023-12-18 RX ADMIN — POTASSIUM CHLORIDE 10 MEQ: 7.46 INJECTION, SOLUTION INTRAVENOUS at 09:12

## 2023-12-18 RX ADMIN — POTASSIUM CHLORIDE 10 MEQ: 7.46 INJECTION, SOLUTION INTRAVENOUS at 12:12

## 2023-12-18 RX ADMIN — POTASSIUM CHLORIDE 10 MEQ: 7.46 INJECTION, SOLUTION INTRAVENOUS at 11:12

## 2023-12-18 RX ADMIN — POTASSIUM CHLORIDE 10 MEQ: 7.46 INJECTION, SOLUTION INTRAVENOUS at 01:12

## 2023-12-18 RX ADMIN — FOLIC ACID 1 MG: 1 TABLET ORAL at 09:12

## 2023-12-18 RX ADMIN — PIPERACILLIN SODIUM AND TAZOBACTAM SODIUM 4.5 G: 4; .5 INJECTION, POWDER, FOR SOLUTION INTRAVENOUS at 05:12

## 2023-12-18 NOTE — ASSESSMENT & PLAN NOTE
MELD-Na score calculated; MELD 3.0: 26 at 12/17/2023  7:44 PM  MELD-Na: 25 at 12/17/2023  7:44 PM  Calculated from:  Serum Creatinine: 2.4 mg/dL at 12/17/2023  7:44 PM  Serum Sodium: 125 mmol/L at 12/17/2023  7:44 PM  Total Bilirubin: 0.7 mg/dL (Using min of 1 mg/dL) at 12/17/2023  9:04 AM  Serum Albumin: 2.4 g/dL at 12/17/2023  9:04 AM  INR(ratio): 1.0 at 12/15/2023  2:16 PM  Age at listing (hypothetical): 68 years  Sex: Male at 12/17/2023  7:44 PM    - continues to use alcohol persistently  - AST > ALT x2 consistent with ETOH use.  - Trend hepatic function panel  - Appears well compensated on physical exam

## 2023-12-18 NOTE — ASSESSMENT & PLAN NOTE
- Interval history and physical exam findings as described above  - UA findings reviewed  - UCx growing ESBL E coli  - Blood cultures NGTD  - Transitioned Zosyn to Ertapenem

## 2023-12-18 NOTE — SUBJECTIVE & OBJECTIVE
Interval History: No acute events overnight. UOP 2460cc yesterday with 1 unmeasured urine output. Still appears hypervolemic on exam. sCr up to 2.4.    Review of patient's allergies indicates:   Allergen Reactions    Zoloft [sertraline] Other (See Comments)     hyponatremia     Current Facility-Administered Medications   Medication Frequency    acetaminophen tablet 500 mg Q4H PRN    albuterol inhaler 2 puff Q6H PRN    aluminum-magnesium hydroxide-simethicone 200-200-20 mg/5 mL suspension 30 mL QID PRN    dextrose 10% bolus 125 mL 125 mL PRN    dextrose 10% bolus 250 mL 250 mL PRN    diazePAM tablet 10 mg Q8H    enoxaparin injection 40 mg Q24H (prophylaxis, 1700)    ertapenem (INVANZ) 1 g in sodium chloride 0.9 % 100 mL IVPB (MB+) Q24H    folic acid tablet 1 mg Daily    furosemide (LASIX) 160 mg in sodium chloride 0.9% 100 mL IVPB Q12H    glucagon (human recombinant) injection 1 mg PRN    glucose chewable tablet 16 g PRN    glucose chewable tablet 24 g PRN    LORazepam tablet 2 mg Q4H PRN    melatonin tablet 6 mg Nightly PRN    multivitamin tablet Daily    naloxone 0.4 mg/mL injection 0.02 mg PRN    ondansetron injection 4 mg Q8H PRN    oxyCODONE immediate release tablet 5 mg Q6H PRN    sodium chloride 0.9% flush 10 mL Q12H PRN    thiamine tablet 100 mg Daily       Objective:     Vital Signs (Most Recent):  Temp: 97.8 °F (36.6 °C) (12/18/23 1106)  Pulse: 77 (12/18/23 1106)  Resp: 18 (12/18/23 1106)  BP: (!) 121/58 (12/18/23 1106)  SpO2: 95 % (12/18/23 1106) Vital Signs (24h Range):  Temp:  [97.7 °F (36.5 °C)-99.1 °F (37.3 °C)] 97.8 °F (36.6 °C)  Pulse:  [75-95] 77  Resp:  [17-20] 18  SpO2:  [94 %-97 %] 95 %  BP: ()/(52-67) 121/58     Weight: 112 kg (247 lb) (12/16/23 0400)  Body mass index is 30.87 kg/m².  Body surface area is 2.43 meters squared.    I/O last 3 completed shifts:  In: 1200 [P.O.:1200]  Out: 3161 [Urine:3160; Stool:1]     Physical Exam  Vitals and nursing note reviewed.   Constitutional:        General: He is not in acute distress.     Appearance: Normal appearance. He is obese. He is not ill-appearing.   HENT:      Head: Normocephalic and atraumatic.   Cardiovascular:      Rate and Rhythm: Normal rate and regular rhythm.      Heart sounds: Normal heart sounds. No murmur heard.  Pulmonary:      Effort: Pulmonary effort is normal. No respiratory distress.      Comments: Breath sounds decreased bilaterally, difficult to assess 2/2 body habitus and noncompliance with physical exam  Abdominal:      General: There is no distension.      Palpations: Abdomen is soft.      Tenderness: There is no abdominal tenderness.   Musculoskeletal:         General: Normal range of motion.      Cervical back: Normal range of motion. No rigidity.      Right lower leg: Edema present.      Left lower leg: Edema present.   Skin:     General: Skin is warm and dry.   Neurological:      General: No focal deficit present.      Mental Status: He is alert. Mental status is at baseline.   Psychiatric:         Mood and Affect: Mood normal.         Behavior: Behavior normal.          Significant Labs:  All labs within the past 24 hours have been reviewed.     Significant Imaging:  Labs: Reviewed  US: Reviewed

## 2023-12-18 NOTE — PLAN OF CARE
Problem: Fluid and Electrolyte Imbalance (Acute Kidney Injury/Impairment)  Goal: Fluid and Electrolyte Balance  Outcome: Ongoing, Progressing     Problem: Infection  Goal: Absence of Infection Signs and Symptoms  Outcome: Ongoing, Progressing   BP 98/52 manually. MD notified. NS bolus given. Rechecked /58. Iv abx given. Potassium 2.8, Iv and Po potassium given. Ivpb lasix held per MD orders. Iv abx given. Bed locked and in lowest position. Call light in reach.

## 2023-12-18 NOTE — ASSESSMENT & PLAN NOTE
- developed fever shortly after admission  ;  Tmax 103  - CXR with concern for aspiration pna left basilar lungfield  - Growing ESBL E coli in urine, zosyn Dc'd. Started on Ertapenem  - Vanc DC'd  - Blood cultures NGTD  - further management pending clinical course and future study review

## 2023-12-18 NOTE — PROGRESS NOTES
[facility] ([place] [address])  Nephrology  Progress Note    Patient Name: Froylan Borja  MRN: [id number]  Admission Date: 12/15/2023  Hospital Length of Stay: 3 days  Attending Provider: Moy Cantu MD   Primary Care Physician: Janki Tamez MD  Principal Problem:ALESSANDRA (acute kidney injury)    Subjective:     HPI: Patient is a 68 y.o. male presents for a new consultation for evaluation of elevated serum creatinine and hyponatremia. The patient was found to have an elevated serum creatinine during routine laboratory testing, at 2.3 mg/dL.      The patient has been admitted with confusion of acute onset. He has ESLD, HTN, HLD, diverticulosis. Admission revealed concerns for CAP and rhabdomyolysis. Found to have a serum  mEq/L.      The patient was taking NSAIDs or new antibiotics, recreational drugs, recent episode of dehydration, diarrhea, nausea or vomiting, acute illness, or exposure to IV radiocontrast.     Interval History: No acute events overnight. UOP 2460cc yesterday with 1 unmeasured urine output. Still appears hypervolemic on exam. sCr up to 2.4.    Review of patient's allergies indicates:   Allergen Reactions    Zoloft [sertraline] Other (See Comments)     hyponatremia     Current Facility-Administered Medications   Medication Frequency    acetaminophen tablet 500 mg Q4H PRN    albuterol inhaler 2 puff Q6H PRN    aluminum-magnesium hydroxide-simethicone 200-200-20 mg/5 mL suspension 30 mL QID PRN    dextrose 10% bolus 125 mL 125 mL PRN    dextrose 10% bolus 250 mL 250 mL PRN    diazePAM tablet 10 mg Q8H    enoxaparin injection 40 mg Q24H (prophylaxis, 1700)    ertapenem (INVANZ) 1 g in sodium chloride 0.9 % 100 mL IVPB (MB+) Q24H    folic acid tablet 1 mg Daily    furosemide (LASIX) 160 mg in sodium chloride 0.9% 100 mL IVPB Q12H    glucagon (human recombinant) injection 1 mg PRN    glucose chewable tablet 16 g PRN    glucose chewable tablet 24 g PRN    LORazepam tablet 2 mg Q4H PRN     melatonin tablet 6 mg Nightly PRN    multivitamin tablet Daily    naloxone 0.4 mg/mL injection 0.02 mg PRN    ondansetron injection 4 mg Q8H PRN    oxyCODONE immediate release tablet 5 mg Q6H PRN    sodium chloride 0.9% flush 10 mL Q12H PRN    thiamine tablet 100 mg Daily       Objective:     Vital Signs (Most Recent):  Temp: 97.8 °F (36.6 °C) (12/18/23 1106)  Pulse: 77 (12/18/23 1106)  Resp: 18 (12/18/23 1106)  BP: (!) 121/58 (12/18/23 1106)  SpO2: 95 % (12/18/23 1106) Vital Signs (24h Range):  Temp:  [97.7 °F (36.5 °C)-99.1 °F (37.3 °C)] 97.8 °F (36.6 °C)  Pulse:  [75-95] 77  Resp:  [17-20] 18  SpO2:  [94 %-97 %] 95 %  BP: ()/(52-67) 121/58     Weight: 112 kg (247 lb) (12/16/23 0400)  Body mass index is 30.87 kg/m².  Body surface area is 2.43 meters squared.    I/O last 3 completed shifts:  In: 1200 [P.O.:1200]  Out: 3161 [Urine:3160; Stool:1]     Physical Exam  Vitals and nursing note reviewed.   Constitutional:       General: He is not in acute distress.     Appearance: Normal appearance. He is obese. He is not ill-appearing.   HENT:      Head: Normocephalic and atraumatic.   Cardiovascular:      Rate and Rhythm: Normal rate and regular rhythm.      Heart sounds: Normal heart sounds. No murmur heard.  Pulmonary:      Effort: Pulmonary effort is normal. No respiratory distress.      Comments: Breath sounds decreased bilaterally, difficult to assess 2/2 body habitus and noncompliance with physical exam  Abdominal:      General: There is no distension.      Palpations: Abdomen is soft.      Tenderness: There is no abdominal tenderness.   Musculoskeletal:         General: Normal range of motion.      Cervical back: Normal range of motion. No rigidity.      Right lower leg: Edema present.      Left lower leg: Edema present.   Skin:     General: Skin is warm and dry.   Neurological:      General: No focal deficit present.      Mental Status: He is alert. Mental status is at baseline.   Psychiatric:          Mood and Affect: Mood normal.         Behavior: Behavior normal.          Significant Labs:  All labs within the past 24 hours have been reviewed.     Significant Imaging:  Labs: Reviewed  US: Reviewed    Assessment/Plan:     Renal/  * ALESSANDRA (acute kidney injury)  69yo M w ho alcoholic cirrhosis and baseline hyponatremia admitted 12/15 with AMS. Work up concerning for alcohol withdrawal, aspiration pneumonia, and rhabdomyolysis with CK 4k. Nephrology has been consulted for ALESSANDRA with Cr of 2.3, 1.0 at baseline.   - UA with 2+ protein, 3+ blood, 3 RBCs, 13 WBCs, 21 granular casts  - Ucx with GNRs  - U Na < 10, U osm 504. U osm decrease to 265 after diuresis  - Urine microscopy with copious amounts of muddy brown granular casts  - RP U/S demonstrates no hydronephrosis, only noting the incidental finding of an echogenic focus within the spleen    Etiology likely ATN given muddy brown casts on microscopy. Likely due to rhabdomyolysis vs ischemia due to volume depletion. Possible component of infectious GN given GNRs on UA. Cr currently stable with improving UOP    Recommendations:  - Continue lasix 160mg IV BID   - Place cardenas catheter to assist with measurement of urine output  - Discontinue Vancomycin in the context of suspected intrinsic kidney injury  - Daily Cr, strict intake and output  - Avoid nephrotoxic agents, renally dose meds  - No need for RRT at this time    Hypokalemia  Likely due to active diuresis    - Replete prn    Metabolic acidosis  HCO3 decreased, likely due to poor H+ clearance in setting of ALESSANDRA  VBG with pH 7.36, pCO2 26, consistent with appropriate respiratory compensation  Uptrending serum bicarbonate  Anticipate further improvement as renal function improves    Hyponatremia  Na 123 this morning, improved from 120.   Urine Na initially < 10, consistent with dehydration vs low effective circulating volume. Consider also component of free water retention in setting of ESLD and ALESSANDRA.   Improving with  IVF and diuresis. Slowly improving.    - Continue with 1L NS and lasix for further free water diuresis  - Correction by no more than 6-8 mEq in 24 hours        Thank you for your consult. I will follow-up with patient. Please contact us if you have any additional questions.    Chuy Silverio MD  Nephrology  [facility] ([facility]

## 2023-12-18 NOTE — SUBJECTIVE & OBJECTIVE
Interval History: Pt seen and examined this morning on rounds. Had soft pressures in 90's/50's in early AM and received 1L IVF bolus and BP responded well. Patient conversant this morning, mild tremors on exam. Reports continued cough, saturating well on 2LNC. Remains afebrile. UA growing ESBL E coli. Zosyn Dc'd and started on Ertapenem. Vanc discontinued. Na improved to 125, continues on IV diuresis. K 2.8  Kidney US with no hydronephrosis.    Objective:     Vital Signs (Most Recent):  Temp: 97.8 °F (36.6 °C) (12/18/23 1106)  Pulse: 77 (12/18/23 1106)  Resp: 18 (12/18/23 1106)  BP: (!) 121/58 (12/18/23 1106)  SpO2: 95 % (12/18/23 1106) Vital Signs (24h Range):  Temp:  [97.7 °F (36.5 °C)-99.1 °F (37.3 °C)] 97.8 °F (36.6 °C)  Pulse:  [75-95] 77  Resp:  [17-20] 18  SpO2:  [94 %-97 %] 95 %  BP: ()/(52-67) 121/58     Weight: 112 kg (247 lb)  Body mass index is 30.87 kg/m².    Intake/Output Summary (Last 24 hours) at 12/18/2023 1225  Last data filed at 12/18/2023 1112  Gross per 24 hour   Intake 480 ml   Output 2320 ml   Net -1840 ml      Physical Exam  Gen: in NAD, appears stated age, central obesity  Neuro: AAOx4, CN2-12 grossly intact BL; motor, sensory, and strength grossly intact BL  HEENT: NTNC, EOMI, PERRLA, MMM; no thyromegaly or lymphadenopathy; no JVD appreciated  CVS: RRR, no m/r/g; S1/S2 auscultated with no S3 or S4; capillary refill < 2 sec  Resp: Wet cough, diminished breath sounds left lower lungfield. 2L NC. No belabored breathing or accessory muscle use appreciated   Abd: BS+ in all 4 quadrants; NTND, soft to palpation; no organomegaly appreciated   Extrem: Chronic venous stasis skin findings. pulses full, equal, and regular over all 4 extremities; no UE or LE edema BL      MELD 3.0: 26 at 12/17/2023  7:44 PM  MELD-Na: 25 at 12/17/2023  7:44 PM  Calculated from:  Serum Creatinine: 2.4 mg/dL at 12/17/2023  7:44 PM  Serum Sodium: 125 mmol/L at 12/17/2023  7:44 PM  Total Bilirubin: 0.7 mg/dL (Using  Please call patient and set up device check with OV Dr Castillo, thanks.   "min of 1 mg/dL) at 12/17/2023  9:04 AM  Serum Albumin: 2.4 g/dL at 12/17/2023  9:04 AM  INR(ratio): 1.0 at 12/15/2023  2:16 PM  Age at listing (hypothetical): 68 years  Sex: Male at 12/17/2023  7:44 PM      Significant Labs:  CBC:  Recent Labs   Lab 12/16/23  1733 12/17/23  0904 12/18/23 0329   WBC  --  3.61* 3.56*   HGB  --  12.9* 14.0   HCT 37 36.9* 39.6*   PLT  --  86* 103*     CMP:  Recent Labs   Lab 12/17/23  0904 12/17/23  1200 12/17/23  1944 12/18/23 0329 12/18/23  0911   * 125* 125*  --  125*   K 3.5 2.9* 2.8*  --  2.8*   CL 95 95 95  --  95   CO2 15* 18* 21*  --  20*   GLU 75 93 93  --  106   BUN 36* 37* 40*  --  40*   CREATININE 1.9* 2.0* 2.4*  --  2.3*   CALCIUM 8.0* 7.8* 7.9*  --  8.2*   PROT 5.7*  --   --  6.1  --    ALBUMIN 2.4*  --   --  2.4*  --    BILITOT 0.7  --   --  0.7  --    ALKPHOS 57  --   --  58  --    *  --   --  113*  --    ALT 70*  --   --  67*  --    ANIONGAP 13 12 9  --  10     PTINR:  No results for input(s): "INR" in the last 48 hours.      Significant Procedures:   Dobutamine Stress Test with Color Flow: No results found for this or any previous visit.  "

## 2023-12-18 NOTE — PROGRESS NOTES
VANCOMYCIN DOSING BY PHARMACY DISCONTINUATION NOTE    Froylan Borja is a 68 y.o. male who had been consulted for vancomycin dosing.    The pharmacy consult for vancomycin dosing has been discontinued.     Vancomycin Dosing by Pharmacy Consult will sign-off. Please reconsult if necessary. Thank you for allowing us to participate in this patient's care.     Carole Bah, Pharm.D.,[facility]  Phone: [phone]

## 2023-12-18 NOTE — PLAN OF CARE
[facility] ([place] [address])  Initial Discharge Assessment       Primary Care Provider: Janki Tamez MD    Admission Diagnosis: Trauma [T14.90XA]  Weakness [R53.1]  Chest pain [R07.9]    Admission Date: 12/15/2023  Expected Discharge Date: 12/22/2023    Transition of Care Barriers: (P) None    Payor: [employer] MANAGED MEDICARE / Plan: HU[employer] TOTAL CARE ADVANTAGE / Product Type: Medicare Advantage /     Extended Emergency Contact Information  Primary Emergency Contact: EpesCady forresterha  Mobile Phone: [phone]  Relation: Spouse  Preferred language: English    Discharge Plan A: (P) Skilled Nursing Facility  Discharge Plan B: (P) [facility]      [id number] - [place], [place] - [address] [facility] [address]  [facility] [place] [address]  Phone: [phone] Fax: [phone]      Initial Assessment (most recent)       Adult Discharge Assessment - 12/18/23 1229          Discharge Assessment    Assessment Type Discharge Planning Assessment (P)      Confirmed/corrected address, phone number and insurance Yes (P)      Confirmed Demographics Correct on Facesheet (P)      Source of Information patient (P)      Communicated KENNETH with patient/caregiver No (P)      Reason For Admission ALESSANDRA (P)      People in Home spouse (P)      Do you expect to return to your current living situation? Yes (P)      Do you have help at home or someone to help you manage your care at home? Yes (P)      Who are your caregiver(s) and their phone number(s)? Mary Borja, spouse, [phone] (P)      Prior to hospitilization cognitive status: Unable to Assess (P)      Current cognitive status: Alert/Oriented (P)    Pt very sleepy    Walking or Climbing Stairs Difficulty no (P)      Dressing/Bathing Difficulty no (P)      Home Layout -- (P)    2nd story apt with 15 stairs, usually has no trouble navigating stairs.    Equipment Currently Used at Home none (P)      Readmission within  30 days? No (P)      Do you currently have service(s) that help you manage your care at home? No (P)      Do you take prescription medications? Yes (P)      Do you have prescription coverage? Yes (P)      Coverage [employer] (P)      Do you have any problems affording any of your prescribed medications? No (P)      Who is going to help you get home at discharge? Mary Borja, spouse, [phone] (P)      How do you get to doctors appointments? other (see comments) (P)    cab    Are you on dialysis? No (P)      Do you take coumadin? No (P)      Discharge Plan A Skilled Nursing Facility (P)      Discharge Plan B Home Health (P)      DME Needed Upon Discharge  other (see comments) (P)    TBD    Discharge Plan discussed with: Patient (P)      Transition of Care Barriers None (P)         Physical Activity    On average, how many days per week do you engage in moderate to strenuous exercise (like a brisk walk)? 0 days (P)      On average, how many minutes do you engage in exercise at this level? 0 min (P)         Financial Resource Strain    How hard is it for you to pay for the very basics like food, housing, medical care, and heating? Not hard at all (P)         Housing Stability    In the last 12 months, was there a time when you were not able to pay the mortgage or rent on time? No (P)      In the last 12 months, how many places have you lived? 1 (P)      In the last 12 months, was there a time when you did not have a steady place to sleep or slept in a shelter (including now)? No (P)         Transportation Needs    In the past 12 months, has lack of transportation kept you from medical appointments or from getting medications? No (P)      In the past 12 months, has lack of transportation kept you from meetings, work, or from getting things needed for daily living? No (P)         Food Insecurity    Within the past 12 months, you worried that your food would run out before you got the money to buy more. Never true (P)       Within the past 12 months, the food you bought just didn't last and you didn't have money to get more. Never true (P)         Stress    Do you feel stress - tense, restless, nervous, or anxious, or unable to sleep at night because your mind is troubled all the time - these days? To some extent (P)         Social Connections    In a typical week, how many times do you talk on the phone with family, friends, or neighbors? Once a week (P)      How often do you get together with friends or relatives? Once a week (P)      How often do you attend Taoism or Confucianism services? Never (P)      Do you belong to any clubs or organizations such as Taoism groups, unions, fraternal or athletic groups, or school groups? No (P)      How often do you attend meetings of the clubs or organizations you belong to? Never (P)      Are you , , , , never , or living with a partner?  (P)         Alcohol Use    Q1: How often do you have a drink containing alcohol? 4 or more times a week (P)      Q2: How many drinks containing alcohol do you have on a typical day when you are drinking? 10 or more (P)      Q3: How often do you have six or more drinks on one occasion? Daily or almost daily (P)         OTHER    Name(s) of People in Home Mary Borja, spouse, [phone] (P)                  Pt lives with wife in 2nd story apartment with 15 steps, generally no trouble navigating steps.  He will take a cab home and takes cab to MD appointments.  No 30D readmission.  No HH, DME, coumadin or HD.  Indep with ADL's.  [facility].    Janelle Eugene, BSN, BS, RN, CCM

## 2023-12-18 NOTE — PROGRESS NOTES
VANCOMYCIN DOSING BY PHARMACY DISCONTINUATION NOTE    Froylan Borja is a 68 y.o. male who had been consulted for vancomycin dosing.    The pharmacy consult for vancomycin dosing has been discontinued.     Vancomycin Dosing by Pharmacy Consult will sign-off. Please reconsult if necessary. Thank you for allowing us to participate in this patient's care.       Shakira Bejarano, Sirisha, BCPS  [id number]

## 2023-12-18 NOTE — ASSESSMENT & PLAN NOTE
Patient was found to have thrombocytopenia, the likely etiology is secondary to sepsis/infection, will monitor the platelets Daily. Will transfuse if platelet count is <10k. Hold DVT prophylaxis if platelets are <50k. The patient's platelet results have been reviewed and are listed below.  Recent Labs   Lab 12/18/23  0329   *       - Platelets decreased from Likely secondary to infection however was receiving subcutaneous heparin. HIT panel sent and changed DVT Ppx to lovenox.  - Improving

## 2023-12-18 NOTE — ASSESSMENT & PLAN NOTE
Na 123 this morning, improved from 120.   Urine Na initially < 10, consistent with dehydration vs low effective circulating volume. Consider also component of free water retention in setting of ESLD and ALESSANDRA.   Improving with IVF and diuresis. Slowly improving.    - Continue with 1L NS and lasix for further free water diuresis  - Correction by no more than 6-8 mEq in 24 hours

## 2023-12-18 NOTE — ASSESSMENT & PLAN NOTE
69yo M w ho alcoholic cirrhosis and baseline hyponatremia admitted 12/15 with AMS. Work up concerning for alcohol withdrawal, aspiration pneumonia, and rhabdomyolysis with CK 4k. Nephrology has been consulted for ALESSANDRA with Cr of 2.3, 1.0 at baseline.   - UA with 2+ protein, 3+ blood, 3 RBCs, 13 WBCs, 21 granular casts  - Ucx with GNRs  - U Na < 10, U osm 504. U osm decrease to 265 after diuresis  - Urine microscopy with copious amounts of muddy brown granular casts  - RP U/S demonstrates no hydronephrosis, only noting the incidental finding of an echogenic focus within the spleen    Etiology likely ATN given muddy brown casts on microscopy. Likely due to rhabdomyolysis vs ischemia due to volume depletion. Possible component of infectious GN given GNRs on UA. Cr currently stable with improving UOP    Recommendations:  - Continue lasix 160mg IV BID   - Place cardenas catheter to assist with measurement of urine output  - Discontinue Vancomycin in the context of suspected intrinsic kidney injury  - Daily Cr, strict intake and output  - Avoid nephrotoxic agents, renally dose meds  - No need for RRT at this time

## 2023-12-18 NOTE — ASSESSMENT & PLAN NOTE
HCO3 decreased, likely due to poor H+ clearance in setting of ALESSANDRA  VBG with pH 7.36, pCO2 26, consistent with appropriate respiratory compensation  Uptrending serum bicarbonate  Anticipate further improvement as renal function improves

## 2023-12-18 NOTE — CONSULTS
[facility] - Intensive Care ([place] [address])  Infectious Disease  Consult Note    Patient Name: Froylan Borja  MRN: [id number]  Admission Date: 12/15/2023  Hospital Length of Stay: 3 days  Attending Physician: Moy Cantu MD  Primary Care Provider: Janki Tamez MD     Isolation Status: No active isolations    Patient information was obtained from past medical records.      Inpatient consult to Infectious Diseases  Consult performed by: Cliff King MD  Consult ordered by: Moy Cantu MD      Consulted for ertapenem approval. Patient with ESBL UTI, susceptible to ertapenem (and Cipro).     Ertapenem approved.     Thank you for your consult. I will sign off. Please contact us if you have any additional questions.    Cliff King MD  Infectious Disease  [facility] ([place] [address])    Subjective:     Principal Problem: ALESSANDRA (acute kidney injury)    HPI: No notes on file    No new subjective & objective note has been filed under this hospital service since the last note was generated.

## 2023-12-18 NOTE — PROGRESS NOTES
Pharmacokinetic Assessment Follow Up: IV Vancomycin    Vancomycin serum concentration assessment(s):    The random level was drawn correctly and can be used to guide therapy at this time. The measurement is above the desired definitive target range of 10 to 15 mcg/mL.    Vancomycin Regimen Plan:    Re-dose when the random level is less than 15 mcg/mL, next level to be drawn at 15:00 on 12/18/23    Drug levels (last 3 results):  Recent Labs   Lab Result Units 12/17/23  0033 12/18/23  0329   Vancomycin, Random ug/mL 12.3 19.3       Pharmacy will continue to follow and monitor vancomycin.    Please contact pharmacy at extension [phone] for questions regarding this assessment.    Thank you for the consult,   Conrad Sanders       Patient brief summary:  Froylan Borja is a 68 y.o. male initiated on antimicrobial therapy with IV Vancomycin for treatment of  Pneumonia    The patient's current regimen is pulse dosing    Drug Allergies:   Review of patient's allergies indicates:   Allergen Reactions    Zoloft [sertraline] Other (See Comments)     hyponatremia       Actual Body Weight:   112 kg    Renal Function:   Estimated Creatinine Clearance: 39.8 mL/min (A) (based on SCr of 2.4 mg/dL (H)).,     Dialysis Method (if applicable):  N/A    CBC (last 72 hours):  Recent Labs   Lab Result Units 12/15/23  1416 12/16/23  0312 12/17/23  0904 12/18/23  0329   WBC K/uL 10.11 7.95 3.61* 3.56*   Hemoglobin g/dL 15.0 12.8* 12.9* 14.0   Hematocrit % 41.1 35.4* 36.9* 39.6*   Platelets K/uL 143* 112* 86* 103*   Gran % % 86.4* 80.1* 75.7* 68.6   Lymph % % 4.1* 7.3* 9.1* 14.0*   Mono % % 6.9 9.9 11.9 11.2   Eosinophil % % 0.0 0.1 0.0 0.6   Basophil % % 0.4 0.5 0.8 1.1   Differential Method  Automated Automated Automated Automated       Metabolic Panel (last 72 hours):  Recent Labs   Lab Result Units 12/15/23  1416 12/15/23  1859 12/15/23  1938 12/15/23  2242 12/16/23  0312 12/16/23  0908 12/16/23  1044 12/16/23  1045 12/16/23  1351 12/17/23  0904  12/17/23  1026 12/17/23  1200 12/17/23  1944 12/18/23  0329   Sodium mmol/L 120* 120*  --  120* 118* 121*  --  120* 120* 123*  --  125* 125*  --    Sodium, Urine mmol/L  --   --  15*  --   --   --  <10*  --   --   --  61  --   --   --    Potassium mmol/L 3.9 3.7  --  4.0 3.9 4.2  --  3.9 3.9 3.5  --  2.9* 2.8*  --    Chloride mmol/L 91* 90*  --  90* 92* 94*  --  94* 96 95  --  95 95  --    CO2 mmol/L 16* 19*  --  19* 18* 17*  --  15* 13* 15*  --  18* 21*  --    Glucose mg/dL 83 116*  --  98 135* 103  --  95 88 75  --  93 93  --    Glucose, UA   --   --  Negative  --   --   --   --   --   --   --   --   --   --   --    BUN mg/dL 32* 34*  --  35* 35* 36*  --  34* 36* 36*  --  37* 40*  --    Creatinine mg/dL 2.3* 2.2*  --  1.9* 2.0* 1.9*  --  1.9* 1.9* 1.9*  --  2.0* 2.4*  --    Creatinine, Urine mg/dL  --   --  157.0  --   --   --   --   --   --   --   --   --   --   --    Albumin g/dL 3.0*  --   --   --   --   --   --   --   --  2.4*  --   --   --  2.4*   Total Bilirubin mg/dL 0.9  --   --   --   --   --   --   --   --  0.7  --   --   --  0.7   Alkaline Phosphatase U/L 70  --   --   --   --   --   --   --   --  57  --   --   --  58   AST U/L 177*  --   --   --   --   --   --   --   --  139*  --   --   --  113*   ALT U/L 80*  --   --   --   --   --   --   --   --  70*  --   --   --  67*   Magnesium mg/dL 2.2  --   --   --  2.0  --   --   --   --  1.9  --   --   --  1.8   Phosphorus mg/dL  --   --   --   --  3.1  --   --   --   --  3.1  --   --   --  2.9       Vancomycin Administrations:  vancomycin given in the last 96 hours                     vancomycin 1,500 mg in dextrose 5 % (D5W) 250 mL IVPB (Vial-Mate) (mg) 1,500 mg New Bag 12/17/23 0317    vancomycin 2 g in dextrose 5 % 500 mL IVPB (mg) 2,000 mg New Bag 12/16/23 0139                    Microbiologic Results:  Microbiology Results (last 7 days)       Procedure Component Value Units Date/Time    Urine culture [[id number]]  (Abnormal)  (Susceptibility) Collected:  12/15/23 1938    Order Status: Completed Specimen: Urine Updated: 12/17/23 2207     Urine Culture, Routine ESCHERICHIA COLI ESBL  50,000 - 99,999 cfu/ml      Narrative:      Specimen Source->Urine    Clostridium difficile EIA [[id number]] Collected: 12/17/23 1444    Order Status: Completed Specimen: Stool Updated: 12/17/23 2104     C. diff Antigen Negative     C difficile Toxins A+B, EIA Negative     Comment: Testing not recommended for children <24 months old.       Culture, MRSA [[id number]] Collected: 12/17/23 1038    Order Status: Sent Specimen: MRSA source from Nares, Left Updated: 12/17/23 1203    Blood culture [[id number]] Collected: 12/15/23 2241    Order Status: Completed Specimen: Blood Updated: 12/17/23 0613     Blood Culture, Routine No Growth to date      No Growth to date    Blood culture [[id number]] Collected: 12/15/23 2241    Order Status: Completed Specimen: Blood Updated: 12/17/23 0613     Blood Culture, Routine No Growth to date      No Growth to date

## 2023-12-18 NOTE — ASSESSMENT & PLAN NOTE
- Creatinine 2.3 on presentation ; baseline 1.0  - suspect secondary to dehydration with severe alcohol use/dependence and inadequate water/hydration  - Nephrology consulted, urine spun and multiple muddy brown casts indicative of ATN  - Continue IV diuresis (currently on 160mg BID) per nephrology recommendations  - strict I/Os  - avoid nephrotoxic agents as appropriate  - continue to monitor  - Renal US without hydronephrosis

## 2023-12-18 NOTE — PROGRESS NOTES
[facility] ([address] Medicine  Progress Note    Patient Name: Froylan Borja  MRN: [id number]  Patient Class: IP- Inpatient   Admission Date: 12/15/2023  Length of Stay: 3 days  Attending Physician: Moy Cantu MD  Primary Care Provider: Janki Tamez MD        Subjective:     Principal Problem:ALESSANDRA (acute kidney injury)        HPI:  This is a 67yo male with a past medical history of alcohol abuse, depression, hyponatremia severe requiring prior MICU admit, HTN, HLD, cirrhosis, diverticulosis who presents to the ED with AMS. Patient reportedly at the bar with his wife earlier today and became confused and unsteady and difficulty ambulating. Per chart review police called because of beligerance and were concerned about stroke symptoms and brought patient to ED for further evaluation. Patient alert and oriented to person and aware in hospital. Unable to contribute meaningfully to history but does admit heavy daily alcohol use.     In the ED patient initially afebrile, and hemodynamically stable saturating well on room air at rest. WBC 10.1 with leukocytosis. Na 120. Creatinine 2.3 (baseline 1.0). CPK 3960. CXR with concern for aspiration pneumonia. Patient diaphoretic, tremulous, tachycardic concerning for developing withdrawal. Started on IVFs, benzodiazepines, and abx for rhabdo, hyponatremia, aspiration pna, alcohol withdrawal. Admitted to the care of medicine for further evaluation and management.     Overview/Hospital Course:  [place] [facility] [place] with severe hyponatremia in setting of ETOH abuse, ETOH withdrawals, ALESSANDRA, and aspiration pneumonia. He was started on Vanc/zosyn for aspiration pneumonia. Received IVF bolus and continued on maintenance IVF, Nephrology consulted for hyponatremia and ALESSANDRA. Urine was spun muddy brown casts observed indicative of ATN. Nephrology recommending NS continued @ 150ml/hr and lasix 160mg IV BID for sodium excretion. Na slowing  improving at appropriate rate. Blood cultures NGTD. UA growing GNR's. MRSA swab pending. Patient continues on Vanc/zosyn. Platelets decreased from 140 -> 112 -> 86. Likely secondary to infection however was receiving subcutaneous heparin. HIT panel sent and transitioned to lovenox. Continues to have symptoms of alcohol withdrawal and continued on CIWA and scheduled/PRN benzodiazepines. UA growing ESBL E coli and Dc'd zosyn and started ertapenem. Vanc discontinued.     Interval History: Pt seen and examined this morning on rounds. Had soft pressures in 90's/50's in early AM and received 1L IVF bolus and BP responded well. Patient conversant this morning, mild tremors on exam. Reports continued cough, saturating well on 2LNC. Remains afebrile. UA growing ESBL E coli. Zosyn Dc'd and started on Ertapenem. Vanc discontinued. Na improved to 125, continues on IV diuresis. K 2.8  Kidney US with no hydronephrosis.    Objective:     Vital Signs (Most Recent):  Temp: 97.8 °F (36.6 °C) (12/18/23 1106)  Pulse: 77 (12/18/23 1106)  Resp: 18 (12/18/23 1106)  BP: (!) 121/58 (12/18/23 1106)  SpO2: 95 % (12/18/23 1106) Vital Signs (24h Range):  Temp:  [97.7 °F (36.5 °C)-99.1 °F (37.3 °C)] 97.8 °F (36.6 °C)  Pulse:  [75-95] 77  Resp:  [17-20] 18  SpO2:  [94 %-97 %] 95 %  BP: ()/(52-67) 121/58     Weight: 112 kg (247 lb)  Body mass index is 30.87 kg/m².    Intake/Output Summary (Last 24 hours) at 12/18/2023 1225  Last data filed at 12/18/2023 1112  Gross per 24 hour   Intake 480 ml   Output 2320 ml   Net -1840 ml      Physical Exam  Gen: in NAD, appears stated age, central obesity  Neuro: AAOx4, CN2-12 grossly intact BL; motor, sensory, and strength grossly intact BL  HEENT: NTNC, EOMI, PERRLA, MMM; no thyromegaly or lymphadenopathy; no JVD appreciated  CVS: RRR, no m/r/g; S1/S2 auscultated with no S3 or S4; capillary refill < 2 sec  Resp: Wet cough, diminished breath sounds left lower lungfield. 2L NC. No belabored breathing or  "accessory muscle use appreciated   Abd: BS+ in all 4 quadrants; NTND, soft to palpation; no organomegaly appreciated   Extrem: Chronic venous stasis skin findings. pulses full, equal, and regular over all 4 extremities; no UE or LE edema BL      MELD 3.0: 26 at 12/17/2023  7:44 PM  MELD-Na: 25 at 12/17/2023  7:44 PM  Calculated from:  Serum Creatinine: 2.4 mg/dL at 12/17/2023  7:44 PM  Serum Sodium: 125 mmol/L at 12/17/2023  7:44 PM  Total Bilirubin: 0.7 mg/dL (Using min of 1 mg/dL) at 12/17/2023  9:04 AM  Serum Albumin: 2.4 g/dL at 12/17/2023  9:04 AM  INR(ratio): 1.0 at 12/15/2023  2:16 PM  Age at listing (hypothetical): 68 years  Sex: Male at 12/17/2023  7:44 PM      Significant Labs:  CBC:  Recent Labs   Lab 12/16/23  1733 12/17/23  0904 12/18/23  0329   WBC  --  3.61* 3.56*   HGB  --  12.9* 14.0   HCT 37 36.9* 39.6*   PLT  --  86* 103*     CMP:  Recent Labs   Lab 12/17/23  0904 12/17/23  1200 12/17/23  1944 12/18/23  0329 12/18/23  0911   * 125* 125*  --  125*   K 3.5 2.9* 2.8*  --  2.8*   CL 95 95 95  --  95   CO2 15* 18* 21*  --  20*   GLU 75 93 93  --  106   BUN 36* 37* 40*  --  40*   CREATININE 1.9* 2.0* 2.4*  --  2.3*   CALCIUM 8.0* 7.8* 7.9*  --  8.2*   PROT 5.7*  --   --  6.1  --    ALBUMIN 2.4*  --   --  2.4*  --    BILITOT 0.7  --   --  0.7  --    ALKPHOS 57  --   --  58  --    *  --   --  113*  --    ALT 70*  --   --  67*  --    ANIONGAP 13 12 9  --  10     PTINR:  No results for input(s): "INR" in the last 48 hours.      Significant Procedures:   Dobutamine Stress Test with Color Flow: No results found for this or any previous visit.    Assessment/Plan:      * ALESSANDRA (acute kidney injury)  - Creatinine 2.3 on presentation ; baseline 1.0  - suspect secondary to dehydration with severe alcohol use/dependence and inadequate water/hydration  - Nephrology consulted, urine spun and multiple muddy brown casts indicative of ATN  - Continue IV diuresis (currently on 160mg BID) per nephrology " recommendations  - strict I/Os  - avoid nephrotoxic agents as appropriate  - continue to monitor  - Renal US without hydronephrosis    Hyponatremia  History of severe hyponatremia requiring ICU. From chart review appears largely secondary to beer potemania in the past  - Na 120 on presentation, downtrended to 118 overnight and nephrology was consulted.   - Na improving at appropriate rate with IVF and IV diuresis  - BMP q12h  - neurochecks q4h  - seizure precuations  - further management pending clinical course and future study review      Acute cystitis  - Interval history and physical exam findings as described above  - UA findings reviewed  - UCx growing ESBL E coli  - Blood cultures NGTD  - Transitioned Zosyn to Ertapenem      Aspiration pneumonia  - developed fever shortly after admission  ;  Tmax 103  - CXR with concern for aspiration pna left basilar lungfield  - Growing ESBL E coli in urine, zosyn Dc'd. Started on Ertapenem  - Vanc DC'd  - Blood cultures NGTD  - further management pending clinical course and future study review    Alcohol use disorder, severe, dependence  - start diazepam 10mg q8h scheduled  - CIWA q4h  - ativan prn CIWA  - daily thiamine/FA/MV  - would benefit from addiction psych eval once clinically appropriate    Rhabdomyolysis  - CPK 4k on presentation  - IVFs  - monitor kidney function  - CK improving    Cirrhosis  MELD-Na score calculated; MELD 3.0: 26 at 12/17/2023  7:44 PM  MELD-Na: 25 at 12/17/2023  7:44 PM  Calculated from:  Serum Creatinine: 2.4 mg/dL at 12/17/2023  7:44 PM  Serum Sodium: 125 mmol/L at 12/17/2023  7:44 PM  Total Bilirubin: 0.7 mg/dL (Using min of 1 mg/dL) at 12/17/2023  9:04 AM  Serum Albumin: 2.4 g/dL at 12/17/2023  9:04 AM  INR(ratio): 1.0 at 12/15/2023  2:16 PM  Age at listing (hypothetical): 68 years  Sex: Male at 12/17/2023  7:44 PM    - continues to use alcohol persistently  - AST > ALT x2 consistent with ETOH use.  - Trend hepatic function panel  - Appears  well compensated on physical exam    Thrombocytopenia  Patient was found to have thrombocytopenia, the likely etiology is secondary to sepsis/infection, will monitor the platelets Daily. Will transfuse if platelet count is <10k. Hold DVT prophylaxis if platelets are <50k. The patient's platelet results have been reviewed and are listed below.  Recent Labs   Lab 12/18/23  0329   *       - Platelets decreased from Likely secondary to infection however was receiving subcutaneous heparin. HIT panel sent and changed DVT Ppx to lovenox.  - Improving    Hypokalemia  - K 2.8 this morning, likely 2/2 diuresis  - Replete with PO and IV today    Depression  - holding home meds for now as seems he hasn't been taking for some time      VTE Risk Mitigation (From admission, onward)           Ordered     enoxaparin injection 40 mg  Every 24 hours         12/17/23 1239     IP VTE HIGH RISK PATIENT  Once         12/15/23 1817     Place sequential compression device  Until discontinued         12/15/23 1817                    Discharge Planning   KENNETH: 12/22/2023     Code Status: Full Code   Is the patient medically ready for discharge?: No    Reason for patient still in hospital (select all that apply): Treatment  Discharge Plan A: Skilled Nursing Facility                  Moy Cantu MD  Department of Hospital Medicine   [facility] - Intensive Care ([place]-16)

## 2023-12-19 LAB
ALBUMIN SERPL BCP-MCNC: 2.3 G/DL (ref 3.5–5.2)
ALP SERPL-CCNC: 61 U/L (ref 55–135)
ALT SERPL W/O P-5'-P-CCNC: 60 U/L (ref 10–44)
ANION GAP SERPL CALC-SCNC: 11 MMOL/L (ref 8–16)
ANION GAP SERPL CALC-SCNC: 12 MMOL/L (ref 8–16)
AST SERPL-CCNC: 83 U/L (ref 10–40)
BASOPHILS # BLD AUTO: 0.03 K/UL (ref 0–0.2)
BASOPHILS NFR BLD: 0.8 % (ref 0–1.9)
BILIRUB DIRECT SERPL-MCNC: 0.3 MG/DL (ref 0.1–0.3)
BILIRUB SERPL-MCNC: 0.5 MG/DL (ref 0.1–1)
BUN SERPL-MCNC: 43 MG/DL (ref 8–23)
BUN SERPL-MCNC: 43 MG/DL (ref 8–23)
CALCIUM SERPL-MCNC: 8.7 MG/DL (ref 8.7–10.5)
CALCIUM SERPL-MCNC: 8.9 MG/DL (ref 8.7–10.5)
CHLORIDE SERPL-SCNC: 95 MMOL/L (ref 95–110)
CHLORIDE SERPL-SCNC: 99 MMOL/L (ref 95–110)
CK SERPL-CCNC: 287 U/L (ref 20–200)
CO2 SERPL-SCNC: 25 MMOL/L (ref 23–29)
CO2 SERPL-SCNC: 28 MMOL/L (ref 23–29)
CREAT SERPL-MCNC: 2.1 MG/DL (ref 0.5–1.4)
CREAT SERPL-MCNC: 2.2 MG/DL (ref 0.5–1.4)
DIFFERENTIAL METHOD BLD: ABNORMAL
EOSINOPHIL # BLD AUTO: 0 K/UL (ref 0–0.5)
EOSINOPHIL NFR BLD: 1.1 % (ref 0–8)
ERYTHROCYTE [DISTWIDTH] IN BLOOD BY AUTOMATED COUNT: 14.1 % (ref 11.5–14.5)
EST. GFR  (NO RACE VARIABLE): 31.8 ML/MIN/1.73 M^2
EST. GFR  (NO RACE VARIABLE): 33.7 ML/MIN/1.73 M^2
GLUCOSE SERPL-MCNC: 112 MG/DL (ref 70–110)
GLUCOSE SERPL-MCNC: 126 MG/DL (ref 70–110)
HCT VFR BLD AUTO: 35.4 % (ref 40–54)
HGB BLD-MCNC: 12.9 G/DL (ref 14–18)
IMM GRANULOCYTES # BLD AUTO: 0.07 K/UL (ref 0–0.04)
IMM GRANULOCYTES NFR BLD AUTO: 1.9 % (ref 0–0.5)
LYMPHOCYTES # BLD AUTO: 0.5 K/UL (ref 1–4.8)
LYMPHOCYTES NFR BLD: 14 % (ref 18–48)
MCH RBC QN AUTO: 32.3 PG (ref 27–31)
MCHC RBC AUTO-ENTMCNC: 36.4 G/DL (ref 32–36)
MCV RBC AUTO: 89 FL (ref 82–98)
MONOCYTES # BLD AUTO: 0.5 K/UL (ref 0.3–1)
MONOCYTES NFR BLD: 14.3 % (ref 4–15)
MRSA SPEC QL CULT: NORMAL
NEUTROPHILS # BLD AUTO: 2.5 K/UL (ref 1.8–7.7)
NEUTROPHILS NFR BLD: 67.9 % (ref 38–73)
NRBC BLD-RTO: 0 /100 WBC
PLATELET # BLD AUTO: 125 K/UL (ref 150–450)
PMV BLD AUTO: 10.1 FL (ref 9.2–12.9)
POTASSIUM SERPL-SCNC: 2.9 MMOL/L (ref 3.5–5.1)
POTASSIUM SERPL-SCNC: 2.9 MMOL/L (ref 3.5–5.1)
PROT SERPL-MCNC: 6 G/DL (ref 6–8.4)
RBC # BLD AUTO: 3.99 M/UL (ref 4.6–6.2)
SODIUM SERPL-SCNC: 135 MMOL/L (ref 136–145)
SODIUM SERPL-SCNC: 135 MMOL/L (ref 136–145)
WBC # BLD AUTO: 3.63 K/UL (ref 3.9–12.7)

## 2023-12-19 PROCEDURE — 85025 COMPLETE CBC W/AUTO DIFF WBC: CPT | Mod: HCNC | Performed by: INTERNAL MEDICINE

## 2023-12-19 PROCEDURE — 25000003 PHARM REV CODE 250: Mod: HCNC | Performed by: FAMILY MEDICINE

## 2023-12-19 PROCEDURE — 25000003 PHARM REV CODE 250: Mod: HCNC | Performed by: INTERNAL MEDICINE

## 2023-12-19 PROCEDURE — 80076 HEPATIC FUNCTION PANEL: CPT | Mod: HCNC | Performed by: INTERNAL MEDICINE

## 2023-12-19 PROCEDURE — 82550 ASSAY OF CK (CPK): CPT | Mod: HCNC | Performed by: INTERNAL MEDICINE

## 2023-12-19 PROCEDURE — 80048 BASIC METABOLIC PNL TOTAL CA: CPT | Mod: HCNC | Performed by: INTERNAL MEDICINE

## 2023-12-19 PROCEDURE — 36415 COLL VENOUS BLD VENIPUNCTURE: CPT | Mod: HCNC | Performed by: INTERNAL MEDICINE

## 2023-12-19 PROCEDURE — 63600175 PHARM REV CODE 636 W HCPCS: Mod: HCNC | Performed by: INTERNAL MEDICINE

## 2023-12-19 PROCEDURE — 21400001 HC TELEMETRY ROOM: Mod: HCNC

## 2023-12-19 PROCEDURE — 99232 SBSQ HOSP IP/OBS MODERATE 35: CPT | Mod: HCNC,,, | Performed by: HOSPITALIST

## 2023-12-19 RX ORDER — POTASSIUM CHLORIDE 20 MEQ/1
40 TABLET, EXTENDED RELEASE ORAL ONCE
Status: COMPLETED | OUTPATIENT
Start: 2023-12-19 | End: 2023-12-19

## 2023-12-19 RX ADMIN — FUROSEMIDE 160 MG: 10 INJECTION, SOLUTION INTRAMUSCULAR; INTRAVENOUS at 09:12

## 2023-12-19 RX ADMIN — Medication 100 MG: at 09:12

## 2023-12-19 RX ADMIN — FOLIC ACID 1 MG: 1 TABLET ORAL at 09:12

## 2023-12-19 RX ADMIN — ERTAPENEM 1 G: 1 INJECTION INTRAMUSCULAR; INTRAVENOUS at 10:12

## 2023-12-19 RX ADMIN — THERA TABS 1 TABLET: TAB at 09:12

## 2023-12-19 RX ADMIN — POTASSIUM CHLORIDE 40 MEQ: 1500 TABLET, EXTENDED RELEASE ORAL at 01:12

## 2023-12-19 RX ADMIN — ENOXAPARIN SODIUM 40 MG: 40 INJECTION SUBCUTANEOUS at 05:12

## 2023-12-19 RX ADMIN — DIAZEPAM 10 MG: 5 TABLET ORAL at 06:12

## 2023-12-19 NOTE — ASSESSMENT & PLAN NOTE
Patient was found to have thrombocytopenia, the likely etiology is secondary to sepsis/infection, will monitor the platelets Daily. Will transfuse if platelet count is <10k. Hold DVT prophylaxis if platelets are <50k. The patient's platelet results have been reviewed and are listed below.  Recent Labs   Lab 12/19/23  0324   *       - Platelets decreased from Likely secondary to infection however was receiving subcutaneous heparin. HIT negative.   - Improving

## 2023-12-19 NOTE — ASSESSMENT & PLAN NOTE
- Interval history and physical exam findings as described above  - UA findings reviewed  - UCx growing ESBL E coli  - Blood cultures NGTD  - Transitioned Zosyn to Ertapenem; will treat for 7 day course

## 2023-12-19 NOTE — SUBJECTIVE & OBJECTIVE
Interval History: Pt seen and examined this morning on darius. PAT. Remained afebrile. Urine output of 3L with IV diuresis, Cr improving, now 2.1. IV lasix Dc'd per nephrology recommendations. Sodium improved to 135. .     Objective:     Vital Signs (Most Recent):  Temp: 98.6 °F (37 °C) (12/19/23 0745)  Pulse: 98 (12/19/23 1143)  Resp: 18 (12/19/23 0745)  BP: 135/63 (12/19/23 0745)  SpO2: (!) 92 % (12/19/23 0745) Vital Signs (24h Range):  Temp:  [97.5 °F (36.4 °C)-99.2 °F (37.3 °C)] 98.6 °F (37 °C)  Pulse:  [75-98] 98  Resp:  [18-20] 18  SpO2:  [92 %-98 %] 92 %  BP: (106-150)/(56-66) 135/63     Weight: 112 kg (247 lb)  Body mass index is 30.87 kg/m².    Intake/Output Summary (Last 24 hours) at 12/19/2023 1414  Last data filed at 12/19/2023 0917  Gross per 24 hour   Intake 150 ml   Output 3025 ml   Net -2875 ml      Physical Exam  Gen: in NAD, appears stated age, central obesity  Neuro: AAOx4, CN2-12 grossly intact BL; motor, sensory, and strength grossly intact BL  HEENT: NTNC, EOMI, PERRLA, MMM; no thyromegaly or lymphadenopathy; no JVD appreciated  CVS: RRR, no m/r/g; S1/S2 auscultated with no S3 or S4; capillary refill < 2 sec  Resp: Wet cough, diminished breath sounds left lower lungfield. 2L NC. No belabored breathing or accessory muscle use appreciated   Abd: BS+ in all 4 quadrants; NTND, soft to palpation; no organomegaly appreciated   Extrem: Chronic venous stasis skin findings. pulses full, equal, and regular over all 4 extremities; no UE or LE edema BL      MELD 3.0: 26 at 12/17/2023  7:44 PM  MELD-Na: 25 at 12/17/2023  7:44 PM  Calculated from:  Serum Creatinine: 2.4 mg/dL at 12/17/2023  7:44 PM  Serum Sodium: 125 mmol/L at 12/17/2023  7:44 PM  Total Bilirubin: 0.7 mg/dL (Using min of 1 mg/dL) at 12/17/2023  9:04 AM  Serum Albumin: 2.4 g/dL at 12/17/2023  9:04 AM  INR(ratio): 1.0 at 12/15/2023  2:16 PM  Age at listing (hypothetical): 68 years  Sex: Male at 12/17/2023  7:44 PM      Significant  "Labs:  CBC:  Recent Labs   Lab 12/18/23 0329 12/19/23 0324   WBC 3.56* 3.63*   HGB 14.0 12.9*   HCT 39.6* 35.4*   * 125*     CMP:  Recent Labs   Lab 12/18/23 0329 12/18/23  0911 12/18/23  1938 12/19/23 0324 12/19/23 0904   NA  --  125* 129*  --  135*   K  --  2.8* 3.4*  --  2.9*   CL  --  95 99  --  99   CO2  --  20* 21*  --  25   GLU  --  106 94  --  112*   BUN  --  40* 39*  --  43*   CREATININE  --  2.3* 2.3*  --  2.1*   CALCIUM  --  8.2* 8.3*  --  8.7   PROT 6.1  --   --  6.0  --    ALBUMIN 2.4*  --   --  2.3*  --    BILITOT 0.7  --   --  0.5  --    ALKPHOS 58  --   --  61  --    *  --   --  83*  --    ALT 67*  --   --  60*  --    ANIONGAP  --  10 9  --  11     PTINR:  No results for input(s): "INR" in the last 48 hours.      Significant Procedures:   Dobutamine Stress Test with Color Flow: No results found for this or any previous visit.  "

## 2023-12-19 NOTE — ASSESSMENT & PLAN NOTE
History of severe hyponatremia requiring ICU. From chart review appears largely secondary to beer potemania in the past  - Na 120 on presentation, downtrended to 118 overnight and nephrology was consulted.   - Na improving at appropriate rate with IVF and IV diuresis  - BMP q12h  - neurochecks q4h  - seizure precuations  - further management pending clinical course and future study review  - Improved to 135; nephrology recommending discontinuation of IV lasix

## 2023-12-19 NOTE — ASSESSMENT & PLAN NOTE
- 12/19: K 2.9, likely 2/2 IV diuresis. Will replete. Discontinued IV diuretics per nephrology  - CTM

## 2023-12-19 NOTE — ASSESSMENT & PLAN NOTE
- CPK 4k on presentation  - Encourage PO hydration  - Cr improving  - Nephrology following  - monitor kidney function  - CK improving

## 2023-12-19 NOTE — PLAN OF CARE
Problem: Fluid and Electrolyte Imbalance (Acute Kidney Injury/Impairment)  Goal: Fluid and Electrolyte Balance  Outcome: Ongoing, Progressing     Problem: Infection  Goal: Absence of Infection Signs and Symptoms  Outcome: Ongoing, Progressing     Patient is awake and oriented x 4. IV antibiotics and lasix given. PICC line consult, IV access G20 right arm established. Bowel movement x 1. Skin discoloration on upper and lower extremities. Redness in the mid thigh/groin area.

## 2023-12-19 NOTE — PROGRESS NOTES
[facility] - [facility] ([place] [address])  Nephrology  Progress Note    Patient Name: Froylan Borja  MRN: [id number]  Admission Date: 12/15/2023  Hospital Length of Stay: 4 days  Attending Provider: Moy Cantu MD   Primary Care Physician: Janki Tamez MD  Principal Problem:ALESSANDRA (acute kidney injury)    Subjective:     HPI: Patient is a 68 y.o. male presents for a new consultation for evaluation of elevated serum creatinine and hyponatremia. The patient was found to have an elevated serum creatinine during routine laboratory testing, at 2.3 mg/dL.      The patient has been admitted with confusion of acute onset. He has ESLD, HTN, HLD, diverticulosis. Admission revealed concerns for CAP and rhabdomyolysis. Found to have a serum  mEq/L.      The patient was taking NSAIDs or new antibiotics, recreational drugs, recent episode of dehydration, diarrhea, nausea or vomiting, acute illness, or exposure to IV radiocontrast.     Interval History: No acute events overnight. UOP increased to 3.1L yesterday. sCr stable at 2.3.    Review of patient's allergies indicates:   Allergen Reactions    Zoloft [sertraline] Other (See Comments)     hyponatremia     Current Facility-Administered Medications   Medication Frequency    acetaminophen tablet 500 mg Q4H PRN    albuterol inhaler 2 puff Q6H PRN    aluminum-magnesium hydroxide-simethicone 200-200-20 mg/5 mL suspension 30 mL QID PRN    dextrose 10% bolus 125 mL 125 mL PRN    dextrose 10% bolus 250 mL 250 mL PRN    enoxaparin injection 40 mg Q24H (prophylaxis, 1700)    ertapenem (INVANZ) 1 g in sodium chloride 0.9 % 100 mL IVPB (MB+) Q24H    folic acid tablet 1 mg Daily    glucagon (human recombinant) injection 1 mg PRN    glucose chewable tablet 16 g PRN    glucose chewable tablet 24 g PRN    LORazepam tablet 2 mg Q4H PRN    melatonin tablet 6 mg Nightly PRN    multivitamin tablet Daily    naloxone 0.4 mg/mL injection 0.02 mg PRN    ondansetron injection 4 mg Q8H PRN     oxyCODONE immediate release tablet 5 mg Q6H PRN    sodium chloride 0.9% flush 10 mL Q12H PRN    thiamine tablet 100 mg Daily       Objective:     Vital Signs (Most Recent):  Temp: 98.6 °F (37 °C) (12/19/23 0745)  Pulse: 98 (12/19/23 1143)  Resp: 18 (12/19/23 0745)  BP: 135/63 (12/19/23 0745)  SpO2: (!) 92 % (12/19/23 0745) Vital Signs (24h Range):  Temp:  [97.5 °F (36.4 °C)-99.2 °F (37.3 °C)] 98.6 °F (37 °C)  Pulse:  [75-98] 98  Resp:  [18-20] 18  SpO2:  [92 %-98 %] 92 %  BP: (106-150)/(56-66) 135/63     Weight: 112 kg (247 lb) (12/16/23 0400)  Body mass index is 30.87 kg/m².  Body surface area is 2.43 meters squared.    I/O last 3 completed shifts:  In: 390 [P.O.:390]  Out: 4200 [Urine:4200]     Physical Exam  Vitals and nursing note reviewed.   Constitutional:       General: He is not in acute distress.     Appearance: Normal appearance. He is obese. He is not ill-appearing.   HENT:      Head: Normocephalic and atraumatic.   Cardiovascular:      Rate and Rhythm: Normal rate and regular rhythm.      Heart sounds: Normal heart sounds. No murmur heard.  Pulmonary:      Effort: Pulmonary effort is normal. No respiratory distress.      Comments: Breath sounds decreased bilaterally, difficult to assess 2/2 body habitus and noncompliance with physical exam  Abdominal:      General: There is no distension.      Palpations: Abdomen is soft.      Tenderness: There is no abdominal tenderness.   Musculoskeletal:         General: Normal range of motion.      Cervical back: Normal range of motion. No rigidity.      Right lower leg: No edema.      Left lower leg: No edema.   Skin:     General: Skin is warm and dry.   Neurological:      General: No focal deficit present.      Mental Status: He is alert. Mental status is at baseline.   Psychiatric:         Mood and Affect: Mood normal.         Behavior: Behavior normal.          Significant Labs:  All labs within the past 24 hours have been reviewed.     Significant  Imaging:  Labs: Reviewed  Assessment/Plan:     Renal/  * ALSESANDRA (acute kidney injury)  67yo M w ho alcoholic cirrhosis and baseline hyponatremia admitted 12/15 with AMS. Work up concerning for alcohol withdrawal, aspiration pneumonia, and rhabdomyolysis with CK 4k. Nephrology has been consulted for ALESSANDRA with Cr of 2.3, 1.0 at baseline.   - UA with 2+ protein, 3+ blood, 3 RBCs, 13 WBCs, 21 granular casts  - Ucx with GNRs  - U Na < 10, U osm 504. U osm decrease to 265 after diuresis  - Urine microscopy with copious amounts of muddy brown granular casts  - RP U/S demonstrates no hydronephrosis, only noting the incidental finding of an echogenic focus within the spleen    Etiology likely ATN given muddy brown casts on microscopy. Likely due to rhabdomyolysis vs ischemia due to volume depletion. Possible component of infectious GN given GNRs on UA. Cr currently stable with improving UOP. Patient now closer to euvolemia on exam with notable UOP. Would recommend a lasix holiday to reduce risk of developing a pre-renal injury alongside ATN.    Recommendations:  - Discontinue lasix  - Place cardenas catheter to assist with measurement of urine output  - Daily Cr, strict intake and output  - Avoid nephrotoxic agents, renally dose meds  - No need for RRT at this time    Metabolic acidosis  HCO3 decreased, likely due to poor H+ clearance in setting of ALESSANDRA  VBG with pH 7.36, pCO2 26, consistent with appropriate respiratory compensation  Uptrending serum bicarbonate  Anticipate further improvement as renal function improves    Hyponatremia  Na 123 this morning, improved from 120.   Urine Na initially < 10, consistent with dehydration vs low effective circulating volume. Consider also component of free water retention in setting of ESLD and ALESSANDRA.   Improving with IVF and diuresis. Slowly improving.    - Continue with 1.5L FR and lasix for further free water diuresis  - Correction by no more than 6-8 mEq in 24 hours        Thank you for  your consult. I will follow-up with patient. Please contact us if you have any additional questions.    Chuy Silverio MD  Nephrology  [facility] - Intensive Care ([place]-)   English

## 2023-12-19 NOTE — ASSESSMENT & PLAN NOTE
Na 123 this morning, improved from 120.   Urine Na initially < 10, consistent with dehydration vs low effective circulating volume. Consider also component of free water retention in setting of ESLD and ALESSANDRA.   Improving with IVF and diuresis. Slowly improving.    - Continue with 1.5L FR and lasix for further free water diuresis  - Correction by no more than 6-8 mEq in 24 hours

## 2023-12-19 NOTE — ASSESSMENT & PLAN NOTE
69yo M w ho alcoholic cirrhosis and baseline hyponatremia admitted 12/15 with AMS. Work up concerning for alcohol withdrawal, aspiration pneumonia, and rhabdomyolysis with CK 4k. Nephrology has been consulted for ALESSANDRA with Cr of 2.3, 1.0 at baseline.   - UA with 2+ protein, 3+ blood, 3 RBCs, 13 WBCs, 21 granular casts  - Ucx with GNRs  - U Na < 10, U osm 504. U osm decrease to 265 after diuresis  - Urine microscopy with copious amounts of muddy brown granular casts  - RP U/S demonstrates no hydronephrosis, only noting the incidental finding of an echogenic focus within the spleen    Etiology likely ATN given muddy brown casts on microscopy. Likely due to rhabdomyolysis vs ischemia due to volume depletion. Possible component of infectious GN given GNRs on UA. Cr currently stable with improving UOP. Patient now closer to euvolemia on exam with notable UOP. Would recommend a lasix holiday to reduce risk of developing a pre-renal injury alongside ATN.    Recommendations:  - Discontinue lasix  - Place acrdenas catheter to assist with measurement of urine output  - Daily Cr, strict intake and output  - Avoid nephrotoxic agents, renally dose meds  - No need for RRT at this time

## 2023-12-19 NOTE — SUBJECTIVE & OBJECTIVE
Interval History: No acute events overnight. UOP increased to 3.1L yesterday. sCr stable at 2.3.    Review of patient's allergies indicates:   Allergen Reactions    Zoloft [sertraline] Other (See Comments)     hyponatremia     Current Facility-Administered Medications   Medication Frequency    acetaminophen tablet 500 mg Q4H PRN    albuterol inhaler 2 puff Q6H PRN    aluminum-magnesium hydroxide-simethicone 200-200-20 mg/5 mL suspension 30 mL QID PRN    dextrose 10% bolus 125 mL 125 mL PRN    dextrose 10% bolus 250 mL 250 mL PRN    enoxaparin injection 40 mg Q24H (prophylaxis, 1700)    ertapenem (INVANZ) 1 g in sodium chloride 0.9 % 100 mL IVPB (MB+) Q24H    folic acid tablet 1 mg Daily    glucagon (human recombinant) injection 1 mg PRN    glucose chewable tablet 16 g PRN    glucose chewable tablet 24 g PRN    LORazepam tablet 2 mg Q4H PRN    melatonin tablet 6 mg Nightly PRN    multivitamin tablet Daily    naloxone 0.4 mg/mL injection 0.02 mg PRN    ondansetron injection 4 mg Q8H PRN    oxyCODONE immediate release tablet 5 mg Q6H PRN    sodium chloride 0.9% flush 10 mL Q12H PRN    thiamine tablet 100 mg Daily       Objective:     Vital Signs (Most Recent):  Temp: 98.6 °F (37 °C) (12/19/23 0745)  Pulse: 98 (12/19/23 1143)  Resp: 18 (12/19/23 0745)  BP: 135/63 (12/19/23 0745)  SpO2: (!) 92 % (12/19/23 0745) Vital Signs (24h Range):  Temp:  [97.5 °F (36.4 °C)-99.2 °F (37.3 °C)] 98.6 °F (37 °C)  Pulse:  [75-98] 98  Resp:  [18-20] 18  SpO2:  [92 %-98 %] 92 %  BP: (106-150)/(56-66) 135/63     Weight: 112 kg (247 lb) (12/16/23 0400)  Body mass index is 30.87 kg/m².  Body surface area is 2.43 meters squared.    I/O last 3 completed shifts:  In: 390 [P.O.:390]  Out: 4200 [Urine:4200]     Physical Exam  Vitals and nursing note reviewed.   Constitutional:       General: He is not in acute distress.     Appearance: Normal appearance. He is obese. He is not ill-appearing.   HENT:      Head: Normocephalic and atraumatic.    Cardiovascular:      Rate and Rhythm: Normal rate and regular rhythm.      Heart sounds: Normal heart sounds. No murmur heard.  Pulmonary:      Effort: Pulmonary effort is normal. No respiratory distress.      Comments: Breath sounds decreased bilaterally, difficult to assess 2/2 body habitus and noncompliance with physical exam  Abdominal:      General: There is no distension.      Palpations: Abdomen is soft.      Tenderness: There is no abdominal tenderness.   Musculoskeletal:         General: Normal range of motion.      Cervical back: Normal range of motion. No rigidity.      Right lower leg: No edema.      Left lower leg: No edema.   Skin:     General: Skin is warm and dry.   Neurological:      General: No focal deficit present.      Mental Status: He is alert. Mental status is at baseline.   Psychiatric:         Mood and Affect: Mood normal.         Behavior: Behavior normal.          Significant Labs:  All labs within the past 24 hours have been reviewed.     Significant Imaging:  Labs: Reviewed

## 2023-12-19 NOTE — ASSESSMENT & PLAN NOTE
- developed fever shortly after admission  ;  Tmax 103  - CXR with concern for aspiration pna left basilar lungfield  - Growing ESBL E coli in urine, zosyn Dc'd.   - Started on Ertapenem  - Vanc DC'd  - Blood cultures NGTD  - further management pending clinical course and future study review

## 2023-12-19 NOTE — ASSESSMENT & PLAN NOTE
- CIWA q4h  - ativan IV 2mg prn for CIWA > 8  - daily thiamine/FA/MV  - would benefit from addiction psych eval once clinically appropriate

## 2023-12-19 NOTE — PROGRESS NOTES
[facility] - [facility] ([address] Medicine  Progress Note    Patient Name: Froylan Borja  MRN: [id number]  Patient Class: IP- Inpatient   Admission Date: 12/15/2023  Length of Stay: 4 days  Attending Physician: Moy Cantu MD  Primary Care Provider: Janki Tamez MD        Subjective:     Principal Problem:ALESSANDRA (acute kidney injury)        HPI:  This is a 69yo male with a past medical history of alcohol abuse, depression, hyponatremia severe requiring prior MICU admit, HTN, HLD, cirrhosis, diverticulosis who presents to the ED with AMS. Patient reportedly at the bar with his wife earlier today and became confused and unsteady and difficulty ambulating. Per chart review police called because of beligerance and were concerned about stroke symptoms and brought patient to ED for further evaluation. Patient alert and oriented to person and aware in hospital. Unable to contribute meaningfully to history but does admit heavy daily alcohol use.     In the ED patient initially afebrile, and hemodynamically stable saturating well on room air at rest. WBC 10.1 with leukocytosis. Na 120. Creatinine 2.3 (baseline 1.0). CPK 3960. CXR with concern for aspiration pneumonia. Patient diaphoretic, tremulous, tachycardic concerning for developing withdrawal. Started on IVFs, benzodiazepines, and abx for rhabdo, hyponatremia, aspiration pna, alcohol withdrawal. Admitted to the care of medicine for further evaluation and management.     Overview/Hospital Course:  [place] [facility] [place] with severe hyponatremia in setting of ETOH abuse, ETOH withdrawals, ALESSANDRA, and aspiration pneumonia. He was started on Vanc/zosyn for aspiration pneumonia. Received IVF bolus and continued on maintenance IVF, Nephrology consulted for hyponatremia and ALESSANDRA. Urine was spun muddy brown casts observed indicative of ATN. Nephrology recommending NS continued @ 150ml/hr and lasix 160mg IV BID for sodium excretion. Na slowing  improving at appropriate rate. Blood cultures NGTD. UA growing GNR's. MRSA swab pending. Patient continues on Vanc/zosyn. Platelets decreased from 140 -> 112 -> 86. Likely secondary to infection however was receiving subcutaneous heparin. HIT panel sent and transitioned to lovenox. Continues to have symptoms of alcohol withdrawal and continued on CIWA and scheduled/PRN benzodiazepines. UA growing ESBL E coli and Dc'd zosyn and started ertapenem. Vanc discontinued. Urine output remained good with diuresis, Cr stable. IV lasix Dc'd per nephrology recommendations. Sodium improved to 135.     Interval History: Pt seen and examined this morning on rounds. NAEON. Remained afebrile. Urine output of 3L with IV diuresis, Cr improving, now 2.1. IV lasix Dc'd per nephrology recommendations. Sodium improved to 135. .     Objective:     Vital Signs (Most Recent):  Temp: 98.6 °F (37 °C) (12/19/23 0745)  Pulse: 98 (12/19/23 1143)  Resp: 18 (12/19/23 0745)  BP: 135/63 (12/19/23 0745)  SpO2: (!) 92 % (12/19/23 0745) Vital Signs (24h Range):  Temp:  [97.5 °F (36.4 °C)-99.2 °F (37.3 °C)] 98.6 °F (37 °C)  Pulse:  [75-98] 98  Resp:  [18-20] 18  SpO2:  [92 %-98 %] 92 %  BP: (106-150)/(56-66) 135/63     Weight: 112 kg (247 lb)  Body mass index is 30.87 kg/m².    Intake/Output Summary (Last 24 hours) at 12/19/2023 1414  Last data filed at 12/19/2023 0917  Gross per 24 hour   Intake 150 ml   Output 3025 ml   Net -2875 ml      Physical Exam  Gen: in NAD, appears stated age, central obesity  Neuro: AAOx4, CN2-12 grossly intact BL; motor, sensory, and strength grossly intact BL  HEENT: NTNC, EOMI, PERRLA, MMM; no thyromegaly or lymphadenopathy; no JVD appreciated  CVS: RRR, no m/r/g; S1/S2 auscultated with no S3 or S4; capillary refill < 2 sec  Resp: Wet cough, diminished breath sounds left lower lungfield. 2L NC. No belabored breathing or accessory muscle use appreciated   Abd: BS+ in all 4 quadrants; NTND, soft to palpation; no  "organomegaly appreciated   Extrem: Chronic venous stasis skin findings. pulses full, equal, and regular over all 4 extremities; no UE or LE edema BL      MELD 3.0: 26 at 12/17/2023  7:44 PM  MELD-Na: 25 at 12/17/2023  7:44 PM  Calculated from:  Serum Creatinine: 2.4 mg/dL at 12/17/2023  7:44 PM  Serum Sodium: 125 mmol/L at 12/17/2023  7:44 PM  Total Bilirubin: 0.7 mg/dL (Using min of 1 mg/dL) at 12/17/2023  9:04 AM  Serum Albumin: 2.4 g/dL at 12/17/2023  9:04 AM  INR(ratio): 1.0 at 12/15/2023  2:16 PM  Age at listing (hypothetical): 68 years  Sex: Male at 12/17/2023  7:44 PM      Significant Labs:  CBC:  Recent Labs   Lab 12/18/23 0329 12/19/23  0324   WBC 3.56* 3.63*   HGB 14.0 12.9*   HCT 39.6* 35.4*   * 125*     CMP:  Recent Labs   Lab 12/18/23 0329 12/18/23  0911 12/18/23  1938 12/19/23  0324 12/19/23  0904   NA  --  125* 129*  --  135*   K  --  2.8* 3.4*  --  2.9*   CL  --  95 99  --  99   CO2  --  20* 21*  --  25   GLU  --  106 94  --  112*   BUN  --  40* 39*  --  43*   CREATININE  --  2.3* 2.3*  --  2.1*   CALCIUM  --  8.2* 8.3*  --  8.7   PROT 6.1  --   --  6.0  --    ALBUMIN 2.4*  --   --  2.3*  --    BILITOT 0.7  --   --  0.5  --    ALKPHOS 58  --   --  61  --    *  --   --  83*  --    ALT 67*  --   --  60*  --    ANIONGAP  --  10 9  --  11     PTINR:  No results for input(s): "INR" in the last 48 hours.      Significant Procedures:   Dobutamine Stress Test with Color Flow: No results found for this or any previous visit.    Assessment/Plan:      * ALESSANDRA (acute kidney injury)  - Creatinine 2.3 on presentation ; baseline 1.0  - suspect secondary to dehydration with severe alcohol use/dependence and inadequate water/hydration  - Nephrology consulted, urine spun and multiple muddy brown casts indicative of ATN  - Continue IV diuresis (currently on 160mg BID) per nephrology recommendations  - strict I/Os  - avoid nephrotoxic agents as appropriate  - continue to monitor  - Renal US without " hydronephrosis  - Cr 2.1 today; improving    Hyponatremia  History of severe hyponatremia requiring ICU. From chart review appears largely secondary to beer potemania in the past  - Na 120 on presentation, downtrended to 118 overnight and nephrology was consulted.   - Na improving at appropriate rate with IVF and IV diuresis  - BMP q12h  - neurochecks q4h  - seizure precuations  - further management pending clinical course and future study review  - Improved to 135; nephrology recommending discontinuation of IV lasix      Acute cystitis  - Interval history and physical exam findings as described above  - UA findings reviewed  - UCx growing ESBL E coli  - Blood cultures NGTD  - Transitioned Zosyn to Ertapenem; will treat for 7 day course      Aspiration pneumonia  - developed fever shortly after admission  ;  Tmax 103  - CXR with concern for aspiration pna left basilar lungfield  - Growing ESBL E coli in urine, zosyn Dc'd.   - Started on Ertapenem  - Vanc DC'd  - Blood cultures NGTD  - further management pending clinical course and future study review    Alcohol use disorder, severe, dependence  - CIWA q4h  - ativan IV 2mg prn for CIWA > 8  - daily thiamine/FA/MV  - would benefit from addiction psych eval once clinically appropriate    Rhabdomyolysis  - CPK 4k on presentation  - Encourage PO hydration  - Cr improving  - Nephrology following  - monitor kidney function  - CK improving    Cirrhosis  MELD-Na score calculated; MELD 3.0: 26 at 12/17/2023  7:44 PM  MELD-Na: 25 at 12/17/2023  7:44 PM  Calculated from:  Serum Creatinine: 2.4 mg/dL at 12/17/2023  7:44 PM  Serum Sodium: 125 mmol/L at 12/17/2023  7:44 PM  Total Bilirubin: 0.7 mg/dL (Using min of 1 mg/dL) at 12/17/2023  9:04 AM  Serum Albumin: 2.4 g/dL at 12/17/2023  9:04 AM  INR(ratio): 1.0 at 12/15/2023  2:16 PM  Age at listing (hypothetical): 68 years  Sex: Male at 12/17/2023  7:44 PM    - continues to use alcohol persistently  - AST > ALT x2 consistent with  ETOH use.  - Trend hepatic function panel  - Appears well compensated on physical exam    Thrombocytopenia  Patient was found to have thrombocytopenia, the likely etiology is secondary to sepsis/infection, will monitor the platelets Daily. Will transfuse if platelet count is <10k. Hold DVT prophylaxis if platelets are <50k. The patient's platelet results have been reviewed and are listed below.  Recent Labs   Lab 12/19/23  0324   *       - Platelets decreased from Likely secondary to infection however was receiving subcutaneous heparin. HIT negative.   - Improving    Hypokalemia  - 12/19: K 2.9, likely 2/2 IV diuresis. Will replete. Discontinued IV diuretics per nephrology  - CTM    Depression  - holding home meds for now as seems he hasn't been taking for some time      VTE Risk Mitigation (From admission, onward)           Ordered     enoxaparin injection 40 mg  Every 24 hours         12/17/23 1239     IP VTE HIGH RISK PATIENT  Once         12/15/23 1817     Place sequential compression device  Until discontinued         12/15/23 1817                    Discharge Planning   KENNETH: 12/22/2023     Code Status: Full Code   Is the patient medically ready for discharge?: No    Reason for patient still in hospital (select all that apply): Treatment  Discharge Plan A: Skilled Nursing Facility                  Moy Cantu MD  Department of Hospital Medicine   [facility] - Intensive Care ([place])

## 2023-12-20 LAB
ALBUMIN SERPL BCP-MCNC: 2.3 G/DL (ref 3.5–5.2)
ALP SERPL-CCNC: 67 U/L (ref 55–135)
ALT SERPL W/O P-5'-P-CCNC: 52 U/L (ref 10–44)
ANION GAP SERPL CALC-SCNC: 10 MMOL/L (ref 8–16)
AST SERPL-CCNC: 62 U/L (ref 10–40)
BASOPHILS # BLD AUTO: 0.05 K/UL (ref 0–0.2)
BASOPHILS NFR BLD: 0.9 % (ref 0–1.9)
BILIRUB DIRECT SERPL-MCNC: 0.3 MG/DL (ref 0.1–0.3)
BILIRUB SERPL-MCNC: 0.5 MG/DL (ref 0.1–1)
BUN SERPL-MCNC: 46 MG/DL (ref 8–23)
CALCIUM SERPL-MCNC: 8.9 MG/DL (ref 8.7–10.5)
CHLORIDE SERPL-SCNC: 96 MMOL/L (ref 95–110)
CO2 SERPL-SCNC: 30 MMOL/L (ref 23–29)
CREAT SERPL-MCNC: 2 MG/DL (ref 0.5–1.4)
DIFFERENTIAL METHOD BLD: ABNORMAL
EOSINOPHIL # BLD AUTO: 0.1 K/UL (ref 0–0.5)
EOSINOPHIL NFR BLD: 0.9 % (ref 0–8)
ERYTHROCYTE [DISTWIDTH] IN BLOOD BY AUTOMATED COUNT: 14.1 % (ref 11.5–14.5)
EST. GFR  (NO RACE VARIABLE): 35.7 ML/MIN/1.73 M^2
GLUCOSE SERPL-MCNC: 127 MG/DL (ref 70–110)
HCT VFR BLD AUTO: 37.3 % (ref 40–54)
HGB BLD-MCNC: 13.4 G/DL (ref 14–18)
IMM GRANULOCYTES # BLD AUTO: 0.11 K/UL (ref 0–0.04)
IMM GRANULOCYTES NFR BLD AUTO: 2.1 % (ref 0–0.5)
LYMPHOCYTES # BLD AUTO: 0.6 K/UL (ref 1–4.8)
LYMPHOCYTES NFR BLD: 10.9 % (ref 18–48)
MCH RBC QN AUTO: 31.9 PG (ref 27–31)
MCHC RBC AUTO-ENTMCNC: 35.9 G/DL (ref 32–36)
MCV RBC AUTO: 89 FL (ref 82–98)
MONOCYTES # BLD AUTO: 0.7 K/UL (ref 0.3–1)
MONOCYTES NFR BLD: 13.1 % (ref 4–15)
NEUTROPHILS # BLD AUTO: 3.8 K/UL (ref 1.8–7.7)
NEUTROPHILS NFR BLD: 72.1 % (ref 38–73)
NRBC BLD-RTO: 0 /100 WBC
PLATELET # BLD AUTO: 161 K/UL (ref 150–450)
PMV BLD AUTO: 10 FL (ref 9.2–12.9)
POTASSIUM SERPL-SCNC: 3.1 MMOL/L (ref 3.5–5.1)
PROT SERPL-MCNC: 6.1 G/DL (ref 6–8.4)
RBC # BLD AUTO: 4.2 M/UL (ref 4.6–6.2)
SODIUM SERPL-SCNC: 136 MMOL/L (ref 136–145)
WBC # BLD AUTO: 5.33 K/UL (ref 3.9–12.7)

## 2023-12-20 PROCEDURE — 97162 PT EVAL MOD COMPLEX 30 MIN: CPT | Mod: HCNC

## 2023-12-20 PROCEDURE — 21400001 HC TELEMETRY ROOM: Mod: HCNC

## 2023-12-20 PROCEDURE — 85025 COMPLETE CBC W/AUTO DIFF WBC: CPT | Mod: HCNC | Performed by: INTERNAL MEDICINE

## 2023-12-20 PROCEDURE — 97165 OT EVAL LOW COMPLEX 30 MIN: CPT | Mod: HCNC

## 2023-12-20 PROCEDURE — 99232 SBSQ HOSP IP/OBS MODERATE 35: CPT | Mod: HCNC,,, | Performed by: INTERNAL MEDICINE

## 2023-12-20 PROCEDURE — 97535 SELF CARE MNGMENT TRAINING: CPT | Mod: HCNC

## 2023-12-20 PROCEDURE — 97530 THERAPEUTIC ACTIVITIES: CPT | Mod: HCNC

## 2023-12-20 PROCEDURE — 36415 COLL VENOUS BLD VENIPUNCTURE: CPT | Mod: HCNC | Performed by: INTERNAL MEDICINE

## 2023-12-20 PROCEDURE — 80076 HEPATIC FUNCTION PANEL: CPT | Mod: HCNC | Performed by: INTERNAL MEDICINE

## 2023-12-20 PROCEDURE — 80048 BASIC METABOLIC PNL TOTAL CA: CPT | Mod: HCNC

## 2023-12-20 PROCEDURE — 25000003 PHARM REV CODE 250: Mod: HCNC | Performed by: FAMILY MEDICINE

## 2023-12-20 PROCEDURE — 36415 COLL VENOUS BLD VENIPUNCTURE: CPT | Mod: HCNC

## 2023-12-20 PROCEDURE — 25000003 PHARM REV CODE 250: Mod: HCNC | Performed by: INTERNAL MEDICINE

## 2023-12-20 PROCEDURE — 63600175 PHARM REV CODE 636 W HCPCS: Mod: HCNC | Performed by: INTERNAL MEDICINE

## 2023-12-20 RX ORDER — POTASSIUM CHLORIDE 20 MEQ/1
40 TABLET, EXTENDED RELEASE ORAL ONCE
Status: COMPLETED | OUTPATIENT
Start: 2023-12-20 | End: 2023-12-20

## 2023-12-20 RX ADMIN — POTASSIUM CHLORIDE 40 MEQ: 1500 TABLET, EXTENDED RELEASE ORAL at 07:12

## 2023-12-20 RX ADMIN — ENOXAPARIN SODIUM 40 MG: 40 INJECTION SUBCUTANEOUS at 04:12

## 2023-12-20 RX ADMIN — FOLIC ACID 1 MG: 1 TABLET ORAL at 08:12

## 2023-12-20 RX ADMIN — THERA TABS 1 TABLET: TAB at 08:12

## 2023-12-20 RX ADMIN — POTASSIUM CHLORIDE 40 MEQ: 1500 TABLET, EXTENDED RELEASE ORAL at 01:12

## 2023-12-20 RX ADMIN — ACETAMINOPHEN 500 MG: 500 TABLET ORAL at 04:12

## 2023-12-20 RX ADMIN — ERTAPENEM 1 G: 1 INJECTION INTRAMUSCULAR; INTRAVENOUS at 10:12

## 2023-12-20 RX ADMIN — Medication 100 MG: at 08:12

## 2023-12-20 NOTE — PROGRESS NOTES
[facility] - Intensive Care ([address] Medicine  Progress Note    Patient Name: Froylan Borja  MRN: [id number]  Patient Class: IP- Inpatient   Admission Date: 12/15/2023  Length of Stay: 5 days  Attending Physician: Moy Cantu MD  Primary Care Provider: Janki Tamez MD        Subjective:     Principal Problem:ALESSANDRA (acute kidney injury)        HPI:  This is a 69yo male with a past medical history of alcohol abuse, depression, hyponatremia severe requiring prior MICU admit, HTN, HLD, cirrhosis, diverticulosis who presents to the ED with AMS. Patient reportedly at the bar with his wife earlier today and became confused and unsteady and difficulty ambulating. Per chart review police called because of beligerance and were concerned about stroke symptoms and brought patient to ED for further evaluation. Patient alert and oriented to person and aware in hospital. Unable to contribute meaningfully to history but does admit heavy daily alcohol use.     In the ED patient initially afebrile, and hemodynamically stable saturating well on room air at rest. WBC 10.1 with leukocytosis. Na 120. Creatinine 2.3 (baseline 1.0). CPK 3960. CXR with concern for aspiration pneumonia. Patient diaphoretic, tremulous, tachycardic concerning for developing withdrawal. Started on IVFs, benzodiazepines, and abx for rhabdo, hyponatremia, aspiration pna, alcohol withdrawal. Admitted to the care of medicine for further evaluation and management.     Overview/Hospital Course:  Patient admitted to [facility] for AMS with severe hyponatremia in setting of ETOH abuse, ETOH withdrawals, ALESSANDRA, and aspiration pneumonia. He was started on Vanc/zosyn for aspiration pneumonia. Received IVF bolus and continued on maintenance IVF, Nephrology consulted for hyponatremia and ALESSANDRA. Urine was spun muddy brown casts observed indicative of ATN. Nephrology recommending NS continued @ 150ml/hr and lasix 160mg IV BID for sodium excretion. Na slowing  improving at appropriate rate. Blood cultures NGTD. UA growing GNR's. MRSA swab pending. Patient continues on Vanc/zosyn. Platelets decreased from 140 -> 112 -> 86. Likely secondary to infection however was receiving subcutaneous heparin. HIT panel sent and transitioned to lovenox. Continues to have symptoms of alcohol withdrawal and continued on CIWA and scheduled/PRN benzodiazepines. UA growing ESBL E coli and Dc'd zosyn and started ertapenem. Vanc discontinued. Urine output remained good with diuresis, Cr stable. IV lasix Dc'd per nephrology recommendations. Sodium improved to 135. PT/OT consulted.    Interval History: Pt seen and examined this morning on rounds. NAEON. Remains afebrile. Cr improving. Good UOP. Patient overall feeling better but still fatigued.    Objective:     Vital Signs (Most Recent):  Temp: 98.9 °F (37.2 °C) (12/20/23 1157)  Pulse: 95 (12/20/23 1157)  Resp: 18 (12/20/23 1157)  BP: (!) 141/66 (12/20/23 1157)  SpO2: 99 % (12/20/23 1157) Vital Signs (24h Range):  Temp:  [96.2 °F (35.7 °C)-98.9 °F (37.2 °C)] 98.9 °F (37.2 °C)  Pulse:  [89-98] 95  Resp:  [16-20] 18  SpO2:  [91 %-99 %] 99 %  BP: (135-166)/(66-74) 141/66     Weight: 112 kg (247 lb)  Body mass index is 30.87 kg/m².    Intake/Output Summary (Last 24 hours) at 12/20/2023 1253  Last data filed at 12/20/2023 1157  Gross per 24 hour   Intake --   Output 1746 ml   Net -1746 ml      Physical Exam  Gen: in NAD, appears stated age, central obesity  Neuro: AAOx4, CN2-12 grossly intact BL; motor, sensory, and strength grossly intact BL  HEENT: NTNC, EOMI, PERRLA, MMM; no thyromegaly or lymphadenopathy; no JVD appreciated  CVS: RRR, no m/r/g; S1/S2 auscultated with no S3 or S4; capillary refill < 2 sec  Resp: Wet cough, diminished breath sounds left lower lungfield. 2L NC. No belabored breathing or accessory muscle use appreciated   Abd: BS+ in all 4 quadrants; NTND, soft to palpation; no organomegaly appreciated   Extrem: Chronic venous stasis  "skin findings. Discoloration/bruising on Ue's BL. pulses full, equal, and regular over all 4 extremities; no UE or LE edema BL      MELD 3.0: 26 at 12/17/2023  7:44 PM  MELD-Na: 25 at 12/17/2023  7:44 PM  Calculated from:  Serum Creatinine: 2.4 mg/dL at 12/17/2023  7:44 PM  Serum Sodium: 125 mmol/L at 12/17/2023  7:44 PM  Total Bilirubin: 0.7 mg/dL (Using min of 1 mg/dL) at 12/17/2023  9:04 AM  Serum Albumin: 2.4 g/dL at 12/17/2023  9:04 AM  INR(ratio): 1.0 at 12/15/2023  2:16 PM  Age at listing (hypothetical): 68 years  Sex: Male at 12/17/2023  7:44 PM      Significant Labs:  CBC:  Recent Labs   Lab 12/19/23  0324 12/20/23  0540   WBC 3.63* 5.33   HGB 12.9* 13.4*   HCT 35.4* 37.3*   * 161     CMP:  Recent Labs   Lab 12/19/23  0324 12/19/23  0904 12/19/23  1937 12/20/23  0540 12/20/23  1159   NA  --  135* 135*  --  136   K  --  2.9* 2.9*  --  3.1*   CL  --  99 95  --  96   CO2  --  25 28  --  30*   GLU  --  112* 126*  --  127*   BUN  --  43* 43*  --  46*   CREATININE  --  2.1* 2.2*  --  2.0*   CALCIUM  --  8.7 8.9  --  8.9   PROT 6.0  --   --  6.1  --    ALBUMIN 2.3*  --   --  2.3*  --    BILITOT 0.5  --   --  0.5  --    ALKPHOS 61  --   --  67  --    AST 83*  --   --  62*  --    ALT 60*  --   --  52*  --    ANIONGAP  --  11 12  --  10     PTINR:  No results for input(s): "INR" in the last 48 hours.      Significant Procedures:   Dobutamine Stress Test with Color Flow: No results found for this or any previous visit.    Assessment/Plan:      * ALESSANDRA (acute kidney injury)  - Creatinine 2.3 on presentation ; baseline 1.0  - suspect secondary to dehydration with severe alcohol use/dependence and inadequate water/hydration  - Nephrology consulted, urine spun and multiple muddy brown casts indicative of ATN  - S/p IV diuresis for 2-3 days, now off diuretics as of 12/19  - strict I/Os  - avoid nephrotoxic agents as appropriate  - continue to monitor  - Renal US without hydronephrosis  - Cr continues to " improve    Hyponatremia  History of severe hyponatremia requiring ICU. From chart review appears largely secondary to beer potemania in the past  - Na 120 on presentation, downtrended to 118 overnight  - Nephrology was consulted.   - S/p IVF and IV diuresis with improvement to Na  - IV lasix DC'd  - neurochecks q4h  - seizure precuations  - further management pending clinical course and future study review    -Na improved to 136      Acute cystitis  - Interval history and physical exam findings as described above  - UA findings reviewed  - UCx growing ESBL E coli  - Blood cultures NGTD  - Transitioned Zosyn to Ertapenem 12/18      Aspiration pneumonia  - developed fever shortly after admission  ;  Tmax 103  - CXR with concern for aspiration pna left basilar lungfield  - Growing ESBL E coli in urine, zosyn Dc'd.   - Started on Ertapenem 12/18  - Vanc DC'd  - Blood cultures NGTD  - further management pending clinical course and future study review    Alcohol use disorder, severe, dependence  - CIWA q4h  - ativan IV 2mg prn for CIWA > 8  - daily thiamine/FA/MV  - would benefit from addiction psych eval once clinically appropriate    Rhabdomyolysis  - CPK 4k on presentation  - Encourage PO hydration  - Cr improving  - Nephrology following  - monitor kidney function  - CK improving    Cirrhosis  MELD-Na score calculated; MELD 3.0: 26 at 12/17/2023  7:44 PM  MELD-Na: 25 at 12/17/2023  7:44 PM  Calculated from:  Serum Creatinine: 2.4 mg/dL at 12/17/2023  7:44 PM  Serum Sodium: 125 mmol/L at 12/17/2023  7:44 PM  Total Bilirubin: 0.7 mg/dL (Using min of 1 mg/dL) at 12/17/2023  9:04 AM  Serum Albumin: 2.4 g/dL at 12/17/2023  9:04 AM  INR(ratio): 1.0 at 12/15/2023  2:16 PM  Age at listing (hypothetical): 68 years  Sex: Male at 12/17/2023  7:44 PM    - continues to use alcohol persistently  - AST > ALT x2 consistent with ETOH use.  - Trend hepatic function panel  - LFT's improving  - Appears well compensated on physical  exam    Thrombocytopenia  Patient was found to have thrombocytopenia, the likely etiology is secondary to sepsis/infection, will monitor the platelets Daily. Will transfuse if platelet count is <10k. Hold DVT prophylaxis if platelets are <50k. The patient's platelet results have been reviewed and are listed below.  Recent Labs   Lab 12/20/23  0540          - Platelets decreased from Likely secondary to infection however was receiving subcutaneous heparin. HIT negative.   - Improving    Hypokalemia  - Replete PRN  - CTM    Depression  - holding home meds for now as seems he hasn't been taking for some time      VTE Risk Mitigation (From admission, onward)           Ordered     enoxaparin injection 40 mg  Every 24 hours         12/17/23 1239     IP VTE HIGH RISK PATIENT  Once         12/15/23 1817     Place sequential compression device  Until discontinued         12/15/23 1817                    Discharge Planning   KENNETH: 12/24/2023     Code Status: Full Code   Is the patient medically ready for discharge?: No    Reason for patient still in hospital (select all that apply): Treatment  Discharge Plan A: Skilled Nursing Facility                  Moy Cantu MD  Department of Hospital Medicine   [facility] - Intensive Care ([place]-[facility])

## 2023-12-20 NOTE — PHYSICIAN QUERY
PT Name: Froylan Borja  MR #: [id number]    DOCUMENTATION CLARIFICATION     CDS/: Taylor Pérez RN          Contact information: [email or web address]     This form is a permanent document in the medical record.     Query Date: December 20, 2023    By submitting this query, we are merely seeking further clarification of documentation. Please utilize your independent clinical judgment when addressing the question(s) below.    The Medical Record contains the following:   Indicators   Supporting Clinical Findings Location in Medical Record   x AMS, Confusion,  LOC, etc.  AMS  68 y.o. year old male who presents to the ED for alcohol intoxication, confusion, not able to ambulate well at home.    This is a 67yo male with a past medical history of alcohol abuse, depression, hyponatremia severe requiring prior MICU admit, HTN, HLD, cirrhosis, diverticulosis who presents to the ED with AMS.  Patient reportedly at the bar with his  wife earlier today and became confused and unsteady and difficulty ambulating.    [facility] with severe hyponatremia in setting of ETOH abuse, ETOH withdrawals, ALESSANDRA, and aspiration pneumonia.    The patient has been admitted with confusion of acute onset.     ED Provider note of 12/15         H & P of 12/15             HM PN of 12/16       Nephro PN of 12/17    x Acute/Chronic Illness Acute illness: Hyponatremia, ALESSANDRA, rhabdomyolysis, aspiration pneumonia, depression, alcohol use disorder, severe, dependence, cirrhosis       past medical history of alcohol abuse, depression, hyponatremia severe requiring prior MICU admit, HTN, HLD, cirrhosis, diverticulosis who presents to the ED with AMS.  H & P of 12/15         H & P of 12/15        x Radiology Findings No evidence of acute intracranial pathology.  CT of head of 12/15    x Electrolyte Imbalance  12/15/23 18:59 12/15/23 22:42 12/16/23 03:12 12/16/23 09:08 12/16/23 10:45 12/16/23 13:51 12/17/23 09:04   Sodium 120 (L) 120  (L) 118 (LL) 121 (L) 120 (L) 120 (L) 123 (L)        12/17/23 12:00 12/17/23 19:44 12/18/23 09:11 12/18/23 19:38 12/19/23 09:04 12/19/23 19:37   Sodium 125 (L) 125 (L) 125 (L) 129 (L) 135 (L) 135 (L)   Potassium 2.9 (L) 2.8 (L) 2.8 (L) 3.4 (L) 2.9 (L) 2.9 (L)        Labs of 12/15 to 12/17              Labs of 12/17 to 12/19    x Medication Antibiotics   Ertapenem 1 G IV   Piperacillin 4.5 G IV   Vancomycin 1500 mg IV   Vancomycin 2000 mg IV     0.9 % NaCl 500 mg IV   0.9 % NaCl continuous @ 150 ml/hr   0.9 % NaCl continuous @ 100 ml/hr   Sodium chloride 0.9 % 1000 ml IV bolus     Potassium chloride 10 meq IV @ 100 ml/hr   Potassium chloride 40 meq oral  5 doses     Thiamine 100 mg IV ED one time   Thiamine 300 mg IV ED one time          Mar 12/18 to present   Mar 12/15 to 12/18  Mar 12/17 one dose   Mar 12/15 one dose     Mar 12/15  Mar 12/15 to 12/16  Mar 12/17   Mar 12/18     Mar 12/18   Mar 12/20 1323, 12/20 0700, 12/19 1323, 12/18 0949, 12/17 1419     Mar 12/15  Mar 12/15    x Treatment         Neuro checks  Orders of 12/15     Other       The noted clinical guidelines are only system guidelines and do not replace the providers clinical judgment.    The National [place] of Neurologic Disorders and Stroke (NINDS) of the NIH describes encephalopathy as any diffuse disease of the brain that alters brain function or structure.    Provider, please specify the diagnosis or diagnoses associated with above clinical findings.    [ x   ] Metabolic Encephalopathy - Due to electrolyte imbalance, metabolic derangements, or infectious processes, includes Septic Encephalopathy, Uremic Encephalopathy     [   ] Alcoholic Encephalopathy - Degeneration of the nervous system due to alcohol      [   ] Other Encephalopathy (Please Specify): ____________________     [   ] Other (Please Specify) : ________________   [   ]  Clinically Undetermined         Please document in your progress notes daily for the duration of treatment  until resolved, and include in your discharge summary.    References:  NIGHAT Rashid RN, CCDS. (2018, June 9). Notes from the Instructor: Encephalopathy tips. Retrieved October 22, 2020, from [email or web address]/articles/[email or web address]-[email or web address]-[email or web address]-tips    ICD-9-CM Coding Clinic First Quarter 2013, Effective with discharges: October 21, 2013 [facility] § Seizure with encephalopathy due to postictal state (2013).    ICD-10-CM/[employer] Integrated Codebook (Version V.20.8.10.0) [Computer software]. (2020). Retrieved October 21, 2020.    National [place] of Neurological Disorders and Stroke. (2019, March 27). Retrieved October 22, 2020, from https://www.ninds.nih.gov/Disorders/All-Disorders/Zdtamnevseeoxu-Gsyxuzgicbb-Dhef    Form No. 87065

## 2023-12-20 NOTE — ASSESSMENT & PLAN NOTE
- developed fever shortly after admission  ;  Tmax 103  - CXR with concern for aspiration pna left basilar lungfield  - Growing ESBL E coli in urine, zosyn Dc'd.   - Started on Ertapenem 12/18  - Vanc DC'd  - Blood cultures NGTD  - further management pending clinical course and future study review

## 2023-12-20 NOTE — PT/OT/SLP EVAL
Occupational Therapy   Co-Evaluation and Co-Treatment  Co-treatment with PT for maximal pt participation, safety, and activity tolerance       Name: Froylan Borja  MRN: [id number]  Admitting Diagnosis: ALESSANDRA (acute kidney injury)  Recent Surgery: * No surgery found *      Recommendations:     Discharge Recommendations: Moderate Intensity Therapy  Discharge Equipment Recommendations:  walker, rolling, wheelchair, bedside commode  Barriers to discharge:       Assessment:     Froylan Borja is a 68 y.o. male with a medical diagnosis of ALESSANDRA (acute kidney injury). Pt presents with decreased endurance and impaired mobility performance as limited by cardiovascular status and generalized weakness. Pt found upright in bed and agreeable for therapy. Pt found soiled requiring pericare with pt able to complete additional ADLs afterwards at bedside. Pt did trial standing and lateral steps at HOB this evaluation however notably unsteady despite x2 person A and use of a RW. PTA, pt was (I) with ADLs/mobility. Patient currently demonstrates a need for moderate intensity therapy on a daily basis post acute secondary to a decline in functional status due to illness, injury, and disease   Performance deficits affecting function: weakness, impaired functional mobility, gait instability, impaired endurance, impaired self care skills, decreased upper extremity function, impaired balance, decreased lower extremity function, decreased safety awareness, decreased coordination, decreased ROM, impaired cardiopulmonary response to activity.      Rehab Prognosis: Good; patient would benefit from acute skilled OT services to address these deficits and reach maximum level of function.       Plan:     Patient to be seen 3 x/week to address the above listed problems via self-care/home management, therapeutic activities, therapeutic exercises, neuromuscular re-education  Plan of Care Expires: 01/20/24  Plan of Care Reviewed with: patient    Subjective     Chief  "Complaint: weakness   Patient/Family Comments/goals: "I feel pretty weak"    Occupational Profile:  Living Environment: Pt lives with wife in a 2nd floor apartment (no elevator). Pt has 15 steps with 1 HR (right side). Pt has a walk in shower for bathing  Previous level of function: I with ADLs   Roles and Routines: Pt is retired however still works at [employer] at [employer]. Not driving at this time .  Equipment Used at Home: none  Assistance upon Discharge: wife as able     Pain/Comfort:  Pain Rating 1: 0/10  Pain Rating Post-Intervention 1: 0/10    Patients cultural, spiritual, Orthodoxy conflicts given the current situation: no    Objective:     Communicated with: RN prior to session.  Patient found HOB elevated with telemetry, peripheral IV, oxygen upon OT entry to room.    General Precautions: Standard, fall  Orthopedic Precautions: N/A  Braces: N/A  Respiratory Status:  02 (2L)doffed upon arrival; donned during therapy    Occupational Performance:    Bed Mobility:    Patient completed Rolling/Turning to Left with  moderate assistance  Patient completed Rolling/Turning to Right with moderate assistance  Patient completed Scooting/Bridging with moderate assistance  Patient completed Supine to Sit with maximal assistance and 2 persons  Patient completed Sit to Supine with moderate assistance and 2 persons    Functional Mobility/Transfers:  Patient completed Sit <> Stand Transfer with moderate assistance and of 2 persons  with  rolling walker   Functional Mobility: Pt stood with mod A x2 from bed with RW. Pt tolerated 5 steps laterally towards HOB using a RW and x2 person A.  Pt sat EOB with mod A initially however progressing to SBA.    Activities of Daily Living:  Grooming: stand by assistance washing face and brushing teeth at EOB   Toileting: total assistance as pt found soiled requiring pericare     Cognitive/Visual Perceptual:  Cognitive/Psychosocial Skills:     -       Oriented to: Person, Place, Time, and " Situation   -       Follows Commands/attention:delayed processing but following commands   -       Communication: clear/fluent  -       Memory: No Deficits noted  -       Safety awareness/insight to disability: impaired   -       Mood/Affect/Coping skills/emotional control: Appropriate to situation    Physical Exam:  Balance:    -       demo good sitting balance once given cues and safely at EOB. Poor standing balance   Dominant hand:    -       right  Upper Extremity Range of Motion:     -       Right Upper Extremity: WFL  -       Left Upper Extremity: WFL  Upper Extremity Strength:    -       Right Upper Extremity: WFL  -       Left Upper Extremity: WFL   Strength:    -       Right Upper Extremity: WFL  -       Left Upper Extremity: WFL    AMPAC 6 Click ADL:  AMPA Total Score: 16    Treatment & Education:  Pt educated on role of occupational therapy, POC, and safety during ADLs and functional mobility. Pt and OT discussed importance of safe, continued mobility to optimize daily living skills. Pt verbalized understanding.     White board updated during session. Pt given instruction to call for medical staff/nurse for assistance.       Patient left HOB elevated with all lines intact, call button in reach, and rn notified    GOALS:   Multidisciplinary Problems       Occupational Therapy Goals          Problem: Occupational Therapy    Goal Priority Disciplines Outcome Interventions   Occupational Therapy Goal     OT, PT/OT Ongoing, Progressing    Description: Goals to be met by: 1/20/24 (1 month)     Patient will increase functional independence with ADLs by performing:    UE Dressing with Supervision.  LE Dressing with Supervision.  Grooming while standing at sink with Supervision.  Toileting from toilet with Supervision for hygiene and clothing management.   Rolling to Bilateral with [place].   Supine to sit with [place].  Step transfer with Supervision  Toilet transfer to toilet with Supervision.                        History:     Past Medical History:   Diagnosis Date    Alcohol abuse     Anxiety     Cirrhosis     Glaucoma     History of hepatitis C, s/p successful Rx w/ SVR24 - 1/2017     genotype 1;  relapse following PegIFN+RBV+Victrelis; prior relapse following PegIFN+RBV S/p 12 weeks harvoni + RBV w/ SVR    Hypertension     Paresthesia     feet, bilaterally         Past Surgical History:   Procedure Laterality Date    LIVER BIOPSY         Time Tracking:     OT Date of Treatment: 12/20/23  OT Start Time: 1113  OT Stop Time: 1150  OT Total Time (min): 37 min    Billable Minutes:Evaluation 10 min  Self Care/Home Management 27 min    12/20/2023

## 2023-12-20 NOTE — ASSESSMENT & PLAN NOTE
Patient was found to have thrombocytopenia, the likely etiology is secondary to sepsis/infection, will monitor the platelets Daily. Will transfuse if platelet count is <10k. Hold DVT prophylaxis if platelets are <50k. The patient's platelet results have been reviewed and are listed below.  Recent Labs   Lab 12/20/23  0540          - Platelets decreased from Likely secondary to infection however was receiving subcutaneous heparin. HIT negative.   - Improving

## 2023-12-20 NOTE — PLAN OF CARE
Problem: Occupational Therapy  Goal: Occupational Therapy Goal  Description: Goals to be met by: 1/20/24 (1 month)     Patient will increase functional independence with ADLs by performing:    UE Dressing with Supervision.  LE Dressing with Supervision.  Grooming while standing at sink with Supervision.  Toileting from toilet with Supervision for hygiene and clothing management.   Rolling to Bilateral with [place].   Supine to sit with [place].  Step transfer with Supervision  Toilet transfer to toilet with Supervision.    Evaluated pt and established OT POC. Continue OT as tolerated.  Rach Warner OT  12/20/2023    Outcome: Ongoing, Progressing

## 2023-12-20 NOTE — ASSESSMENT & PLAN NOTE
MELD-Na score calculated; MELD 3.0: 26 at 12/17/2023  7:44 PM  MELD-Na: 25 at 12/17/2023  7:44 PM  Calculated from:  Serum Creatinine: 2.4 mg/dL at 12/17/2023  7:44 PM  Serum Sodium: 125 mmol/L at 12/17/2023  7:44 PM  Total Bilirubin: 0.7 mg/dL (Using min of 1 mg/dL) at 12/17/2023  9:04 AM  Serum Albumin: 2.4 g/dL at 12/17/2023  9:04 AM  INR(ratio): 1.0 at 12/15/2023  2:16 PM  Age at listing (hypothetical): 68 years  Sex: Male at 12/17/2023  7:44 PM    - continues to use alcohol persistently  - AST > ALT x2 consistent with ETOH use.  - Trend hepatic function panel  - LFT's improving  - Appears well compensated on physical exam

## 2023-12-20 NOTE — PLAN OF CARE
Problem: Fluid and Electrolyte Imbalance (Acute Kidney Injury/Impairment)  Goal: Fluid and Electrolyte Balance  Outcome: Ongoing, Progressing     Problem: Infection  Goal: Absence of Infection Signs and Symptoms  Outcome: Ongoing, Progressing     Patient is awake and oriented x 4. Therapy session with PT OT, able to stand on the side of the bed with 2 assist and a walker. Bowel movement x 1. IV antibiotics given    Complained of headache 7/10. Given Tylenol.   Bed in lowest position. Patient belongings placed within patient reach.

## 2023-12-20 NOTE — PLAN OF CARE
PT evaluation completed- see note for details. PT POC and goals established.    Problem: Physical Therapy  Goal: Physical Therapy Goal  Description: Goals to be met by: 24     Patient will increase functional independence with mobility by performin. Supine to sit with Contact Guard Assistance  2. Sit to stand transfer with Contact Guard Assistance  3. Bed to chair transfer with Contact Guard Assistance using LRAD  4. Gait  x 200 feet with Contact Guard Assistance using LRAD.   5. Ascend/descend 15 stair with right Handrails Minimal Assistance using LRAD.   6. Lower extremity exercise program x30 reps per handout, with independence    Outcome: Ongoing, Progressing

## 2023-12-20 NOTE — ASSESSMENT & PLAN NOTE
67yo M w ho alcoholic cirrhosis and baseline hyponatremia admitted 12/15 with AMS. Work up concerning for alcohol withdrawal, aspiration pneumonia, and rhabdomyolysis with CK 4k. Nephrology has been consulted for ALESSANDRA with Cr of 2.3, 1.0 at baseline.   - UA with 2+ protein, 3+ blood, 3 RBCs, 13 WBCs, 21 granular casts  - Ucx with GNRs  - U Na < 10, U osm 504. U osm decrease to 265 after diuresis  - Urine microscopy with copious amounts of muddy brown granular casts  - RP U/S demonstrates no hydronephrosis, only noting the incidental finding of an echogenic focus within the spleen    Etiology likely ATN given muddy brown casts on microscopy. Likely due to rhabdomyolysis vs ischemia due to volume depletion. Possible component of infectious GN given GNRs on UA. Cr currently stable with improving UOP. Patient now closer to euvolemia on exam with notable UOP. Would recommend against further diuresis to reduce risk of developing a pre-renal injury alongside ATN. May be auto-diuresing in the setting of recovering ATN, as he remains to have high UOP off lasix.    Recommendations:  - No further diuresis needed at this time, unless rising concern for a fluid overload  - Monitor for hypernatremia, hypokalemia as a result of autodiuresis  - Place cardenas catheter to assist with measurement of urine output  - Daily Cr, strict intake and output  - Avoid nephrotoxic agents, renally dose meds  - No need for RRT at this time

## 2023-12-20 NOTE — SUBJECTIVE & OBJECTIVE
Interval History: Pt seen and examined this morning on rounds. CHUNGEON. Remains afebrile. Cr improving. Good UOP. Patient overall feeling better but still fatigued.    Objective:     Vital Signs (Most Recent):  Temp: 98.9 °F (37.2 °C) (12/20/23 1157)  Pulse: 95 (12/20/23 1157)  Resp: 18 (12/20/23 1157)  BP: (!) 141/66 (12/20/23 1157)  SpO2: 99 % (12/20/23 1157) Vital Signs (24h Range):  Temp:  [96.2 °F (35.7 °C)-98.9 °F (37.2 °C)] 98.9 °F (37.2 °C)  Pulse:  [89-98] 95  Resp:  [16-20] 18  SpO2:  [91 %-99 %] 99 %  BP: (135-166)/(66-74) 141/66     Weight: 112 kg (247 lb)  Body mass index is 30.87 kg/m².    Intake/Output Summary (Last 24 hours) at 12/20/2023 1253  Last data filed at 12/20/2023 1157  Gross per 24 hour   Intake --   Output 1746 ml   Net -1746 ml      Physical Exam  Gen: in NAD, appears stated age, central obesity  Neuro: AAOx4, CN2-12 grossly intact BL; motor, sensory, and strength grossly intact BL  HEENT: NTNC, EOMI, PERRLA, MMM; no thyromegaly or lymphadenopathy; no JVD appreciated  CVS: RRR, no m/r/g; S1/S2 auscultated with no S3 or S4; capillary refill < 2 sec  Resp: Wet cough, diminished breath sounds left lower lungfield. 2L NC. No belabored breathing or accessory muscle use appreciated   Abd: BS+ in all 4 quadrants; NTND, soft to palpation; no organomegaly appreciated   Extrem: Chronic venous stasis skin findings. Discoloration/bruising on Ue's BL. pulses full, equal, and regular over all 4 extremities; no UE or LE edema BL      MELD 3.0: 26 at 12/17/2023  7:44 PM  MELD-Na: 25 at 12/17/2023  7:44 PM  Calculated from:  Serum Creatinine: 2.4 mg/dL at 12/17/2023  7:44 PM  Serum Sodium: 125 mmol/L at 12/17/2023  7:44 PM  Total Bilirubin: 0.7 mg/dL (Using min of 1 mg/dL) at 12/17/2023  9:04 AM  Serum Albumin: 2.4 g/dL at 12/17/2023  9:04 AM  INR(ratio): 1.0 at 12/15/2023  2:16 PM  Age at listing (hypothetical): 68 years  Sex: Male at 12/17/2023  7:44 PM      Significant Labs:  CBC:  Recent Labs   Lab  "12/19/23 0324 12/20/23  0540   WBC 3.63* 5.33   HGB 12.9* 13.4*   HCT 35.4* 37.3*   * 161     CMP:  Recent Labs   Lab 12/19/23 0324 12/19/23  0904 12/19/23  1937 12/20/23  0540 12/20/23  1159   NA  --  135* 135*  --  136   K  --  2.9* 2.9*  --  3.1*   CL  --  99 95  --  96   CO2  --  25 28  --  30*   GLU  --  112* 126*  --  127*   BUN  --  43* 43*  --  46*   CREATININE  --  2.1* 2.2*  --  2.0*   CALCIUM  --  8.7 8.9  --  8.9   PROT 6.0  --   --  6.1  --    ALBUMIN 2.3*  --   --  2.3*  --    BILITOT 0.5  --   --  0.5  --    ALKPHOS 61  --   --  67  --    AST 83*  --   --  62*  --    ALT 60*  --   --  52*  --    ANIONGAP  --  11 12  --  10     PTINR:  No results for input(s): "INR" in the last 48 hours.      Significant Procedures:   Dobutamine Stress Test with Color Flow: No results found for this or any previous visit.  "

## 2023-12-20 NOTE — ASSESSMENT & PLAN NOTE
Na up to 135, improved from 120.   Urine Na initially < 10, consistent with dehydration vs low effective circulating volume. Consider also component of free water retention in setting of ESLD and ALESSANDRA.   Improving with IVF and diuresis. Slowly improving.    - Continue with 1.5L FR  - Correction by no more than 6-8 mEq in 24 hours  - Ok for daily BMP

## 2023-12-20 NOTE — SUBJECTIVE & OBJECTIVE
Interval History: No acute events overnight. sCr stable today at 2.2. UOP still elevated to 2.1L after only morning dose of IV lasix yesterday. Patient may be auto-diuresing.    Review of patient's allergies indicates:   Allergen Reactions    Zoloft [sertraline] Other (See Comments)     hyponatremia     Current Facility-Administered Medications   Medication Frequency    acetaminophen tablet 500 mg Q4H PRN    albuterol inhaler 2 puff Q6H PRN    aluminum-magnesium hydroxide-simethicone 200-200-20 mg/5 mL suspension 30 mL QID PRN    dextrose 10% bolus 125 mL 125 mL PRN    dextrose 10% bolus 250 mL 250 mL PRN    enoxaparin injection 40 mg Q24H (prophylaxis, 1700)    ertapenem (INVANZ) 1 g in sodium chloride 0.9 % 100 mL IVPB (MB+) Q24H    folic acid tablet 1 mg Daily    glucagon (human recombinant) injection 1 mg PRN    glucose chewable tablet 16 g PRN    glucose chewable tablet 24 g PRN    LORazepam tablet 2 mg Q4H PRN    melatonin tablet 6 mg Nightly PRN    multivitamin tablet Daily    naloxone 0.4 mg/mL injection 0.02 mg PRN    ondansetron injection 4 mg Q8H PRN    oxyCODONE immediate release tablet 5 mg Q6H PRN    sodium chloride 0.9% flush 10 mL Q12H PRN    thiamine tablet 100 mg Daily       Objective:     Vital Signs (Most Recent):  Temp: 98.9 °F (37.2 °C) (12/20/23 1157)  Pulse: 95 (12/20/23 1157)  Resp: 18 (12/20/23 1157)  BP: (!) 141/66 (12/20/23 1157)  SpO2: 99 % (12/20/23 1157) Vital Signs (24h Range):  Temp:  [96.2 °F (35.7 °C)-98.9 °F (37.2 °C)] 98.9 °F (37.2 °C)  Pulse:  [89-98] 95  Resp:  [16-20] 18  SpO2:  [91 %-99 %] 99 %  BP: (135-166)/(66-74) 141/66     Weight: 112 kg (247 lb) (12/16/23 0400)  Body mass index is 30.87 kg/m².  Body surface area is 2.43 meters squared.    I/O last 3 completed shifts:  In: 150 [P.O.:150]  Out: 4195 [Urine:4195]     Physical Exam  Vitals and nursing note reviewed.   Constitutional:       General: He is not in acute distress.     Appearance: Normal appearance. He is obese.  He is not ill-appearing.   HENT:      Head: Normocephalic and atraumatic.   Cardiovascular:      Rate and Rhythm: Normal rate and regular rhythm.      Heart sounds: Normal heart sounds. No murmur heard.  Pulmonary:      Effort: Pulmonary effort is normal. No respiratory distress.   Abdominal:      General: There is no distension.      Palpations: Abdomen is soft.      Tenderness: There is no abdominal tenderness.   Musculoskeletal:         General: Normal range of motion.      Cervical back: Normal range of motion. No rigidity.      Right lower leg: No edema.      Left lower leg: No edema.   Skin:     General: Skin is warm and dry.   Neurological:      General: No focal deficit present.      Mental Status: He is alert. Mental status is at baseline.   Psychiatric:         Mood and Affect: Mood normal.         Behavior: Behavior normal.          Significant Labs:  All labs within the past 24 hours have been reviewed.     Significant Imaging:  Labs: Reviewed

## 2023-12-20 NOTE — PROGRESS NOTES
[facility] ([place] [id number])  Nephrology  Progress Note    Patient Name: Froylan Borja  MRN: [id number]  Admission Date: 12/15/2023  Hospital Length of Stay: 5 days  Attending Provider: Moy Cantu MD   Primary Care Physician: Janki Tamez MD  Principal Problem:ALESSANDRA (acute kidney injury)    Subjective:     HPI: Patient is a 68 y.o. male presents for a new consultation for evaluation of elevated serum creatinine and hyponatremia. The patient was found to have an elevated serum creatinine during routine laboratory testing, at 2.3 mg/dL.      The patient has been admitted with confusion of acute onset. He has ESLD, HTN, HLD, diverticulosis. Admission revealed concerns for CAP and rhabdomyolysis. Found to have a serum  mEq/L.      The patient was taking NSAIDs or new antibiotics, recreational drugs, recent episode of dehydration, diarrhea, nausea or vomiting, acute illness, or exposure to IV radiocontrast.     Interval History: No acute events overnight. sCr stable today at 2.2. UOP still elevated to 2.1L after only morning dose of IV lasix yesterday. Patient may be auto-diuresing.    Review of patient's allergies indicates:   Allergen Reactions    Zoloft [sertraline] Other (See Comments)     hyponatremia     Current Facility-Administered Medications   Medication Frequency    acetaminophen tablet 500 mg Q4H PRN    albuterol inhaler 2 puff Q6H PRN    aluminum-magnesium hydroxide-simethicone 200-200-20 mg/5 mL suspension 30 mL QID PRN    dextrose 10% bolus 125 mL 125 mL PRN    dextrose 10% bolus 250 mL 250 mL PRN    enoxaparin injection 40 mg Q24H (prophylaxis, 1700)    ertapenem (INVANZ) 1 g in sodium chloride 0.9 % 100 mL IVPB (MB+) Q24H    folic acid tablet 1 mg Daily    glucagon (human recombinant) injection 1 mg PRN    glucose chewable tablet 16 g PRN    glucose chewable tablet 24 g PRN    LORazepam tablet 2 mg Q4H PRN    melatonin tablet 6 mg Nightly PRN    multivitamin tablet Daily     naloxone 0.4 mg/mL injection 0.02 mg PRN    ondansetron injection 4 mg Q8H PRN    oxyCODONE immediate release tablet 5 mg Q6H PRN    sodium chloride 0.9% flush 10 mL Q12H PRN    thiamine tablet 100 mg Daily       Objective:     Vital Signs (Most Recent):  Temp: 98.9 °F (37.2 °C) (12/20/23 1157)  Pulse: 95 (12/20/23 1157)  Resp: 18 (12/20/23 1157)  BP: (!) 141/66 (12/20/23 1157)  SpO2: 99 % (12/20/23 1157) Vital Signs (24h Range):  Temp:  [96.2 °F (35.7 °C)-98.9 °F (37.2 °C)] 98.9 °F (37.2 °C)  Pulse:  [89-98] 95  Resp:  [16-20] 18  SpO2:  [91 %-99 %] 99 %  BP: (135-166)/(66-74) 141/66     Weight: 112 kg (247 lb) (12/16/23 0400)  Body mass index is 30.87 kg/m².  Body surface area is 2.43 meters squared.    I/O last 3 completed shifts:  In: 150 [P.O.:150]  Out: 4195 [Urine:4195]     Physical Exam  Vitals and nursing note reviewed.   Constitutional:       General: He is not in acute distress.     Appearance: Normal appearance. He is obese. He is not ill-appearing.   HENT:      Head: Normocephalic and atraumatic.   Cardiovascular:      Rate and Rhythm: Normal rate and regular rhythm.      Heart sounds: Normal heart sounds. No murmur heard.  Pulmonary:      Effort: Pulmonary effort is normal. No respiratory distress.   Abdominal:      General: There is no distension.      Palpations: Abdomen is soft.      Tenderness: There is no abdominal tenderness.   Musculoskeletal:         General: Normal range of motion.      Cervical back: Normal range of motion. No rigidity.      Right lower leg: No edema.      Left lower leg: No edema.   Skin:     General: Skin is warm and dry.   Neurological:      General: No focal deficit present.      Mental Status: He is alert. Mental status is at baseline.   Psychiatric:         Mood and Affect: Mood normal.         Behavior: Behavior normal.          Significant Labs:  All labs within the past 24 hours have been reviewed.     Significant Imaging:  Labs: Reviewed  Assessment/Plan:      Renal/  * ALESSANDRA (acute kidney injury)  69yo M w ho alcoholic cirrhosis and baseline hyponatremia admitted 12/15 with AMS. Work up concerning for alcohol withdrawal, aspiration pneumonia, and rhabdomyolysis with CK 4k. Nephrology has been consulted for ALESSANDRA with Cr of 2.3, 1.0 at baseline.   - UA with 2+ protein, 3+ blood, 3 RBCs, 13 WBCs, 21 granular casts  - Ucx with GNRs  - U Na < 10, U osm 504. U osm decrease to 265 after diuresis  - Urine microscopy with copious amounts of muddy brown granular casts  - RP U/S demonstrates no hydronephrosis, only noting the incidental finding of an echogenic focus within the spleen    Etiology likely ATN given muddy brown casts on microscopy. Likely due to rhabdomyolysis vs ischemia due to volume depletion. Possible component of infectious GN given GNRs on UA. Cr currently stable with improving UOP. Patient now closer to euvolemia on exam with notable UOP. Would recommend against further diuresis to reduce risk of developing a pre-renal injury alongside ATN. May be auto-diuresing in the setting of recovering ATN, as he remains to have high UOP off lasix.    Recommendations:  - No further diuresis needed at this time, unless rising concern for a fluid overload  - Monitor for hypernatremia, hypokalemia as a result of autodiuresis  - Place cardenas catheter to assist with measurement of urine output  - Daily Cr, strict intake and output  - Avoid nephrotoxic agents, renally dose meds  - No need for RRT at this time    Hyponatremia  Na up to 135, improved from 120.   Urine Na initially < 10, consistent with dehydration vs low effective circulating volume. Consider also component of free water retention in setting of ESLD and ALESSANDRA.   Improving with IVF and diuresis. Slowly improving.    - Continue with 1.5L FR  - Correction by no more than 6-8 mEq in 24 hours  - Ok for daily BMP        Thank you for your consult. I will follow-up with patient. Please contact us if you have any  additional questions.    Chuy Silverio MD  Nephrology  [facility] ([facility]

## 2023-12-20 NOTE — ASSESSMENT & PLAN NOTE
History of severe hyponatremia requiring ICU. From chart review appears largely secondary to beer potemania in the past  - Na 120 on presentation, downtrended to 118 overnight  - Nephrology was consulted.   - S/p IVF and IV diuresis with improvement to Na  - IV lasix DC'd  - neurochecks q4h  - seizure precuations  - further management pending clinical course and future study review    -Na improved to 136

## 2023-12-20 NOTE — PT/OT/SLP EVAL
Physical Therapy Co-Evaluation and Co-Treatment    Patient Name:  Froylan Borja   MRN:  [id number]  Admit Date: 12/15/2023  Admitting Diagnosis:  ALESSANDRA (acute kidney injury)   Length of Stay: 5 days  Recent Surgery: * No surgery found *      Recommendations:     Discharge Recommendations:    Moderate Intensity Therapy   Discharge Equipment Recommendations: walker, elsy, wheelchair, bedside commode   Barriers to discharge: Inaccessible home, Decreased caregiver support, and increased need for caregiver assistance    Plan:     During this hospitalization, patient to be seen 3 x/week to address the identified rehab impairments via gait training, therapeutic activities, therapeutic exercises, neuromuscular re-education and progress towards the established goals.  Plan of Care Expires:  01/20/24  Plan of Care Reviewed with: patient    Assessment:     Froylan Borja is a 68 y.o. male admitted with a medical diagnosis of ALESSANDRA (acute kidney injury). Pt agreeable to therapy evaluation. Pt reports Ind PTA. Pt presents with significant LE weakness, decreased activity tolerance and endurance, and impaired balance resulting in increased need for assistance with bed mobility, sit to stand transfers and gait. Patient unsteady with use of RW during gait trial. Patient is not functioning at his baseline and is a fall risk. Pt has 15 steps to enter his home and is unsafe to perform this at this time. Patient would benefit from skilled therapy services to maximize safety and independence, increase activity tolerance, decrease fall risk, decrease caregiver burden, improve QOL, improve patient's functional mobility, and decrease risk of contractures and pressure sores. Patient currently demonstrates a need for moderate intensity therapy on a daily basis post acute secondary to a decline in functional status due to illness         Problem List: weakness, impaired endurance, impaired self care skills, impaired functional mobility, gait instability,  "impaired balance, decreased upper extremity function, decreased lower extremity function, decreased safety awareness, impaired cardiopulmonary response to activity, decreased ROM.  Rehab Prognosis: Good; patient would benefit from acute skilled PT services to address these deficits and reach maximum level of function.      Goals:   Multidisciplinary Problems       Physical Therapy Goals          Problem: Physical Therapy    Goal Priority Disciplines Outcome Goal Variances Interventions   Physical Therapy Goal     PT, PT/OT Ongoing, Progressing     Description: Goals to be met by: 24     Patient will increase functional independence with mobility by performin. Supine to sit with Contact Guard Assistance  2. Sit to stand transfer with Contact Guard Assistance  3. Bed to chair transfer with Contact Guard Assistance using LRAD  4. Gait  x 200 feet with Contact Guard Assistance using LRAD.   5. Ascend/descend 15 stair with right Handrails Minimal Assistance using LRAD.   6. Lower extremity exercise program x30 reps per handout, with independence                         Subjective     RN notified prior to session. NO one present upon PT entrance into room. Patient agreeable to PT evaluation.    Chief Complaint: "I havent been mobilized in a week I'm very weak"  Patient/Family Comments/goals: increase strength  Pain/Comfort:  Pain Rating 1: 0/10  Pain Rating Post-Intervention 1: 0/10    Social History:  Residence: lives with their spouse  2nd level apartment with 15 YAMILET and R HR, no elevator access. Pt's bathroom has a walk in shower.  Support available: none, has not needed  Equipment Owned (not using): none  Equipment Used: None  Prior level of function: independent; pt has to assist wife with household duties       Patient reports they will have assistance from no one upon discharge.    Objective:     Additional staff present: OT; OT for co-evaluation due to suspected patient need for two skilled therapists " "to appropriately assess patient's functional deficits as well as ensure patient safety, accommodate for limited activity tolerance, and provide appropriate, skilled assistance to maximize functional potential during evaluation.    Patient found supine with: telemetry, peripheral IV, oxygen     General Precautions: Standard, Cardiac fall   Orthopedic Precautions:N/A   Braces: N/A   Body mass index is 30.87 kg/m².  Oxygen Device: Nasal Cannula 2L (pt found with NC around head no on pt, encouraged pt to wear NC during therapy so re-applied NC)  Vitals: BP (!) 141/66 (Patient Position: Lying)   Pulse 95   Temp 98.9 °F (37.2 °C) (Oral)   Resp 18   Ht 6' 3" (1.905 m)   Wt 112 kg (247 lb)   SpO2 99%   BMI 30.87 kg/m²       Exams:    Cognition:  Alert, Distractible, and Cooperative  Command following: Follows two-step verbal commands  Communication: clear/fluent    Sensation:   Light touch sensation: Intact BLEs    Gross Motor Coordination: No deficits noted during functional mobility tasks     Edema/Skin Integrity: None noted; Visible skin intact    Postural examination/scapula alignment: Rounded shoulder and Head forward    Lower Extremity Range of Motion:  Right Lower Extremity: WFL  Left Lower Extremity: WFL    Lower Extremity Strength:  Right Lower Extremity: hip flexors 3/5; knee extensors 4/5, ankle DF 4/5  Left Lower Extremity: hip flexors 3/5; knee extensors 4/5, ankle DF 4/5      Functional Mobility:    Bed Mobility:  Rolling to right with minimum assistance  Rolling to left with minimum assistance  Scooting with moderate assistance  Supine > Sit with maximal assistance and 2 persons  Sit > Supine with moderate assistance  Increased bed mobility performed to assist pt with pericare    Transfers:   Sit <> Stand Transfer: Moderate Assistance and of 2 persons  x 1 trials from EOB with RW    Bed <> Chair: Not performed 2/2 pt safety             Gait:  Distance: 6 side steps along EOB  Assistance level: Moderate " Assistance and of 2 persons  Assistive Device: rolling walker  Gait Deviation(s): occasional unsteady gait, decreased step length, wide base of support, decreased weight shift, decreased foot clearance, flexed posture, and decreased leisa  all lines remained intact throughout ambulation trial  Comments: Pt required VC/TC for glut extension to reach full stand. Pt with decreased foot clearance that slightly improved with VC. Pt unsteady throughout attempt.    Stairs:  Not performed 2/2 pt safety    Balance:  Sitting Balance  Static: stand by assistance; pt initially requiring mod A once feet on floor SBA  Time: 20 minutes EOB  Comment: Pt completed strength testing and ADLs with OT while sitting EOB  Dynamic: stand by assistance and contact guard assistance  Standing:  Static: moderate assistance and of 2 persons  Dynamic: moderate assistance and of 2 persons    Outcome Measures:  AM-PAC 6 CLICK MOBILITY  Turning over in bed (including adjusting bedclothes, sheets and blankets)?: 3  Sitting down on and standing up from a chair with arms (e.g., wheelchair, bedside commode, etc.): 2  Moving from lying on back to sitting on the side of the bed?: 2  Moving to and from a bed to a chair (including a wheelchair)?: 2  Need to walk in hospital room?: 2  Climbing 3-5 steps with a railing?: 1  Basic Mobility Total Score: 12     Therapeutic Exercises:   Patient educated on and demo'd x2 reps heel slides and ankle pumps to perform while in bed. Encouraged pt to perform x10 reps 3x/day    Education:  Time provided for education and discussion of pt's functional status  All questions answered within PT scope of practice  PT role and POC  Educated on TE and encouraged to perform    Patient left HOB elevated with all lines intact, call button in reach, and RN notified.      History:     Past Medical History:   Diagnosis Date    Alcohol abuse     Anxiety     Cirrhosis     Glaucoma     History of hepatitis C, s/p successful Rx w/  SVR24 - 1/2017     genotype 1;  relapse following PegIFN+RBV+Victrelis; prior relapse following PegIFN+RBV S/p 12 weeks harvoni + RBV w/ SVR    Hypertension     Paresthesia     feet, bilaterally       Past Surgical History:   Procedure Laterality Date    LIVER BIOPSY         Family History   Problem Relation Age of Onset    Diabetes Mellitus Father     Hypertension Father     Cancer Father         ? CRC    Diabetes Father     Cancer Mother         colon vs polyps, colectomy    Colon cancer Mother     Cancer Sister         ? CRC    Colonic polyp Brother     Cirrhosis Neg Hx        Social History     Socioeconomic History    Marital status:    Tobacco Use    Smoking status: Some Days     Types: Cigars    Smokeless tobacco: Never    Tobacco comments:     smokes cigars 20 per month   Substance and Sexual Activity    Alcohol use: Yes     Comment: heavy alcohol use in the past, now drinking daily, 2-3 beers daily    Drug use: No   Social History Narrative     (mental health issues), retired  at [employer]. No kids. 2 dogs. No exercise.     Social Determinants of Health     Financial Resource Strain: Low Risk  (12/18/2023)    Overall Financial Resource Strain (CARDIA)     Difficulty of Paying Living Expenses: Not hard at all   Food Insecurity: No Food Insecurity (12/18/2023)    Hunger Vital Sign     Worried About Running Out of Food in the Last Year: Never true     Ran Out of Food in the Last Year: Never true   Transportation Needs: No Transportation Needs (12/18/2023)    PRAPARE - Transportation     Lack of Transportation (Medical): No     Lack of Transportation (Non-Medical): No   Physical Activity: Inactive (12/18/2023)    Exercise Vital Sign     Days of Exercise per Week: 0 days     Minutes of Exercise per Session: 0 min   Stress: Stress Concern Present (12/18/2023)    [employer] [place] [employer] - Occupational Stress Questionnaire     Feeling of Stress : To some extent   Social  Connections: Socially Isolated (12/18/2023)    Social Connection and Isolation Panel [NHANES]     Frequency of Communication with Friends and Family: Once a week     Frequency of Social Gatherings with Friends and Family: Once a week     Attends Muslim Services: Never     Active Member of Clubs or Organizations: No     Attends Club or Organization Meetings: Never     Marital Status:    Housing Stability: Low Risk  (12/18/2023)    Housing Stability Vital Sign     Unable to Pay for Housing in the Last Year: No     Number of Places Lived in the Last Year: 1     Unstable Housing in the Last Year: No       Time Tracking:     PT Received On: 12/20/23  PT Start Time: 1113     PT Stop Time: 1151  PT Total Time (min): 38 min     Billable Minutes: Evaluation 10 minutes and Therapeutic Activity 28 minutes    12/20/2023

## 2023-12-20 NOTE — PLAN OF CARE
[facility] - Intensive Care ([facility])  Discharge Reassessment    Primary Care Provider: Janki Tamez MD    Expected Discharge Date: 12/24/2023    Reassessment (most recent)       Discharge Reassessment - 12/20/23 1531          Discharge Reassessment    Assessment Type Discharge Planning Reassessment (P)      Did the patient's condition or plan change since previous assessment? No (P)      Discharge Plan discussed with: Patient (P)      Communicated KENNETH with patient/caregiver No (P)      Discharge Plan A Skilled Nursing Facility (P)      Discharge Plan B Home Health (P)      DME Needed Upon Discharge  other (see comments) (P)    tbd    Transition of Care Barriers None (P)      Why the patient remains in the hospital Requires continued medical care (P)         Post-Acute Status    Post-Acute Authorization Placement (P)      Post-Acute Placement Status Referrals Sent (P)      Coverage [employer] (P)      Discharge Delays None known at this time (P)                  Met with pt to review discharge recommendation of SNF and is agreeable to plan    Patient/family provided list of facilities in-network with patient's payor plan. Providers that are owned, operated, or affiliated with [facility] are included on the list.     Notified that referral sent to below listed facilities from in-network list based on proximity to home/family support:   1.[facility], [facility], [facility], [facility], [facility], [facility]  2.  3.  4.  5. (can send more than 5)    Patient/family instructed to identify preference.    Preferred Facility: (if more than 1, listed in order of descending preference)  No preference.    If an additional preferred facility not listed above is identified, additional referral to be sent. If above facilities unable to accept, will send additional referrals to in-network providers.     CM submitted for [employer] auth via [employer].  CM called in [employer], faxed PasRR, uploaded PasRR to  CP.    Janelle Eugene, BSN, BS, RN, CCM

## 2023-12-20 NOTE — ASSESSMENT & PLAN NOTE
- Interval history and physical exam findings as described above  - UA findings reviewed  - UCx growing ESBL E coli  - Blood cultures NGTD  - Transitioned Zosyn to Ertapenem 12/18

## 2023-12-20 NOTE — ASSESSMENT & PLAN NOTE
- Creatinine 2.3 on presentation ; baseline 1.0  - suspect secondary to dehydration with severe alcohol use/dependence and inadequate water/hydration  - Nephrology consulted, urine spun and multiple muddy brown casts indicative of ATN  - S/p IV diuresis for 2-3 days, now off diuretics as of 12/19  - strict I/Os  - avoid nephrotoxic agents as appropriate  - continue to monitor  - Renal US without hydronephrosis  - Cr continues to improve

## 2023-12-21 PROBLEM — R53.81 DEBILITY: Status: ACTIVE | Noted: 2023-12-21

## 2023-12-21 LAB
ALBUMIN SERPL BCP-MCNC: 2.4 G/DL (ref 3.5–5.2)
ALP SERPL-CCNC: 73 U/L (ref 55–135)
ALT SERPL W/O P-5'-P-CCNC: 47 U/L (ref 10–44)
ANION GAP SERPL CALC-SCNC: 9 MMOL/L (ref 8–16)
AST SERPL-CCNC: 54 U/L (ref 10–40)
BACTERIA BLD CULT: NORMAL
BACTERIA BLD CULT: NORMAL
BASOPHILS # BLD AUTO: 0.05 K/UL (ref 0–0.2)
BASOPHILS NFR BLD: 0.7 % (ref 0–1.9)
BILIRUB DIRECT SERPL-MCNC: 0.2 MG/DL (ref 0.1–0.3)
BILIRUB SERPL-MCNC: 0.5 MG/DL (ref 0.1–1)
BUN SERPL-MCNC: 47 MG/DL (ref 8–23)
CALCIUM SERPL-MCNC: 9.1 MG/DL (ref 8.7–10.5)
CHLORIDE SERPL-SCNC: 100 MMOL/L (ref 95–110)
CO2 SERPL-SCNC: 28 MMOL/L (ref 23–29)
CREAT SERPL-MCNC: 1.7 MG/DL (ref 0.5–1.4)
DIFFERENTIAL METHOD BLD: ABNORMAL
EOSINOPHIL # BLD AUTO: 0.1 K/UL (ref 0–0.5)
EOSINOPHIL NFR BLD: 1.1 % (ref 0–8)
ERYTHROCYTE [DISTWIDTH] IN BLOOD BY AUTOMATED COUNT: 14.3 % (ref 11.5–14.5)
EST. GFR  (NO RACE VARIABLE): 43.4 ML/MIN/1.73 M^2
GLUCOSE SERPL-MCNC: 120 MG/DL (ref 70–110)
HCT VFR BLD AUTO: 39.1 % (ref 40–54)
HGB BLD-MCNC: 13.8 G/DL (ref 14–18)
IMM GRANULOCYTES # BLD AUTO: 0.16 K/UL (ref 0–0.04)
IMM GRANULOCYTES NFR BLD AUTO: 2.2 % (ref 0–0.5)
LYMPHOCYTES # BLD AUTO: 0.6 K/UL (ref 1–4.8)
LYMPHOCYTES NFR BLD: 8.3 % (ref 18–48)
MCH RBC QN AUTO: 32.2 PG (ref 27–31)
MCHC RBC AUTO-ENTMCNC: 35.3 G/DL (ref 32–36)
MCV RBC AUTO: 91 FL (ref 82–98)
MONOCYTES # BLD AUTO: 0.9 K/UL (ref 0.3–1)
MONOCYTES NFR BLD: 11.8 % (ref 4–15)
NEUTROPHILS # BLD AUTO: 5.6 K/UL (ref 1.8–7.7)
NEUTROPHILS NFR BLD: 75.9 % (ref 38–73)
NRBC BLD-RTO: 0 /100 WBC
PLATELET # BLD AUTO: 211 K/UL (ref 150–450)
PMV BLD AUTO: 9.4 FL (ref 9.2–12.9)
POTASSIUM SERPL-SCNC: 3.8 MMOL/L (ref 3.5–5.1)
PROT SERPL-MCNC: 6.4 G/DL (ref 6–8.4)
RBC # BLD AUTO: 4.28 M/UL (ref 4.6–6.2)
SODIUM SERPL-SCNC: 137 MMOL/L (ref 136–145)
WBC # BLD AUTO: 7.38 K/UL (ref 3.9–12.7)

## 2023-12-21 PROCEDURE — 36415 COLL VENOUS BLD VENIPUNCTURE: CPT | Mod: HCNC | Performed by: INTERNAL MEDICINE

## 2023-12-21 PROCEDURE — 25000003 PHARM REV CODE 250: Mod: HCNC | Performed by: INTERNAL MEDICINE

## 2023-12-21 PROCEDURE — 25000003 PHARM REV CODE 250: Mod: HCNC | Performed by: FAMILY MEDICINE

## 2023-12-21 PROCEDURE — 63600175 PHARM REV CODE 636 W HCPCS: Mod: HCNC | Performed by: INTERNAL MEDICINE

## 2023-12-21 PROCEDURE — 21400001 HC TELEMETRY ROOM: Mod: HCNC

## 2023-12-21 PROCEDURE — 80048 BASIC METABOLIC PNL TOTAL CA: CPT | Mod: HCNC | Performed by: INTERNAL MEDICINE

## 2023-12-21 PROCEDURE — 80076 HEPATIC FUNCTION PANEL: CPT | Mod: HCNC | Performed by: INTERNAL MEDICINE

## 2023-12-21 PROCEDURE — 85025 COMPLETE CBC W/AUTO DIFF WBC: CPT | Mod: HCNC | Performed by: INTERNAL MEDICINE

## 2023-12-21 RX ADMIN — ENOXAPARIN SODIUM 40 MG: 40 INJECTION SUBCUTANEOUS at 04:12

## 2023-12-21 RX ADMIN — FOLIC ACID 1 MG: 1 TABLET ORAL at 08:12

## 2023-12-21 RX ADMIN — THERA TABS 1 TABLET: TAB at 08:12

## 2023-12-21 RX ADMIN — Medication 100 MG: at 08:12

## 2023-12-21 RX ADMIN — ERTAPENEM 1 G: 1 INJECTION INTRAMUSCULAR; INTRAVENOUS at 10:12

## 2023-12-21 NOTE — PLAN OF CARE
Patient's renal function following expected course of recovery post-ATN. sCr down to 1.7 with acceptable urine output. We will be signing off today. Please feel free to reach out to our service with any questions or given any change in the clinical picture. Need sOPT follow up with nephrology     Chuy Silverio MD - PGY1  Department of Nephrology  [facility]

## 2023-12-21 NOTE — PROGRESS NOTES
[facility] - Intensive Care ([address] Medicine  Progress Note    Patient Name: Froylan Borja  MRN: [id number]  Patient Class: IP- Inpatient   Admission Date: 12/15/2023  Length of Stay: 6 days  Attending Physician: Pardeep Torres MD  Primary Care Provider: Janki Tamez MD        Subjective:     Principal Problem:ALESSANDRA (acute kidney injury)        HPI:  This is a 69yo male with a past medical history of alcohol abuse, depression, hyponatremia severe requiring prior MICU admit, HTN, HLD, cirrhosis, diverticulosis who presents to the ED with AMS. Patient reportedly at the bar with his wife earlier today and became confused and unsteady and difficulty ambulating. Per chart review police called because of beligerance and were concerned about stroke symptoms and brought patient to ED for further evaluation. Patient alert and oriented to person and aware in hospital. Unable to contribute meaningfully to history but does admit heavy daily alcohol use.     In the ED patient initially afebrile, and hemodynamically stable saturating well on room air at rest. WBC 10.1 with leukocytosis. Na 120. Creatinine 2.3 (baseline 1.0). CPK 3960. CXR with concern for aspiration pneumonia. Patient diaphoretic, tremulous, tachycardic concerning for developing withdrawal. Started on IVFs, benzodiazepines, and abx for rhabdo, hyponatremia, aspiration pna, alcohol withdrawal. Admitted to the care of medicine for further evaluation and management.     Overview/Hospital Course:  Patient admitted to [facility] for AMS with severe hyponatremia in setting of ETOH abuse, ETOH withdrawals, ALESSANDRA, and aspiration pneumonia. He was started on Vanc/zosyn for aspiration pneumonia. Received IVF bolus and continued on maintenance IVF, Nephrology consulted for hyponatremia and ALESSANDRA. Urine was spun muddy brown casts observed indicative of ATN. Nephrology recommending NS continued @ 150ml/hr and lasix 160mg IV BID for sodium excretion. Na slowing improving at  appropriate rate. Blood cultures NGTD. UA growing GNR's. MRSA swab pending. Patient continues on Vanc/zosyn. Platelets decreased from 140 -> 112 -> 86. Likely secondary to infection however was receiving subcutaneous heparin. HIT panel sent and transitioned to lovenox. Continues to have symptoms of alcohol withdrawal and continued on CIWA and scheduled/PRN benzodiazepines. UA growing ESBL E coli and Dc'd zosyn and started ertapenem. Vanc discontinued. Urine output remained good with diuresis, Cr stable. IV lasix Dc'd per nephrology recommendations. Sodium improved to 135. PT/OT consulted.    Interval History:  No issues reported by nursing staff.  Patient awaiting skilled nursing facility placement.  Kidney functions improving and Nephrology signed off    Review of Systems   Unable to perform ROS: Other     Objective:     Vital Signs (Most Recent):  Temp: 98 °F (36.7 °C) (12/21/23 1135)  Pulse: 98 (12/21/23 1200)  Resp: 18 (12/21/23 1135)  BP: (!) 143/67 (12/21/23 1135)  SpO2: 96 % (12/21/23 1135) Vital Signs (24h Range):  Temp:  [96.6 °F (35.9 °C)-98 °F (36.7 °C)] 98 °F (36.7 °C)  Pulse:  [] 98  Resp:  [18-19] 18  SpO2:  [96 %-100 %] 96 %  BP: (132-148)/(65-74) 143/67     Weight: 112 kg (247 lb)  Body mass index is 30.87 kg/m².    Intake/Output Summary (Last 24 hours) at 12/21/2023 1357  Last data filed at 12/21/2023 1105  Gross per 24 hour   Intake 315 ml   Output --   Net 315 ml      Physical Exam  Constitutional:       General: He is not in acute distress.     Appearance: He is obese.   HENT:      Head: Normocephalic.      Right Ear: External ear normal.      Left Ear: External ear normal.      Nose: Nose normal.      Comments: Nasal cannula  Eyes:      General: No scleral icterus.  Cardiovascular:      Rate and Rhythm: Normal rate.   Pulmonary:      Effort: Pulmonary effort is normal.   Abdominal:      Palpations: Abdomen is soft.      Tenderness: There is no abdominal tenderness.   Genitourinary:      "Comments: Condom catheter  Musculoskeletal:      Comments: Debility   Skin:     General: Skin is warm.   Neurological:      Mental Status: He is alert. Mental status is at baseline.         MELD 3.0: 26 at 12/17/2023  7:44 PM  MELD-Na: 25 at 12/17/2023  7:44 PM  Calculated from:  Serum Creatinine: 2.4 mg/dL at 12/17/2023  7:44 PM  Serum Sodium: 125 mmol/L at 12/17/2023  7:44 PM  Total Bilirubin: 0.7 mg/dL (Using min of 1 mg/dL) at 12/17/2023  9:04 AM  Serum Albumin: 2.4 g/dL at 12/17/2023  9:04 AM  INR(ratio): 1.0 at 12/15/2023  2:16 PM  Age at listing (hypothetical): 68 years  Sex: Male at 12/17/2023  7:44 PM      Significant Labs:  CBC:  Recent Labs   Lab 12/20/23  0540 12/21/23  0421   WBC 5.33 7.38   HGB 13.4* 13.8*   HCT 37.3* 39.1*    211     CMP:  Recent Labs   Lab 12/19/23  1937 12/20/23  0540 12/20/23  1159 12/21/23  0421   *  --  136 137   K 2.9*  --  3.1* 3.8   CL 95  --  96 100   CO2 28  --  30* 28   *  --  127* 120*   BUN 43*  --  46* 47*   CREATININE 2.2*  --  2.0* 1.7*   CALCIUM 8.9  --  8.9 9.1   PROT  --  6.1  --  6.4   ALBUMIN  --  2.3*  --  2.4*   BILITOT  --  0.5  --  0.5   ALKPHOS  --  67  --  73   AST  --  62*  --  54*   ALT  --  52*  --  47*   ANIONGAP 12  --  10 9     PTINR:  No results for input(s): "INR" in the last 48 hours.    Significant Procedures:   Dobutamine Stress Test with Color Flow: No results found for this or any previous visit.        Assessment/Plan:      * ALESSANDRA (acute kidney injury)  - Creatinine 2.3 on presentation ; baseline 1.0  - suspect secondary to dehydration with severe alcohol use/dependence and inadequate water/hydration  - Nephrology consulted, urine spun and multiple muddy brown casts indicative of ATN  - S/p IV diuresis for 2-3 days, now off diuretics as of 12/19  - strict I/Os  - avoid nephrotoxic agents as appropriate  - continue to monitor  - Renal US without hydronephrosis  - Cr continues to improve    12/21/23  Kidney functions improving " and Nephrology signed off.  Continue with oral intake    Acute cystitis  - Interval history and physical exam findings as described above  - UA findings reviewed  - UCx growing ESBL E coli  - Blood cultures NGTD  - Transitioned Zosyn to Ertapenem 12/18 12/21/23  Continue ertapenem.  Final day of treatment tomorrow      Debility  For skilled nursing facility placement    Thrombocytopenia  Patient was found to have thrombocytopenia, the likely etiology is secondary to sepsis/infection, will monitor the platelets Daily. Will transfuse if platelet count is <10k. Hold DVT prophylaxis if platelets are <50k. The patient's platelet results have been reviewed and are listed below.  Recent Labs   Lab 12/20/23  0540          - Platelets decreased from Likely secondary to infection however was receiving subcutaneous heparin. HIT negative.   - Improving    Hypokalemia  - Replete PRN  - CTM    Rhabdomyolysis  - CPK 4k on presentation  - Encourage PO hydration  - Cr improving  - Nephrology following  - monitor kidney function  - CK improving    Aspiration pneumonia  - developed fever shortly after admission  ;  Tmax 103  - CXR with concern for aspiration pna left basilar lungfield  - Growing ESBL E coli in urine, zosyn Dc'd.   - Started on Ertapenem 12/18  - Vanc DC'd  - Blood cultures NGTD  - further management pending clinical course and future study review    Depression  - holding home meds for now as seems he hasn't been taking for some time    Hyponatremia  History of severe hyponatremia requiring ICU. From chart review appears largely secondary to beer potemania in the past  - Na 120 on presentation, downtrended to 118 overnight  - Nephrology was consulted.   - S/p IVF and IV diuresis with improvement to Na  - IV lasix DC'd  - neurochecks q4h  - seizure precuations  - further management pending clinical course and future study review    -Na improved to 136      Alcohol use disorder, severe, dependence  - CIWA  q4h  - ativan IV 2mg prn for CIWA > 8  - daily thiamine/FA/MV  - would benefit from addiction psych eval once clinically appropriate    Cirrhosis  MELD-Na score calculated; MELD 3.0: 26 at 12/17/2023  7:44 PM  MELD-Na: 25 at 12/17/2023  7:44 PM  Calculated from:  Serum Creatinine: 2.4 mg/dL at 12/17/2023  7:44 PM  Serum Sodium: 125 mmol/L at 12/17/2023  7:44 PM  Total Bilirubin: 0.7 mg/dL (Using min of 1 mg/dL) at 12/17/2023  9:04 AM  Serum Albumin: 2.4 g/dL at 12/17/2023  9:04 AM  INR(ratio): 1.0 at 12/15/2023  2:16 PM  Age at listing (hypothetical): 68 years  Sex: Male at 12/17/2023  7:44 PM    - continues to use alcohol persistently  - AST > ALT x2 consistent with ETOH use.  - Trend hepatic function panel  - LFT's improving  - Appears well compensated on physical exam      VTE Risk Mitigation (From admission, onward)           Ordered     enoxaparin injection 40 mg  Every 24 hours         12/17/23 1239     IP VTE HIGH RISK PATIENT  Once         12/15/23 1817     Place sequential compression device  Until discontinued         12/15/23 1817                    Discharge Planning   KENNETH: 12/24/2023     Code Status: Full Code   Is the patient medically ready for discharge?: No    Reason for patient still in hospital (select all that apply): Patient trending condition  Discharge Plan A: Skilled Nursing Facility   Discharge Delays: None known at this time              Pardeep Torres MD  Department of Hospital Medicine   [facility] - Intensive Care ([place])

## 2023-12-21 NOTE — ASSESSMENT & PLAN NOTE
- Interval history and physical exam findings as described above  - UA findings reviewed  - UCx growing ESBL E coli  - Blood cultures NGTD  - Transitioned Zosyn to Ertapenem 12/18 12/21/23  Continue ertapenem.  Final day of treatment tomorrow

## 2023-12-21 NOTE — ASSESSMENT & PLAN NOTE
- Creatinine 2.3 on presentation ; baseline 1.0  - suspect secondary to dehydration with severe alcohol use/dependence and inadequate water/hydration  - Nephrology consulted, urine spun and multiple muddy brown casts indicative of ATN  - S/p IV diuresis for 2-3 days, now off diuretics as of 12/19  - strict I/Os  - avoid nephrotoxic agents as appropriate  - continue to monitor  - Renal US without hydronephrosis  - Cr continues to improve    12/21/23  Kidney functions improving and Nephrology signed off.  Continue with oral intake

## 2023-12-21 NOTE — PLAN OF CARE
CM uploaded 142 to CP.  The following agencies have denied SNF:    [facility], [facility], [facility].    Pt has [employer] auth # [id number] certified in total.    Janelle Eugene, GENNYN, BS, RN, CCM

## 2023-12-22 PROBLEM — D69.6 THROMBOCYTOPENIA: Status: RESOLVED | Noted: 2023-12-17 | Resolved: 2023-12-22

## 2023-12-22 PROBLEM — E87.6 HYPOKALEMIA: Status: RESOLVED | Noted: 2023-12-17 | Resolved: 2023-12-22

## 2023-12-22 LAB
ALBUMIN SERPL BCP-MCNC: 2.4 G/DL (ref 3.5–5.2)
ALP SERPL-CCNC: 77 U/L (ref 55–135)
ALT SERPL W/O P-5'-P-CCNC: 40 U/L (ref 10–44)
ANION GAP SERPL CALC-SCNC: 13 MMOL/L (ref 8–16)
AST SERPL-CCNC: 45 U/L (ref 10–40)
BASOPHILS # BLD AUTO: 0.08 K/UL (ref 0–0.2)
BASOPHILS NFR BLD: 1 % (ref 0–1.9)
BILIRUB DIRECT SERPL-MCNC: 0.3 MG/DL (ref 0.1–0.3)
BILIRUB SERPL-MCNC: 0.5 MG/DL (ref 0.1–1)
BUN SERPL-MCNC: 50 MG/DL (ref 8–23)
CALCIUM SERPL-MCNC: 9.2 MG/DL (ref 8.7–10.5)
CHLORIDE SERPL-SCNC: 101 MMOL/L (ref 95–110)
CO2 SERPL-SCNC: 26 MMOL/L (ref 23–29)
CREAT SERPL-MCNC: 1.4 MG/DL (ref 0.5–1.4)
DIFFERENTIAL METHOD BLD: ABNORMAL
EOSINOPHIL # BLD AUTO: 0.1 K/UL (ref 0–0.5)
EOSINOPHIL NFR BLD: 1.2 % (ref 0–8)
ERYTHROCYTE [DISTWIDTH] IN BLOOD BY AUTOMATED COUNT: 14.1 % (ref 11.5–14.5)
EST. GFR  (NO RACE VARIABLE): 54.7 ML/MIN/1.73 M^2
GLUCOSE SERPL-MCNC: 109 MG/DL (ref 70–110)
HCT VFR BLD AUTO: 44.2 % (ref 40–54)
HGB BLD-MCNC: 14.2 G/DL (ref 14–18)
IMM GRANULOCYTES # BLD AUTO: 0.16 K/UL (ref 0–0.04)
IMM GRANULOCYTES NFR BLD AUTO: 2 % (ref 0–0.5)
LYMPHOCYTES # BLD AUTO: 0.9 K/UL (ref 1–4.8)
LYMPHOCYTES NFR BLD: 11.4 % (ref 18–48)
MCH RBC QN AUTO: 33.3 PG (ref 27–31)
MCHC RBC AUTO-ENTMCNC: 32.1 G/DL (ref 32–36)
MCV RBC AUTO: 104 FL (ref 82–98)
MONOCYTES # BLD AUTO: 1 K/UL (ref 0.3–1)
MONOCYTES NFR BLD: 12.3 % (ref 4–15)
NEUTROPHILS # BLD AUTO: 5.8 K/UL (ref 1.8–7.7)
NEUTROPHILS NFR BLD: 72.1 % (ref 38–73)
NRBC BLD-RTO: 0 /100 WBC
PLATELET # BLD AUTO: 227 K/UL (ref 150–450)
PMV BLD AUTO: 10.3 FL (ref 9.2–12.9)
POTASSIUM SERPL-SCNC: 4 MMOL/L (ref 3.5–5.1)
PROT SERPL-MCNC: 6.6 G/DL (ref 6–8.4)
RBC # BLD AUTO: 4.26 M/UL (ref 4.6–6.2)
SODIUM SERPL-SCNC: 140 MMOL/L (ref 136–145)
WBC # BLD AUTO: 8.08 K/UL (ref 3.9–12.7)

## 2023-12-22 PROCEDURE — 97535 SELF CARE MNGMENT TRAINING: CPT | Mod: HCNC,CO

## 2023-12-22 PROCEDURE — 63600175 PHARM REV CODE 636 W HCPCS: Mod: HCNC | Performed by: INTERNAL MEDICINE

## 2023-12-22 PROCEDURE — 25000003 PHARM REV CODE 250: Mod: HCNC | Performed by: INTERNAL MEDICINE

## 2023-12-22 PROCEDURE — 97116 GAIT TRAINING THERAPY: CPT | Mod: HCNC,CQ

## 2023-12-22 PROCEDURE — 21400001 HC TELEMETRY ROOM: Mod: HCNC

## 2023-12-22 PROCEDURE — 36415 COLL VENOUS BLD VENIPUNCTURE: CPT | Mod: HCNC | Performed by: INTERNAL MEDICINE

## 2023-12-22 PROCEDURE — 97530 THERAPEUTIC ACTIVITIES: CPT | Mod: HCNC,CQ

## 2023-12-22 PROCEDURE — 97530 THERAPEUTIC ACTIVITIES: CPT | Mod: HCNC,CO

## 2023-12-22 PROCEDURE — 85025 COMPLETE CBC W/AUTO DIFF WBC: CPT | Mod: HCNC | Performed by: INTERNAL MEDICINE

## 2023-12-22 PROCEDURE — 25000003 PHARM REV CODE 250: Mod: HCNC | Performed by: FAMILY MEDICINE

## 2023-12-22 PROCEDURE — 80076 HEPATIC FUNCTION PANEL: CPT | Mod: HCNC | Performed by: INTERNAL MEDICINE

## 2023-12-22 PROCEDURE — 80048 BASIC METABOLIC PNL TOTAL CA: CPT | Mod: HCNC | Performed by: INTERNAL MEDICINE

## 2023-12-22 RX ADMIN — ENOXAPARIN SODIUM 40 MG: 40 INJECTION SUBCUTANEOUS at 05:12

## 2023-12-22 RX ADMIN — THERA TABS 1 TABLET: TAB at 09:12

## 2023-12-22 RX ADMIN — Medication 100 MG: at 09:12

## 2023-12-22 RX ADMIN — ERTAPENEM 1 G: 1 INJECTION INTRAMUSCULAR; INTRAVENOUS at 09:12

## 2023-12-22 RX ADMIN — FOLIC ACID 1 MG: 1 TABLET ORAL at 09:12

## 2023-12-22 NOTE — PT/OT/SLP PROGRESS
Physical Therapy Treatment    Co-treatment with OT due to acuity of condition, level of skilled assist needed for assessment of safety with mobility and potential of not tolerating a second treatment session.      Patient Name:  Froylan Borja   MRN:  [id number]    Recommendations:     Discharge Recommendations: Moderate Intensity Therapy  Discharge Equipment Recommendations: walker, rolling, wheelchair, bedside commode  Barriers to discharge: Inaccessible home, Decreased caregiver support, and current level of assistance needed     Assessment:     Froyaln Borja is a 68 y.o. male admitted with a medical diagnosis of ALESSANDRA (acute kidney injury).  He presents with the following impairments/functional limitations: weakness, impaired endurance, impaired self care skills, impaired functional mobility, gait instability, decreased coordination, impaired cardiopulmonary response to activity, decreased safety awareness, impaired coordination Pt tolerated treatment session well today. Pt still requires assistance for bed mobility, t/fs and gait. Patient remains appropriate for continued skilled services within the acute environment and goals remain appropriate.     Rehab Prognosis: Good; patient would benefit from acute skilled PT services to address these deficits and reach maximum level of function.    Recent Surgery: * No surgery found *      Plan:     During this hospitalization, patient to be seen 3 x/week to address the identified rehab impairments via gait training, therapeutic activities, therapeutic exercises, neuromuscular re-education and progress toward the following goals:    Plan of Care Expires:  01/20/24    Subjective     Chief Complaint: None stated   Patient/Family Comments/goals: Pt agreeable to PT   Pain/Comfort:  Pain Rating 1: 0/10      Objective:     Communicated with Rn prior to session.  Patient found  supine with OT present   with Condom Catheter, telemetry, peripheral IV, oxygen upon PT entry to room.      General Precautions: Standard, fall, seizure, aspiration  Orthopedic Precautions: N/A  Braces: N/A  Respiratory Status: Nasal cannula, flow 2 L/min     Functional Mobility:  Bed Mobility:     Rolling to R: Max A with draw sheet  Scooting: minimum assistance towards HOB (R)  Supine to Sit: moderate assistance and of 2 persons  Pt sat EOB CGA/SBA and worked with OT   Sit to Supine: moderate assistance and of 2 persons  Transfers:     Sit to Stand:  minimum assistance with rolling walker  Cues to stand up tall and to look up  Gait: 4 steps forward, 4 steps backwards, 4 side steps towards head of bed (R) Nelyl x 2   Cues for RW and LE advancement   Decreased step length, decreased weight shift, decreased foot clearance, flexed posture      AM-PAC 6 CLICK MOBILITY  Turning over in bed (including adjusting bedclothes, sheets and blankets)?: 2  Sitting down on and standing up from a chair with arms (e.g., wheelchair, bedside commode, etc.): 2  Moving from lying on back to sitting on the side of the bed?: 2  Moving to and from a bed to a chair (including a wheelchair)?: 2  Need to walk in hospital room?: 2  Climbing 3-5 steps with a railing?: 1  Basic Mobility Total Score: 11       Treatment & Education:  Therapist provided instruction and educated for safety during transfers and gait training. As well as proper body mechanics, energy conservation, and fall prevention strategies during tasks listed above, and the effects of prolonged immobility and the importance of performing EOB/OOB activity and exercises to promote healing and reduce recovery time.      Patient left HOB elevated with all lines intact, call button in reach, and RN notified..    GOALS:   Multidisciplinary Problems       Physical Therapy Goals          Problem: Physical Therapy    Goal Priority Disciplines Outcome Goal Variances Interventions   Physical Therapy Goal     PT, PT/OT Ongoing, Progressing     Description: Goals to be met by: 1/20/24      Patient will increase functional independence with mobility by performin. Supine to sit with Contact Guard Assistance  2. Sit to stand transfer with Contact Guard Assistance  3. Bed to chair transfer with Contact Guard Assistance using LRAD  4. Gait  x 200 feet with Contact Guard Assistance using LRAD.   5. Ascend/descend 15 stair with right Handrails Minimal Assistance using LRAD.   6. Lower extremity exercise program x30 reps per handout, with independence                         Time Tracking:     PT Received On: 23  PT Start Time: 946     PT Stop Time: 1019  PT Total Time (min): 33 min     Billable Minutes: Gait Training 15 and Therapeutic Activity 18    Treatment Type: Treatment  PT/PTA: PTA     Number of PTA visits since last PT visit: 2023

## 2023-12-22 NOTE — ASSESSMENT & PLAN NOTE
- Interval history and physical exam findings as described above  - UA findings reviewed  - UCx growing ESBL E coli  - Blood cultures NGTD  - Transitioned Zosyn to Ertapenem 12/18 12/22/23  Completed Ertapenem.

## 2023-12-22 NOTE — PLAN OF CARE
"[facility] - Intensive Care ([facility] [place])  Discharge Reassessment    Primary Care Provider: Janki Tamez MD    Expected Discharge Date: 12/24/2023    Reassessment (most recent)       Discharge Reassessment - 12/22/23 1300          Discharge Reassessment    Assessment Type Discharge Planning Reassessment (P)      Did the patient's condition or plan change since previous assessment? No (P)      Discharge Plan discussed with: Patient (P)      Communicated KENNETH with patient/caregiver Date not available/Unable to determine (P)      Discharge Plan A Skilled Nursing Facility (P)      Discharge Plan B Home Health (P)      DME Needed Upon Discharge  none (P)      Transition of Care Barriers None (P)      Why the patient remains in the hospital Requires continued medical care (P)         Post-Acute Status    Post-Acute Authorization Placement (P)      Post-Acute Placement Status Referrals Sent (P)      Coverage [employer] (P)      Discharge Delays Post-Acute Set-up (P)                  CM spoke with pt in room.  He agrees to continue to pursue SNF.  He's concerned that he would be placed in an "old folks home".  Instructed that placement is temporary for rehab, he will be d/c'd to home.  Pt v/u.  No accepting facilities in .  CM expanded search and sent more referrals.    GENNY ThackerN, BS, RN, [facility]          "

## 2023-12-22 NOTE — PT/OT/SLP PROGRESS
Occupational Therapy  Co-Treatment    Co-tx to ensure pt safety and accommodate for pt's activity tolerance.    Name: Froylan Borja  MRN: [id number]  Admitting Diagnosis:  ALESSANDRA (acute kidney injury)       Recommendations:     Discharge Recommendations: Moderate Intensity Therapy  Discharge Equipment Recommendations:  walker, rolling, wheelchair, bedside commode  Barriers to discharge:       Assessment:     Froylan Borja is a 68 y.o. male with a medical diagnosis of ALESSANDRA (acute kidney injury).  He presents with the following performance deficits affecting function are weakness, impaired endurance, impaired self care skills, impaired functional mobility, decreased safety awareness, decreased lower extremity function, impaired balance, gait instability, impaired cardiopulmonary response to activity, decreased coordination. He requires assistance with bed mobility and functional transfers due to performance deficits and decline in functioning. Pt would continue to benefit from OT services to ensure safe return to PLOF and maximize independence with occupational performance.    Rehab Prognosis:  Good; patient would benefit from acute skilled OT services to address these deficits and reach maximum level of function.       Plan:     Patient to be seen 3 x/week to address the above listed problems via self-care/home management, therapeutic activities, therapeutic exercises, neuromuscular re-education  Plan of Care Expires: 01/20/24  Plan of Care Reviewed with: patient    Subjective     Chief Complaint: None stated   Patient/Family Comments/goals: Pt agreeable to OT  Pain/Comfort:  Pain Rating 1: 0/10    Objective:     Communicated with: Nurse prior to session.  Patient found supine with peripheral IV, oxygen, telemetry, Condom Catheter and nurse present upon OT entry to room.  A client care conference was completed by the OTR and the BARBA prior to treatment by the BARBA to discuss the patient's POC and current status.     General  Precautions: Standard, fall, seizure, aspiration    Orthopedic Precautions:N/A  Braces: N/A  Respiratory Status: Nasal cannula, flow 2 L/min     Occupational Performance:     Bed Mobility:   Pt completed rolling to R side with max assistance with draw sheet   Patient completed Scooting EOB with minimum assistance  Patient completed Supine to Sit with moderate assistance and 2 persons  Patient completed Sit to Supine with moderate assistance and 2 persons     Functional Mobility/Transfers:  Patient completed Sit <> Stand Transfer with minimum assistance and of 2 persons  with  rolling walker   Functional Mobility: Pt performed ~4-5 steps forward and backwards to and from EOB with RW CGA x 2 persons and verbal cueing for RW management and initiation of steps. Pt performed ~4-5 lateral R steps towards HOB with CGA x 2 persons and verbal cueing for initiation of steps    Activities of Daily Living:  Grooming: pt performed oral hygiene and washed face with washcloth seated EOB with  stand by assistance via setup.      [facility] 6 Click ADL: 16    Treatment & Education:  Pt sat upright EOB with SBA ~10 minutes to improve static/dynamic sitting balance while performing grooming tasks.  Pt declined to sit in bedside chair due to pt felt he was unable to return back safely with staff assistance.  At end of session, scooting towards HOB via draw sheet with total assistance x 2 persons  Pt educated and instruction on safety with transfer, RW management and use of call bell for assistance.    Patient left supine with HOB with all lines intact, call button in reach, and RN notified    GOALS:   Multidisciplinary Problems       Occupational Therapy Goals          Problem: Occupational Therapy    Goal Priority Disciplines Outcome Interventions   Occupational Therapy Goal     OT, PT/OT Ongoing, Progressing    Description: Goals to be met by: 1/20/24 (1 month)     Patient will increase functional independence with ADLs by  performing:    UE Dressing with Supervision.  LE Dressing with Supervision.  Grooming while standing at sink with Supervision.  Toileting from toilet with Supervision for hygiene and clothing management.   Rolling to Bilateral with [place].   Supine to sit with [place].  Step transfer with Supervision  Toilet transfer to toilet with Supervision.                       Time Tracking:     OT Date of Treatment: 12/22/23  OT Start Time: 0944  OT Stop Time: 1019  OT Total Time (min): 35 min    Billable Minutes:Self Care/Home Management 15  Therapeutic Activity 18    OT/MARQUITA: MARQUITA     Number of MARQUITA visits since last OT visit: 1  I certify that I was present in the room directing the BARBA in service delivery and guiding them using my skilled judgment. As the co-signing therapist I have reviewed the student BARBA's documentation and am responsible for the treatment, assessment, and plan.     12/22/2023

## 2023-12-22 NOTE — SUBJECTIVE & OBJECTIVE
Interval History:  Kidney functions continues to improve.  No acute issues at this time.    Review of Systems   Unable to perform ROS: Other     Objective:     Vital Signs (Most Recent):  Temp: 97.7 °F (36.5 °C) (12/22/23 0809)  Pulse: 94 (12/22/23 0809)  Resp: 16 (12/22/23 0403)  BP: 133/68 (12/22/23 0809)  SpO2: 97 % (12/22/23 0809) Vital Signs (24h Range):  Temp:  [97 °F (36.1 °C)-98.7 °F (37.1 °C)] 97.7 °F (36.5 °C)  Pulse:  [85-99] 94  Resp:  [15-18] 16  SpO2:  [96 %-97 %] 97 %  BP: (133-166)/(64-77) 133/68     Weight: 112 kg (247 lb)  Body mass index is 30.87 kg/m².    Intake/Output Summary (Last 24 hours) at 12/22/2023 0903  Last data filed at 12/22/2023 0655  Gross per 24 hour   Intake 640 ml   Output 925 ml   Net -285 ml        Physical Exam  Constitutional:       General: He is not in acute distress.     Appearance: He is obese.   HENT:      Head: Normocephalic.      Right Ear: External ear normal.      Left Ear: External ear normal.      Nose: Nose normal.      Comments: Nasal cannula  Eyes:      General: No scleral icterus.  Cardiovascular:      Rate and Rhythm: Normal rate.   Pulmonary:      Effort: Pulmonary effort is normal.   Abdominal:      Palpations: Abdomen is soft.      Tenderness: There is no abdominal tenderness.   Genitourinary:     Comments: Condom catheter  Musculoskeletal:      Comments: Debility   Skin:     General: Skin is warm.   Neurological:      Mental Status: He is alert. Mental status is at baseline.     MELD 3.0: 26 at 12/17/2023  7:44 PM  MELD-Na: 25 at 12/17/2023  7:44 PM  Calculated from:  Serum Creatinine: 2.4 mg/dL at 12/17/2023  7:44 PM  Serum Sodium: 125 mmol/L at 12/17/2023  7:44 PM  Total Bilirubin: 0.7 mg/dL (Using min of 1 mg/dL) at 12/17/2023  9:04 AM  Serum Albumin: 2.4 g/dL at 12/17/2023  9:04 AM  INR(ratio): 1.0 at 12/15/2023  2:16 PM  Age at listing (hypothetical): 68 years  Sex: Male at 12/17/2023  7:44 PM      Significant Labs:  CBC:  Recent Labs   Lab  "12/21/23 0421 12/22/23 0425   WBC 7.38 8.08   HGB 13.8* 14.2   HCT 39.1* 44.2    227     CMP:  Recent Labs   Lab 12/20/23  1159 12/21/23 0421 12/22/23 0425    137 140   K 3.1* 3.8 4.0   CL 96 100 101   CO2 30* 28 26   * 120* 109   BUN 46* 47* 50*   CREATININE 2.0* 1.7* 1.4   CALCIUM 8.9 9.1 9.2   PROT  --  6.4 6.6   ALBUMIN  --  2.4* 2.4*   BILITOT  --  0.5 0.5   ALKPHOS  --  73 77   AST  --  54* 45*   ALT  --  47* 40   ANIONGAP 10 9 13     PTINR:  No results for input(s): "INR" in the last 48 hours.    Significant Procedures:   Dobutamine Stress Test with Color Flow: No results found for this or any previous visit.      "

## 2023-12-22 NOTE — PROGRESS NOTES
[facility] - Intensive Care ([address] Medicine  Progress Note    Patient Name: Froylan Borja  MRN: [id number]  Patient Class: IP- Inpatient   Admission Date: 12/15/2023  Length of Stay: 7 days  Attending Physician: Pardeep Torres MD  Primary Care Provider: Janki Tamez MD        Subjective:     Principal Problem:ALESSANDRA (acute kidney injury)        HPI:  This is a 69yo male with a past medical history of alcohol abuse, depression, hyponatremia severe requiring prior MICU admit, HTN, HLD, cirrhosis, diverticulosis who presents to the ED with AMS. Patient reportedly at the bar with his wife earlier today and became confused and unsteady and difficulty ambulating. Per chart review police called because of beligerance and were concerned about stroke symptoms and brought patient to ED for further evaluation. Patient alert and oriented to person and aware in hospital. Unable to contribute meaningfully to history but does admit heavy daily alcohol use.     In the ED patient initially afebrile, and hemodynamically stable saturating well on room air at rest. WBC 10.1 with leukocytosis. Na 120. Creatinine 2.3 (baseline 1.0). CPK 3960. CXR with concern for aspiration pneumonia. Patient diaphoretic, tremulous, tachycardic concerning for developing withdrawal. Started on IVFs, benzodiazepines, and abx for rhabdo, hyponatremia, aspiration pna, alcohol withdrawal. Admitted to the care of medicine for further evaluation and management.     Overview/Hospital Course:  Patient admitted to [facility] for AMS with severe hyponatremia in setting of ETOH abuse, ETOH withdrawals, ALESSANDRA, and aspiration pneumonia. He was started on Vanc/zosyn for aspiration pneumonia. Received IVF bolus and continued on maintenance IVF, Nephrology consulted for hyponatremia and ALESSANDRA. Urine was spun muddy brown casts observed indicative of ATN. Nephrology recommending NS continued @ 150ml/hr and lasix 160mg IV BID for sodium excretion. Na slowing improving at  appropriate rate. Blood cultures NGTD. UA growing GNR's. MRSA swab pending. Patient continues on Vanc/zosyn. Platelets decreased from 140 -> 112 -> 86. Likely secondary to infection however was receiving subcutaneous heparin. HIT panel sent and transitioned to lovenox. Continues to have symptoms of alcohol withdrawal and continued on CIWA and scheduled/PRN benzodiazepines. UA growing ESBL E coli and Dc'd zosyn and started ertapenem. Vanc discontinued. Urine output remained good with diuresis, Cr stable. IV lasix Dc'd per nephrology recommendations. Sodium improved to 135. PT/OT consulted.    Interval History:  Kidney functions continues to improve.  No acute issues at this time.    Review of Systems   Unable to perform ROS: Other     Objective:     Vital Signs (Most Recent):  Temp: 97.7 °F (36.5 °C) (12/22/23 0809)  Pulse: 94 (12/22/23 0809)  Resp: 16 (12/22/23 0403)  BP: 133/68 (12/22/23 0809)  SpO2: 97 % (12/22/23 0809) Vital Signs (24h Range):  Temp:  [97 °F (36.1 °C)-98.7 °F (37.1 °C)] 97.7 °F (36.5 °C)  Pulse:  [85-99] 94  Resp:  [15-18] 16  SpO2:  [96 %-97 %] 97 %  BP: (133-166)/(64-77) 133/68     Weight: 112 kg (247 lb)  Body mass index is 30.87 kg/m².    Intake/Output Summary (Last 24 hours) at 12/22/2023 0903  Last data filed at 12/22/2023 0655  Gross per 24 hour   Intake 640 ml   Output 925 ml   Net -285 ml        Physical Exam  Constitutional:       General: He is not in acute distress.     Appearance: He is obese.   HENT:      Head: Normocephalic.      Right Ear: External ear normal.      Left Ear: External ear normal.      Nose: Nose normal.      Comments: Nasal cannula  Eyes:      General: No scleral icterus.  Cardiovascular:      Rate and Rhythm: Normal rate.   Pulmonary:      Effort: Pulmonary effort is normal.   Abdominal:      Palpations: Abdomen is soft.      Tenderness: There is no abdominal tenderness.   Genitourinary:     Comments: Condom catheter  Musculoskeletal:      Comments: Debility  "  Skin:     General: Skin is warm.   Neurological:      Mental Status: He is alert. Mental status is at baseline.     MELD 3.0: 26 at 12/17/2023  7:44 PM  MELD-Na: 25 at 12/17/2023  7:44 PM  Calculated from:  Serum Creatinine: 2.4 mg/dL at 12/17/2023  7:44 PM  Serum Sodium: 125 mmol/L at 12/17/2023  7:44 PM  Total Bilirubin: 0.7 mg/dL (Using min of 1 mg/dL) at 12/17/2023  9:04 AM  Serum Albumin: 2.4 g/dL at 12/17/2023  9:04 AM  INR(ratio): 1.0 at 12/15/2023  2:16 PM  Age at listing (hypothetical): 68 years  Sex: Male at 12/17/2023  7:44 PM      Significant Labs:  CBC:  Recent Labs   Lab 12/21/23  0421 12/22/23  0425   WBC 7.38 8.08   HGB 13.8* 14.2   HCT 39.1* 44.2    227     CMP:  Recent Labs   Lab 12/20/23  1159 12/21/23  0421 12/22/23  0425    137 140   K 3.1* 3.8 4.0   CL 96 100 101   CO2 30* 28 26   * 120* 109   BUN 46* 47* 50*   CREATININE 2.0* 1.7* 1.4   CALCIUM 8.9 9.1 9.2   PROT  --  6.4 6.6   ALBUMIN  --  2.4* 2.4*   BILITOT  --  0.5 0.5   ALKPHOS  --  73 77   AST  --  54* 45*   ALT  --  47* 40   ANIONGAP 10 9 13     PTINR:  No results for input(s): "INR" in the last 48 hours.    Significant Procedures:   Dobutamine Stress Test with Color Flow: No results found for this or any previous visit.        Assessment/Plan:      * ALESSANDRA (acute kidney injury)  - Creatinine 2.3 on presentation ; baseline 1.0  - suspect secondary to dehydration with severe alcohol use/dependence and inadequate water/hydration  - Nephrology consulted, urine spun and multiple muddy brown casts indicative of ATN  - S/p IV diuresis for 2-3 days, now off diuretics as of 12/19  - strict I/Os  - avoid nephrotoxic agents as appropriate  - continue to monitor  - Renal US without hydronephrosis  - Cr continues to improve    12/21/23  Kidney functions improving and Nephrology signed off.  Continue with oral intake    Acute cystitis  - Interval history and physical exam findings as described above  - UA findings reviewed  - UCx " growing ESBL E coli  - Blood cultures NGTD  - Transitioned Zosyn to Ertapenem 12/18 12/22/23  Completed Ertapenem.      Debility  For skilled nursing facility placement    Rhabdomyolysis  - CPK 4k on presentation  - Encourage PO hydration  - Cr improving  - Nephrology following  - monitor kidney function  - CK improving    Aspiration pneumonia  - developed fever shortly after admission  ;  Tmax 103  - CXR with concern for aspiration pna left basilar lungfield  - Growing ESBL E coli in urine, zosyn Dc'd.   - Started on Ertapenem 12/18  - Vanc DC'd  - Blood cultures NGTD  - further management pending clinical course and future study review    Depression  - holding home meds for now as seems he hasn't been taking for some time    Hyponatremia  History of severe hyponatremia requiring ICU. From chart review appears largely secondary to beer potemania in the past  - Na 120 on presentation, downtrended to 118 overnight  - Nephrology was consulted.   - S/p IVF and IV diuresis with improvement to Na  - IV lasix DC'd  - neurochecks q4h  - seizure precuations  - further management pending clinical course and future study review    -Na improved to 136      Alcohol use disorder, severe, dependence  - CIWA q4h  - ativan IV 2mg prn for CIWA > 8  - daily thiamine/FA/MV  - would benefit from addiction psych eval once clinically appropriate    Cirrhosis  MELD-Na score calculated; MELD 3.0: 26 at 12/17/2023  7:44 PM  MELD-Na: 25 at 12/17/2023  7:44 PM  Calculated from:  Serum Creatinine: 2.4 mg/dL at 12/17/2023  7:44 PM  Serum Sodium: 125 mmol/L at 12/17/2023  7:44 PM  Total Bilirubin: 0.7 mg/dL (Using min of 1 mg/dL) at 12/17/2023  9:04 AM  Serum Albumin: 2.4 g/dL at 12/17/2023  9:04 AM  INR(ratio): 1.0 at 12/15/2023  2:16 PM  Age at listing (hypothetical): 68 years  Sex: Male at 12/17/2023  7:44 PM    - continues to use alcohol persistently  - AST > ALT x2 consistent with ETOH use.  - Trend hepatic function panel  - LFT's  improving  - Appears well compensated on physical exam      VTE Risk Mitigation (From admission, onward)           Ordered     enoxaparin injection 40 mg  Every 24 hours         12/17/23 1239     IP VTE HIGH RISK PATIENT  Once         12/15/23 1817     Place sequential compression device  Until discontinued         12/15/23 1817                    Discharge Planning   KENNETH: 12/24/2023     Code Status: Full Code   Is the patient medically ready for discharge?: No    Reason for patient still in hospital (select all that apply): Patient trending condition  Discharge Plan A: Skilled Nursing Facility   Discharge Delays: None known at this time              Pardeep Torres MD  Department of Hospital Medicine   [facility] - Intensive Care ([place])

## 2023-12-23 LAB
ALBUMIN SERPL BCP-MCNC: 2.2 G/DL (ref 3.5–5.2)
ALP SERPL-CCNC: 70 U/L (ref 55–135)
ALT SERPL W/O P-5'-P-CCNC: 32 U/L (ref 10–44)
ANION GAP SERPL CALC-SCNC: 12 MMOL/L (ref 8–16)
AST SERPL-CCNC: 35 U/L (ref 10–40)
BASOPHILS # BLD AUTO: 0.06 K/UL (ref 0–0.2)
BASOPHILS NFR BLD: 0.7 % (ref 0–1.9)
BILIRUB DIRECT SERPL-MCNC: 0.3 MG/DL (ref 0.1–0.3)
BILIRUB SERPL-MCNC: 0.6 MG/DL (ref 0.1–1)
BUN SERPL-MCNC: 52 MG/DL (ref 8–23)
CALCIUM SERPL-MCNC: 8.7 MG/DL (ref 8.7–10.5)
CHLORIDE SERPL-SCNC: 102 MMOL/L (ref 95–110)
CO2 SERPL-SCNC: 25 MMOL/L (ref 23–29)
CREAT SERPL-MCNC: 1.2 MG/DL (ref 0.5–1.4)
DIFFERENTIAL METHOD BLD: ABNORMAL
EOSINOPHIL # BLD AUTO: 0.1 K/UL (ref 0–0.5)
EOSINOPHIL NFR BLD: 0.9 % (ref 0–8)
ERYTHROCYTE [DISTWIDTH] IN BLOOD BY AUTOMATED COUNT: 13.8 % (ref 11.5–14.5)
EST. GFR  (NO RACE VARIABLE): >60 ML/MIN/1.73 M^2
GLUCOSE SERPL-MCNC: 129 MG/DL (ref 70–110)
HCT VFR BLD AUTO: 38.5 % (ref 40–54)
HGB BLD-MCNC: 13 G/DL (ref 14–18)
IMM GRANULOCYTES # BLD AUTO: 0.17 K/UL (ref 0–0.04)
IMM GRANULOCYTES NFR BLD AUTO: 1.9 % (ref 0–0.5)
LYMPHOCYTES # BLD AUTO: 1.2 K/UL (ref 1–4.8)
LYMPHOCYTES NFR BLD: 13 % (ref 18–48)
MCH RBC QN AUTO: 33.3 PG (ref 27–31)
MCHC RBC AUTO-ENTMCNC: 33.8 G/DL (ref 32–36)
MCV RBC AUTO: 99 FL (ref 82–98)
MONOCYTES # BLD AUTO: 1 K/UL (ref 0.3–1)
MONOCYTES NFR BLD: 10.7 % (ref 4–15)
NEUTROPHILS # BLD AUTO: 6.5 K/UL (ref 1.8–7.7)
NEUTROPHILS NFR BLD: 72.8 % (ref 38–73)
NRBC BLD-RTO: 0 /100 WBC
PLATELET # BLD AUTO: 266 K/UL (ref 150–450)
PMV BLD AUTO: 9.6 FL (ref 9.2–12.9)
POTASSIUM SERPL-SCNC: 3.7 MMOL/L (ref 3.5–5.1)
PROT SERPL-MCNC: 5.9 G/DL (ref 6–8.4)
RBC # BLD AUTO: 3.9 M/UL (ref 4.6–6.2)
SODIUM SERPL-SCNC: 139 MMOL/L (ref 136–145)
WBC # BLD AUTO: 8.89 K/UL (ref 3.9–12.7)

## 2023-12-23 PROCEDURE — 80076 HEPATIC FUNCTION PANEL: CPT | Mod: HCNC | Performed by: INTERNAL MEDICINE

## 2023-12-23 PROCEDURE — 36415 COLL VENOUS BLD VENIPUNCTURE: CPT | Mod: HCNC | Performed by: INTERNAL MEDICINE

## 2023-12-23 PROCEDURE — 80048 BASIC METABOLIC PNL TOTAL CA: CPT | Mod: HCNC | Performed by: INTERNAL MEDICINE

## 2023-12-23 PROCEDURE — 21400001 HC TELEMETRY ROOM: Mod: HCNC

## 2023-12-23 PROCEDURE — 97530 THERAPEUTIC ACTIVITIES: CPT | Mod: HCNC,CQ

## 2023-12-23 PROCEDURE — 85025 COMPLETE CBC W/AUTO DIFF WBC: CPT | Mod: HCNC | Performed by: INTERNAL MEDICINE

## 2023-12-23 PROCEDURE — 63600175 PHARM REV CODE 636 W HCPCS: Mod: HCNC | Performed by: INTERNAL MEDICINE

## 2023-12-23 PROCEDURE — 25000003 PHARM REV CODE 250: Mod: HCNC | Performed by: FAMILY MEDICINE

## 2023-12-23 RX ADMIN — Medication 100 MG: at 09:12

## 2023-12-23 RX ADMIN — ENOXAPARIN SODIUM 40 MG: 40 INJECTION SUBCUTANEOUS at 05:12

## 2023-12-23 RX ADMIN — THERA TABS 1 TABLET: TAB at 09:12

## 2023-12-23 RX ADMIN — FOLIC ACID 1 MG: 1 TABLET ORAL at 09:12

## 2023-12-23 NOTE — SUBJECTIVE & OBJECTIVE
Interval History:   No acute issues at this time.  Awaiting placement    Review of Systems   Unable to perform ROS: Other     Objective:     Vital Signs (Most Recent):  Temp: 97.8 °F (36.6 °C) (12/23/23 0904)  Pulse: 87 (12/23/23 0904)  Resp: 15 (12/23/23 0904)  BP: 139/67 (12/23/23 0904)  SpO2: 99 % (12/23/23 0904) Vital Signs (24h Range):  Temp:  [97.3 °F (36.3 °C)-98.9 °F (37.2 °C)] 97.8 °F (36.6 °C)  Pulse:  [] 87  Resp:  [15-20] 15  SpO2:  [96 %-99 %] 99 %  BP: (127-158)/(60-74) 139/67     Weight: 112 kg (247 lb)  Body mass index is 30.87 kg/m².    Intake/Output Summary (Last 24 hours) at 12/23/2023 1120  Last data filed at 12/23/2023 0911  Gross per 24 hour   Intake 100 ml   Output 1675 ml   Net -1575 ml      Physical Exam  Constitutional:       General: He is not in acute distress.     Appearance: He is obese.   HENT:      Head: Normocephalic.      Right Ear: External ear normal.      Left Ear: External ear normal.      Nose: Nose normal.      Comments: Nasal cannula  Eyes:      General: No scleral icterus.  Cardiovascular:      Rate and Rhythm: Normal rate.   Pulmonary:      Effort: Pulmonary effort is normal.   Abdominal:      Palpations: Abdomen is soft.      Tenderness: There is no abdominal tenderness.   Genitourinary:     Comments: Condom catheter  Musculoskeletal:      Comments: Debility   Skin:     General: Skin is warm.   Neurological:      Mental Status: He is alert. Mental status is at baseline.     MELD 3.0: 26 at 12/17/2023  7:44 PM  MELD-Na: 25 at 12/17/2023  7:44 PM  Calculated from:  Serum Creatinine: 2.4 mg/dL at 12/17/2023  7:44 PM  Serum Sodium: 125 mmol/L at 12/17/2023  7:44 PM  Total Bilirubin: 0.7 mg/dL (Using min of 1 mg/dL) at 12/17/2023  9:04 AM  Serum Albumin: 2.4 g/dL at 12/17/2023  9:04 AM  INR(ratio): 1.0 at 12/15/2023  2:16 PM  Age at listing (hypothetical): 68 years  Sex: Male at 12/17/2023  7:44 PM      Significant Labs:  CBC:  Recent Labs   Lab 12/22/23  5769  "12/23/23  0324   WBC 8.08 8.89   HGB 14.2 13.0*   HCT 44.2 38.5*    266     CMP:  Recent Labs   Lab 12/22/23  0425 12/23/23 0324    139   K 4.0 3.7    102   CO2 26 25    129*   BUN 50* 52*   CREATININE 1.4 1.2   CALCIUM 9.2 8.7   PROT 6.6 5.9*   ALBUMIN 2.4* 2.2*   BILITOT 0.5 0.6   ALKPHOS 77 70   AST 45* 35   ALT 40 32   ANIONGAP 13 12     PTINR:  No results for input(s): "INR" in the last 48 hours.    Significant Procedures:   Dobutamine Stress Test with Color Flow: No results found for this or any previous visit.      "

## 2023-12-23 NOTE — PLAN OF CARE
Problem: Adult Inpatient Plan of Care  Goal: Plan of Care Review  Outcome: Ongoing, Progressing  Flowsheets (Taken 12/23/2023 0453)  Plan of Care Reviewed With: patient  Goal: Patient-Specific Goal (Individualized)  Outcome: Ongoing, Progressing  Goal: Absence of Hospital-Acquired Illness or Injury  Outcome: Ongoing, Progressing  Intervention: Identify and Manage Fall Risk  Flowsheets (Taken 12/23/2023 0453)  Safety Promotion/Fall Prevention: assistive device/personal item within reach  Intervention: Prevent Skin Injury  Flowsheets (Taken 12/23/2023 0453)  Body Position: position changed independently  Skin Protection: incontinence pads utilized  Intervention: Prevent and Manage VTE (Venous Thromboembolism) Risk  Flowsheets (Taken 12/23/2023 0453)  VTE Prevention/Management:   bleeding precations maintained   bleeding risk assessed  Range of Motion: active ROM (range of motion) encouraged  Intervention: Prevent Infection  Flowsheets (Taken 12/23/2023 0453)  Infection Prevention:   hand hygiene promoted   single patient room provided  Goal: Optimal Comfort and Wellbeing  Outcome: Ongoing, Progressing  Intervention: Provide Person-Centered Care  Flowsheets (Taken 12/23/2023 0453)  Trust Relationship/Rapport:   care explained   thoughts/feelings acknowledged  Goal: Readiness for Transition of Care  Outcome: Ongoing, Progressing     Problem: Fluid and Electrolyte Imbalance (Acute Kidney Injury/Impairment)  Goal: Fluid and Electrolyte Balance  Outcome: Ongoing, Progressing     Problem: Oral Intake Inadequate (Acute Kidney Injury/Impairment)  Goal: Optimal Nutrition Intake  Outcome: Ongoing, Progressing     Problem: Renal Function Impairment (Acute Kidney Injury/Impairment)  Goal: Effective Renal Function  Outcome: Ongoing, Progressing  Intervention: Monitor and Support Renal Function  Flowsheets (Taken 12/23/2023 0453)  Medication Review/Management: medications reviewed     Problem: Infection  Goal: Absence of  Infection Signs and Symptoms  Outcome: Ongoing, Progressing     Problem: Skin Injury Risk Increased  Goal: Skin Health and Integrity  Outcome: Ongoing, Progressing  Intervention: Optimize Skin Protection  Flowsheets (Taken 12/23/2023 9147)  Skin Protection: incontinence pads utilized  Head of Bed (HOB) Positioning: HOB lowered

## 2023-12-23 NOTE — PT/OT/SLP PROGRESS
Physical Therapy Treatment    Patient Name:  Froylan Borja   MRN:  [id number]    Recommendations:     Discharge Recommendations: Moderate Intensity Therapy  Discharge Equipment Recommendations: bedside commode, walker, rolling, wheelchair  Barriers to discharge: Inaccessible home and Decreased caregiver support    Assessment:     Froylan Borja is a 68 y.o. male admitted with a medical diagnosis of ALESSANDRA (acute kidney injury).  He presents with the following impairments/functional limitations: weakness, impaired endurance, impaired self care skills, impaired functional mobility, gait instability, impaired balance, decreased ROM, edema, impaired cardiopulmonary response to activity . Patient showed good participation, but appeared weaker and was unable to transfer from sit to stand today.    Rehab Prognosis: Fair; patient would benefit from acute skilled PT services to address these deficits and reach maximum level of function.    Recent Surgery: * No surgery found *      Plan:     During this hospitalization, patient to be seen 3 x/week to address the identified rehab impairments via gait training, therapeutic activities, therapeutic exercises, neuromuscular re-education and progress toward the following goals:    Plan of Care Expires:  01/20/24    Subjective     Chief Complaint: weakness  Patient/Family Comments/goals: to get stronger  Pain/Comfort:  Pain Rating 1: 0/10  Pain Rating Post-Intervention 1: 0/10      Objective:     Communicated with NSG prior to session.  Patient found HOB elevated with bed alarm, Condom Catheter, telemetry, oxygen upon PT entry to room.     General Precautions: Standard, fall, aspiration, seizure  Orthopedic Precautions: N/A  Braces: N/A  Respiratory Status: Nasal cannula, flow 2 L/min     Functional Mobility:  Bed Mobility:     Rolling Right: moderate assistance  Scooting: total assistance  Supine to Sit: moderate assistance  Sit to Supine: moderate assistance  Transfers:     Sit to Stand:   total assistance with rolling walker      AM-PAC 6 CLICK MOBILITY  Turning over in bed (including adjusting bedclothes, sheets and blankets)?: 2  Sitting down on and standing up from a chair with arms (e.g., wheelchair, bedside commode, etc.): 2  Moving from lying on back to sitting on the side of the bed?: 2  Moving to and from a bed to a chair (including a wheelchair)?: 1  Need to walk in hospital room?: 1  Climbing 3-5 steps with a railing?: 1  Basic Mobility Total Score: 9       Treatment & Education:  Patient assisted to sitting at EOB. Patient sat at EOB x 20 minutes, initially with CGA, but progressing to Supervision. Attempted sit to stand activity with RW, but patient unable to completely extend his knees. Scoot pivot to HOB with max assistance. Repositioned in bed for comfort and left with all needs in reach.    Patient left HOB elevated with all lines intact, call button in reach, and bed alarm on..    GOALS:   Multidisciplinary Problems       Physical Therapy Goals          Problem: Physical Therapy    Goal Priority Disciplines Outcome Goal Variances Interventions   Physical Therapy Goal     PT, PT/OT Ongoing, Progressing     Description: Goals to be met by: 24     Patient will increase functional independence with mobility by performin. Supine to sit with Contact Guard Assistance  2. Sit to stand transfer with Contact Guard Assistance  3. Bed to chair transfer with Contact Guard Assistance using LRAD  4. Gait  x 200 feet with Contact Guard Assistance using LRAD.   5. Ascend/descend 15 stair with right Handrails Minimal Assistance using LRAD.   6. Lower extremity exercise program x30 reps per handout, with independence                         Time Tracking:     PT Received On: 23  PT Start Time: 1556     PT Stop Time: 1620  PT Total Time (min): 24 min     Billable Minutes: Therapeutic Activity 24    Treatment Type: Treatment  PT/PTA: PTA     Number of PTA visits since last PT visit: 2      12/23/2023

## 2023-12-23 NOTE — PROGRESS NOTES
[facility] ([address] Medicine  Progress Note    Patient Name: Froylan Borja  MRN: [id number]  Patient Class: IP- Inpatient   Admission Date: 12/15/2023  Length of Stay: 8 days  Attending Physician: Pardeep Torres MD  Primary Care Provider: Janki Tamez MD        Subjective:     Principal Problem:ALESSANDRA (acute kidney injury)        HPI:  This is a 69yo male with a past medical history of alcohol abuse, depression, hyponatremia severe requiring prior MICU admit, HTN, HLD, cirrhosis, diverticulosis who presents to the ED with AMS. Patient reportedly at the bar with his wife earlier today and became confused and unsteady and difficulty ambulating. Per chart review police called because of beligerance and were concerned about stroke symptoms and brought patient to ED for further evaluation. Patient alert and oriented to person and aware in hospital. Unable to contribute meaningfully to history but does admit heavy daily alcohol use.     In the ED patient initially afebrile, and hemodynamically stable saturating well on room air at rest. WBC 10.1 with leukocytosis. Na 120. Creatinine 2.3 (baseline 1.0). CPK 3960. CXR with concern for aspiration pneumonia. Patient diaphoretic, tremulous, tachycardic concerning for developing withdrawal. Started on IVFs, benzodiazepines, and abx for rhabdo, hyponatremia, aspiration pna, alcohol withdrawal. Admitted to the care of medicine for further evaluation and management.     Overview/Hospital Course:  Patient admitted to [facility] for AMS with severe hyponatremia in setting of ETOH abuse, ETOH withdrawals, ALESSANDRA, and aspiration pneumonia. He was started on Vanc/zosyn for aspiration pneumonia. Received IVF bolus and continued on maintenance IVF, Nephrology consulted for hyponatremia and ALESSANDRA. Urine was spun muddy brown casts observed indicative of ATN. Nephrology recommending NS continued @ 150ml/hr and lasix 160mg IV BID for sodium excretion. Na slowing improving at  appropriate rate. Blood cultures NGTD. UA growing GNR's. MRSA swab pending. Patient continues on Vanc/zosyn. Platelets decreased from 140 -> 112 -> 86. Likely secondary to infection however was receiving subcutaneous heparin. HIT panel sent and transitioned to lovenox. Continues to have symptoms of alcohol withdrawal and continued on CIWA and scheduled/PRN benzodiazepines. UA growing ESBL E coli and Dc'd zosyn and started ertapenem. Vanc discontinued. Urine output remained good with diuresis, Cr stable. IV lasix Dc'd per nephrology recommendations. Sodium improved to 135. PT/OT consulted.    Interval History:   No acute issues at this time.  Awaiting placement    Review of Systems   Unable to perform ROS: Other     Objective:     Vital Signs (Most Recent):  Temp: 97.8 °F (36.6 °C) (12/23/23 0904)  Pulse: 87 (12/23/23 0904)  Resp: 15 (12/23/23 0904)  BP: 139/67 (12/23/23 0904)  SpO2: 99 % (12/23/23 0904) Vital Signs (24h Range):  Temp:  [97.3 °F (36.3 °C)-98.9 °F (37.2 °C)] 97.8 °F (36.6 °C)  Pulse:  [] 87  Resp:  [15-20] 15  SpO2:  [96 %-99 %] 99 %  BP: (127-158)/(60-74) 139/67     Weight: 112 kg (247 lb)  Body mass index is 30.87 kg/m².    Intake/Output Summary (Last 24 hours) at 12/23/2023 1120  Last data filed at 12/23/2023 0911  Gross per 24 hour   Intake 100 ml   Output 1675 ml   Net -1575 ml      Physical Exam  Constitutional:       General: He is not in acute distress.     Appearance: He is obese.   HENT:      Head: Normocephalic.      Right Ear: External ear normal.      Left Ear: External ear normal.      Nose: Nose normal.      Comments: Nasal cannula  Eyes:      General: No scleral icterus.  Cardiovascular:      Rate and Rhythm: Normal rate.   Pulmonary:      Effort: Pulmonary effort is normal.   Abdominal:      Palpations: Abdomen is soft.      Tenderness: There is no abdominal tenderness.   Genitourinary:     Comments: Condom catheter  Musculoskeletal:      Comments: Debility   Skin:     General:  "Skin is warm.   Neurological:      Mental Status: He is alert. Mental status is at baseline.     MELD 3.0: 26 at 12/17/2023  7:44 PM  MELD-Na: 25 at 12/17/2023  7:44 PM  Calculated from:  Serum Creatinine: 2.4 mg/dL at 12/17/2023  7:44 PM  Serum Sodium: 125 mmol/L at 12/17/2023  7:44 PM  Total Bilirubin: 0.7 mg/dL (Using min of 1 mg/dL) at 12/17/2023  9:04 AM  Serum Albumin: 2.4 g/dL at 12/17/2023  9:04 AM  INR(ratio): 1.0 at 12/15/2023  2:16 PM  Age at listing (hypothetical): 68 years  Sex: Male at 12/17/2023  7:44 PM      Significant Labs:  CBC:  Recent Labs   Lab 12/22/23  0425 12/23/23  0324   WBC 8.08 8.89   HGB 14.2 13.0*   HCT 44.2 38.5*    266     CMP:  Recent Labs   Lab 12/22/23  0425 12/23/23  0324    139   K 4.0 3.7    102   CO2 26 25    129*   BUN 50* 52*   CREATININE 1.4 1.2   CALCIUM 9.2 8.7   PROT 6.6 5.9*   ALBUMIN 2.4* 2.2*   BILITOT 0.5 0.6   ALKPHOS 77 70   AST 45* 35   ALT 40 32   ANIONGAP 13 12     PTINR:  No results for input(s): "INR" in the last 48 hours.    Significant Procedures:   Dobutamine Stress Test with Color Flow: No results found for this or any previous visit.        Assessment/Plan:      * ALESSANDRA (acute kidney injury)  - Creatinine 2.3 on presentation ; baseline 1.0  - suspect secondary to dehydration with severe alcohol use/dependence and inadequate water/hydration  - Nephrology consulted, urine spun and multiple muddy brown casts indicative of ATN  - S/p IV diuresis for 2-3 days, now off diuretics as of 12/19  - strict I/Os  - avoid nephrotoxic agents as appropriate  - continue to monitor  - Renal US without hydronephrosis  - Cr continues to improve    12/21/23  Kidney functions improving and Nephrology signed off.  Continue with oral intake    Acute cystitis  - Interval history and physical exam findings as described above  - UA findings reviewed  - UCx growing ESBL E coli  - Blood cultures NGTD  - Transitioned Zosyn to Ertapenem " 12/18 12/22/23  Completed Ertapenem.      Debility  For skilled nursing facility placement    Rhabdomyolysis  - CPK 4k on presentation  - Encourage PO hydration  - Cr improving  - Nephrology following  - monitor kidney function  - CK improving    Aspiration pneumonia  - developed fever shortly after admission  ;  Tmax 103  - CXR with concern for aspiration pna left basilar lungfield  - Growing ESBL E coli in urine, zosyn Dc'd.   - Started on Ertapenem 12/18  - Vanc DC'd  - Blood cultures NGTD  - further management pending clinical course and future study review    Depression  - holding home meds for now as seems he hasn't been taking for some time    Hyponatremia  History of severe hyponatremia requiring ICU. From chart review appears largely secondary to beer potemania in the past  - Na 120 on presentation, downtrended to 118 overnight  - Nephrology was consulted.   - S/p IVF and IV diuresis with improvement to Na  - IV lasix DC'd  - neurochecks q4h  - seizure precuations  - further management pending clinical course and future study review    -Na improved to 136      Alcohol use disorder, severe, dependence  - CIWA q4h  - ativan IV 2mg prn for CIWA > 8  - daily thiamine/FA/MV  - would benefit from addiction psych eval once clinically appropriate    Cirrhosis  MELD-Na score calculated; MELD 3.0: 26 at 12/17/2023  7:44 PM  MELD-Na: 25 at 12/17/2023  7:44 PM  Calculated from:  Serum Creatinine: 2.4 mg/dL at 12/17/2023  7:44 PM  Serum Sodium: 125 mmol/L at 12/17/2023  7:44 PM  Total Bilirubin: 0.7 mg/dL (Using min of 1 mg/dL) at 12/17/2023  9:04 AM  Serum Albumin: 2.4 g/dL at 12/17/2023  9:04 AM  INR(ratio): 1.0 at 12/15/2023  2:16 PM  Age at listing (hypothetical): 68 years  Sex: Male at 12/17/2023  7:44 PM    - continues to use alcohol persistently  - AST > ALT x2 consistent with ETOH use.  - Trend hepatic function panel  - LFT's improving  - Appears well compensated on physical exam      VTE Risk Mitigation  (From admission, onward)           Ordered     enoxaparin injection 40 mg  Every 24 hours         12/17/23 1239     IP VTE HIGH RISK PATIENT  Once         12/15/23 1817     Place sequential compression device  Until discontinued         12/15/23 1817                    Discharge Planning   KENNETH: 12/24/2023     Code Status: Full Code   Is the patient medically ready for discharge?: No    Reason for patient still in hospital (select all that apply): Patient trending condition  Discharge Plan A: Skilled Nursing Facility   Discharge Delays: (!) Post-Acute Set-up              Pardeep Torres MD  Department of Hospital Medicine   [facility] - Intensive Care ([place]

## 2023-12-24 PROCEDURE — 63600175 PHARM REV CODE 636 W HCPCS: Mod: HCNC | Performed by: INTERNAL MEDICINE

## 2023-12-24 PROCEDURE — 21400001 HC TELEMETRY ROOM: Mod: HCNC

## 2023-12-24 PROCEDURE — 25000003 PHARM REV CODE 250: Mod: HCNC | Performed by: FAMILY MEDICINE

## 2023-12-24 RX ADMIN — FOLIC ACID 1 MG: 1 TABLET ORAL at 08:12

## 2023-12-24 RX ADMIN — ENOXAPARIN SODIUM 40 MG: 40 INJECTION SUBCUTANEOUS at 04:12

## 2023-12-24 RX ADMIN — THERA TABS 1 TABLET: TAB at 08:12

## 2023-12-24 RX ADMIN — Medication 100 MG: at 08:12

## 2023-12-24 NOTE — PLAN OF CARE
Pt AAO x 4, VS monitored and stable. Fluid restriction enforced. No concerns at this time. Side rails up x 2, bed locked and in lowest position. Call light within reach.     Problem: Adult Inpatient Plan of Care  Goal: Plan of Care Review  Outcome: Ongoing, Progressing  Flowsheets (Taken 12/24/2023 0355)  Plan of Care Reviewed With: patient  Goal: Patient-Specific Goal (Individualized)  Outcome: Ongoing, Progressing  Goal: Absence of Hospital-Acquired Illness or Injury  Outcome: Ongoing, Progressing  Intervention: Identify and Manage Fall Risk  Flowsheets (Taken 12/24/2023 0355)  Safety Promotion/Fall Prevention:   assistive device/personal item within reach   nonskid shoes/socks when out of bed  Intervention: Prevent Skin Injury  Flowsheets (Taken 12/24/2023 0355)  Body Position: position changed independently  Skin Protection: incontinence pads utilized  Intervention: Prevent and Manage VTE (Venous Thromboembolism) Risk  Flowsheets (Taken 12/24/2023 0355)  VTE Prevention/Management:   bleeding precations maintained   bleeding risk assessed  Range of Motion: active ROM (range of motion) encouraged  Intervention: Prevent Infection  Flowsheets (Taken 12/24/2023 0355)  Infection Prevention:   hand hygiene promoted   single patient room provided  Goal: Optimal Comfort and Wellbeing  Outcome: Ongoing, Progressing  Intervention: Provide Person-Centered Care  Flowsheets (Taken 12/24/2023 0355)  Trust Relationship/Rapport:   care explained   empathic listening provided   questions answered   thoughts/feelings acknowledged  Goal: Readiness for Transition of Care  Outcome: Ongoing, Progressing     Problem: Fluid and Electrolyte Imbalance (Acute Kidney Injury/Impairment)  Goal: Fluid and Electrolyte Balance  Outcome: Ongoing, Progressing     Problem: Oral Intake Inadequate (Acute Kidney Injury/Impairment)  Goal: Optimal Nutrition Intake  Outcome: Ongoing, Progressing     Problem: Renal Function Impairment (Acute Kidney  Injury/Impairment)  Goal: Effective Renal Function  Outcome: Ongoing, Progressing  Intervention: Monitor and Support Renal Function  Flowsheets (Taken 12/24/2023 0355)  Medication Review/Management: medications reviewed     Problem: Infection  Goal: Absence of Infection Signs and Symptoms  Outcome: Ongoing, Progressing     Problem: Skin Injury Risk Increased  Goal: Skin Health and Integrity  Outcome: Ongoing, Progressing  Intervention: Optimize Skin Protection  Flowsheets (Taken 12/24/2023 0355)  Pressure Reduction Techniques: frequent weight shift encouraged  Skin Protection: incontinence pads utilized  Head of Bed (HOB) Positioning:   HOB elevated   HOB at 30-45 degrees

## 2023-12-24 NOTE — SUBJECTIVE & OBJECTIVE
Interval History:  Awaiting placement.  No acute issues at this time    Review of Systems   Unable to perform ROS: Other     Objective:     Vital Signs (Most Recent):  Temp: 98 °F (36.7 °C) (12/24/23 0835)  Pulse: 90 (12/24/23 0835)  Resp: 15 (12/24/23 0835)  BP: (!) 141/68 (12/24/23 0835)  SpO2: 97 % (12/24/23 0835) Vital Signs (24h Range):  Temp:  [97.4 °F (36.3 °C)-98.9 °F (37.2 °C)] 98 °F (36.7 °C)  Pulse:  [88-94] 90  Resp:  [15-18] 15  SpO2:  [96 %-99 %] 97 %  BP: (132-155)/(65-70) 141/68     Weight: 112 kg (247 lb)  Body mass index is 30.87 kg/m².    Intake/Output Summary (Last 24 hours) at 12/24/2023 1042  Last data filed at 12/24/2023 0117  Gross per 24 hour   Intake 360 ml   Output 750 ml   Net -390 ml        [facility] - Intensive Care ([address] Medicine  Progress Note     Patient Name: Froylan Borja  MRN: [id number]  Patient Class: IP- Inpatient     Admission Date: 12/15/2023  Length of Stay: 8 days  Attending Physician: Pardeep Torres MD  Primary Care Provider: Janki Tamez MD           Subjective:      Principal Problem:ALESSANDRA (acute kidney injury)           HPI:  This is a 67yo male with a past medical history of alcohol abuse, depression, hyponatremia severe requiring prior MICU admit, HTN, HLD, cirrhosis, diverticulosis who presents to the ED with AMS. Patient reportedly at the bar with his wife earlier today and became confused and unsteady and difficulty ambulating. Per chart review police called because of beligerance and were concerned about stroke symptoms and brought patient to ED for further evaluation. Patient alert and oriented to person and aware in hospital. Unable to contribute meaningfully to history but does admit heavy daily alcohol use.      In the ED patient initially afebrile, and hemodynamically stable saturating well on room air at rest. WBC 10.1 with leukocytosis. Na 120. Creatinine 2.3 (baseline 1.0). CPK 3960. CXR with concern for aspiration pneumonia. Patient  diaphoretic, tremulous, tachycardic concerning for developing withdrawal. Started on IVFs, benzodiazepines, and abx for rhabdo, hyponatremia, aspiration pna, alcohol withdrawal. Admitted to the care of medicine for further evaluation and management.      Overview/Hospital Course:  Patient admitted to [facility] for AMS with severe hyponatremia in setting of ETOH abuse, ETOH withdrawals, ALESSANDRA, and aspiration pneumonia. He was started on Vanc/zosyn for aspiration pneumonia. Received IVF bolus and continued on maintenance IVF, Nephrology consulted for hyponatremia and ALESSANDRA. Urine was spun muddy brown casts observed indicative of ATN. Nephrology recommending NS continued @ 150ml/hr and lasix 160mg IV BID for sodium excretion. Na slowing improving at appropriate rate. Blood cultures NGTD. UA growing GNR's. MRSA swab pending. Patient continues on Vanc/zosyn. Platelets decreased from 140 -> 112 -> 86. Likely secondary to infection however was receiving subcutaneous heparin. HIT panel sent and transitioned to lovenox. Continues to have symptoms of alcohol withdrawal and continued on CIWA and scheduled/PRN benzodiazepines. UA growing ESBL E coli and Dc'd zosyn and started ertapenem. Vanc discontinued. Urine output remained good with diuresis, Cr stable. IV lasix Dc'd per nephrology recommendations. Sodium improved to 135. PT/OT consulted.     Interval History:   No acute issues at this time.  Awaiting placement     Review of Systems   Unable to perform ROS: Other      Objective:      Vital Signs (Most Recent):  Temp: 97.8 °F (36.6 °C) (12/23/23 0904)  Pulse: 87 (12/23/23 0904)  Resp: 15 (12/23/23 0904)  BP: 139/67 (12/23/23 0904)  SpO2: 99 % (12/23/23 0904) Vital Signs (24h Range):  Temp:  [97.3 °F (36.3 °C)-98.9 °F (37.2 °C)] 97.8 °F (36.6 °C)  Pulse:  [] 87  Resp:  [15-20] 15  SpO2:  [96 %-99 %] 99 %  BP: (127-158)/(60-74) 139/67      Weight: 112 kg (247 lb)  Body mass index is 30.87 kg/m².     Intake/Output Summary (Last 24  "hours) at 12/23/2023 1120  Last data filed at 12/23/2023 0911      Gross per 24 hour   Intake 100 ml   Output 1675 ml   Net -1575 ml      Physical Exam  Constitutional:       General: He is not in acute distress.     Appearance: He is obese.   HENT:      Head: Normocephalic.      Right Ear: External ear normal.      Left Ear: External ear normal.      Nose: Nose normal.      Comments: Nasal cannula  Eyes:      General: No scleral icterus.  Cardiovascular:      Rate and Rhythm: Normal rate.   Pulmonary:      Effort: Pulmonary effort is normal.   Abdominal:      Palpations: Abdomen is soft.      Tenderness: There is no abdominal tenderness.   Musculoskeletal:      Comments: Debility   Skin:     General: Skin is warm.   Neurological:      Mental Status: He is alert. Mental status is at baseline.        MELD 3.0: 26 at 12/17/2023  7:44 PM  MELD-Na: 25 at 12/17/2023  7:44 PM  Calculated from:  Serum Creatinine: 2.4 mg/dL at 12/17/2023  7:44 PM  Serum Sodium: 125 mmol/L at 12/17/2023  7:44 PM  Total Bilirubin: 0.7 mg/dL (Using min of 1 mg/dL) at 12/17/2023  9:04 AM  Serum Albumin: 2.4 g/dL at 12/17/2023  9:04 AM  INR(ratio): 1.0 at 12/15/2023  2:16 PM  Age at listing (hypothetical): 68 years  Sex: Male at 12/17/2023  7:44 PM      Significant Labs:  CBC:  Recent Labs   Lab 12/23/23  0324   WBC 8.89   HGB 13.0*   HCT 38.5*        CMP:  Recent Labs   Lab 12/23/23  0324      K 3.7      CO2 25   *   BUN 52*   CREATININE 1.2   CALCIUM 8.7   PROT 5.9*   ALBUMIN 2.2*   BILITOT 0.6   ALKPHOS 70   AST 35   ALT 32   ANIONGAP 12     PTINR:  No results for input(s): "INR" in the last 48 hours.    Significant Procedures:   Dobutamine Stress Test with Color Flow: No results found for this or any previous visit.      "

## 2023-12-24 NOTE — PLAN OF CARE
Problem: Adult Inpatient Plan of Care  Goal: Plan of Care Review  Outcome: Ongoing, Progressing     Problem: Fluid and Electrolyte Imbalance (Acute Kidney Injury/Impairment)  Goal: Fluid and Electrolyte Balance  Outcome: Ongoing, Progressing     Problem: Skin Injury Risk Increased  Goal: Skin Health and Integrity  Outcome: Ongoing, Progressing

## 2023-12-24 NOTE — PROGRESS NOTES
[facility] - Intensive Care ([address] Medicine  Progress Note    Patient Name: Froylan Borja  MRN: [id number]  Patient Class: IP- Inpatient   Admission Date: 12/15/2023  Length of Stay: 9 days  Attending Physician: Pardeep Torres MD  Primary Care Provider: Janki Tamez MD        Subjective:     Principal Problem:ALESSANDRA (acute kidney injury)        HPI:  This is a 69yo male with a past medical history of alcohol abuse, depression, hyponatremia severe requiring prior MICU admit, HTN, HLD, cirrhosis, diverticulosis who presents to the ED with AMS. Patient reportedly at the bar with his wife earlier today and became confused and unsteady and difficulty ambulating. Per chart review police called because of beligerance and were concerned about stroke symptoms and brought patient to ED for further evaluation. Patient alert and oriented to person and aware in hospital. Unable to contribute meaningfully to history but does admit heavy daily alcohol use.     In the ED patient initially afebrile, and hemodynamically stable saturating well on room air at rest. WBC 10.1 with leukocytosis. Na 120. Creatinine 2.3 (baseline 1.0). CPK 3960. CXR with concern for aspiration pneumonia. Patient diaphoretic, tremulous, tachycardic concerning for developing withdrawal. Started on IVFs, benzodiazepines, and abx for rhabdo, hyponatremia, aspiration pna, alcohol withdrawal. Admitted to the care of medicine for further evaluation and management.     Overview/Hospital Course:  Patient admitted to [facility] for AMS with severe hyponatremia in setting of ETOH abuse, ETOH withdrawals, ALESSANDRA, and aspiration pneumonia. He was started on Vanc/zosyn for aspiration pneumonia. Received IVF bolus and continued on maintenance IVF, Nephrology consulted for hyponatremia and ALESSANDRA. Urine was spun muddy brown casts observed indicative of ATN. Nephrology recommending NS continued @ 150ml/hr and lasix 160mg IV BID for sodium excretion. Na slowing improving at  appropriate rate. Blood cultures NGTD. UA growing GNR's. MRSA swab pending. Patient continues on Vanc/zosyn. Platelets decreased from 140 -> 112 -> 86. Likely secondary to infection however was receiving subcutaneous heparin. HIT panel sent and transitioned to lovenox. Continues to have symptoms of alcohol withdrawal and continued on CIWA and scheduled/PRN benzodiazepines. UA growing ESBL E coli and Dc'd zosyn and started ertapenem. Vanc discontinued. Urine output remained good with diuresis, Cr stable. IV lasix Dc'd per nephrology recommendations. Sodium improved to 135. PT/OT consulted.    Interval History:  Awaiting placement.  No acute issues at this time    Review of Systems   Unable to perform ROS: Other     Objective:     Vital Signs (Most Recent):  Temp: 98 °F (36.7 °C) (12/24/23 0835)  Pulse: 90 (12/24/23 0835)  Resp: 15 (12/24/23 0835)  BP: (!) 141/68 (12/24/23 0835)  SpO2: 97 % (12/24/23 0835) Vital Signs (24h Range):  Temp:  [97.4 °F (36.3 °C)-98.9 °F (37.2 °C)] 98 °F (36.7 °C)  Pulse:  [88-94] 90  Resp:  [15-18] 15  SpO2:  [96 %-99 %] 97 %  BP: (132-155)/(65-70) 141/68     Weight: 112 kg (247 lb)  Body mass index is 30.87 kg/m².    Intake/Output Summary (Last 24 hours) at 12/24/2023 1042  Last data filed at 12/24/2023 0117  Gross per 24 hour   Intake 360 ml   Output 750 ml   Net -390 ml        [facility] - Intensive Care ([address] Medicine  Progress Note     Patient Name: Froylan Borja  MRN: [id number]  Patient Class: IP- Inpatient     Admission Date: 12/15/2023  Length of Stay: 8 days  Attending Physician: Pardeep Torres MD  Primary Care Provider: Janki Tamez MD           Subjective:      Principal Problem:ALESSANDRA (acute kidney injury)           HPI:  This is a 67yo male with a past medical history of alcohol abuse, depression, hyponatremia severe requiring prior MICU admit, HTN, HLD, cirrhosis, diverticulosis who presents to the ED with AMS. Patient reportedly at the bar with his wife  earlier today and became confused and unsteady and difficulty ambulating. Per chart review police called because of beligerance and were concerned about stroke symptoms and brought patient to ED for further evaluation. Patient alert and oriented to person and aware in hospital. Unable to contribute meaningfully to history but does admit heavy daily alcohol use.      In the ED patient initially afebrile, and hemodynamically stable saturating well on room air at rest. WBC 10.1 with leukocytosis. Na 120. Creatinine 2.3 (baseline 1.0). CPK 3960. CXR with concern for aspiration pneumonia. Patient diaphoretic, tremulous, tachycardic concerning for developing withdrawal. Started on IVFs, benzodiazepines, and abx for rhabdo, hyponatremia, aspiration pna, alcohol withdrawal. Admitted to the care of medicine for further evaluation and management.      Overview/Hospital Course:  Patient admitted to [facility] for AMS with severe hyponatremia in setting of ETOH abuse, ETOH withdrawals, ALESSANDRA, and aspiration pneumonia. He was started on Vanc/zosyn for aspiration pneumonia. Received IVF bolus and continued on maintenance IVF, Nephrology consulted for hyponatremia and ALESSANDRA. Urine was spun muddy brown casts observed indicative of ATN. Nephrology recommending NS continued @ 150ml/hr and lasix 160mg IV BID for sodium excretion. Na slowing improving at appropriate rate. Blood cultures NGTD. UA growing GNR's. MRSA swab pending. Patient continues on Vanc/zosyn. Platelets decreased from 140 -> 112 -> 86. Likely secondary to infection however was receiving subcutaneous heparin. HIT panel sent and transitioned to lovenox. Continues to have symptoms of alcohol withdrawal and continued on CIWA and scheduled/PRN benzodiazepines. UA growing ESBL E coli and Dc'd zosyn and started ertapenem. Vanc discontinued. Urine output remained good with diuresis, Cr stable. IV lasix Dc'd per nephrology recommendations. Sodium improved to 135. PT/OT consulted.      Interval History:   No acute issues at this time.  Awaiting placement     Review of Systems   Unable to perform ROS: Other      Objective:      Vital Signs (Most Recent):  Temp: 97.8 °F (36.6 °C) (12/23/23 0904)  Pulse: 87 (12/23/23 0904)  Resp: 15 (12/23/23 0904)  BP: 139/67 (12/23/23 0904)  SpO2: 99 % (12/23/23 0904) Vital Signs (24h Range):  Temp:  [97.3 °F (36.3 °C)-98.9 °F (37.2 °C)] 97.8 °F (36.6 °C)  Pulse:  [] 87  Resp:  [15-20] 15  SpO2:  [96 %-99 %] 99 %  BP: (127-158)/(60-74) 139/67      Weight: 112 kg (247 lb)  Body mass index is 30.87 kg/m².     Intake/Output Summary (Last 24 hours) at 12/23/2023 1120  Last data filed at 12/23/2023 0911      Gross per 24 hour   Intake 100 ml   Output 1675 ml   Net -1575 ml      Physical Exam  Constitutional:       General: He is not in acute distress.     Appearance: He is obese.   HENT:      Head: Normocephalic.      Right Ear: External ear normal.      Left Ear: External ear normal.      Nose: Nose normal.      Comments: Nasal cannula  Eyes:      General: No scleral icterus.  Cardiovascular:      Rate and Rhythm: Normal rate.   Pulmonary:      Effort: Pulmonary effort is normal.   Abdominal:      Palpations: Abdomen is soft.      Tenderness: There is no abdominal tenderness.   Musculoskeletal:      Comments: Debility   Skin:     General: Skin is warm.   Neurological:      Mental Status: He is alert. Mental status is at baseline.        MELD 3.0: 26 at 12/17/2023  7:44 PM  MELD-Na: 25 at 12/17/2023  7:44 PM  Calculated from:  Serum Creatinine: 2.4 mg/dL at 12/17/2023  7:44 PM  Serum Sodium: 125 mmol/L at 12/17/2023  7:44 PM  Total Bilirubin: 0.7 mg/dL (Using min of 1 mg/dL) at 12/17/2023  9:04 AM  Serum Albumin: 2.4 g/dL at 12/17/2023  9:04 AM  INR(ratio): 1.0 at 12/15/2023  2:16 PM  Age at listing (hypothetical): 68 years  Sex: Male at 12/17/2023  7:44 PM      Significant Labs:  CBC:  Recent Labs   Lab 12/23/23  0324   WBC 8.89   HGB 13.0*   HCT 38.5*   PLT  "266     CMP:  Recent Labs   Lab 12/23/23  0324      K 3.7      CO2 25   *   BUN 52*   CREATININE 1.2   CALCIUM 8.7   PROT 5.9*   ALBUMIN 2.2*   BILITOT 0.6   ALKPHOS 70   AST 35   ALT 32   ANIONGAP 12     PTINR:  No results for input(s): "INR" in the last 48 hours.    Significant Procedures:   Dobutamine Stress Test with Color Flow: No results found for this or any previous visit.        Assessment/Plan:      * ALESSANDRA (acute kidney injury)  - Creatinine 2.3 on presentation ; baseline 1.0  - suspect secondary to dehydration with severe alcohol use/dependence and inadequate water/hydration  - Nephrology consulted, urine spun and multiple muddy brown casts indicative of ATN  - S/p IV diuresis for 2-3 days, now off diuretics as of 12/19  - strict I/Os  - avoid nephrotoxic agents as appropriate  - continue to monitor  - Renal US without hydronephrosis  - Cr continues to improve    12/21/23  Kidney functions improving and Nephrology signed off.  Continue with oral intake    Acute cystitis  - Interval history and physical exam findings as described above  - UA findings reviewed  - UCx growing ESBL E coli  - Blood cultures NGTD  - Transitioned Zosyn to Ertapenem 12/18 12/22/23  Completed Ertapenem.      Debility  For skilled nursing facility placement    Rhabdomyolysis  - CPK 4k on presentation  - Encourage PO hydration  - Cr improving  - Nephrology following  - monitor kidney function  - CK improving    Aspiration pneumonia  - developed fever shortly after admission  ;  Tmax 103  - CXR with concern for aspiration pna left basilar lungfield  - Growing ESBL E coli in urine, zosyn Dc'd.   - Started on Ertapenem 12/18  - Vanc DC'd  - Blood cultures NGTD  - further management pending clinical course and future study review    Depression  - holding home meds for now as seems he hasn't been taking for some time    Hyponatremia  History of severe hyponatremia requiring ICU. From chart review appears largely " secondary to beer potemania in the past  - Na 120 on presentation, downtrended to 118 overnight  - Nephrology was consulted.   - S/p IVF and IV diuresis with improvement to Na  - IV lasix DC'd  - neurochecks q4h  - seizure precuations  - further management pending clinical course and future study review    -Na improved to 136      Alcohol use disorder, severe, dependence  - CIWA q4h  - ativan IV 2mg prn for CIWA > 8  - daily thiamine/FA/MV  - would benefit from addiction psych eval once clinically appropriate    Cirrhosis  MELD-Na score calculated; MELD 3.0: 26 at 12/17/2023  7:44 PM  MELD-Na: 25 at 12/17/2023  7:44 PM  Calculated from:  Serum Creatinine: 2.4 mg/dL at 12/17/2023  7:44 PM  Serum Sodium: 125 mmol/L at 12/17/2023  7:44 PM  Total Bilirubin: 0.7 mg/dL (Using min of 1 mg/dL) at 12/17/2023  9:04 AM  Serum Albumin: 2.4 g/dL at 12/17/2023  9:04 AM  INR(ratio): 1.0 at 12/15/2023  2:16 PM  Age at listing (hypothetical): 68 years  Sex: Male at 12/17/2023  7:44 PM    - continues to use alcohol persistently  - AST > ALT x2 consistent with ETOH use.  - Trend hepatic function panel  - LFT's improving  - Appears well compensated on physical exam      VTE Risk Mitigation (From admission, onward)           Ordered     enoxaparin injection 40 mg  Every 24 hours         12/17/23 1239     IP VTE HIGH RISK PATIENT  Once         12/15/23 1817     Place sequential compression device  Until discontinued         12/15/23 1817                    Discharge Planning   KENNETH: 12/24/2023     Code Status: Full Code   Is the patient medically ready for discharge?: No    Reason for patient still in hospital (select all that apply): Patient trending condition  Discharge Plan A: Skilled Nursing Facility   Discharge Delays: (!) Post-Acute Set-up              Pardeep Torres MD  Department of Hospital Medicine   [facility] - Intensive Care ([place]

## 2023-12-25 PROCEDURE — 25000003 PHARM REV CODE 250: Mod: HCNC | Performed by: INTERNAL MEDICINE

## 2023-12-25 PROCEDURE — 63600175 PHARM REV CODE 636 W HCPCS: Mod: HCNC | Performed by: INTERNAL MEDICINE

## 2023-12-25 PROCEDURE — 25000003 PHARM REV CODE 250: Mod: HCNC | Performed by: FAMILY MEDICINE

## 2023-12-25 PROCEDURE — 21400001 HC TELEMETRY ROOM: Mod: HCNC

## 2023-12-25 RX ORDER — LOSARTAN POTASSIUM 50 MG/1
100 TABLET ORAL DAILY
Status: DISCONTINUED | OUTPATIENT
Start: 2023-12-25 | End: 2024-01-03 | Stop reason: HOSPADM

## 2023-12-25 RX ADMIN — ENOXAPARIN SODIUM 40 MG: 40 INJECTION SUBCUTANEOUS at 05:12

## 2023-12-25 RX ADMIN — FOLIC ACID 1 MG: 1 TABLET ORAL at 09:12

## 2023-12-25 RX ADMIN — Medication 100 MG: at 09:12

## 2023-12-25 RX ADMIN — LOSARTAN POTASSIUM 100 MG: 50 TABLET, FILM COATED ORAL at 05:12

## 2023-12-25 RX ADMIN — THERA TABS 1 TABLET: TAB at 09:12

## 2023-12-25 NOTE — PLAN OF CARE
Pt AAO x 4, VS monitored and stable. Fluid restriction maintained. CIWA and Neuro assessments q 4 hrs. Telemetry monitor in place. Wound care consulted. PICC consulted. Side rails up x 2, bed locked and in lowest position. Call light within reach.     Problem: Adult Inpatient Plan of Care  Goal: Plan of Care Review  Outcome: Ongoing, Progressing  Goal: Patient-Specific Goal (Individualized)  Outcome: Ongoing, Progressing  Goal: Absence of Hospital-Acquired Illness or Injury  Outcome: Ongoing, Progressing  Goal: Optimal Comfort and Wellbeing  Outcome: Ongoing, Progressing  Goal: Readiness for Transition of Care  Outcome: Ongoing, Progressing     Problem: Fluid and Electrolyte Imbalance (Acute Kidney Injury/Impairment)  Goal: Fluid and Electrolyte Balance  Outcome: Ongoing, Progressing     Problem: Oral Intake Inadequate (Acute Kidney Injury/Impairment)  Goal: Optimal Nutrition Intake  Outcome: Ongoing, Progressing     Problem: Renal Function Impairment (Acute Kidney Injury/Impairment)  Goal: Effective Renal Function  Outcome: Ongoing, Progressing     Problem: Infection  Goal: Absence of Infection Signs and Symptoms  Outcome: Ongoing, Progressing     Problem: Skin Injury Risk Increased  Goal: Skin Health and Integrity  Outcome: Ongoing, Progressing     Problem: Impaired Wound Healing  Goal: Optimal Wound Healing  Outcome: Ongoing, Progressing

## 2023-12-25 NOTE — PROGRESS NOTES
[facility] - Intensive Care ([address] Medicine  Progress Note    Patient Name: Froylan Borja  MRN: [id number]  Patient Class: IP- Inpatient   Admission Date: 12/15/2023  Length of Stay: 10 days  Attending Physician: Pardeep Torres MD  Primary Care Provider: Janki Tamez MD        Subjective:     Principal Problem:ALESSANDRA (acute kidney injury)        HPI:  This is a 67yo male with a past medical history of alcohol abuse, depression, hyponatremia severe requiring prior MICU admit, HTN, HLD, cirrhosis, diverticulosis who presents to the ED with AMS. Patient reportedly at the bar with his wife earlier today and became confused and unsteady and difficulty ambulating. Per chart review police called because of beligerance and were concerned about stroke symptoms and brought patient to ED for further evaluation. Patient alert and oriented to person and aware in hospital. Unable to contribute meaningfully to history but does admit heavy daily alcohol use.     In the ED patient initially afebrile, and hemodynamically stable saturating well on room air at rest. WBC 10.1 with leukocytosis. Na 120. Creatinine 2.3 (baseline 1.0). CPK 3960. CXR with concern for aspiration pneumonia. Patient diaphoretic, tremulous, tachycardic concerning for developing withdrawal. Started on IVFs, benzodiazepines, and abx for rhabdo, hyponatremia, aspiration pna, alcohol withdrawal. Admitted to the care of medicine for further evaluation and management.     Overview/Hospital Course:  Patient admitted to [facility] for AMS with severe hyponatremia in setting of ETOH abuse, ETOH withdrawals, ALESSANDRA, and aspiration pneumonia. He was started on Vanc/zosyn for aspiration pneumonia. Received IVF bolus and continued on maintenance IVF, Nephrology consulted for hyponatremia and ALESSANDRA. Urine was spun muddy brown casts observed indicative of ATN. Nephrology recommending NS continued @ 150ml/hr and lasix 160mg IV BID for sodium excretion. Na slowing improving  at appropriate rate. Blood cultures NGTD. UA growing GNR's. MRSA swab pending. Patient continues on Vanc/zosyn. Platelets decreased from 140 -> 112 -> 86. Likely secondary to infection however was receiving subcutaneous heparin. HIT panel sent and transitioned to lovenox. Continues to have symptoms of alcohol withdrawal and continued on CIWA and scheduled/PRN benzodiazepines. UA growing ESBL E coli and Dc'd zosyn and started ertapenem. Vanc discontinued. Urine output remained good with diuresis, Cr stable. IV lasix Dc'd per nephrology recommendations. Sodium improved to 135. PT/OT consulted.    Interval History:  Awaiting placement.  No acute issues    Review of Systems   Unable to perform ROS: Other     Objective:     Vital Signs (Most Recent):  Temp: 98 °F (36.7 °C) (12/25/23 0818)  Pulse: 92 (12/25/23 0818)  Resp: 18 (12/25/23 0818)  BP: (!) 160/79 (12/25/23 0818)  SpO2: (!) 94 % (12/25/23 0818) Vital Signs (24h Range):  Temp:  [97.5 °F (36.4 °C)-98.8 °F (37.1 °C)] 98 °F (36.7 °C)  Pulse:  [41-96] 92  Resp:  [16-18] 18  SpO2:  [94 %-99 %] 94 %  BP: (145-164)/(69-79) 160/79     Weight: 112 kg (247 lb)  Body mass index is 30.87 kg/m².    Intake/Output Summary (Last 24 hours) at 12/25/2023 1055  Last data filed at 12/25/2023 0408  Gross per 24 hour   Intake 480 ml   Output 975 ml   Net -495 ml      Physical Exam  Constitutional:       General: He is not in acute distress.     Appearance: He is obese.   HENT:      Head: Normocephalic.      Right Ear: External ear normal.      Left Ear: External ear normal.      Nose: Nose normal.      Comments: Nasal cannula  Eyes:      General: No scleral icterus.  Cardiovascular:      Rate and Rhythm: Normal rate.   Pulmonary:      Effort: Pulmonary effort is normal.   Abdominal:      Palpations: Abdomen is soft.      Tenderness: There is no abdominal tenderness.   Musculoskeletal:      Comments: Debility   Skin:     General: Skin is warm.   Neurological:      Mental Status: He is  "alert. Mental status is at baseline.     MELD 3.0: 26 at 12/17/2023  7:44 PM  MELD-Na: 25 at 12/17/2023  7:44 PM  Calculated from:  Serum Creatinine: 2.4 mg/dL at 12/17/2023  7:44 PM  Serum Sodium: 125 mmol/L at 12/17/2023  7:44 PM  Total Bilirubin: 0.7 mg/dL (Using min of 1 mg/dL) at 12/17/2023  9:04 AM  Serum Albumin: 2.4 g/dL at 12/17/2023  9:04 AM  INR(ratio): 1.0 at 12/15/2023  2:16 PM  Age at listing (hypothetical): 68 years  Sex: Male at 12/17/2023  7:44 PM      Significant Labs:  CBC:  No results for input(s): "WBC", "HGB", "HCT", "PLT" in the last 48 hours.  CMP:  No results for input(s): "NA", "K", "CL", "CO2", "GLU", "BUN", "CREATININE", "CALCIUM", "PROT", "ALBUMIN", "BILITOT", "ALKPHOS", "AST", "ALT", "ANIONGAP", "EGFRNONAA" in the last 48 hours.    Invalid input(s): "ESTGFAFR[place]"  PTINR:  No results for input(s): "INR" in the last 48 hours.    Significant Procedures:   Dobutamine Stress Test with Color Flow: No results found for this or any previous visit.        Assessment/Plan:      * ALESSANDRA (acute kidney injury)  - Creatinine 2.3 on presentation ; baseline 1.0  - suspect secondary to dehydration with severe alcohol use/dependence and inadequate water/hydration  - Nephrology consulted, urine spun and multiple muddy brown casts indicative of ATN  - S/p IV diuresis for 2-3 days, now off diuretics as of 12/19  - strict I/Os  - avoid nephrotoxic agents as appropriate  - continue to monitor  - Renal US without hydronephrosis  - Cr continues to improve    12/21/23  Kidney functions improving and Nephrology signed off.  Continue with oral intake    Acute cystitis  - Interval history and physical exam findings as described above  - UA findings reviewed  - UCx growing ESBL E coli  - Blood cultures NGTD  - Transitioned Zosyn to Ertapenem 12/18 12/22/23  Completed Ertapenem.      Debility  For skilled nursing facility placement    Rhabdomyolysis  - CPK 4k on presentation  - Encourage PO hydration  - Cr " improving  - Nephrology following  - monitor kidney function  - CK improving    Aspiration pneumonia  - developed fever shortly after admission  ;  Tmax 103  - CXR with concern for aspiration pna left basilar lungfield  - Growing ESBL E coli in urine, zosyn Dc'd.   - Started on Ertapenem 12/18  - Vanc DC'd  - Blood cultures NGTD  - further management pending clinical course and future study review    Depression  - holding home meds for now as seems he hasn't been taking for some time    Hyponatremia  History of severe hyponatremia requiring ICU. From chart review appears largely secondary to beer potemania in the past  - Na 120 on presentation, downtrended to 118 overnight  - Nephrology was consulted.   - S/p IVF and IV diuresis with improvement to Na  - IV lasix DC'd  - neurochecks q4h  - seizure precuations  - further management pending clinical course and future study review    -Na improved to 136      Alcohol use disorder, severe, dependence  - CIWA q4h  - ativan IV 2mg prn for CIWA > 8  - daily thiamine/FA/MV  - would benefit from addiction psych eval once clinically appropriate    Cirrhosis  MELD-Na score calculated; MELD 3.0: 26 at 12/17/2023  7:44 PM  MELD-Na: 25 at 12/17/2023  7:44 PM  Calculated from:  Serum Creatinine: 2.4 mg/dL at 12/17/2023  7:44 PM  Serum Sodium: 125 mmol/L at 12/17/2023  7:44 PM  Total Bilirubin: 0.7 mg/dL (Using min of 1 mg/dL) at 12/17/2023  9:04 AM  Serum Albumin: 2.4 g/dL at 12/17/2023  9:04 AM  INR(ratio): 1.0 at 12/15/2023  2:16 PM  Age at listing (hypothetical): 68 years  Sex: Male at 12/17/2023  7:44 PM    - continues to use alcohol persistently  - AST > ALT x2 consistent with ETOH use.  - Trend hepatic function panel  - LFT's improving  - Appears well compensated on physical exam      VTE Risk Mitigation (From admission, onward)           Ordered     enoxaparin injection 40 mg  Every 24 hours         12/17/23 1239     IP VTE HIGH RISK PATIENT  Once         12/15/23 1817      Place sequential compression device  Until discontinued         12/15/23 1817                    Discharge Planning   KENNETH: 12/24/2023     Code Status: Full Code   Is the patient medically ready for discharge?: No    Reason for patient still in hospital (select all that apply): Patient trending condition  Discharge Plan A: Skilled Nursing Facility   Discharge Delays: (!) Post-Acute Set-up              Pardeep Torres MD  Department of Hospital Medicine   [facility] - Intensive Care ([place])

## 2023-12-25 NOTE — SUBJECTIVE & OBJECTIVE
"Interval History:  Awaiting placement.  No acute issues    Review of Systems   Unable to perform ROS: Other     Objective:     Vital Signs (Most Recent):  Temp: 98 °F (36.7 °C) (12/25/23 0818)  Pulse: 92 (12/25/23 0818)  Resp: 18 (12/25/23 0818)  BP: (!) 160/79 (12/25/23 0818)  SpO2: (!) 94 % (12/25/23 0818) Vital Signs (24h Range):  Temp:  [97.5 °F (36.4 °C)-98.8 °F (37.1 °C)] 98 °F (36.7 °C)  Pulse:  [41-96] 92  Resp:  [16-18] 18  SpO2:  [94 %-99 %] 94 %  BP: (145-164)/(69-79) 160/79     Weight: 112 kg (247 lb)  Body mass index is 30.87 kg/m².    Intake/Output Summary (Last 24 hours) at 12/25/2023 1055  Last data filed at 12/25/2023 0408  Gross per 24 hour   Intake 480 ml   Output 975 ml   Net -495 ml      Physical Exam  Constitutional:       General: He is not in acute distress.     Appearance: He is obese.   HENT:      Head: Normocephalic.      Right Ear: External ear normal.      Left Ear: External ear normal.      Nose: Nose normal.      Comments: Nasal cannula  Eyes:      General: No scleral icterus.  Cardiovascular:      Rate and Rhythm: Normal rate.   Pulmonary:      Effort: Pulmonary effort is normal.   Abdominal:      Palpations: Abdomen is soft.      Tenderness: There is no abdominal tenderness.   Musculoskeletal:      Comments: Debility   Skin:     General: Skin is warm.   Neurological:      Mental Status: He is alert. Mental status is at baseline.     MELD 3.0: 26 at 12/17/2023  7:44 PM  MELD-Na: 25 at 12/17/2023  7:44 PM  Calculated from:  Serum Creatinine: 2.4 mg/dL at 12/17/2023  7:44 PM  Serum Sodium: 125 mmol/L at 12/17/2023  7:44 PM  Total Bilirubin: 0.7 mg/dL (Using min of 1 mg/dL) at 12/17/2023  9:04 AM  Serum Albumin: 2.4 g/dL at 12/17/2023  9:04 AM  INR(ratio): 1.0 at 12/15/2023  2:16 PM  Age at listing (hypothetical): 68 years  Sex: Male at 12/17/2023  7:44 PM      Significant Labs:  CBC:  No results for input(s): "WBC", "HGB", "HCT", "PLT" in the last 48 hours.  CMP:  No results for " "input(s): "NA", "K", "CL", "CO2", "GLU", "BUN", "CREATININE", "CALCIUM", "PROT", "ALBUMIN", "BILITOT", "ALKPHOS", "AST", "ALT", "ANIONGAP", "EGFRNONAA" in the last 48 hours.    Invalid input(s): "ESTGFAFR[place]"  PTINR:  No results for input(s): "INR" in the last 48 hours.    Significant Procedures:   Dobutamine Stress Test with Color Flow: No results found for this or any previous visit.      "

## 2023-12-25 NOTE — PLAN OF CARE
Pt AAOx4. Respirations even and unlabored on 2LNC. CIWA and NEURO checks q4. Tele monitor in place. Safety maintained.     Problem: Adult Inpatient Plan of Care  Goal: Plan of Care Review  Outcome: Ongoing, Progressing     Problem: Skin Injury Risk Increased  Goal: Skin Health and Integrity  Outcome: Ongoing, Progressing

## 2023-12-26 PROCEDURE — 21400001 HC TELEMETRY ROOM: Mod: HCNC

## 2023-12-26 PROCEDURE — 25000003 PHARM REV CODE 250: Mod: HCNC | Performed by: INTERNAL MEDICINE

## 2023-12-26 PROCEDURE — 97535 SELF CARE MNGMENT TRAINING: CPT | Mod: HCNC,CO

## 2023-12-26 PROCEDURE — 63600175 PHARM REV CODE 636 W HCPCS: Mod: HCNC | Performed by: INTERNAL MEDICINE

## 2023-12-26 PROCEDURE — 97530 THERAPEUTIC ACTIVITIES: CPT | Mod: HCNC,CO

## 2023-12-26 PROCEDURE — 97530 THERAPEUTIC ACTIVITIES: CPT | Mod: HCNC,CQ

## 2023-12-26 PROCEDURE — 25000003 PHARM REV CODE 250: Mod: HCNC | Performed by: FAMILY MEDICINE

## 2023-12-26 PROCEDURE — 97116 GAIT TRAINING THERAPY: CPT | Mod: HCNC,CQ

## 2023-12-26 RX ADMIN — THERA TABS 1 TABLET: TAB at 09:12

## 2023-12-26 RX ADMIN — OXYCODONE HYDROCHLORIDE 5 MG: 5 TABLET ORAL at 09:12

## 2023-12-26 RX ADMIN — FOLIC ACID 1 MG: 1 TABLET ORAL at 09:12

## 2023-12-26 RX ADMIN — ENOXAPARIN SODIUM 40 MG: 40 INJECTION SUBCUTANEOUS at 05:12

## 2023-12-26 RX ADMIN — LOSARTAN POTASSIUM 100 MG: 50 TABLET, FILM COATED ORAL at 09:12

## 2023-12-26 RX ADMIN — Medication 100 MG: at 09:12

## 2023-12-26 NOTE — CONSULTS
[facility] - Intensive Care ([place] [address])  Wound Care    Patient Name:  Froylan Borja   MRN:  [id number]  Date: 12/26/2023  Diagnosis: ALESSANDRA (acute kidney injury)    History:     Past Medical History:   Diagnosis Date    Alcohol abuse     Anxiety     Cirrhosis     Glaucoma     History of hepatitis C, s/p successful Rx w/ SVR24 - 1/2017     genotype 1;  relapse following PegIFN+RBV+Victrelis; prior relapse following PegIFN+RBV S/p 12 weeks harvoni + RBV w/ SVR    Hypertension     Paresthesia     feet, bilaterally       Social History     Socioeconomic History    Marital status:    Tobacco Use    Smoking status: Some Days     Types: Cigars    Smokeless tobacco: Never    Tobacco comments:     smokes cigars 20 per month   Substance and Sexual Activity    Alcohol use: Yes     Comment: heavy alcohol use in the past, now drinking daily, 2-3 beers daily    Drug use: No   Social History Narrative     (mental health issues), retired  at [employer]. No kids. 2 dogs. No exercise.     Social Determinants of Health     Financial Resource Strain: Low Risk  (12/18/2023)    Overall Financial Resource Strain (CARDIA)     Difficulty of Paying Living Expenses: Not hard at all   Food Insecurity: No Food Insecurity (12/18/2023)    Hunger Vital Sign     Worried About Running Out of Food in the Last Year: Never true     Ran Out of Food in the Last Year: Never true   Transportation Needs: No Transportation Needs (12/18/2023)    PRAPARE - Transportation     Lack of Transportation (Medical): No     Lack of Transportation (Non-Medical): No   Physical Activity: Inactive (12/18/2023)    Exercise Vital Sign     Days of Exercise per Week: 0 days     Minutes of Exercise per Session: 0 min   Stress: Stress Concern Present (12/18/2023)    [place] [employer] - Occupational Stress Questionnaire     Feeling of Stress : To some extent   Social Connections: Socially Isolated (12/18/2023)    Social Connection and Isolation  Panel [NHANES]     Frequency of Communication with Friends and Family: Once a week     Frequency of Social Gatherings with Friends and Family: Once a week     Attends Anabaptist Services: Never     Active Member of Clubs or Organizations: No     Attends Club or Organization Meetings: Never     Marital Status:    Housing Stability: Low Risk  (12/18/2023)    Housing Stability Vital Sign     Unable to Pay for Housing in the Last Year: No     Number of Places Lived in the Last Year: 1     Unstable Housing in the Last Year: No       Precautions:     Allergies as of 12/15/2023 - Reviewed 12/15/2023   Allergen Reaction Noted    Zoloft [sertraline] Other (See Comments) 01/27/2022       WOC Assessment Details/Treatment   Patient seen for wound care consultation to bilateral thighs, scrotum, penis, sacrum.   Reviewed chart for this encounter.   See Flow Sheet for findings.      Per chart review. This is a 69yo male with a past medical history of alcohol abuse, depression, hyponatremia severe requiring prior MICU admit, HTN, HLD, cirrhosis, diverticulosis who presents to the ED with AMS. Wound care noted skin to be pink and red in color, with flaking. Wound care to cleansed barrier cream with soap and water. Wound care applied Triad the areas of redness and applied a Qivi. Pt denied any needs at this time and left Triad at the bedside.      RECOMMENDATIONS:  - IP Skin integrity GERALD  - Bedside nurse to perform wound care to penis, scrotum, buttocks, and thighs:  Cleanse wound with soap and water  Pat dry.  Apply a Hydrophilic Wound Dressing (Triad) to wound bed  Apply this BID AND PRN.     Discussed POC with patient and primary RN.   See EMR for orders & patient education.  Discussed POC with primary team.  GERALD Notified.    Nursing to continue care.  Nursing to maintain pressure injury prevention interventions.  Contact wound care for any further questions.       12/26/23 1030   WOCN Assessment   WOCN Total Time (mins) 30    Visit Date 12/26/23   Visit Time 1030   Consult Type New   CN Speciality Wound   Wound moisture   Intervention assessed;changed;applied;chart review;coordination of care;orders   Teaching on-going        Altered Skin Integrity 12/25/23 0413 Penis Skin Tear Partial thickness tissue loss. Shallow open ulcer with a red or pink wound bed, without slough. Intact or Open/Ruptured Serum-filled blister.   Date First Assessed/Time First Assessed: 12/25/23 0413   Location: Penis  Primary Wound Type: Skin Tear  Description of Altered Skin Integrity: Partial thickness tissue loss. Shallow open ulcer with a red or pink wound bed, without slough. Intact or O...   Dressing Appearance No dressing   Drainage Amount None   Appearance Pink;Red;Moist   Tissue loss description Partial thickness   Care Cleansed with:;Soap and water   Dressing Applied;Other (comment)  (Triad)   Dressing Change Due 12/26/23        Altered Skin Integrity 12/25/23 0415 Buttocks Moisture associated dermatitis   Date First Assessed/Time First Assessed: 12/25/23 0415   Location: Buttocks  Primary Wound Type: Moisture associated dermatitis   Wound Image    Dressing Appearance No dressing   Drainage Amount None   Appearance Pink;Red;Moist   Tissue loss description Partial thickness   Care Cleansed with:;Soap and water   Dressing Applied;Other (comment)  (Triad)   Dressing Change Due 12/26/23 12/26/2023

## 2023-12-26 NOTE — PLAN OF CARE
Recommendations    1. Continue Regular diet with texture per SLP      2. If PO intake <50%, add Boost plus BID      3. RD to monitor and follow    Goals: Meet % EEN, EPN by RD f/u  Nutrition Goal Status: new  Communication of RD Recs:  (POC)

## 2023-12-26 NOTE — SUBJECTIVE & OBJECTIVE
"Interval History:  No acute issues.  Awaiting placement    Review of Systems   Unable to perform ROS: Other     Objective:     Vital Signs (Most Recent):  Temp: 97.6 °F (36.4 °C) (12/26/23 0803)  Pulse: 104 (12/26/23 0803)  Resp: 18 (12/26/23 0803)  BP: (!) 143/77 (12/26/23 0803)  SpO2: (!) 92 % (12/26/23 0803) Vital Signs (24h Range):  Temp:  [97.6 °F (36.4 °C)-98.3 °F (36.8 °C)] 97.6 °F (36.4 °C)  Pulse:  [] 104  Resp:  [17-18] 18  SpO2:  [92 %-97 %] 92 %  BP: (133-194)/(63-88) 143/77     Weight: 112 kg (247 lb)  Body mass index is 30.87 kg/m².    Intake/Output Summary (Last 24 hours) at 12/26/2023 0858  Last data filed at 12/25/2023 1724  Gross per 24 hour   Intake --   Output 400 ml   Net -400 ml      Physical Exam  Constitutional:       General: He is not in acute distress.     Appearance: He is obese.   HENT:      Head: Normocephalic.      Right Ear: External ear normal.      Left Ear: External ear normal.      Nose: Nose normal.      Comments: Nasal cannula  Eyes:      General: No scleral icterus.  Cardiovascular:      Rate and Rhythm: Normal rate.   Pulmonary:      Effort: Pulmonary effort is normal.   Abdominal:      Palpations: Abdomen is soft.      Tenderness: There is no abdominal tenderness.   Musculoskeletal:      Comments: Debility   Skin:     General: Skin is warm.   Neurological:      Mental Status: He is alert. Mental status is at baseline.     MELD 3.0: 26 at 12/17/2023  7:44 PM  MELD-Na: 25 at 12/17/2023  7:44 PM  Calculated from:  Serum Creatinine: 2.4 mg/dL at 12/17/2023  7:44 PM  Serum Sodium: 125 mmol/L at 12/17/2023  7:44 PM  Total Bilirubin: 0.7 mg/dL (Using min of 1 mg/dL) at 12/17/2023  9:04 AM  Serum Albumin: 2.4 g/dL at 12/17/2023  9:04 AM  INR(ratio): 1.0 at 12/15/2023  2:16 PM  Age at listing (hypothetical): 68 years  Sex: Male at 12/17/2023  7:44 PM      Significant Labs:  CBC:  No results for input(s): "WBC", "HGB", "HCT", "PLT" in the last 48 hours.  CMP:  No results for " "input(s): "NA", "K", "CL", "CO2", "GLU", "BUN", "CREATININE", "CALCIUM", "PROT", "ALBUMIN", "BILITOT", "ALKPHOS", "AST", "ALT", "ANIONGAP", "EGFRNONAA" in the last 48 hours.    Invalid input(s): "ESTGFAFR[place]"  PTINR:  No results for input(s): "INR" in the last 48 hours.    Significant Procedures:   Dobutamine Stress Test with Color Flow: No results found for this or any previous visit.      "

## 2023-12-26 NOTE — PT/OT/SLP PROGRESS
Occupational Therapy   Co-Treatment with PT  Co-treatment performed due to patient's multiple deficits requiring two skilled therapists to appropriately and safely assess patient's strength and endurance while facilitating functional tasks in addition to accommodating for patient's activity tolerance.     Name: Froylan Borja  MRN: [id number]  Admitting Diagnosis:  ALESSANDRA (acute kidney injury)       Recommendations:     Discharge Recommendations: Moderate Intensity Therapy  Discharge Equipment Recommendations:  walker, rolling, wheelchair, bedside commode  Barriers to discharge:       Assessment:     Froylan Borja is a 68 y.o. male with a medical diagnosis of ALESSANDRA (acute kidney injury).  He presents with the following performance deficits affecting function: weakness, impaired functional mobility, gait instability, impaired endurance, decreased upper extremity function, impaired balance, impaired self care skills, decreased lower extremity function, decreased coordination, pain, impaired cardiopulmonary response to activity.     Pt agreeable to therapy and tolerated session well. Pt requiring lighter assistance with bed mobility and transfers than previous sessions. Pt requiring light assistance with ambulation as well, extra time required. Pt transferring bed<>chair x 2 trials and ambulating short distances with RW. He c/o fatigue and weakness.    Rehab Prognosis:  Good; patient would benefit from acute skilled OT services to address these deficits and reach maximum level of function.       Plan:     Patient to be seen 3 x/week to address the above listed problems via self-care/home management, therapeutic activities, therapeutic exercises  Plan of Care Expires: 01/20/24  Plan of Care Reviewed with: patient, spouse    Subjective     Chief Complaint: fatigue  Patient/Family Comments/goals: to improve function  Pain/Comfort:  Pain Rating 1:  (unrated)  Location - Orientation 1: generalized  Location 1: neck  Pain Addressed 1:  Distraction, Reposition  Pain Rating Post-Intervention 1: 0/10    Objective:     Communicated with: RN prior to session.  Patient found supine with bed alarm, Condom Catheter, telemetry, oxygen upon OT entry to room.  A client care conference was completed by the OTR and the BARBA prior to treatment by the BARBA to discuss the patient's POC and current status.    General Precautions: Standard, fall, aspiration    Orthopedic Precautions:N/A  Braces: N/A  Respiratory Status: Nasal cannula, flow 2.5 L/min     Occupational Performance:     Bed Mobility:    Patient completed Rolling/Turning to Left with  minimum assistance  Patient completed Rolling/Turning to Right with minimum assistance  Patient completed Scooting/Bridging with minimum assistance  Patient completed Supine to Sit with minimum assistance  Patient completed Sit to Supine with moderate assistance and 2 persons     Functional Mobility/Transfers:  Patient completed Sit <> Stand Transfer with minimum assistance (from elevated bed) and mod A of 2 (from bedside chair)  with  rolling walker   Patient completed Bed <> Chair Transfer using Step Transfer technique with contact guard assistance with rolling walker  Functional Mobility: pt ambulated ~6ft +~10ft with CGA using RW. No LOB or SOB noted. Verbal cues for increased step length and RW mgmt.    Activities of Daily Living:  Grooming: stand by assistance facial care seated in bedside chair  Toileting: total assistance with rolling R/L and standing with RW      [facility] 6 Click ADL: 17    Treatment & Education:  Pt educated on OT POC and frequency during hospital stay.   Pt educated on importance of OOB activity to improve function and activity tolerance.  Pt educated on pacing techniques to improve safety awareness.  Pt educated on proper hand placement and techniques for RW mgmt to improve safety awareness.   Addressed all patient questions/concerns within BARBA scope of practice.     Patient left HOB elevated  with all lines intact, call button in reach, RN notified, and spouse present    GOALS:   Multidisciplinary Problems       Occupational Therapy Goals          Problem: Occupational Therapy    Goal Priority Disciplines Outcome Interventions   Occupational Therapy Goal     OT, PT/OT Ongoing, Progressing    Description: Goals to be met by: 1/20/24 (1 month)     Patient will increase functional independence with ADLs by performing:    UE Dressing with Supervision.  LE Dressing with Supervision.  Grooming while standing at sink with Supervision.  Toileting from toilet with Supervision for hygiene and clothing management.   Rolling to Bilateral with [place].   Supine to sit with [place].  Step transfer with Supervision  Toilet transfer to toilet with Supervision.                       Time Tracking:     OT Date of Treatment: 12/26/23  OT Start Time: 1307  OT Stop Time: 1339  OT Total Time (min): 32 min    Billable Minutes:Self Care/Home Management 12  Therapeutic Activity 20    OT/MARQUITA: MARQUITA     Number of MARQUITA visits since last OT visit: 2    12/26/2023

## 2023-12-26 NOTE — PT/OT/SLP PROGRESS
Physical Therapy Treatment    Co-treatment with OT due to acuity of condition, level of skilled assist needed for assessment of safety with mobility and potential of not tolerating a second treatment session.     Patient Name:  Froylan Borja   MRN:  [id number]    Recommendations:     Discharge Recommendations: Moderate Intensity Therapy  Discharge Equipment Recommendations: wheelchair, walker, rolling, bedside commode  Barriers to discharge:  Current level of assistance     Assessment:     Froylan Borja is a 68 y.o. male admitted with a medical diagnosis of ALESSANDRA (acute kidney injury).  He presents with the following impairments/functional limitations: weakness, impaired endurance, impaired self care skills, impaired functional mobility, decreased lower extremity function, decreased coordination, impaired cardiopulmonary response to activity Pt tolerated treatment session well today. Pt required less assistance and was able to increase distance ambulated. Patient remains appropriate for continued skilled services within the acute environment and goals remain appropriate.   .    Rehab Prognosis: Good; patient would benefit from acute skilled PT services to address these deficits and reach maximum level of function.    Recent Surgery: * No surgery found *      Plan:     During this hospitalization, patient to be seen 3 x/week to address the identified rehab impairments via gait training, therapeutic activities, therapeutic exercises, neuromuscular re-education and progress toward the following goals:    Plan of Care Expires:  01/20/24    Subjective     Chief Complaint: Neck pain   Patient/Family Comments/goals: Pt agreeable to PT   Pain/Comfort:  Pain Rating 1:  (not rated)  Location - Orientation 1: generalized  Location 1: neck  Pain Addressed 1: Reposition, Distraction  Pain Rating Post-Intervention 1:  (not rated)      Objective:     Communicated with Rn prior to session.  Patient found supine with telemetry, Condom  Catheter, oxygen, peripheral IV upon PT entry to room.     General Precautions: Standard, fall, aspiration  Orthopedic Precautions: N/A  Braces: N/A  Respiratory Status: Nasal cannula, flow 2.5 L/min     Functional Mobility:  Bed Mobility:     Rolling Right: moderate assistance   Supine to Sit: minimum assistance to elevate trunk   Sit to Supine: moderate assistance and of 2 persons  Transfers:     Sit to Stand from EOB: minimum assistance with rolling walker  Sit to Stand from Chair: ModA and of 2 persons   Bed to Chair: Nelly initially yet progressed to CGA with  rolling walker  using  Step Transfer  Gait: 6 ft + 10 ft with RW initially Nelly yet progressed to CGA   Cues for RW, LE advancement, stand up tall and safety awareness during t/fs and gait      Pt performed 10 repetitions of seated B LE exercises consisting of: Marching, LAQ, ABD/ADD, heel raises, and toe raises.         AM-PAC 6 CLICK MOBILITY  Turning over in bed (including adjusting bedclothes, sheets and blankets)?: 3  Sitting down on and standing up from a chair with arms (e.g., wheelchair, bedside commode, etc.): 2  Moving from lying on back to sitting on the side of the bed?: 2  Moving to and from a bed to a chair (including a wheelchair)?: 2  Need to walk in hospital room?: 2  Climbing 3-5 steps with a railing?: 1  Basic Mobility Total Score: 12       Treatment & Education:  Therapist provided instruction and educated for safety during transfers and gait training. As well as proper body mechanics, energy conservation, and fall prevention strategies during tasks listed above, and the effects of prolonged immobility and the importance of performing EOB/OOB activity and exercises to promote healing and reduce recovery time.       Patient left with bed in chair position with all lines intact, call button in reach, RN notified, and wife present..    GOALS:   Multidisciplinary Problems       Physical Therapy Goals          Problem: Physical Therapy     Goal Priority Disciplines Outcome Goal Variances Interventions   Physical Therapy Goal     PT, PT/OT Ongoing, Progressing     Description: Goals to be met by: 24     Patient will increase functional independence with mobility by performin. Supine to sit with Contact Guard Assistance  2. Sit to stand transfer with Contact Guard Assistance  3. Bed to chair transfer with Contact Guard Assistance using LRAD  4. Gait  x 200 feet with Contact Guard Assistance using LRAD.   5. Ascend/descend 15 stair with right Handrails Minimal Assistance using LRAD.   6. Lower extremity exercise program x30 reps per handout, with independence                         Time Tracking:     PT Received On: 23  PT Start Time: 1307     PT Stop Time: 1339  PT Total Time (min): 32 min     Billable Minutes: Gait Training 15 and Therapeutic Activity 17    Treatment Type: Treatment  PT/PTA: PTA     Number of PTA visits since last PT visit: 3     2023

## 2023-12-26 NOTE — PROGRESS NOTES
"[facility] - Intensive Care ([facility]-)  Adult Nutrition  Progress Note    SUMMARY       Recommendations    1. Continue Regular diet with texture per SLP      2. If PO intake <50%, add Boost plus BID      3. RD to monitor and follow    Goals: Meet % EEN, EPN by RD f/u  Nutrition Goal Status: new  Communication of RD Recs:  (POC)    Assessment and Plan    Nutrition Problem  Increased nutrient needs (protein, energy)    Related to (etiology):   Increased physiological demands    Signs and Symptoms (as evidenced by):   cirrhosis    Interventions/Recommendations (treatment strategy):  Collaboration with other providers    Nutrition Diagnosis Status:   New     Reason for Assessment    Reason For Assessment: length of stay  Diagnosis:  (ALESSANDRA)  Relevant Medical History: cirrhosis, alcohol abuse, aspiration pneumonia  Interdisciplinary Rounds: did not attend    General Information Comments:   LOS 11 days. Pt with hx of alcohol abuse, HTN, HLD, cirrhosis, diverticulosis. Pt reports good appetite PTA, had decreased PO intake since admit due to disliking hospital food. Denies N/V/D/C, chewing/swallowing difficulties. Per wt hx,  lb, noted wt loss of 10 lb (4%) x 1 month (not significant). Pt appears nourished. No indicator of malnutrition.    Nutrition Discharge Planning: Adequate PO intake    Nutrition Risk Screen    Nutrition Risk Screen: no indicators present    Nutrition/Diet History    Spiritual, Cultural Beliefs, Hinduism Practices, Values that Affect Care: no    Anthropometrics    Temp: 97.5 °F (36.4 °C)  Height Method: Stated  Height: 6' 3" (190.5 cm)  Height (inches): 75 in  Weight Method: Bed Scale  Weight: 112 kg (247 lb)  Weight (lb): 247 lb  Ideal Body Weight (IBW), Male: 196 lb  % Ideal Body Weight, Male (lb): 126.02 %  BMI (Calculated): 30.9     Lab/Procedures/Meds    Pertinent Labs Reviewed: reviewed  Pertinent Medications Reviewed: reviewed  Pertinent Medications Comments: losartan, MV, foliac " acid, thiamine    Estimated/Assessed Needs    Weight Used For Calorie Calculations: 112 kg (247 lb)  Energy Calorie Requirements (kcal): 1976 kcal  Energy Need Method: [place]-St Jeor  Protein Requirements: 134-178g (1.5-2g/kg IBW)  Weight Used For Protein Calculations: 88.9 kg (196 lb) (IBW)  Fluid Requirements (mL): 1ml/kcal or per MD  Estimated Fluid Requirement Method: RDA Method  RDA Method (mL): 1976     Nutrition Prescription Ordered    Current Diet Order: Regular    Evaluation of Received Nutrient/Fluid Intake    I/O: - 9.7 L since admit  Energy Calories Required: not meeting needs  Protein Required: not meeting needs  Comments: LBM 12/26  Tolerance: tolerating    Nutrition Risk    Level of Risk/Frequency of Follow-up:  (1x/week)     Monitor and Evaluation    Food and Nutrient Intake: energy intake, food and beverage intake  Food and Nutrient Adminstration: diet order  Knowledge/Beliefs/Attitudes: food and nutrition knowledge/skill  Physical Activity and Function: nutrition-related ADLs and IADLs  Anthropometric Measurements: height/length, weight, weight change, body mass index  Biochemical Data, Medical Tests and Procedures: gastrointestinal profile, electrolyte and renal panel, glucose/endocrine profile, inflammatory profile  Nutrition-Focused Physical Findings: overall appearance     Nutrition Follow-Up    RD Follow-up?: Yes

## 2023-12-26 NOTE — PROGRESS NOTES
[facility] - Intensive Care ([address] Medicine  Progress Note    Patient Name: Froylan Borja  MRN: [id number]  Patient Class: IP- Inpatient   Admission Date: 12/15/2023  Length of Stay: 11 days  Attending Physician: Pardeep Torres MD  Primary Care Provider: Janki Tamez MD        Subjective:     Principal Problem:ALESSANDRA (acute kidney injury)        HPI:  This is a 69yo male with a past medical history of alcohol abuse, depression, hyponatremia severe requiring prior MICU admit, HTN, HLD, cirrhosis, diverticulosis who presents to the ED with AMS. Patient reportedly at the bar with his wife earlier today and became confused and unsteady and difficulty ambulating. Per chart review police called because of beligerance and were concerned about stroke symptoms and brought patient to ED for further evaluation. Patient alert and oriented to person and aware in hospital. Unable to contribute meaningfully to history but does admit heavy daily alcohol use.     In the ED patient initially afebrile, and hemodynamically stable saturating well on room air at rest. WBC 10.1 with leukocytosis. Na 120. Creatinine 2.3 (baseline 1.0). CPK 3960. CXR with concern for aspiration pneumonia. Patient diaphoretic, tremulous, tachycardic concerning for developing withdrawal. Started on IVFs, benzodiazepines, and abx for rhabdo, hyponatremia, aspiration pna, alcohol withdrawal. Admitted to the care of medicine for further evaluation and management.     Overview/Hospital Course:  Patient admitted to [facility] for AMS with severe hyponatremia in setting of ETOH abuse, ETOH withdrawals, ALESSANDRA, and aspiration pneumonia. He was started on Vanc/zosyn for aspiration pneumonia. Received IVF bolus and continued on maintenance IVF, Nephrology consulted for hyponatremia and ALESSANDRA. Urine was spun muddy brown casts observed indicative of ATN. Nephrology recommending NS continued @ 150ml/hr and lasix 160mg IV BID for sodium excretion. Na slowing improving  at appropriate rate. Blood cultures NGTD. UA growing GNR's. MRSA swab pending. Patient continues on Vanc/zosyn. Platelets decreased from 140 -> 112 -> 86. Likely secondary to infection however was receiving subcutaneous heparin. HIT panel sent and transitioned to lovenox. Continues to have symptoms of alcohol withdrawal and continued on CIWA and scheduled/PRN benzodiazepines. UA growing ESBL E coli and Dc'd zosyn and started ertapenem. Vanc discontinued. Urine output remained good with diuresis, Cr stable. IV lasix Dc'd per nephrology recommendations. Sodium improved to 135. PT/OT consulted.    Interval History:  No acute issues.  Awaiting placement    Review of Systems   Unable to perform ROS: Other     Objective:     Vital Signs (Most Recent):  Temp: 97.6 °F (36.4 °C) (12/26/23 0803)  Pulse: 104 (12/26/23 0803)  Resp: 18 (12/26/23 0803)  BP: (!) 143/77 (12/26/23 0803)  SpO2: (!) 92 % (12/26/23 0803) Vital Signs (24h Range):  Temp:  [97.6 °F (36.4 °C)-98.3 °F (36.8 °C)] 97.6 °F (36.4 °C)  Pulse:  [] 104  Resp:  [17-18] 18  SpO2:  [92 %-97 %] 92 %  BP: (133-194)/(63-88) 143/77     Weight: 112 kg (247 lb)  Body mass index is 30.87 kg/m².    Intake/Output Summary (Last 24 hours) at 12/26/2023 0858  Last data filed at 12/25/2023 1724  Gross per 24 hour   Intake --   Output 400 ml   Net -400 ml      Physical Exam  Constitutional:       General: He is not in acute distress.     Appearance: He is obese.   HENT:      Head: Normocephalic.      Right Ear: External ear normal.      Left Ear: External ear normal.      Nose: Nose normal.      Comments: Nasal cannula  Eyes:      General: No scleral icterus.  Cardiovascular:      Rate and Rhythm: Normal rate.   Pulmonary:      Effort: Pulmonary effort is normal.   Abdominal:      Palpations: Abdomen is soft.      Tenderness: There is no abdominal tenderness.   Musculoskeletal:      Comments: Debility   Skin:     General: Skin is warm.   Neurological:      Mental Status: He  "is alert. Mental status is at baseline.     MELD 3.0: 26 at 12/17/2023  7:44 PM  MELD-Na: 25 at 12/17/2023  7:44 PM  Calculated from:  Serum Creatinine: 2.4 mg/dL at 12/17/2023  7:44 PM  Serum Sodium: 125 mmol/L at 12/17/2023  7:44 PM  Total Bilirubin: 0.7 mg/dL (Using min of 1 mg/dL) at 12/17/2023  9:04 AM  Serum Albumin: 2.4 g/dL at 12/17/2023  9:04 AM  INR(ratio): 1.0 at 12/15/2023  2:16 PM  Age at listing (hypothetical): 68 years  Sex: Male at 12/17/2023  7:44 PM      Significant Labs:  CBC:  No results for input(s): "WBC", "HGB", "HCT", "PLT" in the last 48 hours.  CMP:  No results for input(s): "NA", "K", "CL", "CO2", "GLU", "BUN", "CREATININE", "CALCIUM", "PROT", "ALBUMIN", "BILITOT", "ALKPHOS", "AST", "ALT", "ANIONGAP", "EGFRNONAA" in the last 48 hours.    Invalid input(s): "ESTGFAFRICA"  PTINR:  No results for input(s): "INR" in the last 48 hours.    Significant Procedures:   Dobutamine Stress Test with Color Flow: No results found for this or any previous visit.        Assessment/Plan:      * ALESSANDRA (acute kidney injury)  - Creatinine 2.3 on presentation ; baseline 1.0  - suspect secondary to dehydration with severe alcohol use/dependence and inadequate water/hydration  - Nephrology consulted, urine spun and multiple muddy brown casts indicative of ATN  - S/p IV diuresis for 2-3 days, now off diuretics as of 12/19  - strict I/Os  - avoid nephrotoxic agents as appropriate  - continue to monitor  - Renal US without hydronephrosis  - Cr continues to improve    12/21/23  Kidney functions improving and Nephrology signed off.  Continue with oral intake    Acute cystitis  - Interval history and physical exam findings as described above  - UA findings reviewed  - UCx growing ESBL E coli  - Blood cultures NGTD  - Transitioned Zosyn to Ertapenem 12/18 12/22/23  Completed Ertapenem.      Debility  For skilled nursing facility placement    Rhabdomyolysis  - CPK 4k on presentation  - Encourage PO hydration  - Cr " improving  - Nephrology following  - monitor kidney function  - CK improving    Aspiration pneumonia  - developed fever shortly after admission  ;  Tmax 103  - CXR with concern for aspiration pna left basilar lungfield  - Growing ESBL E coli in urine, zosyn Dc'd.   - Started on Ertapenem 12/18  - Vanc DC'd  - Blood cultures NGTD  - further management pending clinical course and future study review    Depression  - holding home meds for now as seems he hasn't been taking for some time    Hyponatremia  History of severe hyponatremia requiring ICU. From chart review appears largely secondary to beer potemania in the past  - Na 120 on presentation, downtrended to 118 overnight  - Nephrology was consulted.   - S/p IVF and IV diuresis with improvement to Na  - IV lasix DC'd  - neurochecks q4h  - seizure precuations  - further management pending clinical course and future study review    -Na improved to 136      Alcohol use disorder, severe, dependence  - CIWA q4h  - ativan IV 2mg prn for CIWA > 8  - daily thiamine/FA/MV  - would benefit from addiction psych eval once clinically appropriate    Cirrhosis  MELD-Na score calculated; MELD 3.0: 26 at 12/17/2023  7:44 PM  MELD-Na: 25 at 12/17/2023  7:44 PM  Calculated from:  Serum Creatinine: 2.4 mg/dL at 12/17/2023  7:44 PM  Serum Sodium: 125 mmol/L at 12/17/2023  7:44 PM  Total Bilirubin: 0.7 mg/dL (Using min of 1 mg/dL) at 12/17/2023  9:04 AM  Serum Albumin: 2.4 g/dL at 12/17/2023  9:04 AM  INR(ratio): 1.0 at 12/15/2023  2:16 PM  Age at listing (hypothetical): 68 years  Sex: Male at 12/17/2023  7:44 PM    - continues to use alcohol persistently  - AST > ALT x2 consistent with ETOH use.  - Trend hepatic function panel  - LFT's improving  - Appears well compensated on physical exam      VTE Risk Mitigation (From admission, onward)           Ordered     enoxaparin injection 40 mg  Every 24 hours         12/17/23 1239     IP VTE HIGH RISK PATIENT  Once         12/15/23 1817      Place sequential compression device  Until discontinued         12/15/23 1817                    Discharge Planning   KENNETH: 12/28/2023     Code Status: Full Code   Is the patient medically ready for discharge?: No    Reason for patient still in hospital (select all that apply): Patient trending condition  Discharge Plan A: Skilled Nursing Facility   Discharge Delays: (!) Post-Acute Set-up              Pardeep Torres MD  Department of Hospital Medicine   [facility] - Intensive Care ([place]-[facility])

## 2023-12-26 NOTE — PLAN OF CARE
Per Consuelo at [facility], they need updated clinical info to review pt for admissions.  CM sent updated clinicals to [facility] and other facilities via CP.    GENNY ThackerN, BS, RN, CCM

## 2023-12-27 PROCEDURE — 25000003 PHARM REV CODE 250: Mod: HCNC | Performed by: INTERNAL MEDICINE

## 2023-12-27 PROCEDURE — 97530 THERAPEUTIC ACTIVITIES: CPT | Mod: HCNC,CO

## 2023-12-27 PROCEDURE — 21400001 HC TELEMETRY ROOM: Mod: HCNC

## 2023-12-27 PROCEDURE — 97535 SELF CARE MNGMENT TRAINING: CPT | Mod: HCNC,CO

## 2023-12-27 PROCEDURE — 63600175 PHARM REV CODE 636 W HCPCS: Mod: HCNC | Performed by: INTERNAL MEDICINE

## 2023-12-27 PROCEDURE — 25000003 PHARM REV CODE 250: Mod: HCNC | Performed by: FAMILY MEDICINE

## 2023-12-27 PROCEDURE — 97530 THERAPEUTIC ACTIVITIES: CPT | Mod: HCNC,CQ

## 2023-12-27 PROCEDURE — 97116 GAIT TRAINING THERAPY: CPT | Mod: HCNC,CQ

## 2023-12-27 RX ADMIN — ENOXAPARIN SODIUM 40 MG: 40 INJECTION SUBCUTANEOUS at 06:12

## 2023-12-27 RX ADMIN — THERA TABS 1 TABLET: TAB at 10:12

## 2023-12-27 RX ADMIN — Medication 100 MG: at 10:12

## 2023-12-27 RX ADMIN — ACETAMINOPHEN 500 MG: 500 TABLET ORAL at 06:12

## 2023-12-27 RX ADMIN — FOLIC ACID 1 MG: 1 TABLET ORAL at 10:12

## 2023-12-27 RX ADMIN — ACETAMINOPHEN 500 MG: 500 TABLET ORAL at 08:12

## 2023-12-27 RX ADMIN — LOSARTAN POTASSIUM 100 MG: 50 TABLET, FILM COATED ORAL at 10:12

## 2023-12-27 NOTE — PROGRESS NOTES
[facility] - Intensive Care ([address] Medicine  Progress Note    Patient Name: Froylan Borja  MRN: [id number]  Patient Class: IP- Inpatient   Admission Date: 12/15/2023  Length of Stay: 12 days  Attending Physician: Pardeep Torres MD  Primary Care Provider: Janki Tamez MD        Subjective:     Principal Problem:Debility        HPI:  This is a 69yo male with a past medical history of alcohol abuse, depression, hyponatremia severe requiring prior MICU admit, HTN, HLD, cirrhosis, diverticulosis who presents to the ED with AMS. Patient reportedly at the bar with his wife earlier today and became confused and unsteady and difficulty ambulating. Per chart review police called because of beligerance and were concerned about stroke symptoms and brought patient to ED for further evaluation. Patient alert and oriented to person and aware in hospital. Unable to contribute meaningfully to history but does admit heavy daily alcohol use.     In the ED patient initially afebrile, and hemodynamically stable saturating well on room air at rest. WBC 10.1 with leukocytosis. Na 120. Creatinine 2.3 (baseline 1.0). CPK 3960. CXR with concern for aspiration pneumonia. Patient diaphoretic, tremulous, tachycardic concerning for developing withdrawal. Started on IVFs, benzodiazepines, and abx for rhabdo, hyponatremia, aspiration pna, alcohol withdrawal. Admitted to the care of medicine for further evaluation and management.     Overview/Hospital Course:  [place] [facility] [place] severe hyponatremia in setting of ETOH abuse, ETOH withdrawals, ALESSANDRA, and aspiration pneumonia. He was started on Vanc/zosyn for aspiration pneumonia. Received IVF bolus and continued on maintenance IVF, Nephrology consulted for hyponatremia and ALESSANDRA. Urine was spun muddy brown casts observed indicative of ATN. Nephrology recommending NS continued @ 150ml/hr and lasix 160mg IV BID for sodium excretion. Na slowing improving at appropriate  rate. Blood cultures NGTD. UA growing GNR's. MRSA swab pending. Patient continues on Vanc/zosyn. Platelets decreased from 140 -> 112 -> 86. Likely secondary to infection however was receiving subcutaneous heparin. HIT panel sent and transitioned to lovenox. Continues to have symptoms of alcohol withdrawal and continued on CIWA and scheduled/PRN benzodiazepines. UA growing ESBL E coli and Dc'd zosyn and started ertapenem. Vanc discontinued. Urine output remained good with diuresis, Cr stable. IV lasix Dc'd per nephrology recommendations. Sodium improved to 135. PT/OT consulted.    12/21/23 and onwards, patient completed IV ertapenem.  Kidney functions improved and Nephrology signed off.  He is awaiting placement at this time to SNF    Interval History:  No complaints or acute issues at this time.  Awaiting placement    Review of Systems   Unable to perform ROS: Other     Objective:     Vital Signs (Most Recent):  Temp: 98.9 °F (37.2 °C) (12/27/23 0907)  Pulse: 98 (12/27/23 0907)  Resp: 20 (12/27/23 0907)  BP: 114/63 (12/27/23 0907)  SpO2: (!) 92 % (12/27/23 0907) Vital Signs (24h Range):  Temp:  [97.5 °F (36.4 °C)-98.9 °F (37.2 °C)] 98.9 °F (37.2 °C)  Pulse:  [] 98  Resp:  [18-21] 20  SpO2:  [90 %-94 %] 92 %  BP: (114-171)/(60-72) 114/63     Weight: 112 kg (247 lb)  Body mass index is 30.87 kg/m².  No intake or output data in the 24 hours ending 12/27/23 1015     Physical Exam  Constitutional:       General: He is not in acute distress.     Appearance: He is obese.   HENT:      Head: Normocephalic.      Right Ear: External ear normal.      Left Ear: External ear normal.      Nose: Nose normal.   Eyes:      General: No scleral icterus.  Cardiovascular:      Rate and Rhythm: Normal rate.   Pulmonary:      Effort: Pulmonary effort is normal.   Abdominal:      Palpations: Abdomen is soft.      Tenderness: There is no abdominal tenderness.   Musculoskeletal:      Comments: Debility   Skin:     General: Skin is warm.  "  Neurological:      Mental Status: He is alert. Mental status is at baseline.     MELD 3.0: 26 at 12/17/2023  7:44 PM  MELD-Na: 25 at 12/17/2023  7:44 PM  Calculated from:  Serum Creatinine: 2.4 mg/dL at 12/17/2023  7:44 PM  Serum Sodium: 125 mmol/L at 12/17/2023  7:44 PM  Total Bilirubin: 0.7 mg/dL (Using min of 1 mg/dL) at 12/17/2023  9:04 AM  Serum Albumin: 2.4 g/dL at 12/17/2023  9:04 AM  INR(ratio): 1.0 at 12/15/2023  2:16 PM  Age at listing (hypothetical): 68 years  Sex: Male at 12/17/2023  7:44 PM      Significant Labs:  CBC:  No results for input(s): "WBC", "HGB", "HCT", "PLT" in the last 48 hours.  CMP:  No results for input(s): "NA", "K", "CL", "CO2", "GLU", "BUN", "CREATININE", "CALCIUM", "PROT", "ALBUMIN", "BILITOT", "ALKPHOS", "AST", "ALT", "ANIONGAP", "EGFRNONAA" in the last 48 hours.    Invalid input(s): "ESTGFAFR[place]"  PTINR:  No results for input(s): "INR" in the last 48 hours.    Significant Procedures:   Dobutamine Stress Test with Color Flow: No results found for this or any previous visit.        Assessment/Plan:      * Debility  For skilled nursing facility placement    Acute cystitis  - Interval history and physical exam findings as described above  - UA findings reviewed  - UCx growing ESBL E coli  - Blood cultures NGTD  - Transitioned Zosyn to Ertapenem 12/18 12/22/23  Completed Ertapenem.      ALESSANDRA (acute kidney injury)  - Creatinine 2.3 on presentation ; baseline 1.0  - suspect secondary to dehydration with severe alcohol use/dependence and inadequate water/hydration  - Nephrology consulted, urine spun and multiple muddy brown casts indicative of ATN  - S/p IV diuresis for 2-3 days, now off diuretics as of 12/19  - strict I/Os  - avoid nephrotoxic agents as appropriate  - continue to monitor  - Renal US without hydronephrosis  - Cr continues to improve    12/21/23  Kidney functions improving and Nephrology signed off.  Continue with oral intake    Rhabdomyolysis  - CPK 4k on " presentation  - Encourage PO hydration  - Cr improving  - Nephrology following  - monitor kidney function  - CK improving    Aspiration pneumonia  - developed fever shortly after admission  ;  Tmax 103  - CXR with concern for aspiration pna left basilar lungfield  - Growing ESBL E coli in urine, zosyn Dc'd.   - Started on Ertapenem 12/18  - Vanc DC'd  - Blood cultures NGTD  - further management pending clinical course and future study review    Depression  - holding home meds for now as seems he hasn't been taking for some time    Hyponatremia  History of severe hyponatremia requiring ICU. From chart review appears largely secondary to beer potemania in the past  - Na 120 on presentation, downtrended to 118 overnight  - Nephrology was consulted.   - S/p IVF and IV diuresis with improvement to Na  - IV lasix DC'd  - neurochecks q4h  - seizure precuations  - further management pending clinical course and future study review    -Na improved to 136      Alcohol use disorder, severe, dependence  - CIWA q4h  - ativan IV 2mg prn for CIWA > 8  - daily thiamine/FA/MV  - would benefit from addiction psych eval once clinically appropriate    Cirrhosis  MELD-Na score calculated; MELD 3.0: 26 at 12/17/2023  7:44 PM  MELD-Na: 25 at 12/17/2023  7:44 PM  Calculated from:  Serum Creatinine: 2.4 mg/dL at 12/17/2023  7:44 PM  Serum Sodium: 125 mmol/L at 12/17/2023  7:44 PM  Total Bilirubin: 0.7 mg/dL (Using min of 1 mg/dL) at 12/17/2023  9:04 AM  Serum Albumin: 2.4 g/dL at 12/17/2023  9:04 AM  INR(ratio): 1.0 at 12/15/2023  2:16 PM  Age at listing (hypothetical): 68 years  Sex: Male at 12/17/2023  7:44 PM    - continues to use alcohol persistently  - AST > ALT x2 consistent with ETOH use.  - Trend hepatic function panel  - LFT's improving  - Appears well compensated on physical exam      VTE Risk Mitigation (From admission, onward)           Ordered     enoxaparin injection 40 mg  Every 24 hours         12/17/23 1239     IP VTE HIGH  RISK PATIENT  Once         12/15/23 1817     Place sequential compression device  Until discontinued         12/15/23 1817                    Discharge Planning   KENNETH: 12/28/2023     Code Status: Full Code   Is the patient medically ready for discharge?: No    Reason for patient still in hospital (select all that apply): Patient trending condition  Discharge Plan A: Skilled Nursing Facility   Discharge Delays: (!) Post-Acute Set-up              Pardeep Torres MD  Department of Hospital Medicine   [facility] - Intensive Care ([place]-[address])

## 2023-12-27 NOTE — SUBJECTIVE & OBJECTIVE
"Interval History:  No complaints or acute issues at this time.  Awaiting placement    Review of Systems   Unable to perform ROS: Other     Objective:     Vital Signs (Most Recent):  Temp: 98.9 °F (37.2 °C) (12/27/23 0907)  Pulse: 98 (12/27/23 0907)  Resp: 20 (12/27/23 0907)  BP: 114/63 (12/27/23 0907)  SpO2: (!) 92 % (12/27/23 0907) Vital Signs (24h Range):  Temp:  [97.5 °F (36.4 °C)-98.9 °F (37.2 °C)] 98.9 °F (37.2 °C)  Pulse:  [] 98  Resp:  [18-21] 20  SpO2:  [90 %-94 %] 92 %  BP: (114-171)/(60-72) 114/63     Weight: 112 kg (247 lb)  Body mass index is 30.87 kg/m².  No intake or output data in the 24 hours ending 12/27/23 1015     Physical Exam  Constitutional:       General: He is not in acute distress.     Appearance: He is obese.   HENT:      Head: Normocephalic.      Right Ear: External ear normal.      Left Ear: External ear normal.      Nose: Nose normal.   Eyes:      General: No scleral icterus.  Cardiovascular:      Rate and Rhythm: Normal rate.   Pulmonary:      Effort: Pulmonary effort is normal.   Abdominal:      Palpations: Abdomen is soft.      Tenderness: There is no abdominal tenderness.   Musculoskeletal:      Comments: Debility   Skin:     General: Skin is warm.   Neurological:      Mental Status: He is alert. Mental status is at baseline.     MELD 3.0: 26 at 12/17/2023  7:44 PM  MELD-Na: 25 at 12/17/2023  7:44 PM  Calculated from:  Serum Creatinine: 2.4 mg/dL at 12/17/2023  7:44 PM  Serum Sodium: 125 mmol/L at 12/17/2023  7:44 PM  Total Bilirubin: 0.7 mg/dL (Using min of 1 mg/dL) at 12/17/2023  9:04 AM  Serum Albumin: 2.4 g/dL at 12/17/2023  9:04 AM  INR(ratio): 1.0 at 12/15/2023  2:16 PM  Age at listing (hypothetical): 68 years  Sex: Male at 12/17/2023  7:44 PM      Significant Labs:  CBC:  No results for input(s): "WBC", "HGB", "HCT", "PLT" in the last 48 hours.  CMP:  No results for input(s): "NA", "K", "CL", "CO2", "GLU", "BUN", "CREATININE", "CALCIUM", "PROT", "ALBUMIN", "BILITOT", " ""ALKPHOS", "AST", "ALT", "ANIONGAP", "EGFRNONAA" in the last 48 hours.    Invalid input(s): "ESTGFAFR[place]"  PTINR:  No results for input(s): "INR" in the last 48 hours.    Significant Procedures:   Dobutamine Stress Test with Color Flow: No results found for this or any previous visit.      "

## 2023-12-27 NOTE — PLAN OF CARE
[facility] - Intensive Care ([facility]-16)  Discharge Reassessment    Primary Care Provider: Janki Tamez MD    Expected Discharge Date: 12/28/2023    Reassessment (most recent)       Discharge Reassessment - 12/27/23 1218          Discharge Reassessment    Assessment Type Discharge Planning Reassessment (P)      Did the patient's condition or plan change since previous assessment? No (P)      Discharge Plan discussed with: Patient (P)      Communicated KENNETH with patient/caregiver Date not available/Unable to determine (P)      Discharge Plan A Skilled Nursing Facility (P)      Discharge Plan B Home Health (P)      DME Needed Upon Discharge  walker, rolling (P)      Transition of Care Barriers None (P)      Why the patient remains in the hospital Requires continued medical care (P)         Post-Acute Status    Post-Acute Authorization Placement (P)      Post-Acute Placement Status Referrals Sent (P)      Coverage [employer] (P)      Discharge Delays Post-Acute Set-up (P)                  CM spoke with pt in room, discussed d/c plan.  CM informed SNF no accepting facilities, and we are waiting on auth.  Pt c/o he is concerned that the longer he sits, the more debilitated he becomes.  He states that he has ambulated in room with walker and PT; if he had a walker in room, he could ambulate with that, but doesn't have a walker in room.  Discussed possibly home with HH.  He states he has a cane he can use.  He states he has wife at home, but he helps her more than she helps him - she could help if absolutely necessary only.      Pt states he needs condom cath. He has called nurses station.  CM informed nurse Mueller.    JEAN CARLOS emailed Consuelo Godoy with [facility] requesting update on whether or not they can take this pt.  Response pending.    Janelle Eugene, GENNYN, BS, RN, CCM

## 2023-12-27 NOTE — PT/OT/SLP PROGRESS
Occupational Therapy   Co-Treatment    Name: Froylan Borja  MRN: [id number]  Admitting Diagnosis:  Debility       Recommendations:     Discharge Recommendations: Moderate Intensity Therapy  Discharge Equipment Recommendations:  walker, rolling, wheelchair, bedside commode  Barriers to discharge:       Assessment:     Froylan Borja is a 68 y.o. male with a medical diagnosis of Debility.  He presents with the following performance deficits affecting function: weakness, impaired functional mobility, decreased coordination, impaired endurance, gait instability, impaired cardiopulmonary response to activity, decreased upper extremity function, decreased lower extremity function, impaired self care skills, impaired balance.     Pt agreeable to therapy and tolerated session well. Pt requiring light assistance for bed mobility and ambulation, extra time needed. Pt needing significant assistance for sit>stand trasnfers from low bed. He walked short distance in room with RW. Seated EOB, pt performing grooming and hygiene tasks. Pt c/o neck pain.      Rehab Prognosis:  Good; patient would benefit from acute skilled OT services to address these deficits and reach maximum level of function.       Plan:     Patient to be seen 3 x/week to address the above listed problems via self-care/home management, therapeutic activities, therapeutic exercises  Plan of Care Expires: 01/20/24  Plan of Care Reviewed with: patient    Subjective     Patient/Family Comments/goals: to improve function  Pain/Comfort:  Pain Rating 1:  (unrated)  Location - Orientation 1: generalized  Location 1: neck  Pain Addressed 1: Reposition, Distraction  Pain Rating Post-Intervention 1:  (unrated)    Objective:     Communicated with: RN prior to session.  Patient found HOB elevated with telemetry, Condom Catheter upon OT entry to room.    General Precautions: Standard, fall, aspiration    Orthopedic Precautions:N/A  Braces: N/A  Respiratory Status: Room air      Occupational Performance:     Bed Mobility:    Patient completed Scooting/Bridging with contact guard assistance  Patient completed Supine to Sit with contact guard assistance  Patient completed Sit to Supine with contact guard assistance     Functional Mobility/Transfers:  Patient completed Sit <> Stand Transfer with moderate assistance and of 2 persons  with  rolling walker   Functional Mobility: pt ambulated ~20ft with CGA using RW. Extra time needed for slower gait and weakness. 2.5L/min of O2 place for ambulation. No LOB or SOB noted    Activities of Daily Living:  Grooming: stand by assistance for facial care and oral hygiene seated EOB      [facility] 6 Click ADL: 17    Treatment & Education:  Pt educated on OT POC and frequency during hospital stay.   Pt educated on importance of OOB activity to improve function and activity tolerance.  Pt educated on proper hand placement and techniques for RW mgmt to improve safety awareness.   Pt educated on pacing techniques for improved endurance.   Addressed all patient questions/concerns within BARBA scope of practice.     Patient left HOB elevated with all lines intact, call button in reach, and RN notified    GOALS:   Multidisciplinary Problems       Occupational Therapy Goals          Problem: Occupational Therapy    Goal Priority Disciplines Outcome Interventions   Occupational Therapy Goal     OT, PT/OT Ongoing, Progressing    Description: Goals to be met by: 1/20/24 (1 month)     Patient will increase functional independence with ADLs by performing:    UE Dressing with Supervision.  LE Dressing with Supervision.  Grooming while standing at sink with Supervision.  Toileting from toilet with Supervision for hygiene and clothing management.   Rolling to Bilateral with [place].   Supine to sit with [place].  Step transfer with Supervision  Toilet transfer to toilet with Supervision.                       Time Tracking:     OT Date of Treatment: 12/27/23  OT  Start Time: 1310  OT Stop Time: 1342  OT Total Time (min): 32 min    Billable Minutes:Self Care/Home Management 17  Therapeutic Activity 15    OT/MARQUITA: MARQUITA     Number of MARQUITA visits since last OT visit: 3    12/27/2023

## 2023-12-27 NOTE — PT/OT/SLP PROGRESS
Physical Therapy Treatment  Co-treatment with OT due to acuity of condition, level of skilled assist needed for assessment of safety with mobility and potential of not tolerating a second treatment session.     Patient Name:  Froylan Borja   MRN:  [id number]    Recommendations:     Discharge Recommendations: Moderate Intensity Therapy  Discharge Equipment Recommendations: bedside commode, walker, rolling, wheelchair  Barriers to discharge:     Assessment:     Froylan Borja is a 68 y.o. male admitted with a medical diagnosis of Debility.  He presents with the following impairments/functional limitations: weakness, impaired endurance, impaired self care skills, impaired functional mobility, decreased lower extremity function, pain, impaired cardiopulmonary response to activity Pt tolerated treatment session well today. Pt tolerated increase to distance ambulated and requires less assistance with bed mobility . Patient remains appropriate for continued skilled services within the acute environment and goals remain appropriate.   .    Rehab Prognosis: Good; patient would benefit from acute skilled PT services to address these deficits and reach maximum level of function.    Recent Surgery: * No surgery found *      Plan:     During this hospitalization, patient to be seen 3 x/week to address the identified rehab impairments via gait training, therapeutic activities, therapeutic exercises, neuromuscular re-education and progress toward the following goals:    Plan of Care Expires:  01/20/24    Subjective     Chief Complaint: Neck pain   Patient/Family Comments/goals: Pt agreeable to PT   Pain/Comfort:  Pain Rating 1:  (not rated)  Location - Orientation 1: generalized  Location 1: neck  Pain Addressed 1: Reposition, Distraction  Pain Rating Post-Intervention 1:  (not rated)      Objective:     Communicated with Rn prior to session.  Patient found supine with telemetry, Condom Catheter upon PT entry to room.     General  Precautions: Standard, aspiration, fall  Orthopedic Precautions: N/A  Braces: N/A  Respiratory Status: Nasal cannula, flow 2.5 L/min. Upon entering room pt was not wearing oxygen and O2 was at 93%, O2 was donned during treatment session and O2 was 98-99%     Functional Mobility:  Bed Mobility:     Supine to Sit: contact guard assistance  Sit to Supine: contact guard assistance  Transfers:     Sit to Stand:  moderate assistance and of 2 persons with rolling walker  Gait: 20 ft CGA with RW. Chair followed for safety   Cues to stand up tall, look forward, and advance RW  Pt ambulated with slow leisa and decreased step length   Pt required standing rest breaks       AM-PAC 6 CLICK MOBILITY  Turning over in bed (including adjusting bedclothes, sheets and blankets)?: 3  Sitting down on and standing up from a chair with arms (e.g., wheelchair, bedside commode, etc.): 2  Moving from lying on back to sitting on the side of the bed?: 3  Moving to and from a bed to a chair (including a wheelchair)?: 3  Need to walk in hospital room?: 3  Climbing 3-5 steps with a railing?: 2  Basic Mobility Total Score: 16       Treatment & Education:  Therapist provided instruction and educated for safety during transfers and gait training. As well as proper body mechanics, energy conservation, and fall prevention strategies during tasks listed above, and the effects of prolonged immobility and the importance of performing EOB/OOB activity and exercises to promote healing and reduce recovery time.       Patient left with bed in chair position with all lines intact, call button in reach, and Rn notified..    GOALS:   Multidisciplinary Problems       Physical Therapy Goals          Problem: Physical Therapy    Goal Priority Disciplines Outcome Goal Variances Interventions   Physical Therapy Goal     PT, PT/OT Ongoing, Progressing     Description: Goals to be met by: 1/20/24     Patient will increase functional independence with mobility by  performin. Supine to sit with Contact Guard Assistance  2. Sit to stand transfer with Contact Guard Assistance  3. Bed to chair transfer with Contact Guard Assistance using LRAD  4. Gait  x 200 feet with Contact Guard Assistance using LRAD.   5. Ascend/descend 15 stair with right Handrails Minimal Assistance using LRAD.   6. Lower extremity exercise program x30 reps per handout, with independence                         Time Tracking:     PT Received On: 23  PT Start Time: 1310     PT Stop Time: 1342  PT Total Time (min): 32 min     Billable Minutes: Gait Training 15 and Therapeutic Activity 17    Treatment Type: Treatment  PT/PTA: PTA     Number of PTA visits since last PT visit: 4     2023

## 2023-12-27 NOTE — PLAN OF CARE
Per ulysses, pt's [employer] auth #[id number]  23.  CM submitted MD, OT, and PT progress notes via [employer].  Auth is pending.    Per Magui at [facility] [phone], they cannot accept pt SNF level d/t no contract with [facility], but they could accept [facility].    GENNY ThackerN, BS, RN, CCM

## 2023-12-28 PROBLEM — Z02.9 DISCHARGE PLANNING ISSUES: Status: ACTIVE | Noted: 2023-12-28

## 2023-12-28 PROBLEM — Z75.8 DISCHARGE PLANNING ISSUES: Status: ACTIVE | Noted: 2023-12-28

## 2023-12-28 PROBLEM — E87.20 METABOLIC ACIDOSIS: Status: RESOLVED | Noted: 2023-12-17 | Resolved: 2023-12-28

## 2023-12-28 PROCEDURE — 25000003 PHARM REV CODE 250: Mod: HCNC | Performed by: FAMILY MEDICINE

## 2023-12-28 PROCEDURE — 21400001 HC TELEMETRY ROOM: Mod: HCNC

## 2023-12-28 PROCEDURE — 63600175 PHARM REV CODE 636 W HCPCS: Mod: HCNC | Performed by: INTERNAL MEDICINE

## 2023-12-28 PROCEDURE — 25000003 PHARM REV CODE 250: Mod: HCNC | Performed by: INTERNAL MEDICINE

## 2023-12-28 RX ADMIN — FOLIC ACID 1 MG: 1 TABLET ORAL at 08:12

## 2023-12-28 RX ADMIN — OXYCODONE HYDROCHLORIDE 5 MG: 5 TABLET ORAL at 08:12

## 2023-12-28 RX ADMIN — ENOXAPARIN SODIUM 40 MG: 40 INJECTION SUBCUTANEOUS at 04:12

## 2023-12-28 RX ADMIN — Medication 100 MG: at 08:12

## 2023-12-28 RX ADMIN — THERA TABS 1 TABLET: TAB at 08:12

## 2023-12-28 RX ADMIN — ACETAMINOPHEN 500 MG: 500 TABLET ORAL at 01:12

## 2023-12-28 RX ADMIN — LOSARTAN POTASSIUM 100 MG: 50 TABLET, FILM COATED ORAL at 08:12

## 2023-12-28 NOTE — PLAN OF CARE
Problem: Adult Inpatient Plan of Care  Goal: Plan of Care Review  Outcome: Ongoing, Progressing     Problem: Renal Function Impairment (Acute Kidney Injury/Impairment)  Goal: Effective Renal Function  Outcome: Ongoing, Progressing     Problem: Infection  Goal: Absence of Infection Signs and Symptoms  Outcome: Ongoing, Progressing     Problem: Impaired Wound Healing  Goal: Optimal Wound Healing  Outcome: Ongoing, Progressing

## 2023-12-28 NOTE — ASSESSMENT & PLAN NOTE
History of severe hyponatremia requiring ICU. From chart review appears largely secondary to beer potemania in the past  - Na 120 on presentation, downtrended to 118 overnight  - Nephrology was consulted.   - S/p IVF and IV diuresis with improvement to Na  - IV lasix DC'd  - neurochecks q4h  - seizure precuations  - further management pending clinical course and future study review    - Resolved, continue PO intake

## 2023-12-28 NOTE — ASSESSMENT & PLAN NOTE
- CPK 4k on presentation  - Encourage PO hydration  - Cr improving  - Nephrology following  - monitor kidney function  - CK improved  - Resolved

## 2023-12-28 NOTE — ASSESSMENT & PLAN NOTE
- developed fever shortly after admission  ;  Tmax 103  - CXR with concern for aspiration pna left basilar lungfield  - Growing ESBL E coli in urine, zosyn Dc'd.   - Started on Ertapenem 12/18  - Vanc DC'd  - Blood cultures NGTD  - further management pending clinical course and future study review    - S/p course of Abx, weaning off of oxygen

## 2023-12-28 NOTE — ASSESSMENT & PLAN NOTE
MELD-Na score calculated; MELD 3.0: 26 at 12/17/2023  7:44 PM  MELD-Na: 25 at 12/17/2023  7:44 PM  Calculated from:  Serum Creatinine: 2.4 mg/dL at 12/17/2023  7:44 PM  Serum Sodium: 125 mmol/L at 12/17/2023  7:44 PM  Total Bilirubin: 0.7 mg/dL (Using min of 1 mg/dL) at 12/17/2023  9:04 AM  Serum Albumin: 2.4 g/dL at 12/17/2023  9:04 AM  INR(ratio): 1.0 at 12/15/2023  2:16 PM  Age at listing (hypothetical): 68 years  Sex: Male at 12/17/2023  7:44 PM    - continues to use alcohol persistently  - AST > ALT x2 consistent with ETOH use.  - Trend hepatic function panel  - LFT's improved  - Appears well compensated on physical exam

## 2023-12-28 NOTE — PLAN OF CARE
Problem: Adult Inpatient Plan of Care  Goal: Plan of Care Review  Outcome: Ongoing, Progressing     Problem: Oral Intake Inadequate (Acute Kidney Injury/Impairment)  Goal: Optimal Nutrition Intake  Outcome: Ongoing, Progressing     Problem: Skin Injury Risk Increased  Goal: Skin Health and Integrity  Outcome: Ongoing, Progressing     Problem: Impaired Wound Healing  Goal: Optimal Wound Healing  Outcome: Ongoing, Progressing

## 2023-12-28 NOTE — ASSESSMENT & PLAN NOTE
- Creatinine 2.3 on presentation ; baseline 1.0  - suspect secondary to dehydration with severe alcohol use/dependence and inadequate water/hydration  - Nephrology consulted, urine spun and multiple muddy brown casts indicative of ATN  - S/p IV diuresis for 2-3 days, now off diuretics as of 12/19  - strict I/Os  - avoid nephrotoxic agents as appropriate  - continue to monitor  - Renal US without hydronephrosis  - Cr continues to improve  - Resolved. Kidney functions improving and Nephrology signed off.  Continue with oral intake

## 2023-12-28 NOTE — PLAN OF CARE
Medications administered as ordered. CIWA and NEURO checks q4. Barrier cream applied to skin. Frequent weight shifting. Bed locked in the lowest position with side rails up x2. Call bell within reach. Bed alarm set. Safety maintained. Care ongoing.       Problem: Adult Inpatient Plan of Care  Goal: Plan of Care Review  Outcome: Ongoing, Progressing     Problem: Fluid and Electrolyte Imbalance (Acute Kidney Injury/Impairment)  Goal: Fluid and Electrolyte Balance  Outcome: Ongoing, Progressing     Problem: Oral Intake Inadequate (Acute Kidney Injury/Impairment)  Goal: Optimal Nutrition Intake  Outcome: Ongoing, Progressing     Problem: Skin Injury Risk Increased  Goal: Skin Health and Integrity  Outcome: Ongoing, Progressing

## 2023-12-28 NOTE — PLAN OF CARE
Per Availity, pt's [employer] SN[facility] auth  23.  CM sent updated MD progress note, PT note, OT note via availity.    JEAN CARLOS sent email to Consuelo Godoy with [facility] requesting updated info about whether or not they could accept pt.  Response pending.    Per Ashley Mireles at [facility], they won't have any beds available until possibly next Tuesday.    The pt has been denied by the following facilities:  [facility]  [facility]  [facility]   [place]  [facility]    3:32 PM  CM called [facility], [phone], spoke with Sneha, she states she will refer to DON to see if they might be able to admit this pt.    GENNY ThackerN, BS, RN, CCM

## 2023-12-28 NOTE — SUBJECTIVE & OBJECTIVE
"Interval History: Pt seen and examined this morning on chantal STEWART. Awaiting placement to SNF. No complaints at this time.      Objective:     Vital Signs (Most Recent):  Temp: 98 °F (36.7 °C) (12/28/23 1153)  Pulse: 94 (12/28/23 1153)  Resp: 18 (12/28/23 1153)  BP: (!) 142/64 (12/28/23 1153)  SpO2: 96 % (12/28/23 1153) Vital Signs (24h Range):  Temp:  [97.8 °F (36.6 °C)-98.4 °F (36.9 °C)] 98 °F (36.7 °C)  Pulse:  [80-99] 94  Resp:  [18-19] 18  SpO2:  [91 %-96 %] 96 %  BP: (108-144)/(57-81) 142/64     Weight: 112 kg (247 lb)  Body mass index is 30.87 kg/m².    Intake/Output Summary (Last 24 hours) at 12/28/2023 1348  Last data filed at 12/28/2023 0821  Gross per 24 hour   Intake 100 ml   Output 1075 ml   Net -975 ml      Physical Exam  Gen: in NAD, appears stated age, obese  Neuro: AAOx4, CN2-12 grossly intact BL; motor, sensory, and strength grossly intact BL  HEENT: NTNC, EOMI, PERRLA, MMM; no thyromegaly or lymphadenopathy; no JVD appreciated  CVS: RRR, no m/r/g; S1/S2 auscultated with no S3 or S4; capillary refill < 2 sec  Resp: lungs CTAB, no w/r/r; no belabored breathing or accessory muscle use appreciated   Abd: BS+ in all 4 quadrants; NTND, soft to palpation; no organomegaly appreciated   Extrem: pulses full, equal, and regular over all 4 extremities; no UE or LE edema BL  MSK: Debility      MELD 3.0: 26 at 12/17/2023  7:44 PM  MELD-Na: 25 at 12/17/2023  7:44 PM  Calculated from:  Serum Creatinine: 2.4 mg/dL at 12/17/2023  7:44 PM  Serum Sodium: 125 mmol/L at 12/17/2023  7:44 PM  Total Bilirubin: 0.7 mg/dL (Using min of 1 mg/dL) at 12/17/2023  9:04 AM  Serum Albumin: 2.4 g/dL at 12/17/2023  9:04 AM  INR(ratio): 1.0 at 12/15/2023  2:16 PM  Age at listing (hypothetical): 68 years  Sex: Male at 12/17/2023  7:44 PM      Significant Labs:  CBC:  No results for input(s): "WBC", "HGB", "HCT", "PLT" in the last 48 hours.  CMP:  No results for input(s): "NA", "K", "CL", "CO2", "GLU", "BUN", "CREATININE", "CALCIUM", " ""PROT", "ALBUMIN", "BILITOT", "ALKPHOS", "AST", "ALT", "ANIONGAP", "EGFRNONAA" in the last 48 hours.    Invalid input(s): "ESTGFAFRICA"  PTINR:  No results for input(s): "INR" in the last 48 hours.    Significant Procedures:   Dobutamine Stress Test with Color Flow: No results found for this or any previous visit.  "

## 2023-12-28 NOTE — PROGRESS NOTES
[facility] - [facility] ([address] Medicine  Progress Note    Patient Name: Froylan Borja  MRN: [id number]  Patient Class: IP- Inpatient   Admission Date: 12/15/2023  Length of Stay: 13 days  Attending Physician: Moy Cantu MD  Primary Care Provider: Janki Tamez MD        Subjective:     Principal Problem:Discharge planning issues        HPI:  This is a 67yo male with a past medical history of alcohol abuse, depression, hyponatremia severe requiring prior MICU admit, HTN, HLD, cirrhosis, diverticulosis who presents to the ED with AMS. Patient reportedly at the bar with his wife earlier today and became confused and unsteady and difficulty ambulating. Per chart review police called because of beligerance and were concerned about stroke symptoms and brought patient to ED for further evaluation. Patient alert and oriented to person and aware in hospital. Unable to contribute meaningfully to history but does admit heavy daily alcohol use.     In the ED patient initially afebrile, and hemodynamically stable saturating well on room air at rest. WBC 10.1 with leukocytosis. Na 120. Creatinine 2.3 (baseline 1.0). CPK 3960. CXR with concern for aspiration pneumonia. Patient diaphoretic, tremulous, tachycardic concerning for developing withdrawal. Started on IVFs, benzodiazepines, and abx for rhabdo, hyponatremia, aspiration pna, alcohol withdrawal. Admitted to the care of medicine for further evaluation and management.     Overview/Hospital Course:  Patient admitted to [facility] for AMS with severe hyponatremia in setting of ETOH abuse, ETOH withdrawals, ALESSANDRA, and aspiration pneumonia. He was started on Vanc/zosyn for aspiration pneumonia. Received IVF bolus and continued on maintenance IVF, Nephrology consulted for hyponatremia and ALESSANDRA. Urine was spun muddy brown casts observed indicative of ATN. Nephrology recommending NS continued @ 150ml/hr and lasix 160mg IV BID for sodium excretion. Na slowing  improving at appropriate rate. Blood cultures NGTD. UA growing GNR's. MRSA swab pending. Patient continues on Vanc/zosyn. Platelets decreased from 140 -> 112 -> 86. Likely secondary to infection however was receiving subcutaneous heparin. HIT panel sent and transitioned to lovenox. Continues to have symptoms of alcohol withdrawal and continued on CIWA and scheduled/PRN benzodiazepines. UA growing ESBL E coli and Dc'd zosyn and started ertapenem. Vanc discontinued. Urine output remained good with diuresis, Cr stable. IV lasix Dc'd per nephrology recommendations. Sodium improved to 135. PT/OT consulted.    12/21/23 and onwards, patient completed IV ertapenem.  Kidney functions improved and Nephrology signed off.  He is awaiting placement at this time to SNF.    Interval History: Pt seen and examined this morning on chantal STEWART. Awaiting placement to SNF. No complaints at this time.      Objective:     Vital Signs (Most Recent):  Temp: 98 °F (36.7 °C) (12/28/23 1153)  Pulse: 94 (12/28/23 1153)  Resp: 18 (12/28/23 1153)  BP: (!) 142/64 (12/28/23 1153)  SpO2: 96 % (12/28/23 1153) Vital Signs (24h Range):  Temp:  [97.8 °F (36.6 °C)-98.4 °F (36.9 °C)] 98 °F (36.7 °C)  Pulse:  [80-99] 94  Resp:  [18-19] 18  SpO2:  [91 %-96 %] 96 %  BP: (108-144)/(57-81) 142/64     Weight: 112 kg (247 lb)  Body mass index is 30.87 kg/m².    Intake/Output Summary (Last 24 hours) at 12/28/2023 1348  Last data filed at 12/28/2023 0821  Gross per 24 hour   Intake 100 ml   Output 1075 ml   Net -975 ml      Physical Exam  Gen: in NAD, appears stated age, obese  Neuro: AAOx4, CN2-12 grossly intact BL; motor, sensory, and strength grossly intact BL  HEENT: NTNC, EOMI, PERRLA, MMM; no thyromegaly or lymphadenopathy; no JVD appreciated  CVS: RRR, no m/r/g; S1/S2 auscultated with no S3 or S4; capillary refill < 2 sec  Resp: lungs CTAB, no w/r/r; no belabored breathing or accessory muscle use appreciated   Abd: BS+ in all 4 quadrants; NTND, soft to  "palpation; no organomegaly appreciated   Extrem: pulses full, equal, and regular over all 4 extremities; no UE or LE edema BL  MSK: Debility      MELD 3.0: 26 at 12/17/2023  7:44 PM  MELD-Na: 25 at 12/17/2023  7:44 PM  Calculated from:  Serum Creatinine: 2.4 mg/dL at 12/17/2023  7:44 PM  Serum Sodium: 125 mmol/L at 12/17/2023  7:44 PM  Total Bilirubin: 0.7 mg/dL (Using min of 1 mg/dL) at 12/17/2023  9:04 AM  Serum Albumin: 2.4 g/dL at 12/17/2023  9:04 AM  INR(ratio): 1.0 at 12/15/2023  2:16 PM  Age at listing (hypothetical): 68 years  Sex: Male at 12/17/2023  7:44 PM      Significant Labs:  CBC:  No results for input(s): "WBC", "HGB", "HCT", "PLT" in the last 48 hours.  CMP:  No results for input(s): "NA", "K", "CL", "CO2", "GLU", "BUN", "CREATININE", "CALCIUM", "PROT", "ALBUMIN", "BILITOT", "ALKPHOS", "AST", "ALT", "ANIONGAP", "EGFRNONAA" in the last 48 hours.    Invalid input(s): "ESTGFAFRICA"  PTINR:  No results for input(s): "INR" in the last 48 hours.    Significant Procedures:   Dobutamine Stress Test with Color Flow: No results found for this or any previous visit.    Assessment/Plan:      * Discharge planning issues  Patient medically stable. Awaiting SNF placement given debility.      ALESSANDRA (acute kidney injury)  - Creatinine 2.3 on presentation ; baseline 1.0  - suspect secondary to dehydration with severe alcohol use/dependence and inadequate water/hydration  - Nephrology consulted, urine spun and multiple muddy brown casts indicative of ATN  - S/p IV diuresis for 2-3 days, now off diuretics as of 12/19  - strict I/Os  - avoid nephrotoxic agents as appropriate  - continue to monitor  - Renal US without hydronephrosis  - Cr continues to improve  - Resolved. Kidney functions improving and Nephrology signed off.  Continue with oral intake    Hyponatremia  History of severe hyponatremia requiring ICU. From chart review appears largely secondary to beer potemania in the past  - Na 120 on presentation, " downtrended to 118 overnight  - Nephrology was consulted.   - S/p IVF and IV diuresis with improvement to Na  - IV lasix DC'd  - neurochecks q4h  - seizure precuations  - further management pending clinical course and future study review    - Resolved, continue PO intake      Acute cystitis  - Interval history and physical exam findings as described above  - UA findings reviewed  - UCx growing ESBL E coli  - Blood cultures NGTD  - Transitioned Zosyn to Ertapenem 12/18 12/22/23  Completed Ertapenem.      Aspiration pneumonia  - developed fever shortly after admission  ;  Tmax 103  - CXR with concern for aspiration pna left basilar lungfield  - Growing ESBL E coli in urine, zosyn Dc'd.   - Started on Ertapenem 12/18  - Vanc DC'd  - Blood cultures NGTD  - further management pending clinical course and future study review    - S/p course of Abx, weaning off of oxygen    Alcohol use disorder, severe, dependence  - daily thiamine/FA/MV  - would benefit from addiction psych eval once clinically appropriate    Rhabdomyolysis  - CPK 4k on presentation  - Encourage PO hydration  - Cr improving  - Nephrology following  - monitor kidney function  - CK improved  - Resolved    Cirrhosis  MELD-Na score calculated; MELD 3.0: 26 at 12/17/2023  7:44 PM  MELD-Na: 25 at 12/17/2023  7:44 PM  Calculated from:  Serum Creatinine: 2.4 mg/dL at 12/17/2023  7:44 PM  Serum Sodium: 125 mmol/L at 12/17/2023  7:44 PM  Total Bilirubin: 0.7 mg/dL (Using min of 1 mg/dL) at 12/17/2023  9:04 AM  Serum Albumin: 2.4 g/dL at 12/17/2023  9:04 AM  INR(ratio): 1.0 at 12/15/2023  2:16 PM  Age at listing (hypothetical): 68 years  Sex: Male at 12/17/2023  7:44 PM    - continues to use alcohol persistently  - AST > ALT x2 consistent with ETOH use.  - Trend hepatic function panel  - LFT's improved  - Appears well compensated on physical exam    Debility  For skilled nursing facility placement    Depression  - holding home meds for now as seems he hasn't been  taking for some time      VTE Risk Mitigation (From admission, onward)           Ordered     enoxaparin injection 40 mg  Every 24 hours         12/17/23 1239     IP VTE HIGH RISK PATIENT  Once         12/15/23 1817     Place sequential compression device  Until discontinued         12/15/23 1817                    Discharge Planning   KENNETH: 12/28/2023     Code Status: Full Code   Is the patient medically ready for discharge?: No    Reason for patient still in hospital (select all that apply): Pending disposition  Discharge Plan A: Skilled Nursing Facility   Discharge Delays: (!) Post-Acute Set-up              Moy Cantu MD  Department of Hospital Medicine   [facility] - Intensive Care ([place])

## 2023-12-29 PROBLEM — L24.A2 IRRITANT CONTACT DERMATITIS DUE TO FECAL, URINARY OR DUAL INCONTINENCE: Status: ACTIVE | Noted: 2023-12-29

## 2023-12-29 LAB
ANION GAP SERPL CALC-SCNC: 7 MMOL/L (ref 8–16)
BASOPHILS # BLD AUTO: 0.15 K/UL (ref 0–0.2)
BASOPHILS NFR BLD: 1.8 % (ref 0–1.9)
BUN SERPL-MCNC: 44 MG/DL (ref 8–23)
CALCIUM SERPL-MCNC: 9.2 MG/DL (ref 8.7–10.5)
CHLORIDE SERPL-SCNC: 102 MMOL/L (ref 95–110)
CO2 SERPL-SCNC: 31 MMOL/L (ref 23–29)
CREAT SERPL-MCNC: 1.4 MG/DL (ref 0.5–1.4)
DIFFERENTIAL METHOD BLD: ABNORMAL
EOSINOPHIL # BLD AUTO: 0.1 K/UL (ref 0–0.5)
EOSINOPHIL NFR BLD: 1.1 % (ref 0–8)
ERYTHROCYTE [DISTWIDTH] IN BLOOD BY AUTOMATED COUNT: 13.5 % (ref 11.5–14.5)
EST. GFR  (NO RACE VARIABLE): 54.7 ML/MIN/1.73 M^2
GLUCOSE SERPL-MCNC: 113 MG/DL (ref 70–110)
HCT VFR BLD AUTO: 36.8 % (ref 40–54)
HGB BLD-MCNC: 12.7 G/DL (ref 14–18)
IMM GRANULOCYTES # BLD AUTO: 0.16 K/UL (ref 0–0.04)
IMM GRANULOCYTES NFR BLD AUTO: 1.9 % (ref 0–0.5)
LYMPHOCYTES # BLD AUTO: 1.5 K/UL (ref 1–4.8)
LYMPHOCYTES NFR BLD: 17.7 % (ref 18–48)
MCH RBC QN AUTO: 32.3 PG (ref 27–31)
MCHC RBC AUTO-ENTMCNC: 34.5 G/DL (ref 32–36)
MCV RBC AUTO: 94 FL (ref 82–98)
MONOCYTES # BLD AUTO: 0.8 K/UL (ref 0.3–1)
MONOCYTES NFR BLD: 9.3 % (ref 4–15)
NEUTROPHILS # BLD AUTO: 5.8 K/UL (ref 1.8–7.7)
NEUTROPHILS NFR BLD: 68.2 % (ref 38–73)
NRBC BLD-RTO: 0 /100 WBC
PLATELET # BLD AUTO: 366 K/UL (ref 150–450)
PMV BLD AUTO: 9.3 FL (ref 9.2–12.9)
POTASSIUM SERPL-SCNC: 4.3 MMOL/L (ref 3.5–5.1)
RBC # BLD AUTO: 3.93 M/UL (ref 4.6–6.2)
SODIUM SERPL-SCNC: 140 MMOL/L (ref 136–145)
WBC # BLD AUTO: 8.42 K/UL (ref 3.9–12.7)

## 2023-12-29 PROCEDURE — 97110 THERAPEUTIC EXERCISES: CPT | Mod: HCNC

## 2023-12-29 PROCEDURE — 99222 1ST HOSP IP/OBS MODERATE 55: CPT | Mod: HCNC,,, | Performed by: NURSE PRACTITIONER

## 2023-12-29 PROCEDURE — 21400001 HC TELEMETRY ROOM: Mod: HCNC

## 2023-12-29 PROCEDURE — 97530 THERAPEUTIC ACTIVITIES: CPT | Mod: HCNC,CO

## 2023-12-29 PROCEDURE — 63600175 PHARM REV CODE 636 W HCPCS: Mod: HCNC | Performed by: INTERNAL MEDICINE

## 2023-12-29 PROCEDURE — 97116 GAIT TRAINING THERAPY: CPT | Mod: HCNC

## 2023-12-29 PROCEDURE — 85025 COMPLETE CBC W/AUTO DIFF WBC: CPT | Mod: HCNC | Performed by: INTERNAL MEDICINE

## 2023-12-29 PROCEDURE — 36415 COLL VENOUS BLD VENIPUNCTURE: CPT | Mod: HCNC | Performed by: INTERNAL MEDICINE

## 2023-12-29 PROCEDURE — 25000003 PHARM REV CODE 250: Mod: HCNC | Performed by: FAMILY MEDICINE

## 2023-12-29 PROCEDURE — 94761 N-INVAS EAR/PLS OXIMETRY MLT: CPT | Mod: HCNC

## 2023-12-29 PROCEDURE — 25000003 PHARM REV CODE 250: Mod: HCNC | Performed by: INTERNAL MEDICINE

## 2023-12-29 PROCEDURE — 80048 BASIC METABOLIC PNL TOTAL CA: CPT | Mod: HCNC | Performed by: INTERNAL MEDICINE

## 2023-12-29 PROCEDURE — 97535 SELF CARE MNGMENT TRAINING: CPT | Mod: HCNC,CO

## 2023-12-29 RX ADMIN — ENOXAPARIN SODIUM 40 MG: 40 INJECTION SUBCUTANEOUS at 04:12

## 2023-12-29 RX ADMIN — ACETAMINOPHEN 500 MG: 500 TABLET ORAL at 04:12

## 2023-12-29 RX ADMIN — OXYCODONE HYDROCHLORIDE 5 MG: 5 TABLET ORAL at 08:12

## 2023-12-29 RX ADMIN — FOLIC ACID 1 MG: 1 TABLET ORAL at 08:12

## 2023-12-29 RX ADMIN — THERA TABS 1 TABLET: TAB at 08:12

## 2023-12-29 RX ADMIN — Medication 100 MG: at 08:12

## 2023-12-29 RX ADMIN — LOSARTAN POTASSIUM 100 MG: 50 TABLET, FILM COATED ORAL at 08:12

## 2023-12-29 NOTE — PLAN OF CARE
Per availity, pt's auth is certified in total, [id number] expiration 1/1/24.    Per email from Consuelo Godoy at [facility], they are reviewing pt to see if they can take him.    10:53 AM  Per Consuelo, pt was denied for wounds.    11:41 AM  CM called Sneha at [facility] [phone]; M requesting call back re: can they accept this pt.    GENNY ThackerN, BS, RN, CCM

## 2023-12-29 NOTE — SUBJECTIVE & OBJECTIVE
Scheduled Meds:   enoxparin  40 mg Subcutaneous Q24H (prophylaxis, 1700)    folic acid  1 mg Oral Daily    losartan  100 mg Oral Daily    multivitamin  1 tablet Oral Daily    thiamine  100 mg Oral Daily     Continuous Infusions:  PRN Meds:acetaminophen, albuterol **AND** MDI Q6H PRN, aluminum-magnesium hydroxide-simethicone, dextrose 10%, dextrose 10%, glucagon (human recombinant), glucose, glucose, LORazepam, melatonin, naloxone, ondansetron, oxyCODONE, sodium chloride 0.9%    Review of patient's allergies indicates:   Allergen Reactions    Zoloft [sertraline] Other (See Comments)     hyponatremia        Past Medical History:   Diagnosis Date    Alcohol abuse     Anxiety     Cirrhosis     Glaucoma     History of hepatitis C, s/p successful Rx w/ SVR24 - 1/2017     genotype 1;  relapse following PegIFN+RBV+Victrelis; prior relapse following PegIFN+RBV S/p 12 weeks harvoni + RBV w/ SVR    Hypertension     Paresthesia     feet, bilaterally     Past Surgical History:   Procedure Laterality Date    LIVER BIOPSY         Family History       Problem Relation (Age of Onset)    Cancer Father, Mother, Sister    Colon cancer Mother    Colonic polyp Brother    Diabetes Father    Diabetes Mellitus Father    Hypertension Father          Tobacco Use    Smoking status: Some Days     Types: Cigars    Smokeless tobacco: Never    Tobacco comments:     smokes cigars 20 per month   Substance and Sexual Activity    Alcohol use: Yes     Comment: heavy alcohol use in the past, now drinking daily, 2-3 beers daily    Drug use: No    Sexual activity: Not on file     Review of Systems   Skin:  Positive for wound.       Objective:     Vital Signs (Most Recent):  Temp: 98 °F (36.7 °C) (12/29/23 1147)  Pulse: 97 (12/29/23 1147)  Resp: 18 (12/29/23 1147)  BP: 129/64 (12/29/23 1147)  SpO2: 96 % (12/29/23 1147) Vital Signs (24h Range):  Temp:  [98 °F (36.7 °C)-98.9 °F (37.2 °C)] 98 °F (36.7 °C)  Pulse:  [87-97] 97  Resp:  [16-19] 18  SpO2:  [85  %-96 %] 96 %  BP: (114-156)/(57-72) 129/64     Weight: 112 kg (247 lb)  Body mass index is 30.87 kg/m².     Physical Exam  Constitutional:       Appearance: Normal appearance.   Skin:     General: Skin is warm and dry.      Findings: Lesion present.   Neurological:      Mental Status: He is alert.          Laboratory:  All pertinent labs reviewed within the last 24 hours.    Diagnostic Results:  None

## 2023-12-29 NOTE — ASSESSMENT & PLAN NOTE
- consult received for evaluation of skin injury.  - pt presents to the ED with AMS.   - moisture dermatitis to bilateral buttocks and thighs with red, inflamed, and peeling skin. Appears to be healing.  - continue Triad bid/prn.  - incontinence pad saturated with urine. Replaced with new pad.  - urinal at bedside.  - lexi surface. Will order waffle overlay.  - turns well. Encouraged to turn q2h.  - nursing to maintain pressure injury prevention measures and continue wound care per orders.

## 2023-12-29 NOTE — PT/OT/SLP PROGRESS
Occupational Therapy   Treatment    Name: Froylan Borja  MRN: [id number]  Admitting Diagnosis:  Discharge planning issues       Recommendations:     Discharge Recommendations: Moderate Intensity Therapy  Discharge Equipment Recommendations:  walker, rolling, wheelchair, bedside commode  Barriers to discharge:       Assessment:     Froylan Borja is a 68 y.o. male with a medical diagnosis of Discharge planning issues.  He presents with the following performance deficits affecting function: weakness, impaired functional mobility, gait instability, impaired endurance, impaired balance, impaired self care skills, impaired cardiopulmonary response to activity, decreased lower extremity function, decreased upper extremity function, decreased coordination, decreased safety awareness.     Pt agreeable to therapy and tolerated session well. Pt requiring light assistance for bed mobility and ambulation but significant assistance for transfers. Pt ambulating short distance to sink with RW to perform g/h tasks but sat d/t fatigue. He finished g/h tasks seated in bedside chair. Pt completing BLE exercises while standing with RW.     Rehab Prognosis:  Good; patient would benefit from acute skilled OT services to address these deficits and reach maximum level of function.       Plan:     Patient to be seen 3 x/week to address the above listed problems via self-care/home management, therapeutic activities, therapeutic exercises  Plan of Care Expires: 01/20/24  Plan of Care Reviewed with: patient    Subjective     Chief Complaint: fatigue  Patient/Family Comments/goals: to improve function   Pain/Comfort:  Pain Rating 1: 0/10  Pain Addressed 1: Reposition, Distraction  Pain Rating Post-Intervention 1: 0/10    Objective:     Communicated with: RN prior to session.  Patient found HOB elevated with telemetry, Condom Catheter upon OT entry to room.  A client care conference was completed by the OTR and the BARBA prior to treatment by the BARBA to  discuss the patient's POC and current status.    General Precautions: Standard, fall, aspiration    Orthopedic Precautions:N/A  Braces: N/A  Respiratory Status: Room air     Occupational Performance:     Bed Mobility:    Patient completed Scooting/Bridging with contact guard assistance  Patient completed Supine to Sit with contact guard assistance     Functional Mobility/Transfers:  Patient completed Sit <> Stand Transfer with moderate assistance and of 2 persons  with  rolling walker   Functional Mobility: pt ambulated ~10ft with CGA using RW. No LOB, fatigue noted.     Activities of Daily Living:  Grooming: supervision seated for oral hygiene and facial care, attempted at sink but deferred d/t fatigue  Upper Body Dressing: contact guard assistance to don gown around back      [facility] 6 Click ADL: 17    Treatment & Education:  Pt educated on OT POC and frequency during hospital stay.   Pt educated on proper hand placement and techniques for RW mgmt to improve safety awareness.   Pt educated on importance of OOB activity to improve function and activity tolerance.  Pt educated on pacing and deep breathing techniques to improve endurance and safety awareness  Addressed all patient questions/concerns within BARBA scope of practice.     Patient left up in chair with all lines intact, call button in reach, RN notified, and 2L/min O2 placed    GOALS:   Multidisciplinary Problems       Occupational Therapy Goals          Problem: Occupational Therapy    Goal Priority Disciplines Outcome Interventions   Occupational Therapy Goal     OT, PT/OT Ongoing, Progressing    Description: Goals to be met by: 1/20/24 (1 month)     Patient will increase functional independence with ADLs by performing:    UE Dressing with Supervision.  LE Dressing with Supervision.  Grooming while standing at sink with Supervision.  Toileting from toilet with Supervision for hygiene and clothing management.   Rolling to Bilateral with [place].    Supine to sit with [place].  Step transfer with Supervision  Toilet transfer to toilet with Supervision.                       Time Tracking:     OT Date of Treatment: 12/29/23  OT Start Time: 1324  OT Stop Time: 1357  OT Total Time (min): 33 min    Billable Minutes:Self Care/Home Management 12  Therapeutic Activity 21    OT/MARQUITA: MARQUITA     Number of MARQUITA visits since last OT visit: 4    12/29/2023

## 2023-12-29 NOTE — HPI
Froylan Borja is a 68 year old male with a past medical history of alcohol abuse, depression, hyponatremia severe requiring prior MICU admit, HTN, HLD, cirrhosis, diverticulosis who presents to the ED with AMS. Patient reportedly at the bar with his wife earlier today and became confused and unsteady and difficulty ambulating. Per chart review police called because of beligerance and were concerned about stroke symptoms and brought patient to ED for further evaluation. Patient alert and oriented to person and aware in hospital. Unable to contribute meaningfully to history but does admit heavy daily alcohol use.      In the ED patient initially afebrile, and hemodynamically stable saturating well on room air at rest. WBC 10.1 with leukocytosis. Na 120. Creatinine 2.3 (baseline 1.0). CPK 3960. CXR with concern for aspiration pneumonia. Patient diaphoretic, tremulous, tachycardic concerning for developing withdrawal. Started on IVFs, benzodiazepines, and abx for rhabdo, hyponatremia, aspiration pna, alcohol withdrawal. Admitted to the care of medicine for further evaluation and management. Skin integrity GERALD consulted for evaluation of skin injury.

## 2023-12-29 NOTE — PT/OT/SLP PROGRESS
Physical Therapy Co-Treatment    Patient Name:  Froylan Borja   MRN:  [id number]  Admitting Diagnosis:  Discharge planning issues   Recent Surgery: * No surgery found *    Admit Date: 12/15/2023  Length of Stay: 14 days    Recommendations:     Discharge Recommendations:    Moderate Intensity Therapy   Discharge Equipment Recommendations: bedside commode, walker, rolling, wheelchair   Barriers to discharge: Decreased caregiver support and increased need for assistance    Appropriate transfer level with nursing staff: Safe to transfer to bedside chair/bedside commode: stand pivot with 2 people with RW.    Plan:     During this hospitalization, patient to be seen 3 x/week to address the identified rehab impairments via gait training, therapeutic activities, therapeutic exercises, neuromuscular re-education and progress towards the established goals.  Plan of Care Expires:  01/20/24  Plan of Care Reviewed with: patient    Assessment:     Froylan Borja is a 68 y.o. male admitted with a medical diagnosis of Discharge planning issues. Pt agreeable to therapy session. Pt demo'd improvements this session as he gait trained and performed standing balance at sink but pt with SOB and fatigued quickly requiring rest break after 2 minutes at sink. Pt requested 1 L NC after mobility to assist with SOB. Pt completed standing TE with RW but reported dizziness after so pt reclined in chair for pt to recover. Pt reported no dizziness at end of session. Patient continues to rquire increased assistance at this time to performed sit to stand transfers. Patient would benefit from skilled therapy services to maximize safety and independence, increase activity tolerance, decrease fall risk, decrease caregiver burden, improve QOL, improve patient's functional mobility, and decrease risk of contractures and pressure sores. Patient continues to demonstrate the need for moderate intensity therapy on a daily basis post acute exhibited by decreased  independence with functional mobility    DME Justification:    Patient demonstrates a mobility limitation that significantly impairs their ability to participate in one or more mobility related activities of daily living. Patient's mobility limitation cannot be sufficiently resolved with the use of a cane, but can be sufficiently resolved with the use of a rolling walker.The use of a rolling walker will considerably improve their ability to participate in MRADLs. Patient will use the walker on a regular basis at home.     Patient has a mobility limitation that significantly impairs their ability to participate in one or more mobility related activities of daily living in customary locations in the home. The mobility limitation cannot be sufficiently resolved by the use of a cane or walker. The use of a manual wheelchair will greatly improve the patient's ability to participate in MRADLs. The patient will use the wheelchair on a regular basis at home. They have expressed their willingness to use a manual wheelchair in the home, and have a caregiver who is available and willing to assist with the wheelchair if needed.    Patient has a mobility limitation that significantly impairs their ability to participate in one or more mobility related activities of daily living, including toileting. This deficit can be resolved by using a bedside commode. Patient demonstrates mobility limitations that will cause them to be confined to one room at home, and a bathroom will not be accessible. Using a bedside commode will greatly improve the patient's ability to participate in MRADLs.      Problem List: weakness, impaired endurance, impaired self care skills, impaired functional mobility, gait instability, impaired balance, decreased upper extremity function, decreased lower extremity function, decreased coordination, impaired cardiopulmonary response to activity, decreased safety awareness.  Rehab Prognosis: Good; patient would  "benefit from acute skilled PT services to address these deficits and reach maximum level of function.      Goals:   Multidisciplinary Problems       Physical Therapy Goals          Problem: Physical Therapy    Goal Priority Disciplines Outcome Goal Variances Interventions   Physical Therapy Goal     PT, PT/OT Ongoing, Progressing     Description: Goals to be met by: 24     Patient will increase functional independence with mobility by performin. Supine to sit with Contact Guard Assistance  2. Sit to stand transfer with Contact Guard Assistance  3. Bed to chair transfer with Contact Guard Assistance using LRAD  4. Gait  x 200 feet with Contact Guard Assistance using LRAD.   5. Ascend/descend 15 stair with right Handrails Minimal Assistance using LRAD.   6. Lower extremity exercise program x30 reps per handout, with independence                         Subjective     RN notified prior to session. No one present upon PT entrance into room. Patient agreeable to PT treatment session.    Chief Complaint: "I wish someone would help me besides yall I want to sit on the edge of the bed but they wont help me"  Patient/Family Comments/goals: increase strength and mobility  Pain/Comfort:  Pain Rating 1: 0/10  Pain Rating Post-Intervention 1: 0/10      Objective:     Additional staff present: OT; OT for cotx due to pt's multiple medical comorbidities and functional deficits req'ing two skilled therapists to appropriately maximize functional potential by progressing pt's musculoskeletal strength and endurance, facilitating neuromuscular postural balance and control ,and accommodating for patient's impaired cardiopulmonary tolerance to activities.     Patient found supine with: telemetry   Cognition:   Alert and Cooperative  Patient is oriented to Person, Place, Time, Situation  General Precautions: Standard, Cardiac fall, aspiration   Orthopedic Precautions:N/A   Braces: N/A   Body mass index is 30.87 kg/m².  Oxygen " "Device: Room Air  Vitals: /64   Pulse 101   Temp 98 °F (36.7 °C)   Resp 18   Ht 6' 3" (1.905 m)   Wt 112 kg (247 lb)   SpO2 96%   BMI 30.87 kg/m²     Outcome Measures:  AM-PAC 6 CLICK MOBILITY  Turning over in bed (including adjusting bedclothes, sheets and blankets)?: 3  Sitting down on and standing up from a chair with arms (e.g., wheelchair, bedside commode, etc.): 2  Moving from lying on back to sitting on the side of the bed?: 3  Moving to and from a bed to a chair (including a wheelchair)?: 3  Need to walk in hospital room?: 3  Climbing 3-5 steps with a railing?: 2  Basic Mobility Total Score: 16     Functional Mobility:    Bed Mobility:   Scooting with contact guard assistance  Supine > Sit with contact guard assistance    Transfers:   Sit <> Stand Transfer: moderate assistance and of 2 persons with rolling walker x1 trial EOB, x1 trial from chair     Balance:  Sitting Balance  Static: stand by assistance  Dynamic: stand by assistance  Standing:  Static: contact guard assistance  Time: 2 minutes at sink  Comment: Pt completed ADLs with OT. Pt with fwd lean onto sink that worsened with fatigue so pt encouraged to return to chair for seated rest break. Pt with increased SOB after gait trial and standing balance requesting 1 L NC for SOB.   Dynamic: contact guard assistance      Gait:  Patient ambulated: 10' to sink' + 2' back to chair    Patient required: contact guard  Patient used:  rolling walker   Gait Deviation(s): unsteady gait, decreased step length, wide base of support, flexed posture, and decreased leisa  all lines remained intact throughout ambulation trial  Chair follow for patient safety  Gait belt utilized  Comments: Patient mildly unsteady with fwd lean onto RW during trial.    Therapeutic Exercises:   Patient performed 1 set(s) of 10 repetitions of  the following standing exercises: marches and hip abduction for bilateral LE with RW CGA. Patient required skilled PT for instruction " of exercises and appropriate cues to perform exercises safely and appropriately.  Pt dizzy after exercises so returned to chair with legs elevated, ankle pumps encouraged, and chair reclined. Pt reported complete resolution of dizziness after ~3 minutes.    Education:  Time provided for education, counseling and discussion of health disposition in regards to patient's current status  All questions answered within PT scope of practice and to patient's satisfaction  PT role in POC to address current functional deficits  Pt educated on proper body mechanics, safety techniques, and energy conservation with PT facilitation and cueing throughout session  Call nursing/pct to transfer to chair/use bathroom. Pt stated understanding.  RW ordered for pt's room to use with STAFF    Patient left up in chair with all lines intact, call button in reach, and RN notified.    Time Tracking:     PT Received On: 12/29/23  PT Start Time: 1324     PT Stop Time: 1357  PT Total Time (min): 33 min     Billable Minutes:   Gait Training 12 minutes and Therapeutic Exercise 14 minutes    Treatment Type: Treatment  PT/PTA: PT       12/29/2023

## 2023-12-29 NOTE — PROGRESS NOTES
[facility] ([address] Medicine  Progress Note    Patient Name: Froylan Borja  MRN: [id number]  Patient Class: IP- Inpatient   Admission Date: 12/15/2023  Length of Stay: 14 days  Attending Physician: Moy Cantu MD  Primary Care Provider: Janki Tamez MD        Subjective:     Principal Problem:Discharge planning issues        HPI:  This is a 69yo male with a past medical history of alcohol abuse, depression, hyponatremia severe requiring prior MICU admit, HTN, HLD, cirrhosis, diverticulosis who presents to the ED with AMS. Patient reportedly at the bar with his wife earlier today and became confused and unsteady and difficulty ambulating. Per chart review police called because of beligerance and were concerned about stroke symptoms and brought patient to ED for further evaluation. Patient alert and oriented to person and aware in hospital. Unable to contribute meaningfully to history but does admit heavy daily alcohol use.     In the ED patient initially afebrile, and hemodynamically stable saturating well on room air at rest. WBC 10.1 with leukocytosis. Na 120. Creatinine 2.3 (baseline 1.0). CPK 3960. CXR with concern for aspiration pneumonia. Patient diaphoretic, tremulous, tachycardic concerning for developing withdrawal. Started on IVFs, benzodiazepines, and abx for rhabdo, hyponatremia, aspiration pna, alcohol withdrawal. Admitted to the care of medicine for further evaluation and management.     Overview/Hospital Course:  Patient admitted to [facility] for AMS with severe hyponatremia in setting of ETOH abuse, ETOH withdrawals, ALESSANDRA, and aspiration pneumonia. He was started on Vanc/zosyn for aspiration pneumonia. Received IVF bolus and continued on maintenance IVF, Nephrology consulted for hyponatremia and ALESSANDRA. Urine was spun muddy brown casts observed indicative of ATN. Nephrology recommending NS continued @ 150ml/hr and lasix 160mg IV BID for sodium excretion. Na slowing  improving at appropriate rate. Blood cultures NGTD. UA growing GNR's. MRSA swab pending. Patient continues on Vanc/zosyn. Platelets decreased from 140 -> 112 -> 86. Likely secondary to infection however was receiving subcutaneous heparin. HIT panel sent and transitioned to lovenox. Continues to have symptoms of alcohol withdrawal and continued on CIWA and scheduled/PRN benzodiazepines. UA growing ESBL E coli and Dc'd zosyn and started ertapenem. Vanc discontinued. Urine output remained good with diuresis, Cr stable. IV lasix Dc'd per nephrology recommendations. Sodium improved to 135. PT/OT consulted.    12/21/23 and onwards, patient completed IV ertapenem.  Kidney functions improved and Nephrology signed off.  He is awaiting placement at this time to SNF. Wound care consulted for moisture associated dermatitis on sacrum.     Interval History: Pt seen and examined this morning on hcantal STEWART. Awaiting placement to SNF. No complaints at this time.      Objective:     Vital Signs (Most Recent):  Temp: 98 °F (36.7 °C) (12/29/23 1147)  Pulse: 97 (12/29/23 1147)  Resp: 18 (12/29/23 1147)  BP: 129/64 (12/29/23 1147)  SpO2: 96 % (12/29/23 1147) Vital Signs (24h Range):  Temp:  [98 °F (36.7 °C)-98.9 °F (37.2 °C)] 98 °F (36.7 °C)  Pulse:  [87-97] 97  Resp:  [16-19] 18  SpO2:  [85 %-96 %] 96 %  BP: (114-156)/(57-72) 129/64     Weight: 112 kg (247 lb)  Body mass index is 30.87 kg/m².    Intake/Output Summary (Last 24 hours) at 12/29/2023 1435  Last data filed at 12/29/2023 1139  Gross per 24 hour   Intake --   Output 750 ml   Net -750 ml      Physical Exam  Gen: in NAD, appears stated age, obese  Neuro: AAOx4, CN2-12 grossly intact BL; motor, sensory, and strength grossly intact BL  HEENT: NTNC, EOMI, PERRLA, MMM; no thyromegaly or lymphadenopathy; no JVD appreciated  CVS: RRR, no m/r/g; S1/S2 auscultated with no S3 or S4; capillary refill < 2 sec  Resp: lungs CTAB, no w/r/r; no belabored breathing or accessory muscle use  "appreciated   Abd: BS+ in all 4 quadrants; NTND, soft to palpation; no organomegaly appreciated   Extrem: pulses full, equal, and regular over all 4 extremities; no UE or LE edema BL  MSK: Debility      MELD 3.0: 26 at 12/17/2023  7:44 PM  MELD-Na: 25 at 12/17/2023  7:44 PM  Calculated from:  Serum Creatinine: 2.4 mg/dL at 12/17/2023  7:44 PM  Serum Sodium: 125 mmol/L at 12/17/2023  7:44 PM  Total Bilirubin: 0.7 mg/dL (Using min of 1 mg/dL) at 12/17/2023  9:04 AM  Serum Albumin: 2.4 g/dL at 12/17/2023  9:04 AM  INR(ratio): 1.0 at 12/15/2023  2:16 PM  Age at listing (hypothetical): 68 years  Sex: Male at 12/17/2023  7:44 PM      Significant Labs:  CBC:  Recent Labs   Lab 12/29/23 0613   WBC 8.42   HGB 12.7*   HCT 36.8*        CMP:  Recent Labs   Lab 12/29/23 0613      K 4.3      CO2 31*   *   BUN 44*   CREATININE 1.4   CALCIUM 9.2   ANIONGAP 7*     PTINR:  No results for input(s): "INR" in the last 48 hours.    Significant Procedures:   Dobutamine Stress Test with Color Flow: No results found for this or any previous visit.    Assessment/Plan:      * Discharge planning issues  Patient medically stable. Awaiting SNF placement given debility.      ALESSANDRA (acute kidney injury)  - Creatinine 2.3 on presentation ; baseline 1.0  - suspect secondary to dehydration with severe alcohol use/dependence and inadequate water/hydration  - Nephrology consulted, urine spun and multiple muddy brown casts indicative of ATN  - S/p IV diuresis for 2-3 days, now off diuretics as of 12/19  - strict I/Os  - avoid nephrotoxic agents as appropriate  - continue to monitor  - Renal US without hydronephrosis  - Cr continues to improve  - Resolved. Kidney functions improving and Nephrology signed off.  Continue with oral intake    Hyponatremia  History of severe hyponatremia requiring ICU. From chart review appears largely secondary to beer potemania in the past  - Na 120 on presentation, downtrended to 118 overnight  - " Nephrology was consulted.   - S/p IVF and IV diuresis with improvement to Na  - IV lasix DC'd  - neurochecks q4h  - seizure precuations  - further management pending clinical course and future study review    - Resolved, continue PO intake      Acute cystitis  - Interval history and physical exam findings as described above  - UA findings reviewed  - UCx growing ESBL E coli  - Blood cultures NGTD  - Transitioned Zosyn to Ertapenem 12/18 12/22/23  Completed Ertapenem.      Aspiration pneumonia  - developed fever shortly after admission  ;  Tmax 103  - CXR with concern for aspiration pna left basilar lungfield  - Growing ESBL E coli in urine, zosyn Dc'd.   - Started on Ertapenem 12/18  - Vanc DC'd  - Blood cultures NGTD  - further management pending clinical course and future study review    - S/p course of Abx, weaned off of oxygen    Alcohol use disorder, severe, dependence  - daily thiamine/FA/MV    Rhabdomyolysis  - CPK 4k on presentation  - Encourage PO hydration  - Cr improving  - Nephrology following  - monitor kidney function  - CK improved  - Resolved    Cirrhosis  MELD-Na score calculated; MELD 3.0: 26 at 12/17/2023  7:44 PM  MELD-Na: 25 at 12/17/2023  7:44 PM  Calculated from:  Serum Creatinine: 2.4 mg/dL at 12/17/2023  7:44 PM  Serum Sodium: 125 mmol/L at 12/17/2023  7:44 PM  Total Bilirubin: 0.7 mg/dL (Using min of 1 mg/dL) at 12/17/2023  9:04 AM  Serum Albumin: 2.4 g/dL at 12/17/2023  9:04 AM  INR(ratio): 1.0 at 12/15/2023  2:16 PM  Age at listing (hypothetical): 68 years  Sex: Male at 12/17/2023  7:44 PM    - continues to use alcohol persistently  - AST > ALT x2 consistent with ETOH use.  - Trend hepatic function panel  - LFT's improved  - Appears well compensated on physical exam    Debility  For skilled nursing facility placement    Depression  - holding home meds for now as seems he hasn't been taking for some time      VTE Risk Mitigation (From admission, onward)           Ordered     enoxaparin  injection 40 mg  Every 24 hours         12/17/23 1239     IP VTE HIGH RISK PATIENT  Once         12/15/23 1817     Place sequential compression device  Until discontinued         12/15/23 1817                    Discharge Planning   KENNETH: 1/1/2024     Code Status: Full Code   Is the patient medically ready for discharge?: No    Reason for patient still in hospital (select all that apply): Pending disposition  Discharge Plan A: Skilled Nursing Facility   Discharge Delays: (!) Post-Acute Set-up              Moy Cantu MD  Department of Hospital Medicine   [facility] - Intensive Care ([place])

## 2023-12-29 NOTE — PHYSICIAN QUERY
PT Name: Froylan Borja  MR #: [id number]     DOCUMENTATION CLARIFICATION     CDS/: Taylor Pérez RN             Contact information: [email or web address]     This form is a permanent document in the medical record.     Query Date: December 29, 2023    By submitting this query, we are merely seeking further clarification of documentation.  Please utilize your independent clinical judgment when addressing the question(s) below.  Provider, please provide the integumentary diagnosis related to the documentation of Buttocks:   The Medical Record contains the following:     Altered Skin Integrity 12/25/23 0415 Buttocks Moisture associated dermatitis    Appearance Pink;Red;Moist    Tissue loss description Partial thickness    Care Cleansed with:;Soap and water    Dressing Applied;Other (comment)          (Triad)    Wound care to cleansed barrier cream with soap and water. Wound care applied Triad the areas of redness and applied a Qivi.     Skin Protection: incontinence pads utilized                  The clinical guidelines noted are only a system guideline. It does not replace the providers clinical judgment.    Per the National Pressure Injury Advisory Panel:   A pressure injury is localized damage to the skin and underlying soft tissue usually over a bony prominence or related to a medical or other device. The injury can present as intact skin or an open ulcer and may be painful. The injury occurs as a result of intense and/or prolonged pressure or pressure in combination with shear. The tolerance of soft tissue for pressure and shear may also be affected by microclimate, nutrition, perfusion, co-morbidities and condition of the soft tissue.       Stage 1 Pressure Injury:  Intact skin with a localized area of non-blanchable erythema, which may appear differently in darkly pigmented skin. Color changes do not include purple or maroon discoloration; these may indicate deep tissue pressure injury.    Stage 2  Pressure Injury:  Partial-thickness loss of skin with exposed dermis. The wound bed is viable, pink or red, moist, and may also present as an intact or ruptured serum-filled blister.    Stage 3 Pressure Injury:  Full-thickness loss of skin, in which adipose (fat) is visible in the ulcer and granulation tissue and epibole (rolled wound edges) are often present. Slough and/or eschar may be visible. Undermining and tunneling may occur.    Stage 4 Pressure Injury:  Full-thickness skin and tissue loss with exposed or directly palpable fascia, muscle, tendon, ligament, cartilage or bone in the ulcer. Slough and/or eschar may be visible. Epibole (rolled edges), undermining and/or tunneling often occur.    Unstageable Pressure Injury:  Full-thickness skin and tissue loss in which the extent of tissue damage within the ulcer cannot be confirmed because it is obscured by slough or eschar. If slough or eschar is removed, a Stage 3 or Stage 4 pressure injury will be revealed.    Deep Tissue Pressure Injury:  Intact or non-intact skin with localized area of persistent non-blanchable deep red, maroon, purple discoloration or epidermal separation revealing a dark wound bed or blood filled blister. This injury results from intense and/or prolonged pressure and shear forces at the bone-muscle interface. The wound may evolve rapidly to reveal the actual extent of tissue injury, or may resolve without tissue loss. If necrotic tissue, subcutaneous tissue, granulation tissue, fascia, muscle or other underlying structures are visible, this indicates a full thickness pressure injury (Unstageable, Stage 3 or Stage 4). Do not use DTPI to describe vascular, traumatic, neuropathic, or dermatologic conditions.   Medical Device Related Pressure Injury: This describes an etiology. Medical device related pressure injuries result from the use of devices designed and applied for diagnostic or therapeutic purposes. The resultant pressure injury  generally conforms to the pattern or shape of the device. The injury should be staged using the staging system.    Mucosal Membrane Pressure Injury: Mucosal membrane pressure injury is found on mucous membranes with a history of a medical device in use at the location of the injury. Due to the anatomy of the tissue these ulcers cannot be staged.       Provider, please provide the integumentary diagnosis related to the documentation of Buttocks:      [ x  ] Moisture associated dermatitis due to Fecal, Urinary, or Dual Incontinence  (Note to follow with updated media file)   [   ] Other Integumentary Diagnosis (please specify):______________         Please document in your progress notes daily for the duration of treatment until resolved and include in your discharge summary.    Reference:    KIERSTEN Patel., Twan, J. M., Goldberg, M., IVET Yeh., IVET Goodson., & NICOLASA De La Cruz. (2016). Revised National Pressure Ulcer Advisory Panel Pressure Injury Staging System: Revised Pressure Injury Staging System. J Wound Ostomy Continence Nurs, 43(6), 585-597. doi:10.1097/[id number]    Form No.17004

## 2023-12-29 NOTE — SUBJECTIVE & OBJECTIVE
Interval History: Pt seen and examined this morning on chantal STEWART. Awaiting placement to SNF. No complaints at this time.      Objective:     Vital Signs (Most Recent):  Temp: 98 °F (36.7 °C) (12/29/23 1147)  Pulse: 97 (12/29/23 1147)  Resp: 18 (12/29/23 1147)  BP: 129/64 (12/29/23 1147)  SpO2: 96 % (12/29/23 1147) Vital Signs (24h Range):  Temp:  [98 °F (36.7 °C)-98.9 °F (37.2 °C)] 98 °F (36.7 °C)  Pulse:  [87-97] 97  Resp:  [16-19] 18  SpO2:  [85 %-96 %] 96 %  BP: (114-156)/(57-72) 129/64     Weight: 112 kg (247 lb)  Body mass index is 30.87 kg/m².    Intake/Output Summary (Last 24 hours) at 12/29/2023 1435  Last data filed at 12/29/2023 1139  Gross per 24 hour   Intake --   Output 750 ml   Net -750 ml      Physical Exam  Gen: in NAD, appears stated age, obese  Neuro: AAOx4, CN2-12 grossly intact BL; motor, sensory, and strength grossly intact BL  HEENT: NTNC, EOMI, PERRLA, MMM; no thyromegaly or lymphadenopathy; no JVD appreciated  CVS: RRR, no m/r/g; S1/S2 auscultated with no S3 or S4; capillary refill < 2 sec  Resp: lungs CTAB, no w/r/r; no belabored breathing or accessory muscle use appreciated   Abd: BS+ in all 4 quadrants; NTND, soft to palpation; no organomegaly appreciated   Extrem: pulses full, equal, and regular over all 4 extremities; no UE or LE edema BL  MSK: Debility      MELD 3.0: 26 at 12/17/2023  7:44 PM  MELD-Na: 25 at 12/17/2023  7:44 PM  Calculated from:  Serum Creatinine: 2.4 mg/dL at 12/17/2023  7:44 PM  Serum Sodium: 125 mmol/L at 12/17/2023  7:44 PM  Total Bilirubin: 0.7 mg/dL (Using min of 1 mg/dL) at 12/17/2023  9:04 AM  Serum Albumin: 2.4 g/dL at 12/17/2023  9:04 AM  INR(ratio): 1.0 at 12/15/2023  2:16 PM  Age at listing (hypothetical): 68 years  Sex: Male at 12/17/2023  7:44 PM      Significant Labs:  CBC:  Recent Labs   Lab 12/29/23 0613   WBC 8.42   HGB 12.7*   HCT 36.8*        CMP:  Recent Labs   Lab 12/29/23 0613      K 4.3      CO2 31*   *   BUN 44*  "  CREATININE 1.4   CALCIUM 9.2   ANIONGAP 7*     PTINR:  No results for input(s): "INR" in the last 48 hours.    Significant Procedures:   Dobutamine Stress Test with Color Flow: No results found for this or any previous visit.  "

## 2023-12-29 NOTE — PLAN OF CARE
Problem: Infection  Goal: Absence of Infection Signs and Symptoms  Outcome: Ongoing, Progressing     Problem: Skin Injury Risk Increased  Goal: Skin Health and Integrity  Outcome: Ongoing, Progressing     Problem: Impaired Wound Healing  Goal: Optimal Wound Healing  Outcome: Ongoing, Progressing     Patient is awake and oriented x 4. Wound dressing done on the buttocks, thigh and groin. Due medications given. OT/PT session complete, patient sat on a chair.  2 person assist to stand up, able to walk with the use of walker at a steady very slow pace and small steps. Complained of headache 5/10 pain rating. Given tylenol, with reported pain relief. Trembling noted, refused to change room temperature.  Personal belongings placed within reach, bed locked and at low position.

## 2023-12-29 NOTE — CONSULTS
[facility] ([place] [address])  Skin Integrity GERALD  Consult Note    Patient Name: Froylan Borja  MRN: [id number]  Admission Date: 12/15/2023  Hospital Length of Stay: 14 days  Attending Physician: Moy Cantu MD  Primary Care Provider: Janki Tamez MD     Inpatient consult to Skin Integrity  Practitioner  Consult performed by: Jackie Hill NP  Consult ordered by: Pardeep Torres MD        Subjective:     History of Present Illness:  Froylan Borja is a 68 year old male with a past medical history of alcohol abuse, depression, hyponatremia severe requiring prior MICU admit, HTN, HLD, cirrhosis, diverticulosis who presents to the ED with AMS. Patient reportedly at the bar with his wife earlier today and became confused and unsteady and difficulty ambulating. Per chart review police called because of beligerance and were concerned about stroke symptoms and brought patient to ED for further evaluation. Patient alert and oriented to person and aware in hospital. Unable to contribute meaningfully to history but does admit heavy daily alcohol use.      In the ED patient initially afebrile, and hemodynamically stable saturating well on room air at rest. WBC 10.1 with leukocytosis. Na 120. Creatinine 2.3 (baseline 1.0). CPK 3960. CXR with concern for aspiration pneumonia. Patient diaphoretic, tremulous, tachycardic concerning for developing withdrawal. Started on IVFs, benzodiazepines, and abx for rhabdo, hyponatremia, aspiration pna, alcohol withdrawal. Admitted to the care of medicine for further evaluation and management. Skin integrity GERALD consulted for evaluation of skin injury.    Scheduled Meds:   enoxparin  40 mg Subcutaneous Q24H (prophylaxis, 1700)    folic acid  1 mg Oral Daily    losartan  100 mg Oral Daily    multivitamin  1 tablet Oral Daily    thiamine  100 mg Oral Daily     Continuous Infusions:  PRN Meds:acetaminophen, albuterol **AND** MDI Q6H PRN, aluminum-magnesium hydroxide-simethicone,  dextrose 10%, dextrose 10%, glucagon (human recombinant), glucose, glucose, LORazepam, melatonin, naloxone, ondansetron, oxyCODONE, sodium chloride 0.9%    Review of patient's allergies indicates:   Allergen Reactions    Zoloft [sertraline] Other (See Comments)     hyponatremia        Past Medical History:   Diagnosis Date    Alcohol abuse     Anxiety     Cirrhosis     Glaucoma     History of hepatitis C, s/p successful Rx w/ SVR24 - 1/2017     genotype 1;  relapse following PegIFN+RBV+Victrelis; prior relapse following PegIFN+RBV S/p 12 weeks harvoni + RBV w/ SVR    Hypertension     Paresthesia     feet, bilaterally     Past Surgical History:   Procedure Laterality Date    LIVER BIOPSY         Family History       Problem Relation (Age of Onset)    Cancer Father, Mother, Sister    Colon cancer Mother    Colonic polyp Brother    Diabetes Father    Diabetes Mellitus Father    Hypertension Father          Tobacco Use    Smoking status: Some Days     Types: Cigars    Smokeless tobacco: Never    Tobacco comments:     smokes cigars 20 per month   Substance and Sexual Activity    Alcohol use: Yes     Comment: heavy alcohol use in the past, now drinking daily, 2-3 beers daily    Drug use: No    Sexual activity: Not on file     Review of Systems   Skin:  Positive for wound.       Objective:     Vital Signs (Most Recent):  Temp: 98 °F (36.7 °C) (12/29/23 1147)  Pulse: 97 (12/29/23 1147)  Resp: 18 (12/29/23 1147)  BP: 129/64 (12/29/23 1147)  SpO2: 96 % (12/29/23 1147) Vital Signs (24h Range):  Temp:  [98 °F (36.7 °C)-98.9 °F (37.2 °C)] 98 °F (36.7 °C)  Pulse:  [87-97] 97  Resp:  [16-19] 18  SpO2:  [85 %-96 %] 96 %  BP: (114-156)/(57-72) 129/64     Weight: 112 kg (247 lb)  Body mass index is 30.87 kg/m².     Physical Exam  Constitutional:       Appearance: Normal appearance.   Skin:     General: Skin is warm and dry.      Findings: Lesion present.   Neurological:      Mental Status: He is alert.          Laboratory:  All  pertinent labs reviewed within the last 24 hours.    Diagnostic Results:  None      Assessment/Plan:         GERALD Skin Integrity Evaluation      Skin Integrity GERALD evaluation of patient as part of the comprehensive skin care team.     He has been admitted for 14 days. Skin injury was noted on 12/25/23. POA no.    Buttocks        Derm  Irritant contact dermatitis due to fecal, urinary or dual incontinence  - consult received for evaluation of skin injury.  - pt presents to the ED with AMS.   - moisture dermatitis to bilateral buttocks and thighs with red, inflamed, and peeling skin. Appears to be healing.  - continue Triad bid/prn.  - incontinence pad saturated with urine. Replaced with new pad.  - urinal at bedside.  - lexi surface. Will order waffle overlay.  - turns well. Encouraged to turn q2h.  - nursing to maintain pressure injury prevention measures and continue wound care per orders.        Thank you for your consult. I will follow-up with patient. Please contact us if you have any additional questions.      Jackie Hill NP  Skin Integrity GERALD  [facility] ([facility]

## 2023-12-29 NOTE — ASSESSMENT & PLAN NOTE
- developed fever shortly after admission  ;  Tmax 103  - CXR with concern for aspiration pna left basilar lungfield  - Growing ESBL E coli in urine, zosyn Dc'd.   - Started on Ertapenem 12/18  - Vanc DC'd  - Blood cultures NGTD  - further management pending clinical course and future study review    - S/p course of Abx, weaned off of oxygen

## 2023-12-30 PROCEDURE — 94761 N-INVAS EAR/PLS OXIMETRY MLT: CPT | Mod: HCNC

## 2023-12-30 PROCEDURE — 63600175 PHARM REV CODE 636 W HCPCS: Mod: HCNC | Performed by: INTERNAL MEDICINE

## 2023-12-30 PROCEDURE — 99900035 HC TECH TIME PER 15 MIN (STAT): Mod: HCNC

## 2023-12-30 PROCEDURE — 25000003 PHARM REV CODE 250: Mod: HCNC | Performed by: FAMILY MEDICINE

## 2023-12-30 PROCEDURE — 21400001 HC TELEMETRY ROOM: Mod: HCNC

## 2023-12-30 PROCEDURE — 25000003 PHARM REV CODE 250: Mod: HCNC | Performed by: INTERNAL MEDICINE

## 2023-12-30 RX ADMIN — THERA TABS 1 TABLET: TAB at 09:12

## 2023-12-30 RX ADMIN — FOLIC ACID 1 MG: 1 TABLET ORAL at 09:12

## 2023-12-30 RX ADMIN — Medication 100 MG: at 09:12

## 2023-12-30 RX ADMIN — LOSARTAN POTASSIUM 100 MG: 50 TABLET, FILM COATED ORAL at 09:12

## 2023-12-30 RX ADMIN — ENOXAPARIN SODIUM 40 MG: 40 INJECTION SUBCUTANEOUS at 06:12

## 2023-12-30 NOTE — ASSESSMENT & PLAN NOTE
Patient medically stable. Awaiting SNF placement given debility. So far has been denied by [facility] SNF's. CM following, appreciate assistance.

## 2023-12-30 NOTE — PLAN OF CARE
Problem: Adult Inpatient Plan of Care  Goal: Plan of Care Review  Outcome: Ongoing, Progressing     Problem: Fluid and Electrolyte Imbalance (Acute Kidney Injury/Impairment)  Goal: Fluid and Electrolyte Balance  Outcome: Ongoing, Progressing     Problem: Infection  Goal: Absence of Infection Signs and Symptoms  Outcome: Ongoing, Progressing     Problem: Skin Injury Risk Increased  Goal: Skin Health and Integrity  Outcome: Ongoing, Progressing

## 2023-12-30 NOTE — SUBJECTIVE & OBJECTIVE
Interval History: Pt seen and examined this morning on chantal STEWART. Awaiting placement to SNF. No complaints at this time.      Objective:     Vital Signs (Most Recent):  Temp: 98 °F (36.7 °C) (12/30/23 1149)  Pulse: 95 (12/30/23 1149)  Resp: 16 (12/30/23 1149)  BP: 133/62 (12/30/23 1149)  SpO2: 98 % (12/30/23 1149) Vital Signs (24h Range):  Temp:  [97.9 °F (36.6 °C)-98.6 °F (37 °C)] 98 °F (36.7 °C)  Pulse:  [] 95  Resp:  [16-19] 16  SpO2:  [94 %-98 %] 98 %  BP: (119-145)/(61-73) 133/62     Weight: 112 kg (247 lb)  Body mass index is 30.87 kg/m².    Intake/Output Summary (Last 24 hours) at 12/30/2023 1215  Last data filed at 12/30/2023 0743  Gross per 24 hour   Intake --   Output 1100 ml   Net -1100 ml      Physical Exam  Gen: in NAD, appears stated age, obese  Neuro: AAOx4, CN2-12 grossly intact BL; motor, sensory, and strength grossly intact BL  HEENT: NTNC, EOMI, PERRLA, MMM; no thyromegaly or lymphadenopathy; no JVD appreciated  CVS: RRR, no m/r/g; S1/S2 auscultated with no S3 or S4; capillary refill < 2 sec  Resp: lungs CTAB, no w/r/r; no belabored breathing or accessory muscle use appreciated   Abd: BS+ in all 4 quadrants; NTND, soft to palpation; no organomegaly appreciated   Extrem: pulses full, equal, and regular over all 4 extremities; no UE or LE edema BL  MSK: Debility      MELD 3.0: 26 at 12/17/2023  7:44 PM  MELD-Na: 25 at 12/17/2023  7:44 PM  Calculated from:  Serum Creatinine: 2.4 mg/dL at 12/17/2023  7:44 PM  Serum Sodium: 125 mmol/L at 12/17/2023  7:44 PM  Total Bilirubin: 0.7 mg/dL (Using min of 1 mg/dL) at 12/17/2023  9:04 AM  Serum Albumin: 2.4 g/dL at 12/17/2023  9:04 AM  INR(ratio): 1.0 at 12/15/2023  2:16 PM  Age at listing (hypothetical): 68 years  Sex: Male at 12/17/2023  7:44 PM      Significant Labs:  CBC:  Recent Labs   Lab 12/29/23 0613   WBC 8.42   HGB 12.7*   HCT 36.8*        CMP:  Recent Labs   Lab 12/29/23 0613      K 4.3      CO2 31*   *   BUN  "44*   CREATININE 1.4   CALCIUM 9.2   ANIONGAP 7*     PTINR:  No results for input(s): "INR" in the last 48 hours.    Significant Procedures:   Dobutamine Stress Test with Color Flow: No results found for this or any previous visit.  "

## 2023-12-30 NOTE — PROGRESS NOTES
[facility] ([address] Medicine  Progress Note    Patient Name: Froylan Borja  MRN: [id number]  Patient Class: IP- Inpatient   Admission Date: 12/15/2023  Length of Stay: 15 days  Attending Physician: Moy Cantu MD  Primary Care Provider: Janki Tamez MD        Subjective:     Principal Problem:Discharge planning issues        HPI:  This is a 67yo male with a past medical history of alcohol abuse, depression, hyponatremia severe requiring prior MICU admit, HTN, HLD, cirrhosis, diverticulosis who presents to the ED with AMS. Patient reportedly at the bar with his wife earlier today and became confused and unsteady and difficulty ambulating. Per chart review police called because of beligerance and were concerned about stroke symptoms and brought patient to ED for further evaluation. Patient alert and oriented to person and aware in hospital. Unable to contribute meaningfully to history but does admit heavy daily alcohol use.     In the ED patient initially afebrile, and hemodynamically stable saturating well on room air at rest. WBC 10.1 with leukocytosis. Na 120. Creatinine 2.3 (baseline 1.0). CPK 3960. CXR with concern for aspiration pneumonia. Patient diaphoretic, tremulous, tachycardic concerning for developing withdrawal. Started on IVFs, benzodiazepines, and abx for rhabdo, hyponatremia, aspiration pna, alcohol withdrawal. Admitted to the care of medicine for further evaluation and management.     Overview/Hospital Course:  Patient admitted to [facility] for AMS with severe hyponatremia in setting of ETOH abuse, ETOH withdrawals, ALESSANDRA, and aspiration pneumonia. He was started on Vanc/zosyn for aspiration pneumonia. Received IVF bolus and continued on maintenance IVF, Nephrology consulted for hyponatremia and ALESSANDRA. Urine was spun muddy brown casts observed indicative of ATN. Nephrology recommending NS continued @ 150ml/hr and lasix 160mg IV BID for sodium excretion. Na slowing  improving at appropriate rate. Blood cultures NGTD. UA growing GNR's. MRSA swab pending. Patient continues on Vanc/zosyn. Platelets decreased from 140 -> 112 -> 86. Likely secondary to infection however was receiving subcutaneous heparin. HIT panel sent and transitioned to lovenox. Continues to have symptoms of alcohol withdrawal and continued on CIWA and scheduled/PRN benzodiazepines. UA growing ESBL E coli and Dc'd zosyn and started ertapenem. Vanc discontinued. Urine output remained good with diuresis, Cr stable. IV lasix Dc'd per nephrology recommendations. Sodium improved to 135. PT/OT consulted.    12/21/23 and onwards, patient completed IV ertapenem.  Kidney functions improved and Nephrology signed off.  He is awaiting placement at this time to SNF. Wound care consulted for moisture associated dermatitis on sacrum.     Interval History: Pt seen and examined this morning on chantal STEWART. Awaiting placement to SNF. No complaints at this time.      Objective:     Vital Signs (Most Recent):  Temp: 98 °F (36.7 °C) (12/30/23 1149)  Pulse: 95 (12/30/23 1149)  Resp: 16 (12/30/23 1149)  BP: 133/62 (12/30/23 1149)  SpO2: 98 % (12/30/23 1149) Vital Signs (24h Range):  Temp:  [97.9 °F (36.6 °C)-98.6 °F (37 °C)] 98 °F (36.7 °C)  Pulse:  [] 95  Resp:  [16-19] 16  SpO2:  [94 %-98 %] 98 %  BP: (119-145)/(61-73) 133/62     Weight: 112 kg (247 lb)  Body mass index is 30.87 kg/m².    Intake/Output Summary (Last 24 hours) at 12/30/2023 1215  Last data filed at 12/30/2023 0743  Gross per 24 hour   Intake --   Output 1100 ml   Net -1100 ml      Physical Exam  Gen: in NAD, appears stated age, obese  Neuro: AAOx4, CN2-12 grossly intact BL; motor, sensory, and strength grossly intact BL  HEENT: NTNC, EOMI, PERRLA, MMM; no thyromegaly or lymphadenopathy; no JVD appreciated  CVS: RRR, no m/r/g; S1/S2 auscultated with no S3 or S4; capillary refill < 2 sec  Resp: lungs CTAB, no w/r/r; no belabored breathing or accessory muscle use  "appreciated   Abd: BS+ in all 4 quadrants; NTND, soft to palpation; no organomegaly appreciated   Extrem: pulses full, equal, and regular over all 4 extremities; no UE or LE edema BL  MSK: Debility      MELD 3.0: 26 at 12/17/2023  7:44 PM  MELD-Na: 25 at 12/17/2023  7:44 PM  Calculated from:  Serum Creatinine: 2.4 mg/dL at 12/17/2023  7:44 PM  Serum Sodium: 125 mmol/L at 12/17/2023  7:44 PM  Total Bilirubin: 0.7 mg/dL (Using min of 1 mg/dL) at 12/17/2023  9:04 AM  Serum Albumin: 2.4 g/dL at 12/17/2023  9:04 AM  INR(ratio): 1.0 at 12/15/2023  2:16 PM  Age at listing (hypothetical): 68 years  Sex: Male at 12/17/2023  7:44 PM      Significant Labs:  CBC:  Recent Labs   Lab 12/29/23 0613   WBC 8.42   HGB 12.7*   HCT 36.8*        CMP:  Recent Labs   Lab 12/29/23 0613      K 4.3      CO2 31*   *   BUN 44*   CREATININE 1.4   CALCIUM 9.2   ANIONGAP 7*     PTINR:  No results for input(s): "INR" in the last 48 hours.    Significant Procedures:   Dobutamine Stress Test with Color Flow: No results found for this or any previous visit.    Assessment/Plan:      * Discharge planning issues  Patient medically stable. Awaiting SNF placement given debility. So far has been denied by [facility] SNF's. CM following, appreciate assistance.      ALESSANDRA (acute kidney injury)  - Creatinine 2.3 on presentation ; baseline 1.0  - suspect secondary to dehydration with severe alcohol use/dependence and inadequate water/hydration  - Nephrology consulted, urine spun and multiple muddy brown casts indicative of ATN  - S/p IV diuresis for 2-3 days, now off diuretics as of 12/19  - strict I/Os  - avoid nephrotoxic agents as appropriate  - continue to monitor  - Renal US without hydronephrosis  - Cr continues to improve  - Resolved. Kidney functions improving and Nephrology signed off.  Continue with oral intake    Hyponatremia  History of severe hyponatremia requiring ICU. From chart review appears largely secondary to beer " potemania in the past  - Na 120 on presentation, downtrended to 118 overnight  - Nephrology was consulted.   - S/p IVF and IV diuresis with improvement to Na  - IV lasix DC'd  - neurochecks q4h  - seizure precuations  - further management pending clinical course and future study review    - Resolved, continue PO intake      Acute cystitis  - Interval history and physical exam findings as described above  - UA findings reviewed  - UCx growing ESBL E coli  - Blood cultures NGTD  - Transitioned Zosyn to Ertapenem 12/18 12/22/23  Completed Ertapenem.      Aspiration pneumonia  - developed fever shortly after admission  ;  Tmax 103  - CXR with concern for aspiration pna left basilar lungfield  - Growing ESBL E coli in urine, zosyn Dc'd.   - Started on Ertapenem 12/18  - Vanc DC'd  - Blood cultures NGTD  - further management pending clinical course and future study review    - S/p course of Abx, weaned off of oxygen    Alcohol use disorder, severe, dependence  - daily thiamine/FA/MV    Rhabdomyolysis  - CPK 4k on presentation  - Encourage PO hydration  - Cr improving  - Nephrology following  - monitor kidney function  - CK improved  - Resolved    Cirrhosis  MELD-Na score calculated; MELD 3.0: 26 at 12/17/2023  7:44 PM  MELD-Na: 25 at 12/17/2023  7:44 PM  Calculated from:  Serum Creatinine: 2.4 mg/dL at 12/17/2023  7:44 PM  Serum Sodium: 125 mmol/L at 12/17/2023  7:44 PM  Total Bilirubin: 0.7 mg/dL (Using min of 1 mg/dL) at 12/17/2023  9:04 AM  Serum Albumin: 2.4 g/dL at 12/17/2023  9:04 AM  INR(ratio): 1.0 at 12/15/2023  2:16 PM  Age at listing (hypothetical): 68 years  Sex: Male at 12/17/2023  7:44 PM    - continues to use alcohol persistently  - AST > ALT x2 consistent with ETOH use.  - Trend hepatic function panel  - LFT's improved  - Appears well compensated on physical exam    Irritant contact dermatitis due to fecal, urinary or dual incontinence  - Wound care consulted      Debility  For skilled nursing facility  placement    Depression  - holding home meds for now as seems he hasn't been taking for some time      VTE Risk Mitigation (From admission, onward)           Ordered     enoxaparin injection 40 mg  Every 24 hours         12/17/23 1239     IP VTE HIGH RISK PATIENT  Once         12/15/23 1817     Place sequential compression device  Until discontinued         12/15/23 1817                    Discharge Planning   KENNETH: 1/1/2024     Code Status: Full Code   Is the patient medically ready for discharge?: No    Reason for patient still in hospital (select all that apply): Pending disposition  Discharge Plan A: Skilled Nursing Facility   Discharge Delays: (!) Post-Acute Set-up              Moy Cantu MD  Department of Hospital Medicine   [facility] - Intensive Care ([place])

## 2023-12-31 PROCEDURE — 63600175 PHARM REV CODE 636 W HCPCS: Mod: HCNC | Performed by: INTERNAL MEDICINE

## 2023-12-31 PROCEDURE — 25000003 PHARM REV CODE 250: Mod: HCNC | Performed by: FAMILY MEDICINE

## 2023-12-31 PROCEDURE — 25000003 PHARM REV CODE 250: Mod: HCNC | Performed by: INTERNAL MEDICINE

## 2023-12-31 PROCEDURE — 21400001 HC TELEMETRY ROOM: Mod: HCNC

## 2023-12-31 RX ADMIN — ACETAMINOPHEN 500 MG: 500 TABLET ORAL at 07:12

## 2023-12-31 RX ADMIN — THERA TABS 1 TABLET: TAB at 09:12

## 2023-12-31 RX ADMIN — Medication 100 MG: at 09:12

## 2023-12-31 RX ADMIN — LOSARTAN POTASSIUM 100 MG: 50 TABLET, FILM COATED ORAL at 09:12

## 2023-12-31 RX ADMIN — FOLIC ACID 1 MG: 1 TABLET ORAL at 09:12

## 2023-12-31 RX ADMIN — ENOXAPARIN SODIUM 40 MG: 40 INJECTION SUBCUTANEOUS at 05:12

## 2023-12-31 NOTE — SUBJECTIVE & OBJECTIVE
"Interval History: Pt seen and examined this morning on chantal PAT. No complaints today.    Objective:     Vital Signs (Most Recent):  Temp: 98 °F (36.7 °C) (12/31/23 1127)  Pulse: 98 (12/31/23 1127)  Resp: 18 (12/31/23 1127)  BP: 139/62 (12/31/23 1127)  SpO2: 95 % (12/31/23 1127) Vital Signs (24h Range):  Temp:  [97.8 °F (36.6 °C)-98.8 °F (37.1 °C)] 98 °F (36.7 °C)  Pulse:  [] 98  Resp:  [16-18] 18  SpO2:  [94 %-98 %] 95 %  BP: (115-150)/(59-73) 139/62     Weight: 112 kg (247 lb)  Body mass index is 30.87 kg/m².    Intake/Output Summary (Last 24 hours) at 12/31/2023 1436  Last data filed at 12/31/2023 1000  Gross per 24 hour   Intake 480 ml   Output 550 ml   Net -70 ml      Physical Exam  Gen: in NAD, appears stated age, obese  Neuro: AAOx4, CN2-12 grossly intact BL; motor, sensory, and strength grossly intact BL  HEENT: NTNC, EOMI, PERRLA, MMM; no thyromegaly or lymphadenopathy; no JVD appreciated  CVS: RRR, no m/r/g; S1/S2 auscultated with no S3 or S4; capillary refill < 2 sec  Resp: lungs CTAB, no w/r/r; no belabored breathing or accessory muscle use appreciated   Abd: BS+ in all 4 quadrants; NTND, soft to palpation; no organomegaly appreciated   Extrem: pulses full, equal, and regular over all 4 extremities; no UE or LE edema BL  MSK: Debility      MELD 3.0: 26 at 12/17/2023  7:44 PM  MELD-Na: 25 at 12/17/2023  7:44 PM  Calculated from:  Serum Creatinine: 2.4 mg/dL at 12/17/2023  7:44 PM  Serum Sodium: 125 mmol/L at 12/17/2023  7:44 PM  Total Bilirubin: 0.7 mg/dL (Using min of 1 mg/dL) at 12/17/2023  9:04 AM  Serum Albumin: 2.4 g/dL at 12/17/2023  9:04 AM  INR(ratio): 1.0 at 12/15/2023  2:16 PM  Age at listing (hypothetical): 68 years  Sex: Male at 12/17/2023  7:44 PM      Significant Labs:  CBC:  No results for input(s): "WBC", "HGB", "HCT", "PLT" in the last 48 hours.    CMP:  No results for input(s): "NA", "K", "CL", "CO2", "GLU", "BUN", "CREATININE", "CALCIUM", "PROT", "ALBUMIN", "BILITOT", " ""ALKPHOS", "AST", "ALT", "ANIONGAP", "EGFRNONAA" in the last 48 hours.    Invalid input(s): "ESTGFAFRICA"    PTINR:  No results for input(s): "INR" in the last 48 hours.    Significant Procedures:   Dobutamine Stress Test with Color Flow: No results found for this or any previous visit.  "

## 2023-12-31 NOTE — PROGRESS NOTES
[facility] - Intensive Care ([address] Medicine  Progress Note    Patient Name: Froylan Borja  MRN: [id number]  Patient Class: IP- Inpatient   Admission Date: 12/15/2023  Length of Stay: 16 days  Attending Physician: Moy Cantu MD  Primary Care Provider: Janki Tamez MD        Subjective:     Principal Problem:Discharge planning issues        HPI:  This is a 69yo male with a past medical history of alcohol abuse, depression, hyponatremia severe requiring prior MICU admit, HTN, HLD, cirrhosis, diverticulosis who presents to the ED with AMS. Patient reportedly at the bar with his wife earlier today and became confused and unsteady and difficulty ambulating. Per chart review police called because of beligerance and were concerned about stroke symptoms and brought patient to ED for further evaluation. Patient alert and oriented to person and aware in hospital. Unable to contribute meaningfully to history but does admit heavy daily alcohol use.     In the ED patient initially afebrile, and hemodynamically stable saturating well on room air at rest. WBC 10.1 with leukocytosis. Na 120. Creatinine 2.3 (baseline 1.0). CPK 3960. CXR with concern for aspiration pneumonia. Patient diaphoretic, tremulous, tachycardic concerning for developing withdrawal. Started on IVFs, benzodiazepines, and abx for rhabdo, hyponatremia, aspiration pna, alcohol withdrawal. Admitted to the care of medicine for further evaluation and management.     Overview/Hospital Course:  Patient admitted to [facility] for [place]MS with severe hyponatremia in setting of ETOH abuse, ETOH withdrawals, ALESSANDRA, and aspiration pneumonia. He was started on Vanc/zosyn for aspiration pneumonia. Received IVF bolus and continued on maintenance IVF, Nephrology consulted for hyponatremia and ALESSANDRA. Urine was spun muddy brown casts observed indicative of ATN. Nephrology recommending NS continued @ 150ml/hr and lasix 160mg IV BID for sodium excretion. Na slowing  improving at appropriate rate. Blood cultures NGTD. UA growing GNR's. MRSA swab pending. Patient continues on Vanc/zosyn. Platelets decreased from 140 -> 112 -> 86. Likely secondary to infection however was receiving subcutaneous heparin. HIT panel sent and transitioned to lovenox. Continues to have symptoms of alcohol withdrawal and continued on CIWA and scheduled/PRN benzodiazepines. UA growing ESBL E coli and Dc'd zosyn and started ertapenem. Vanc discontinued. Urine output remained good with diuresis, Cr stable. IV lasix Dc'd per nephrology recommendations. Sodium improved to 135. PT/OT consulted.    12/21/23 and onwards, patient completed IV ertapenem.  Kidney functions improved and Nephrology signed off.  He is awaiting placement at this time to SNF. Wound care consulted for moisture associated dermatitis on sacrum.     Interval History: Pt seen and examined this morning on chantal STEWART. No complaints today.    Objective:     Vital Signs (Most Recent):  Temp: 98 °F (36.7 °C) (12/31/23 1127)  Pulse: 98 (12/31/23 1127)  Resp: 18 (12/31/23 1127)  BP: 139/62 (12/31/23 1127)  SpO2: 95 % (12/31/23 1127) Vital Signs (24h Range):  Temp:  [97.8 °F (36.6 °C)-98.8 °F (37.1 °C)] 98 °F (36.7 °C)  Pulse:  [] 98  Resp:  [16-18] 18  SpO2:  [94 %-98 %] 95 %  BP: (115-150)/(59-73) 139/62     Weight: 112 kg (247 lb)  Body mass index is 30.87 kg/m².    Intake/Output Summary (Last 24 hours) at 12/31/2023 1436  Last data filed at 12/31/2023 1000  Gross per 24 hour   Intake 480 ml   Output 550 ml   Net -70 ml      Physical Exam  Gen: in NAD, appears stated age, obese  Neuro: AAOx4, CN2-12 grossly intact BL; motor, sensory, and strength grossly intact BL  HEENT: NTNC, EOMI, PERRLA, MMM; no thyromegaly or lymphadenopathy; no JVD appreciated  CVS: RRR, no m/r/g; S1/S2 auscultated with no S3 or S4; capillary refill < 2 sec  Resp: lungs CTAB, no w/r/r; no belabored breathing or accessory muscle use appreciated   Abd: BS+ in all 4  "quadrants; NTND, soft to palpation; no organomegaly appreciated   Extrem: pulses full, equal, and regular over all 4 extremities; no UE or LE edema BL  MSK: Debility      MELD 3.0: 26 at 12/17/2023  7:44 PM  MELD-Na: 25 at 12/17/2023  7:44 PM  Calculated from:  Serum Creatinine: 2.4 mg/dL at 12/17/2023  7:44 PM  Serum Sodium: 125 mmol/L at 12/17/2023  7:44 PM  Total Bilirubin: 0.7 mg/dL (Using min of 1 mg/dL) at 12/17/2023  9:04 AM  Serum Albumin: 2.4 g/dL at 12/17/2023  9:04 AM  INR(ratio): 1.0 at 12/15/2023  2:16 PM  Age at listing (hypothetical): 68 years  Sex: Male at 12/17/2023  7:44 PM      Significant Labs:  CBC:  No results for input(s): "WBC", "HGB", "HCT", "PLT" in the last 48 hours.    CMP:  No results for input(s): "NA", "K", "CL", "CO2", "GLU", "BUN", "CREATININE", "CALCIUM", "PROT", "ALBUMIN", "BILITOT", "ALKPHOS", "AST", "ALT", "ANIONGAP", "EGFRNONAA" in the last 48 hours.    Invalid input(s): "ESTGFAFRICA"    PTINR:  No results for input(s): "INR" in the last 48 hours.    Significant Procedures:   Dobutamine Stress Test with Color Flow: No results found for this or any previous visit.    Assessment/Plan:      * Discharge planning issues  Patient medically stable. Awaiting SNF placement given debility. So far has been denied by [facility] SNF's. CM following, appreciate assistance.      ALESSANDRA (acute kidney injury)  - Creatinine 2.3 on presentation ; baseline 1.0  - suspect secondary to dehydration with severe alcohol use/dependence and inadequate water/hydration  - Nephrology consulted, urine spun and multiple muddy brown casts indicative of ATN  - S/p IV diuresis for 2-3 days, now off diuretics as of 12/19  - strict I/Os  - avoid nephrotoxic agents as appropriate  - continue to monitor  - Renal US without hydronephrosis  - Cr continues to improve  - Resolved. Kidney functions improving and Nephrology signed off.  Continue with oral intake    Hyponatremia  History of severe hyponatremia requiring ICU. " From chart review appears largely secondary to beer potemania in the past  - Na 120 on presentation, downtrended to 118 overnight  - Nephrology was consulted.   - S/p IVF and IV diuresis with improvement to Na  - IV lasix DC'd  - neurochecks q4h  - seizure precuations  - further management pending clinical course and future study review    - Resolved, continue PO intake      Acute cystitis  - Interval history and physical exam findings as described above  - UA findings reviewed  - UCx growing ESBL E coli  - Blood cultures NGTD  - Transitioned Zosyn to Ertapenem 12/18 12/22/23  Completed Ertapenem.      Aspiration pneumonia  - developed fever shortly after admission  ;  Tmax 103  - CXR with concern for aspiration pna left basilar lungfield  - Growing ESBL E coli in urine, zosyn Dc'd.   - Started on Ertapenem 12/18  - Vanc DC'd  - Blood cultures NGTD  - further management pending clinical course and future study review    - S/p course of Abx, weaned off of oxygen    Alcohol use disorder, severe, dependence  - daily thiamine/FA/MV    Rhabdomyolysis  - CPK 4k on presentation  - Encourage PO hydration  - Cr improving  - Nephrology following  - monitor kidney function  - CK improved  - Resolved    Cirrhosis  MELD-Na score calculated; MELD 3.0: 26 at 12/17/2023  7:44 PM  MELD-Na: 25 at 12/17/2023  7:44 PM  Calculated from:  Serum Creatinine: 2.4 mg/dL at 12/17/2023  7:44 PM  Serum Sodium: 125 mmol/L at 12/17/2023  7:44 PM  Total Bilirubin: 0.7 mg/dL (Using min of 1 mg/dL) at 12/17/2023  9:04 AM  Serum Albumin: 2.4 g/dL at 12/17/2023  9:04 AM  INR(ratio): 1.0 at 12/15/2023  2:16 PM  Age at listing (hypothetical): 68 years  Sex: Male at 12/17/2023  7:44 PM    - continues to use alcohol persistently  - AST > ALT x2 consistent with ETOH use.  - Trend hepatic function panel  - LFT's improved  - Appears well compensated on physical exam    Irritant contact dermatitis due to fecal, urinary or dual incontinence  - Wound care  consulted      Debility  For skilled nursing facility placement    Depression  - holding home meds for now as seems he hasn't been taking for some time      VTE Risk Mitigation (From admission, onward)           Ordered     enoxaparin injection 40 mg  Every 24 hours         12/17/23 1239     IP VTE HIGH RISK PATIENT  Once         12/15/23 1817     Place sequential compression device  Until discontinued         12/15/23 1817                    Discharge Planning   KENNETH: 1/1/2024     Code Status: Full Code   Is the patient medically ready for discharge?: No    Reason for patient still in hospital (select all that apply): Pending disposition  Discharge Plan A: Skilled Nursing Facility   Discharge Delays: (!) Post-Acute Set-up              Moy Cantu MD  Department of Hospital Medicine   [facility] - Intensive Care ([place] [address])

## 2023-12-31 NOTE — PLAN OF CARE
Problem: Adult Inpatient Plan of Care  Goal: Plan of Care Review  Outcome: Ongoing, Not Progressing  Goal: Optimal Comfort and Wellbeing  Outcome: Ongoing, Not Progressing     Problem: Confusion Acute  Goal: Optimal Cognitive Function  Outcome: Ongoing, Progressing     AAOx4. 02 sats wnl to ra. No acute issues present.

## 2023-12-31 NOTE — ASSESSMENT & PLAN NOTE
Patient medically stable. Awaiting SNF placement given debility. So far has been denied by [facility] SNF's. CM following, appreciate assistance.

## 2023-12-31 NOTE — PLAN OF CARE
Problem: Adult Inpatient Plan of Care  Goal: Plan of Care Review  Outcome: Ongoing, Progressing  Goal: Patient-Specific Goal (Individualized)  Outcome: Ongoing, Progressing  Goal: Absence of Hospital-Acquired Illness or Injury  Outcome: Ongoing, Progressing  Goal: Optimal Comfort and Wellbeing  Outcome: Ongoing, Progressing  Goal: Readiness for Transition of Care  Outcome: Ongoing, Progressing     Problem: Fluid and Electrolyte Imbalance (Acute Kidney Injury/Impairment)  Goal: Fluid and Electrolyte Balance  Outcome: Ongoing, Progressing     Problem: Oral Intake Inadequate (Acute Kidney Injury/Impairment)  Goal: Optimal Nutrition Intake  Outcome: Ongoing, Progressing     Problem: Renal Function Impairment (Acute Kidney Injury/Impairment)  Goal: Effective Renal Function  Outcome: Ongoing, Progressing     Problem: Infection  Goal: Absence of Infection Signs and Symptoms  Outcome: Ongoing, Progressing     Problem: Skin Injury Risk Increased  Goal: Skin Health and Integrity  Outcome: Ongoing, Progressing     Problem: Impaired Wound Healing  Goal: Optimal Wound Healing  Outcome: Ongoing, Progressing     Problem: Fall Injury Risk  Goal: Absence of Fall and Fall-Related Injury  Outcome: Ongoing, Progressing     Problem: Confusion Acute  Goal: Optimal Cognitive Function  Outcome: Ongoing, Progressing

## 2024-01-01 PROCEDURE — 25000003 PHARM REV CODE 250: Mod: HCNC | Performed by: FAMILY MEDICINE

## 2024-01-01 PROCEDURE — 63600175 PHARM REV CODE 636 W HCPCS: Mod: HCNC | Performed by: INTERNAL MEDICINE

## 2024-01-01 PROCEDURE — 21400001 HC TELEMETRY ROOM: Mod: HCNC

## 2024-01-01 PROCEDURE — 25000003 PHARM REV CODE 250: Mod: HCNC | Performed by: INTERNAL MEDICINE

## 2024-01-01 RX ADMIN — Medication 100 MG: at 09:01

## 2024-01-01 RX ADMIN — ACETAMINOPHEN 500 MG: 500 TABLET ORAL at 11:01

## 2024-01-01 RX ADMIN — LOSARTAN POTASSIUM 100 MG: 50 TABLET, FILM COATED ORAL at 09:01

## 2024-01-01 RX ADMIN — THERA TABS 1 TABLET: TAB at 09:01

## 2024-01-01 RX ADMIN — FOLIC ACID 1 MG: 1 TABLET ORAL at 09:01

## 2024-01-01 RX ADMIN — ENOXAPARIN SODIUM 40 MG: 40 INJECTION SUBCUTANEOUS at 04:01

## 2024-01-01 NOTE — PLAN OF CARE
Problem: Adult Inpatient Plan of Care  Goal: Plan of Care Review  Outcome: Ongoing, Not Progressing  Goal: Optimal Comfort and Wellbeing  Outcome: Ongoing, Not Progressing     Problem: Confusion Acute  Goal: Optimal Cognitive Function  Outcome: Ongoing, Progressing     AAOx4. No complaints voiced during shift. Continues with iv diuretic.

## 2024-01-01 NOTE — SUBJECTIVE & OBJECTIVE
"Interval History: Pt seen and examined this morning on dariusJorge Alberto STEWART. No complaints today.    Objective:     Vital Signs (Most Recent):  Temp: 99.2 °F (37.3 °C) (01/01/24 1157)  Pulse: 90 (01/01/24 1157)  Resp: 18 (01/01/24 1157)  BP: 134/67 (01/01/24 1157)  SpO2: (!) 94 % (01/01/24 1157) Vital Signs (24h Range):  Temp:  [98 °F (36.7 °C)-99.4 °F (37.4 °C)] 99.2 °F (37.3 °C)  Pulse:  [83-95] 90  Resp:  [16-18] 18  SpO2:  [90 %-94 %] 94 %  BP: (123-170)/(60-77) 134/67     Weight: 112 kg (247 lb)  Body mass index is 30.87 kg/m².    Intake/Output Summary (Last 24 hours) at 1/1/2024 1305  Last data filed at 1/1/2024 0930  Gross per 24 hour   Intake 240 ml   Output 1200 ml   Net -960 ml      Physical Exam  Gen: in NAD, appears stated age, obese  Neuro: AAOx4, CN2-12 grossly intact BL; motor, sensory, and strength grossly intact BL  HEENT: NTNC, EOMI, PERRLA, MMM; no thyromegaly or lymphadenopathy; no JVD appreciated  CVS: RRR, no m/r/g; S1/S2 auscultated with no S3 or S4; capillary refill < 2 sec  Resp: lungs CTAB, no w/r/r; no belabored breathing or accessory muscle use appreciated   Abd: BS+ in all 4 quadrants; NTND, soft to palpation; no organomegaly appreciated   Extrem: pulses full, equal, and regular over all 4 extremities; no UE or LE edema BL  MSK: Debility      MELD 3.0: 26 at 12/17/2023  7:44 PM  MELD-Na: 25 at 12/17/2023  7:44 PM  Calculated from:  Serum Creatinine: 2.4 mg/dL at 12/17/2023  7:44 PM  Serum Sodium: 125 mmol/L at 12/17/2023  7:44 PM  Total Bilirubin: 0.7 mg/dL (Using min of 1 mg/dL) at 12/17/2023  9:04 AM  Serum Albumin: 2.4 g/dL at 12/17/2023  9:04 AM  INR(ratio): 1.0 at 12/15/2023  2:16 PM  Age at listing (hypothetical): 68 years  Sex: Male at 12/17/2023  7:44 PM      Significant Labs:  CBC:  No results for input(s): "WBC", "HGB", "HCT", "PLT" in the last 48 hours.    CMP:  No results for input(s): "NA", "K", "CL", "CO2", "GLU", "BUN", "CREATININE", "CALCIUM", "PROT", "ALBUMIN", "BILITOT", " ""ALKPHOS", "AST", "ALT", "ANIONGAP", "EGFRNONAA" in the last 48 hours.    Invalid input(s): "ESTGFAFRICA"    PTINR:  No results for input(s): "INR" in the last 48 hours.    Significant Procedures:   Dobutamine Stress Test with Color Flow: No results found for this or any previous visit.  "

## 2024-01-01 NOTE — ASSESSMENT & PLAN NOTE
Patient medically stable. Awaiting SNF placement given debility. So far has been denied by [facility] SNF's. CM following, appreciate assistance.

## 2024-01-01 NOTE — PROGRESS NOTES
[facility] - Intensive Care ([address] Medicine  Progress Note    Patient Name: Froylan Borja  MRN: [id number]  Patient Class: IP- Inpatient   Admission Date: 12/15/2023  Length of Stay: 17 days  Attending Physician: Moy Cantu MD  Primary Care Provider: Janki Tamez MD        Subjective:     Principal Problem:Discharge planning issues        HPI:  This is a 69yo male with a past medical history of alcohol abuse, depression, hyponatremia severe requiring prior MICU admit, HTN, HLD, cirrhosis, diverticulosis who presents to the ED with AMS. Patient reportedly at the bar with his wife earlier today and became confused and unsteady and difficulty ambulating. Per chart review police called because of beligerance and were concerned about stroke symptoms and brought patient to ED for further evaluation. Patient alert and oriented to person and aware in hospital. Unable to contribute meaningfully to history but does admit heavy daily alcohol use.     In the ED patient initially afebrile, and hemodynamically stable saturating well on room air at rest. WBC 10.1 with leukocytosis. Na 120. Creatinine 2.3 (baseline 1.0). CPK 3960. CXR with concern for aspiration pneumonia. Patient diaphoretic, tremulous, tachycardic concerning for developing withdrawal. Started on IVFs, benzodiazepines, and abx for rhabdo, hyponatremia, aspiration pna, alcohol withdrawal. Admitted to the care of medicine for further evaluation and management.     Overview/Hospital Course:  Patient admitted to [facility] [place] for AMS with severe hyponatremia in setting of ETOH abuse, ETOH withdrawals, ALESSANDRA, and aspiration pneumonia. He was started on Vanc/zosyn for aspiration pneumonia. Received IVF bolus and continued on maintenance IVF, Nephrology consulted for hyponatremia and ALESSANDRA. Urine was spun muddy brown casts observed indicative of ATN. Nephrology recommending NS continued @ 150ml/hr and lasix 160mg IV BID for sodium excretion. Na slowing  improving at appropriate rate. Blood cultures NGTD. UA growing GNR's. MRSA swab pending. Patient continues on Vanc/zosyn. Platelets decreased from 140 -> 112 -> 86. Likely secondary to infection however was receiving subcutaneous heparin. HIT panel sent and transitioned to lovenox. Continues to have symptoms of alcohol withdrawal and continued on CIWA and scheduled/PRN benzodiazepines. UA growing ESBL E coli and Dc'd zosyn and started ertapenem. Vanc discontinued. Urine output remained good with diuresis, Cr stable. IV lasix Dc'd per nephrology recommendations. Sodium improved to 135. PT/OT consulted.    12/21/23 and onwards, patient completed IV ertapenem.  Kidney functions improved and Nephrology signed off.  He is awaiting placement at this time to SNF. Wound care consulted for moisture associated dermatitis on sacrum.     Interval History: Pt seen and examined this morning on chantal STEWART. No complaints today.    Objective:     Vital Signs (Most Recent):  Temp: 99.2 °F (37.3 °C) (01/01/24 1157)  Pulse: 90 (01/01/24 1157)  Resp: 18 (01/01/24 1157)  BP: 134/67 (01/01/24 1157)  SpO2: (!) 94 % (01/01/24 1157) Vital Signs (24h Range):  Temp:  [98 °F (36.7 °C)-99.4 °F (37.4 °C)] 99.2 °F (37.3 °C)  Pulse:  [83-95] 90  Resp:  [16-18] 18  SpO2:  [90 %-94 %] 94 %  BP: (123-170)/(60-77) 134/67     Weight: 112 kg (247 lb)  Body mass index is 30.87 kg/m².    Intake/Output Summary (Last 24 hours) at 1/1/2024 1305  Last data filed at 1/1/2024 0930  Gross per 24 hour   Intake 240 ml   Output 1200 ml   Net -960 ml      Physical Exam  Gen: in NAD, appears stated age, obese  Neuro: AAOx4, CN2-12 grossly intact BL; motor, sensory, and strength grossly intact BL  HEENT: NTNC, EOMI, PERRLA, MMM; no thyromegaly or lymphadenopathy; no JVD appreciated  CVS: RRR, no m/r/g; S1/S2 auscultated with no S3 or S4; capillary refill < 2 sec  Resp: lungs CTAB, no w/r/r; no belabored breathing or accessory muscle use appreciated   Abd: BS+ in all  "4 quadrants; NTND, soft to palpation; no organomegaly appreciated   Extrem: pulses full, equal, and regular over all 4 extremities; no UE or LE edema BL  MSK: Debility      MELD 3.0: 26 at 12/17/2023  7:44 PM  MELD-Na: 25 at 12/17/2023  7:44 PM  Calculated from:  Serum Creatinine: 2.4 mg/dL at 12/17/2023  7:44 PM  Serum Sodium: 125 mmol/L at 12/17/2023  7:44 PM  Total Bilirubin: 0.7 mg/dL (Using min of 1 mg/dL) at 12/17/2023  9:04 AM  Serum Albumin: 2.4 g/dL at 12/17/2023  9:04 AM  INR(ratio): 1.0 at 12/15/2023  2:16 PM  Age at listing (hypothetical): 68 years  Sex: Male at 12/17/2023  7:44 PM      Significant Labs:  CBC:  No results for input(s): "WBC", "HGB", "HCT", "PLT" in the last 48 hours.    CMP:  No results for input(s): "NA", "K", "CL", "CO2", "GLU", "BUN", "CREATININE", "CALCIUM", "PROT", "ALBUMIN", "BILITOT", "ALKPHOS", "AST", "ALT", "ANIONGAP", "EGFRNONAA" in the last 48 hours.    Invalid input(s): "ESTGFAFRICA"    PTINR:  No results for input(s): "INR" in the last 48 hours.    Significant Procedures:   Dobutamine Stress Test with Color Flow: No results found for this or any previous visit.    Assessment/Plan:      * Discharge planning issues  Patient medically stable. Awaiting SNF placement given debility. So far has been denied by [facility] SNF's. CM following, appreciate assistance.      ALESSANDRA (acute kidney injury)  - Creatinine 2.3 on presentation ; baseline 1.0  - suspect secondary to dehydration with severe alcohol use/dependence and inadequate water/hydration  - Nephrology consulted, urine spun and multiple muddy brown casts indicative of ATN  - S/p IV diuresis for 2-3 days, now off diuretics as of 12/19  - strict I/Os  - avoid nephrotoxic agents as appropriate  - continue to monitor  - Renal US without hydronephrosis  - Cr continues to improve  - Resolved. Kidney functions improving and Nephrology signed off.  Continue with oral intake    Hyponatremia  History of severe hyponatremia requiring ICU. " From chart review appears largely secondary to beer potemania in the past  - Na 120 on presentation, downtrended to 118 overnight  - Nephrology was consulted.   - S/p IVF and IV diuresis with improvement to Na  - IV lasix DC'd  - neurochecks q4h  - seizure precuations  - further management pending clinical course and future study review    - Resolved, continue PO intake      Acute cystitis  - Interval history and physical exam findings as described above  - UA findings reviewed  - UCx growing ESBL E coli  - Blood cultures NGTD  - Transitioned Zosyn to Ertapenem 12/18 12/22/23  Completed Ertapenem.      Aspiration pneumonia  - developed fever shortly after admission  ;  Tmax 103  - CXR with concern for aspiration pna left basilar lungfield  - Growing ESBL E coli in urine, zosyn Dc'd.   - Started on Ertapenem 12/18  - Vanc DC'd  - Blood cultures NGTD  - further management pending clinical course and future study review    - S/p course of Abx, weaned off of oxygen    Alcohol use disorder, severe, dependence  - daily thiamine/FA/MV    Rhabdomyolysis  - CPK 4k on presentation  - Encourage PO hydration  - Cr improving  - Nephrology following  - monitor kidney function  - CK improved  - Resolved    Cirrhosis  MELD-Na score calculated; MELD 3.0: 26 at 12/17/2023  7:44 PM  MELD-Na: 25 at 12/17/2023  7:44 PM  Calculated from:  Serum Creatinine: 2.4 mg/dL at 12/17/2023  7:44 PM  Serum Sodium: 125 mmol/L at 12/17/2023  7:44 PM  Total Bilirubin: 0.7 mg/dL (Using min of 1 mg/dL) at 12/17/2023  9:04 AM  Serum Albumin: 2.4 g/dL at 12/17/2023  9:04 AM  INR(ratio): 1.0 at 12/15/2023  2:16 PM  Age at listing (hypothetical): 68 years  Sex: Male at 12/17/2023  7:44 PM    - continues to use alcohol persistently  - AST > ALT x2 consistent with ETOH use.  - Trend hepatic function panel  - LFT's improved  - Appears well compensated on physical exam    Irritant contact dermatitis due to fecal, urinary or dual incontinence  - Wound care  consulted      Debility  For skilled nursing facility placement    Depression  - holding home meds for now as seems he hasn't been taking for some time      VTE Risk Mitigation (From admission, onward)           Ordered     enoxaparin injection 40 mg  Every 24 hours         12/17/23 1239     IP VTE HIGH RISK PATIENT  Once         12/15/23 1817     Place sequential compression device  Until discontinued         12/15/23 1817                    Discharge Planning   KENNETH: 1/1/2024     Code Status: Full Code   Is the patient medically ready for discharge?: No    Reason for patient still in hospital (select all that apply): Pending disposition  Discharge Plan A: Skilled Nursing Facility   Discharge Delays: (!) Post-Acute Set-up              Moy Cantu MD  Department of Hospital Medicine   [facility] - Intensive Care ([place] [address])

## 2024-01-01 NOTE — PLAN OF CARE
Problem: Adult Inpatient Plan of Care  Goal: Plan of Care Review  Outcome: Ongoing, Not Progressing  Goal: Optimal Comfort and Wellbeing  Outcome: Ongoing, Not Progressing     Problem: Confusion Acute  Goal: Optimal Cognitive Function  Outcome: Ongoing, Progressing     AAOx4. 02 sats wnl to ra. Prn tylenol given as needed for c/o mild headache.

## 2024-01-02 PROCEDURE — 97535 SELF CARE MNGMENT TRAINING: CPT | Mod: HCNC,CO

## 2024-01-02 PROCEDURE — 25000003 PHARM REV CODE 250: Mod: HCNC | Performed by: INTERNAL MEDICINE

## 2024-01-02 PROCEDURE — 25000003 PHARM REV CODE 250: Mod: HCNC | Performed by: FAMILY MEDICINE

## 2024-01-02 PROCEDURE — 63600175 PHARM REV CODE 636 W HCPCS: Mod: HCNC | Performed by: INTERNAL MEDICINE

## 2024-01-02 PROCEDURE — 97110 THERAPEUTIC EXERCISES: CPT | Mod: HCNC,CQ

## 2024-01-02 PROCEDURE — 97116 GAIT TRAINING THERAPY: CPT | Mod: HCNC,CQ

## 2024-01-02 PROCEDURE — 97530 THERAPEUTIC ACTIVITIES: CPT | Mod: HCNC,CO

## 2024-01-02 PROCEDURE — 21400001 HC TELEMETRY ROOM: Mod: HCNC

## 2024-01-02 PROCEDURE — 97530 THERAPEUTIC ACTIVITIES: CPT | Mod: HCNC,CQ

## 2024-01-02 RX ADMIN — LOSARTAN POTASSIUM 100 MG: 50 TABLET, FILM COATED ORAL at 09:01

## 2024-01-02 RX ADMIN — ENOXAPARIN SODIUM 40 MG: 40 INJECTION SUBCUTANEOUS at 05:01

## 2024-01-02 RX ADMIN — FOLIC ACID 1 MG: 1 TABLET ORAL at 09:01

## 2024-01-02 RX ADMIN — ACETAMINOPHEN 500 MG: 500 TABLET ORAL at 11:01

## 2024-01-02 RX ADMIN — Medication 100 MG: at 09:01

## 2024-01-02 RX ADMIN — THERA TABS 1 TABLET: TAB at 09:01

## 2024-01-02 NOTE — SUBJECTIVE & OBJECTIVE
"Interval History: Pt seen and examined this morning on chantal PAT. No complaints today.    Objective:     Vital Signs (Most Recent):  Temp: 98.2 °F (36.8 °C) (01/02/24 1158)  Pulse: (!) 119 (01/02/24 1158)  Resp: 18 (01/02/24 1158)  BP: 135/74 (01/02/24 1158)  SpO2: 98 % (01/02/24 1158) Vital Signs (24h Range):  Temp:  [97.6 °F (36.4 °C)-98.2 °F (36.8 °C)] 98.2 °F (36.8 °C)  Pulse:  [] 119  Resp:  [17-18] 18  SpO2:  [94 %-98 %] 98 %  BP: (133-140)/(62-79) 135/74     Weight: 112 kg (247 lb)  Body mass index is 30.87 kg/m².    Intake/Output Summary (Last 24 hours) at 1/2/2024 1335  Last data filed at 1/2/2024 0500  Gross per 24 hour   Intake 120 ml   Output 350 ml   Net -230 ml      Physical Exam  Gen: in NAD, appears stated age, obese  Neuro: AAOx4, CN2-12 grossly intact BL; motor, sensory, and strength grossly intact BL  HEENT: NTNC, EOMI, PERRLA, MMM; no thyromegaly or lymphadenopathy; no JVD appreciated  CVS: RRR, no m/r/g; S1/S2 auscultated with no S3 or S4; capillary refill < 2 sec  Resp: lungs CTAB, no w/r/r; no belabored breathing or accessory muscle use appreciated   Abd: BS+ in all 4 quadrants; NTND, soft to palpation; no organomegaly appreciated   Extrem: pulses full, equal, and regular over all 4 extremities; no UE or LE edema BL  MSK: Debility      MELD 3.0: 26 at 12/17/2023  7:44 PM  MELD-Na: 25 at 12/17/2023  7:44 PM  Calculated from:  Serum Creatinine: 2.4 mg/dL at 12/17/2023  7:44 PM  Serum Sodium: 125 mmol/L at 12/17/2023  7:44 PM  Total Bilirubin: 0.7 mg/dL (Using min of 1 mg/dL) at 12/17/2023  9:04 AM  Serum Albumin: 2.4 g/dL at 12/17/2023  9:04 AM  INR(ratio): 1.0 at 12/15/2023  2:16 PM  Age at listing (hypothetical): 68 years  Sex: Male at 12/17/2023  7:44 PM      Significant Labs:  CBC:  No results for input(s): "WBC", "HGB", "HCT", "PLT" in the last 48 hours.    CMP:  No results for input(s): "NA", "K", "CL", "CO2", "GLU", "BUN", "CREATININE", "CALCIUM", "PROT", "ALBUMIN", "BILITOT", " ""ALKPHOS", "AST", "ALT", "ANIONGAP", "EGFRNONAA" in the last 48 hours.    Invalid input(s): "ESTGFAFRICA"    PTINR:  No results for input(s): "INR" in the last 48 hours.    Significant Procedures:   Dobutamine Stress Test with Color Flow: No results found for this or any previous visit.  "

## 2024-01-02 NOTE — PT/OT/SLP PROGRESS
Occupational Therapy   Treatment    Name: Froylan Borja  MRN: [id number]  Admitting Diagnosis:  Discharge planning issues       Recommendations:     Discharge Recommendations: Moderate Intensity Therapy  Discharge Equipment Recommendations:  walker, rolling, wheelchair, bedside commode  Barriers to discharge:       Assessment:     Froylan Borja is a 68 y.o. male with a medical diagnosis of Discharge planning issues.  He presents with the following performance deficits affecting function: weakness, impaired functional mobility, gait instability, impaired endurance, impaired cardiopulmonary response to activity, decreased lower extremity function, decreased safety awareness.     Pt agreeable to therapy and tolerated session well. Pt showing improvements in ambulation and endurance. Pt ambulating with light assistance household distances using RW. Pt standing at sink for completion of g/h tasks. No LOB or SOB noted.    Rehab Prognosis:  Good; patient would benefit from acute skilled OT services to address these deficits and reach maximum level of function.       Plan:     Patient to be seen 3 x/week to address the above listed problems via self-care/home management, therapeutic activities, therapeutic exercises  Plan of Care Expires: 01/20/24  Plan of Care Reviewed with: patient    Subjective     Chief Complaint: light fatigue  Patient/Family Comments/goals: to return to PLOF  Pain/Comfort:  Pain Rating 1: 0/10  Pain Rating Post-Intervention 1: 0/10    Objective:     Communicated with: RN prior to session.  Patient found HOB elevated with telemetry upon OT entry to room.  A client care conference was completed by the OTR and the BARBA prior to treatment by the BARBA to discuss the patient's POC and current status.  Treatment supervised by JOSE LUIS Guevara.    General Precautions: Standard, fall, aspiration    Orthopedic Precautions:N/A  Braces: N/A  Respiratory Status: Room air     Occupational Performance:     Bed Mobility:     Patient completed Supine to Sit with supervision  Patient completed Sit to Supine with supervision     Functional Mobility/Transfers:  Patient completed Sit <> Stand Transfer with minimum assistance  with  rolling walker   Functional Mobility: pt amb ~12ft + 80ft with SBA using RW. No LOB or SOB noted.    Activities of Daily Living:  Grooming: stand by assistance for oral hygiene and facial cares standing at sink      [facility] 6 Click ADL: 19    Treatment & Education:  Pt educated on OT POC and frequency during hospital stay.   Pt educated on importance of OOB activity to improve function and activity tolerance.  Pt educated on proper hand placement and techniques for RW mgmt to improve safety awareness.   Addressed all patient questions/concerns within BARBA scope of practice.     Patient left HOB elevated with all lines intact, call button in reach, bed alarm on, and RN notified    GOALS:   Multidisciplinary Problems       Occupational Therapy Goals          Problem: Occupational Therapy    Goal Priority Disciplines Outcome Interventions   Occupational Therapy Goal     OT, PT/OT Ongoing, Progressing    Description: Goals to be met by: 1/20/24 (1 month)     Patient will increase functional independence with ADLs by performing:    UE Dressing with Supervision.  LE Dressing with Supervision.  Grooming while standing at sink with Supervision.  Toileting from toilet with Supervision for hygiene and clothing management.   Rolling to Bilateral with [place].   Supine to sit with [place].  Step transfer with Supervision  Toilet transfer to toilet with Supervision.                       Time Tracking:     OT Date of Treatment: 01/02/24  OT Start Time: 1336  OT Stop Time: 1402  OT Total Time (min): 26 min    Billable Minutes:Self Care/Home Management 12  Therapeutic Activity 14    OT/MARQUITA: MARQUITA     Number of MARQUITA visits since last OT visit: 5    1/2/2024

## 2024-01-02 NOTE — PLAN OF CARE
This pt has been denied by all SNF facilities in network per SNF list from [employer] website: [facility], [facility], [facility], [facility], [facility], [facility].  CM escalated to leadership.    Janelle Eugene, GENNYN, BS, RN, CCM

## 2024-01-02 NOTE — ASSESSMENT & PLAN NOTE
Patient medically stable. Awaiting SNF placement given debility. So far has been denied by [facility] SNF's. CM following, appreciate assistance.

## 2024-01-02 NOTE — PT/OT/SLP PROGRESS
Physical Therapy Treatment    Patient Name:  Froylan Borja   MRN:  [id number]    Recommendations:     Discharge Recommendations: Moderate Intensity Therapy  Discharge Equipment Recommendations: walker, rolling, wheelchair, bedside commode  Barriers to discharge:     Assessment:     Froylan Borja is a 68 y.o. male admitted with a medical diagnosis of Discharge planning issues.  He presents with the following impairments/functional limitations: weakness, impaired endurance, impaired self care skills, impaired functional mobility, decreased safety awareness, impaired cardiopulmonary response to activity Pt tolerated treatment session well today. Pt was able to increase distance ambulated and tolerated LE exercises well. Patient remains appropriate for continued skilled services within the acute environment and goals remain appropriate.   .    Rehab Prognosis: Good; patient would benefit from acute skilled PT services to address these deficits and reach maximum level of function.    Recent Surgery: * No surgery found *      Plan:     During this hospitalization, patient to be seen 3 x/week to address the identified rehab impairments via gait training, therapeutic activities, therapeutic exercises, neuromuscular re-education and progress toward the following goals:    Plan of Care Expires:  01/20/24    Subjective     Chief Complaint: None stated   Patient/Family Comments/goals: Pt agreeable to PT   Pain/Comfort:  Pain Rating 1: 0/10      Objective:     Communicated with Rn prior to session.  Patient found supine with telemetry upon PT entry to room.     General Precautions: Standard, aspiration, fall  Orthopedic Precautions: N/A  Braces: N/A  Respiratory Status: Room air     Functional Mobility:  Bed Mobility:     Supine to Sit: contact guard assistance  Sit to Supine: contact guard assistance  Transfers:     Sit to Stand from bed x 5:  minimum assistance with rolling walker  Sit to stand from chair: ModA with RW   Gait: 40 ft  CGA with RW   Pt performed 10 repetitions of seated B LE exercises consisting of: LAQ, ADD, heel raises, and toe raises.   Pt performed 10 repetitions of mini squats and ADB within RW       AM-PAC 6 CLICK MOBILITY  Turning over in bed (including adjusting bedclothes, sheets and blankets)?: 3  Sitting down on and standing up from a chair with arms (e.g., wheelchair, bedside commode, etc.): 3  Moving from lying on back to sitting on the side of the bed?: 3  Moving to and from a bed to a chair (including a wheelchair)?: 3  Need to walk in hospital room?: 3  Climbing 3-5 steps with a railing?: 2  Basic Mobility Total Score: 17       Treatment & Education:  Therapist provided instruction and educated for safety during transfers and gait training. As well as proper body mechanics, energy conservation, and fall prevention strategies during tasks listed above, and the effects of prolonged immobility and the importance of performing EOB/OOB activity and exercises to promote healing and reduce recovery time.       Patient left supine with all lines intact, call button in reach, and RN notified..    GOALS:   Multidisciplinary Problems       Physical Therapy Goals          Problem: Physical Therapy    Goal Priority Disciplines Outcome Goal Variances Interventions   Physical Therapy Goal     PT, PT/OT Ongoing, Progressing     Description: Goals to be met by: 24     Patient will increase functional independence with mobility by performin. Supine to sit with Contact Guard Assistance  2. Sit to stand transfer with Contact Guard Assistance  3. Bed to chair transfer with Contact Guard Assistance using LRAD  4. Gait  x 200 feet with Contact Guard Assistance using LRAD.   5. Ascend/descend 15 stair with right Handrails Minimal Assistance using LRAD.   6. Lower extremity exercise program x30 reps per handout, with independence                         Time Tracking:     PT Received On: 24  PT Start Time: 1041     PT  Stop Time: 1119  PT Total Time (min): 38 min     Billable Minutes: Gait Training 14, Therapeutic Activity 14, and Therapeutic Exercise 10    Treatment Type: Treatment  PT/PTA: PTA     Number of PTA visits since last PT visit: 1 01/02/2024

## 2024-01-02 NOTE — PROGRESS NOTES
[facility] - Int[facility] Care ([address] Medicine  Progress Note    Patient Name: Froylan Borja  MRN: [id number]  Patient Class: IP- Inpatient   Admission Date: 12/15/2023  Length of Stay: 18 days  Attending Physician: Moy Cantu MD  Primary Care Provider: Janki Tamez MD        Subjective:     Principal Problem:Discharge planning issues        HPI:  This is a 69yo male with a past medical history of alcohol abuse, depression, hyponatremia severe requiring prior MICU admit, HTN, HLD, cirrhosis, diverticulosis who presents to the ED with AMS. Patient reportedly at the bar with his wife earlier today and became confused and unsteady and difficulty ambulating. Per chart review police called because of beligerance and were concerned about stroke symptoms and brought patient to ED for further evaluation. Patient alert and oriented to person and aware in hospital. Unable to contribute meaningfully to history but does admit heavy daily alcohol use.     In the ED patient initially afebrile, and hemodynamically stable saturating well on room air at rest. WBC 10.1 with leukocytosis. Na 120. Creatinine 2.3 (baseline 1.0). CPK 3960. CXR with concern for aspiration pneumonia. Patient diaphoretic, tremulous, tachycardic concerning for developing withdrawal. Started on IVFs, benzodiazepines, and abx for rhabdo, hyponatremia, aspiration pna, alcohol withdrawal. Admitted to the care of medicine for further evaluation and management.     Overview/Hospital Course:  [place] [facility] [place] with severe hyponatremia in setting of ETOH abuse, ETOH withdrawals, ALESSANDRA, and aspiration pneumonia. He was started on Vanc/zosyn for aspiration pneumonia. Received IVF bolus and continued on maintenance IVF, Nephrology consulted for hyponatremia and ALESSANDRA. Urine was spun muddy brown casts observed indicative of ATN. Nephrology recommending NS continued @ 150ml/hr and lasix 160mg IV BID for sodium excretion. Na slowing  improving at appropriate rate. Blood cultures NGTD. UA growing GNR's. MRSA swab pending. Patient continues on Vanc/zosyn. Platelets decreased from 140 -> 112 -> 86. Likely secondary to infection however was receiving subcutaneous heparin. HIT panel sent and transitioned to lovenox. Continues to have symptoms of alcohol withdrawal and continued on CIWA and scheduled/PRN benzodiazepines. UA growing ESBL E coli and Dc'd zosyn and started ertapenem. Vanc discontinued. Urine output remained good with diuresis, Cr stable. IV lasix Dc'd per nephrology recommendations. Sodium improved to 135. PT/OT consulted.    12/21/23 and onwards, patient completed IV ertapenem.  Kidney functions improved and Nephrology signed off.  He is awaiting placement at this time to SNF. Wound care consulted for moisture associated dermatitis on sacrum.     Interval History: Pt seen and examined this morning on chantal STEWART. No complaints today.    Objective:     Vital Signs (Most Recent):  Temp: 98.2 °F (36.8 °C) (01/02/24 1158)  Pulse: (!) 119 (01/02/24 1158)  Resp: 18 (01/02/24 1158)  BP: 135/74 (01/02/24 1158)  SpO2: 98 % (01/02/24 1158) Vital Signs (24h Range):  Temp:  [97.6 °F (36.4 °C)-98.2 °F (36.8 °C)] 98.2 °F (36.8 °C)  Pulse:  [] 119  Resp:  [17-18] 18  SpO2:  [94 %-98 %] 98 %  BP: (133-140)/(62-79) 135/74     Weight: 112 kg (247 lb)  Body mass index is 30.87 kg/m².    Intake/Output Summary (Last 24 hours) at 1/2/2024 1335  Last data filed at 1/2/2024 0500  Gross per 24 hour   Intake 120 ml   Output 350 ml   Net -230 ml      Physical Exam  Gen: in NAD, appears stated age, obese  Neuro: AAOx4, CN2-12 grossly intact BL; motor, sensory, and strength grossly intact BL  HEENT: NTNC, EOMI, PERRLA, MMM; no thyromegaly or lymphadenopathy; no JVD appreciated  CVS: RRR, no m/r/g; S1/S2 auscultated with no S3 or S4; capillary refill < 2 sec  Resp: lungs CTAB, no w/r/r; no belabored breathing or accessory muscle use appreciated   Abd: BS+ in  "all 4 quadrants; NTND, soft to palpation; no organomegaly appreciated   Extrem: pulses full, equal, and regular over all 4 extremities; no UE or LE edema BL  MSK: Debility      MELD 3.0: 26 at 12/17/2023  7:44 PM  MELD-Na: 25 at 12/17/2023  7:44 PM  Calculated from:  Serum Creatinine: 2.4 mg/dL at 12/17/2023  7:44 PM  Serum Sodium: 125 mmol/L at 12/17/2023  7:44 PM  Total Bilirubin: 0.7 mg/dL (Using min of 1 mg/dL) at 12/17/2023  9:04 AM  Serum Albumin: 2.4 g/dL at 12/17/2023  9:04 AM  INR(ratio): 1.0 at 12/15/2023  2:16 PM  Age at listing (hypothetical): 68 years  Sex: Male at 12/17/2023  7:44 PM      Significant Labs:  CBC:  No results for input(s): "WBC", "HGB", "HCT", "PLT" in the last 48 hours.    CMP:  No results for input(s): "NA", "K", "CL", "CO2", "GLU", "BUN", "CREATININE", "CALCIUM", "PROT", "ALBUMIN", "BILITOT", "ALKPHOS", "AST", "ALT", "ANIONGAP", "EGFRNONAA" in the last 48 hours.    Invalid input(s): "ESTGFAFRICA"    PTINR:  No results for input(s): "INR" in the last 48 hours.    Significant Procedures:   Dobutamine Stress Test with Color Flow: No results found for this or any previous visit.    Assessment/Plan:      * Discharge planning issues  Patient medically stable. Awaiting SNF placement given debility. So far has been denied by [facility] SNF's. CM following, appreciate assistance.      ALESSANDRA (acute kidney injury)  - Creatinine 2.3 on presentation ; baseline 1.0  - suspect secondary to dehydration with severe alcohol use/dependence and inadequate water/hydration  - Nephrology consulted, urine spun and multiple muddy brown casts indicative of ATN  - S/p IV diuresis for 2-3 days, now off diuretics as of 12/19  - strict I/Os  - avoid nephrotoxic agents as appropriate  - continue to monitor  - Renal US without hydronephrosis  - Cr continues to improve  - Resolved. Kidney functions improving and Nephrology signed off.  Continue with oral intake    Hyponatremia  History of severe hyponatremia requiring " ICU. From chart review appears largely secondary to beer potemania in the past  - Na 120 on presentation, downtrended to 118 overnight  - Nephrology was consulted.   - S/p IVF and IV diuresis with improvement to Na  - IV lasix DC'd  - neurochecks q4h  - seizure precuations  - further management pending clinical course and future study review    - Resolved, continue PO intake      Acute cystitis  - Interval history and physical exam findings as described above  - UA findings reviewed  - UCx growing ESBL E coli  - Blood cultures NGTD  - Transitioned Zosyn to Ertapenem 12/18 12/22/23  Completed Ertapenem.      Aspiration pneumonia  - developed fever shortly after admission  ;  Tmax 103  - CXR with concern for aspiration pna left basilar lungfield  - Growing ESBL E coli in urine, zosyn Dc'd.   - Started on Ertapenem 12/18  - Vanc DC'd  - Blood cultures NGTD  - further management pending clinical course and future study review    - S/p course of Abx, weaned off of oxygen    Alcohol use disorder, severe, dependence  - daily thiamine/FA/MV    Rhabdomyolysis  - CPK 4k on presentation  - Encourage PO hydration  - Cr improving  - Nephrology following  - monitor kidney function  - CK improved  - Resolved    Cirrhosis  MELD-Na score calculated; MELD 3.0: 26 at 12/17/2023  7:44 PM  MELD-Na: 25 at 12/17/2023  7:44 PM  Calculated from:  Serum Creatinine: 2.4 mg/dL at 12/17/2023  7:44 PM  Serum Sodium: 125 mmol/L at 12/17/2023  7:44 PM  Total Bilirubin: 0.7 mg/dL (Using min of 1 mg/dL) at 12/17/2023  9:04 AM  Serum Albumin: 2.4 g/dL at 12/17/2023  9:04 AM  INR(ratio): 1.0 at 12/15/2023  2:16 PM  Age at listing (hypothetical): 68 years  Sex: Male at 12/17/2023  7:44 PM    - continues to use alcohol persistently  - AST > ALT x2 consistent with ETOH use.  - Trend hepatic function panel  - LFT's improved  - Appears well compensated on physical exam    Irritant contact dermatitis due to fecal, urinary or dual incontinence  - Wound  care consulted      Debility  For skilled nursing facility placement    Depression  - holding home meds for now as seems he hasn't been taking for some time      VTE Risk Mitigation (From admission, onward)           Ordered     enoxaparin injection 40 mg  Every 24 hours         12/17/23 1239     IP VTE HIGH RISK PATIENT  Once         12/15/23 1817     Place sequential compression device  Until discontinued         12/15/23 1817                    Discharge Planning   KENNETH: 1/4/2024     Code Status: Full Code   Is the patient medically ready for discharge?: No    Reason for patient still in hospital (select all that apply): Pending disposition  Discharge Plan A: Skilled Nursing Facility   Discharge Delays: (!) Post-Acute Set-up              Moy Cantu MD  Department of Hospital Medicine   [facility] - Intensive Care ([place]-)

## 2024-01-02 NOTE — PLAN OF CARE
[facility] - Int[facility] ([facility])  Discharge Reassessment    Primary Care Provider: Janki Tamez MD    Expected Discharge Date: 1/4/2024    Reassessment (most recent)       Discharge Reassessment - 01/02/24 1537          Discharge Reassessment    Assessment Type Discharge Planning Reassessment (P)      Did the patient's condition or plan change since previous assessment? No (P)      Discharge Plan discussed with: Patient (P)      Communicated KENNETH with patient/caregiver Date not available/Unable to determine (P)      Discharge Plan A Skilled Nursing Facility (P)      Discharge Plan B Home Health (P)      DME Needed Upon Discharge  walker, rolling;other (see comments) (P)    Walker needed if pt goes home instead of SNF.    Transition of Care Barriers None (P)      Why the patient remains in the hospital Requires continued medical care (P)         Post-Acute Status    Post-Acute Authorization Placement (P)      Post-Acute Placement Status Referrals Sent (P)      Coverage [employer] (P)      Discharge Delays Post-Acute Set-up (P)                  CM spoke with pt in room.  Discussed d/c plan.  Instructed that there are still no accepting facilities for SNF, this has been escalated to leadership.  Pt concerned about getting a walker if he decides to go home instead of SNF.  Instructed that [facility] could provide a walker if needed.  Pt v/u.  Pt states he's walking around the room and to the bathroom with walker; hasn't walked in the [place] yet.    Per [employer], [facility] ref# [id number] is pending.    The following facilities have denied for the following reasons:    [facility] SN[facility] no beds.    [facility], [facility] - not a covered benefit    [facility]    [place] no reason given.    CM sent response to [employer] [facility] escalations via portal.    GENNY ThackerN, BS, RN, CCM

## 2024-01-03 VITALS
DIASTOLIC BLOOD PRESSURE: 76 MMHG | HEART RATE: 90 BPM | TEMPERATURE: 96 F | WEIGHT: 247 LBS | OXYGEN SATURATION: 96 % | BODY MASS INDEX: 30.71 KG/M2 | RESPIRATION RATE: 17 BRPM | HEIGHT: 75 IN | SYSTOLIC BLOOD PRESSURE: 132 MMHG

## 2024-01-03 PROCEDURE — 25000003 PHARM REV CODE 250: Mod: HCNC | Performed by: FAMILY MEDICINE

## 2024-01-03 PROCEDURE — 25000003 PHARM REV CODE 250: Mod: HCNC | Performed by: INTERNAL MEDICINE

## 2024-01-03 RX ORDER — LANOLIN ALCOHOL/MO/W.PET/CERES
100 CREAM (GRAM) TOPICAL DAILY
Qty: 60 TABLET | Refills: 0 | Status: SHIPPED | OUTPATIENT
Start: 2024-01-03

## 2024-01-03 RX ORDER — FOLIC ACID 1 MG/1
1 TABLET ORAL DAILY
Qty: 60 TABLET | Refills: 0 | Status: SHIPPED | OUTPATIENT
Start: 2024-01-03 | End: 2024-03-03

## 2024-01-03 RX ADMIN — Medication 100 MG: at 09:01

## 2024-01-03 RX ADMIN — LOSARTAN POTASSIUM 100 MG: 50 TABLET, FILM COATED ORAL at 09:01

## 2024-01-03 RX ADMIN — THERA TABS 1 TABLET: TAB at 09:01

## 2024-01-03 RX ADMIN — FOLIC ACID 1 MG: 1 TABLET ORAL at 09:01

## 2024-01-03 NOTE — PLAN OF CARE
[facility] - Intensive Care ([place] [address])      HOME HEALTH ORDERS  FACE TO FACE ENCOUNTER    Patient Name: Froylan Borja  YOB: 1955    PCP: Janki Tmaez MD   PCP Address: [facility] [facility], [address] / [place] [id number]  PCP Phone Number: [phone]  PCP Fax: [phone]    Encounter Date: 12/15/23    Admit to Home Health    Diagnoses:  Active Hospital Problems    Diagnosis  POA    *Discharge planning issues [Z02.9]  Not Applicable     Priority: 1 - High    ALESSANDRA (acute kidney injury) [N17.9]  Yes     Priority: 2     Hyponatremia [E87.1]  Yes     Priority: 2     Acute cystitis [N30.00]  Yes     Priority: 3     Aspiration pneumonia [J69.0]  Yes     Priority: 3     Alcohol use disorder, severe, dependence [F10.20]  Yes     Priority: 4      Chronic    Rhabdomyolysis [M62.82]  Yes     Priority: 5     Cirrhosis [K74.60]  Yes     Priority: 6     Irritant contact dermatitis due to fecal, urinary or dual incontinence [L24.A2]  No    Debility [R53.81]  Yes    Depression [F32.A]  Yes     Chronic      Resolved Hospital Problems    Diagnosis Date Resolved POA    Metabolic acidosis [E87.20] 12/28/2023 Yes    Hypokalemia [E87.6] 12/22/2023 No    Thrombocytopenia [D69.6] 12/22/2023 Yes       Follow Up Appointments:  No future appointments.    Allergies:  Review of patient's allergies indicates:   Allergen Reactions    Zoloft [sertraline] Other (See Comments)     hyponatremia       Medications: Review discharge medications with patient and family and provide education.    Current Facility-Administered Medications   Medication Dose Route Frequency Provider Last Rate Last Admin    acetaminophen tablet 500 mg  500 mg Oral Q4H PRN Dru Razo MD   500 mg at 01/02/24 2317    albuterol inhaler 2 puff  2 puff Inhalation Q6H PRN Dru Razo MD        aluminum-magnesium hydroxide-simethicone 200-200-20 mg/5 mL suspension 30 mL  30 mL Oral QID PRN Dru Razo MD        dextrose 10%  bolus 125 mL 125 mL  12.5 g Intravenous PRN Dru Razo MD        dextrose 10% bolus 250 mL 250 mL  25 g Intravenous PRN Dru Razo MD        enoxaparin injection 40 mg  40 mg Subcutaneous Q24H (prophylaxis, 1700) Moy Cantu MD   40 mg at 01/02/24 1758    folic acid tablet 1 mg  1 mg Oral Daily Dru Razo MD   1 mg at 01/02/24 0951    glucagon (human recombinant) injection 1 mg  1 mg Intramuscular PRN Dru Razo MD        glucose chewable tablet 16 g  16 g Oral Dru Waddell MD        glucose chewable tablet 24 g  24 g Oral Dru Waddell MD        LORazepam tablet 2 mg  2 mg Oral Q4H PRN Dru Razo MD   2 mg at 12/16/23 1527    losartan tablet 100 mg  100 mg Oral Daily Pardeep Torres MD   100 mg at 01/02/24 0951    melatonin tablet 6 mg  6 mg Oral Nightly PRN Dru Razo MD        multivitamin tablet  1 tablet Oral Daily Dru Razo MD   1 tablet at 01/02/24 0951    naloxone 0.4 mg/mL injection 0.02 mg  0.02 mg Intravenous PRDru Hoang MD        ondansetron injection 4 mg  4 mg Intravenous Q8H PRDru Hoang MD        oxyCODONE immediate release tablet 5 mg  5 mg Oral Q6H PRN Dru Razo MD   5 mg at 12/29/23 2044    sodium chloride 0.9% flush 10 mL  10 mL Intravenous Q12H PRN Dru Razo MD        thiamine tablet 100 mg  100 mg Oral Daily Dru Razo MD   100 mg at 01/02/24 0951     Current Discharge Medication List        START taking these medications    Details   folic acid (FOLVITE) 1 MG tablet Take 1 tablet (1 mg total) by mouth once daily.  Qty: 60 tablet, Refills: 0      multivitamin Tab Take 1 tablet by mouth once daily.  Qty: 60 tablet, Refills: 0      thiamine 100 MG tablet Take 1 tablet (100 mg total) by mouth once daily.  Qty: 60 tablet, Refills: 0           CONTINUE these medications which have NOT CHANGED    Details   losartan (COZAAR) 100 MG tablet Take 1 tablet (100 mg  total) by mouth once daily.  Qty: 90 tablet, Refills: 3    Comments: .  Associated Diagnoses: Essential hypertension           STOP taking these medications       buPROPion (WELLBUTRIN SR) 150 MG TBSR 12 hr tablet Comments:   Reason for Stopping:         gabapentin (NEURONTIN) 300 MG capsule Comments:   Reason for Stopping:         albuterol (PROVENTIL HFA) 90 mcg/actuation inhaler Comments:   Reason for Stopping:         amLODIPine (NORVASC) 5 MG tablet Comments:   Reason for Stopping:                 I have seen and examined this patient within the last 30 days. My clinical findings that support the need for the home health skilled services and home bound status are the following:no   Weakness/numbness causing balance and gait disturbance due to Weakness/Debility making it taxing to leave home.     Diet:   regular diet    Labs:  N/a    Referrals/ Consults  Physical Therapy to evaluate and treat. Evaluate for home safety and equipment needs; Establish/upgrade home exercise program. Perform / instruct on therapeutic exercises, gait training, transfer training, and Range of Motion.  Occupational Therapy to evaluate and treat. Evaluate home environment for safety and equipment needs. Perform/Instruct on transfers, ADL training, ROM, and therapeutic exercises.  Aide to provide assistance with personal care, ADLs, and vital signs.    Activities:   activity as tolerated    Nursing:   Agency to admit patient within 24 hours of hospital discharge unless specified on physician order or at patient request    SN to complete comprehensive assessment including routine vital signs. Instruct on disease process and s/s of complications to report to MD. Review/verify medication list sent home with the patient at time of discharge  and instruct patient/caregiver as needed. Frequency may be adjusted depending on start of care date.     Skilled nurse to perform up to 3 visits PRN for symptoms related to diagnosis    Notify MD if SBP >  160 or < 90; DBP > 90 or < 50; HR > 120 or < 50; Temp > 101; O2 < 88%;     Ok to schedule additional visits based on staff availability and patient request on consecutive days within the home health episode.    When multiple disciplines ordered:    Start of Care occurs on Sunday - Wednesday schedule remaining discipline evaluations as ordered on separate consecutive days following the start of care.    Thursday SOC -schedule subsequent evaluations Friday and Monday the following week.     Friday - Saturday SOC - schedule subsequent discipline evaluations on consecutive days starting Monday of the following week.        Miscellaneous   N/a    Home Health Aide:  Physical Therapy Three times weekly, Occupational Therapy Three times weekly, and Home Health Aide Three times weekly    Wound Care Orders       GERALD Skin Integrity Evaluation        Skin Integrity GERALD evaluation of patient as part of the comprehensive skin care team.      He has been admitted for 14 days. Skin injury was noted on 12/25/23. POA no.     Buttocks          Derm  Irritant contact dermatitis due to fecal, urinary or dual incontinence  - moisture dermatitis to bilateral buttocks and thighs with red, inflamed, and peeling skin. Appears to be healing.  - continue Triad bid/prn.     I certify that this patient is confined to his home and needs physical therapy and occupational therapy.    Moy Cantu MD  Attending Physician  Department of Hospital Medicine  1/3/2024         shower only

## 2024-01-03 NOTE — DISCHARGE SUMMARY
[facility] - Intensive Care ([place] [address])  [facility] Medicine  Discharge Summary      Patient Name: Froylan Borja  MRN: [id number]  HOWARD: [id number]  Patient Class: IP- Inpatient  Admission Date: 12/15/2023  Hospital Length of Stay: 19 days  Discharge Date and Time:  01/03/2024 7:49 AM  Attending Physician: Moy Cantu MD   Discharging Provider: Moy Cantu MD  Primary Care Provider: Janki Tamez MD  [facility] Medicine Team: [facility] Moy Cantu MD  Primary Care Team: [facility]    HPI:   This is a 69yo male with a past medical history of alcohol abuse, depression, hyponatremia severe requiring prior MICU admit, HTN, HLD, cirrhosis, diverticulosis who presents to the ED with AMS. Patient reportedly at the bar with his wife earlier today and became confused and unsteady and difficulty ambulating. Per chart review police called because of beligerance and were concerned about stroke symptoms and brought patient to ED for further evaluation. Patient alert and oriented to person and aware in hospital. Unable to contribute meaningfully to history but does admit heavy daily alcohol use.     In the ED patient initially afebrile, and hemodynamically stable saturating well on room air at rest. WBC 10.1 with leukocytosis. Na 120. Creatinine 2.3 (baseline 1.0). CPK 3960. CXR with concern for aspiration pneumonia. Patient diaphoretic, tremulous, tachycardic concerning for developing withdrawal. Started on IVFs, benzodiazepines, and abx for rhabdo, hyponatremia, aspiration pna, alcohol withdrawal. Admitted to the care of medicine for further evaluation and management.     * No surgery found *      Hospital Course:   Patient admitted to [facility] for AMS with severe hyponatremia in setting of ETOH abuse, ETOH withdrawals, ALESSANDRA, and aspiration pneumonia. He was started on Vanc/zosyn for aspiration pneumonia. Received IVF bolus and continued on maintenance IVF, Nephrology consulted for hyponatremia and ALESSANDRA. Urine  was spun muddy brown casts observed indicative of ATN. Nephrology recommending NS continued @ 150ml/hr and lasix 160mg IV BID for sodium excretion. Na slowing improving at appropriate rate. Blood cultures NGTD. UA growing GNR's. MRSA swab pending. Patient continues on Vanc/zosyn. Platelets decreased from 140 -> 112 -> 86. Likely secondary to infection however was receiving subcutaneous heparin. HIT panel sent and transitioned to lovenox. Continues to have symptoms of alcohol withdrawal and continued on CIWA and scheduled/PRN benzodiazepines. UA growing ESBL E coli and Dc'd zosyn and started ertapenem. Vanc discontinued. Urine output remained good with diuresis, Cr stable. IV lasix Dc'd per nephrology recommendations. Sodium improved to 135. PT/OT consulted.    12/21/23 and onwards, patient completed IV ertapenem.  Kidney functions improved and Nephrology signed off.  He is awaiting placement at this time to SNF. Wound care consulted for moisture associated dermatitis on sacrum. PT/OT continued to recommend SNF however he was unfortunately rejected from all SNF's. Patient able to ambulate with walker. Patient medically stable for discharge. He and wife live on 2nd floor of apartment but are able to use a 1st floor apartment per wife after she spoke with her landlord. Will order wheelchair and rolling walker to bedside as well as thiamine, MV, and folic acid prior to discharge. Patient also extensively counseled on alcohol cessation. Referred to addiction psychiatry clinic. Home health PT/OT and nursing aide ordered.    Goals of Care Treatment Preferences:  Code Status: Full Code      Consults:   Consults (From admission, onward)          Status Ordering Provider     Inpatient consult to Skin Integrity  Practitioner  Once        Provider:  Jackie Hill NP    Completed JODI ANGUIANO     Inpatient consult to PICC team ([facility])  Once        Provider:  (Not yet assigned)    Completed JODI ANGUIANO     [facility]  Once        Provider:  (Not yet assigned)    Completed LAURIE WRIGHT     Inpatient consult to Infectious Diseases  Once        Provider:  (Not yet assigned)    Completed LAURIE WRIGHT     Inpatient consult to Nephrology  Once        Provider:  (Not yet assigned)    Completed KAREN ANDERSEN            No new Assessment & Plan notes have been filed under this hospital service since the last note was generated.  Service: Hospital Medicine    Final Active Diagnoses:    Diagnosis Date Noted POA    PRINCIPAL PROBLEM:  Discharge planning issues [Z02.9] 12/28/2023 Not Applicable    ALESSANDRA (acute kidney injury) [N17.9] 12/15/2023 Yes    Hyponatremia [E87.1] 11/03/2021 Yes    Acute cystitis [N30.00] 12/17/2023 Yes    Aspiration pneumonia [J69.0] 12/15/2023 Yes    Alcohol use disorder, severe, dependence [F10.20] 03/27/2017 Yes     Chronic    Rhabdomyolysis [M62.82] 12/15/2023 Yes    Cirrhosis [K74.60] 04/04/2014 Yes    Irritant contact dermatitis due to fecal, urinary or dual incontinence [L24.A2] 12/29/2023 No    Debility [R53.81] 12/21/2023 Yes    Depression [F32.A] 11/04/2021 Yes     Chronic      Problems Resolved During this Admission:    Diagnosis Date Noted Date Resolved POA    Metabolic acidosis [E87.20] 12/17/2023 12/28/2023 Yes    Hypokalemia [E87.6] 12/17/2023 12/22/2023 No    Thrombocytopenia [D69.6] 12/17/2023 12/22/2023 Yes       Discharged Condition: good    Disposition: Home or Self Care    Follow Up:    Patient Instructions:      WALKER FOR HOME USE     Order Specific Question Answer Comments   Type of Walker: Adult (5'4"-6'6")    With wheels? Yes    Height: 6' 3" (1.905 m)    Weight: 112 kg (247 lb)    Length of need (1-99 months): 99    Does patient have medical equipment at home? none    Please check all that apply: Patient's condition impairs ambulation.      WHEELCHAIR FOR HOME USE     Order Specific Question Answer Comments   Hours in W/C per day: 6    Type of Wheelchair: Standard  " "  Size(Width): 18"(STD adult)    Leg Support: STD footrests    Lap Belt: Velcro    Accessories: Anti-tippers    Cushion: Basic    Reclining Back No    Height: 6' 3" (1.905 m)    Weight: 112 kg (247 lb)    Does patient have medical equipment at home? none    Length of need (1-99 months): 99    Please check all that apply: Caregiver is capable and willing to operate wheelchair safely.    Please check all that apply: Patient's upper body strength is sufficient for propulsion.      Ambulatory referral/consult to Addiction Psychiatry   Standing Status: Future   Referral Priority: Routine Referral Type: Psychiatric   Referral Reason: Specialty Services Required   Requested Specialty: Addiction Medicine   Number of Visits Requested: 1       Significant Diagnostic Studies: N/A    Pending Diagnostic Studies:       Procedure Component Value Units Date/Time    Basic Metabolic Panel (BMP) [[id number]] Collected: 12/16/23 0312    Order Status: Sent Lab Status: In process Updated: 12/16/23 0312    Specimen: Blood            Medications:  Reconciled Home Medications:      Medication List        START taking these medications      folic acid 1 MG tablet  Commonly known as: FOLVITE  Take 1 tablet (1 mg total) by mouth once daily.     multivitamin Tab  Take 1 tablet by mouth once daily.     thiamine 100 MG tablet  Take 1 tablet (100 mg total) by mouth once daily.            CONTINUE taking these medications      losartan 100 MG tablet  Commonly known as: COZAAR  Take 1 tablet (100 mg total) by mouth once daily.            STOP taking these medications      albuterol 90 mcg/actuation inhaler  Commonly known as: PROVENTIL HFA     amLODIPine 5 MG tablet  Commonly known as: NORVASC     buPROPion 150 MG TBSR 12 hr tablet  Commonly known as: WELLBUTRIN SR     gabapentin 300 MG capsule  Commonly known as: NEURONTIN              Indwelling Lines/Drains at time of discharge:   Lines/Drains/Airways       None                   Time spent on " the discharge of patient: 35 minutes         Moy Cantu MD  Department of Hospital Medicine  [facility] - Intensive Care ([place])

## 2024-01-03 NOTE — PLAN OF CARE
Patient AAOX4. Reviewed discharge instructions with patient. Pt verbalized understanding of all instructions and education. Bedside meds delivered to pt. IV removed. Site dry and intact with coban. Patient questions and concerns answered. No distress noted. Pt has his w/c. Pt ambulated to stretcher and transported out via transportation.

## 2024-01-03 NOTE — DISCHARGE SUMMARY
[facility] - Med Surg ([place] [address])  [facility] Medicine  Discharge Summary      Patient Name: Froylan Borja  MRN: [id number]  HOWARD: [id number]  Patient Class: IP- Inpatient  Admission Date: 12/15/2023  Hospital Length of Stay: 19 days  Discharge Date and Time:  01/03/2024 1:31 PM  Attending Physician: Moy Cantu MD   Discharging Provider: Moy Cantu MD  Primary Care Provider: Janki Tamez MD  [facility] Medicine Team: [facility] Moy Cantu MD  Primary Care Team: [facility]    HPI:   This is a 67yo male with a past medical history of alcohol abuse, depression, hyponatremia severe requiring prior MICU admit, HTN, HLD, cirrhosis, diverticulosis who presents to the ED with AMS. Patient reportedly at the bar with his wife earlier today and became confused and unsteady and difficulty ambulating. Per chart review police called because of beligerance and were concerned about stroke symptoms and brought patient to ED for further evaluation. Patient alert and oriented to person and aware in hospital. Unable to contribute meaningfully to history but does admit heavy daily alcohol use.     In the ED patient initially afebrile, and hemodynamically stable saturating well on room air at rest. WBC 10.1 with leukocytosis. Na 120. Creatinine 2.3 (baseline 1.0). CPK 3960. CXR with concern for aspiration pneumonia. Patient diaphoretic, tremulous, tachycardic concerning for developing withdrawal. Started on IVFs, benzodiazepines, and abx for rhabdo, hyponatremia, aspiration pna, alcohol withdrawal. Admitted to the care of medicine for further evaluation and management.     * No surgery found *      Hospital Course:   Patient admitted to [facility] [place] with severe hyponatremia in setting of ETOH abuse, ETOH withdrawals, ALESSANDRA, and aspiration pneumonia. He was started on Vanc/zosyn for aspiration pneumonia. Received IVF bolus and continued on maintenance IVF, Nephrology consulted for hyponatremia and ALESSANDRA. Urine was  spun muddy brown casts observed indicative of ATN. Nephrology recommending NS continued @ 150ml/hr and lasix 160mg IV BID for sodium excretion. Na slowing improving at appropriate rate. Blood cultures NGTD. UA growing GNR's. MRSA swab pending. Patient continues on Vanc/zosyn. Platelets decreased from 140 -> 112 -> 86. Likely secondary to infection however was receiving subcutaneous heparin. HIT panel sent and transitioned to lovenox. Continues to have symptoms of alcohol withdrawal and continued on CIWA and scheduled/PRN benzodiazepines. UA growing ESBL E coli and Dc'd zosyn and started ertapenem. Vanc discontinued. Urine output remained good with diuresis, Cr stable. IV lasix Dc'd per nephrology recommendations. Sodium improved to 135. PT/OT consulted.    12/21/23 and onwards, patient completed IV ertapenem.  Kidney functions improved and Nephrology signed off.  He is awaiting placement at this time to SNF. Wound care consulted for moisture associated dermatitis on sacrum. PT/OT continued to recommend SNF however he was unfortunately rejected from all SNF's. Patient does not want to wait in the hospital to wait for out of network SNF referrals and decided he wants to go home with home health.  Patient able to ambulate with walker. Patient medically stable for discharge. He and wife live on 2nd floor of apartment but are able to use a 1st floor apartment per wife after she spoke with her landlord. Will order rolling walker to bedside as well as thiamine, MV, and folic acid prior to discharge. Patient also extensively counseled on alcohol cessation. Referred to addiction psychiatry clinic.      The mobility limitation cannot be sufficiently resolved by the use of a cane. Patient's functional mobility deficit can be sufficiently resolved with the use of a Rolling Walker. Patient's mobility limitation significantly impairs their ability to participate in one of more activities of daily living. The use of a rolling  walker will significantly improve the patient's ability to participate in MRADLS and the patient will use it on regular basis in the home.      Goals of Care Treatment Preferences:  Code Status: Full Code      Consults:   Consults (From admission, onward)          Status Ordering Provider     Inpatient consult to Skin Integrity  Practitioner  Once        Provider:  Jackie Hill NP    Completed JODI ANGUIANO     Inpatient consult to PICC team ([facility])  Once        Provider:  (Not yet assigned)    Completed JODI ANGUIANO     Inpatient consult to PICC team ([facility])  Once        Provider:  (Not yet assigned)    Completed LAURIE WRIGHT     Inpatient consult to Infectious Diseases  Once        Provider:  (Not yet assigned)    Completed LAURIE WRIGHT     Inpatient consult to Nephrology  Once        Provider:  (Not yet assigned)    Completed KAREN ANDERSEN            No new Assessment & Plan notes have been filed under this hospital service since the last note was generated.  Service: Hospital Medicine    Final Active Diagnoses:    Diagnosis Date Noted POA    PRINCIPAL PROBLEM:  Discharge planning issues [Z02.9] 12/28/2023 Not Applicable    ALESSANDRA (acute kidney injury) [N17.9] 12/15/2023 Yes    Hyponatremia [E87.1] 11/03/2021 Yes    Acute cystitis [N30.00] 12/17/2023 Yes    Aspiration pneumonia [J69.0] 12/15/2023 Yes    Alcohol use disorder, severe, dependence [F10.20] 03/27/2017 Yes     Chronic    Rhabdomyolysis [M62.82] 12/15/2023 Yes    Cirrhosis [K74.60] 04/04/2014 Yes    Irritant contact dermatitis due to fecal, urinary or dual incontinence [L24.A2] 12/29/2023 No    Debility [R53.81] 12/21/2023 Yes    Depression [F32.A] 11/04/2021 Yes     Chronic      Problems Resolved During this Admission:    Diagnosis Date Noted Date Resolved POA    Metabolic acidosis [E87.20] 12/17/2023 12/28/2023 Yes    Hypokalemia [E87.6] 12/17/2023 12/22/2023 No    Thrombocytopenia [D69.6] 12/17/2023 12/22/2023 Yes       Discharged Condition:  "good    Disposition: Home or Self Care    Follow Up:    Patient Instructions:      WALKER FOR HOME USE     Order Specific Question Answer Comments   Type of Walker: Adult (5'4"-6'6")    With wheels? Yes    Height: 6' 3" (1.905 m)    Weight: 112 kg (247 lb)    Length of need (1-99 months): 99    Does patient have medical equipment at home? none    Please check all that apply: Patient's condition impairs ambulation.      Ambulatory referral/consult to Addiction Psychiatry   Standing Status: Future   Referral Priority: Routine Referral Type: Psychiatric   Referral Reason: Specialty Services Required   Requested Specialty: Addiction Medicine   Number of Visits Requested: 1       Significant Diagnostic Studies: N/A    Pending Diagnostic Studies:       Procedure Component Value Units Date/Time    Basic Metabolic Panel (BMP) [[id number]] Collected: 12/16/23 0312    Order Status: Sent Lab Status: In process Updated: 12/16/23 0312    Specimen: Blood            Medications:  Reconciled Home Medications:      Medication List        START taking these medications      folic acid 1 MG tablet  Commonly known as: FOLVITE  Take 1 tablet (1 mg total) by mouth once daily.     THERA-M 9 mg iron-400 mcg Tab tablet  Generic drug: multivit-iron-FA-calcium-mins  Take 1 tablet by mouth once daily.     thiamine 100 MG tablet  Take 1 tablet (100 mg total) by mouth once daily.            CONTINUE taking these medications      losartan 100 MG tablet  Commonly known as: COZAAR  Take 1 tablet (100 mg total) by mouth once daily.            STOP taking these medications      albuterol 90 mcg/actuation inhaler  Commonly known as: PROVENTIL HFA     amLODIPine 5 MG tablet  Commonly known as: NORVASC     buPROPion 150 MG TBSR 12 hr tablet  Commonly known as: WELLBUTRIN SR     gabapentin 300 MG capsule  Commonly known as: NEURONTIN              Indwelling Lines/Drains at time of discharge:   Lines/Drains/Airways       None                   Time spent " on the discharge of patient: 35 minutes         Moy Cantu MD  Department of Hospital Medicine  [facility] Surg ([facility]

## 2024-01-03 NOTE — PLAN OF CARE
Per [employer], [employer] auth is pending ref# [id number].    Per Dr. Cantu, pt has decided to go home with HH, will need RW and a ride home.  CM messaged Vickie LAN to eval for RW.  CM sent referrals to [facility] in network providers for HH.    11:44 AM  CM spoke with pt in room.  He states he wants to go home with HH, RW.  He has stairs to get into his home and cannot navigate stairs - will need ambulance transport for safe transportation home.  Instructed he may be charged for this, since he is able to walk with a walker.  Pt v/u.    CM sent HH orders to [facility], they requested HH orders.  CM sent in CP, called [facility] [phone] spoke with Amber.  She agress to eval orders and call CM back if they can accept.    CM set up ambulance transport for 2 PM with [facility].    1:38 PM  CM spoke with pt in room, he states he will accept RW.      IRENE Thacker, BS, RN, CCM

## 2024-01-04 NOTE — PLAN OF CARE
[facility]mund [facility] - Med Surg ([facility])  Discharge Final Note    Primary Care Provider: Janki Tamez MD    Expected Discharge Date: 1/3/2024    Final Discharge Note (most recent)       Final Note - 01/04/24 0821          Final Note    Assessment Type Final Discharge Note (P)      Anticipated Discharge Disposition Home-Health Care Svc (P)         Post-Acute Status    Post-Acute Authorization Home Health (P)      Home Health Status Referrals Sent (P)      Coverage [employer] (P)      Discharge Delays Post-Acute Set-up (P)                  Pt d/c'd home with HH.    HH not set up yet.  The following in network agencies have denied via CP: [facility], [facility], [facility], [facility], [facility], [facility], [facility].    CM called [facility], no response in CP, spoke with Rose Mary, who states she cannot see referral in CP.  Faxed referral to [phone].  Response pending.    CM called [facility], no response in [phone], left message requesting call back.    8:48 AM  Per Connie at [facility], they are short staffed and cannot accept pt.    1:02 PM  Per Janessa [facility] can take [employer] pt's if eligible and they have 3 or more declinations.  CM called Taylor Aguilera at [phone], ext [phone], [facility] requesting call back.    CM called [facility] back, [facility] with Radha Lux requesting call back.    1:57 PM  Gee with [facility] called to f/u on pt's wound care - pt has ordered Triad to buttocks BID and PRN.      CM called pt, no answer, called wife, Mary Borja [phone].  She states pt is applying [employer] medicated powder to buttocks - he has some cream, Ammonium lactate 12% lotion, but he doesn't apply to skin because he doesn't like it.  He doesn't have any Triad, was not sent home with this.    3:56 PM  Per wound care nurse, any zinc oxide based cream can be used for wound care.  CM called Mary, instructed in this, she states she will purchase ointment and she or her  can apply BID.  CM called Gee to inform.  She states  she will call CM back.    GENNY ThackerN, BS, RN, CCM        Important Message from Medicare

## 2024-01-05 NOTE — PLAN OF CARE
Per CP, [facility] has accepted this pt.       spoke with Corin from [facility] [phone] ext [phone].  Informed that [facility] has accepted this pt.  She states she will call [facility] with authorization.    GENNY ThackerN, BS, RN, [facility]

## 2024-01-16 ENCOUNTER — OFFICE VISIT (OUTPATIENT)
Dept: INTERNAL MEDICINE | Facility: CLINIC | Age: 69
End: 2024-01-16
Payer: MEDICARE

## 2024-01-16 ENCOUNTER — LAB VISIT (OUTPATIENT)
Dept: LAB | Facility: HOSPITAL | Age: 69
End: 2024-01-16
Attending: INTERNAL MEDICINE
Payer: MEDICARE

## 2024-01-16 VITALS
HEIGHT: 75 IN | SYSTOLIC BLOOD PRESSURE: 150 MMHG | OXYGEN SATURATION: 98 % | HEART RATE: 92 BPM | WEIGHT: 240.5 LBS | BODY MASS INDEX: 29.9 KG/M2 | DIASTOLIC BLOOD PRESSURE: 70 MMHG

## 2024-01-16 DIAGNOSIS — I10 ESSENTIAL HYPERTENSION: ICD-10-CM

## 2024-01-16 DIAGNOSIS — E87.6 HYPOKALEMIA: ICD-10-CM

## 2024-01-16 DIAGNOSIS — E87.1 HYPONATREMIA: ICD-10-CM

## 2024-01-16 DIAGNOSIS — E87.1 HYPONATREMIA: Primary | ICD-10-CM

## 2024-01-16 DIAGNOSIS — Z78.9 ALCOHOL USE: ICD-10-CM

## 2024-01-16 LAB
ALBUMIN SERPL BCP-MCNC: 3.4 G/DL (ref 3.5–5.2)
ALP SERPL-CCNC: 100 U/L (ref 55–135)
ALT SERPL W/O P-5'-P-CCNC: 16 U/L (ref 10–44)
ANION GAP SERPL CALC-SCNC: 9 MMOL/L (ref 8–16)
AST SERPL-CCNC: 23 U/L (ref 10–40)
BASOPHILS # BLD AUTO: 0.17 K/UL (ref 0–0.2)
BASOPHILS NFR BLD: 1.5 % (ref 0–1.9)
BILIRUB SERPL-MCNC: 0.8 MG/DL (ref 0.1–1)
BUN SERPL-MCNC: 9 MG/DL (ref 8–23)
CALCIUM SERPL-MCNC: 9.7 MG/DL (ref 8.7–10.5)
CHLORIDE SERPL-SCNC: 107 MMOL/L (ref 95–110)
CO2 SERPL-SCNC: 25 MMOL/L (ref 23–29)
CREAT SERPL-MCNC: 1.1 MG/DL (ref 0.5–1.4)
DIFFERENTIAL METHOD BLD: ABNORMAL
EOSINOPHIL # BLD AUTO: 0.5 K/UL (ref 0–0.5)
EOSINOPHIL NFR BLD: 3.9 % (ref 0–8)
ERYTHROCYTE [DISTWIDTH] IN BLOOD BY AUTOMATED COUNT: 14.6 % (ref 11.5–14.5)
EST. GFR  (NO RACE VARIABLE): >60 ML/MIN/1.73 M^2
GLUCOSE SERPL-MCNC: 94 MG/DL (ref 70–110)
HCT VFR BLD AUTO: 40.6 % (ref 40–54)
HGB BLD-MCNC: 13.2 G/DL (ref 14–18)
IMM GRANULOCYTES # BLD AUTO: 0.15 K/UL (ref 0–0.04)
IMM GRANULOCYTES NFR BLD AUTO: 1.3 % (ref 0–0.5)
LYMPHOCYTES # BLD AUTO: 3.1 K/UL (ref 1–4.8)
LYMPHOCYTES NFR BLD: 26.8 % (ref 18–48)
MCH RBC QN AUTO: 32.8 PG (ref 27–31)
MCHC RBC AUTO-ENTMCNC: 32.5 G/DL (ref 32–36)
MCV RBC AUTO: 101 FL (ref 82–98)
MONOCYTES # BLD AUTO: 0.9 K/UL (ref 0.3–1)
MONOCYTES NFR BLD: 7.6 % (ref 4–15)
NEUTROPHILS # BLD AUTO: 6.8 K/UL (ref 1.8–7.7)
NEUTROPHILS NFR BLD: 58.9 % (ref 38–73)
NRBC BLD-RTO: 0 /100 WBC
PLATELET # BLD AUTO: 373 K/UL (ref 150–450)
PMV BLD AUTO: 10.2 FL (ref 9.2–12.9)
POTASSIUM SERPL-SCNC: 5.2 MMOL/L (ref 3.5–5.1)
PROT SERPL-MCNC: 7.8 G/DL (ref 6–8.4)
RBC # BLD AUTO: 4.02 M/UL (ref 4.6–6.2)
SODIUM SERPL-SCNC: 141 MMOL/L (ref 136–145)
WBC # BLD AUTO: 11.53 K/UL (ref 3.9–12.7)

## 2024-01-16 PROCEDURE — 1111F DSCHRG MED/CURRENT MED MERGE: CPT | Mod: HCNC,CPTII,S$GLB, | Performed by: INTERNAL MEDICINE

## 2024-01-16 PROCEDURE — 3077F SYST BP >= 140 MM HG: CPT | Mod: HCNC,CPTII,S$GLB, | Performed by: INTERNAL MEDICINE

## 2024-01-16 PROCEDURE — 3008F BODY MASS INDEX DOCD: CPT | Mod: HCNC,CPTII,S$GLB, | Performed by: INTERNAL MEDICINE

## 2024-01-16 PROCEDURE — 80053 COMPREHEN METABOLIC PANEL: CPT | Mod: HCNC | Performed by: INTERNAL MEDICINE

## 2024-01-16 PROCEDURE — 99214 OFFICE O/P EST MOD 30 MIN: CPT | Mod: HCNC,S$GLB,, | Performed by: INTERNAL MEDICINE

## 2024-01-16 PROCEDURE — 1160F RVW MEDS BY RX/DR IN RCRD: CPT | Mod: HCNC,CPTII,S$GLB, | Performed by: INTERNAL MEDICINE

## 2024-01-16 PROCEDURE — 36415 COLL VENOUS BLD VENIPUNCTURE: CPT | Mod: HCNC | Performed by: INTERNAL MEDICINE

## 2024-01-16 PROCEDURE — 3078F DIAST BP <80 MM HG: CPT | Mod: HCNC,CPTII,S$GLB, | Performed by: INTERNAL MEDICINE

## 2024-01-16 PROCEDURE — 99999 PR PBB SHADOW E&M-EST. PATIENT-LVL III: CPT | Mod: PBBFAC,HCNC,, | Performed by: INTERNAL MEDICINE

## 2024-01-16 PROCEDURE — 85025 COMPLETE CBC W/AUTO DIFF WBC: CPT | Mod: HCNC | Performed by: INTERNAL MEDICINE

## 2024-01-16 PROCEDURE — 1159F MED LIST DOCD IN RCRD: CPT | Mod: HCNC,CPTII,S$GLB, | Performed by: INTERNAL MEDICINE

## 2024-01-16 NOTE — PROGRESS NOTES
CC:  Hospital follow-up     HPI:  The patient is a 60-year-old male with a history of alcohol abuse, depression cirrhosis of the liver, hypertension, hyperlipidemia who was recently admitted for altered mental status severe hyponatremia presents today for follow-up.  The patient was treated with IV fluids, vanc and Zosyn and p.r.n. benzodiazepine.  Urine culture grew out ESBL E coli.  The patient was changed over to ertapenem.  Vancomycin and Zosyn were discontinued.  He and his wife lived on a 2nd floor apartment.  His wife was able to secure a 1st floor apartment.  The patient was discharged home with a rolling walker and home health.  He reports that he has been seen at least once by home nurse and once by a physical therapist.  Reports no alcohol use today did have a few beers yesterday.    ROS:  Patient reports some weight loss since hospitalization.  No chest pain.  No shortness breath.  He has a bowel movement every morning.  No bladder changes.  He does have neuropathy involving his legs.    Physical exam:   General appearance:  No acute distress   HEENT: Conjunctiva is clear.  Bilateral cataracts.  Left TM is clear.  Right shows wax.  Nasal septum is midline without discharge.  Oropharynx without.  Pulmonary:  Good inspiratory, expiratory breath sounds are heard.  Lungs are clear auscultation.    Cardiovascular:  S1-S2, rhythm is regular.  Empties with trace edema.    GI:  Abdomen is nontender, nondistended without hepatosplenomegaly    Assessment:  1. Hyponatremia secondary to EtOH use   2. Hypertension   3. Elevated BMI  4.  Alcohol-induced polyneuropathy    Plan:    1.  Will check a CBC and CMP 2.  The patient will follow up pending test results.

## 2024-01-17 ENCOUNTER — TELEPHONE (OUTPATIENT)
Dept: INTERNAL MEDICINE | Facility: CLINIC | Age: 69
End: 2024-01-17
Payer: MEDICARE

## 2024-01-17 NOTE — TELEPHONE ENCOUNTER
----- Message from Ottoniel Chahal MA sent at 1/17/2024  8:25 AM CST -----    ----- Message -----  From: Jabari Mckeon MD  Sent: 1/16/2024   6:28 PM CST  To: Linda THOMPSON Staff    Please schedule a follow up with Dr. Tamez in one month.

## 2024-01-17 NOTE — TELEPHONE ENCOUNTER
I left a message on his personal recorder. Message sent to the appointment desk to arrange as stated in message.

## 2024-01-19 ENCOUNTER — TELEPHONE (OUTPATIENT)
Dept: INTERNAL MEDICINE | Facility: CLINIC | Age: 69
End: 2024-01-19
Payer: MEDICARE

## 2024-01-19 NOTE — TELEPHONE ENCOUNTER
----- Message from Irish Roy sent at 1/19/2024  1:58 PM CST -----  Contact: Ms. Lindsey Phone# [phone]  Ms. Lindsey an  at [facility] would like to know if you have signed the order that she have faxed over to you on January twelfth?

## 2024-02-09 ENCOUNTER — DOCUMENT SCAN (OUTPATIENT)
Dept: HOME HEALTH SERVICES | Facility: HOSPITAL | Age: 69
End: 2024-02-09
Payer: MEDICARE

## 2024-02-12 ENCOUNTER — DOCUMENT SCAN (OUTPATIENT)
Dept: HOME HEALTH SERVICES | Facility: HOSPITAL | Age: 69
End: 2024-02-12
Payer: MEDICARE

## 2024-02-29 ENCOUNTER — DOCUMENT SCAN (OUTPATIENT)
Dept: HOME HEALTH SERVICES | Facility: HOSPITAL | Age: 69
End: 2024-02-29
Payer: MEDICARE

## 2024-03-04 ENCOUNTER — TELEPHONE (OUTPATIENT)
Dept: INTERNAL MEDICINE | Facility: CLINIC | Age: 69
End: 2024-03-04
Payer: MEDICARE

## 2024-03-04 DIAGNOSIS — I10 ESSENTIAL HYPERTENSION: ICD-10-CM

## 2024-03-04 NOTE — TELEPHONE ENCOUNTER
----- Message from Cyndy See sent at 3/4/2024  8:27 AM CST -----  Contact: Pt  [phone]  Prescription refill request  .  RX name and strength (copy/paste from chart):     1. losartan (COZAAR) 100 MG tablet  2. Bupropion 150 mg  3. Gabapentin 300 mg       [id number]  [place], [place] - [facility] [address]  [address] [address]  [facility] [place] [address]  Phone: [phone] Fax: [phone]    Additional information:   Pt has an appt on 04/11/2024.  He is asking if he can get his refills called in.

## 2024-03-04 NOTE — TELEPHONE ENCOUNTER
Patient requesting refill for losartan, bupropion, and gabapentin; losartan is on current med list and other two are not.

## 2024-03-05 ENCOUNTER — EXTERNAL HOME HEALTH (OUTPATIENT)
Dept: HOME HEALTH SERVICES | Facility: HOSPITAL | Age: 69
End: 2024-03-05
Payer: MEDICARE

## 2024-03-05 RX ORDER — GABAPENTIN 300 MG/1
300 CAPSULE ORAL NIGHTLY
Qty: 30 CAPSULE | Refills: 2 | Status: SHIPPED | OUTPATIENT
Start: 2024-03-05 | End: 2024-04-11 | Stop reason: SDUPTHER

## 2024-03-05 RX ORDER — LOSARTAN POTASSIUM 100 MG/1
100 TABLET ORAL DAILY
Qty: 90 TABLET | Refills: 3 | Status: SHIPPED | OUTPATIENT
Start: 2024-03-05

## 2024-03-05 RX ORDER — BUPROPION HYDROCHLORIDE 150 MG/1
150 TABLET, EXTENDED RELEASE ORAL 2 TIMES DAILY
Qty: 60 TABLET | Refills: 3 | Status: SHIPPED | OUTPATIENT
Start: 2024-03-05 | End: 2024-04-04

## 2024-03-18 PROBLEM — J69.0 ASPIRATION PNEUMONIA: Status: RESOLVED | Noted: 2023-12-15 | Resolved: 2024-03-18

## 2024-04-01 PROBLEM — N17.9 AKI (ACUTE KIDNEY INJURY): Status: RESOLVED | Noted: 2023-12-15 | Resolved: 2024-04-01

## 2024-04-11 ENCOUNTER — OFFICE VISIT (OUTPATIENT)
Dept: INTERNAL MEDICINE | Facility: CLINIC | Age: 69
End: 2024-04-11
Payer: MEDICARE

## 2024-04-11 VITALS
OXYGEN SATURATION: 97 % | WEIGHT: 241.94 LBS | SYSTOLIC BLOOD PRESSURE: 150 MMHG | HEART RATE: 63 BPM | HEIGHT: 75 IN | DIASTOLIC BLOOD PRESSURE: 90 MMHG | BODY MASS INDEX: 30.08 KG/M2

## 2024-04-11 DIAGNOSIS — I10 PRIMARY HYPERTENSION: Primary | ICD-10-CM

## 2024-04-11 DIAGNOSIS — K74.69 OTHER CIRRHOSIS OF LIVER: ICD-10-CM

## 2024-04-11 DIAGNOSIS — N40.0 BENIGN PROSTATIC HYPERPLASIA, UNSPECIFIED WHETHER LOWER URINARY TRACT SYMPTOMS PRESENT: ICD-10-CM

## 2024-04-11 DIAGNOSIS — N50.9 SCROTAL LESION: ICD-10-CM

## 2024-04-11 DIAGNOSIS — D51.8 OTHER VITAMIN B12 DEFICIENCY ANEMIAS: ICD-10-CM

## 2024-04-11 DIAGNOSIS — G62.1 ALCOHOL-INDUCED POLYNEUROPATHY: ICD-10-CM

## 2024-04-11 DIAGNOSIS — E55.9 MILD VITAMIN D DEFICIENCY: ICD-10-CM

## 2024-04-11 DIAGNOSIS — R73.9 HYPERGLYCEMIA: ICD-10-CM

## 2024-04-11 DIAGNOSIS — F10.20 ALCOHOL USE DISORDER, SEVERE, DEPENDENCE: ICD-10-CM

## 2024-04-11 DIAGNOSIS — E78.5 HYPERLIPIDEMIA, UNSPECIFIED HYPERLIPIDEMIA TYPE: ICD-10-CM

## 2024-04-11 DIAGNOSIS — Z12.5 ENCOUNTER FOR SCREENING FOR MALIGNANT NEOPLASM OF PROSTATE: ICD-10-CM

## 2024-04-11 DIAGNOSIS — Z12.11 SCREENING FOR COLON CANCER: ICD-10-CM

## 2024-04-11 DIAGNOSIS — F33.9 EPISODE OF RECURRENT MAJOR DEPRESSIVE DISORDER, UNSPECIFIED DEPRESSION EPISODE SEVERITY: ICD-10-CM

## 2024-04-11 DIAGNOSIS — N39.0 URINARY TRACT INFECTION WITHOUT HEMATURIA, SITE UNSPECIFIED: ICD-10-CM

## 2024-04-11 PROCEDURE — 3080F DIAST BP >= 90 MM HG: CPT | Mod: CPTII,S$GLB,, | Performed by: INTERNAL MEDICINE

## 2024-04-11 PROCEDURE — 1100F PTFALLS ASSESS-DOCD GE2>/YR: CPT | Mod: CPTII,S$GLB,, | Performed by: INTERNAL MEDICINE

## 2024-04-11 PROCEDURE — 1159F MED LIST DOCD IN RCRD: CPT | Mod: CPTII,S$GLB,, | Performed by: INTERNAL MEDICINE

## 2024-04-11 PROCEDURE — 3008F BODY MASS INDEX DOCD: CPT | Mod: CPTII,S$GLB,, | Performed by: INTERNAL MEDICINE

## 2024-04-11 PROCEDURE — 3288F FALL RISK ASSESSMENT DOCD: CPT | Mod: CPTII,S$GLB,, | Performed by: INTERNAL MEDICINE

## 2024-04-11 PROCEDURE — 4010F ACE/ARB THERAPY RXD/TAKEN: CPT | Mod: CPTII,S$GLB,, | Performed by: INTERNAL MEDICINE

## 2024-04-11 PROCEDURE — 99999 PR PBB SHADOW E&M-EST. PATIENT-LVL V: CPT | Mod: PBBFAC,HCNC,, | Performed by: INTERNAL MEDICINE

## 2024-04-11 PROCEDURE — 99214 OFFICE O/P EST MOD 30 MIN: CPT | Mod: S$GLB,,, | Performed by: INTERNAL MEDICINE

## 2024-04-11 PROCEDURE — 1125F AMNT PAIN NOTED PAIN PRSNT: CPT | Mod: CPTII,S$GLB,, | Performed by: INTERNAL MEDICINE

## 2024-04-11 PROCEDURE — 3077F SYST BP >= 140 MM HG: CPT | Mod: CPTII,S$GLB,, | Performed by: INTERNAL MEDICINE

## 2024-04-11 RX ORDER — GABAPENTIN 300 MG/1
CAPSULE ORAL
Qty: 90 CAPSULE | Refills: 6 | Status: SHIPPED | OUTPATIENT
Start: 2024-04-11

## 2024-04-11 NOTE — PROGRESS NOTES
Subjective:       Patient ID: Froylan Borja is a 69 y.o. male.    Chief Complaint: Annual Exam    HPIPt is doing better - still drinking some.  He is living in an apartment and sleeping in a bed (last time sleeping in a chair and had severe LE edema).  He has two cats.  No CP or SOB.  Review of Systems   Respiratory:  Negative for shortness of breath.    Cardiovascular:  Negative for chest pain.   Gastrointestinal:  Negative for abdominal pain, diarrhea, nausea and vomiting.   Genitourinary:  Negative for dysuria.   Neurological:  Negative for seizures, syncope and headaches.       Objective:      Physical Exam  Constitutional:       General: He is not in acute distress.     Appearance: He is well-developed.   Eyes:      Pupils: Pupils are equal, round, and reactive to light.   Neck:      Thyroid: No thyromegaly.      Vascular: No JVD.   Cardiovascular:      Rate and Rhythm: Normal rate and regular rhythm.      Heart sounds: Normal heart sounds. No murmur heard.     No friction rub. No gallop.   Pulmonary:      Effort: Pulmonary effort is normal.      Breath sounds: Normal breath sounds. No wheezing or rales.   Abdominal:      General: Bowel sounds are normal. There is no distension.      Palpations: Abdomen is soft. There is no mass.      Tenderness: There is no abdominal tenderness. There is no guarding or rebound.   Genitourinary:     Penis: Normal.       Comments: L scrotum with 2cm cystic protrusion he reports it has been present for about 4 years  Musculoskeletal:      Cervical back: Neck supple.   Lymphadenopathy:      Cervical: No cervical adenopathy.   Skin:     General: Skin is warm and dry.   Neurological:      Mental Status: He is alert and oriented to person, place, and time.   Psychiatric:         Behavior: Behavior normal.         Thought Content: Thought content normal.         Judgment: Judgment normal.         Assessment:       1. Primary hypertension    2. Hyperlipidemia, unspecified hyperlipidemia  type    3. Hyperglycemia    4. Mild vitamin D deficiency    5. Benign prostatic hyperplasia, unspecified whether lower urinary tract symptoms present    6. Encounter for screening for malignant neoplasm of prostate    7. Urinary tract infection without hematuria, site unspecified    8. Other cirrhosis of liver    9. Screening for colon cancer    10. Episode of recurrent major depressive disorder, unspecified depression episode severity    11. Other vitamin B12 deficiency anemias    12. Scrotal lesion    13. Alcohol-induced polyneuropathy    14. Alcohol use disorder, severe, dependence        Plan:   Primary hypertension  -     CBC Auto Differential; Future; Expected date: 04/11/2024  -     Comprehensive Metabolic Panel; Future; Expected date: 04/11/2024  -     TSH; Future; Expected date: 04/11/2024  -     Microalbumin/Creatinine Ratio, Urine; Future; Expected date: 04/11/2024  -     Urinalysis; Future; Expected date: 04/11/2024  -     Ambulatory referral/consult to Optometry; Future; Expected date: 04/18/2024  He will start checking it at home - he has not had a drink today may be having some withdrawal  Hyperlipidemia, unspecified hyperlipidemia type  -     Lipid Panel; Future; Expected date: 04/11/2024    Hyperglycemia  -     Hemoglobin A1C; Future; Expected date: 04/11/2024    Mild vitamin D deficiency  -     Vitamin D; Future; Expected date: 04/11/2024    Benign prostatic hyperplasia, unspecified whether lower urinary tract symptoms present  -     PSA, Screening; Future; Expected date: 04/11/2024    Encounter for screening for malignant neoplasm of prostate  -     PSA, Screening; Future; Expected date: 04/11/2024    Urinary tract infection without hematuria, site unspecified  -     Urine culture; Future; Expected date: 04/11/2024    Other cirrhosis of liver  -     Ambulatory referral/consult to Hepatology; Future; Expected date: 04/18/2024  -     Ambulatory referral/consult to Endo Procedure ; Future;  Expected date: 04/12/2024  -     Vitamin B12; Future; Expected date: 05/11/2024  -     Methylmalonic Acid, Serum; Future; Expected date: 04/11/2024  -     Protime-INR; Future; Expected date: 04/11/2024  -     AMMONIA; Future; Expected date: 04/11/2024    Screening for colon cancer  -     Ambulatory referral/consult to Endo Procedure ; Future; Expected date: 04/12/2024    Episode of recurrent major depressive disorder, unspecified depression episode severity  -     Ambulatory referral/consult to Psychiatry; Future; Expected date: 04/18/2024    Other vitamin B12 deficiency anemias  -     Vitamin B12; Future; Expected date: 05/11/2024    Scrotal lesion  -     Ambulatory referral/consult to Urology; Future; Expected date: 04/18/2024  -     US Scrotum And Testicles; Future; Expected date: 04/11/2024    Alcohol-induced polyneuropathy  Stable   Alcohol use disorder, severe, dependence  Going to see psych  Other orders  -     gabapentin (NEURONTIN) 300 MG capsule; One in AM and two at bedtime  Dispense: 90 capsule; Refill: 6

## 2024-04-11 NOTE — PATIENT INSTRUCTIONS
Please call to schedule scopes [phone]     I recommend   Pneumonia vaccine - prevnar 20  Shingles  RSV

## 2024-04-13 ENCOUNTER — HOSPITAL ENCOUNTER (OUTPATIENT)
Dept: RADIOLOGY | Facility: HOSPITAL | Age: 69
Discharge: HOME | End: 2024-04-13
Attending: INTERNAL MEDICINE
Payer: MEDICARE

## 2024-04-13 DIAGNOSIS — N50.9 SCROTAL LESION: ICD-10-CM

## 2024-04-13 PROCEDURE — 76870 US EXAM SCROTUM: CPT | Mod: 26,,, | Performed by: RADIOLOGY

## 2024-04-13 PROCEDURE — 93975 VASCULAR STUDY: CPT | Mod: TC

## 2024-04-13 PROCEDURE — 93975 VASCULAR STUDY: CPT | Mod: 26,,, | Performed by: RADIOLOGY

## 2024-04-14 NOTE — PROGRESS NOTES
Please inform - the area in your scrotum appears to be a cyst - please keep the appointment with Urology so they can evaluate.

## 2024-04-17 ENCOUNTER — LAB VISIT (OUTPATIENT)
Dept: LAB | Facility: HOSPITAL | Age: 69
End: 2024-04-17
Attending: INTERNAL MEDICINE
Payer: MEDICARE

## 2024-04-17 DIAGNOSIS — N40.0 BENIGN PROSTATIC HYPERPLASIA, UNSPECIFIED WHETHER LOWER URINARY TRACT SYMPTOMS PRESENT: ICD-10-CM

## 2024-04-17 DIAGNOSIS — E55.9 MILD VITAMIN D DEFICIENCY: ICD-10-CM

## 2024-04-17 DIAGNOSIS — E78.5 HYPERLIPIDEMIA, UNSPECIFIED HYPERLIPIDEMIA TYPE: ICD-10-CM

## 2024-04-17 DIAGNOSIS — D51.8 OTHER VITAMIN B12 DEFICIENCY ANEMIAS: ICD-10-CM

## 2024-04-17 DIAGNOSIS — K74.69 OTHER CIRRHOSIS OF LIVER: ICD-10-CM

## 2024-04-17 DIAGNOSIS — I10 PRIMARY HYPERTENSION: ICD-10-CM

## 2024-04-17 DIAGNOSIS — R73.9 HYPERGLYCEMIA: ICD-10-CM

## 2024-04-17 DIAGNOSIS — Z12.5 ENCOUNTER FOR SCREENING FOR MALIGNANT NEOPLASM OF PROSTATE: ICD-10-CM

## 2024-04-17 LAB
25(OH)D3+25(OH)D2 SERPL-MCNC: 11 NG/ML (ref 30–96)
ALBUMIN SERPL BCP-MCNC: 3.9 G/DL (ref 3.5–5.2)
ALP SERPL-CCNC: 80 U/L (ref 55–135)
ALT SERPL W/O P-5'-P-CCNC: 17 U/L (ref 10–44)
AMMONIA PLAS-SCNC: 25 UMOL/L (ref 10–50)
ANION GAP SERPL CALC-SCNC: 10 MMOL/L (ref 8–16)
AST SERPL-CCNC: 20 U/L (ref 10–40)
BASOPHILS # BLD AUTO: 0.11 K/UL (ref 0–0.2)
BASOPHILS NFR BLD: 1.4 % (ref 0–1.9)
BILIRUB SERPL-MCNC: 0.8 MG/DL (ref 0.1–1)
BUN SERPL-MCNC: 21 MG/DL (ref 8–23)
CALCIUM SERPL-MCNC: 9.4 MG/DL (ref 8.7–10.5)
CHLORIDE SERPL-SCNC: 100 MMOL/L (ref 95–110)
CHOLEST SERPL-MCNC: 181 MG/DL (ref 120–199)
CHOLEST/HDLC SERPL: 2.3 {RATIO} (ref 2–5)
CO2 SERPL-SCNC: 24 MMOL/L (ref 23–29)
COMPLEXED PSA SERPL-MCNC: 2.8 NG/ML (ref 0–4)
CREAT SERPL-MCNC: 1 MG/DL (ref 0.5–1.4)
DIFFERENTIAL METHOD BLD: ABNORMAL
EOSINOPHIL # BLD AUTO: 0.6 K/UL (ref 0–0.5)
EOSINOPHIL NFR BLD: 7.8 % (ref 0–8)
ERYTHROCYTE [DISTWIDTH] IN BLOOD BY AUTOMATED COUNT: 12.2 % (ref 11.5–14.5)
EST. GFR  (NO RACE VARIABLE): >60 ML/MIN/1.73 M^2
ESTIMATED AVG GLUCOSE: 100 MG/DL (ref 68–131)
GLUCOSE SERPL-MCNC: 106 MG/DL (ref 70–110)
HBA1C MFR BLD: 5.1 % (ref 4–5.6)
HCT VFR BLD AUTO: 47.2 % (ref 40–54)
HDLC SERPL-MCNC: 79 MG/DL (ref 40–75)
HDLC SERPL: 43.6 % (ref 20–50)
HGB BLD-MCNC: 15.8 G/DL (ref 14–18)
IMM GRANULOCYTES # BLD AUTO: 0.06 K/UL (ref 0–0.04)
IMM GRANULOCYTES NFR BLD AUTO: 0.8 % (ref 0–0.5)
INR PPP: 1 (ref 0.8–1.2)
LDLC SERPL CALC-MCNC: 91 MG/DL (ref 63–159)
LYMPHOCYTES # BLD AUTO: 1.9 K/UL (ref 1–4.8)
LYMPHOCYTES NFR BLD: 25.2 % (ref 18–48)
MCH RBC QN AUTO: 31.5 PG (ref 27–31)
MCHC RBC AUTO-ENTMCNC: 33.5 G/DL (ref 32–36)
MCV RBC AUTO: 94 FL (ref 82–98)
MONOCYTES # BLD AUTO: 0.6 K/UL (ref 0.3–1)
MONOCYTES NFR BLD: 8 % (ref 4–15)
NEUTROPHILS # BLD AUTO: 4.4 K/UL (ref 1.8–7.7)
NEUTROPHILS NFR BLD: 56.8 % (ref 38–73)
NONHDLC SERPL-MCNC: 102 MG/DL
NRBC BLD-RTO: 0 /100 WBC
PLATELET # BLD AUTO: 232 K/UL (ref 150–450)
PMV BLD AUTO: 9.6 FL (ref 9.2–12.9)
POTASSIUM SERPL-SCNC: 4.6 MMOL/L (ref 3.5–5.1)
PROT SERPL-MCNC: 7.3 G/DL (ref 6–8.4)
PROTHROMBIN TIME: 10.4 SEC (ref 9–12.5)
RBC # BLD AUTO: 5.01 M/UL (ref 4.6–6.2)
SODIUM SERPL-SCNC: 134 MMOL/L (ref 136–145)
TRIGL SERPL-MCNC: 55 MG/DL (ref 30–150)
TSH SERPL DL<=0.005 MIU/L-ACNC: 2.95 UIU/ML (ref 0.4–4)
VIT B12 SERPL-MCNC: 206 PG/ML (ref 210–950)
WBC # BLD AUTO: 7.66 K/UL (ref 3.9–12.7)

## 2024-04-17 PROCEDURE — 80061 LIPID PANEL: CPT | Mod: HCNC | Performed by: INTERNAL MEDICINE

## 2024-04-17 PROCEDURE — 82607 VITAMIN B-12: CPT | Mod: HCNC | Performed by: INTERNAL MEDICINE

## 2024-04-17 PROCEDURE — 84153 ASSAY OF PSA TOTAL: CPT | Mod: HCNC | Performed by: INTERNAL MEDICINE

## 2024-04-17 PROCEDURE — 85610 PROTHROMBIN TIME: CPT | Mod: HCNC | Performed by: INTERNAL MEDICINE

## 2024-04-17 PROCEDURE — 36415 COLL VENOUS BLD VENIPUNCTURE: CPT | Mod: HCNC | Performed by: INTERNAL MEDICINE

## 2024-04-17 PROCEDURE — 83036 HEMOGLOBIN GLYCOSYLATED A1C: CPT | Mod: HCNC | Performed by: INTERNAL MEDICINE

## 2024-04-17 PROCEDURE — 83921 ORGANIC ACID SINGLE QUANT: CPT | Mod: HCNC | Performed by: INTERNAL MEDICINE

## 2024-04-17 PROCEDURE — 82306 VITAMIN D 25 HYDROXY: CPT | Mod: HCNC | Performed by: INTERNAL MEDICINE

## 2024-04-17 PROCEDURE — 82140 ASSAY OF AMMONIA: CPT | Mod: HCNC | Performed by: INTERNAL MEDICINE

## 2024-04-17 PROCEDURE — 80053 COMPREHEN METABOLIC PANEL: CPT | Mod: HCNC | Performed by: INTERNAL MEDICINE

## 2024-04-17 PROCEDURE — 84443 ASSAY THYROID STIM HORMONE: CPT | Mod: HCNC | Performed by: INTERNAL MEDICINE

## 2024-04-17 PROCEDURE — 85025 COMPLETE CBC W/AUTO DIFF WBC: CPT | Mod: HCNC | Performed by: INTERNAL MEDICINE

## 2024-04-23 LAB — METHYLMALONATE SERPL-SCNC: 0.34 UMOL/L

## 2024-05-31 ENCOUNTER — OFFICE VISIT (OUTPATIENT)
Dept: UROLOGY | Facility: CLINIC | Age: 69
End: 2024-05-31
Payer: MEDICARE

## 2024-05-31 VITALS
HEART RATE: 87 BPM | SYSTOLIC BLOOD PRESSURE: 151 MMHG | BODY MASS INDEX: 31.78 KG/M2 | DIASTOLIC BLOOD PRESSURE: 74 MMHG | WEIGHT: 255.63 LBS | HEIGHT: 75 IN

## 2024-05-31 DIAGNOSIS — E66.9 CLASS 1 OBESITY WITH SERIOUS COMORBIDITY AND BODY MASS INDEX (BMI) OF 31.0 TO 31.9 IN ADULT, UNSPECIFIED OBESITY TYPE: ICD-10-CM

## 2024-05-31 DIAGNOSIS — N13.8 BPH WITH URINARY OBSTRUCTION: ICD-10-CM

## 2024-05-31 DIAGNOSIS — N52.9 ED (ERECTILE DYSFUNCTION) OF ORGANIC ORIGIN: ICD-10-CM

## 2024-05-31 DIAGNOSIS — R39.9 LOWER URINARY TRACT SYMPTOMS (LUTS): ICD-10-CM

## 2024-05-31 DIAGNOSIS — Z87.898 HISTORY OF GROSS HEMATURIA: ICD-10-CM

## 2024-05-31 DIAGNOSIS — N50.9 SCROTAL LESION: Primary | ICD-10-CM

## 2024-05-31 DIAGNOSIS — N40.1 BPH WITH URINARY OBSTRUCTION: ICD-10-CM

## 2024-05-31 DIAGNOSIS — E29.1 MALE HYPOGONADISM: ICD-10-CM

## 2024-05-31 LAB
BILIRUB SERPL-MCNC: NORMAL MG/DL
BLOOD URINE, POC: NORMAL
CLARITY, POC UA: CLEAR
COLOR, POC UA: YELLOW
GLUCOSE UR QL STRIP: NORMAL
KETONES UR QL STRIP: NORMAL
LEUKOCYTE ESTERASE URINE, POC: NORMAL
NITRITE, POC UA: NORMAL
PH, POC UA: 5
PROTEIN, POC: NORMAL
SPECIFIC GRAVITY, POC UA: 1.02
UROBILINOGEN, POC UA: NORMAL

## 2024-05-31 PROCEDURE — 81002 URINALYSIS NONAUTO W/O SCOPE: CPT | Mod: HCNC,S$GLB,, | Performed by: NURSE PRACTITIONER

## 2024-05-31 PROCEDURE — 1160F RVW MEDS BY RX/DR IN RCRD: CPT | Mod: HCNC,CPTII,S$GLB, | Performed by: NURSE PRACTITIONER

## 2024-05-31 PROCEDURE — 3044F HG A1C LEVEL LT 7.0%: CPT | Mod: HCNC,CPTII,S$GLB, | Performed by: NURSE PRACTITIONER

## 2024-05-31 PROCEDURE — 1101F PT FALLS ASSESS-DOCD LE1/YR: CPT | Mod: HCNC,CPTII,S$GLB, | Performed by: NURSE PRACTITIONER

## 2024-05-31 PROCEDURE — 99999 PR PBB SHADOW E&M-EST. PATIENT-LVL IV: CPT | Mod: PBBFAC,HCNC,, | Performed by: NURSE PRACTITIONER

## 2024-05-31 PROCEDURE — 1126F AMNT PAIN NOTED NONE PRSNT: CPT | Mod: HCNC,CPTII,S$GLB, | Performed by: NURSE PRACTITIONER

## 2024-05-31 PROCEDURE — 99215 OFFICE O/P EST HI 40 MIN: CPT | Mod: HCNC,S$GLB,, | Performed by: NURSE PRACTITIONER

## 2024-05-31 PROCEDURE — 3078F DIAST BP <80 MM HG: CPT | Mod: HCNC,CPTII,S$GLB, | Performed by: NURSE PRACTITIONER

## 2024-05-31 PROCEDURE — 3077F SYST BP >= 140 MM HG: CPT | Mod: HCNC,CPTII,S$GLB, | Performed by: NURSE PRACTITIONER

## 2024-05-31 PROCEDURE — 1159F MED LIST DOCD IN RCRD: CPT | Mod: HCNC,CPTII,S$GLB, | Performed by: NURSE PRACTITIONER

## 2024-05-31 PROCEDURE — 4010F ACE/ARB THERAPY RXD/TAKEN: CPT | Mod: HCNC,CPTII,S$GLB, | Performed by: NURSE PRACTITIONER

## 2024-05-31 PROCEDURE — 3288F FALL RISK ASSESSMENT DOCD: CPT | Mod: HCNC,CPTII,S$GLB, | Performed by: NURSE PRACTITIONER

## 2024-05-31 PROCEDURE — 3008F BODY MASS INDEX DOCD: CPT | Mod: HCNC,CPTII,S$GLB, | Performed by: NURSE PRACTITIONER

## 2024-05-31 RX ORDER — SILDENAFIL 100 MG/1
100 TABLET, FILM COATED ORAL DAILY PRN
Qty: 6 TABLET | Refills: 12 | Status: SHIPPED | OUTPATIENT
Start: 2024-05-31 | End: 2025-05-31

## 2024-05-31 NOTE — PROGRESS NOTES
CHIEF COMPLAINT:    Froylan Borja is a 69 y.o. male presents today for Scrotal Lesion    HISTORY OF PRESENTING ILLINESS:    Froylan Borja is a 69 y.o. male who is an established patient in our clinic. He has a history of BPH, Bulbourethral stricture. Hematuria with a negative workup in 2020, hypogonadism, & ED. He has history of Cirrhosis/Hep C  He has a large painless cyst on his scrotum that has been present for years. It has been evaluated in the past during cysto with Dr. Isaac.  Last office visit was 07/26/2020 with Dr. Carlin.    Here today with his wife to discuss scrotal cyst and ED.      04/13/2024  Scrotum:   - Right and left testicles are normal without acute abnormality.  - Indeterminate 2.5 cm well-circumscribed soft tissue lesion at the area of clinical concern occupying/projecting from the inferior scrotum within the scrotal wall.  Findings favored to represent a dermal lesion such as inclusion cyst, sebaceous cyst etcetera..    He denies any discomfort to area. No drainage. No erythema. Does not change in size. Not bothersome.    No urinary complaints. FOS is good. (-) straining, dysuria or hematuria. No leakage.   (+) nocturia but depends on fluid intake.     ED > 1 year. Had tried Viagra in the past; no issues.  Interested in improvement           REVIEW OF SYSTEMS:  Review of Systems   Constitutional: Negative.  Negative for chills and fever.   Eyes:  Negative for double vision.   Respiratory:  Negative for cough and shortness of breath.    Cardiovascular:  Negative for chest pain and palpitations.   Gastrointestinal:  Negative for abdominal pain, constipation, diarrhea, nausea and vomiting.   Genitourinary:  Positive for frequency. Negative for dysuria, flank pain and hematuria.        See HPI   Musculoskeletal:  Negative for falls.   Neurological:  Negative for dizziness and seizures.   Endo/Heme/Allergies:  Negative for polydipsia.         PATIENT HISTORY:    Past Medical History:   Diagnosis  Date    Alcohol abuse     Anxiety     Cirrhosis     Glaucoma     History of hepatitis C, s/p successful Rx w/ SVR24 - 1/2017     genotype 1;  relapse following PegIFN+RBV+Victrelis; prior relapse following PegIFN+RBV S/p 12 weeks harvoni + RBV w/ SVR    Hypertension     Paresthesia     feet, bilaterally       Past Surgical History:   Procedure Laterality Date    LIVER BIOPSY         Family History   Problem Relation Name Age of Onset    Colonic polyp Mother 95     Cancer Mother 95         colon vs polyps, colectomy    Diabetes Mellitus Father      Hypertension Father      Cancer Father          ? CRC    Diabetes Father      Cancer Sister          ? CRC    Colonic polyp Brother      Cirrhosis Neg Hx         Social History     Socioeconomic History    Marital status:    Tobacco Use    Smoking status: Some Days     Types: Cigars    Smokeless tobacco: Never    Tobacco comments:     smokes cigars 20 per month   Substance and Sexual Activity    Alcohol use: Yes     Comment: heavy alcohol use in the past, now drinking daily, 2-3 beers daily    Drug use: No   Social History Narrative     (mental health issues), retired  at [employer]. No kids. 2 dogs. No exercise.     Social Determinants of Health     Financial Resource Strain: Low Risk  (12/18/2023)    Overall Financial Resource Strain (CARDIA)     Difficulty of Paying Living Expenses: Not hard at all   Food Insecurity: No Food Insecurity (12/18/2023)    Hunger Vital Sign     Worried About Running Out of Food in the Last Year: Never true     Ran Out of Food in the Last Year: Never true   Transportation Needs: No Transportation Needs (12/18/2023)    PRAPARE - Transportation     Lack of Transportation (Medical): No     Lack of Transportation (Non-Medical): No   Physical Activity: Inactive (12/18/2023)    Exercise Vital Sign     Days of Exercise per Week: 0 days     Minutes of Exercise per Session: 0 min   Stress: Stress Concern Present (12/18/2023)     [facility] [employer] - Occupational Stress Questionnaire     Feeling of Stress : To some extent   Housing Stability: Low Risk  (12/18/2023)    Housing Stability Vital Sign     Unable to Pay for Housing in the Last Year: No     Number of Places Lived in the Last Year: 1     Unstable Housing in the Last Year: No       Allergies:  Zoloft [sertraline]    Medications:    Current Outpatient Medications:     gabapentin (NEURONTIN) 300 MG capsule, One in AM and two at bedtime, Disp: 90 capsule, Rfl: 6    losartan (COZAAR) 100 MG tablet, Take 1 tablet (100 mg total) by mouth once daily., Disp: 90 tablet, Rfl: 3    multivitamin Tab, Take 1 tablet by mouth once daily., Disp: 60 tablet, Rfl: 0    thiamine 100 MG tablet, Take 1 tablet (100 mg total) by mouth once daily., Disp: 60 tablet, Rfl: 0    buPROPion (WELLBUTRIN SR) 150 MG TBSR 12 hr tablet, Take 1 tablet (150 mg total) by mouth 2 (two) times daily., Disp: 60 tablet, Rfl: 3    folic acid (FOLVITE) 1 MG tablet, Take 1 tablet (1 mg total) by mouth once daily., Disp: 60 tablet, Rfl: 0    sildenafiL (VIAGRA) 100 MG tablet, Take 1 tablet (100 mg total) by mouth daily as needed for Erectile Dysfunction (take on an empty stomach 30-60 minutes before)., Disp: 6 tablet, Rfl: 12    PHYSICAL EXAMINATION:  Physical Exam  Vitals and nursing note reviewed.   Constitutional:       General: He is awake.      Appearance: Normal appearance. He is obese.   HENT:      Head: Normocephalic.      Right Ear: External ear normal.      Left Ear: External ear normal.      Nose: Nose normal.   Cardiovascular:      Rate and Rhythm: Normal rate.   Pulmonary:      Effort: Pulmonary effort is normal. No respiratory distress.   Abdominal:      Tenderness: There is no abdominal tenderness. There is no right CVA tenderness or left CVA tenderness.   Genitourinary:     Penis: Normal. No phimosis, paraphimosis or hypospadias.       Testes: Normal.      Prostate: Enlarged. Not tender and no  nodules present.      Rectum: Normal.       Musculoskeletal:         General: Normal range of motion.      Cervical back: Normal range of motion.   Skin:     General: Skin is warm and dry.   Neurological:      General: No focal deficit present.      Mental Status: He is alert and oriented to person, place, and time.   Psychiatric:         Mood and Affect: Mood normal.         Behavior: Behavior is cooperative.           LABS:      In office UA today was clear of active infection and blood.      Lab Results   Component Value Date    PSA 2.8 04/17/2024    PSA 3.5 07/01/2021    PSA 1.6 11/18/2019       Lab Results   Component Value Date    CREATININE 1.0 04/17/2024    EGFRNORACEVR >60.0 04/17/2024               IMPRESSION:    Encounter Diagnoses   Name Primary?    Scrotal lesion Yes    ED (erectile dysfunction) of organic origin     BPH with urinary obstruction     Lower urinary tract symptoms (LUTS)     Class 1 obesity with serious comorbidity and body mass index (BMI) of 31.0 to 31.9 in adult, unspecified obesity type     Male hypogonadism     History of gross hematuria          Assessment:       1. Scrotal lesion    2. ED (erectile dysfunction) of organic origin    3. BPH with urinary obstruction    4. Lower urinary tract symptoms (LUTS)    5. Class 1 obesity with serious comorbidity and body mass index (BMI) of 31.0 to 31.9 in adult, unspecified obesity type    6. Male hypogonadism    7. History of gross hematuria        Plan:         I spent 40 minutes with the patient of which more than half was spent in direct consultation with the patient in regards to our treatment and plan.  We addressed the office findings and recent labs; any need to go ER today.   Education and recommendations of today's plan of care including home remedies and needed follow up with PCP.   We discussed the chief complaints; reviewed the scrotal cyst, LUTS and the possible contributory factors.   Reassurance no infection or visible blood  seen in today's urine sample  Reviewed management; including possible medical and surgical options.   We discussed his ED and the contributory factors.  Follow up with PCP for underlying medical conditions causing ED.   We discussed the physiological as well as his psychological aspects that contribute to ED.  Reviewed the 1st, 2nd, & 3rd line therapies for ED.  The benefits, expectations risks, se of the different therapies.  Encouraged to take on an empty stomach 30-60 minutes prior to interaction.   He was informed that for the DANICA we have rep that comes to clinic for him discuss.  If interested in ICI therapy, medication comes from a compound pharmacy and the 1st dose has to be done under medical supervision.  This is done due to high risk for priapism.  Educational materials given.  Rx for Viagra given.   Recommended lifestyle modifications with a proper, healthy diet, good hydration but during the day. Reducing bladder irritants.   Benefits of regular exercise and weight loss. This to help with overall health & T levels  RTC 3 months

## 2024-05-31 NOTE — PATIENT INSTRUCTIONS
Penile Self-Injection Procedure  Self-injection is a good option for many men with erectile dysfunction (ED). A tiny needle is used to inject medication into the penis. This helps your penis get hard and stay that way long enough for sex. And sex and orgasm will feel as good as always. You may be nervous about doing self-injection at first. But with practice, it will get easier. Your healthcare provider will show you how to do self-injection the first time. The simple steps are outlined on this sheet.  Preparing for Injection  Wash your hands well with soap and water.  Prepare the medication (if needed).  Sit or  a comfortable position in a warm, well-lit room. If you need to, sit or  front of a mirror.  Find an injection site on one side of your penis, in a place with no visible veins. (Dont inject into the top, bottom, or head of the penis.)  Clean the injection site with an alcohol swab. Grasp the head of your penis firmly with your thumb and forefinger (dont just pinch the skin). Stretch the penis so the skin on the shaft is taut.  Injecting the Medication  Rest your penis against your inner thigh and pull it gently toward your knee. Dont twist or rotate it. This way youll be sure to inject the medication into the spot you chose and cleaned before.  Hold the syringe between your thumb and fingers, like youre holding a pen. Rest your forearm on your thigh for support.  Insert the needle at a 90-degree angle (perpendicular) to the shaft. Do this quickly to reduce discomfort. (The needle should go in easily. If it doesnt, stop right away.)  Move your thumb to the plunger. Press down to inject the medication, counting to 5.  Remove the needle and dispose of it safely.     The injection site can be in any part of the shaded area.     Insert the needle straight into the penis.    Gaining an Erection  Apply pressure to the injection site for a few minutes. This prevents swelling and bruising and  helps spread the medication.   Stand up. This may help your erection develop. Foreplay often helps, too.  Your penis should become firm within 10-20 minutes. The erection will last long enough for sex, and maybe longer.  Call Your Doctor If You Have:   An erection that lasts longer than 3-4  hours  Bleeding or bruising  Severe pain  Scarring or curvature of the penis       © 8680-3235 [facility], [address] [place] [address], [place] [address]. All rights reserved. This information is not intended as a substitute for professional medical care. Always follow your healthcare professional's instructions.

## 2024-06-21 ENCOUNTER — CLINICAL SUPPORT (OUTPATIENT)
Dept: ENDOSCOPY | Facility: HOSPITAL | Age: 69
End: 2024-06-21
Attending: INTERNAL MEDICINE
Payer: MEDICARE

## 2024-06-21 ENCOUNTER — TELEPHONE (OUTPATIENT)
Dept: ENDOSCOPY | Facility: HOSPITAL | Age: 69
End: 2024-06-21

## 2024-06-21 DIAGNOSIS — K74.60 HEPATIC CIRRHOSIS, UNSPECIFIED HEPATIC CIRRHOSIS TYPE, UNSPECIFIED WHETHER ASCITES PRESENT: Primary | ICD-10-CM

## 2024-06-21 DIAGNOSIS — K74.69 OTHER CIRRHOSIS OF LIVER: ICD-10-CM

## 2024-06-21 DIAGNOSIS — Z12.11 SCREENING FOR COLON CANCER: ICD-10-CM

## 2024-06-21 RX ORDER — SODIUM, POTASSIUM,MAG SULFATES 17.5-3.13G
1 SOLUTION, RECONSTITUTED, ORAL ORAL DAILY
Qty: 1 KIT | Refills: 0 | Status: SHIPPED | OUTPATIENT
Start: 2024-06-21 | End: 2024-06-23

## 2024-06-21 NOTE — TELEPHONE ENCOUNTER
Colonoscopy Procedure Prep Instructions      Date of procedure: 9/19/24 Arrive at: 9:00AM    Location of Department:   [facility] [address] [place] [address]  Take the [facility] Elevators to 4th Floor Endoscopy Lab    As soon as possible:   your prep from pharmacy and over the counter DULCOLAX LAXATIVE TABLETS     On the day before your procedure   What You CAN do:   You may have clear liquids ONLY -see below for list.     Liquids That Are OK to Drink:   Water  Sports drinks (Gatorade, Power-Aid)  Coffee or tea (no cream or nondairy creamer)  Clear juices without pulp (apple, white grape)  Gelatin desserts (no fruit or toppings)  Clear soda (sprite, coke, ginger ale)  Chicken broth (until 12 midnight the night before procedure)      What You CANNOT do:   Do not EAT solid food, drink milk or anything   colored red.  Do not drink alcohol.  Do not take oral medications within 1 hour of starting   each dose of SUPREP.  No gum chewing or candy morning of procedure.      Note:   (Please disregard the insert instructions from pharmacy).  SUPREP Bowel Prep Kit is indicated for cleansing of the colon as a preparation for colonoscopy in adults.   Be sure to tell your doctor about all the medicines you take, including prescription and non-prescription medicines, vitamins, and herbal supplements. SUPREP Bowel Prep Kit may affect how other medicines work.  Medication taken by mouth may not be absorbed properly when taken within 1 hour before the start of each dose of SUPREP Bowel Prep Kit.    It is not uncommon to experience some abdominal cramping, nausea and/or vomiting when taking the prep. If you have nausea and/or vomiting while taking the prep, stop drinking for 20 to 30 minutes then continue.    How to take prep:    SUPREP Bowel Prep Kit is a (2-day) prep.   Both 6-ounce bottles are required for a complete preparation for colonoscopy. Dilute the solution concentrate as directed prior  to use. You must drink water with each dose of SUPREP, and additional water after each dose.    DOSE 1--Day Before Colonoscopy 9/18/24    Drink at least 6 to 8 glasses of clear liquids from time you wake up until you begin your prep and then continue until bedtime to avoid dehydration.     12:00 pm (NOON) Take four (4) Dulcolax (Bisacodyl) tablets with at least 8 ounces or more of clear liquids.      6:00 pm:    You must complete Steps 1 through 4 using one (1) 6-ounce bottle before going to bed as shown below:    Step 1-Pour ONE (1) 6-ounce bottle of SUPREP liquid into the mixing container.  Step 2-Add cool drinking water to the 16-ounce line on the container and mix.  Step 3-Drink ALL the liquid in the container.  Step 4-You must drink two (2) more 16-ounce containers of water over the next 1 hour.  IMPORTANT: If you experience preparation-related symptoms (for example, nausea, bloating, or cramping), stop, or slow the rate of drinking the additional water until your symptoms decrease.    DOSE 2--Day of the Colonoscopy 9/19/24 at 2-3 AM.    For this dose, repeat Steps 1 through 4 shown above using the other 6-ounce bottle.   You may continue drinking water/clear liquids until   4 hours before your colonoscopy or as directed by the scheduling nurse  6:00AM.    For information about your procedure, two (2) things to view prior to colonoscopy:  Please watch this informational video. It is important to watch this animated consent video prior to your arrival. If you haven't watched the video prior to arriving, you are required to watch it during admission which can causes delays.    Options for viewing:   Using a keyboard:  press and hold the control tab (Ctrl) and left mouse click to follow links.           Colonoscopy Instructional Video                                                                                   OR    Type link address into your web browser's address  bar:  [email or web address]      Educational Booklet with pictures:      Colonoscopy Prep - Liquid                IMPORTANT INFORMATION TO KNOW BEFORE YOUR PROCEDURE    [facility] 4th Floor    If your procedure requires the administration of anesthesia, it is necessary for a responsible adult to drive you home. (Medical Transportation, Uber, Lyft, Taxi, etc. may ONLY be used if a responsible adult is present to accompany you home.  The responsible adult CAN'T be the  of the service).      person must be available to return to pick you up within 15 minutes of being notified of discharge.       Please bring a picture ID, insurance card, & copayment      Take Medications as directed below:        If you begin taking any blood thinning medications or injectable weight loss/diabetes medications (other than insulin) , please contact the endoscopy scheduling department listed below as soon as possible.    If you are diabetic see the attached instruction sheet regarding your medication.     If you take HEART, BLOOD PRESSURE, SEIZURE, PAIN, LUNG (including inhalers/nebulizers), ANTI-REJECTION (transplant patients), or PSYCHIATRIC medications, please take at your regular times with a sip of water or as directed by the scheduling nurse.     Important contact information:    Endoscopy [phone] Hours of operation Monday-Friday 8:00-4:30pm.    Questions about insurance or financial obligations call [phone] or [phone].    If you have questions regarding the prep or need to reschedule, please call [phone]. After hours questions requiring immediate assistance, contact [facility] nurse line at [phone] or [phone].   NOTE:     On occasion, unforeseen circumstances may cause a delay in your procedure start time. We respect your time and appreciate your patience during these circumstances.      Comments: .            Are you  ready for your Colonoscopy?      __ If you take blood thinners,weight loss or diabetic injectable medications, have you stopped taking them according to your doctor's instructions before your procedures?  __ Have you stopped eating solid foods and followed a clear liquid diet a full day before your procedure or followed the diet       guidelines indicated in your instructions?       REMINDER: NO BROTH AFTER MIDNIGHT THE DAY BEFORE PROCEDURE.   __ Have you completed all your prep solution? PLEASE DO NOT FOLLOW the insert/or box instructions from pharmacy)  __ Have you taken your blood pressure, heart, seizure, or other essential medications the morning of your procedure?  __ Have you planned for a ride to and from procedure?  (Medical Transportation, Uber, Lyft, Taxi, etc. may ONLY be used if a responsible adult is present to accompany you home). The responsible adult CANNOT be the  of the service.  person must be available to return and pick you up within 15 minutes of being notified of discharge.           Questions or Concerns?  Please call us!  [phone] (M-F) [phone] (Nights and weekends)    Listed below are some helpful tips:    Please bring protective cases for eyewear and hearing aids-wear comfortable clothing/shoes.  Follow prep instructions closely so you don't have to do it twice.  Bowel prep is prescription used to clean out the colon before a colonoscopy. The prep increases movement of your colon by causing you to have diarrhea (loose stools). Cleaning out stool from the colon helps your doctor to see in your colon clearly during this procedure.   It is important to stay hydrated before, during and after bowel prep to prevent loss of fluid (dehydration). You can have water and your choice of clear liquids. Reminder: these liquids you will drink in addition to the bowel prep.     Preparing the mixture:    First, mix the prep with water.  Make the taste better by adding a sugar free  drink mix (Crystal Light) can improve the taste of your prep.   Use a large bore (opening) straw. Place towards the back of mouth (throat) as tolerated.  Prepare a prep mixture that is lightly chilled, but not ice-cold. Drinking a large amount of ice-cold liquid can make you feel very ill.  Avoid drinking any RED beverages or eating popsicles with this color for the 24 hours leading up to your procedure. This color can look like blood in the colon.    Consuming the prep:    Take your time. If you feel ill, take a 15-minute break from drinking the prep mixture  Combat hunger and dehydration with clear liquids. Options like JELL-O, or popsicles will help.  Settle in with good reading material. The goal is to clean out 6 feet of colon, so you can plan on spending a good deal of time in the bathroom. Have some good reading material on-hand or an iPad ready to keep yourself entertained!  Keep yourself comfortable. We recommend applying personal hygiene wipes, Tucks pads and a soothing ointment, like A&D ointment, Desitin, or Vaseline to your bottom before starting and as needed to protect your skin.

## 2024-06-21 NOTE — TELEPHONE ENCOUNTER
Spoke to patient to schedule procedure(s) Colonoscopy/EGD       Physician to perform procedure(s) Dr. TRINI Pollard  Date of Procedure (s) 9/19/24  Arrival Time 9:00 AM  Time of Procedure(s) 10:00 AM   Location of Procedure(s) [facility] 4th Floor  Type of Rx Prep sent to patient: Suprep  Instructions provided to patient via Email    Patient was informed on the following information and verbalized understanding. Screening questionnaire reviewed with patient and complete. If procedure requires anesthesia, a responsible adult needs to be present to accompany the patient home, patient cannot drive after receiving anesthesia. Appointment details are tentative, especially check-in time. Patient will receive a prep-op call 7 days prior to confirm check-in time for procedure. If applicable the patient should contact their pharmacy to verify Rx for procedure prep is ready for pick-up. Patient was advised to call the scheduling department at [phone] if pharmacy states no Rx is available. Patient was advised to call the endoscopy scheduling department if any questions or concerns arise.      [facility]oscopy Scheduling Department

## 2024-08-22 ENCOUNTER — LAB VISIT (OUTPATIENT)
Dept: LAB | Facility: HOSPITAL | Age: 69
End: 2024-08-22
Attending: INTERNAL MEDICINE
Payer: MEDICARE

## 2024-08-22 ENCOUNTER — OFFICE VISIT (OUTPATIENT)
Dept: HEPATOLOGY | Facility: CLINIC | Age: 69
End: 2024-08-22
Attending: INTERNAL MEDICINE
Payer: MEDICARE

## 2024-08-22 ENCOUNTER — TELEPHONE (OUTPATIENT)
Dept: HEPATOLOGY | Facility: CLINIC | Age: 69
End: 2024-08-22

## 2024-08-22 VITALS
BODY MASS INDEX: 32.68 KG/M2 | RESPIRATION RATE: 18 BRPM | DIASTOLIC BLOOD PRESSURE: 86 MMHG | WEIGHT: 262.81 LBS | TEMPERATURE: 97 F | HEART RATE: 93 BPM | HEIGHT: 75 IN | SYSTOLIC BLOOD PRESSURE: 186 MMHG | OXYGEN SATURATION: 98 %

## 2024-08-22 DIAGNOSIS — K74.69 OTHER CIRRHOSIS OF LIVER: Primary | ICD-10-CM

## 2024-08-22 DIAGNOSIS — K74.69 OTHER CIRRHOSIS OF LIVER: ICD-10-CM

## 2024-08-22 DIAGNOSIS — Z86.19 HISTORY OF HEPATITIS C: ICD-10-CM

## 2024-08-22 LAB
AFP SERPL-MCNC: 3.1 NG/ML (ref 0–8.4)
ALBUMIN SERPL BCP-MCNC: 3.8 G/DL (ref 3.5–5.2)
ALP SERPL-CCNC: 75 U/L (ref 55–135)
ALT SERPL W/O P-5'-P-CCNC: 21 U/L (ref 10–44)
ANION GAP SERPL CALC-SCNC: 7 MMOL/L (ref 8–16)
AST SERPL-CCNC: 23 U/L (ref 10–40)
BASOPHILS # BLD AUTO: 0.08 K/UL (ref 0–0.2)
BASOPHILS NFR BLD: 1.2 % (ref 0–1.9)
BILIRUB SERPL-MCNC: 0.7 MG/DL (ref 0.1–1)
BUN SERPL-MCNC: 16 MG/DL (ref 8–23)
CALCIUM SERPL-MCNC: 9.5 MG/DL (ref 8.7–10.5)
CHLORIDE SERPL-SCNC: 102 MMOL/L (ref 95–110)
CO2 SERPL-SCNC: 24 MMOL/L (ref 23–29)
CREAT SERPL-MCNC: 1.1 MG/DL (ref 0.5–1.4)
DIFFERENTIAL METHOD BLD: ABNORMAL
EOSINOPHIL # BLD AUTO: 0.3 K/UL (ref 0–0.5)
EOSINOPHIL NFR BLD: 4.9 % (ref 0–8)
ERYTHROCYTE [DISTWIDTH] IN BLOOD BY AUTOMATED COUNT: 12.7 % (ref 11.5–14.5)
EST. GFR  (NO RACE VARIABLE): >60 ML/MIN/1.73 M^2
GLUCOSE SERPL-MCNC: 119 MG/DL (ref 70–110)
HCT VFR BLD AUTO: 44.8 % (ref 40–54)
HGB BLD-MCNC: 14.8 G/DL (ref 14–18)
IMM GRANULOCYTES # BLD AUTO: 0.09 K/UL (ref 0–0.04)
IMM GRANULOCYTES NFR BLD AUTO: 1.3 % (ref 0–0.5)
INR PPP: 1 (ref 0.8–1.2)
LYMPHOCYTES # BLD AUTO: 1.2 K/UL (ref 1–4.8)
LYMPHOCYTES NFR BLD: 17.3 % (ref 18–48)
MCH RBC QN AUTO: 31.7 PG (ref 27–31)
MCHC RBC AUTO-ENTMCNC: 33 G/DL (ref 32–36)
MCV RBC AUTO: 96 FL (ref 82–98)
MONOCYTES # BLD AUTO: 0.5 K/UL (ref 0.3–1)
MONOCYTES NFR BLD: 7.8 % (ref 4–15)
NEUTROPHILS # BLD AUTO: 4.6 K/UL (ref 1.8–7.7)
NEUTROPHILS NFR BLD: 67.5 % (ref 38–73)
NRBC BLD-RTO: 0 /100 WBC
PLATELET # BLD AUTO: 229 K/UL (ref 150–450)
PMV BLD AUTO: 9.2 FL (ref 9.2–12.9)
POTASSIUM SERPL-SCNC: 6 MMOL/L (ref 3.5–5.1)
PROT SERPL-MCNC: 7.2 G/DL (ref 6–8.4)
PROTHROMBIN TIME: 10.6 SEC (ref 9–12.5)
RBC # BLD AUTO: 4.67 M/UL (ref 4.6–6.2)
SODIUM SERPL-SCNC: 133 MMOL/L (ref 136–145)
WBC # BLD AUTO: 6.78 K/UL (ref 3.9–12.7)

## 2024-08-22 PROCEDURE — 36415 COLL VENOUS BLD VENIPUNCTURE: CPT | Performed by: INTERNAL MEDICINE

## 2024-08-22 PROCEDURE — 80053 COMPREHEN METABOLIC PANEL: CPT | Performed by: INTERNAL MEDICINE

## 2024-08-22 PROCEDURE — 99999 PR PBB SHADOW E&M-EST. PATIENT-LVL IV: CPT | Mod: PBBFAC,HCNC,, | Performed by: INTERNAL MEDICINE

## 2024-08-22 PROCEDURE — 82105 ALPHA-FETOPROTEIN SERUM: CPT | Performed by: INTERNAL MEDICINE

## 2024-08-22 PROCEDURE — 85610 PROTHROMBIN TIME: CPT | Performed by: INTERNAL MEDICINE

## 2024-08-22 PROCEDURE — 85025 COMPLETE CBC W/AUTO DIFF WBC: CPT | Performed by: INTERNAL MEDICINE

## 2024-08-22 NOTE — PROGRESS NOTES
HEPATOLOGY FOLLOW UP    Referring Physician:   Current Corresponding Physician: Dr. Janki Tamez    Froylan Borja is here for follow up of Cirrhosis      HPI  This 69-year-old gentleman is here to reestablish care in the hepatology Clinic.  He has a history of well-compensated cirrhosis secondary to history of hepatitis-C as well as ongoing alcohol use disorder.  He has been counseled on many occasions to abstain from alcohol but continues to drink.  He is overdue for ultrasound surveillance.  I will be arranging blood work today to assess his liver synthetic function.  He is scheduled for a surveillance upper endoscopy next month.  He currently complains of no symptoms of chronic liver disease.  He has no lower extremity edema or symptoms of hepatic encephalopathy.  He has had no symptoms of gastrointestinal bleeding.  He continues to drink 2-3 alcoholic beverages a day.    Outpatient Encounter Medications as of 8/22/2024   Medication Sig Dispense Refill    gabapentin (NEURONTIN) 300 MG capsule One in AM and two at bedtime 90 capsule 6    losartan (COZAAR) 100 MG tablet Take 1 tablet (100 mg total) by mouth once daily. 90 tablet 3    multivitamin Tab Take 1 tablet by mouth once daily. 60 tablet 0    sildenafiL (VIAGRA) 100 MG tablet Take 1 tablet (100 mg total) by mouth daily as needed for Erectile Dysfunction (take on an empty stomach 30-60 minutes before). 6 tablet 12    thiamine 100 MG tablet Take 1 tablet (100 mg total) by mouth once daily. 60 tablet 0    buPROPion (WELLBUTRIN SR) 150 MG TBSR 12 hr tablet Take 1 tablet (150 mg total) by mouth 2 (two) times daily. 60 tablet 3    folic acid (FOLVITE) 1 MG tablet Take 1 tablet (1 mg total) by mouth once daily. 60 tablet 0     No facility-administered encounter medications on file as of 8/22/2024.     Review of patient's allergies indicates:   Allergen Reactions    Zoloft [sertraline] Other (See Comments)     hyponatremia     Past Medical History:   Diagnosis Date     Alcohol abuse     Anxiety     Cirrhosis     Glaucoma     History of hepatitis C, s/p successful Rx w/ SVR24 - 1/2017     genotype 1;  relapse following PegIFN+RBV+Victrelis; prior relapse following PegIFN+RBV S/p 12 weeks harvoni + RBV w/ SVR    Hypertension     Paresthesia     feet, bilaterally       Review of Systems   Constitutional:  Negative for activity change, appetite change, chills, fatigue and unexpected weight change.   HENT:  Negative for congestion, facial swelling and tinnitus.    Eyes:  Negative for visual disturbance.   Respiratory:  Negative for cough, shortness of breath and wheezing.    Cardiovascular:  Negative for chest pain and palpitations.   Gastrointestinal:  Negative for abdominal distention.   Genitourinary:  Negative for dysuria.   Musculoskeletal:  Negative for arthralgias, joint swelling and myalgias.   Neurological:  Negative for syncope and headaches.   Hematological:  Does not bruise/bleed easily.   Psychiatric/Behavioral:  Negative for confusion.      Vitals:    08/22/24 0825   BP: (!) 186/86   Pulse: 93   Resp: 18   Temp: 97.4 °F (36.3 °C)       Physical Exam  Constitutional:       Appearance: He is well-developed.   Eyes:      General: No scleral icterus.  Cardiovascular:      Rate and Rhythm: Normal rate and regular rhythm.      Heart sounds: Normal heart sounds.   Pulmonary:      Effort: Pulmonary effort is normal. No respiratory distress.      Breath sounds: Normal breath sounds. No wheezing.   Abdominal:      General: Bowel sounds are normal. There is no distension.      Palpations: Abdomen is soft. There is no mass.      Tenderness: There is no abdominal tenderness. There is no rebound.   Musculoskeletal:         General: Normal range of motion.   Lymphadenopathy:      Cervical: No cervical adenopathy.   Skin:     General: Skin is warm and dry.   Neurological:      Mental Status: He is alert and oriented to person, place, and time.         MELD 3.0: 8 at 4/17/2024  7:47  AM  MELD-Na: 6 at 4/17/2024  7:47 AM  Calculated from:  Serum Creatinine: 1.0 mg/dL at 4/17/2024  7:47 AM  Serum Sodium: 134 mmol/L at 4/17/2024  7:47 AM  Total Bilirubin: 0.8 mg/dL (Using min of 1 mg/dL) at 4/17/2024  7:47 AM  Serum Albumin: 3.9 g/dL (Using max of 3.5 g/dL) at 4/17/2024  7:47 AM  INR(ratio): 1.0 at 4/17/2024  7:47 AM  Age at listing (hypothetical): 69 years  Sex: Male at 4/17/2024  7:47 AM      Lab Results   Component Value Date     04/17/2024    BUN 21 04/17/2024    CREATININE 1.0 04/17/2024    CALCIUM 9.4 04/17/2024     (L) 04/17/2024    K 4.6 04/17/2024     04/17/2024    PROT 7.3 04/17/2024    CO2 24 04/17/2024    ANIONGAP 10 04/17/2024    WBC 7.66 04/17/2024    RBC 5.01 04/17/2024    HGB 15.8 04/17/2024    HCT 47.2 04/17/2024    HCT 37 12/16/2023    MCV 94 04/17/2024    MCH 31.5 (H) 04/17/2024    MCHC 33.5 04/17/2024     Lab Results   Component Value Date    RDW 12.2 04/17/2024     04/17/2024    MPV 9.6 04/17/2024    GRAN 4.4 04/17/2024    GRAN 56.8 04/17/2024    LYMPH 1.9 04/17/2024    LYMPH 25.2 04/17/2024    MONO 0.6 04/17/2024    MONO 8.0 04/17/2024    EOSINOPHIL 7.8 04/17/2024    BASOPHIL 1.4 04/17/2024    EOS 0.6 (H) 04/17/2024    BASO 0.11 04/17/2024    APTT 23.0 02/22/2008    GROUPTRH A NEG 05/01/2014    ABDIRAHMAN Negative 09/27/2012    BNP 19 12/15/2023    CHOL 181 04/17/2024    TRIG 55 04/17/2024    HDL 79 (H) 04/17/2024    CHOLHDL 43.6 04/17/2024    TOTALCHOLEST 2.3 04/17/2024    ALBUMIN 3.9 04/17/2024    BILIDIR 0.3 12/23/2023    AST 20 04/17/2024    ALT 17 04/17/2024    ALKPHOS 80 04/17/2024    MG 1.8 12/18/2023    LABPROT 10.4 04/17/2024    INR 1.0 04/17/2024    PSA 2.8 04/17/2024       Assessment and Plan:  Patient Active Problem List   Diagnosis    Glaucoma    History of hepatitis C, s/p successful Rx w/ SVR24 - 1/2017    Cirrhosis    Anxiety    Erectile dysfunction    Essential hypertension    Alcohol use disorder, severe, dependence    Obesity (BMI 30-39.9)     Gross hematuria    Diverticulosis: seen on colonoscopy 2014    Hyponatremia    Septic arthritis of right ankle    Bacterial conjunctivitis    Cellulitis of leg    HTN (hypertension)    Depression    Alcohol withdrawal, uncomplicated    Fall    Homelessness    Scalp laceration    Impaired functional mobility and endurance    Nocturnal hypoxia    Primary osteoarthritis of right knee    Primary osteoarthritis of left knee    COVID    Rhabdomyolysis    Acute cystitis    Debility    Discharge planning issues    Irritant contact dermatitis due to fecal, urinary or dual incontinence    Alcohol-induced polyneuropathy     Froylan Borja is a 69 y.o. male withCirrhosis    Impression:    Well-compensated cirrhosis secondary to history of hepatitis-C as well as ongoing alcohol use disorder.    Plan:    I encouraged him to seek a pathway to sobriety.  I will enter him into a hepatoma surveillance program.  I will be getting blood work today including an alpha fetoprotein.  He will return to clinic in 6 months time with continued clinical, biochemical and ultrasound surveillance.

## 2024-08-22 NOTE — TELEPHONE ENCOUNTER
--- Secure Chat Message from Dr EUDARD Morris ---  Can you direct him to the ER for management of his Potassium 6.0.    Call placed to the Patient's (m) at [phone]. No Answer. Left detailed VM message from Dr Morris.    Call placed to (h) at [phone]. Pt was available and came to the phone.  Message relayed to Patient regarding his Lab results and Dr Morris wants you to go to the ED for management and treatment of your elevated Potassium 6.0.    Patient asked which location he will be going too? He stated the [facility] [place]. Verbalized understanding.    Secure Chat response sent to Dr Morris.

## 2024-08-28 ENCOUNTER — TELEPHONE (OUTPATIENT)
Dept: UROLOGY | Facility: CLINIC | Age: 69
End: 2024-08-28
Payer: MEDICARE

## 2024-08-29 ENCOUNTER — HOSPITAL ENCOUNTER (OUTPATIENT)
Dept: RADIOLOGY | Facility: HOSPITAL | Age: 69
Discharge: HOME | End: 2024-08-29
Attending: INTERNAL MEDICINE
Payer: MEDICARE

## 2024-08-29 DIAGNOSIS — K74.69 OTHER CIRRHOSIS OF LIVER: ICD-10-CM

## 2024-08-29 PROCEDURE — 76700 US EXAM ABDOM COMPLETE: CPT | Mod: 26,,, | Performed by: RADIOLOGY

## 2024-08-29 PROCEDURE — 76700 US EXAM ABDOM COMPLETE: CPT | Mod: TC

## 2024-08-30 ENCOUNTER — TELEPHONE (OUTPATIENT)
Dept: HEPATOLOGY | Facility: CLINIC | Age: 69
End: 2024-08-30
Payer: MEDICARE

## 2024-08-30 DIAGNOSIS — K74.69 OTHER CIRRHOSIS OF LIVER: Primary | ICD-10-CM

## 2024-08-30 NOTE — TELEPHONE ENCOUNTER
----- Message from Maximiliano Morris MD  -----  I have ordered an MRI. Please schedule.       Call placed to the patient at [phone]. Message relayed from Dr EDUARD Morris.   Dr Morris has reviewed your recent US Abd and would like for you to do a MRI.    Patient stated my wife and my self both have procedures scheduled in Sept. That would be a bit too much. I can do it in Oct.    MRI Abd W Wo Cont scheduled for Saturday 10/5/24 at 9:15 am.  Pt verbalized understanding. Reminder placed in the mail.

## 2024-08-30 NOTE — TELEPHONE ENCOUNTER
----- Message from Maximiliano Morris MD sent at 8/30/2024 10:12 AM CDT -----  I have ordered an MRI. Please schedule.

## 2024-09-16 ENCOUNTER — TELEPHONE (OUTPATIENT)
Dept: ENDOSCOPY | Facility: HOSPITAL | Age: 69
End: 2024-09-16
Payer: MEDICARE

## 2024-09-16 NOTE — TELEPHONE ENCOUNTER
Contacted Pt for Endoscopy precall to confirm scheduled procedure(s) Colonoscopy/EGD on 9/19/24. Pt did not answer. Left voicemail for pt to call Endoscopy Scheduling to confirm.

## 2024-09-19 ENCOUNTER — HOSPITAL ENCOUNTER (OUTPATIENT)
Facility: HOSPITAL | Age: 69
Discharge: HOME | End: 2024-09-19
Attending: INTERNAL MEDICINE | Admitting: INTERNAL MEDICINE
Payer: MEDICARE

## 2024-09-19 ENCOUNTER — ANESTHESIA EVENT (OUTPATIENT)
Dept: ENDOSCOPY | Facility: HOSPITAL | Age: 69
End: 2024-09-19
Payer: MEDICARE

## 2024-09-19 ENCOUNTER — ANESTHESIA (OUTPATIENT)
Dept: ENDOSCOPY | Facility: HOSPITAL | Age: 69
End: 2024-09-19
Payer: MEDICARE

## 2024-09-19 VITALS
TEMPERATURE: 98 F | HEIGHT: 75 IN | BODY MASS INDEX: 31.08 KG/M2 | WEIGHT: 250 LBS | SYSTOLIC BLOOD PRESSURE: 174 MMHG | OXYGEN SATURATION: 99 % | DIASTOLIC BLOOD PRESSURE: 82 MMHG | HEART RATE: 67 BPM | RESPIRATION RATE: 17 BRPM

## 2024-09-19 DIAGNOSIS — Z12.11 SCREEN FOR COLON CANCER: ICD-10-CM

## 2024-09-19 PROCEDURE — 37000009 HC ANESTHESIA EA ADD 15 MINS: Performed by: INTERNAL MEDICINE

## 2024-09-19 PROCEDURE — 43235 EGD DIAGNOSTIC BRUSH WASH: CPT | Mod: 51,HCNC,, | Performed by: INTERNAL MEDICINE

## 2024-09-19 PROCEDURE — 27201089 HC SNARE, DISP (ANY): Performed by: INTERNAL MEDICINE

## 2024-09-19 PROCEDURE — 27201012 HC FORCEPS, HOT/COLD, DISP: Performed by: INTERNAL MEDICINE

## 2024-09-19 PROCEDURE — 37000008 HC ANESTHESIA 1ST 15 MINUTES: Performed by: INTERNAL MEDICINE

## 2024-09-19 PROCEDURE — 63600175 PHARM REV CODE 636 W HCPCS: Performed by: NURSE ANESTHETIST, CERTIFIED REGISTERED

## 2024-09-19 PROCEDURE — 88305 TISSUE EXAM BY PATHOLOGIST: CPT | Mod: 26,HCNC,, | Performed by: PATHOLOGY

## 2024-09-19 PROCEDURE — 45380 COLONOSCOPY AND BIOPSY: CPT | Mod: 59,PT,HCNC | Performed by: INTERNAL MEDICINE

## 2024-09-19 PROCEDURE — 88305 TISSUE EXAM BY PATHOLOGIST: CPT | Mod: HCNC | Performed by: PATHOLOGY

## 2024-09-19 PROCEDURE — 25000003 PHARM REV CODE 250: Performed by: NURSE ANESTHETIST, CERTIFIED REGISTERED

## 2024-09-19 PROCEDURE — 25000003 PHARM REV CODE 250: Performed by: INTERNAL MEDICINE

## 2024-09-19 PROCEDURE — 45380 COLONOSCOPY AND BIOPSY: CPT | Mod: 59,PT,HCNC, | Performed by: INTERNAL MEDICINE

## 2024-09-19 PROCEDURE — 27200997: Performed by: INTERNAL MEDICINE

## 2024-09-19 PROCEDURE — 45385 COLONOSCOPY W/LESION REMOVAL: CPT | Mod: PT,HCNC,, | Performed by: INTERNAL MEDICINE

## 2024-09-19 PROCEDURE — 43235 EGD DIAGNOSTIC BRUSH WASH: CPT | Mod: HCNC | Performed by: INTERNAL MEDICINE

## 2024-09-19 PROCEDURE — 45385 COLONOSCOPY W/LESION REMOVAL: CPT | Mod: PT,HCNC | Performed by: INTERNAL MEDICINE

## 2024-09-19 RX ORDER — PROPOFOL 10 MG/ML
VIAL (ML) INTRAVENOUS
Status: DISCONTINUED | OUTPATIENT
Start: 2024-09-19 | End: 2024-09-19

## 2024-09-19 RX ORDER — SODIUM CHLORIDE 9 MG/ML
INJECTION, SOLUTION INTRAVENOUS CONTINUOUS
Status: DISCONTINUED | OUTPATIENT
Start: 2024-09-19 | End: 2024-09-19 | Stop reason: HOSPADM

## 2024-09-19 RX ORDER — PROPOFOL 10 MG/ML
INJECTION, EMULSION INTRAVENOUS CONTINUOUS PRN
Status: DISCONTINUED | OUTPATIENT
Start: 2024-09-19 | End: 2024-09-19

## 2024-09-19 RX ORDER — LIDOCAINE HYDROCHLORIDE 20 MG/ML
INJECTION INTRAVENOUS
Status: DISCONTINUED | OUTPATIENT
Start: 2024-09-19 | End: 2024-09-19

## 2024-09-19 RX ADMIN — PROPOFOL 200 MG: 10 INJECTION, EMULSION INTRAVENOUS at 10:09

## 2024-09-19 RX ADMIN — PROPOFOL 40 MG: 10 INJECTION, EMULSION INTRAVENOUS at 10:09

## 2024-09-19 RX ADMIN — PROPOFOL 30 MG: 10 INJECTION, EMULSION INTRAVENOUS at 10:09

## 2024-09-19 RX ADMIN — PROPOFOL 200 MCG/KG/MIN: 10 INJECTION, EMULSION INTRAVENOUS at 10:09

## 2024-09-19 RX ADMIN — SODIUM CHLORIDE: 0.9 INJECTION, SOLUTION INTRAVENOUS at 09:09

## 2024-09-19 RX ADMIN — LIDOCAINE HYDROCHLORIDE 100 MG: 20 INJECTION INTRAVENOUS at 10:09

## 2024-09-19 NOTE — PROVATION PATIENT INSTRUCTIONS
Discharge Summary/Instructions after an Endoscopic Procedure  Patient Name: Froylan Borja  Patient MRN: [id number]  Patient YOB: 1955 Thursday, September 19, 2024  Mo Patino MD  Dear patient,  As a result of recent federal legislation (The Federal Cures Act), you may   receive lab or pathology results from your procedure in your [facility]   account before your physician is able to contact you. Your physician or   their representative will relay the results to you with their   recommendations at their soonest availability.  Thank you,  RESTRICTIONS:  During your procedure today, you received medications for sedation.  These   medications may affect your judgment, balance and coordination.  Therefore,   for 24 hours, you have the following restrictions:   - DO NOT drive a car, operate machinery, make legal/financial decisions,   sign important papers or drink alcohol.    ACTIVITY:  Today: no heavy lifting, straining or running due to procedural   sedation/anesthesia.  The following day: return to full activity including work.  DIET:  Eat and drink normally unless instructed otherwise.     TREATMENT FOR COMMON SIDE EFFECTS:  - Mild abdominal pain, nausea, belching, bloating or excessive gas:  rest,   eat lightly and use a heating pad.  - Sore Throat: treat with throat lozenges and/or gargle with warm salt   water.  - Because air was used during the procedure, expelling large amounts of air   from your rectum or belching is normal.  - If a bowel prep was taken, you may not have a bowel movement for 1-3 days.    This is normal.  SYMPTOMS TO WATCH FOR AND REPORT TO YOUR PHYSICIAN:  1. Abdominal pain or bloating, other than gas cramps.  2. Chest pain.  3. Back pain.  4. Signs of infection such as: chills or fever occurring within 24 hours   after the procedure.  5. Rectal bleeding, which would show as bright red, maroon, or black stools.   (A tablespoon of blood from the rectum is not serious, especially if    hemorrhoids are present.)  6. Vomiting.  7. Weakness or dizziness.  GO DIRECTLY TO THE NEAREST EMERGENCY ROOM IF YOU HAVE ANY OF THE FOLLOWING:      Difficulty breathing              Chills and/or fever over 101 F   Persistent vomiting and/or vomiting blood   Severe abdominal pain   Severe chest pain   Black, tarry stools   Bleeding- more than one tablespoon   Any other symptom or condition that you feel may need urgent attention  Your doctor recommends these additional instructions:  If any biopsies were taken, your doctors clinic will contact you in 1 to 2   weeks with any results.  - Discharge patient to home.   - Repeat upper endoscopy in 2 years for surveillance.   - Return to referring physician.   - The findings and recommendations were discussed with the patient.  For questions, problems or results please call your physician - Mo Patino MD at Work:  [phone].  [facility], EMERGENCY ROOM PHONE NUMBER: [phone]  IF A COMPLICATION OR EMERGENCY SITUATION ARISES AND YOU ARE UNABLE TO REACH   YOUR PHYSICIAN - GO DIRECTLY TO THE EMERGENCY ROOM.  Mo Patino MD  9/19/2024 10:40:41 AM  This report has been verified and signed electronically.  Dear patient,  As a result of recent federal legislation (The Federal Cures Act), you may   receive lab or pathology results from your procedure in your [facility]   account before your physician is able to contact you. Your physician or   their representative will relay the results to you with their   recommendations at their soonest availability.  Thank you,  PROVATION

## 2024-09-19 NOTE — ANESTHESIA PREPROCEDURE EVALUATION
09/19/2024  Froylan Borja is a 69 y.o., male.    Pre-operative evaluation for Procedure(s) (LRB):  EGD (ESOPHAGOGASTRODUODENOSCOPY) (N/A)  COLONOSCOPY (N/A)    Froylan Borja is a 69 y.o. male history of HTN and well-compensated cirrhosis secondary to history of hepatitis-C as well as ongoing alcohol use disorder. Last dose of HTN medication yesterday morning.         2D Echo:  01/27/2022  Normal central venous pressure (3 mmHg).  The estimated PA systolic pressure is 16 mmHg.  The left ventricle is normal in size with normal systolic function.  The estimated ejection fraction is 55%.  Indeterminate left ventricular diastolic function.  Normal right ventricular size with normal right ventricular systolic function.    EKG:  Vent. Rate : 114 BPM     Atrial Rate : 114 BPM      P-R Int : 150 ms          QRS Dur : 082 ms       QT Int : 314 ms       P-R-T Axes : 037 037 058 degrees      QTc Int : 432 ms     Sinus tachycardia   Otherwise normal ECG   When compared with ECG of 03-NOV-2021 17:29,   No significant change was found   Confirmed by Azeb Shultz MD ([id number]) on 12/15/2023 3:17:59 PM       Patient Active Problem List   Diagnosis    Glaucoma    History of hepatitis C, s/p successful Rx w/ SVR24 - 1/2017    Cirrhosis    Anxiety    Erectile dysfunction    Essential hypertension    Alcohol use disorder, severe, dependence    Obesity (BMI 30-39.9)    Gross hematuria    Diverticulosis: seen on colonoscopy 2014    Hyponatremia    Septic arthritis of right ankle    Bacterial conjunctivitis    Cellulitis of leg    HTN (hypertension)    Depression    Alcohol withdrawal, uncomplicated    Fall    Homelessness    Scalp laceration    Impaired functional mobility and endurance    Nocturnal hypoxia    Primary osteoarthritis of right knee    Primary osteoarthritis of left knee    COVID    Rhabdomyolysis    Acute cystitis    Debility  "   Discharge planning issues    Irritant contact dermatitis due to fecal, urinary or dual incontinence    Alcohol-induced polyneuropathy       Review of patient's allergies indicates:   Allergen Reactions    Zoloft [sertraline] Other (See Comments)     hyponatremia       Past Surgical History:   Procedure Laterality Date    LIVER BIOPSY           Vital Signs:         CBC:   Recent Labs     09/19/24  0755   WBC 8.08   RBC 4.56*   HGB 14.9   HCT 43.4      MCV 95   MCH 32.7*   MCHC 34.3       CMP: No results for input(s): "NA", "K", "CL", "CO2", "BUN", "CREATININE", "GLU", "MG", "PHOS", "CALCIUM", "ALBUMIN", "PROT", "ALKPHOS", "ALT", "AST", "BILITOT" in the last 72 hours.    INR  Recent Labs     09/19/24  0755   INR 1.0                   Pre-op Assessment    I have reviewed the Patient Summary Reports.     I have reviewed the Nursing Notes. I have reviewed the NPO Status.   I have reviewed the Medications.     Review of Systems  Anesthesia Hx:             Denies Family Hx of Anesthesia complications.    Denies Personal Hx of Anesthesia complications.                    Social:  Alcohol Use       Cardiovascular:     Hypertension                                        Pulmonary:     Denies Asthma.   Denies Shortness of breath.                  Hepatic/GI:      Liver Disease, Hepatitis               Physical Exam  General: Well nourished    Airway:  Mallampati: II / II  Mouth Opening: Normal  TM Distance: Normal  Tongue: Normal  Neck ROM: Normal ROM        Anesthesia Plan  Type of Anesthesia, risks & benefits discussed:    Anesthesia Type: MAC  Intra-op Monitoring Plan: Standard ASA Monitors  Post Op Pain Control Plan: multimodal analgesia and IV/PO Opioids PRN  Induction:  IV  ASA Score: 3  Day of Surgery Review of History & Physical: H&P Update referred to the surgeon/provider.    Ready For Surgery From Anesthesia Perspective.     .      "

## 2024-09-19 NOTE — TRANSFER OF CARE
"Anesthesia Transfer of Care Note    Patient: Froylan Borja    Procedure(s) Performed: Procedure(s) (LRB):  EGD (ESOPHAGOGASTRODUODENOSCOPY) (N/A)  COLONOSCOPY (N/A)    Patient location: GI    Anesthesia Type: general    Transport from OR: Transported from OR on room air with adequate spontaneous ventilation    Post pain: adequate analgesia    Post assessment: no apparent anesthetic complications    Post vital signs: stable    Level of consciousness: responds to stimulation and awake    Nausea/Vomiting: no nausea/vomiting    Complications: none    Transfer of care protocol was followed      Last vitals: Visit Vitals  /66   Pulse 97   Temp 36.4 °C (97.5 °F)   Resp 17   Ht 6' 3" (1.905 m)   Wt 113.4 kg (250 lb)   SpO2 97%   BMI 31.25 kg/m²     "

## 2024-09-19 NOTE — H&P
[facility]  History & Physical    Subjective:      Chief Complaint/Reason for Admission:     Direct access EGD and colonoscopy for Varices screening and colorectal cancer screening possible family history of CRC in FDRs.    Diagnoses  Other cirrhosis of liver [K74.69]  Screening for colon cancer [Z12.11]     Froylan Borja is a 69 y.o. male.    Past Medical History:   Diagnosis Date    Alcohol abuse     Anxiety     Cirrhosis     Glaucoma     History of hepatitis C, s/p successful Rx w/ SVR24 - 1/2017     genotype 1;  relapse following PegIFN+RBV+Victrelis; prior relapse following PegIFN+RBV S/p 12 weeks harvoni + RBV w/ SVR    Hypertension     Paresthesia     feet, bilaterally     Past Surgical History:   Procedure Laterality Date    LIVER BIOPSY       Social History     Tobacco Use    Smoking status: Some Days     Types: Cigars    Smokeless tobacco: Never    Tobacco comments:     smokes cigars 20 per month   Substance Use Topics    Alcohol use: Yes     Comment: heavy alcohol use in the past, now drinking daily, 2-3 beers daily    Drug use: No       PTA Medications   Medication Sig    buPROPion (WELLBUTRIN SR) 150 MG TBSR 12 hr tablet Take 1 tablet (150 mg total) by mouth 2 (two) times daily.    folic acid (FOLVITE) 1 MG tablet Take 1 tablet (1 mg total) by mouth once daily.    gabapentin (NEURONTIN) 300 MG capsule One in AM and two at bedtime    losartan (COZAAR) 100 MG tablet Take 1 tablet (100 mg total) by mouth once daily.    multivitamin Tab Take 1 tablet by mouth once daily.    sildenafiL (VIAGRA) 100 MG tablet Take 1 tablet (100 mg total) by mouth daily as needed for Erectile Dysfunction (take on an empty stomach 30-60 minutes before).    thiamine 100 MG tablet Take 1 tablet (100 mg total) by mouth once daily.     Review of patient's allergies indicates:   Allergen Reactions    Zoloft [sertraline] Other (See Comments)     hyponatremia        Review of Systems   Constitutional:  Negative for  fever.       Objective:      Vital Signs (Most Recent)       Vital Signs Range (Last 24H):       Physical Exam  Neurological:      Mental Status: He is alert and oriented to person, place, and time.             Assessment:      Direct access EGD and colonoscopy for Varices screening and colorectal cancer screening possible family history of CRC in FDRs.    Diagnoses  Other cirrhosis of liver [K74.69]  Screening for colon cancer [Z12.11]       Plan:    Direct access EGD and colonoscopy for Varices screening and colorectal cancer screening possible family history of CRC in FDRs.    Diagnoses  Other cirrhosis of liver [K74.69]  Screening for colon cancer [Z12.11]

## 2024-09-19 NOTE — PROVATION PATIENT INSTRUCTIONS
Discharge Summary/Instructions after an Endoscopic Procedure  Patient Name: Froylan Borja  Patient MRN: [id number]  Patient YOB: 1955 Thursday, September 19, 2024  Mo Patino MD  Dear patient,  As a result of recent federal legislation (The Federal Cures Act), you may   receive lab or pathology results from your procedure in your [facility]   account before your physician is able to contact you. Your physician or   their representative will relay the results to you with their   recommendations at their soonest availability.  Thank you,  RESTRICTIONS:  During your procedure today, you received medications for sedation.  These   medications may affect your judgment, balance and coordination.  Therefore,   for 24 hours, you have the following restrictions:   - DO NOT drive a car, operate machinery, make legal/financial decisions,   sign important papers or drink alcohol.    ACTIVITY:  Today: no heavy lifting, straining or running due to procedural   sedation/anesthesia.  The following day: return to full activity including work.  DIET:  Eat and drink normally unless instructed otherwise.     TREATMENT FOR COMMON SIDE EFFECTS:  - Mild abdominal pain, nausea, belching, bloating or excessive gas:  rest,   eat lightly and use a heating pad.  - Sore Throat: treat with throat lozenges and/or gargle with warm salt   water.  - Because air was used during the procedure, expelling large amounts of air   from your rectum or belching is normal.  - If a bowel prep was taken, you may not have a bowel movement for 1-3 days.    This is normal.  SYMPTOMS TO WATCH FOR AND REPORT TO YOUR PHYSICIAN:  1. Abdominal pain or bloating, other than gas cramps.  2. Chest pain.  3. Back pain.  4. Signs of infection such as: chills or fever occurring within 24 hours   after the procedure.  5. Rectal bleeding, which would show as bright red, maroon, or black stools.   (A tablespoon of blood from the rectum is not serious, especially if    hemorrhoids are present.)  6. Vomiting.  7. Weakness or dizziness.  GO DIRECTLY TO THE NEAREST EMERGENCY ROOM IF YOU HAVE ANY OF THE FOLLOWING:      Difficulty breathing              Chills and/or fever over 101 F   Persistent vomiting and/or vomiting blood   Severe abdominal pain   Severe chest pain   Black, tarry stools   Bleeding- more than one tablespoon   Any other symptom or condition that you feel may need urgent attention  Your doctor recommends these additional instructions:  If any biopsies were taken, your doctors clinic will contact you in 1 to 2   weeks with any results.  - Discharge patient to home.   - Await pathology results.   - Telephone endoscopist for pathology results in 3 weeks.   - Repeat colonoscopy in 1 year for surveillance.   - Refer to a genetics counselor at the next available appointment. Referral   placed for evaluation of possible hereditary CRC syndrome with his family   history.  - Return to referring physician.   - The findings and recommendations were discussed with the patient.  For questions, problems or results please call your physician - Mo Patino MD at Work:  [phone].  [facility] [place], EMERGENCY ROOM PHONE NUMBER: [phone]  IF A COMPLICATION OR EMERGENCY SITUATION ARISES AND YOU ARE UNABLE TO REACH   YOUR PHYSICIAN - GO DIRECTLY TO THE EMERGENCY ROOM.  Mo Patino MD  9/19/2024 11:11:26 AM  This report has been verified and signed electronically.  Dear patient,  As a result of recent federal legislation (The Federal Cures Act), you may   receive lab or pathology results from your procedure in your [facility]   account before your physician is able to contact you. Your physician or   their representative will relay the results to you with their   recommendations at their soonest availability.  Thank you,  PROVATION

## 2024-09-20 LAB
GLUCOSE SERPL-MCNC: 101 MG/DL (ref 70–110)
HCO3 UR-SCNC: 22.4 MMOL/L (ref 24–28)
HCT VFR BLD CALC: 38 %PCV (ref 36–54)
PCO2 BLDA: 39.7 MMHG (ref 35–45)
PH SMN: 7.36 [PH] (ref 7.35–7.45)
PO2 BLDA: 34 MMHG (ref 40–60)
POC BE: -3 MMOL/L
POC IONIZED CALCIUM: 1.2 MMOL/L (ref 1.06–1.42)
POC SATURATED O2: 63 % (ref 95–100)
POC TCO2: 24 MMOL/L (ref 24–29)
POTASSIUM BLD-SCNC: 4.4 MMOL/L (ref 3.5–5.1)
SAMPLE: ABNORMAL
SODIUM BLD-SCNC: 132 MMOL/L (ref 136–145)

## 2024-09-20 NOTE — ANESTHESIA POSTPROCEDURE EVALUATION
Anesthesia Post Evaluation    Patient: Froylan Borja    Procedure(s) Performed: Procedure(s) (LRB):  EGD (ESOPHAGOGASTRODUODENOSCOPY) (N/A)  COLONOSCOPY (N/A)    Final Anesthesia Type: MAC      Patient location during evaluation: PACU  Patient participation: Yes- Able to Participate  Level of consciousness: awake and alert  Post-procedure vital signs: reviewed and stable  Pain management: adequate  Airway patency: patent    PONV status at discharge: No PONV  Anesthetic complications: no      Cardiovascular status: blood pressure returned to baseline  Respiratory status: unassisted, spontaneous ventilation and room air  Hydration status: euvolemic  Follow-up not needed.              Vitals Value Taken Time   /82 09/19/24 1144   Temp 36.4 °C (97.5 °F) 09/19/24 1113   Pulse 67 09/19/24 1144   Resp 17 09/19/24 1144   SpO2 99 % 09/19/24 1144         No case tracking events are documented in the log.      Pain/Matt Score: Matt Score: 10 (9/19/2024 11:28 AM)

## 2024-09-23 LAB
FINAL PATHOLOGIC DIAGNOSIS: NORMAL
GROSS: NORMAL
Lab: NORMAL
MICROSCOPIC EXAM: NORMAL

## 2024-10-01 ENCOUNTER — TELEPHONE (OUTPATIENT)
Dept: GASTROENTEROLOGY | Facility: CLINIC | Age: 69
End: 2024-10-01
Payer: MEDICARE

## 2024-10-01 NOTE — TELEPHONE ENCOUNTER
----- Message from Mo Patino MD sent at 9/29/2024 12:30 PM CDT -----  GI MA team    Please tell patient that his colonoscopy pathology was benign but 1 of his colon polyps was an adenoma based on strong family history recommend next surveillance colonoscopy in 1 year.  Thank you      1. COLON, ASCENDING, POLYP, BIOPSY:  - Focal mucosal collection of mature adipose tissue, suggestive of benign lipomatous lesion  - No evidence of epithelial dysplasia or malignancy  - See comment    Comment:  The above histologic findings are favored to represent a benign adipocytic lesion (ex - lipoma) either arising in the mucosa or extending up from the underlying submucosa. There is no evidence of epithelial dysplasia or malignancy in the examined  sections. Endoscopic correlation is recommended.    2. COLON, TRANSVERSE, POLYP, BIOPSY:  - Tubular adenoma   Comment: Interp By Gustavo Obando M.D., Signed on 09/23/2024 at 17:01  Microscopic Exam Multiple deeper levels examined on block 1A.

## 2024-10-02 ENCOUNTER — OFFICE VISIT (OUTPATIENT)
Dept: INTERNAL MEDICINE | Facility: CLINIC | Age: 69
End: 2024-10-02
Payer: MEDICARE

## 2024-10-02 VITALS
WEIGHT: 264.56 LBS | OXYGEN SATURATION: 97 % | SYSTOLIC BLOOD PRESSURE: 134 MMHG | HEIGHT: 75 IN | DIASTOLIC BLOOD PRESSURE: 76 MMHG | HEART RATE: 70 BPM | BODY MASS INDEX: 32.89 KG/M2

## 2024-10-02 DIAGNOSIS — Z00.00 ENCOUNTER FOR PREVENTIVE HEALTH EXAMINATION: Primary | ICD-10-CM

## 2024-10-02 DIAGNOSIS — J98.4 CALCIFIED GRANULOMA OF LUNG: ICD-10-CM

## 2024-10-02 DIAGNOSIS — E66.9 OBESITY (BMI 30-39.9): ICD-10-CM

## 2024-10-02 DIAGNOSIS — D69.2 PURPURA: ICD-10-CM

## 2024-10-02 DIAGNOSIS — F33.42 RECURRENT MAJOR DEPRESSIVE DISORDER, IN FULL REMISSION: ICD-10-CM

## 2024-10-02 DIAGNOSIS — Z86.19 HISTORY OF HEPATITIS C: ICD-10-CM

## 2024-10-02 DIAGNOSIS — K70.30 ALCOHOLIC CIRRHOSIS, UNSPECIFIED WHETHER ASCITES PRESENT: ICD-10-CM

## 2024-10-02 DIAGNOSIS — G62.1 ALCOHOL-INDUCED POLYNEUROPATHY: ICD-10-CM

## 2024-10-02 DIAGNOSIS — I10 ESSENTIAL HYPERTENSION: ICD-10-CM

## 2024-10-02 PROBLEM — L03.119 CELLULITIS OF LEG: Status: RESOLVED | Noted: 2021-11-03 | Resolved: 2024-10-02

## 2024-10-02 PROBLEM — N30.00 ACUTE CYSTITIS: Status: RESOLVED | Noted: 2023-12-17 | Resolved: 2024-10-02

## 2024-10-02 PROBLEM — F32.5 MAJOR DEPRESSIVE DISORDER IN FULL REMISSION: Status: ACTIVE | Noted: 2021-11-04

## 2024-10-02 PROBLEM — S01.01XA SCALP LACERATION: Status: RESOLVED | Noted: 2021-11-04 | Resolved: 2024-10-02

## 2024-10-02 PROBLEM — U07.1 COVID: Status: RESOLVED | Noted: 2022-07-01 | Resolved: 2024-10-02

## 2024-10-02 PROCEDURE — 99999 PR PBB SHADOW E&M-EST. PATIENT-LVL IV: CPT | Mod: PBBFAC,HCNC,, | Performed by: NURSE PRACTITIONER

## 2024-10-02 NOTE — PROGRESS NOTES
"  Froylan Borja presented for an initial Medicare AWV today. The following components were reviewed and updated:    Medical history  Family History  Social history  Allergies and Current Medications  Health Risk Assessment  Health Maintenance  Care Team    **See Completed Assessments for Annual Wellness visit with in the encounter summary    The following assessments were completed:  Depression Screening  Cognitive function Screening    Timed Get Up Test  Whisper Test      Opioid documentation:      Patient does not have a current opioid prescription.          Vitals:    10/02/24 1102 10/02/24 1129   BP: (!) 158/88 134/76   BP Location: Right arm Right arm   Pulse: 70    SpO2: 97%    Weight: 120 kg (264 lb 8.8 oz)    Height: 6' 3" (1.905 m)      Body mass index is 33.07 kg/m².       Physical Exam  Vitals and nursing note reviewed.   Constitutional:       Appearance: He is well-developed. He is obese.   HENT:      Head: Normocephalic.   Cardiovascular:      Rate and Rhythm: Normal rate and regular rhythm.      Heart sounds: Normal heart sounds. No murmur heard.  Pulmonary:      Effort: Pulmonary effort is normal.      Breath sounds: Normal breath sounds.   Abdominal:      General: Bowel sounds are normal.      Palpations: Abdomen is soft.   Musculoskeletal:         General: Normal range of motion.   Skin:     General: Skin is warm and dry.   Neurological:      Mental Status: He is alert and oriented to person, place, and time.      Motor: No abnormal muscle tone.   Psychiatric:         Mood and Affect: Mood normal.            Diagnoses and health risks identified today and associated recommendations/orders:    1. Encounter for preventive health examination  Here for Health Risk Assessment/Annual Wellness Visit.  Health maintenance reviewed and updated. Follow up in one year.   Declined all immunizations.    2. Essential hypertension  Chronic, repeat reading at goal on current medication. Followed by PCP.     3. Calcified " granuloma of lung  Noted on CXR 6/30/22.    4. Recurrent major depressive disorder, in full remission  Chronic, stable on current medication. PHQ-2 score 0. Followed by PCP.     5. Alcohol-induced polyneuropathy  Chronic, stable. Reports intermittent numbness hands. Followed by PCP.    6. Obesity (BMI 30-39.9)  Chronic, stable. Followed by PCP.    7. Alcoholic cirrhosis, unspecified whether ascites present  Chronic, stable. LFT WNL. Followed by PCP, Hepatology.    8. History of hepatitis C, s/p successful Rx w/ SVR24 - 1/2017  Followed by PCP, Hepatology.    9. Purpura        Provided Froylan with a 5-10 year written screening schedule and personal prevention plan. Recommendations were developed using the USPSTF age appropriate recommendations. Education, counseling, and referrals were provided as needed.  After Visit Summary printed and given to patient which includes a list of additional screenings\tests needed.    Follow up in about 6 months (around 4/11/2025).with PCP      Lorena Jimenez NP

## 2024-10-02 NOTE — PATIENT INSTRUCTIONS
Counseling and Referral of Other Preventative  (Italic type indicates deductible and co-insurance are waived)    Patient Name: Froylan Borja  Today's Date: 10/2/2024    Health Maintenance       Date Due Completion Date    Shingles Vaccine (1 of 2) Never done ---    RSV Vaccine (Age 60+ and Pregnant patients) (1 - Risk 60-74 years 1-dose series) Never done ---    Pneumococcal Vaccines (Age 65+) (2 of 2 - PCV) 10/31/2019 10/31/2018    Influenza Vaccine (1) 09/01/2024 10/31/2018    COVID-19 Vaccine (3 - 2024-25 season) 09/01/2024 7/21/2021    Colorectal Cancer Screening 09/19/2025 9/19/2024    Hemoglobin A1c (Diabetic Prevention Screening) 04/17/2027 4/17/2024    Lipid Panel 04/17/2029 4/17/2024    TETANUS VACCINE 09/23/2032 9/23/2022        No orders of the defined types were placed in this encounter.      The following information is provided to all patients.  This information is to help you find resources for any of the problems found today that may be affecting your health:                  Living healthy guide: www.[facility].[place].gov      Understanding Diabetes: www.diabetes.org      Eating healthy: www.cdc.gov/healthyweight      CDC home safety checklist: www.cdc.gov/steadi/patient.html      Agency on Aging: www.goea.[place].[facility]      Alcoholics anonymous (AA): www.aa.org      Physical Activity: www.dante.nih.gov/kc4vrok      Tobacco use: www.quitwithusla.org

## 2024-10-05 ENCOUNTER — HOSPITAL ENCOUNTER (OUTPATIENT)
Dept: RADIOLOGY | Facility: HOSPITAL | Age: 69
Discharge: HOME | End: 2024-10-05
Attending: INTERNAL MEDICINE
Payer: MEDICARE

## 2024-10-05 DIAGNOSIS — K74.69 OTHER CIRRHOSIS OF LIVER: ICD-10-CM

## 2024-10-05 PROCEDURE — 74183 MRI ABD W/O CNTR FLWD CNTR: CPT | Mod: TC,HCNC

## 2024-10-05 PROCEDURE — 25500020 PHARM REV CODE 255: Mod: HCNC | Performed by: INTERNAL MEDICINE

## 2024-10-05 PROCEDURE — A9585 GADOBUTROL INJECTION: HCPCS | Mod: HCNC | Performed by: INTERNAL MEDICINE

## 2024-10-05 PROCEDURE — 74183 MRI ABD W/O CNTR FLWD CNTR: CPT | Mod: 26,HCNC,, | Performed by: RADIOLOGY

## 2024-10-05 RX ORDER — GADOBUTROL 604.72 MG/ML
10 INJECTION INTRAVENOUS
Status: COMPLETED | OUTPATIENT
Start: 2024-10-05 | End: 2024-10-05

## 2024-10-05 RX ADMIN — GADOBUTROL 10 ML: 604.72 INJECTION INTRAVENOUS at 10:10

## 2024-10-09 ENCOUNTER — TELEPHONE (OUTPATIENT)
Dept: HEPATOLOGY | Facility: CLINIC | Age: 69
End: 2024-10-09
Payer: MEDICARE

## 2024-10-09 DIAGNOSIS — K74.69 OTHER CIRRHOSIS OF LIVER: ICD-10-CM

## 2024-10-09 DIAGNOSIS — K76.9 LIVER DISEASE, UNSPECIFIED: Primary | ICD-10-CM

## 2024-10-09 DIAGNOSIS — Z86.19 HISTORY OF HEPATITIS C: ICD-10-CM

## 2024-10-09 NOTE — TELEPHONE ENCOUNTER
----- Message from Maximiliano Morris MD sent at 10/7/2024  4:43 PM CDT -----  Actually instead of IR conference, can we repeat an MRI in 2-3 months?

## 2024-10-28 ENCOUNTER — TELEPHONE (OUTPATIENT)
Dept: ORTHOPEDICS | Facility: CLINIC | Age: 69
End: 2024-10-28
Payer: MEDICARE

## 2024-10-28 DIAGNOSIS — M17.0 PRIMARY OSTEOARTHRITIS OF BOTH KNEES: Primary | ICD-10-CM

## 2024-10-28 RX ORDER — BUPROPION HYDROCHLORIDE 150 MG/1
150 TABLET, EXTENDED RELEASE ORAL 2 TIMES DAILY
Qty: 60 TABLET | Refills: 1 | Status: SHIPPED | OUTPATIENT
Start: 2024-10-28 | End: 2024-12-27

## 2024-10-30 ENCOUNTER — HOSPITAL ENCOUNTER (OUTPATIENT)
Dept: RADIOLOGY | Facility: HOSPITAL | Age: 69
Discharge: HOME | End: 2024-10-30
Attending: ORTHOPAEDIC SURGERY
Payer: MEDICARE

## 2024-10-30 ENCOUNTER — OFFICE VISIT (OUTPATIENT)
Dept: ORTHOPEDICS | Facility: CLINIC | Age: 69
End: 2024-10-30
Payer: MEDICARE

## 2024-10-30 DIAGNOSIS — M17.0 PRIMARY OSTEOARTHRITIS OF BOTH KNEES: ICD-10-CM

## 2024-10-30 DIAGNOSIS — M17.0 PRIMARY OSTEOARTHRITIS OF BOTH KNEES: Primary | ICD-10-CM

## 2024-10-30 PROCEDURE — 73564 X-RAY EXAM KNEE 4 OR MORE: CPT | Mod: 26,50,HCNC, | Performed by: RADIOLOGY

## 2024-10-30 PROCEDURE — 99999 PR PBB SHADOW E&M-EST. PATIENT-LVL II: CPT | Mod: PBBFAC,HCNC,, | Performed by: ORTHOPAEDIC SURGERY

## 2024-10-30 PROCEDURE — 20610 DRAIN/INJ JOINT/BURSA W/O US: CPT | Mod: 50,HCNC,S$GLB, | Performed by: ORTHOPAEDIC SURGERY

## 2024-10-30 PROCEDURE — 3044F HG A1C LEVEL LT 7.0%: CPT | Mod: HCNC,CPTII,S$GLB, | Performed by: ORTHOPAEDIC SURGERY

## 2024-10-30 PROCEDURE — 99213 OFFICE O/P EST LOW 20 MIN: CPT | Mod: 25,HCNC,S$GLB, | Performed by: ORTHOPAEDIC SURGERY

## 2024-10-30 PROCEDURE — 1125F AMNT PAIN NOTED PAIN PRSNT: CPT | Mod: HCNC,CPTII,S$GLB, | Performed by: ORTHOPAEDIC SURGERY

## 2024-10-30 PROCEDURE — 1101F PT FALLS ASSESS-DOCD LE1/YR: CPT | Mod: HCNC,CPTII,S$GLB, | Performed by: ORTHOPAEDIC SURGERY

## 2024-10-30 PROCEDURE — 1160F RVW MEDS BY RX/DR IN RCRD: CPT | Mod: HCNC,CPTII,S$GLB, | Performed by: ORTHOPAEDIC SURGERY

## 2024-10-30 PROCEDURE — 3288F FALL RISK ASSESSMENT DOCD: CPT | Mod: HCNC,CPTII,S$GLB, | Performed by: ORTHOPAEDIC SURGERY

## 2024-10-30 PROCEDURE — 1159F MED LIST DOCD IN RCRD: CPT | Mod: HCNC,CPTII,S$GLB, | Performed by: ORTHOPAEDIC SURGERY

## 2024-10-30 PROCEDURE — 73564 X-RAY EXAM KNEE 4 OR MORE: CPT | Mod: TC,50,HCNC

## 2024-10-30 PROCEDURE — 4010F ACE/ARB THERAPY RXD/TAKEN: CPT | Mod: HCNC,CPTII,S$GLB, | Performed by: ORTHOPAEDIC SURGERY

## 2024-10-30 RX ADMIN — TRIAMCINOLONE ACETONIDE 40 MG: 40 INJECTION, SUSPENSION INTRA-ARTICULAR; INTRAMUSCULAR at 09:10

## 2024-11-04 RX ORDER — TRIAMCINOLONE ACETONIDE 40 MG/ML
40 INJECTION, SUSPENSION INTRA-ARTICULAR; INTRAMUSCULAR
Status: DISCONTINUED | OUTPATIENT
Start: 2024-10-30 | End: 2024-11-04 | Stop reason: HOSPADM

## 2024-11-04 NOTE — PROGRESS NOTES
Subjective:      Patient ID: Froylan Borja is a 69 y.o. male.    Chief Complaint:   Pain of the Left Knee and Pain of the Right Knee    HPI  He comes in for his bilateral knee osteoarthritis pain.  He has been having this pain for a few years now, right greater than left.  Pain is up to 8/10.  No recent falls.  No groin pain, distal numbness or tingling.      Objective:        Ortho/SPM Exam  There is is a mild valgus deformity, which is passively correctable.  Active range of motion is 5-115 degrees.  Anterior crepitus with active extension.  Patellar mobility is limited.  Boggy synovitis without effusion.  Lateral joint line tenderness predominates.  No instability to varus/valgus/Lachman's stressing.  No pain in the groin with flexion and internal rotation of the hip which is not limited.  Skin intact.  Distal neurovascular intact.  Flip test negative.          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  There is very near bone-on-bone contact laterally.  Assessment:     Advanced DJD, knees    Plan:   We discuss surgery, but he still thinks he wants to continue with injections for now.  We did give him cortisone injections, and he will let me know when he needs to repeat these.    The patient's pathophysiology was explained in detail with reference to x-rays, models, other visual aids as appropriate.  Treatment options were discussed in detail.  Questions were invited and answered to the patient's satisfaction.      Dimitry Patel MD  Orthopedic Surgery

## 2024-11-04 NOTE — PROCEDURES
Large Joint Aspiration/Injection: bilateral knee    Date/Time: 10/30/2024 9:00 AM    Performed by: Dimitry Patel MD  Authorized by: Dimitry Patel MD    Consent Done?:  Yes (Verbal)  Indications:  Pain and arthritis  Site marked: the procedure site was marked    Timeout: prior to procedure the correct patient, procedure, and site was verified    Prep: patient was prepped and draped in usual sterile fashion      Local anesthesia used?: Yes    Local anesthetic:  Lidocaine 1% without epinephrine  Anesthetic total (ml):  5      Details:  Needle Size:  25 G  Ultrasonic Guidance for needle placement?: No    Approach:  Anterolateral  Location:  Knee  Laterality:  Bilateral  Site:  Bilateral knee  Medications (Right):  40 mg triamcinolone acetonide 40 mg/mL  Medications (Left):  40 mg triamcinolone acetonide 40 mg/mL  Patient tolerance:  Patient tolerated the procedure well with no immediate complications

## 2024-12-26 ENCOUNTER — LAB VISIT (OUTPATIENT)
Dept: LAB | Facility: HOSPITAL | Age: 69
End: 2024-12-26
Attending: INTERNAL MEDICINE
Payer: MEDICARE

## 2024-12-26 ENCOUNTER — TELEPHONE (OUTPATIENT)
Dept: INTERNAL MEDICINE | Facility: CLINIC | Age: 69
End: 2024-12-26

## 2024-12-26 ENCOUNTER — OFFICE VISIT (OUTPATIENT)
Dept: INTERNAL MEDICINE | Facility: CLINIC | Age: 69
End: 2024-12-26
Payer: MEDICARE

## 2024-12-26 VITALS — OXYGEN SATURATION: 97 % | BODY MASS INDEX: 32.92 KG/M2 | WEIGHT: 264.75 LBS | HEART RATE: 81 BPM | HEIGHT: 75 IN

## 2024-12-26 DIAGNOSIS — N52.9 ERECTILE DYSFUNCTION, UNSPECIFIED ERECTILE DYSFUNCTION TYPE: ICD-10-CM

## 2024-12-26 DIAGNOSIS — N40.0 BENIGN PROSTATIC HYPERPLASIA, UNSPECIFIED WHETHER LOWER URINARY TRACT SYMPTOMS PRESENT: ICD-10-CM

## 2024-12-26 DIAGNOSIS — H40.10X0 OPEN-ANGLE GLAUCOMA, UNSPECIFIED GLAUCOMA STAGE, UNSPECIFIED LATERALITY, UNSPECIFIED OPEN-ANGLE GLAUCOMA TYPE: ICD-10-CM

## 2024-12-26 DIAGNOSIS — R01.1 SYSTOLIC MURMUR: ICD-10-CM

## 2024-12-26 DIAGNOSIS — E78.5 HYPERLIPIDEMIA, UNSPECIFIED HYPERLIPIDEMIA TYPE: Primary | ICD-10-CM

## 2024-12-26 DIAGNOSIS — R73.9 HYPERGLYCEMIA: ICD-10-CM

## 2024-12-26 DIAGNOSIS — R30.0 DYSURIA: ICD-10-CM

## 2024-12-26 DIAGNOSIS — I10 ESSENTIAL HYPERTENSION: ICD-10-CM

## 2024-12-26 DIAGNOSIS — E55.9 MILD VITAMIN D DEFICIENCY: ICD-10-CM

## 2024-12-26 DIAGNOSIS — Z86.19 HISTORY OF HEPATITIS C: ICD-10-CM

## 2024-12-26 DIAGNOSIS — Z12.5 ENCOUNTER FOR SCREENING FOR MALIGNANT NEOPLASM OF PROSTATE: ICD-10-CM

## 2024-12-26 LAB
ALBUMIN/CREAT UR: 4.5 UG/MG (ref 0–30)
BACTERIA #/AREA URNS AUTO: ABNORMAL /HPF
BILIRUB UR QL STRIP: NEGATIVE
CLARITY UR REFRACT.AUTO: CLEAR
COLOR UR AUTO: YELLOW
CREAT UR-MCNC: 111 MG/DL (ref 23–375)
GLUCOSE UR QL STRIP: NEGATIVE
HGB UR QL STRIP: NEGATIVE
KETONES UR QL STRIP: NEGATIVE
LEUKOCYTE ESTERASE UR QL STRIP: ABNORMAL
MICROALBUMIN UR DL<=1MG/L-MCNC: 5 UG/ML
MICROSCOPIC COMMENT: ABNORMAL
NITRITE UR QL STRIP: NEGATIVE
PH UR STRIP: 6 [PH] (ref 5–8)
PROT UR QL STRIP: NEGATIVE
RBC #/AREA URNS AUTO: 2 /HPF (ref 0–4)
SP GR UR STRIP: 1.02 (ref 1–1.03)
SQUAMOUS #/AREA URNS AUTO: 2 /HPF
URN SPEC COLLECT METH UR: ABNORMAL
WBC #/AREA URNS AUTO: 8 /HPF (ref 0–5)

## 2024-12-26 PROCEDURE — 87086 URINE CULTURE/COLONY COUNT: CPT | Mod: HCNC | Performed by: INTERNAL MEDICINE

## 2024-12-26 PROCEDURE — 3288F FALL RISK ASSESSMENT DOCD: CPT | Mod: HCNC,CPTII,S$GLB, | Performed by: INTERNAL MEDICINE

## 2024-12-26 PROCEDURE — 82043 UR ALBUMIN QUANTITATIVE: CPT | Mod: HCNC | Performed by: INTERNAL MEDICINE

## 2024-12-26 PROCEDURE — 3008F BODY MASS INDEX DOCD: CPT | Mod: HCNC,CPTII,S$GLB, | Performed by: INTERNAL MEDICINE

## 2024-12-26 PROCEDURE — 1101F PT FALLS ASSESS-DOCD LE1/YR: CPT | Mod: HCNC,CPTII,S$GLB, | Performed by: INTERNAL MEDICINE

## 2024-12-26 PROCEDURE — 99999 PR PBB SHADOW E&M-EST. PATIENT-LVL III: CPT | Mod: PBBFAC,HCNC,, | Performed by: INTERNAL MEDICINE

## 2024-12-26 PROCEDURE — 4010F ACE/ARB THERAPY RXD/TAKEN: CPT | Mod: HCNC,CPTII,S$GLB, | Performed by: INTERNAL MEDICINE

## 2024-12-26 PROCEDURE — 81001 URINALYSIS AUTO W/SCOPE: CPT | Mod: HCNC | Performed by: INTERNAL MEDICINE

## 2024-12-26 PROCEDURE — 1126F AMNT PAIN NOTED NONE PRSNT: CPT | Mod: HCNC,CPTII,S$GLB, | Performed by: INTERNAL MEDICINE

## 2024-12-26 PROCEDURE — 1159F MED LIST DOCD IN RCRD: CPT | Mod: HCNC,CPTII,S$GLB, | Performed by: INTERNAL MEDICINE

## 2024-12-26 PROCEDURE — 99214 OFFICE O/P EST MOD 30 MIN: CPT | Mod: HCNC,S$GLB,, | Performed by: INTERNAL MEDICINE

## 2024-12-26 PROCEDURE — 3044F HG A1C LEVEL LT 7.0%: CPT | Mod: HCNC,CPTII,S$GLB, | Performed by: INTERNAL MEDICINE

## 2024-12-26 RX ORDER — AMLODIPINE BESYLATE 2.5 MG/1
2.5 TABLET ORAL DAILY
Qty: 90 TABLET | Refills: 3 | Status: SHIPPED | OUTPATIENT
Start: 2024-12-26 | End: 2025-12-26

## 2024-12-26 RX ORDER — BUPROPION HYDROCHLORIDE 150 MG/1
150 TABLET, EXTENDED RELEASE ORAL 2 TIMES DAILY
Qty: 180 TABLET | Refills: 1 | Status: SHIPPED | OUTPATIENT
Start: 2024-12-26 | End: 2025-02-24

## 2024-12-26 RX ORDER — LOSARTAN POTASSIUM 100 MG/1
100 TABLET ORAL DAILY
Qty: 90 TABLET | Refills: 3 | Status: SHIPPED | OUTPATIENT
Start: 2024-12-26

## 2024-12-26 RX ORDER — GABAPENTIN 300 MG/1
CAPSULE ORAL
Qty: 90 CAPSULE | Refills: 6 | Status: SHIPPED | OUTPATIENT
Start: 2024-12-26

## 2024-12-26 NOTE — PROGRESS NOTES
Subjective:       Patient ID: Froylan Borja is a 69 y.o. male.    Chief Complaint: Annual Exam    HPIPt is feeling well overall.  No CP or SOB. BP still is high at home.  Scrotal lesion drained and is almost resolved.   Review of Systems   Respiratory:  Negative for shortness of breath.    Cardiovascular:  Negative for chest pain.   Gastrointestinal:  Negative for abdominal pain, diarrhea, nausea and vomiting.   Genitourinary:  Negative for dysuria.   Neurological:  Negative for seizures, syncope and headaches.       Objective:      Physical Exam  Constitutional:       General: He is not in acute distress.     Appearance: He is well-developed.   Eyes:      Pupils: Pupils are equal, round, and reactive to light.   Neck:      Thyroid: No thyromegaly.      Vascular: No JVD.   Cardiovascular:      Rate and Rhythm: Normal rate and regular rhythm.      Heart sounds: Murmur (II/VI systolic murmur) heard.      No friction rub. No gallop.   Pulmonary:      Effort: Pulmonary effort is normal.      Breath sounds: Normal breath sounds. No wheezing or rales.   Abdominal:      General: Bowel sounds are normal. There is no distension.      Palpations: Abdomen is soft. There is no mass.      Tenderness: There is no abdominal tenderness. There is no guarding or rebound.   Genitourinary:     Comments: Deferred to Gu  Musculoskeletal:      Cervical back: Neck supple.   Lymphadenopathy:      Cervical: No cervical adenopathy.   Skin:     General: Skin is warm and dry.   Neurological:      Mental Status: He is alert and oriented to person, place, and time.   Psychiatric:         Behavior: Behavior normal.         Thought Content: Thought content normal.         Judgment: Judgment normal.         Assessment:       1. Hyperlipidemia, unspecified hyperlipidemia type    2. Essential hypertension    3. Hyperglycemia    4. Mild vitamin D deficiency    5. Benign prostatic hyperplasia, unspecified whether lower urinary tract symptoms present    6.  Dysuria    7. Encounter for screening for malignant neoplasm of prostate    8. Erectile dysfunction, unspecified erectile dysfunction type    9. History of hepatitis C, s/p successful Rx w/ SVR24 - 1/2017    10. Open-angle glaucoma, unspecified glaucoma stage, unspecified laterality, unspecified open-angle glaucoma type        Plan:   Hyperlipidemia, unspecified hyperlipidemia type  -     Lipid Panel; Future; Expected date: 12/26/2024    Essential hypertension  -     Urinalysis Microscopic; Future; Expected date: 12/27/2024  -     Microalbumin/Creatinine Ratio, Urine; Future; Expected date: 12/26/2024  -     Urinalysis; Future; Expected date: 12/26/2024  -     TSH; Future; Expected date: 12/26/2024  -     losartan (COZAAR) 100 MG tablet; Take 1 tablet (100 mg total) by mouth once daily.  Dispense: 90 tablet; Refill: 3  Uncontrolled add amlodipine  Hyperglycemia  -     Hemoglobin A1C; Future; Expected date: 12/26/2024    Mild vitamin D deficiency  -     Vitamin D; Future; Expected date: 12/26/2024    Benign prostatic hyperplasia, unspecified whether lower urinary tract symptoms present  -     PROSTATE SPECIFIC ANTIGEN, DIAGNOSTIC; Future; Expected date: 12/26/2024  -     Ambulatory referral/consult to Urology; Future; Expected date: 01/02/2025    Dysuria  -     Urine culture; Future; Expected date: 12/26/2024    Encounter for screening for malignant neoplasm of prostate    Erectile dysfunction, unspecified erectile dysfunction type  -     TESTOSTERONE; Future; Expected date: 12/26/2024  -     Follicle Stimulating Hormone; Future; Expected date: 12/26/2024  -     Luteinizing Hormone; Future; Expected date: 12/26/2024  -     Prolactin; Future; Expected date: 12/26/2024  -     Ambulatory referral/consult to Urology; Future; Expected date: 01/02/2025    History of hepatitis C, s/p successful Rx w/ SVR24 - 1/2017  Per hepatology  Open-angle glaucoma, unspecified glaucoma stage, unspecified laterality, unspecified  open-angle glaucoma type  He is scheduling hi own eye appt  Other orders  -     buPROPion (WELLBUTRIN SR) 150 MG TBSR 12 hr tablet; Take 1 tablet (150 mg total) by mouth 2 (two) times daily.  Dispense: 180 tablet; Refill: 1  -     gabapentin (NEURONTIN) 300 MG capsule; One in AM and two at bedtime  Dispense: 90 capsule; Refill: 6  -     amLODIPine (NORVASC) 2.5 MG tablet; Take 1 tablet (2.5 mg total) by mouth once daily.  Dispense: 90 tablet; Refill: 3  Systolic murmur  Echo

## 2024-12-27 LAB
BACTERIA UR CULT: NORMAL
BACTERIA UR CULT: NORMAL

## 2025-01-08 ENCOUNTER — HOSPITAL ENCOUNTER (OUTPATIENT)
Dept: CARDIOLOGY | Facility: HOSPITAL | Age: 70
Discharge: HOME | End: 2025-01-08
Attending: INTERNAL MEDICINE
Payer: MEDICARE

## 2025-01-08 VITALS
SYSTOLIC BLOOD PRESSURE: 134 MMHG | WEIGHT: 264.56 LBS | DIASTOLIC BLOOD PRESSURE: 76 MMHG | HEART RATE: 80 BPM | HEIGHT: 75 IN | BODY MASS INDEX: 32.89 KG/M2

## 2025-01-08 PROCEDURE — 93306 TTE W/DOPPLER COMPLETE: CPT | Mod: 26,HCNC,, | Performed by: INTERNAL MEDICINE

## 2025-01-08 PROCEDURE — 93306 TTE W/DOPPLER COMPLETE: CPT | Mod: HCNC

## 2025-01-15 ENCOUNTER — HOSPITAL ENCOUNTER (OUTPATIENT)
Dept: RADIOLOGY | Facility: HOSPITAL | Age: 70
Discharge: HOME | End: 2025-01-15
Attending: INTERNAL MEDICINE
Payer: MEDICARE

## 2025-01-15 DIAGNOSIS — K76.9 LIVER DISEASE, UNSPECIFIED: ICD-10-CM

## 2025-01-15 DIAGNOSIS — Z86.19 HISTORY OF HEPATITIS C: ICD-10-CM

## 2025-01-15 PROCEDURE — 25500020 PHARM REV CODE 255: Mod: HCNC | Performed by: INTERNAL MEDICINE

## 2025-01-15 PROCEDURE — A9585 GADOBUTROL INJECTION: HCPCS | Mod: HCNC | Performed by: INTERNAL MEDICINE

## 2025-01-15 PROCEDURE — 74183 MRI ABD W/O CNTR FLWD CNTR: CPT | Mod: TC,HCNC

## 2025-01-15 PROCEDURE — 74183 MRI ABD W/O CNTR FLWD CNTR: CPT | Mod: 26,HCNC,, | Performed by: INTERNAL MEDICINE

## 2025-01-15 RX ORDER — GADOBUTROL 604.72 MG/ML
10 INJECTION INTRAVENOUS
Status: COMPLETED | OUTPATIENT
Start: 2025-01-15 | End: 2025-01-15

## 2025-01-15 RX ADMIN — GADOBUTROL 10 ML: 604.72 INJECTION INTRAVENOUS at 10:01

## 2025-01-16 ENCOUNTER — TELEPHONE (OUTPATIENT)
Dept: HEPATOLOGY | Facility: CLINIC | Age: 70
End: 2025-01-16
Payer: MEDICARE

## 2025-01-16 NOTE — TELEPHONE ENCOUNTER
Patient: Froylan Borja       MRN: [id number]      : 1955     Age: 69 y.o.  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     Imaging to be reviewed: MRI on 1/15/25    HCC Treatment History:     Platelets:   Lab Results   Component Value Date/Time     01/15/2025 09:12 AM     Creatinine:   Lab Results   Component Value Date/Time    CREATININE 1.2 01/15/2025 09:12 AM    CREATININE 1.2 01/15/2025 09:12 AM     Bilirubin:   Lab Results   Component Value Date/Time    BILITOT 0.8 01/15/2025 09:12 AM     AFP Last 3 each if available:   Lab Results   Component Value Date/Time    AFP 3.2 01/15/2025 09:12 AM    AFP 3.1 2024 09:16 AM    AFP 3.5 2021 10:55 AM       MELD: MELD 3.0: 10 at 1/15/2025  9:12 AM  MELD-Na: 8 at 1/15/2025  9:12 AM  Calculated from:  Serum Creatinine: 1.2 mg/dL at 1/15/2025  9:12 AM  Serum Sodium: 135 mmol/L at 1/15/2025  9:12 AM  Total Bilirubin: 0.8 mg/dL (Using min of 1 mg/dL) at 1/15/2025  9:12 AM  Serum Albumin: 4 g/dL (Using max of 3.5 g/dL) at 1/15/2025  9:12 AM  INR(ratio): 1 at 1/15/2025  9:12 AM  Age at listing (hypothetical): 69 years  Sex: Male at 1/15/2025  9:12 AM    Committee Discussion:    Plan:     Follow-up Provider: Dr EDUARD Morris.

## 2025-01-16 NOTE — TELEPHONE ENCOUNTER
----- Message from Maximiliano Morris MD sent at 1/16/2025  8:15 AM CST -----  Can we review at IR. 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

## 2025-01-17 ENCOUNTER — CONFERENCE (OUTPATIENT)
Dept: TRANSPLANT | Facility: CLINIC | Age: 70
End: 2025-01-17
Payer: MEDICARE

## 2025-01-17 NOTE — TELEPHONE ENCOUNTER
Patient: Froylan Borja       MRN: [id number]      : 1955     Age: 69 y.o.  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     Imaging to be reviewed: MRI on 1/15/25    HCC Treatment History:     Platelets:   Lab Results   Component Value Date/Time     01/15/2025 09:12 AM     Creatinine:   Lab Results   Component Value Date/Time    CREATININE 1.2 01/15/2025 09:12 AM    CREATININE 1.2 01/15/2025 09:12 AM     Bilirubin:   Lab Results   Component Value Date/Time    BILITOT 0.8 01/15/2025 09:12 AM     AFP Last 3 each if available:   Lab Results   Component Value Date/Time    AFP 3.2 01/15/2025 09:12 AM    AFP 3.1 2024 09:16 AM    AFP 3.5 2021 10:55 AM       MELD: MELD 3.0: 10 at 1/15/2025  9:12 AM  MELD-Na: 8 at 1/15/2025  9:12 AM  Calculated from:  Serum Creatinine: 1.2 mg/dL at 1/15/2025  9:12 AM  Serum Sodium: 135 mmol/L at 1/15/2025  9:12 AM  Total Bilirubin: 0.8 mg/dL (Using min of 1 mg/dL) at 1/15/2025  9:12 AM  Serum Albumin: 4 g/dL (Using max of 3.5 g/dL) at 1/15/2025  9:12 AM  INR(ratio): 1 at 1/15/2025  9:12 AM  Age at listing (hypothetical): 69 years  Sex: Male at 1/15/2025  9:12 AM    IR Discussion/Plan: MRI demonstrates a 1.6 cm lesion in S5 of the liver with enhancement, washout and pseudocapsule c/w HCC. Lesion previously measured 1.0 cm. No vascular invasion or extrahepatic disease. If the pt is a transplant candidate, rec repeat MRI in 3 months.    Committee Discussion:  Liver lesion of 1.6 cm with enhancing, pseudocapsule and washout. It meets criteria for HCC. No vascular invasion. Recommendation is to continue surveillance if patient is a transplant candidate.    Plan:   MRI in 3  months.    Follow-up Provider: Dr EDUARD Morris.

## 2025-01-30 ENCOUNTER — OFFICE VISIT (OUTPATIENT)
Dept: HEPATOLOGY | Facility: CLINIC | Age: 70
End: 2025-01-30
Attending: INTERNAL MEDICINE
Payer: MEDICARE

## 2025-01-30 VITALS
HEIGHT: 74 IN | OXYGEN SATURATION: 98 % | BODY MASS INDEX: 33.98 KG/M2 | WEIGHT: 264.75 LBS | HEART RATE: 86 BPM | DIASTOLIC BLOOD PRESSURE: 91 MMHG | SYSTOLIC BLOOD PRESSURE: 170 MMHG | RESPIRATION RATE: 18 BRPM | TEMPERATURE: 98 F

## 2025-01-30 DIAGNOSIS — K74.69 OTHER CIRRHOSIS OF LIVER: Primary | ICD-10-CM

## 2025-01-30 DIAGNOSIS — C22.0 HCC (HEPATOCELLULAR CARCINOMA): ICD-10-CM

## 2025-01-30 DIAGNOSIS — F10.988 ALCOHOL USE, UNSPECIFIED WITH OTHER ALCOHOL-INDUCED DISORDER: ICD-10-CM

## 2025-01-30 DIAGNOSIS — Z86.19 HISTORY OF HEPATITIS C: ICD-10-CM

## 2025-01-30 PROCEDURE — 3008F BODY MASS INDEX DOCD: CPT | Mod: HCNC,CPTII,S$GLB, | Performed by: INTERNAL MEDICINE

## 2025-01-30 PROCEDURE — 1159F MED LIST DOCD IN RCRD: CPT | Mod: HCNC,CPTII,S$GLB, | Performed by: INTERNAL MEDICINE

## 2025-01-30 PROCEDURE — 3044F HG A1C LEVEL LT 7.0%: CPT | Mod: HCNC,CPTII,S$GLB, | Performed by: INTERNAL MEDICINE

## 2025-01-30 PROCEDURE — 99214 OFFICE O/P EST MOD 30 MIN: CPT | Mod: HCNC,S$GLB,, | Performed by: INTERNAL MEDICINE

## 2025-01-30 PROCEDURE — 1126F AMNT PAIN NOTED NONE PRSNT: CPT | Mod: HCNC,CPTII,S$GLB, | Performed by: INTERNAL MEDICINE

## 2025-01-30 PROCEDURE — 1101F PT FALLS ASSESS-DOCD LE1/YR: CPT | Mod: HCNC,CPTII,S$GLB, | Performed by: INTERNAL MEDICINE

## 2025-01-30 PROCEDURE — 4010F ACE/ARB THERAPY RXD/TAKEN: CPT | Mod: HCNC,CPTII,S$GLB, | Performed by: INTERNAL MEDICINE

## 2025-01-30 PROCEDURE — 3288F FALL RISK ASSESSMENT DOCD: CPT | Mod: HCNC,CPTII,S$GLB, | Performed by: INTERNAL MEDICINE

## 2025-01-30 PROCEDURE — 99999 PR PBB SHADOW E&M-EST. PATIENT-LVL III: CPT | Mod: PBBFAC,HCNC,, | Performed by: INTERNAL MEDICINE

## 2025-01-30 PROCEDURE — 3080F DIAST BP >= 90 MM HG: CPT | Mod: HCNC,CPTII,S$GLB, | Performed by: INTERNAL MEDICINE

## 2025-01-30 PROCEDURE — 3077F SYST BP >= 140 MM HG: CPT | Mod: HCNC,CPTII,S$GLB, | Performed by: INTERNAL MEDICINE

## 2025-01-30 NOTE — PROGRESS NOTES
HEPATOLOGY FOLLOW UP    Referring Physician:   Current Corresponding Physician: Dr. Janki Tamez    Froylan Borja is here for follow up of Hepatocellular Carcinoma and Cirrhosis      HPI  This 69-year-old has reasonably well-compensated cirrhosis secondary to history of hepatitis-C as well as ongoing alcohol use disorder.  His most recent cross-sectional imaging showed a 1.6 cm lesion with radiological features consistent with hepatocellular cancer.  He has no symptoms of chronic liver disease.  He continues to drink about 4 alcoholic beverages daily.  I discussed the potential treatment options.  If he was willing to stop drinking we would normally consider a liver transplant evaluation and defer treatment of the tumor until a gritty greater than 2 cm in size.  To that end we will do an an MRI in 3 months.  He appears to be not interested in transplant Services.  We had a lengthy discussion.  He would like to proceed with radioembolization and defer any thought of transplant.  We will continue to get cross-sectional imaging every 3 months.  He will return to clinic in 6 months time.  Outpatient Encounter Medications as of 1/30/2025   Medication Sig Dispense Refill    amLODIPine (NORVASC) 2.5 MG tablet Take 1 tablet (2.5 mg total) by mouth once daily. 90 tablet 3    buPROPion (WELLBUTRIN SR) 150 MG TBSR 12 hr tablet Take 1 tablet (150 mg total) by mouth 2 (two) times daily. 180 tablet 1    gabapentin (NEURONTIN) 300 MG capsule One in AM and two at bedtime 90 capsule 6    losartan (COZAAR) 100 MG tablet Take 1 tablet (100 mg total) by mouth once daily. 90 tablet 3    multivitamin Tab Take 1 tablet by mouth once daily. 60 tablet 0    sildenafiL (VIAGRA) 100 MG tablet Take 1 tablet (100 mg total) by mouth daily as needed for Erectile Dysfunction (take on an empty stomach 30-60 minutes before). 6 tablet 12    thiamine 100 MG tablet Take 1 tablet (100 mg total) by mouth once daily. 60 tablet 0    folic acid (FOLVITE) 1  MG tablet Take 1 tablet (1 mg total) by mouth once daily. 60 tablet 0     No facility-administered encounter medications on file as of 1/30/2025.     Review of patient's allergies indicates:   Allergen Reactions    Zoloft [sertraline] Other (See Comments)     hyponatremia     Past Medical History:   Diagnosis Date    Alcohol abuse     Anxiety     Cirrhosis     Glaucoma     History of hepatitis C, s/p successful Rx w/ SVR24 - 1/2017     genotype 1;  relapse following PegIFN+RBV+Victrelis; prior relapse following PegIFN+RBV S/p 12 weeks harvoni + RBV w/ SVR    Hypertension     Paresthesia     feet, bilaterally       Review of Systems   Constitutional:  Negative for activity change, appetite change, chills, fatigue and unexpected weight change.   HENT:  Negative for congestion, facial swelling and tinnitus.    Eyes:  Negative for visual disturbance.   Respiratory:  Negative for cough, shortness of breath and wheezing.    Cardiovascular:  Negative for chest pain and palpitations.   Gastrointestinal:  Negative for abdominal distention.   Genitourinary:  Negative for dysuria.   Musculoskeletal:  Negative for arthralgias, joint swelling and myalgias.   Neurological:  Negative for syncope and headaches.   Hematological:  Does not bruise/bleed easily.   Psychiatric/Behavioral:  Negative for confusion.      Vitals:    01/30/25 1053   BP: (!) 170/91   Pulse: 86   Resp: 18   Temp: 98.4 °F (36.9 °C)       Physical Exam  Constitutional:       Appearance: He is well-developed.   Eyes:      General: No scleral icterus.  Cardiovascular:      Rate and Rhythm: Normal rate and regular rhythm.      Heart sounds: Normal heart sounds.   Pulmonary:      Effort: Pulmonary effort is normal. No respiratory distress.      Breath sounds: Normal breath sounds. No wheezing.   Abdominal:      General: Bowel sounds are normal. There is no distension.      Palpations: Abdomen is soft. There is no mass.      Tenderness: There is no abdominal  tenderness. There is no rebound.   Musculoskeletal:         General: Normal range of motion.   Lymphadenopathy:      Cervical: No cervical adenopathy.   Skin:     General: Skin is warm and dry.   Neurological:      Mental Status: He is alert and oriented to person, place, and time.         MELD 3.0: 10 at 1/15/2025  9:12 AM  MELD-Na: 8 at 1/15/2025  9:12 AM  Calculated from:  Serum Creatinine: 1.2 mg/dL at 1/15/2025  9:12 AM  Serum Sodium: 135 mmol/L at 1/15/2025  9:12 AM  Total Bilirubin: 0.8 mg/dL (Using min of 1 mg/dL) at 1/15/2025  9:12 AM  Serum Albumin: 4 g/dL (Using max of 3.5 g/dL) at 1/15/2025  9:12 AM  INR(ratio): 1 at 1/15/2025  9:12 AM  Age at listing (hypothetical): 69 years  Sex: Male at 1/15/2025  9:12 AM      Lab Results   Component Value Date     (H) 01/15/2025    BUN 15 01/15/2025    CREATININE 1.2 01/15/2025    CREATININE 1.2 01/15/2025    CALCIUM 9.5 01/15/2025     (L) 01/15/2025    K 5.3 (H) 01/15/2025    CL 99 01/15/2025    PROT 7.8 01/15/2025    CO2 26 01/15/2025    ANIONGAP 10 01/15/2025    WBC 7.63 01/15/2025    RBC 4.86 01/15/2025    HGB 16.0 01/15/2025    HCT 47.5 01/15/2025    HCT 38 09/19/2024    MCV 98 01/15/2025    MCH 32.9 (H) 01/15/2025    MCHC 33.7 01/15/2025     Lab Results   Component Value Date    RDW 12.6 01/15/2025     01/15/2025    MPV 10.1 01/15/2025    GRAN 5.0 01/15/2025    GRAN 65.4 01/15/2025    LYMPH 1.5 01/15/2025    LYMPH 19.7 01/15/2025    MONO 0.6 01/15/2025    MONO 8.3 01/15/2025    EOSINOPHIL 4.2 01/15/2025    BASOPHIL 1.4 01/15/2025    EOS 0.3 01/15/2025    BASO 0.11 01/15/2025    APTT 23.0 02/22/2008    GROUPTRH A NEG 05/01/2014    ABDIRAHMAN Negative 09/27/2012    BNP 19 12/15/2023    CHOL 184 01/15/2025    TRIG 65 01/15/2025    HDL 80 (H) 01/15/2025    CHOLHDL 43.5 01/15/2025    TOTALCHOLEST 2.3 01/15/2025    ALBUMIN 4.0 01/15/2025    BILIDIR 0.3 12/23/2023    AST 38 01/15/2025    ALT 37 01/15/2025    ALKPHOS 97 01/15/2025    MG 1.8 12/18/2023     LABPROT 10.9 01/15/2025    INR 1.0 01/15/2025    PSA 2.8 04/17/2024       Assessment and Plan:  Patient Active Problem List   Diagnosis    Glaucoma    History of hepatitis C, s/p successful Rx w/ SVR24 - 1/2017    Cirrhosis    Anxiety    Erectile dysfunction    Essential hypertension    Alcohol use disorder, severe, dependence    Obesity (BMI 30-39.9)    Gross hematuria    Diverticulosis: seen on colonoscopy 2014    Hyponatremia    Septic arthritis of right ankle    Bacterial conjunctivitis    Major depressive disorder in full remission    Alcohol withdrawal, uncomplicated    Fall    Homelessness    Impaired functional mobility and endurance    Nocturnal hypoxia    Primary osteoarthritis of right knee    Primary osteoarthritis of left knee    Rhabdomyolysis    Debility    Discharge planning issues    Irritant contact dermatitis due to fecal, urinary or dual incontinence    Alcohol-induced polyneuropathy    Calcified granuloma of lung    Purpura    HCC (hepatocellular carcinoma)     Froylan Borja is a 69 y.o. male withHepatocellular Carcinoma and Cirrhosis    Impression:    Well-compensated cirrhosis secondary to history of hepatitis-C as well as ongoing alcohol use disorder.    Sub 2 cm liver lesion with enhancing characteristics consistent with HCC.    Plan:    I encouraged him to seek a pathway to sobriety.  Since he is not interested in transplant Services, I will try and make arrangements for him to proceed with radioembolization as soon as possible.  We will continue cross-sectional imaging every 3 months.  He will return to clinic in 6 months time.

## 2025-01-30 NOTE — Clinical Note
He is not interested in having a liver transplant or stopping drinking at this point.  Can we go ahead in light of that and just proceed with radioembolization?

## 2025-01-31 ENCOUNTER — TELEPHONE (OUTPATIENT)
Dept: INTERNAL MEDICINE | Facility: CLINIC | Age: 70
End: 2025-01-31
Payer: MEDICARE

## 2025-01-31 NOTE — TELEPHONE ENCOUNTER
----- Message from Alyssa sent at 1/31/2025  9:27 AM CST -----  2TESTRESULTS    Type: Test Results    What test was performed?TRANSTHORACIC ECHO COMPLETE [[id number]]  LAB, APPOINTMENT [facility]     Who ordered the test? Janki Tamez     When and where were the test performed? 01/18 01/115    Would you like a call back and or thru [facility]:    Comments:

## 2025-02-03 ENCOUNTER — TELEPHONE (OUTPATIENT)
Dept: HEPATOLOGY | Facility: CLINIC | Age: 70
End: 2025-02-03
Payer: MEDICARE

## 2025-02-03 NOTE — TELEPHONE ENCOUNTER
----- Message from Maximiliano Morris MD sent at 1/30/2025 11:14 AM CST -----    He is not interested in having a liver transplant or stopping drinking at this point.  Can we go ahead in light of that and just proceed with radioembolization?

## 2025-02-05 ENCOUNTER — TELEPHONE (OUTPATIENT)
Dept: INTERNAL MEDICINE | Facility: CLINIC | Age: 70
End: 2025-02-05
Payer: MEDICARE

## 2025-02-05 NOTE — TELEPHONE ENCOUNTER
----- Message from Justin sent at 2/5/2025  8:29 AM CST -----  Contact: [phone]  2TESTRESULTS    Type: Test Results    What test was performed?echo on heart and blood work    Who ordered the test?pierre mathis    When and where were the test performed[facility]    Would you like a call back and or thru [facility]:call back     Comments:

## 2025-02-18 ENCOUNTER — TELEPHONE (OUTPATIENT)
Dept: INTERNAL MEDICINE | Facility: CLINIC | Age: 70
End: 2025-02-18
Payer: MEDICARE

## 2025-02-18 NOTE — TELEPHONE ENCOUNTER
Pt wants to discuss his blood work results . Thanks     ----- Message from Radha sent at 2/18/2025 11:21 AM CST -----  Regarding: Patient Advice-Lab & Echo Results  2/18/2025   Hello,   I received a call from STEVE WRIGHT [[id number]] regarding his Lab & Echo Results. Please give him a call. He can be reached at [phone]. Thank you, Radha ARCHER Access Navigator

## 2025-02-24 DIAGNOSIS — Z00.00 ENCOUNTER FOR MEDICARE ANNUAL WELLNESS EXAM: ICD-10-CM

## 2025-03-19 ENCOUNTER — TELEPHONE (OUTPATIENT)
Dept: HEPATOLOGY | Facility: CLINIC | Age: 70
End: 2025-03-19
Payer: MEDICARE

## 2025-03-19 ENCOUNTER — TELEPHONE (OUTPATIENT)
Dept: INTERVENTIONAL RADIOLOGY/VASCULAR | Facility: CLINIC | Age: 70
End: 2025-03-19
Payer: MEDICARE

## 2025-03-19 NOTE — TELEPHONE ENCOUNTER
Orders for chlamydia/gonorrhea signed. Referral for rheumatology also placed. This patient needs Y90, per IR conference on 1/17/25.  I sent message to IR schedulers on 2/3/25.  Patient is still waiting for consult appointment for Y90.

## 2025-03-22 ENCOUNTER — OFFICE VISIT (OUTPATIENT)
Dept: UROLOGY | Facility: CLINIC | Age: 70
End: 2025-03-22
Payer: MEDICARE

## 2025-03-22 ENCOUNTER — LAB VISIT (OUTPATIENT)
Dept: LAB | Facility: HOSPITAL | Age: 70
End: 2025-03-22
Payer: MEDICARE

## 2025-03-22 VITALS
WEIGHT: 264.56 LBS | SYSTOLIC BLOOD PRESSURE: 195 MMHG | HEART RATE: 85 BPM | DIASTOLIC BLOOD PRESSURE: 93 MMHG | HEIGHT: 74 IN | BODY MASS INDEX: 33.95 KG/M2

## 2025-03-22 DIAGNOSIS — R97.20 ELEVATED PSA: ICD-10-CM

## 2025-03-22 DIAGNOSIS — R35.1 NOCTURIA: ICD-10-CM

## 2025-03-22 DIAGNOSIS — N52.9 ERECTILE DYSFUNCTION, UNSPECIFIED ERECTILE DYSFUNCTION TYPE: Primary | ICD-10-CM

## 2025-03-22 DIAGNOSIS — N40.0 BENIGN PROSTATIC HYPERPLASIA, UNSPECIFIED WHETHER LOWER URINARY TRACT SYMPTOMS PRESENT: ICD-10-CM

## 2025-03-22 LAB — PSA SERPL-MCNC: 5.77 NG/ML

## 2025-03-22 PROCEDURE — 84153 ASSAY OF PSA TOTAL: CPT | Mod: HCNC

## 2025-03-22 PROCEDURE — 3044F HG A1C LEVEL LT 7.0%: CPT | Mod: HCNC,CPTII,S$GLB, | Performed by: STUDENT IN AN ORGANIZED HEALTH CARE EDUCATION/TRAINING PROGRAM

## 2025-03-22 PROCEDURE — 4010F ACE/ARB THERAPY RXD/TAKEN: CPT | Mod: HCNC,CPTII,S$GLB, | Performed by: STUDENT IN AN ORGANIZED HEALTH CARE EDUCATION/TRAINING PROGRAM

## 2025-03-22 PROCEDURE — 1126F AMNT PAIN NOTED NONE PRSNT: CPT | Mod: HCNC,CPTII,S$GLB, | Performed by: STUDENT IN AN ORGANIZED HEALTH CARE EDUCATION/TRAINING PROGRAM

## 2025-03-22 PROCEDURE — 1159F MED LIST DOCD IN RCRD: CPT | Mod: HCNC,CPTII,S$GLB, | Performed by: STUDENT IN AN ORGANIZED HEALTH CARE EDUCATION/TRAINING PROGRAM

## 2025-03-22 PROCEDURE — 99999 PR PBB SHADOW E&M-EST. PATIENT-LVL III: CPT | Mod: PBBFAC,HCNC,, | Performed by: STUDENT IN AN ORGANIZED HEALTH CARE EDUCATION/TRAINING PROGRAM

## 2025-03-22 PROCEDURE — 3288F FALL RISK ASSESSMENT DOCD: CPT | Mod: HCNC,CPTII,S$GLB, | Performed by: STUDENT IN AN ORGANIZED HEALTH CARE EDUCATION/TRAINING PROGRAM

## 2025-03-22 PROCEDURE — 3008F BODY MASS INDEX DOCD: CPT | Mod: HCNC,CPTII,S$GLB, | Performed by: STUDENT IN AN ORGANIZED HEALTH CARE EDUCATION/TRAINING PROGRAM

## 2025-03-22 PROCEDURE — 3080F DIAST BP >= 90 MM HG: CPT | Mod: HCNC,CPTII,S$GLB, | Performed by: STUDENT IN AN ORGANIZED HEALTH CARE EDUCATION/TRAINING PROGRAM

## 2025-03-22 PROCEDURE — 1101F PT FALLS ASSESS-DOCD LE1/YR: CPT | Mod: HCNC,CPTII,S$GLB, | Performed by: STUDENT IN AN ORGANIZED HEALTH CARE EDUCATION/TRAINING PROGRAM

## 2025-03-22 PROCEDURE — 99214 OFFICE O/P EST MOD 30 MIN: CPT | Mod: HCNC,S$GLB,, | Performed by: STUDENT IN AN ORGANIZED HEALTH CARE EDUCATION/TRAINING PROGRAM

## 2025-03-22 PROCEDURE — 3077F SYST BP >= 140 MM HG: CPT | Mod: HCNC,CPTII,S$GLB, | Performed by: STUDENT IN AN ORGANIZED HEALTH CARE EDUCATION/TRAINING PROGRAM

## 2025-03-22 PROCEDURE — 36415 COLL VENOUS BLD VENIPUNCTURE: CPT | Mod: HCNC

## 2025-03-22 RX ORDER — TADALAFIL 20 MG/1
20 TABLET ORAL
Qty: 30 TABLET | Refills: 5 | Status: SHIPPED | OUTPATIENT
Start: 2025-03-22 | End: 2025-03-27

## 2025-03-22 NOTE — PROGRESS NOTES
It was great to see Froylan today.    Froylan Borja is a pleasant 69 y.o. male presenting for management of BPH and ED.     History of Present Illness     - Patient reports sildenafil was ineffective for ED, but he was taking it in the morning rather than before sex.  - Voids 3-4x per day and 3-4x per night.  - Patient acknowledges consuming a few beers before bed most nights. He occasionally drinks Coke but does not regularly consume caffeine in the form of tea or coffee. Patient's wife has noted his history of significant snoring.   -His scrotal cyst is currently asymptomatic.  - Patient denies any family history of prostate cancer    -Denies weak stream. History of Bulbar stricture, dilated with a scope by Dr. Isaac in 2020. He denies hematuria, dysuria, fevers or renal colic.     SOCIAL HISTORY:  - Alcohol: Drinks a few beers most nights  - Marital status:     MEDICAL HISTORY:  - Erectile dysfunction  - Scrotal cyst  - Bulbar urethral stricture     FAMILY HISTORY:  - No family history of prostate cancer             Past Medical History:   Diagnosis Date    Alcohol abuse     Anxiety     Cirrhosis     Glaucoma     History of hepatitis C, s/p successful Rx w/ SVR24 - 1/2017     genotype 1;  relapse following PegIFN+RBV+Victrelis; prior relapse following PegIFN+RBV S/p 12 weeks harvoni + RBV w/ SVR    Hypertension     Paresthesia     feet, bilaterally      Past Surgical History:   Procedure Laterality Date    COLONOSCOPY N/A 9/19/2024    Procedure: COLONOSCOPY;  Surgeon: Mo Patino MD;  Location: [address]);  Service: Endoscopy;  Laterality: N/A;  Ref by Dr. NIGHAT Tamez, PT/INR/CBC am procedure, Suprep, email - PC  9/16-lvm for pre call-tb-labs 8/22/24    ESOPHAGOGASTRODUODENOSCOPY N/A 9/19/2024    Procedure: EGD (ESOPHAGOGASTRODUODENOSCOPY);  Surgeon: Mo Patino MD;  Location: [facility] ([address] [place] [address]);  Service: Endoscopy;  Laterality: N/A;    LIVER BIOPSY         ROS: as per  HPI    Tobacco Use History[1]     Physical Exam     General: No acute distress. Nontoxic appearing.  HENT: Normocephalic. Atraumatic.  Respiratory: Normal respiratory effort. No conversational dyspnea. No audible wheezing.  Abdomen: No obvious distension.  Skin: No visible abnormalities.  Extremities: No edema upper extremities. No edema lower extremities.  Neurological: Alert and oriented x3. Normal speech.  Psychiatric: Normal mood. Normal affect. No evidence of SI.   :  circumcised phallus. No palpable plaques. Patent meatus without discharge. B/L testicles in scrotum, without masses or tenderness. Right sided scrotal sebaceous cyst.      Data review  Prior internal/external notes have been reviewed: Yes  Care Everywhere reviewed for external records:  Yes    Pertinent labs and imaging independently reviewed include:   Lab Results   Component Value Date     (L) 01/15/2025    K 5.3 (H) 01/15/2025    CREATININE 1.2 01/15/2025    CREATININE 1.2 01/15/2025    EGFRNORACEVR >60.0 01/15/2025    EGFRNORACEVR >60.0 01/15/2025     Lab Results   Component Value Date    HGBA1C 4.7 01/15/2025      Lab Results   Component Value Date    PSA 2.8 04/17/2024    PSA 3.5 07/01/2021    PSA 1.6 11/18/2019         TEST RESULTS:  - Testosterone: 2 months ago, 465, drawn in the morning, noted as within normal range for patient's age  - PSA: 2 months ago, 6.1, elevated  - PSA: 1 year ago, 2.8  - PSA: 3 years ago, 3.5  - Post void residual: Today, 50 cc's     Problems addressed during today's encounter  1. Erectile dysfunction, unspecified erectile dysfunction type  -     Ambulatory referral/consult to Urology    2. Benign prostatic hyperplasia, unspecified whether lower urinary tract symptoms present  -     Ambulatory referral/consult to Urology    3. Elevated PSA  -     Prostate Specific Antigen, Diagnostic; Future; Expected date: 03/22/2025    4. Nocturia  -     Ambulatory referral/consult to Sleep Disorders; Future; Expected  date: 04/05/2025    Other orders  -     tadalafiL (CIALIS) 20 MG Tab; Take 1 tablet (20 mg total) by mouth every 48 hours as needed.  Dispense: 30 tablet; Refill: 5         Plan for problems listed above includes:   Assessment & Plan    ERECTILE DYSFUNCTION:   Reviewed history of erectile dysfunction.   Discontinued Viagra (sildenafil).   Started Cialis (tadalafil) to be taken 2-3 hours before sexual activity, effective for up to 24 hours.    Noted recent testosterone lab results were normal at 465, appropriate for age.    ELEVATED PSA:   Assessed elevated PSA of 6.1, up from previous values of 2.8 and 3.5.   Considered age (approaching 70) in context of PSA levels.   Explained PSA as a screening test for prostate cancer.   Ordered PSA labs to be repeated. Discussed possible NICOLÁS and biopsy if needed.    NOCTURIA AND SLEEP APNEA:   Evaluated urinary frequency, particularly nighttime urination.   Suspect possible undiagnosed sleep apnea as cause of frequent nighttime urination.   Educated on causes of frequent nighttime urination, including alcohol consumption and potential sleep apnea.    Patient to reduce fluid intake 2-3 hours before bedtime.   Ordered sleep study to rule out sleep apnea.    SEBACEOUS CYST:   Assessed sebaceous cyst on scrotum, determined it was benign and not currently problematic.   Provided information on sebaceous cysts, their recurrent nature, and treatment options.   Offered removal, patient declines today    BULBAR URETHRAL STRICTURE:   Reviewed history of bulbar stricture. Voiding well at this time. Continue to observe.    ALCOHOL USE:   Patient to cut out alcohol consumption in the evening.    FOLLOW-UP:   Follow up in 3 months with GERALD.         Risk for nontreatment of mentioned condition(s) includes, but is not limited to:   Continuation or worsening of the current condition(s)  Infection/sepsis  Impaired sexual function    Further evaluation ordered today:   Lab work  Consultation with  another provider  Voiding studies (e.g. Uroflow, PVRs, UDS)    This note was generated with the assistance of ambient listening technology. Verbal consent was obtained by the patient and accompanying visitor(s) for the recording of patient appointment to facilitate this note. I attest to having reviewed and edited the generated note for accuracy, though some syntax or spelling errors may persist. Please contact the author of this note for any clarification.     Narciso Lara MD             [1]   Social History  Tobacco Use   Smoking Status Former    Types: Cigars   Smokeless Tobacco Never   Tobacco Comments    smokes cigars 20 per month

## 2025-03-27 ENCOUNTER — RESULTS FOLLOW-UP (OUTPATIENT)
Dept: UROLOGY | Facility: CLINIC | Age: 70
End: 2025-03-27

## 2025-03-27 ENCOUNTER — TELEPHONE (OUTPATIENT)
Dept: INTERVENTIONAL RADIOLOGY/VASCULAR | Facility: HOSPITAL | Age: 70
End: 2025-03-27
Payer: MEDICARE

## 2025-03-27 ENCOUNTER — OFFICE VISIT (OUTPATIENT)
Dept: INTERVENTIONAL RADIOLOGY/VASCULAR | Facility: CLINIC | Age: 70
End: 2025-03-27
Payer: MEDICARE

## 2025-03-27 VITALS
RESPIRATION RATE: 20 BRPM | BODY MASS INDEX: 33.38 KG/M2 | HEART RATE: 96 BPM | WEIGHT: 260.13 LBS | HEIGHT: 74 IN | DIASTOLIC BLOOD PRESSURE: 86 MMHG | SYSTOLIC BLOOD PRESSURE: 158 MMHG

## 2025-03-27 DIAGNOSIS — C22.0 HCC (HEPATOCELLULAR CARCINOMA): Primary | ICD-10-CM

## 2025-03-27 DIAGNOSIS — R97.20 ELEVATED PSA: Primary | ICD-10-CM

## 2025-03-27 PROCEDURE — 3044F HG A1C LEVEL LT 7.0%: CPT | Mod: CPTII,S$GLB,, | Performed by: FAMILY MEDICINE

## 2025-03-27 PROCEDURE — 99204 OFFICE O/P NEW MOD 45 MIN: CPT | Mod: S$GLB,,, | Performed by: FAMILY MEDICINE

## 2025-03-27 PROCEDURE — 3079F DIAST BP 80-89 MM HG: CPT | Mod: CPTII,S$GLB,, | Performed by: FAMILY MEDICINE

## 2025-03-27 PROCEDURE — 1159F MED LIST DOCD IN RCRD: CPT | Mod: CPTII,S$GLB,, | Performed by: FAMILY MEDICINE

## 2025-03-27 PROCEDURE — 3077F SYST BP >= 140 MM HG: CPT | Mod: CPTII,S$GLB,, | Performed by: FAMILY MEDICINE

## 2025-03-27 PROCEDURE — 1160F RVW MEDS BY RX/DR IN RCRD: CPT | Mod: CPTII,S$GLB,, | Performed by: FAMILY MEDICINE

## 2025-03-27 PROCEDURE — 4010F ACE/ARB THERAPY RXD/TAKEN: CPT | Mod: CPTII,S$GLB,, | Performed by: FAMILY MEDICINE

## 2025-03-27 PROCEDURE — 99999 PR PBB SHADOW E&M-EST. PATIENT-LVL IV: CPT | Mod: PBBFAC,HCNC,, | Performed by: FAMILY MEDICINE

## 2025-03-27 PROCEDURE — 3008F BODY MASS INDEX DOCD: CPT | Mod: CPTII,S$GLB,, | Performed by: FAMILY MEDICINE

## 2025-03-27 NOTE — LETTER
March 27, 2025    Froylan Borja  [address] [place] [address] [address]  [facility] [address]     [facility]  [address]  [facility] [address]  Phone: [phone] PRE-PROCEDURE INSTRUCTIONS    Your procedure with Interventional Radiology is scheduled for _______________________.     Please arrive by _______________________. Please note your appointment time in [facility] will be different.    You must check-in and receive a wristband before going to your procedure. We will call you the day before to let you know your check-in location.    **Do not eat or drink anything between midnight and the time of your procedure. This includes gum, mints, and candy lemon drops.    **Do not smoke or drink alcoholic beverages 24 hours prior to your procedure.    **If you wear contact lenses, dentures, hearing aids, or glasses, bring a container to put them in during the procedure and give them to a family member for safekeeping.    **If you have been diagnosed with sleep apnea please bring your CPAP machine.    **If your doctor has scheduled you for an overnight stay, bring a small overnight bag with any personal items that you may need.    **Make arrangements in advance for transportation home by a responsible adult. It is not safe to drive a vehicle during the 24 hours following the procedure.    **All [facility] facilities and properties are tobacco free. Smoking is NOT allowed.    PLEASE NOTE: The procedure schedule has many variables which affect the time of your procedure. Family members should be available if your surgery time changes.    If you have any questions about these instructions call Interventional Radiology at [phone] Monday - Friday between 8:00am and 4:00pm or [phone] (ask for interventional radiology physician) for after hours.

## 2025-03-27 NOTE — PROGRESS NOTES
"Subjective     Patient ID: Froylan Borja is a 69 y.o. male.    Chief Complaint: Hepatocellular Carcinoma    Patient referred to Interventional Radiology by Maximiliano Morris MD to discuss treatment of hepatocellular carcinoma. Patient has a history of well-compensated cirrhosis secondary to history of hepatitis-C (s/p successful Rx w/ SVR24 - 1/2017) as well as ongoing alcohol use disorder. Recent imaging with MRI obtained on 1/15/2025 noted "1.6 cm arterial enhancing lesion in hepatic segment 5 with washout, pseudo capsule, and threshold growth, compatible with HCC." He was reviewed in liver conference on 1/17/2025: Committee Discussion: Liver lesion of 1.6 cm with enhancing, pseudocapsule and washout. It meets criteria for HCC. No vascular invasion. Recommendation is to continue surveillance if patient is a transplant candidate. Plan: MRI in 3 months.    However, patient declined transplant after long discussion with hepatology. He presents today to discuss treatment. He is accompanied by his wife. He denies any abdominal pain or abdominal distention.     Hepatology progress note reviewed.     Review of Systems   Constitutional:  Negative for activity change, appetite change, chills, fatigue and fever.   Respiratory:  Positive for cough (x couple months). Negative for shortness of breath, wheezing and stridor.    Cardiovascular:  Positive for leg swelling (when standing for long periods of time). Negative for chest pain and palpitations.   Gastrointestinal:  Negative for abdominal distention, abdominal pain, constipation, diarrhea, nausea and vomiting.          Objective     Physical Exam  Constitutional:       General: He is not in acute distress.     Appearance: He is well-developed. He is not diaphoretic.   HENT:      Head: Normocephalic and atraumatic.   Pulmonary:      Effort: Pulmonary effort is normal. No respiratory distress.   Neurological:      Mental Status: He is alert and oriented to person, place, and " time.   Psychiatric:         Behavior: Behavior normal.         Thought Content: Thought content normal.         Judgment: Judgment normal.     ECO  MELD 3.0: 10 at 1/15/2025  9:12 AM  MELD-Na: 8 at 1/15/2025  9:12 AM  Calculated from:  Serum Creatinine: 1.2 mg/dL at 1/15/2025  9:12 AM  Serum Sodium: 135 mmol/L at 1/15/2025  9:12 AM  Total Bilirubin: 0.8 mg/dL (Using min of 1 mg/dL) at 1/15/2025  9:12 AM  Serum Albumin: 4 g/dL (Using max of 3.5 g/dL) at 1/15/2025  9:12 AM  INR(ratio): 1 at 1/15/2025  9:12 AM  Age at listing (hypothetical): 69 years  Sex: Male at 1/15/2025  9:12 AM    Child Hyman: Class A  Transplant Status: patient declined    MRI 1/15/2025       Assessment and Plan     1. HCC (hepatocellular carcinoma)  -     Comprehensive Metabolic Panel; Future; Expected date: 2025  -     Bilirubin, Direct; Future; Expected date: 2025  -     Protime-INR; Future; Expected date: 2025  -     CBC Auto Differential; Future; Expected date: 2025  -     IR Embolization for Tumor_Organ Ischemia; Future; Expected date: 2025  -     NM Liver Imaging Static PRE Y-90 Emboliz; Future; Expected date: 2025  -     NM Liver Imaging Static POST Y-90 Emboli; Future; Expected date: 2025  -     NM Spect/CT Single Area; Future; Expected date: 2025  -     NM Spect/CT Single Area; Future; Expected date: 2025  -     IR Embolization for Tumor_Organ Ischemia; Future; Expected date: 2025        Discussed case with Dr. Ritter. Explained to patient can offer treatment with radioembolization. Yttrium 90 Radioembolization discussed in detail with the patient including risks (including, but not limited to, pain, bleeding, infection, damage to nearby structures, and the need for additional procedures), benefits, potential complications, usual pre and post procedure course.  Discussed the need for initial Angiogram mapping study prior to scheduling the actual Radioembolization  procedure. Utilized images from the patient's chart, the internet, and illustrations to help explain procedure. The patient voices understanding and all questions have been answered.  The patient agrees to proceed as planned. Patient scheduled for mapping 4/10/2025. Pre-procedure handout with clinic phone number provided.

## 2025-03-27 NOTE — NURSING
Patient seen in IR clinic today.  Scheduled for upcoming IR procedure.  Pre-procedure instructions were reviewed with patient. No food after midnight.  You may drink clear liquids (sports drinks, clear juices) until 2 hours before arrival time.   Pt aware will need someone to provide transport home and monitor pt 8 hours post procedure.  No driving for at least 24 hours after procedure.    Pt verbalized understanding of all pre-procedure instructions.  Written instructions given at appointment today.

## 2025-03-29 ENCOUNTER — TELEPHONE (OUTPATIENT)
Dept: UROLOGY | Facility: CLINIC | Age: 70
End: 2025-03-29
Payer: MEDICARE

## 2025-03-29 NOTE — TELEPHONE ENCOUNTER
Pt was called and MRI was set up. Appt was also mailed to pt.   ----- Message from Nurse Coombs sent at 3/29/2025  8:43 AM CDT -----  Please note actions  ----- Message -----  From: Narciso Lara MD  Sent: 3/27/2025   7:01 PM CDT  To: Marco Stuart Staff    Please call and let the patient know that his PSA level is higher than normal for his age (4.5). I will order him a prostate MRI for further evaluation, as I discussed with him at his last visit.   Thanks  ----- Message -----  From: Lab, Background User  Sent: 3/22/2025   1:57 PM CDT  To: Narciso Lara MD

## 2025-04-03 ENCOUNTER — RESEARCH ENCOUNTER (OUTPATIENT)
Dept: RESEARCH | Facility: HOSPITAL | Age: 70
End: 2025-04-03
Payer: MEDICARE

## 2025-04-04 NOTE — PROGRESS NOTES
RESEARCH STUDY CONSENT ENCOUNTER  ORGAN TRANSPLANT  [facility] [address]    Study Title: Role of Tumor-Induced Immune Tolerance in the Patient Response to Locoregional Therapy: Implications in Assessment Risk of Hepatocellular Carcinoma Recurrence Following Liver Transplantation    IRB #: [id number]    IRB Approval Date: 6/8/2016    : Eduardo Li MD  Sub-investigator: Javier Mcmullen, PhD    Patient Number: TBD    Patient was scheduled for a Y90 Mapping on (date: 04/09/2025).     This study is an observational study to evaluate patients receiving transarterial chemoembolization (TACE) or transarterial radioembolization (TARE) therapy to define the link between tumor-elicited peripheral cell populations, and the risk of hepatocellular carcinoma (HCC) recurrence before orthotopic liver transplantation (OLT). [IRB 2016.131.B]      Present for discussion: Patient Only  Is LAR Consenting for Subject: YES/NO: No    Prior to the Informed Consent (IC) being signed, or any protocol required testing, procedure, or intervention being performed, the following was done or discussed with Froylan Borja:    Purpose of the Study, Qualifications to Participate: YES/NO: Yes  Study Design, Schedule and Procedures: YES/NO: Yes  Risks, Benefits, Alternative Treatments, Compensation and Costs: YES/NO: Yes  Confidentiality and HIPAA Authorization for Release of Medical Records for the research trial/subject's right/study related injury: YES/NO: Yes  Study related contact information: YES/NO: Yes  Voluntary Participation and Withdrawal from the research trial at any time: YES/NO: Yes  Patient has been offered the opportunity to ask questions regarding the study and all questions were answered satisfactorily: YES/NO: Yes  Patient verbalizes understanding of the study/procedures and agrees to participate: YES/NO: Yes  CRC and PI contact information given to patient:YES/NO: Yes  Verification the ICF was appropriately completed:  YES/NO: Yes  Signed copy given to patient: YES/NO: Yes  Copy in patient's chart and original uploaded to Epic: YES/NO: Yes    Research staff present during consent: Alan Martin (Consenter) and Jose Alarcon (Witness)    No study procedures (I.e. specimen collection) were performed before the informed consent was signed.     Specimens were collected in [facility] SSCU at the time of peripheral IV line placement: YES/NO: No  Specimens were collected in [facility] RAD IR: YES/NO: No      Alan Martin  Admin Research- Liver Transplant

## 2025-04-09 ENCOUNTER — TELEPHONE (OUTPATIENT)
Dept: INTERVENTIONAL RADIOLOGY/VASCULAR | Facility: HOSPITAL | Age: 70
End: 2025-04-09
Payer: MEDICARE

## 2025-04-09 ENCOUNTER — LAB VISIT (OUTPATIENT)
Dept: LAB | Facility: HOSPITAL | Age: 70
End: 2025-04-09
Payer: MEDICARE

## 2025-04-09 DIAGNOSIS — C22.0 HCC (HEPATOCELLULAR CARCINOMA): ICD-10-CM

## 2025-04-09 LAB
ABSOLUTE EOSINOPHIL (OHS): 0.42 K/UL
ABSOLUTE MONOCYTE (OHS): 0.74 K/UL (ref 0.3–1)
ABSOLUTE NEUTROPHIL COUNT (OHS): 4.77 K/UL (ref 1.8–7.7)
ALBUMIN SERPL BCP-MCNC: 3.6 G/DL (ref 3.5–5.2)
ALP SERPL-CCNC: 100 UNIT/L (ref 40–150)
ALT SERPL W/O P-5'-P-CCNC: 29 UNIT/L (ref 10–44)
ANION GAP (OHS): 6 MMOL/L (ref 8–16)
AST SERPL-CCNC: 29 UNIT/L (ref 11–45)
BASOPHILS # BLD AUTO: 0.11 K/UL
BASOPHILS NFR BLD AUTO: 1.4 %
BILIRUB DIRECT SERPL-MCNC: 0.3 MG/DL (ref 0.1–0.3)
BILIRUB SERPL-MCNC: 0.8 MG/DL (ref 0.1–1)
BUN SERPL-MCNC: 16 MG/DL (ref 8–23)
CALCIUM SERPL-MCNC: 9.1 MG/DL (ref 8.7–10.5)
CHLORIDE SERPL-SCNC: 104 MMOL/L (ref 95–110)
CO2 SERPL-SCNC: 28 MMOL/L (ref 23–29)
CREAT SERPL-MCNC: 0.9 MG/DL (ref 0.5–1.4)
ERYTHROCYTE [DISTWIDTH] IN BLOOD BY AUTOMATED COUNT: 13.1 % (ref 11.5–14.5)
GFR SERPLBLD CREATININE-BSD FMLA CKD-EPI: >60 ML/MIN/1.73/M2
GLUCOSE SERPL-MCNC: 109 MG/DL (ref 70–110)
HCT VFR BLD AUTO: 45.8 % (ref 40–54)
HGB BLD-MCNC: 14.8 GM/DL (ref 14–18)
IMM GRANULOCYTES # BLD AUTO: 0.08 K/UL (ref 0–0.04)
IMM GRANULOCYTES NFR BLD AUTO: 1 % (ref 0–0.5)
INR PPP: 1 (ref 0.8–1.2)
LYMPHOCYTES # BLD AUTO: 1.7 K/UL (ref 1–4.8)
MCH RBC QN AUTO: 32 PG (ref 27–31)
MCHC RBC AUTO-ENTMCNC: 32.3 G/DL (ref 32–36)
MCV RBC AUTO: 99 FL (ref 82–98)
NUCLEATED RBC (/100WBC) (OHS): 0 /100 WBC
PLATELET # BLD AUTO: 242 K/UL (ref 150–450)
PMV BLD AUTO: 9.5 FL (ref 9.2–12.9)
POTASSIUM SERPL-SCNC: 5.1 MMOL/L (ref 3.5–5.1)
PROT SERPL-MCNC: 7.2 GM/DL (ref 6–8.4)
PROTHROMBIN TIME: 10.9 SECONDS (ref 9–12.5)
RBC # BLD AUTO: 4.62 M/UL (ref 4.6–6.2)
RELATIVE EOSINOPHIL (OHS): 5.4 %
RELATIVE LYMPHOCYTE (OHS): 21.7 % (ref 18–48)
RELATIVE MONOCYTE (OHS): 9.5 % (ref 4–15)
RELATIVE NEUTROPHIL (OHS): 61 % (ref 38–73)
SODIUM SERPL-SCNC: 138 MMOL/L (ref 136–145)
WBC # BLD AUTO: 7.82 K/UL (ref 3.9–12.7)

## 2025-04-09 PROCEDURE — 85610 PROTHROMBIN TIME: CPT

## 2025-04-09 PROCEDURE — 36415 COLL VENOUS BLD VENIPUNCTURE: CPT

## 2025-04-09 PROCEDURE — 85025 COMPLETE CBC W/AUTO DIFF WBC: CPT

## 2025-04-09 PROCEDURE — 82248 BILIRUBIN DIRECT: CPT

## 2025-04-09 PROCEDURE — 82040 ASSAY OF SERUM ALBUMIN: CPT

## 2025-04-09 NOTE — NURSING
Pre-procedure call complete.  2 patient identifier used (name and ).  Pt instructed not to eat  anything after midnight the night before procedure.  Pt aware will need someone to provide transport home and monitor pt 8 hours post procedure.  No driving for 3 days after procedure.   Patient advised to take blood pressure, heart medications, with a sip of water morning of procedure.  Patient verbalized aware of which medications to take.  Do not take sleep medication (including OTC) and anxiety medication the night before procedure.  Arrival time and location given.  Expected length of stay reviewed.  Covid screening completed.  Pt verbalized understanding of all pre-procedure instructions.

## 2025-04-10 ENCOUNTER — HOSPITAL ENCOUNTER (OUTPATIENT)
Dept: RADIOLOGY | Facility: HOSPITAL | Age: 70
Discharge: HOME | End: 2025-04-10
Attending: FAMILY MEDICINE
Payer: MEDICARE

## 2025-04-10 ENCOUNTER — HOSPITAL ENCOUNTER (OUTPATIENT)
Dept: INTERVENTIONAL RADIOLOGY/VASCULAR | Facility: HOSPITAL | Age: 70
Discharge: HOME | End: 2025-04-10
Attending: FAMILY MEDICINE
Payer: MEDICARE

## 2025-04-10 VITALS
BODY MASS INDEX: 32.33 KG/M2 | RESPIRATION RATE: 16 BRPM | DIASTOLIC BLOOD PRESSURE: 82 MMHG | HEIGHT: 75 IN | SYSTOLIC BLOOD PRESSURE: 169 MMHG | WEIGHT: 260 LBS | HEART RATE: 77 BPM | OXYGEN SATURATION: 98 % | TEMPERATURE: 98 F

## 2025-04-10 DIAGNOSIS — C22.0 HCC (HEPATOCELLULAR CARCINOMA): ICD-10-CM

## 2025-04-10 DIAGNOSIS — G89.18 PAIN FOLLOWING SURGERY OR PROCEDURE: Primary | ICD-10-CM

## 2025-04-10 PROCEDURE — C1894 INTRO/SHEATH, NON-LASER: HCPCS

## 2025-04-10 PROCEDURE — C1769 GUIDE WIRE: HCPCS

## 2025-04-10 PROCEDURE — 78830 RP LOCLZJ TUM SPECT W/CT 1: CPT | Mod: 26,,, | Performed by: NUCLEAR MEDICINE

## 2025-04-10 PROCEDURE — C1729 CATH, DRAINAGE: HCPCS

## 2025-04-10 PROCEDURE — 25000003 PHARM REV CODE 250: Performed by: INTERNAL MEDICINE

## 2025-04-10 PROCEDURE — 78830 RP LOCLZJ TUM SPECT W/CT 1: CPT | Mod: TC

## 2025-04-10 PROCEDURE — C1760 CLOSURE DEV, VASC: HCPCS

## 2025-04-10 PROCEDURE — 63600175 PHARM REV CODE 636 W HCPCS: Performed by: STUDENT IN AN ORGANIZED HEALTH CARE EDUCATION/TRAINING PROGRAM

## 2025-04-10 PROCEDURE — C1887 CATHETER, GUIDING: HCPCS

## 2025-04-10 PROCEDURE — A9540 TC99M MAA: HCPCS | Performed by: FAMILY MEDICINE

## 2025-04-10 PROCEDURE — 78201 LIVER IMAGING STATIC ONLY: CPT | Mod: TC

## 2025-04-10 PROCEDURE — 25500020 PHARM REV CODE 255: Performed by: FAMILY MEDICINE

## 2025-04-10 PROCEDURE — 78201 LIVER IMAGING STATIC ONLY: CPT | Mod: 26,59,, | Performed by: NUCLEAR MEDICINE

## 2025-04-10 RX ORDER — MIDAZOLAM HYDROCHLORIDE 1 MG/ML
INJECTION, SOLUTION INTRAMUSCULAR; INTRAVENOUS
Status: COMPLETED | OUTPATIENT
Start: 2025-04-10 | End: 2025-04-10

## 2025-04-10 RX ORDER — LIDOCAINE HYDROCHLORIDE 10 MG/ML
INJECTION, SOLUTION EPIDURAL; INFILTRATION; INTRACAUDAL; PERINEURAL
Status: COMPLETED | OUTPATIENT
Start: 2025-04-10 | End: 2025-04-10

## 2025-04-10 RX ORDER — OXYCODONE AND ACETAMINOPHEN 10; 325 MG/1; MG/1
1 TABLET ORAL EVERY 6 HOURS PRN
Qty: 5 TABLET | Refills: 0 | Status: SHIPPED | OUTPATIENT
Start: 2025-04-10

## 2025-04-10 RX ORDER — OXYCODONE AND ACETAMINOPHEN 5; 325 MG/1; MG/1
TABLET ORAL
Status: DISCONTINUED
Start: 2025-04-10 | End: 2025-04-11 | Stop reason: HOSPADM

## 2025-04-10 RX ORDER — LIDOCAINE HYDROCHLORIDE 10 MG/ML
1 INJECTION, SOLUTION EPIDURAL; INFILTRATION; INTRACAUDAL; PERINEURAL ONCE AS NEEDED
Status: DISCONTINUED | OUTPATIENT
Start: 2025-04-10 | End: 2025-04-11 | Stop reason: HOSPADM

## 2025-04-10 RX ORDER — SODIUM CHLORIDE 9 MG/ML
INJECTION, SOLUTION INTRAVENOUS CONTINUOUS
Status: DISCONTINUED | OUTPATIENT
Start: 2025-04-10 | End: 2025-04-11 | Stop reason: HOSPADM

## 2025-04-10 RX ORDER — FENTANYL CITRATE 50 UG/ML
INJECTION, SOLUTION INTRAMUSCULAR; INTRAVENOUS
Status: COMPLETED | OUTPATIENT
Start: 2025-04-10 | End: 2025-04-10

## 2025-04-10 RX ORDER — OXYCODONE AND ACETAMINOPHEN 5; 325 MG/1; MG/1
2 TABLET ORAL ONCE
Refills: 0 | Status: COMPLETED | OUTPATIENT
Start: 2025-04-10 | End: 2025-04-10

## 2025-04-10 RX ADMIN — FENTANYL CITRATE 50 MCG: 50 INJECTION, SOLUTION INTRAMUSCULAR; INTRAVENOUS at 08:04

## 2025-04-10 RX ADMIN — OXYCODONE HYDROCHLORIDE AND ACETAMINOPHEN 2 TABLET: 5; 325 TABLET ORAL at 04:04

## 2025-04-10 RX ADMIN — LIDOCAINE HYDROCHLORIDE 5 ML: 10 SOLUTION INTRAVENOUS at 08:04

## 2025-04-10 RX ADMIN — MIDAZOLAM HYDROCHLORIDE 0.5 MG: 2 INJECTION, SOLUTION INTRAMUSCULAR; INTRAVENOUS at 08:04

## 2025-04-10 RX ADMIN — MIDAZOLAM HYDROCHLORIDE 0.5 MG: 2 INJECTION, SOLUTION INTRAMUSCULAR; INTRAVENOUS at 09:04

## 2025-04-10 RX ADMIN — FENTANYL CITRATE 25 MCG: 50 INJECTION, SOLUTION INTRAMUSCULAR; INTRAVENOUS at 08:04

## 2025-04-10 RX ADMIN — FENTANYL CITRATE 25 MCG: 50 INJECTION, SOLUTION INTRAMUSCULAR; INTRAVENOUS at 09:04

## 2025-04-10 RX ADMIN — IOHEXOL 75 ML: 300 INJECTION, SOLUTION INTRAVENOUS at 09:04

## 2025-04-10 RX ADMIN — KIT FOR THE PREPARATION OF TECHNETIUM TC 99M ALBUMIN AGGREGATED 5.5 MILLICURIE: 2 INJECTION, POWDER, LYOPHILIZED, FOR SUSPENSION INTRAPERITONEAL; INTRAVENOUS at 10:04

## 2025-04-10 NOTE — CARE UPDATE
HOB elevated at 1602, dressing to right groin remains CDI, no redness or hematoma noted, patient attempting to get dressed with spouse's assistance, patient complains of right groin pain, rates pain 10/10, Percocet 5/325 mg (2) tabs given by mouth per Dr. Fernandez's verbal order, will continue to prepare patient for discharge.

## 2025-04-10 NOTE — BRIEF OP NOTE
Radiology Post-Procedure Note    Pre Op Diagnosis: hcc    Post Op Diagnosis: same    Procedure: angiogram and maa mapping      Procedure performed by: Dayan Marks MD    Written Informed Consent Obtained: Yes    Specimen Removed: NO    Estimated Blood Loss: less than 50     Findings:   Right CFA access, closed with manual compresion    Angiogram with maa delivery to the right hepatic lobe.     Patient tolerated procedure well.    Dayan Marks MD  Interventional Radiology      coffee

## 2025-04-10 NOTE — DISCHARGE INSTRUCTIONS
Y 90 Delivery Discharge Instructions    Monitor the groin site for bleeding. Apply firm pressure to the groin site if you need to cough, during sneezes, and in the event you have to vomit. This reduces the risk of your site bleeding. If bleeding does occurs, apply firm, manual pressure and seek medical assistance immediately!  Do not shower/bathe tonight. Shower tomorrow, at least 24 hours post-procedure. After your shower, you may remove the groin dressing.   Carefully cleanse the groin site with gentle soap and water; do not pick at any scab formation.  Monitor the groin site for signs and symptoms of infection (See below).  Stay three feet away from people, especially pregnant people, small pets, and small children for three days.  Sleep alone for the next three days.  You may use the restroom as other members of your home; your waste is not radioactive.    Recovering at Home:    Follow your doctor's recommendations on when it is safe to drive - at least THREE days after your procedure.  Do not lift anything heavier than a gallon of milk for the next TEN days.  Avoid strenuous activity/exercise for the next TEN days - this includes climbing stairs.   Do not submerge in a bathtub, pool, or Jacuzzi until the groin site is fully closed - at least 14 days.  Rest often. You may be more tired than usual.  Take your medications exactly as directed. Do not skip doses. Note - some medications should not be resumed after this procedure to prevent any adverse interaction with the contrast dye, which may be harmful to your kidneys. Be sure to ask your healthcare team about any potential reactions and for guidance on what medications to hold.  Unless directed otherwise, drink six to eight glasses of water a day for the next TWO days to prevent dehydration and to help flush your body of the contrast dye used during your procedure.  Take your temperature and check the groin site for signs of infection - redness, swelling,  drainage, or warmth - every day for one week.    When to call your doctor:    Fever of 100.4°F (38°C) or higher, or as directed by your health care provider.  Sudden shortness breath or any bleeding, bruising, or a large area of swelling at the groin site.  Signs of an allergic reaction to the contrast dye: rash, nausea, vomiting, or trouble breathing.  Signs of infection at the groin site: increased pain, redness, swelling, warmth, or foul-smelling drainage.   Constant or increasing pain or numbness in your leg.  Your leg feels cold, looks blue; numbness and/or tingling in the leg, especially near the catheter insertion site.   Rapid, pounding, or irregular heartbeat.  Blood in your urine or black, tarry stools.    Interventional Radiology Clinic:   [phone], choose prompt 3, Monday - Friday, 8:00 am - 4:00 pm    [phone] After hours and on holidays. Ask to speak with the interventional radiologist on call.

## 2025-04-10 NOTE — CARE UPDATE
patient arrived to MPU 6 via stretcher in [facility], report received from GERDA Gonsalves, patient to remain flat until 1600, see chart for vital signs and assessment, will continue to monitor patient until recovery complete.

## 2025-04-10 NOTE — PLAN OF CARE
Pre-Y procedure completed. Patient tolerated well. Patient AAOx3, no distress noted, respirations even and unlabored, VSS. Right groin procedure site clean, dry, and intact; no bleeding or hematoma noted. Hemostasis 1000, per Dr. Marks, pt must lie flat x6 hours. Pt can sit up at 1600. Pedal pulse 2+ bilateral. Patient to be transferred to MPU bay 6 for 6 hours post-procedural recovery per MD, after nuc med scan. Report to be given at bedside to RN.

## 2025-04-10 NOTE — PROGRESS NOTES
Patient arrived to old ROCU at 1015 awaiting Nuc Med availability. Connected to bedside monitor - cardiac monitoring and continuous pulse oximetry applied. Call bell within reach, side rails raised x 2, bed locked and in lowest position. VSS. Patient in no acute distress. Denies any pain. Procedure site clean, dry, and intact; no bleeding or hematoma noted. Monitoring per post procedure guidelines in progress.   
[facility]cted to monitor - cardiac monitoring and continuous pulse oximetry applied, VSS. Patient in no acute distress.Denies any pain. Procedure site clean, dry, and intact; no bleeding or hematoma noted. Family notified of our current recovery progress and family will be escorted to MPU for remainder of recovery once pt arrives and is settled.   
Detail Level: Zone

## 2025-04-10 NOTE — H&P
Radiology History & Physical      SUBJECTIVE:     Chief Complaint: ab pain    History of Present Illness:  Froylan Borja is a 70 y.o. male with cirrhosis, biopsy proven HCC, Etoh abuse and HTN who presents for pre y 90 mapping.     Past Medical History:   Diagnosis Date    Alcohol abuse     Anxiety     Cirrhosis     Glaucoma     Hepatocellular carcinoma     History of hepatitis C, s/p successful Rx w/ SVR24 - 1/2017     genotype 1;  relapse following PegIFN+RBV+Victrelis; prior relapse following PegIFN+RBV S/p 12 weeks harvoni + RBV w/ SVR    Hypertension     Paresthesia     feet, bilaterally     Past Surgical History:   Procedure Laterality Date    COLONOSCOPY N/A 9/19/2024    Procedure: COLONOSCOPY;  Surgeon: Mo Patino MD;  Location: [facility] (4TH FLR);  Service: Endoscopy;  Laterality: N/A;  Ref by Dr. NIGHAT Tamez, PT/INR/CBC am procedure, Suprep, email - PC  9/16-lvm for pre call-tb-labs 8/22/24    ESOPHAGOGASTRODUODENOSCOPY N/A 9/19/2024    Procedure: EGD (ESOPHAGOGASTRODUODENOSCOPY);  Surgeon: Mo Patino MD;  Location: [facility] ([address] [place] [address]);  Service: Endoscopy;  Laterality: N/A;    LIVER BIOPSY         Home Meds:   Prior to Admission medications    Medication Sig Start Date End Date Taking? Authorizing Provider   amLODIPine (NORVASC) 2.5 MG tablet Take 1 tablet (2.5 mg total) by mouth once daily. 12/26/24 12/26/25 Yes Janki Tamez MD   buPROPion (WELLBUTRIN SR) 150 MG TBSR 12 hr tablet Take 1 tablet (150 mg total) by mouth 2 (two) times daily. 12/26/24 4/10/25 Yes Janki Tamez MD   gabapentin (NEURONTIN) 300 MG capsule One in AM and two at bedtime 12/26/24  Yes Janki Tamez MD   losartan (COZAAR) 100 MG tablet Take 1 tablet (100 mg total) by mouth once daily. 12/26/24  Yes Janki Tamez MD   multivitamin Tab Take 1 tablet by mouth once daily.  Patient not taking: Reported on 3/27/2025 1/3/24   Moy Cantu MD     Anticoagulants/Antiplatelets: no  anticoagulation    Allergies:   Review of patient's allergies indicates:   Allergen Reactions    Zoloft [sertraline] Other (See Comments)     hyponatremia     Sedation History:  no adverse reactions    Review of Systems:   Hematological: no known coagulopathies  Respiratory: no shortness of breath  Cardiovascular: no chest pain  Gastrointestinal: no abdominal pain  Genito-Urinary: no dysuria  Musculoskeletal: negative  Neurological: no TIA or stroke symptoms         OBJECTIVE:     Vital Signs (Most Recent)  Temp: 98.1 °F (36.7 °C) (04/10/25 0707)  Pulse: 80 (04/10/25 0707)  Resp: 16 (04/10/25 0707)  BP: (!) 184/92 (04/10/25 0708)  SpO2: 95 % (04/10/25 0707)    Physical Exam:  ASA: 2  Mallampati: 2    General: no acute distress  Mental Status: alert and oriented to person, place and time  HEENT: normocephalic, atraumatic  Chest: unlabored breathing  Heart: regular heart rate  Abdomen: nondistended  Extremity: moves all extremities    Laboratory  Lab Results   Component Value Date    INR 1.0 04/09/2025       Lab Results   Component Value Date    WBC 7.82 04/09/2025    HGB 14.8 04/09/2025    HCT 45.8 04/09/2025    MCV 99 (H) 04/09/2025     04/09/2025      Lab Results   Component Value Date     (H) 01/15/2025     04/09/2025    K 5.1 04/09/2025     04/09/2025    CO2 28 04/09/2025    BUN 16 04/09/2025    CREATININE 0.9 04/09/2025    CALCIUM 9.1 04/09/2025    MG 1.8 12/18/2023    ALT 29 04/09/2025    AST 29 04/09/2025    ALBUMIN 3.6 04/09/2025    BILITOT 0.8 04/09/2025    BILIDIR 0.3 04/09/2025     MRI 1/15/2025      ASSESSMENT/PLAN:     Sedation Plan: up to moderate  Patient will undergo pre y 90 mapping.    Tre Alejandra MD  Radiology PGY-2

## 2025-04-10 NOTE — PLAN OF CARE
Pt arrived to IR Room 188 for pre-yttrium. Pt oriented to unit and staff. Plan of care reviewed with patient, patient verbalizes understanding. Comfort measures utilized. Pt safely transferred from stretcher to procedural table. Fall risk reviewed with patient, fall risk interventions maintained. Safety strap applied, positioner pillows utilized to minimize pressure points. Blankets applied. Pt prepped and draped utilizing standard sterile technique. Patient placed on continuous monitoring, as required by sedation policy. Timeouts completed utilizing standard universal time-out, per department and facility policy. RN to remain at bedside, continuous monitoring maintained. Pt resting comfortably. Denies pain/discomfort. Will continue to monitor. See flow sheets for monitoring, medication administration, and updates.

## 2025-04-10 NOTE — CARE UPDATE
6 hour post procedure monitoring complete at this time, dressing to right groin remains CDI, no redness or hematoma noted at this time, discharge instructions reviewed with patient and spouse, educational handouts/AVS also provided for reference, IV to left forearm removed without difficulty, catheter tip intact, patient transported to [facility] via wheelchair in [facility].

## 2025-04-15 ENCOUNTER — TELEPHONE (OUTPATIENT)
Dept: INTERVENTIONAL RADIOLOGY/VASCULAR | Facility: CLINIC | Age: 70
End: 2025-04-15
Payer: MEDICARE

## 2025-04-15 ENCOUNTER — TELEPHONE (OUTPATIENT)
Dept: INTERVENTIONAL RADIOLOGY/VASCULAR | Facility: HOSPITAL | Age: 70
End: 2025-04-15
Payer: MEDICARE

## 2025-04-15 DIAGNOSIS — G89.18 PAIN FOLLOWING SURGERY OR PROCEDURE: Primary | ICD-10-CM

## 2025-04-15 NOTE — NURSING
Patient called clinic, s/p Y-90 mapping on Thurs 4/10. Reports bruising and soreness @ site,site is tender and bruising in groin extends outside of R groin into R leg. Onset of symptoms couple days ago.  No new bruising noted today.  Dr. Marks made aware of symptoms, Patient scheduled for Ultrasound tomorrow.

## 2025-04-17 PROCEDURE — 99285 EMERGENCY DEPT VISIT HI MDM: CPT | Mod: HCNC

## 2025-04-18 ENCOUNTER — HOSPITAL ENCOUNTER (INPATIENT)
Facility: HOSPITAL | Age: 70
LOS: 25 days | Discharge: HOME HEALTH | DRG: 264 | End: 2025-05-13
Attending: STUDENT IN AN ORGANIZED HEALTH CARE EDUCATION/TRAINING PROGRAM
Payer: MEDICARE

## 2025-04-18 DIAGNOSIS — I72.4 PSEUDOANEURYSM OF FEMORAL ARTERY: ICD-10-CM

## 2025-04-18 DIAGNOSIS — G89.18 PAIN FOLLOWING SURGERY OR PROCEDURE: ICD-10-CM

## 2025-04-18 DIAGNOSIS — E87.5 HYPERKALEMIA: ICD-10-CM

## 2025-04-18 DIAGNOSIS — T14.8XXA HEMATOMA: Primary | ICD-10-CM

## 2025-04-18 DIAGNOSIS — R07.9 CHEST PAIN: ICD-10-CM

## 2025-04-18 DIAGNOSIS — M79.89 RIGHT LEG SWELLING: ICD-10-CM

## 2025-04-18 DIAGNOSIS — R01.1 SYSTOLIC MURMUR: ICD-10-CM

## 2025-04-18 DIAGNOSIS — M79.604 PAIN OF RIGHT LOWER EXTREMITY: ICD-10-CM

## 2025-04-18 DIAGNOSIS — S81.801A WOUND OF RIGHT LOWER EXTREMITY, INITIAL ENCOUNTER: ICD-10-CM

## 2025-04-18 DIAGNOSIS — S81.801A NON-HEALING WOUND OF LOWER EXTREMITY, RIGHT, INITIAL ENCOUNTER: ICD-10-CM

## 2025-04-18 DIAGNOSIS — S80.11XA LEG HEMATOMA, RIGHT, INITIAL ENCOUNTER: ICD-10-CM

## 2025-04-18 DIAGNOSIS — I82.411 THROMBOSIS OF RIGHT FEMORAL VEIN: ICD-10-CM

## 2025-04-18 DIAGNOSIS — R53.81 DEBILITY: ICD-10-CM

## 2025-04-18 PROBLEM — I82.401 ACUTE DEEP VEIN THROMBOSIS (DVT) OF RIGHT LOWER EXTREMITY: Status: ACTIVE | Noted: 2025-04-18

## 2025-04-18 LAB
ABSOLUTE EOSINOPHIL (OHS): 0.06 K/UL
ABSOLUTE EOSINOPHIL (OHS): 0.07 K/UL
ABSOLUTE EOSINOPHIL (OHS): 0.32 K/UL
ABSOLUTE MONOCYTE (OHS): 0.87 K/UL (ref 0.3–1)
ABSOLUTE MONOCYTE (OHS): 0.88 K/UL (ref 0.3–1)
ABSOLUTE MONOCYTE (OHS): 0.91 K/UL (ref 0.3–1)
ABSOLUTE NEUTROPHIL COUNT (OHS): 5.14 K/UL (ref 1.8–7.7)
ABSOLUTE NEUTROPHIL COUNT (OHS): 6.71 K/UL (ref 1.8–7.7)
ABSOLUTE NEUTROPHIL COUNT (OHS): 8.65 K/UL (ref 1.8–7.7)
ALBUMIN SERPL BCP-MCNC: 3.4 G/DL (ref 3.5–5.2)
ALP SERPL-CCNC: 93 UNIT/L (ref 40–150)
ALT SERPL W/O P-5'-P-CCNC: 24 UNIT/L (ref 10–44)
ANION GAP (OHS): 10 MMOL/L (ref 8–16)
ANION GAP (OHS): 8 MMOL/L (ref 8–16)
APTT PPP: 24.9 SECONDS (ref 21–32)
AST SERPL-CCNC: 37 UNIT/L (ref 11–45)
BASOPHILS # BLD AUTO: 0.09 K/UL
BASOPHILS # BLD AUTO: 0.09 K/UL
BASOPHILS # BLD AUTO: 0.1 K/UL
BASOPHILS NFR BLD AUTO: 0.9 %
BASOPHILS NFR BLD AUTO: 1 %
BASOPHILS NFR BLD AUTO: 1.3 %
BILIRUB SERPL-MCNC: 0.7 MG/DL (ref 0.1–1)
BUN SERPL-MCNC: 14 MG/DL (ref 8–23)
BUN SERPL-MCNC: 14 MG/DL (ref 8–23)
CALCIUM SERPL-MCNC: 8.6 MG/DL (ref 8.7–10.5)
CALCIUM SERPL-MCNC: 8.6 MG/DL (ref 8.7–10.5)
CHLORIDE SERPL-SCNC: 101 MMOL/L (ref 95–110)
CHLORIDE SERPL-SCNC: 99 MMOL/L (ref 95–110)
CK SERPL-CCNC: 100 U/L (ref 20–200)
CO2 SERPL-SCNC: 20 MMOL/L (ref 23–29)
CO2 SERPL-SCNC: 22 MMOL/L (ref 23–29)
CREAT SERPL-MCNC: 0.9 MG/DL (ref 0.5–1.4)
CREAT SERPL-MCNC: 1.1 MG/DL (ref 0.5–1.4)
CRP SERPL-MCNC: 3.9 MG/L
ERYTHROCYTE [DISTWIDTH] IN BLOOD BY AUTOMATED COUNT: 13.2 % (ref 11.5–14.5)
GFR SERPLBLD CREATININE-BSD FMLA CKD-EPI: >60 ML/MIN/1.73/M2
GFR SERPLBLD CREATININE-BSD FMLA CKD-EPI: >60 ML/MIN/1.73/M2
GLUCOSE SERPL-MCNC: 104 MG/DL (ref 70–110)
GLUCOSE SERPL-MCNC: 136 MG/DL (ref 70–110)
HCT VFR BLD AUTO: 36.3 % (ref 40–54)
HCT VFR BLD AUTO: 38.6 % (ref 40–54)
HCT VFR BLD AUTO: 38.7 % (ref 40–54)
HCV AB SERPL QL IA: REACTIVE
HGB BLD-MCNC: 12 GM/DL (ref 14–18)
HGB BLD-MCNC: 12.7 GM/DL (ref 14–18)
HGB BLD-MCNC: 13 GM/DL (ref 14–18)
HIV 1+2 AB+HIV1 P24 AG SERPL QL IA: NORMAL
IMM GRANULOCYTES # BLD AUTO: 0.09 K/UL (ref 0–0.04)
IMM GRANULOCYTES # BLD AUTO: 0.09 K/UL (ref 0–0.04)
IMM GRANULOCYTES # BLD AUTO: 0.11 K/UL (ref 0–0.04)
IMM GRANULOCYTES NFR BLD AUTO: 1 % (ref 0–0.5)
IMM GRANULOCYTES NFR BLD AUTO: 1 % (ref 0–0.5)
IMM GRANULOCYTES NFR BLD AUTO: 1.2 % (ref 0–0.5)
INR PPP: 1 (ref 0.8–1.2)
INR PPP: 1 (ref 0.8–1.2)
LACTATE SERPL-SCNC: 1.6 MMOL/L (ref 0.5–2.2)
LYMPHOCYTES # BLD AUTO: 0.74 K/UL (ref 1–4.8)
LYMPHOCYTES # BLD AUTO: 0.91 K/UL (ref 1–4.8)
LYMPHOCYTES # BLD AUTO: 1.26 K/UL (ref 1–4.8)
MCH RBC QN AUTO: 32.4 PG (ref 27–31)
MCH RBC QN AUTO: 32.5 PG (ref 27–31)
MCH RBC QN AUTO: 32.6 PG (ref 27–31)
MCHC RBC AUTO-ENTMCNC: 32.8 G/DL (ref 32–36)
MCHC RBC AUTO-ENTMCNC: 33.1 G/DL (ref 32–36)
MCHC RBC AUTO-ENTMCNC: 33.7 G/DL (ref 32–36)
MCV RBC AUTO: 96 FL (ref 82–98)
MCV RBC AUTO: 98 FL (ref 82–98)
MCV RBC AUTO: 99 FL (ref 82–98)
NUCLEATED RBC (/100WBC) (OHS): 0 /100 WBC
OHS QRS DURATION: 82 MS
OHS QTC CALCULATION: 437 MS
PLATELET # BLD AUTO: 189 K/UL (ref 150–450)
PLATELET # BLD AUTO: 203 K/UL (ref 150–450)
PLATELET # BLD AUTO: 206 K/UL (ref 150–450)
PMV BLD AUTO: 9.1 FL (ref 9.2–12.9)
PMV BLD AUTO: 9.2 FL (ref 9.2–12.9)
PMV BLD AUTO: 9.5 FL (ref 9.2–12.9)
POCT GLUCOSE: 138 MG/DL (ref 70–110)
POTASSIUM SERPL-SCNC: 4.5 MMOL/L (ref 3.5–5.1)
POTASSIUM SERPL-SCNC: 5.7 MMOL/L (ref 3.5–5.1)
PROT SERPL-MCNC: 6.9 GM/DL (ref 6–8.4)
PROTHROMBIN TIME: 10.9 SECONDS (ref 9–12.5)
PROTHROMBIN TIME: 11 SECONDS (ref 9–12.5)
RBC # BLD AUTO: 3.69 M/UL (ref 4.6–6.2)
RBC # BLD AUTO: 3.9 M/UL (ref 4.6–6.2)
RBC # BLD AUTO: 4.01 M/UL (ref 4.6–6.2)
RELATIVE EOSINOPHIL (OHS): 0.6 %
RELATIVE EOSINOPHIL (OHS): 0.8 %
RELATIVE EOSINOPHIL (OHS): 4.1 %
RELATIVE LYMPHOCYTE (OHS): 10.4 % (ref 18–48)
RELATIVE LYMPHOCYTE (OHS): 16.2 % (ref 18–48)
RELATIVE LYMPHOCYTE (OHS): 7 % (ref 18–48)
RELATIVE MONOCYTE (OHS): 10.4 % (ref 4–15)
RELATIVE MONOCYTE (OHS): 11.3 % (ref 4–15)
RELATIVE MONOCYTE (OHS): 8.3 % (ref 4–15)
RELATIVE NEUTROPHIL (OHS): 65.9 % (ref 38–73)
RELATIVE NEUTROPHIL (OHS): 76.4 % (ref 38–73)
RELATIVE NEUTROPHIL (OHS): 82.2 % (ref 38–73)
SODIUM SERPL-SCNC: 129 MMOL/L (ref 136–145)
SODIUM SERPL-SCNC: 131 MMOL/L (ref 136–145)
WBC # BLD AUTO: 10.52 K/UL (ref 3.9–12.7)
WBC # BLD AUTO: 7.79 K/UL (ref 3.9–12.7)
WBC # BLD AUTO: 8.78 K/UL (ref 3.9–12.7)

## 2025-04-18 PROCEDURE — 25000003 PHARM REV CODE 250: Mod: HCNC

## 2025-04-18 PROCEDURE — 36415 COLL VENOUS BLD VENIPUNCTURE: CPT | Mod: HCNC

## 2025-04-18 PROCEDURE — 87389 HIV-1 AG W/HIV-1&-2 AB AG IA: CPT | Mod: HCNC | Performed by: PHYSICIAN ASSISTANT

## 2025-04-18 PROCEDURE — 63600175 PHARM REV CODE 636 W HCPCS: Mod: HCNC | Performed by: RADIOLOGY

## 2025-04-18 PROCEDURE — 93005 ELECTROCARDIOGRAM TRACING: CPT | Mod: HCNC

## 2025-04-18 PROCEDURE — 63600175 PHARM REV CODE 636 W HCPCS: Mod: HCNC | Performed by: EMERGENCY MEDICINE

## 2025-04-18 PROCEDURE — 25000242 PHARM REV CODE 250 ALT 637 W/ HCPCS: Mod: HCNC

## 2025-04-18 PROCEDURE — 83605 ASSAY OF LACTIC ACID: CPT | Mod: HCNC | Performed by: PHYSICIAN ASSISTANT

## 2025-04-18 PROCEDURE — 96374 THER/PROPH/DIAG INJ IV PUSH: CPT | Mod: HCNC

## 2025-04-18 PROCEDURE — 20600001 HC STEP DOWN PRIVATE ROOM: Mod: HCNC

## 2025-04-18 PROCEDURE — 85025 COMPLETE CBC W/AUTO DIFF WBC: CPT | Mod: HCNC

## 2025-04-18 PROCEDURE — 63600175 PHARM REV CODE 636 W HCPCS: Mod: HCNC | Performed by: PHYSICIAN ASSISTANT

## 2025-04-18 PROCEDURE — 96375 TX/PRO/DX INJ NEW DRUG ADDON: CPT | Mod: HCNC

## 2025-04-18 PROCEDURE — 94640 AIRWAY INHALATION TREATMENT: CPT | Mod: HCNC

## 2025-04-18 PROCEDURE — 63600175 PHARM REV CODE 636 W HCPCS: Mod: HCNC

## 2025-04-18 PROCEDURE — 93010 ELECTROCARDIOGRAM REPORT: CPT | Mod: HCNC,,, | Performed by: INTERNAL MEDICINE

## 2025-04-18 PROCEDURE — 0J9N0ZZ DRAINAGE OF RIGHT LOWER LEG SUBCUTANEOUS TISSUE AND FASCIA, OPEN APPROACH: ICD-10-PCS | Performed by: STUDENT IN AN ORGANIZED HEALTH CARE EDUCATION/TRAINING PROGRAM

## 2025-04-18 PROCEDURE — 85610 PROTHROMBIN TIME: CPT | Mod: HCNC | Performed by: PHYSICIAN ASSISTANT

## 2025-04-18 PROCEDURE — 87522 HEPATITIS C REVRS TRNSCRPJ: CPT | Mod: HCNC | Performed by: PHYSICIAN ASSISTANT

## 2025-04-18 PROCEDURE — 85610 PROTHROMBIN TIME: CPT | Mod: HCNC

## 2025-04-18 PROCEDURE — 86803 HEPATITIS C AB TEST: CPT | Mod: HCNC | Performed by: PHYSICIAN ASSISTANT

## 2025-04-18 PROCEDURE — 80053 COMPREHEN METABOLIC PANEL: CPT | Mod: HCNC | Performed by: PHYSICIAN ASSISTANT

## 2025-04-18 PROCEDURE — 96376 TX/PRO/DX INJ SAME DRUG ADON: CPT | Mod: HCNC

## 2025-04-18 PROCEDURE — 82550 ASSAY OF CK (CPK): CPT | Mod: HCNC | Performed by: PHYSICIAN ASSISTANT

## 2025-04-18 PROCEDURE — 85730 THROMBOPLASTIN TIME PARTIAL: CPT | Mod: HCNC

## 2025-04-18 PROCEDURE — 25500020 PHARM REV CODE 255: Mod: HCNC | Performed by: STUDENT IN AN ORGANIZED HEALTH CARE EDUCATION/TRAINING PROGRAM

## 2025-04-18 PROCEDURE — 85025 COMPLETE CBC W/AUTO DIFF WBC: CPT | Mod: HCNC | Performed by: PHYSICIAN ASSISTANT

## 2025-04-18 PROCEDURE — 86140 C-REACTIVE PROTEIN: CPT | Mod: HCNC | Performed by: PHYSICIAN ASSISTANT

## 2025-04-18 RX ORDER — IBUPROFEN 200 MG
16 TABLET ORAL
Status: DISCONTINUED | OUTPATIENT
Start: 2025-04-18 | End: 2025-05-13 | Stop reason: HOSPADM

## 2025-04-18 RX ORDER — ONDANSETRON 4 MG/1
8 TABLET, ORALLY DISINTEGRATING ORAL EVERY 8 HOURS PRN
Status: DISCONTINUED | OUTPATIENT
Start: 2025-04-18 | End: 2025-05-13 | Stop reason: HOSPADM

## 2025-04-18 RX ORDER — ACETAMINOPHEN 325 MG/1
650 TABLET ORAL EVERY 4 HOURS PRN
Status: DISCONTINUED | OUTPATIENT
Start: 2025-04-18 | End: 2025-04-19

## 2025-04-18 RX ORDER — PROMETHAZINE HYDROCHLORIDE 12.5 MG/1
25 TABLET ORAL EVERY 6 HOURS PRN
Status: DISCONTINUED | OUTPATIENT
Start: 2025-04-18 | End: 2025-05-13 | Stop reason: HOSPADM

## 2025-04-18 RX ORDER — HYDROMORPHONE HYDROCHLORIDE 2 MG/ML
1 INJECTION, SOLUTION INTRAMUSCULAR; INTRAVENOUS; SUBCUTANEOUS
Refills: 0 | Status: COMPLETED | OUTPATIENT
Start: 2025-04-18 | End: 2025-04-18

## 2025-04-18 RX ORDER — FOLIC ACID 1 MG/1
1 TABLET ORAL DAILY
Status: DISCONTINUED | OUTPATIENT
Start: 2025-04-18 | End: 2025-05-13 | Stop reason: HOSPADM

## 2025-04-18 RX ORDER — LORAZEPAM 1 MG/1
2 TABLET ORAL EVERY 4 HOURS PRN
Status: DISCONTINUED | OUTPATIENT
Start: 2025-04-18 | End: 2025-05-13 | Stop reason: HOSPADM

## 2025-04-18 RX ORDER — MORPHINE SULFATE 4 MG/ML
4 INJECTION, SOLUTION INTRAMUSCULAR; INTRAVENOUS
Refills: 0 | Status: COMPLETED | OUTPATIENT
Start: 2025-04-18 | End: 2025-04-18

## 2025-04-18 RX ORDER — GLUCAGON 1 MG
1 KIT INJECTION
Status: DISCONTINUED | OUTPATIENT
Start: 2025-04-18 | End: 2025-05-13 | Stop reason: HOSPADM

## 2025-04-18 RX ORDER — GABAPENTIN 300 MG/1
300 CAPSULE ORAL 2 TIMES DAILY
Status: DISCONTINUED | OUTPATIENT
Start: 2025-04-18 | End: 2025-05-13 | Stop reason: HOSPADM

## 2025-04-18 RX ORDER — IPRATROPIUM BROMIDE AND ALBUTEROL SULFATE 2.5; .5 MG/3ML; MG/3ML
3 SOLUTION RESPIRATORY (INHALATION) EVERY 4 HOURS PRN
Status: DISCONTINUED | OUTPATIENT
Start: 2025-04-18 | End: 2025-05-13 | Stop reason: HOSPADM

## 2025-04-18 RX ORDER — CEFAZOLIN SODIUM 1 G/3ML
2 INJECTION, POWDER, FOR SOLUTION INTRAMUSCULAR; INTRAVENOUS
Status: COMPLETED | OUTPATIENT
Start: 2025-04-18 | End: 2025-04-18

## 2025-04-18 RX ORDER — IBUPROFEN 200 MG
24 TABLET ORAL
Status: DISCONTINUED | OUTPATIENT
Start: 2025-04-18 | End: 2025-05-13 | Stop reason: HOSPADM

## 2025-04-18 RX ORDER — ALBUTEROL SULFATE 2.5 MG/.5ML
10 SOLUTION RESPIRATORY (INHALATION) ONCE
Status: COMPLETED | OUTPATIENT
Start: 2025-04-18 | End: 2025-04-18

## 2025-04-18 RX ORDER — TALC
6 POWDER (GRAM) TOPICAL NIGHTLY PRN
Status: DISCONTINUED | OUTPATIENT
Start: 2025-04-18 | End: 2025-04-22

## 2025-04-18 RX ORDER — LOSARTAN POTASSIUM 50 MG/1
100 TABLET ORAL DAILY
Status: DISCONTINUED | OUTPATIENT
Start: 2025-04-18 | End: 2025-04-19

## 2025-04-18 RX ORDER — OXYCODONE HYDROCHLORIDE 5 MG/1
5 TABLET ORAL EVERY 6 HOURS PRN
Refills: 0 | Status: DISCONTINUED | OUTPATIENT
Start: 2025-04-18 | End: 2025-04-19

## 2025-04-18 RX ORDER — BISACODYL 10 MG/1
10 SUPPOSITORY RECTAL DAILY PRN
Status: DISCONTINUED | OUTPATIENT
Start: 2025-04-18 | End: 2025-05-13 | Stop reason: HOSPADM

## 2025-04-18 RX ORDER — ONDANSETRON HYDROCHLORIDE 2 MG/ML
4 INJECTION, SOLUTION INTRAVENOUS
Status: COMPLETED | OUTPATIENT
Start: 2025-04-18 | End: 2025-04-18

## 2025-04-18 RX ORDER — BUPROPION HYDROCHLORIDE 75 MG/1
150 TABLET ORAL 2 TIMES DAILY
Status: DISCONTINUED | OUTPATIENT
Start: 2025-04-18 | End: 2025-05-13 | Stop reason: HOSPADM

## 2025-04-18 RX ORDER — AMLODIPINE BESYLATE 2.5 MG/1
2.5 TABLET ORAL DAILY
Status: DISCONTINUED | OUTPATIENT
Start: 2025-04-18 | End: 2025-04-19

## 2025-04-18 RX ORDER — HEPARIN SODIUM,PORCINE/D5W 25000/250
0-40 INTRAVENOUS SOLUTION INTRAVENOUS CONTINUOUS
Status: DISCONTINUED | OUTPATIENT
Start: 2025-04-18 | End: 2025-04-20

## 2025-04-18 RX ORDER — SODIUM CHLORIDE 0.9 % (FLUSH) 0.9 %
10 SYRINGE (ML) INJECTION
Status: DISCONTINUED | OUTPATIENT
Start: 2025-04-18 | End: 2025-05-13 | Stop reason: HOSPADM

## 2025-04-18 RX ORDER — THIAMINE HCL 100 MG
100 TABLET ORAL DAILY
Status: DISCONTINUED | OUTPATIENT
Start: 2025-04-18 | End: 2025-05-13 | Stop reason: HOSPADM

## 2025-04-18 RX ADMIN — CEFAZOLIN 2 G: 330 INJECTION, POWDER, FOR SOLUTION INTRAMUSCULAR; INTRAVENOUS at 02:04

## 2025-04-18 RX ADMIN — BUPROPION HYDROCHLORIDE 150 MG: 75 TABLET, FILM COATED ORAL at 09:04

## 2025-04-18 RX ADMIN — HYDROMORPHONE HYDROCHLORIDE 1 MG: 2 INJECTION, SOLUTION INTRAMUSCULAR; INTRAVENOUS; SUBCUTANEOUS at 06:04

## 2025-04-18 RX ADMIN — SODIUM ZIRCONIUM CYCLOSILICATE 10 G: 10 POWDER, FOR SUSPENSION ORAL at 07:04

## 2025-04-18 RX ADMIN — MORPHINE SULFATE 4 MG: 4 INJECTION INTRAVENOUS at 05:04

## 2025-04-18 RX ADMIN — GABAPENTIN 300 MG: 300 CAPSULE ORAL at 09:04

## 2025-04-18 RX ADMIN — AMLODIPINE BESYLATE 2.5 MG: 2.5 TABLET ORAL at 09:04

## 2025-04-18 RX ADMIN — HEPARIN SODIUM AND DEXTROSE 18 UNITS/KG/HR: 10000; 5 INJECTION INTRAVENOUS at 02:04

## 2025-04-18 RX ADMIN — ALBUTEROL SULFATE 10 MG: 2.5 SOLUTION RESPIRATORY (INHALATION) at 08:04

## 2025-04-18 RX ADMIN — Medication 6 MG: at 09:04

## 2025-04-18 RX ADMIN — LOSARTAN POTASSIUM 100 MG: 50 TABLET, FILM COATED ORAL at 09:04

## 2025-04-18 RX ADMIN — THROMBIN, TOPICAL (BOVINE): KIT at 01:04

## 2025-04-18 RX ADMIN — ONDANSETRON 8 MG: 4 TABLET, ORALLY DISINTEGRATING ORAL at 09:04

## 2025-04-18 RX ADMIN — Medication 100 MG: at 09:04

## 2025-04-18 RX ADMIN — ONDANSETRON 4 MG: 2 INJECTION INTRAMUSCULAR; INTRAVENOUS at 05:04

## 2025-04-18 RX ADMIN — IOHEXOL 100 ML: 350 INJECTION, SOLUTION INTRAVENOUS at 04:04

## 2025-04-18 RX ADMIN — ACETAMINOPHEN 650 MG: 325 TABLET ORAL at 11:04

## 2025-04-18 RX ADMIN — HEPARIN SODIUM AND DEXTROSE 18 UNITS/KG/HR: 10000; 5 INJECTION INTRAVENOUS at 11:04

## 2025-04-18 RX ADMIN — FOLIC ACID 1 MG: 1 TABLET ORAL at 09:04

## 2025-04-18 RX ADMIN — MORPHINE SULFATE 4 MG: 4 INJECTION INTRAVENOUS at 02:04

## 2025-04-18 RX ADMIN — OXYCODONE 5 MG: 5 TABLET ORAL at 09:04

## 2025-04-18 RX ADMIN — Medication 1 TABLET: at 09:04

## 2025-04-18 NOTE — ASSESSMENT & PLAN NOTE
Hyperkalemia is likely due to unclear.The patients most recent potassium results are listed below.  Recent Labs     04/18/25  0112   K 5.7*     Plan  - Monitor for arrhythmias with EKG and/or continuous telemetry.   - Treat the hyperkalemia with Potassium Binders.   - Monitor potassium: Daily  - The patient's hyperkalemia is stable  - BMP recheck this afternoon

## 2025-04-18 NOTE — ASSESSMENT & PLAN NOTE
Body mass index is 32.5 kg/m². Morbid obesity complicates all aspects of disease management from diagnostic modalities to treatment. Weight loss encouraged and health benefits explained to patient.

## 2025-04-18 NOTE — ASSESSMENT & PLAN NOTE
Hyponatremia is likely due to Cirrhosis. The patient's most recent sodium results are listed below.  Recent Labs     04/18/25  0112   *     Plan  - Correct the sodium by 4-6mEq in 24 hours.   - Monitor sodium Daily.   - Patient hyponatremia is stable

## 2025-04-18 NOTE — ASSESSMENT & PLAN NOTE
- new swelling noted to RLE following IR procedure  - General surgery consulted- hematoma evacuation performed at bedside  - recommend admission to medicine for electrolyte abnormalities, anticoagulation for DVT, and interdisciplinary coordination  - ok from general surgery standpoint for anticoagulation; would reach out to vascular surgery prior to anticoagulation   - hematoma drained at bedside with evacuation of 120 cc of hematoma. Compressive dressing applied.   - skin may necrose in coming days. Will check in on him. Discussed that he may need a skin excision but hopefully his skin can recover

## 2025-04-18 NOTE — ASSESSMENT & PLAN NOTE
Froylan Borja is a 70 y.o. gentleman with HCC undergoing Y90 therapy with a RLE hematoma, R fem pseudoaneurysm, and a R sided DVT    - recommend admission to medicine for electrolyte abnormalities, anticoagulation for DVT, and interdisciplinary coordination  - ok from general surgery standpoint for anticoagulation; would reach out to vascular surgery prior to anticoagulation   - hematoma drained at bedside with evacuation of 120 cc of hematoma. Compressive dressing applied.   - skin may necrose in coming days. Will check in on him. Discussed that he may need a skin excision but hopefully his skin can recover  - daily dressing changes

## 2025-04-18 NOTE — CONSULTS
Radiology Consult    Froylan Borja is a 70 y.o. male with a history of HCC s/p Y90 mapping on 4/10 presenting with right groin pain, found to have a right CFA pseudoaneurysm on both US and CTA.    Past Medical History:   Diagnosis Date    Alcohol abuse     Anxiety     Cirrhosis     Glaucoma     Hepatocellular carcinoma     History of hepatitis C, s/p successful Rx w/ SVR24 - 1/2017     genotype 1;  relapse following PegIFN+RBV+Victrelis; prior relapse following PegIFN+RBV S/p 12 weeks harvoni + RBV w/ SVR    Hypertension     Paresthesia     feet, bilaterally     Past Surgical History:   Procedure Laterality Date    COLONOSCOPY N/A 9/19/2024    Procedure: COLONOSCOPY;  Surgeon: Mo Patino MD;  Location: [facility] ([address] [place] [address]);  Service: Endoscopy;  Laterality: N/A;  Ref by Dr. NIGHAT Tamez, PT/INR/CBC am procedure, Suprep, email - PC  9/16-[facility] for pre call-tb-labs 8/22/24    ESOPHAGOGASTRODUODENOSCOPY N/A 9/19/2024    Procedure: EGD (ESOPHAGOGASTRODUODENOSCOPY);  Surgeon: Mo Patino MD;  Location: [facility] ([address] [place] [address]);  Service: Endoscopy;  Laterality: N/A;    LIVER BIOPSY           Scheduled Meds:    amLODIPine  2.5 mg Oral Daily    buPROPion  150 mg Oral BID    folic acid  1 mg Oral Daily    gabapentin  300 mg Oral BID    losartan  100 mg Oral Daily    multivitamin  1 tablet Oral Daily    thiamine  100 mg Oral Daily    thrombin (bovine)   Topical (Top) Once     Continuous Infusions:   PRN Meds:  Current Facility-Administered Medications:     acetaminophen, 650 mg, Oral, Q4H PRN    albuterol-ipratropium, 3 mL, Nebulization, Q4H PRN    bisacodyL, 10 mg, Rectal, Daily PRN    dextrose 50%, 12.5 g, Intravenous, PRN    dextrose 50%, 25 g, Intravenous, PRN    glucagon (human recombinant), 1 mg, Intramuscular, PRN    glucose, 16 g, Oral, PRN    glucose, 24 g, Oral, PRN    LORazepam, 2 mg, Oral, Q4H PRN    melatonin, 6 mg, Oral, Nightly PRN    ondansetron, 8 mg, Oral, Q8H PRN    oxyCODONE, 5 mg, Oral, Q6H PRN     promethazine, 25 mg, Oral, Q6H PRN    sodium chloride 0.9%, 10 mL, Intravenous, PRN    Allergies:   Review of patient's allergies indicates:   Allergen Reactions    Zoloft [sertraline] Other (See Comments)     hyponatremia       Labs:  Recent Labs   Lab 04/18/25  0112   INR 1.0       Recent Labs   Lab 04/18/25  0112   WBC 7.79   HGB 13.0*   HCT 38.6*   MCV 96         Recent Labs   Lab 04/18/25  0112 04/18/25  0938   * 131*   K 5.7* 4.5    99   CO2 20* 22*   BUN 14 14   CREATININE 1.1 0.9   CALCIUM 8.6* 8.6*   ALT 24  --    AST 37  --    ALBUMIN 3.4*  --    BILITOT 0.7  --          Vitals (Most Recent):  Temp: 97.5 °F (36.4 °C) (04/18/25 1145)  Pulse: 72 (04/18/25 1145)  Resp: 16 (04/18/25 0815)  BP: (!) 174/76 (04/18/25 1145)  SpO2: 97 % (04/18/25 1145)    Plan:   1. Plan for right CFA PSA thrombin injection today under local anaesthesia.     Dimitry Cade MD  Interventional Radiology  Department of Radiology

## 2025-04-18 NOTE — ASSESSMENT & PLAN NOTE
Patient's blood pressure range in the last 24 hours was: BP  Min: 156/84  Max: 177/90.The patient's inpatient anti-hypertensive regimen is listed below:  Current Antihypertensives  amLODIPine tablet 2.5 mg, Daily, Oral  losartan tablet 100 mg, Daily, Oral    Plan  - BP is controlled, no changes needed to their regimen

## 2025-04-18 NOTE — ED PROVIDER NOTES
Encounter Date: 4/17/2025       History     Chief Complaint   Patient presents with    Leg Injury     Arrives by EMS, hit right calf on the car yesterday, now has a bruise.      70-year-old male with history of alcohol abuse, cirrhosis, hepatocellular carcinoma, hypertension presents to the emergency department with chief complaint of right leg pain and swelling.  Patient reports that he bumped his leg getting out of his car yesterday.  He had some pain and bruising in the area.  States that his pain significantly worsened tonight.  Pain woke him up from sleep. He is having trouble ambulating now secondary to the pain. States that he has tingling in the right foot. Also had a recent angiogram mapping study with IR. States that he has bruising in the groin but only minimal discomfort in the area. Denies chest pain or shortness of breath. Drinks alcohol daily. Had about 4-5 beers today. He denies worsening or alleviating factors.       Review of patient's allergies indicates:   Allergen Reactions    Zoloft [sertraline] Other (See Comments)     hyponatremia     Past Medical History:   Diagnosis Date    Alcohol abuse     Anxiety     Cirrhosis     Glaucoma     Hepatocellular carcinoma     History of hepatitis C, s/p successful Rx w/ SVR24 - 1/2017     genotype 1;  relapse following PegIFN+RBV+Victrelis; prior relapse following PegIFN+RBV S/p 12 weeks harvoni + RBV w/ SVR    Hypertension     Paresthesia     feet, bilaterally     Past Surgical History:   Procedure Laterality Date    COLONOSCOPY N/A 9/19/2024    Procedure: COLONOSCOPY;  Surgeon: Mo Patino MD;  Location: [facility] ([address] [place] [address]);  Service: Endoscopy;  Laterality: N/A;  Ref by Dr. NIGHAT Tamez, PT/INR/CBC am procedure, Suprep, email - PC  9/16-lvm for pre call-tb-labs 8/22/24    ESOPHAGOGASTRODUODENOSCOPY N/A 9/19/2024    Procedure: EGD (ESOPHAGOGASTRODUODENOSCOPY);  Surgeon: Mo Patino MD;  Location: [facility] (4TH FLR);  Service: Endoscopy;   Laterality: N/A;    LIVER BIOPSY       Family History   Problem Relation Name Age of Onset    Colonic polyp Mother 95     Cancer Mother 95         colon vs polyps, colectomy    Diabetes Mellitus Father      Hypertension Father      Cancer Father          ? CRC    Diabetes Father      Cancer Sister          ? CRC    Colonic polyp Brother      Cirrhosis Neg Hx       Social History[1]  Review of Systems   Musculoskeletal:  Positive for myalgias.       Physical Exam     Initial Vitals [04/18/25 0000]   BP Pulse Resp Temp SpO2   (!) 156/84 90 18 98.4 °F (36.9 °C) 98 %      MAP       --         Physical Exam    Vitals reviewed.  Constitutional: He appears well-developed and well-nourished. He is not diaphoretic. No distress.   HENT:   Head: Normocephalic and atraumatic. Mouth/Throat: Oropharynx is clear and moist.   Eyes: EOM are normal. Pupils are equal, round, and reactive to light.   Neck: Neck supple.   Normal range of motion.  Cardiovascular:  Normal rate, regular rhythm, normal heart sounds and intact distal pulses.     Exam reveals no gallop and no friction rub.       No murmur heard.  Pulmonary/Chest: Breath sounds normal. He has no wheezes. He has no rhonchi. He has no rales.   Abdominal: Abdomen is soft. Bowel sounds are normal. There is no abdominal tenderness.   Musculoskeletal:         General: Normal range of motion.      Cervical back: Normal range of motion and neck supple.      Comments: Diffuse ecchymosis noted to the right groin and upper thigh.  Large hematoma noted to the right calf.  Compartments are soft.  There is warmth, erythema, and induration surrounding the hematoma.  Full range of motion of the knee and ankle.  2+ DP pulse.  Sensation to light touch intact.     Neurological: He is alert and oriented to person, place, and time. He has normal strength. GCS score is 15. GCS eye subscore is 4. GCS verbal subscore is 5. GCS motor subscore is 6.   Skin: Skin is warm and dry. Capillary refill takes  less than 2 seconds.   Psychiatric: He has a normal mood and affect. His behavior is normal. Judgment and thought content normal.         ED Course   Procedures  Labs Reviewed   COMPREHENSIVE METABOLIC PANEL - Abnormal       Result Value    Sodium 129 (*)     Potassium 5.7 (*)     Chloride 101      CO2 20 (*)     Glucose 104      BUN 14      Creatinine 1.1      Calcium 8.6 (*)     Protein Total 6.9      Albumin 3.4 (*)     Bilirubin Total 0.7      ALP 93      AST 37      ALT 24      Anion Gap 8      eGFR >60     CBC WITH DIFFERENTIAL - Abnormal    WBC 7.79      RBC 4.01 (*)     HGB 13.0 (*)     HCT 38.6 (*)     MCV 96      MCH 32.4 (*)     MCHC 33.7      RDW 13.2      Platelet Count 189      MPV 9.5      Nucleated RBC 0      Neut % 65.9      Lymph % 16.2 (*)     Mono % 11.3      Eos % 4.1      Basophil % 1.3      Imm Grans % 1.2 (*)     Neut # 5.14      Lymph # 1.26      Mono # 0.88      Eos # 0.32      Baso # 0.10      Imm Grans # 0.09 (*)    C-REACTIVE PROTEIN - Normal    CRP 3.9     LACTIC ACID, PLASMA - Normal    Lactic Acid Level 1.6      Narrative:     Falsely low lactic acid results can be found in samples containing >=13.0 mg/dL total bilirubin and/or >=3.5 mg/dL direct bilirubin.    PROTIME-INR - Normal    PT 10.9      INR 1.0     CBC W/ AUTO DIFFERENTIAL    Narrative:     The following orders were created for panel order CBC auto differential.  Procedure                               Abnormality         Status                     ---------                               -----------         ------                     CBC with Differential[[id number]]       Abnormal            Final result                 Please view results for these tests on the individual orders.   HEPATITIS C ANTIBODY   HEP C VIRUS HOLD SPECIMEN   HIV 1 / 2 ANTIBODY   CK   ISTAT CHEM8          Imaging Results    None          Medications   ceFAZolin injection 2 g (has no administration in time range)   morphine injection 4 mg (4 mg  Intravenous Given 4/18/25 0214)     Medical Decision Making  Emergent evaluation of a 70 y.o. male presenting to the emergency department complaining of right leg pain. Patient is afebrile, hemodynamically stable, and non toxic appearing.   Will order labs, imaging, analgesia.    Differential diagnosis includes but isn't limited to hematoma, compartment syndrome, DVT.     H/H of 13/38.6.  Most recent hemoglobin was 14.8.  INR within normal limits.  Lactate 1.6.  CRP 3.9.  Sodium of 129.  Potassium of 5.7.  Will obtain EKG and recheck potassium with i-STAT.   Imaging studies pending.  Due to shift change, will sign out to oncoming ED provider who will continue to monitor until final disposition is reached.    The patient's history, physical exam, and plan of care was discussed with and agreed upon with my supervising physician.         Amount and/or Complexity of Data Reviewed  Labs: ordered. Decision-making details documented in ED Course.  Radiology: ordered.    Risk  Prescription drug management.               ED Course as of 04/18/25 0216 Fri Apr 18, 2025   0132 WBC: 7.79 [JM]   0132 Hemoglobin(!): 13.0 [JM]   0132 Hematocrit(!): 38.6 [JM]   0132 Platelet Count: 189 [JM]   0209 Sodium(!): 129 [JM]   0210 Potassium(!): 5.7 [JM]      ED Course User Index  [JM] Karla Hill PA-C                           Clinical Impression:  Final diagnoses:  [M79.89] Right leg swelling  [E87.5] Hyperkalemia  [T14.8XXA] Hematoma (Primary)                     [1]   Social History  Tobacco Use    Smoking status: Former     Types: Cigars    Smokeless tobacco: Never    Tobacco comments:     smokes cigars 20 per month   Substance Use Topics    Alcohol use: Yes     Alcohol/week: 28.0 standard drinks of alcohol     Types: 28 Cans of beer per week     Comment: 2-3 beers daily    Drug use: No        Karla Hill PA-C  04/18/25 0216

## 2025-04-18 NOTE — PLAN OF CARE
IR team arrived to pt room 8070, Opt AAO x4. Name//allergies/procedure verified. Pt will be monitored by RN & MD @ bedside throughout procedure.

## 2025-04-18 NOTE — ASSESSMENT & PLAN NOTE
- daily drinker, last drink 4/17  - 3-4 beers daily, denies withdrawal symptoms  - continue thiamine, folic acid and multivitamin  - CIWA per nursing

## 2025-04-18 NOTE — HPI
Mr. Froylan Borja is a 69 y/o male with a PMHx alcohol abuse, cirrhosis, hepatocellular carcinoma, and HTN who presents for complaint of R leg pain and swelling. He reports yesterday he hit his R leg on the car door and shortly developed a bruise and pain to the R lateral leg. He tried tylenol at-home though pain, swelling, and bruising continued to worsen. Admits to paresthesias and numbness to the R foot as well as significant R foot pain with weight-bearing. Denies CP, palpitations, SOB, lightheadedness, dizziness, headaches, LOC, vomiting, diarrhea, abdominal pain, fever, chills. Reports some nausea following morphine and dilaudid for pain in ED. Of note, patient had angiogram y90 mapping study done with IR last Thursday (4/10/25). Has developed some bruising to the medial thigh and mild groin discomfort since procedure. Social history significant for daily alcohol consumption, 3-4 beers daily, last alcoholic beverage yesterday around 3PM.     In the ED, AF HDS. H&H 13.0/38.6 (BL~14.8), no leukocytosis. HypoNa 129, HyperK 5.7, CO2 20, Ca 8.6, Albumin 3.4, remainder of CMP unremarkable. PT/INR wnl at 10.9/1.0. LA 1.6. CPK wnl. Hep C Ab reactive, HCV RNA pending. EKG with normal sinus rhythm. R Lower Ext U/S w/ findings of nonocclusive thrombus in the right common femoral vein and right common femoral artery pseudoaneurysm. CTA Lower Ext pending. R lower extremity hematoma drained at bedside by general surgery with evacuation of 120 cc of hematoma. Compressive dressing applied. Given nebulized albuterol, lokelma, IV cefazolin, IV dilaudid, IV morphine, IV zofran. Admitted to .

## 2025-04-18 NOTE — H&P
[facility] - Telemetry [facility] Medicine  History & Physical    Patient Name: Froylan Borja  MRN: [id number]  Patient Class: IP- Inpatient  Admission Date: 4/18/2025  Attending Physician: Danay Guzman MD   Primary Care Provider: Janki Tamez MD         Patient information was obtained from patient and ER records.     Subjective:     Principal Problem:Acute deep vein thrombosis (DVT) of right lower extremity    Chief Complaint:   Chief Complaint   Patient presents with    Leg Injury     Arrives by EMS, hit right calf on the car yesterday, now has a bruise.         HPI: Mr. Froylan Borja is a 71 y/o male with a PMHx alcohol abuse, cirrhosis, hepatocellular carcinoma, and HTN who presents for complaint of R leg pain and swelling. He reports yesterday he hit his R leg on the car door and shortly developed a bruise and pain to the R lateral leg. He tried tylenol at-home though pain, swelling, and bruising continued to worsen. Admits to paresthesias and numbness to the R foot as well as significant R foot pain with weight-bearing. Denies CP, palpitations, SOB, lightheadedness, dizziness, headaches, LOC, vomiting, diarrhea, abdominal pain, fever, chills. Reports some nausea following morphine and dilaudid for pain in ED. Of note, patient had angiogram y90 mapping study done with IR last Thursday (4/10/25). Has developed some bruising to the medial thigh and mild groin discomfort since procedure. Social history significant for daily alcohol consumption, 3-4 beers daily, last alcoholic beverage yesterday around 3PM.     In the ED, AF HDS. H&H 13.0/38.6 (BL~14.8), no leukocytosis. HypoNa 129, HyperK 5.7, CO2 20, Ca 8.6, Albumin 3.4, remainder of CMP unremarkable. PT/INR wnl at 10.9/1.0. LA 1.6. CPK wnl. Hep C Ab reactive, HCV RNA pending. EKG with normal sinus rhythm. R Lower Ext U/S w/ findings of nonocclusive thrombus in the right common femoral vein and right common femoral artery pseudoaneurysm. CTA Lower Ext  pending. R lower extremity hematoma drained at bedside by general surgery with evacuation of 120 cc of hematoma. Compressive dressing applied. Given nebulized albuterol, lokelma, IV cefazolin, IV dilaudid, IV morphine, IV zofran. Admitted to .     Past Medical History:   Diagnosis Date    Alcohol abuse     Anxiety     Cirrhosis     Glaucoma     Hepatocellular carcinoma     History of hepatitis C, s/p successful Rx w/ SVR24 - 1/2017     genotype 1;  relapse following PegIFN+RBV+Victrelis; prior relapse following PegIFN+RBV S/p 12 weeks harvoni + RBV w/ SVR    Hypertension     Paresthesia     feet, bilaterally       Past Surgical History:   Procedure Laterality Date    COLONOSCOPY N/A 9/19/2024    Procedure: COLONOSCOPY;  Surgeon: Mo Patino MD;  [facility]  Service: Endoscopy;  Laterality: N/A;  Ref by Dr. NIGHAT Tamez, PT/INR/CBC am procedure, Suprep, email - PC  9/16-lvm for pre call-tb-labs 8/22/24    ESOPHAGOGASTRODUODENOSCOPY N/A 9/19/2024    Procedure: EGD (ESOPHAGOGASTRODUODENOSCOPY);  Surgeon: Mo Patino MD;  Location: [facility] ([address] [place] [address]);  Service: Endoscopy;  Laterality: N/A;    LIVER BIOPSY         Review of patient's allergies indicates:   Allergen Reactions    Zoloft [sertraline] Other (See Comments)     hyponatremia       Current Facility-Administered Medications on File Prior to Encounter   Medication    [DISCONTINUED] kit for prep of Tc-99m-albumin 2.5 mg SolR 5 millicurie     Current Outpatient Medications on File Prior to Encounter   Medication Sig    amLODIPine (NORVASC) 2.5 MG tablet Take 1 tablet (2.5 mg total) by mouth once daily.    buPROPion (WELLBUTRIN SR) 150 MG TBSR 12 hr tablet Take 1 tablet (150 mg total) by mouth 2 (two) times daily.    gabapentin (NEURONTIN) 300 MG capsule One in AM and two at bedtime    losartan (COZAAR) 100 MG tablet Take 1 tablet (100 mg total) by mouth once daily.    multivitamin Tab Take 1 tablet by mouth once daily. (Patient  not taking: Reported on 3/27/2025)    oxyCODONE-acetaminophen (PERCOCET)  mg per tablet Take 1 tablet by mouth every 6 (six) hours as needed for Pain.     Family History       Problem Relation (Age of Onset)    Cancer Mother, Father, Sister    Colonic polyp Mother, Brother    Diabetes Father    Diabetes Mellitus Father    Hypertension Father          Tobacco Use    Smoking status: Former     Types: Cigars    Smokeless tobacco: Never    Tobacco comments:     smokes cigars 20 per month   Substance and Sexual Activity    Alcohol use: Yes     Alcohol/week: 28.0 standard drinks of alcohol     Types: 28 Cans of beer per week     Comment: 2-3 beers daily    Drug use: No    Sexual activity: Not on file     Review of Systems   Constitutional:  Negative for chills and fever.   Respiratory:  Negative for chest tightness and shortness of breath.    Cardiovascular:  Positive for leg swelling. Negative for chest pain.   Gastrointestinal:  Negative for abdominal pain, constipation, diarrhea, nausea and vomiting.   Genitourinary:  Negative for dysuria, frequency and urgency.   Musculoskeletal:  Positive for arthralgias and myalgias.   Skin:  Positive for color change and wound.   Neurological:  Positive for numbness. Negative for dizziness, weakness, light-headedness and headaches.     Objective:     Vital Signs (Most Recent):  Temp: 98.4 °F (36.9 °C) (04/18/25 0646)  Pulse: 78 (04/18/25 0815)  Resp: 16 (04/18/25 0815)  BP: (!) 177/90 (04/18/25 0646)  SpO2: 100 % (04/18/25 0815) Vital Signs (24h Range):  Temp:  [98.4 °F (36.9 °C)] 98.4 °F (36.9 °C)  Pulse:  [76-90] 78  Resp:  [16-20] 16  SpO2:  [98 %-100 %] 100 %  BP: (156-177)/(84-90) 177/90     Weight: 117.9 kg (260 lb)  Body mass index is 32.5 kg/m².     Physical Exam  Vitals and nursing note reviewed.   Constitutional:       General: He is not in acute distress.     Appearance: He is well-developed. He is not ill-appearing.   HENT:      Head: Normocephalic and atraumatic.    Eyes:      Pupils: Pupils are equal, round, and reactive to light.   Cardiovascular:      Rate and Rhythm: Normal rate and regular rhythm.   Pulmonary:      Effort: Pulmonary effort is normal. No respiratory distress.      Breath sounds: Normal breath sounds. No wheezing or rales.   Abdominal:      Palpations: Abdomen is soft.      Tenderness: There is no abdominal tenderness.   Musculoskeletal:      Right lower leg: Edema present.      Left lower leg: No edema.      Comments: Sensation intact  RLE dressed per General surgery s/p hematoma evacuation- saturated dressing to lateral-posterior side   Skin:     General: Skin is warm and dry.   Neurological:      Mental Status: He is alert and oriented to person, place, and time.   Psychiatric:         Behavior: Behavior normal.              CRANIAL NERVES     CN III, IV, VI   Pupils are equal, round, and reactive to light.       Significant Labs: All pertinent labs within the past 24 hours have been reviewed.  BMP:   Recent Labs   Lab 04/18/25  0112   *   K 5.7*      CO2 20*   BUN 14   CREATININE 1.1   CALCIUM 8.6*     CBC:   Recent Labs   Lab 04/18/25  0112   WBC 7.79   HGB 13.0*   HCT 38.6*          Significant Imaging: I have reviewed all pertinent imaging results/findings within the past 24 hours.    Imaging Results              CTA Lower Extremity (In process)  Result time 04/18/25 08:50:43                      US Lower Extremity Veins Right (Final result)  Result time 04/18/25 04:39:12      Final result by Esvin Sparrow MD (04/18/25 04:39:12)                   Impression:      Findings suggesting nonocclusive thrombus in the right common femoral vein.    Right common femoral artery pseudoaneurysm, as above.    This report was flagged in Epic as abnormal.    This finding was discovered by and communicated by Rajeev Cordoba MD to Shakira Sebastian MD via secure chat at [phone] on 04/18/2025.    Electronically signed by resident: Rajeev  Adalid  Date:    04/18/2025  Time:    04:15    Electronically signed by: Esvin Sparrow MD  Date:    04/18/2025  Time:    04:39               Narrative:    EXAMINATION:  US LOWER EXTREMITY VEINS RIGHT    CLINICAL HISTORY:  Other specified soft tissue disorders    TECHNIQUE:  Duplex and color flow Doppler evaluation and graded compression of the right lower extremity veins was performed.    COMPARISON:  Lower extremity vein ultrasound 02/04/2022, CTA right lower extremity 04/18/2025    FINDINGS:  Right thigh veins: The right common femoral vein is noncompressible but demonstrates low frequency color Doppler flow suggesting slow flow.  Findings may represent nonocclusive thrombus.    The superficial femoral, popliteal, upper greater saphenous, and deep femoral veins are patent and free of thrombus. The veins are normally compressible and have normal phasic flow and augmentation response.    Right calf veins: The visualized calf veins are patent.    Contralateral CFV: The contralateral (left) common femoral vein is patent and free of thrombus.    Miscellaneous: Focal outpouching at the proximal right common femoral artery with bidirectional flow suggesting pseudoaneurysm with approximately 3 mm neck.                                      Assessment/Plan:     Assessment & Plan  Acute deep vein thrombosis (DVT) of right lower extremity  Pain of R lower extremity   - RLE US showed a nonocclusive thrombus in the right common femoral vein   - will need to start anticoagulation, cleared by Gen Surg/ awaiting clearance from IR before proceeding   - will start heparin gtt once able then switch to PO DOAC if able to tolerate anticoagulation   - MM pain regimen   Essential hypertension  Patient's blood pressure range in the last 24 hours was: BP  Min: 156/84  Max: 177/90.The patient's inpatient anti-hypertensive regimen is listed below:  Current Antihypertensives  amLODIPine tablet 2.5 mg, Daily, Oral  losartan tablet 100 mg,  Daily, Oral    Plan  - BP is controlled, no changes needed to their regimen  Alcohol use disorder, severe, dependence  - daily drinker, last drink 4/17  - 3-4 beers daily, denies withdrawal symptoms  - continue thiamine, folic acid and multivitamin  - CIWA per nursing   Obesity (BMI 30-39.9)  Body mass index is 32.5 kg/m². Morbid obesity complicates all aspects of disease management from diagnostic modalities to treatment. Weight loss encouraged and health benefits explained to patient.   Hyponatremia  Hyponatremia is likely due to Cirrhosis. The patient's most recent sodium results are listed below.  Recent Labs     04/18/25 0112   *     Plan  - Correct the sodium by 4-6mEq in 24 hours.   - Monitor sodium Daily.   - Patient hyponatremia is stable  Hematoma  - new swelling noted to RLE following IR procedure  - General surgery consulted- hematoma evacuation performed at bedside  - recommend admission to medicine for electrolyte abnormalities, anticoagulation for DVT, and interdisciplinary coordination  - ok from general surgery standpoint for anticoagulation; would reach out to vascular surgery prior to anticoagulation   - hematoma drained at bedside with evacuation of 120 cc of hematoma. Compressive dressing applied.   - skin may necrose in coming days. Will check in on him. Discussed that he may need a skin excision but hopefully his skin can recover  Pseudoaneurysm of femoral artery  - RLE US showed a new right common femoral artery pseudoaneurysm   - CTA RLE pending  - Vascular Surgery consulted- recommend IR consult given recent procedure/ possible thrombin injection  - IR consulted, appreciate assistance   Hyperkalemia  Hyperkalemia is likely due to  unclear .The patients most recent potassium results are listed below.  Recent Labs     04/18/25 0112   K 5.7*     Plan  - Monitor for arrhythmias with EKG and/or continuous telemetry.   - Treat the hyperkalemia with Potassium Binders.   - Monitor  potassium: Daily  - The patient's hyperkalemia is stable  - BMP recheck this afternoon  VTE Risk Mitigation (From admission, onward)           Ordered     IP VTE HIGH RISK PATIENT  Once         04/18/25 0725     Reason for No Pharmacological VTE Prophylaxis  Once        Question:  Reasons:  Answer:  Risk of Bleeding  Comment:  will start anticoagulation once medically cleared    04/18/25 0725                       This patient was reviewed in collaboration with Dr. Danay Guzman.              Brittney Trujillo PA-C  Department of Hospital Medicine  [facility] - Telemetry Stepdown

## 2025-04-18 NOTE — ED TRIAGE NOTES
Froylan Borja, a 70 y.o. male presents to the ED w/ complaint of leg injury. Pt arrives via EMS with complaint of hitting R calf on his car yesterday. + bruising, redness and swelling to R calf.

## 2025-04-18 NOTE — SUBJECTIVE & OBJECTIVE
Current Facility-Administered Medications on File Prior to Encounter   Medication    [DISCONTINUED] kit for prep of Tc-99m-albumin 2.5 mg SolR 5 millicurie     Current Outpatient Medications on File Prior to Encounter   Medication Sig    amLODIPine (NORVASC) 2.5 MG tablet Take 1 tablet (2.5 mg total) by mouth once daily.    buPROPion (WELLBUTRIN SR) 150 MG TBSR 12 hr tablet Take 1 tablet (150 mg total) by mouth 2 (two) times daily.    gabapentin (NEURONTIN) 300 MG capsule One in AM and two at bedtime    losartan (COZAAR) 100 MG tablet Take 1 tablet (100 mg total) by mouth once daily.    multivitamin Tab Take 1 tablet by mouth once daily. (Patient not taking: Reported on 3/27/2025)    oxyCODONE-acetaminophen (PERCOCET)  mg per tablet Take 1 tablet by mouth every 6 (six) hours as needed for Pain.       Review of patient's allergies indicates:   Allergen Reactions    Zoloft [sertraline] Other (See Comments)     hyponatremia       Past Medical History:   Diagnosis Date    Alcohol abuse     Anxiety     Cirrhosis     Glaucoma     Hepatocellular carcinoma     History of hepatitis C, s/p successful Rx w/ SVR24 - 1/2017     genotype 1;  relapse following PegIFN+RBV+Victrelis; prior relapse following PegIFN+RBV S/p 12 weeks harvoni + RBV w/ SVR    Hypertension     Paresthesia     feet, bilaterally     Past Surgical History:   Procedure Laterality Date    COLONOSCOPY N/A 9/19/2024    Procedure: COLONOSCOPY;  Surgeon: Mo Patino MD;  Location: [facility] ([address] [place] [address]);  Service: Endoscopy;  Laterality: N/A;  Ref by Dr. NIGHAT Tamez, PT/INR/CBC am procedure, Suprep, email - PC  9/16-lvm for pre call-tb-labs 8/22/24    ESOPHAGOGASTRODUODENOSCOPY N/A 9/19/2024    Procedure: EGD (ESOPHAGOGASTRODUODENOSCOPY);  Surgeon: Mo Patino MD;  Location: [facility] (4TH FLR);  Service: Endoscopy;  Laterality: N/A;    LIVER BIOPSY       Family History       Problem Relation (Age of Onset)    Cancer Mother, Father, Sister     Colonic polyp Mother, Brother    Diabetes Father    Diabetes Mellitus Father    Hypertension Father          Tobacco Use    Smoking status: Former     Types: Cigars    Smokeless tobacco: Never    Tobacco comments:     smokes cigars 20 per month   Substance and Sexual Activity    Alcohol use: Yes     Alcohol/week: 28.0 standard drinks of alcohol     Types: 28 Cans of beer per week     Comment: 2-3 beers daily    Drug use: No    Sexual activity: Not on file     Review of Systems   Constitutional:  Negative for chills and fever.   HENT:  Negative for hearing loss and sore throat.    Eyes:  Negative for discharge and visual disturbance.   Respiratory:  Negative for chest tightness and shortness of breath.    Cardiovascular:  Negative for chest pain and leg swelling.   Gastrointestinal:  Negative for abdominal distention, abdominal pain, nausea and vomiting.   Genitourinary:  Negative for difficulty urinating and hematuria.   Musculoskeletal:  Negative for back pain and joint swelling.   Skin:  Negative for color change and rash.   Neurological:  Negative for numbness and headaches.     Objective:     Vital Signs (Most Recent):  Temp: 98.4 °F (36.9 °C) (04/18/25 0646)  Pulse: 76 (04/18/25 0646)  Resp: 18 (04/18/25 0646)  BP: (!) 177/90 (04/18/25 0646)  SpO2: 100 % (04/18/25 0646) Vital Signs (24h Range):  Temp:  [98.4 °F (36.9 °C)] 98.4 °F (36.9 °C)  Pulse:  [76-90] 76  Resp:  [16-20] 18  SpO2:  [98 %-100 %] 100 %  BP: (156-177)/(84-90) 177/90     Weight: 117.9 kg (260 lb)  Body mass index is 32.5 kg/m².     Physical Exam  Constitutional:       General: He is not in acute distress.     Appearance: Normal appearance.   HENT:      Head: Normocephalic and atraumatic.   Cardiovascular:      Rate and Rhythm: Normal rate and regular rhythm.   Pulmonary:      Effort: Pulmonary effort is normal. No respiratory distress.   Abdominal:      General: Abdomen is flat. There is no distension.      Palpations: Abdomen is soft.       Tenderness: There is no abdominal tenderness.   Musculoskeletal:      Comments: R sided femoral bruising  Large 7x7cm R calf hematoma, tense, skin discoloration. Tender to palpation  3+ pitting edema RLE   Neurological:      General: No focal deficit present.      Mental Status: He is alert and oriented to person, place, and time.   Psychiatric:         Behavior: Behavior normal.         Thought Content: Thought content normal.            I have reviewed all pertinent lab results within the past 24 hours.  CBC:   Recent Labs   Lab 04/18/25  0112   WBC 7.79   RBC 4.01*   HGB 13.0*   HCT 38.6*      MCV 96   MCH 32.4*   MCHC 33.7     CMP:   Recent Labs   Lab 04/18/25  0112   CALCIUM 8.6*   ALBUMIN 3.4*   *   K 5.7*   CO2 20*      BUN 14   CREATININE 1.1   ALKPHOS 93   ALT 24   AST 37   BILITOT 0.7       Significant Diagnostics:  I have reviewed all pertinent imaging results/findings within the past 24 hours.

## 2025-04-18 NOTE — CONSULTS
[facility] - [facility] Dept  General Surgery  Consult Note    Patient Name: Froylan Borja  MRN: [id number]  Code Status: Prior  Admission Date: 4/18/2025  Hospital Length of Stay: 0 days  Attending Physician: Danay Guzman MD  Primary Care Provider: Janki Tamez MD    Patient information was obtained from patient and past medical records.     Inpatient consult to General surgery  Consult performed by: Paulino Andrews MD  Consult ordered by: Shakira Sebastian MD        Subjective:     Principal Problem: <principal problem not specified>    History of Present Illness: Froylan Borja is a 70 y.o. gentleman with PMH of alcohol abuse, cirrhosis, hepatocellular carcinoma undergoing Y90 therapy, and HTN who presents to the ED with R leg pain and swelling. He had Y90 through his R femoral artery 8 days ago. He has had some bruising and pain of his groin. He also bumped his calf on his car and has increasing swelling and discoloration. He did not notice until today, but he also has pitting edema to his right leg, much more than his left side. In the ED, he is hemodynamically normal though hypertensive. Labs significant for Na 129, K 5.7. US of his lower extremities show a R sided nonocclusive DVT and a R femo pseudoaneurysm. CT was also performed which demonstrated his hematoma over his R calf and extravasation of contrast into the pseudoaneusym. General surgery consulted for evaluation of drainage of his RLE hematoma.           Current Facility-Administered Medications on File Prior to Encounter   Medication    [DISCONTINUED] kit for prep of Tc-99m-albumin 2.5 mg SolR 5 millicurie     Current Outpatient Medications on File Prior to Encounter   Medication Sig    amLODIPine (NORVASC) 2.5 MG tablet Take 1 tablet (2.5 mg total) by mouth once daily.    buPROPion (WELLBUTRIN SR) 150 MG TBSR 12 hr tablet Take 1 tablet (150 mg total) by mouth 2 (two) times daily.    gabapentin (NEURONTIN) 300 MG capsule One in AM and two at  bedtime    losartan (COZAAR) 100 MG tablet Take 1 tablet (100 mg total) by mouth once daily.    multivitamin Tab Take 1 tablet by mouth once daily. (Patient not taking: Reported on 3/27/2025)    oxyCODONE-acetaminophen (PERCOCET)  mg per tablet Take 1 tablet by mouth every 6 (six) hours as needed for Pain.       Review of patient's allergies indicates:   Allergen Reactions    Zoloft [sertraline] Other (See Comments)     hyponatremia       Past Medical History:   Diagnosis Date    Alcohol abuse     Anxiety     Cirrhosis     Glaucoma     Hepatocellular carcinoma     History of hepatitis C, s/p successful Rx w/ SVR24 - 1/2017     genotype 1;  relapse following PegIFN+RBV+Victrelis; prior relapse following PegIFN+RBV S/p 12 weeks harvoni + RBV w/ SVR    Hypertension     Paresthesia     feet, bilaterally     Past Surgical History:   Procedure Laterality Date    COLONOSCOPY N/A 9/19/2024    Procedure: COLONOSCOPY;  Surgeon: Mo Patino MD;  Location: [facility] (4TH FLR);  Service: Endoscopy;  Laterality: N/A;  Ref by Dr. NIGHAT Tamez, PT/INR/CBC am procedure, Suprep, email - PC  9/16-lvm for pre call-tb-labs 8/22/24    ESOPHAGOGASTRODUODENOSCOPY N/A 9/19/2024    Procedure: EGD (ESOPHAGOGASTRODUODENOSCOPY);  Surgeon: Mo Patino MD;  Location: [facility] (4TH FLR);  Service: Endoscopy;  Laterality: N/A;    LIVER BIOPSY       Family History       Problem Relation (Age of Onset)    Cancer Mother, Father, Sister    Colonic polyp Mother, Brother    Diabetes Father    Diabetes Mellitus Father    Hypertension Father          Tobacco Use    Smoking status: Former     Types: Cigars    Smokeless tobacco: Never    Tobacco comments:     smokes cigars 20 per month   Substance and Sexual Activity    Alcohol use: Yes     Alcohol/week: 28.0 standard drinks of alcohol     Types: 28 Cans of beer per week     Comment: 2-3 beers daily    Drug use: No    Sexual activity: Not on file     Review of Systems   Constitutional:   Negative for chills and fever.   HENT:  Negative for hearing loss and sore throat.    Eyes:  Negative for discharge and visual disturbance.   Respiratory:  Negative for chest tightness and shortness of breath.    Cardiovascular:  Negative for chest pain and leg swelling.   Gastrointestinal:  Negative for abdominal distention, abdominal pain, nausea and vomiting.   Genitourinary:  Negative for difficulty urinating and hematuria.   Musculoskeletal:  Negative for back pain and joint swelling.   Skin:  Negative for color change and rash.   Neurological:  Negative for numbness and headaches.     Objective:     Vital Signs (Most Recent):  Temp: 98.4 °F (36.9 °C) (04/18/25 0646)  Pulse: 76 (04/18/25 0646)  Resp: 18 (04/18/25 0646)  BP: (!) 177/90 (04/18/25 0646)  SpO2: 100 % (04/18/25 0646) Vital Signs (24h Range):  Temp:  [98.4 °F (36.9 °C)] 98.4 °F (36.9 °C)  Pulse:  [76-90] 76  Resp:  [16-20] 18  SpO2:  [98 %-100 %] 100 %  BP: (156-177)/(84-90) 177/90     Weight: 117.9 kg (260 lb)  Body mass index is 32.5 kg/m².     Physical Exam  Constitutional:       General: He is not in acute distress.     Appearance: Normal appearance.   HENT:      Head: Normocephalic and atraumatic.   Cardiovascular:      Rate and Rhythm: Normal rate and regular rhythm.   Pulmonary:      Effort: Pulmonary effort is normal. No respiratory distress.   Abdominal:      General: Abdomen is flat. There is no distension.      Palpations: Abdomen is soft.      Tenderness: There is no abdominal tenderness.   Musculoskeletal:      Comments: R sided femoral bruising  Large 7x7cm R calf hematoma, tense, skin discoloration. Tender to palpation  3+ pitting edema RLE   Neurological:      General: No focal deficit present.      Mental Status: He is alert and oriented to person, place, and time.   Psychiatric:         Behavior: Behavior normal.         Thought Content: Thought content normal.            I have reviewed all pertinent lab results within the past 24  hours.  CBC:   Recent Labs   Lab 04/18/25  0112   WBC 7.79   RBC 4.01*   HGB 13.0*   HCT 38.6*      MCV 96   MCH 32.4*   MCHC 33.7     CMP:   Recent Labs   Lab 04/18/25  0112   CALCIUM 8.6*   ALBUMIN 3.4*   *   K 5.7*   CO2 20*      BUN 14   CREATININE 1.1   ALKPHOS 93   ALT 24   AST 37   BILITOT 0.7       Significant Diagnostics:  I have reviewed all pertinent imaging results/findings within the past 24 hours.    Assessment/Plan:     Hematoma  Froylan Borja is a 70 y.o. gentleman with HCC undergoing Y90 therapy with a RLE hematoma, R fem pseudoaneurysm, and a R sided DVT    - recommend admission to medicine for electrolyte abnormalities, anticoagulation for DVT, and interdisciplinary coordination  - ok from general surgery standpoint for anticoagulation; would reach out to vascular surgery prior to anticoagulation   - hematoma drained at bedside with evacuation of 120 cc of hematoma. Compressive dressing applied.   - skin may necrose in coming days. Will check in on him. Discussed that he may need a skin excision but hopefully his skin can recover  - daily dressing changes      VTE Risk Mitigation (From admission, onward)      None            Thank you for your consult. I will follow-up with patient. Please contact us if you have any additional questions.    Paulino Andrews MD  General Surgery  [facility] - [facility] Dept

## 2025-04-18 NOTE — ASSESSMENT & PLAN NOTE
Pain of R lower extremity   - RLE US showed a nonocclusive thrombus in the right common femoral vein   - will need to start anticoagulation, cleared by Gen Surg/ awaiting clearance from IR before proceeding   - will start heparin gtt once able then switch to PO DOAC if able to tolerate anticoagulation   - MM pain regimen

## 2025-04-18 NOTE — HPI
Froylan Borja is a 70 y.o. gentleman with PMH of alcohol abuse, cirrhosis, hepatocellular carcinoma undergoing Y90 therapy, and HTN who presents to the ED with R leg pain and swelling. He had Y90 through his R femoral artery 8 days ago. He has had some bruising and pain of his groin. He also bumped his calf on his car and has increasing swelling and discoloration. He did not notice until today, but he also has pitting edema to his right leg, much more than his left side. In the ED, he is hemodynamically normal though hypertensive. Labs significant for Na 129, K 5.7. US of his lower extremities show a R sided nonocclusive DVT and a R femo pseudoaneurysm. CT was also performed which demonstrated his hematoma over his R calf and extravasation of contrast into the pseudoaneusym. General surgery consulted for evaluation of drainage of his RLE hematoma.

## 2025-04-18 NOTE — SUBJECTIVE & OBJECTIVE
Past Medical History:   Diagnosis Date    Alcohol abuse     Anxiety     Cirrhosis     Glaucoma     Hepatocellular carcinoma     History of hepatitis C, s/p successful Rx w/ SVR24 - 1/2017     genotype 1;  relapse following PegIFN+RBV+Victrelis; prior relapse following PegIFN+RBV S/p 12 weeks harvoni + RBV w/ SVR    Hypertension     Paresthesia     feet, bilaterally       Past Surgical History:   Procedure Laterality Date    COLONOSCOPY N/A 9/19/2024    Procedure: COLONOSCOPY;  Surgeon: Mo Patino MD;  Location: [facility] (4TH FLR);  Service: Endoscopy;  Laterality: N/A;  Ref by Dr. NIGHAT Tamez, PT/INR/CBC am procedure, Suprep, email - PC  9/16-lvm for pre call-tb-labs 8/22/24    ESOPHAGOGASTRODUODENOSCOPY N/A 9/19/2024    Procedure: EGD (ESOPHAGOGASTRODUODENOSCOPY);  Surgeon: Mo Patino MD;  Location: [facility] (4TH FLR);  Service: Endoscopy;  Laterality: N/A;    LIVER BIOPSY         Review of patient's allergies indicates:   Allergen Reactions    Zoloft [sertraline] Other (See Comments)     hyponatremia       Current Facility-Administered Medications on File Prior to Encounter   Medication    [DISCONTINUED] kit for prep of Tc-99m-albumin 2.5 mg SolR 5 millicurie     Current Outpatient Medications on File Prior to Encounter   Medication Sig    amLODIPine (NORVASC) 2.5 MG tablet Take 1 tablet (2.5 mg total) by mouth once daily.    buPROPion (WELLBUTRIN SR) 150 MG TBSR 12 hr tablet Take 1 tablet (150 mg total) by mouth 2 (two) times daily.    gabapentin (NEURONTIN) 300 MG capsule One in AM and two at bedtime    losartan (COZAAR) 100 MG tablet Take 1 tablet (100 mg total) by mouth once daily.    multivitamin Tab Take 1 tablet by mouth once daily. (Patient not taking: Reported on 3/27/2025)    oxyCODONE-acetaminophen (PERCOCET)  mg per tablet Take 1 tablet by mouth every 6 (six) hours as needed for Pain.     Family History       Problem Relation (Age of Onset)    Cancer Mother, Father, Sister     Colonic polyp Mother, Brother    Diabetes Father    Diabetes Mellitus Father    Hypertension Father          Tobacco Use    Smoking status: Former     Types: Cigars    Smokeless tobacco: Never    Tobacco comments:     smokes cigars 20 per month   Substance and Sexual Activity    Alcohol use: Yes     Alcohol/week: 28.0 standard drinks of alcohol     Types: 28 Cans of beer per week     Comment: 2-3 beers daily    Drug use: No    Sexual activity: Not on file     Review of Systems   Constitutional:  Negative for chills and fever.   Respiratory:  Negative for chest tightness and shortness of breath.    Cardiovascular:  Positive for leg swelling. Negative for chest pain.   Gastrointestinal:  Negative for abdominal pain, constipation, diarrhea, nausea and vomiting.   Genitourinary:  Negative for dysuria, frequency and urgency.   Musculoskeletal:  Positive for arthralgias and myalgias.   Skin:  Positive for color change and wound.   Neurological:  Positive for numbness. Negative for dizziness, weakness, light-headedness and headaches.     Objective:     Vital Signs (Most Recent):  Temp: 98.4 °F (36.9 °C) (04/18/25 0646)  Pulse: 78 (04/18/25 0815)  Resp: 16 (04/18/25 0815)  BP: (!) 177/90 (04/18/25 0646)  SpO2: 100 % (04/18/25 0815) Vital Signs (24h Range):  Temp:  [98.4 °F (36.9 °C)] 98.4 °F (36.9 °C)  Pulse:  [76-90] 78  Resp:  [16-20] 16  SpO2:  [98 %-100 %] 100 %  BP: (156-177)/(84-90) 177/90     Weight: 117.9 kg (260 lb)  Body mass index is 32.5 kg/m².     Physical Exam  Vitals and nursing note reviewed.   Constitutional:       General: He is not in acute distress.     Appearance: He is well-developed. He is not ill-appearing.   HENT:      Head: Normocephalic and atraumatic.   Eyes:      Pupils: Pupils are equal, round, and reactive to light.   Cardiovascular:      Rate and Rhythm: Normal rate and regular rhythm.   Pulmonary:      Effort: Pulmonary effort is normal. No respiratory distress.      Breath sounds: Normal  breath sounds. No wheezing or rales.   Abdominal:      Palpations: Abdomen is soft.      Tenderness: There is no abdominal tenderness.   Musculoskeletal:      Right lower leg: Edema present.      Left lower leg: No edema.      Comments: Sensation intact  RLE dressed per General surgery s/p hematoma evacuation- saturated dressing to lateral-posterior side   Skin:     General: Skin is warm and dry.   Neurological:      Mental Status: He is alert and oriented to person, place, and time.   Psychiatric:         Behavior: Behavior normal.              CRANIAL NERVES     CN III, IV, VI   Pupils are equal, round, and reactive to light.       Significant Labs: All pertinent labs within the past 24 hours have been reviewed.  BMP:   Recent Labs   Lab 04/18/25  0112   *   K 5.7*      CO2 20*   BUN 14   CREATININE 1.1   CALCIUM 8.6*     CBC:   Recent Labs   Lab 04/18/25  0112   WBC 7.79   HGB 13.0*   HCT 38.6*          Significant Imaging: I have reviewed all pertinent imaging results/findings within the past 24 hours.    Imaging Results              CTA Lower Extremity (In process)  Result time 04/18/25 08:50:43                      US Lower Extremity Veins Right (Final result)  Result time 04/18/25 04:39:12      Final result by Esvin Sparrow MD (04/18/25 04:39:12)                   Impression:      Findings suggesting nonocclusive thrombus in the right common femoral vein.    Right common femoral artery pseudoaneurysm, as above.    This report was flagged in Epic as abnormal.    This finding was discovered by and communicated by Rajeev Cordoba MD to Shakira Sebastian MD via secure chat at 0429 on 04/18/2025.    Electronically signed by resident: Rajeev Cordoba  Date:    04/18/2025  Time:    04:15    Electronically signed by: Esvin Sparrow MD  Date:    04/18/2025  Time:    04:39               Narrative:    EXAMINATION:  US LOWER EXTREMITY VEINS RIGHT    CLINICAL HISTORY:  Other specified soft tissue  disorders    TECHNIQUE:  Duplex and color flow Doppler evaluation and graded compression of the right lower extremity veins was performed.    COMPARISON:  Lower extremity vein ultrasound 02/04/2022, CTA right lower extremity 04/18/2025    FINDINGS:  Right thigh veins: The right common femoral vein is noncompressible but demonstrates low frequency color Doppler flow suggesting slow flow.  Findings may represent nonocclusive thrombus.    The superficial femoral, popliteal, upper greater saphenous, and deep femoral veins are patent and free of thrombus. The veins are normally compressible and have normal phasic flow and augmentation response.    Right calf veins: The visualized calf veins are patent.    Contralateral CFV: The contralateral (left) common femoral vein is patent and free of thrombus.    Miscellaneous: Focal outpouching at the proximal right common femoral artery with bidirectional flow suggesting pseudoaneurysm with approximately 3 mm neck.

## 2025-04-18 NOTE — ASSESSMENT & PLAN NOTE
- RLE US showed a new right common femoral artery pseudoaneurysm   - CTA RLE pending  - Vascular Surgery consulted- recommend IR consult given recent procedure/ possible thrombin injection  - IR consulted, appreciate assistance

## 2025-04-18 NOTE — PROCEDURES
"Froylan Borja is a 70 y.o. male patient.    Temp: 98.4 °F (36.9 °C) (04/18/25 0646)  Pulse: 76 (04/18/25 0646)  Resp: 18 (04/18/25 0646)  BP: (!) 177/90 (04/18/25 0646)  SpO2: 100 % (04/18/25 0646)  Weight: 117.9 kg (260 lb) (04/18/25 0000)  Height: 6' 3" (190.5 cm) (04/18/25 0000)       Incision and Drainage    Date/Time: 4/18/2025 7:24 AM  Location procedure was performed: [facility]    Performed by: Paulino Andrews MD  Authorized by: Paulino Andrews MD  Pre-operative diagnosis: hematoma  Post-operative diagnosis: hematoma  Type: hematoma  Body area: lower extremity  Location details: right leg  Anesthesia: local infiltration    Anesthesia:  Local Anesthetic: lidocaine 1% without epinephrine  Anesthetic total: 10 mL  Scalpel size: 11  Incision type: single straight  Incision depth: subcutaneous  Complexity: simple  Drainage: bloody (old hematoma)  Wound treatment: drainage, expression of material, clot removal and wound packed  Packing material: 1/2 in iodoform gauze  Patient tolerance: Patient tolerated the procedure well with no immediate complications    Incision depth: subcutaneous          4/18/2025    "

## 2025-04-19 PROBLEM — E87.5 HYPERKALEMIA: Status: RESOLVED | Noted: 2025-04-18 | Resolved: 2025-04-19

## 2025-04-19 LAB
ABSOLUTE EOSINOPHIL (OHS): 0.15 K/UL
ABSOLUTE MONOCYTE (OHS): 1.05 K/UL (ref 0.3–1)
ABSOLUTE NEUTROPHIL COUNT (OHS): 5.4 K/UL (ref 1.8–7.7)
ALBUMIN SERPL BCP-MCNC: 3.1 G/DL (ref 3.5–5.2)
ALP SERPL-CCNC: 84 UNIT/L (ref 40–150)
ALT SERPL W/O P-5'-P-CCNC: 17 UNIT/L (ref 10–44)
ANION GAP (OHS): 7 MMOL/L (ref 8–16)
APTT PPP: 47.3 SECONDS (ref 21–32)
APTT PPP: 80.1 SECONDS (ref 21–32)
APTT PPP: >150 SECONDS (ref 21–32)
AST SERPL-CCNC: 19 UNIT/L (ref 11–45)
BASOPHILS # BLD AUTO: 0.08 K/UL
BASOPHILS NFR BLD AUTO: 1 %
BILIRUB SERPL-MCNC: 1.1 MG/DL (ref 0.1–1)
BUN SERPL-MCNC: 21 MG/DL (ref 8–23)
CALCIUM SERPL-MCNC: 8.4 MG/DL (ref 8.7–10.5)
CHLORIDE SERPL-SCNC: 99 MMOL/L (ref 95–110)
CO2 SERPL-SCNC: 24 MMOL/L (ref 23–29)
CREAT SERPL-MCNC: 1.2 MG/DL (ref 0.5–1.4)
ERYTHROCYTE [DISTWIDTH] IN BLOOD BY AUTOMATED COUNT: 13.5 % (ref 11.5–14.5)
GFR SERPLBLD CREATININE-BSD FMLA CKD-EPI: >60 ML/MIN/1.73/M2
GLUCOSE SERPL-MCNC: 96 MG/DL (ref 70–110)
HCT VFR BLD AUTO: 34.2 % (ref 40–54)
HGB BLD-MCNC: 11 GM/DL (ref 14–18)
IMM GRANULOCYTES # BLD AUTO: 0.1 K/UL (ref 0–0.04)
IMM GRANULOCYTES NFR BLD AUTO: 1.2 % (ref 0–0.5)
LYMPHOCYTES # BLD AUTO: 1.45 K/UL (ref 1–4.8)
MAGNESIUM SERPL-MCNC: 2 MG/DL (ref 1.6–2.6)
MCH RBC QN AUTO: 32.6 PG (ref 27–31)
MCHC RBC AUTO-ENTMCNC: 32.2 G/DL (ref 32–36)
MCV RBC AUTO: 102 FL (ref 82–98)
NUCLEATED RBC (/100WBC) (OHS): 0 /100 WBC
PHOSPHATE SERPL-MCNC: 3.7 MG/DL (ref 2.7–4.5)
PLATELET # BLD AUTO: 189 K/UL (ref 150–450)
PMV BLD AUTO: 10.2 FL (ref 9.2–12.9)
POTASSIUM SERPL-SCNC: 4.9 MMOL/L (ref 3.5–5.1)
PROT SERPL-MCNC: 6.2 GM/DL (ref 6–8.4)
RBC # BLD AUTO: 3.37 M/UL (ref 4.6–6.2)
RELATIVE EOSINOPHIL (OHS): 1.8 %
RELATIVE LYMPHOCYTE (OHS): 17.6 % (ref 18–48)
RELATIVE MONOCYTE (OHS): 12.8 % (ref 4–15)
RELATIVE NEUTROPHIL (OHS): 65.6 % (ref 38–73)
SODIUM SERPL-SCNC: 130 MMOL/L (ref 136–145)
WBC # BLD AUTO: 8.23 K/UL (ref 3.9–12.7)

## 2025-04-19 PROCEDURE — 84100 ASSAY OF PHOSPHORUS: CPT | Mod: HCNC

## 2025-04-19 PROCEDURE — 25000003 PHARM REV CODE 250: Mod: HCNC

## 2025-04-19 PROCEDURE — 36415 COLL VENOUS BLD VENIPUNCTURE: CPT | Mod: HCNC

## 2025-04-19 PROCEDURE — 63600175 PHARM REV CODE 636 W HCPCS: Mod: HCNC

## 2025-04-19 PROCEDURE — 80053 COMPREHEN METABOLIC PANEL: CPT | Mod: HCNC

## 2025-04-19 PROCEDURE — 20600001 HC STEP DOWN PRIVATE ROOM: Mod: HCNC

## 2025-04-19 PROCEDURE — 85730 THROMBOPLASTIN TIME PARTIAL: CPT | Mod: HCNC

## 2025-04-19 PROCEDURE — 85025 COMPLETE CBC W/AUTO DIFF WBC: CPT | Mod: HCNC

## 2025-04-19 PROCEDURE — 83735 ASSAY OF MAGNESIUM: CPT | Mod: HCNC

## 2025-04-19 RX ORDER — ACETAMINOPHEN 500 MG
1000 TABLET ORAL EVERY 8 HOURS PRN
Status: DISCONTINUED | OUTPATIENT
Start: 2025-04-19 | End: 2025-05-01

## 2025-04-19 RX ORDER — OXYCODONE HYDROCHLORIDE 5 MG/1
5 TABLET ORAL EVERY 4 HOURS PRN
Status: DISCONTINUED | OUTPATIENT
Start: 2025-04-19 | End: 2025-05-13 | Stop reason: HOSPADM

## 2025-04-19 RX ORDER — AMLODIPINE BESYLATE 10 MG/1
10 TABLET ORAL DAILY
Status: DISCONTINUED | OUTPATIENT
Start: 2025-04-20 | End: 2025-04-28

## 2025-04-19 RX ORDER — HYDROMORPHONE HYDROCHLORIDE 1 MG/ML
1 INJECTION, SOLUTION INTRAMUSCULAR; INTRAVENOUS; SUBCUTANEOUS EVERY 6 HOURS PRN
Status: DISCONTINUED | OUTPATIENT
Start: 2025-04-19 | End: 2025-05-13 | Stop reason: HOSPADM

## 2025-04-19 RX ORDER — AMLODIPINE BESYLATE 5 MG/1
5 TABLET ORAL DAILY
Status: DISCONTINUED | OUTPATIENT
Start: 2025-04-20 | End: 2025-04-19

## 2025-04-19 RX ORDER — OXYCODONE HYDROCHLORIDE 5 MG/1
5 TABLET ORAL EVERY 4 HOURS PRN
Status: DISCONTINUED | OUTPATIENT
Start: 2025-04-19 | End: 2025-04-19

## 2025-04-19 RX ORDER — OXYCODONE HYDROCHLORIDE 10 MG/1
10 TABLET ORAL EVERY 6 HOURS PRN
Status: DISCONTINUED | OUTPATIENT
Start: 2025-04-19 | End: 2025-05-01

## 2025-04-19 RX ADMIN — OXYCODONE 5 MG: 5 TABLET ORAL at 03:04

## 2025-04-19 RX ADMIN — HYDROMORPHONE HYDROCHLORIDE 1 MG: 0.5 INJECTION, SOLUTION INTRAMUSCULAR; INTRAVENOUS; SUBCUTANEOUS at 10:04

## 2025-04-19 RX ADMIN — AMLODIPINE BESYLATE 2.5 MG: 2.5 TABLET ORAL at 09:04

## 2025-04-19 RX ADMIN — OXYCODONE HYDROCHLORIDE 10 MG: 10 TABLET ORAL at 07:04

## 2025-04-19 RX ADMIN — GABAPENTIN 300 MG: 300 CAPSULE ORAL at 10:04

## 2025-04-19 RX ADMIN — BUPROPION HYDROCHLORIDE 150 MG: 75 TABLET, FILM COATED ORAL at 09:04

## 2025-04-19 RX ADMIN — HEPARIN SODIUM AND DEXTROSE 11 UNITS/KG/HR: 10000; 5 INJECTION INTRAVENOUS at 10:04

## 2025-04-19 RX ADMIN — Medication 100 MG: at 09:04

## 2025-04-19 RX ADMIN — OXYCODONE HYDROCHLORIDE 10 MG: 10 TABLET ORAL at 09:04

## 2025-04-19 RX ADMIN — FOLIC ACID 1 MG: 1 TABLET ORAL at 09:04

## 2025-04-19 RX ADMIN — BUPROPION HYDROCHLORIDE 150 MG: 75 TABLET, FILM COATED ORAL at 10:04

## 2025-04-19 RX ADMIN — Medication 1 TABLET: at 09:04

## 2025-04-19 RX ADMIN — LOSARTAN POTASSIUM 100 MG: 50 TABLET, FILM COATED ORAL at 09:04

## 2025-04-19 RX ADMIN — GABAPENTIN 300 MG: 300 CAPSULE ORAL at 09:04

## 2025-04-19 RX ADMIN — ACETAMINOPHEN 1000 MG: 500 TABLET ORAL at 03:04

## 2025-04-19 NOTE — NURSING
RLE drsg. Noted to have a moderate amount of moist bloody drainage. Drsg. Changed per md orders. Will cont. To monitor closely.    normal/no pedal edema

## 2025-04-19 NOTE — SUBJECTIVE & OBJECTIVE
Interval History: Packing changed at bedside this am with drainage of old blood. Replaced with kerlix x1. Tender to palpation, otherwise doing well.    Medications:  Continuous Infusions:   heparin (porcine) in D5W  0-40 Units/kg/hr Intravenous Continuous 16.8 mL/hr at 04/19/25 0301 14 Units/kg/hr at 04/19/25 0301     Scheduled Meds:   amLODIPine  2.5 mg Oral Daily    buPROPion  150 mg Oral BID    folic acid  1 mg Oral Daily    gabapentin  300 mg Oral BID    losartan  100 mg Oral Daily    multivitamin  1 tablet Oral Daily    thiamine  100 mg Oral Daily     PRN Meds:  Current Facility-Administered Medications:     acetaminophen, 1,000 mg, Oral, Q8H PRN    albuterol-ipratropium, 3 mL, Nebulization, Q4H PRN    bisacodyL, 10 mg, Rectal, Daily PRN    dextrose 50%, 12.5 g, Intravenous, PRN    dextrose 50%, 25 g, Intravenous, PRN    glucagon (human recombinant), 1 mg, Intramuscular, PRN    glucose, 16 g, Oral, PRN    glucose, 24 g, Oral, PRN    heparin (PORCINE), 60 Units/kg, Intravenous, PRN    heparin (PORCINE), 30 Units/kg, Intravenous, PRN    HYDROmorphone, 1 mg, Intravenous, Q6H PRN    LORazepam, 2 mg, Oral, Q4H PRN    melatonin, 6 mg, Oral, Nightly PRN    ondansetron, 8 mg, Oral, Q8H PRN    oxyCODONE, 5 mg, Oral, Q4H PRN    oxyCODONE, 10 mg, Oral, Q6H PRN    promethazine, 25 mg, Oral, Q6H PRN    sodium chloride 0.9%, 10 mL, Intravenous, PRN     Review of patient's allergies indicates:   Allergen Reactions    Zoloft [sertraline] Other (See Comments)     hyponatremia     Objective:     Vital Signs (Most Recent):  Temp: 97.7 °F (36.5 °C) (04/19/25 0426)  Pulse: 70 (04/19/25 0426)  Resp: 20 (04/19/25 0426)  BP: 131/60 (04/19/25 0426)  SpO2: 99 % (04/19/25 0426) Vital Signs (24h Range):  Temp:  [97.5 °F (36.4 °C)-98.5 °F (36.9 °C)] 97.7 °F (36.5 °C)  Pulse:  [70-85] 70  Resp:  [16-20] 20  SpO2:  [95 %-100 %] 99 %  BP: (128-174)/(58-81) 131/60     Weight: 120 kg (264 lb 8.8 oz)  Body mass index is 33.07  kg/m².    Intake/Output - Last 3 Shifts         04/17 0700  04/18 0659 04/18 0700  04/19 0659 04/19 0700  04/20 0659    P.O.  120     Total Intake(mL/kg)  120 (1)     Net  +120            Urine Occurrence  1 x     Stool Occurrence  1 x              Physical Exam  Constitutional:       General: He is not in acute distress.     Appearance: Normal appearance.   HENT:      Head: Normocephalic and atraumatic.   Cardiovascular:      Rate and Rhythm: Normal rate and regular rhythm.   Pulmonary:      Effort: Pulmonary effort is normal. No respiratory distress.   Abdominal:      General: Abdomen is flat. There is no distension.      Palpations: Abdomen is soft.      Tenderness: There is no abdominal tenderness.   Musculoskeletal:      Comments: R sided femoral bruising  Large 7x7cm R calf hematoma, tense, skin discoloration. Tender to palpation  3+ pitting edema RLE   Neurological:      General: No focal deficit present.      Mental Status: He is alert and oriented to person, place, and time.   Psychiatric:         Behavior: Behavior normal.         Thought Content: Thought content normal.          Significant Labs:  I have reviewed all pertinent lab results within the past 24 hours.  CBC:   Recent Labs   Lab 04/19/25  0312   WBC 8.23   RBC 3.37*   HGB 11.0*   HCT 34.2*      *   MCH 32.6*   MCHC 32.2     CMP:   Recent Labs   Lab 04/19/25  0312   CALCIUM 8.4*   ALBUMIN 3.1*   *   K 4.9   CO2 24   CL 99   BUN 21   CREATININE 1.2   ALKPHOS 84   ALT 17   AST 19   BILITOT 1.1*       Significant Diagnostics:  I have reviewed all pertinent imaging results/findings within the past 24 hours.

## 2025-04-19 NOTE — PROGRESS NOTES
[facility] - Telemetry Stepdown  General Surgery  Progress Note    Subjective:     History of Present Illness:  Froylan Borja is a 70 y.o. gentleman with PMH of alcohol abuse, cirrhosis, hepatocellular carcinoma undergoing Y90 therapy, and HTN who presents to the ED with R leg pain and swelling. He had Y90 through his R femoral artery 8 days ago. He has had some bruising and pain of his groin. He also bumped his calf on his car and has increasing swelling and discoloration. He did not notice until today, but he also has pitting edema to his right leg, much more than his left side. In the ED, he is hemodynamically normal though hypertensive. Labs significant for Na 129, K 5.7. US of his lower extremities show a R sided nonocclusive DVT and a R femo pseudoaneurysm. CT was also performed which demonstrated his hematoma over his R calf and extravasation of contrast into the pseudoaneusym. General surgery consulted for evaluation of drainage of his RLE hematoma.         Post-Op Info:  * No surgery found *         Interval History: Packing changed at bedside this am with drainage of old blood, no active bleeding. Replaced with kerlix x1. Tender to palpation, otherwise doing well.    Medications:  Continuous Infusions:   heparin (porcine) in D5W  0-40 Units/kg/hr Intravenous Continuous 16.8 mL/hr at 04/19/25 0301 14 Units/kg/hr at 04/19/25 0301     Scheduled Meds:   amLODIPine  2.5 mg Oral Daily    buPROPion  150 mg Oral BID    folic acid  1 mg Oral Daily    gabapentin  300 mg Oral BID    losartan  100 mg Oral Daily    multivitamin  1 tablet Oral Daily    thiamine  100 mg Oral Daily     PRN Meds:  Current Facility-Administered Medications:     acetaminophen, 1,000 mg, Oral, Q8H PRN    albuterol-ipratropium, 3 mL, Nebulization, Q4H PRN    bisacodyL, 10 mg, Rectal, Daily PRN    dextrose 50%, 12.5 g, Intravenous, PRN    dextrose 50%, 25 g, Intravenous, PRN    glucagon (human recombinant), 1 mg, Intramuscular, PRN    glucose, 16 g,  Oral, PRN    glucose, 24 g, Oral, PRN    heparin (PORCINE), 60 Units/kg, Intravenous, PRN    heparin (PORCINE), 30 Units/kg, Intravenous, PRN    HYDROmorphone, 1 mg, Intravenous, Q6H PRN    LORazepam, 2 mg, Oral, Q4H PRN    melatonin, 6 mg, Oral, Nightly PRN    ondansetron, 8 mg, Oral, Q8H PRN    oxyCODONE, 5 mg, Oral, Q4H PRN    oxyCODONE, 10 mg, Oral, Q6H PRN    promethazine, 25 mg, Oral, Q6H PRN    sodium chloride 0.9%, 10 mL, Intravenous, PRN     Review of patient's allergies indicates:   Allergen Reactions    Zoloft [sertraline] Other (See Comments)     hyponatremia     Objective:     Vital Signs (Most Recent):  Temp: 97.7 °F (36.5 °C) (04/19/25 0426)  Pulse: 70 (04/19/25 0426)  Resp: 20 (04/19/25 0426)  BP: 131/60 (04/19/25 0426)  SpO2: 99 % (04/19/25 0426) Vital Signs (24h Range):  Temp:  [97.5 °F (36.4 °C)-98.5 °F (36.9 °C)] 97.7 °F (36.5 °C)  Pulse:  [70-85] 70  Resp:  [16-20] 20  SpO2:  [95 %-100 %] 99 %  BP: (128-174)/(58-81) 131/60     Weight: 120 kg (264 lb 8.8 oz)  Body mass index is 33.07 kg/m².    Intake/Output - Last 3 Shifts         04/17 0700  04/18 0659 04/18 0700  04/19 0659 04/19 0700  04/20 0659    P.O.  120     Total Intake(mL/kg)  120 (1)     Net  +120            Urine Occurrence  1 x     Stool Occurrence  1 x              Physical Exam  Constitutional:       General: He is not in acute distress.     Appearance: Normal appearance.   HENT:      Head: Normocephalic and atraumatic.   Cardiovascular:      Rate and Rhythm: Normal rate and regular rhythm.   Pulmonary:      Effort: Pulmonary effort is normal. No respiratory distress.   Abdominal:      General: Abdomen is flat. There is no distension.      Palpations: Abdomen is soft.      Tenderness: There is no abdominal tenderness.   Musculoskeletal:      Comments: R sided femoral bruising  Large 7x7cm R calf hematoma, tense, skin discoloration. Tender to palpation  3+ pitting edema RLE   Neurological:      General: No focal deficit present.       Mental Status: He is alert and oriented to person, place, and time.   Psychiatric:         Behavior: Behavior normal.         Thought Content: Thought content normal.      Significant Labs:  I have reviewed all pertinent lab results within the past 24 hours.  CBC:   Recent Labs   Lab 04/19/25  0312   WBC 8.23   RBC 3.37*   HGB 11.0*   HCT 34.2*      *   MCH 32.6*   MCHC 32.2     CMP:   Recent Labs   Lab 04/19/25  0312   CALCIUM 8.4*   ALBUMIN 3.1*   *   K 4.9   CO2 24   CL 99   BUN 21   CREATININE 1.2   ALKPHOS 84   ALT 17   AST 19   BILITOT 1.1*       Significant Diagnostics:  I have reviewed all pertinent imaging results/findings within the past 24 hours.  Assessment/Plan:     Sejal Borja is a 70 y.o. gentleman with HCC undergoing Y90 therapy with a RLE hematoma, R fem pseudoaneurysm, and a R sided DVT.    - hematoma drained at bedside with evacuation of 120 cc of hematoma. Compressive dressing applied.   - skin may necrose in coming days. Will check in on him. Discussed that he may need a skin excision but hopefully his skin can recover  - daily dressing / packing changes  - anticoagulation per vascular surgery        Esvin Delcid MD  General Surgery  [facility] - Tele[facility] Stepdown

## 2025-04-19 NOTE — ASSESSMENT & PLAN NOTE
Froylan Borja is a 70 y.o. gentleman with HCC undergoing Y90 therapy with a RLE hematoma, R fem pseudoaneurysm, and a R sided DVT.    - hematoma drained at bedside with evacuation of 120 cc of hematoma. Compressive dressing applied.   - skin may necrose in coming days. Will check in on him. Discussed that he may need a skin excision but hopefully his skin can recover  - daily dressing / packing changes  - anticoagulation per vascular surgery

## 2025-04-19 NOTE — SUBJECTIVE & OBJECTIVE
Interval History: NAEON. AFVSS. GenSurg changed packing and dressing at bedside today. Dressing saturated with blood by time of my evaluation. Exchanged dressing with RN. Hg continues to downtrend. Asked GenSurg to take pics of wound everyday, if can't control bleeding or hematoma re-expanding, will have to hold anticoagulation and discuss IVC filter.     Review of Systems   Constitutional:  Negative for chills and fever.   Respiratory:  Negative for chest tightness and shortness of breath.    Cardiovascular:  Positive for leg swelling. Negative for chest pain.   Gastrointestinal:  Negative for abdominal pain, constipation, diarrhea, nausea and vomiting.   Genitourinary:  Negative for dysuria, frequency and urgency.   Musculoskeletal:  Positive for arthralgias and myalgias.   Skin:  Positive for color change and wound.   Neurological:  Positive for numbness. Negative for dizziness, weakness, light-headedness and headaches.     Objective:     Vital Signs (Most Recent):  Temp: 98.4 °F (36.9 °C) (04/19/25 1630)  Pulse: 81 (04/19/25 1630)  Resp: 20 (04/19/25 1630)  BP: (!) 132/58 (04/19/25 1630)  SpO2: 96 % (04/19/25 1630) Vital Signs (24h Range):  Temp:  [97.7 °F (36.5 °C)-98.5 °F (36.9 °C)] 98.4 °F (36.9 °C)  Pulse:  [70-85] 81  Resp:  [18-20] 20  SpO2:  [96 %-99 %] 96 %  BP: (128-166)/(58-70) 132/58     Weight: 120 kg (264 lb 8.8 oz)  Body mass index is 33.07 kg/m².    Intake/Output Summary (Last 24 hours) at 4/19/2025 1859  Last data filed at 4/18/2025 2117  Gross per 24 hour   Intake 120 ml   Output --   Net 120 ml         Physical Exam  Vitals and nursing note reviewed.   Constitutional:       General: He is not in acute distress.     Appearance: He is well-developed. He is not ill-appearing.   HENT:      Head: Normocephalic and atraumatic.   Eyes:      Pupils: Pupils are equal, round, and reactive to light.   Cardiovascular:      Rate and Rhythm: Normal rate and regular rhythm.   Pulmonary:      Effort: Pulmonary  effort is normal. No respiratory distress.      Breath sounds: Normal breath sounds. No wheezing or rales.   Abdominal:      Palpations: Abdomen is soft.      Tenderness: There is no abdominal tenderness.   Musculoskeletal:      Right lower leg: Edema present.      Left lower leg: No edema.      Comments: Sensation intact  RLE dressed per General surgery s/p hematoma evacuation- saturated dressing to lateral-posterior side   Skin:     General: Skin is warm and dry.   Neurological:      Mental Status: He is alert and oriented to person, place, and time.   Psychiatric:         Behavior: Behavior normal.               Significant Labs: All pertinent labs within the past 24 hours have been reviewed.  CBC:   Recent Labs   Lab 04/18/25  1153 04/18/25  1446 04/19/25  0312   WBC 10.52 8.78 8.23   HGB 12.7* 12.0* 11.0*   HCT 38.7* 36.3* 34.2*    206 189     CMP:   Recent Labs   Lab 04/18/25  0112 04/18/25  0938 04/19/25  0312   * 131* 130*   K 5.7* 4.5 4.9    99 99   CO2 20* 22* 24   BUN 14 14 21   CREATININE 1.1 0.9 1.2   CALCIUM 8.6* 8.6* 8.4*   ALBUMIN 3.4*  --  3.1*   BILITOT 0.7  --  1.1*   ALKPHOS 93  --  84   AST 37  --  19   ALT 24  --  17   ANIONGAP 8 10 7*       Significant Imaging: I have reviewed all pertinent imaging results/findings within the past 24 hours.

## 2025-04-19 NOTE — HOSPITAL COURSE
70 y.o M with HTN, alcohol/HepC cirrhosis c/b HCC s/p Y-90 mapping angiogram on 4/10 who presents with bruising and pain at angiogram site and RLE calf pain after he hit it on a car door. RLE U/S showed non-occlusive CFV thrombus and R CFA pseudoaneurysm. CTA re-demonstrated the psdueoaneurysm with active extravasation within the sac as well as a large R calf hematoma which GenSurg were consulted for and successfully drained (120ccs). Vascular Surgery were consulted, recommended IR guided percutaneous thrombin injection which IR were able to complete. Heparin gtt was started. Groin U/S done the following day showed continued thrombosis of the pseudoanurysm. He was taken to the OR on 4/21 for debridement and placement of wound vac. He was taken back to the OR on 4/24 for further debridement and wound vac change. OR 4/28 for vac reapplication. RLE wound exploration, debridement, possible split-thickness skin grafting versus skin substitute, wound VAC placement 4/30 with Plastics.  Resumed Eliquis on 05/01. Plastics removed wound vac; will not need on discharge    Patient stable for discharge. Appeals for rehab denied. Plan for SNF placement at this time, patient in agreement after discussions to help him get his independence back      Patient declined SNF placement, will go home with home health at this time. Continue wound care, PT/OT at home. Wheelchair ordered. Needs close follow up with plastic surgery and PCP for ongoing care.       Plan of care reviewed with patient, in agreement with discharge plans. Return precautions given      PE  No acute distress  Heart rrr  Lungs cta  Abdomen soft, nontender  RLE in ace bandage, clean and dry. Edema still present but resolving (2+). LLE without edema

## 2025-04-20 ENCOUNTER — ANESTHESIA EVENT (OUTPATIENT)
Dept: SURGERY | Facility: HOSPITAL | Age: 70
End: 2025-04-20
Payer: MEDICARE

## 2025-04-20 LAB
ABSOLUTE EOSINOPHIL (OHS): 0.34 K/UL
ABSOLUTE MONOCYTE (OHS): 1.28 K/UL (ref 0.3–1)
ABSOLUTE NEUTROPHIL COUNT (OHS): 6 K/UL (ref 1.8–7.7)
ALBUMIN SERPL BCP-MCNC: 3 G/DL (ref 3.5–5.2)
ALP SERPL-CCNC: 84 UNIT/L (ref 40–150)
ALT SERPL W/O P-5'-P-CCNC: 20 UNIT/L (ref 10–44)
ANION GAP (OHS): 5 MMOL/L (ref 8–16)
ANION GAP (OHS): 6 MMOL/L (ref 8–16)
APTT PPP: 48.6 SECONDS (ref 21–32)
APTT PPP: 50.4 SECONDS (ref 21–32)
AST SERPL-CCNC: 28 UNIT/L (ref 11–45)
BASOPHILS # BLD AUTO: 0.11 K/UL
BASOPHILS NFR BLD AUTO: 1.1 %
BILIRUB SERPL-MCNC: 1.1 MG/DL (ref 0.1–1)
BNP SERPL-MCNC: 25 PG/ML (ref 0–99)
BUN SERPL-MCNC: 24 MG/DL (ref 8–23)
BUN SERPL-MCNC: 29 MG/DL (ref 8–23)
CALCIUM SERPL-MCNC: 8.1 MG/DL (ref 8.7–10.5)
CALCIUM SERPL-MCNC: 8.3 MG/DL (ref 8.7–10.5)
CHLORIDE SERPL-SCNC: 97 MMOL/L (ref 95–110)
CHLORIDE SERPL-SCNC: 99 MMOL/L (ref 95–110)
CK SERPL-CCNC: 70 U/L (ref 20–200)
CO2 SERPL-SCNC: 24 MMOL/L (ref 23–29)
CO2 SERPL-SCNC: 26 MMOL/L (ref 23–29)
CREAT SERPL-MCNC: 1.2 MG/DL (ref 0.5–1.4)
CREAT SERPL-MCNC: 1.5 MG/DL (ref 0.5–1.4)
ERYTHROCYTE [DISTWIDTH] IN BLOOD BY AUTOMATED COUNT: 13.5 % (ref 11.5–14.5)
GFR SERPLBLD CREATININE-BSD FMLA CKD-EPI: 50 ML/MIN/1.73/M2
GFR SERPLBLD CREATININE-BSD FMLA CKD-EPI: >60 ML/MIN/1.73/M2
GLUCOSE SERPL-MCNC: 103 MG/DL (ref 70–110)
GLUCOSE SERPL-MCNC: 111 MG/DL (ref 70–110)
HCT VFR BLD AUTO: 31.9 % (ref 40–54)
HGB BLD-MCNC: 10.4 GM/DL (ref 14–18)
IMM GRANULOCYTES # BLD AUTO: 0.2 K/UL (ref 0–0.04)
IMM GRANULOCYTES NFR BLD AUTO: 2 % (ref 0–0.5)
INDIRECT COOMBS: NORMAL
LYMPHOCYTES # BLD AUTO: 1.86 K/UL (ref 1–4.8)
MAGNESIUM SERPL-MCNC: 2.1 MG/DL (ref 1.6–2.6)
MCH RBC QN AUTO: 33 PG (ref 27–31)
MCHC RBC AUTO-ENTMCNC: 32.6 G/DL (ref 32–36)
MCV RBC AUTO: 101 FL (ref 82–98)
NUCLEATED RBC (/100WBC) (OHS): 0 /100 WBC
PHOSPHATE SERPL-MCNC: 3.9 MG/DL (ref 2.7–4.5)
PLATELET # BLD AUTO: 182 K/UL (ref 150–450)
PMV BLD AUTO: 9.7 FL (ref 9.2–12.9)
POTASSIUM SERPL-SCNC: 4.8 MMOL/L (ref 3.5–5.1)
POTASSIUM SERPL-SCNC: 5 MMOL/L (ref 3.5–5.1)
PROT SERPL-MCNC: 6.2 GM/DL (ref 6–8.4)
RBC # BLD AUTO: 3.15 M/UL (ref 4.6–6.2)
RELATIVE EOSINOPHIL (OHS): 3.5 %
RELATIVE LYMPHOCYTE (OHS): 19 % (ref 18–48)
RELATIVE MONOCYTE (OHS): 13.1 % (ref 4–15)
RELATIVE NEUTROPHIL (OHS): 61.3 % (ref 38–73)
RH BLD: NORMAL
SODIUM SERPL-SCNC: 127 MMOL/L (ref 136–145)
SODIUM SERPL-SCNC: 130 MMOL/L (ref 136–145)
SPECIMEN OUTDATE: NORMAL
WBC # BLD AUTO: 9.79 K/UL (ref 3.9–12.7)

## 2025-04-20 PROCEDURE — 25000003 PHARM REV CODE 250: Mod: HCNC

## 2025-04-20 PROCEDURE — 80053 COMPREHEN METABOLIC PANEL: CPT | Mod: HCNC

## 2025-04-20 PROCEDURE — 63600175 PHARM REV CODE 636 W HCPCS: Mod: HCNC

## 2025-04-20 PROCEDURE — 85730 THROMBOPLASTIN TIME PARTIAL: CPT | Mod: HCNC

## 2025-04-20 PROCEDURE — 84100 ASSAY OF PHOSPHORUS: CPT | Mod: HCNC

## 2025-04-20 PROCEDURE — 36415 COLL VENOUS BLD VENIPUNCTURE: CPT | Mod: HCNC

## 2025-04-20 PROCEDURE — 83880 ASSAY OF NATRIURETIC PEPTIDE: CPT | Mod: HCNC

## 2025-04-20 PROCEDURE — 82550 ASSAY OF CK (CPK): CPT | Mod: HCNC

## 2025-04-20 PROCEDURE — 85025 COMPLETE CBC W/AUTO DIFF WBC: CPT | Mod: HCNC

## 2025-04-20 PROCEDURE — 20600001 HC STEP DOWN PRIVATE ROOM: Mod: HCNC

## 2025-04-20 PROCEDURE — 83735 ASSAY OF MAGNESIUM: CPT | Mod: HCNC

## 2025-04-20 PROCEDURE — 86900 BLOOD TYPING SEROLOGIC ABO: CPT | Mod: HCNC

## 2025-04-20 RX ADMIN — BUPROPION HYDROCHLORIDE 150 MG: 75 TABLET, FILM COATED ORAL at 09:04

## 2025-04-20 RX ADMIN — OXYCODONE HYDROCHLORIDE 10 MG: 10 TABLET ORAL at 07:04

## 2025-04-20 RX ADMIN — HYDROMORPHONE HYDROCHLORIDE 1 MG: 0.5 INJECTION, SOLUTION INTRAMUSCULAR; INTRAVENOUS; SUBCUTANEOUS at 09:04

## 2025-04-20 RX ADMIN — GABAPENTIN 300 MG: 300 CAPSULE ORAL at 09:04

## 2025-04-20 RX ADMIN — FOLIC ACID 1 MG: 1 TABLET ORAL at 09:04

## 2025-04-20 RX ADMIN — OXYCODONE 5 MG: 5 TABLET ORAL at 09:04

## 2025-04-20 RX ADMIN — OXYCODONE HYDROCHLORIDE 10 MG: 10 TABLET ORAL at 06:04

## 2025-04-20 RX ADMIN — Medication 6 MG: at 09:04

## 2025-04-20 RX ADMIN — OXYCODONE 5 MG: 5 TABLET ORAL at 04:04

## 2025-04-20 RX ADMIN — OXYCODONE HYDROCHLORIDE 10 MG: 10 TABLET ORAL at 01:04

## 2025-04-20 RX ADMIN — Medication 100 MG: at 09:04

## 2025-04-20 RX ADMIN — Medication 1 TABLET: at 09:04

## 2025-04-20 RX ADMIN — AMLODIPINE BESYLATE 10 MG: 10 TABLET ORAL at 09:04

## 2025-04-20 NOTE — ASSESSMENT & PLAN NOTE
Body mass index is 33.07 kg/m². Morbid obesity complicates all aspects of disease management from diagnostic modalities to treatment. Weight loss encouraged and health benefits explained to patient.

## 2025-04-20 NOTE — ASSESSMENT & PLAN NOTE
Patient's blood pressure range in the last 24 hours was: BP  Min: 119/58  Max: 170/75.The patient's inpatient anti-hypertensive regimen is listed below:  Current Antihypertensives  amLODIPine tablet 10 mg, Daily, Oral    Plan  - BP is uncontrolled, will adjust as follows: increase amlodipine to 10mg  - Will hold Losartan 100mg for time being given up-trending creatinine

## 2025-04-20 NOTE — SUBJECTIVE & OBJECTIVE
Interval History: NAEON. AFVSS. GenSurg changed packing and dressing at bedside today. Hg continues to downtrend. Small Creatinine bump on AM labs, but came back down by afternoon re-check. Would like to avoid giving IVF to patient given his cirrhosis/hyponatremia. GenSurg plan on taking patient to OR tomorrow for wound debridement and wound vac placement. Hep gtt discontinued today in anticipation of procedure tomorrow.     Review of Systems   Constitutional:  Negative for chills and fever.   Respiratory:  Negative for chest tightness and shortness of breath.    Cardiovascular:  Positive for leg swelling. Negative for chest pain.   Gastrointestinal:  Negative for abdominal pain, constipation, diarrhea, nausea and vomiting.   Genitourinary:  Negative for dysuria, frequency and urgency.   Musculoskeletal:  Positive for arthralgias and myalgias.   Skin:  Positive for color change and wound.   Neurological:  Positive for numbness. Negative for dizziness, weakness, light-headedness and headaches.     Objective:     Vital Signs (Most Recent):  Temp: 98.5 °F (36.9 °C) (04/20/25 1120)  Pulse: 80 (04/20/25 1120)  Resp: 18 (04/20/25 1301)  BP: (!) 147/65 (04/20/25 1120)  SpO2: 97 % (04/20/25 1120) Vital Signs (24h Range):  Temp:  [98.2 °F (36.8 °C)-98.8 °F (37.1 °C)] 98.5 °F (36.9 °C)  Pulse:  [76-89] 80  Resp:  [17-20] 18  SpO2:  [96 %-98 %] 97 %  BP: (119-170)/(58-75) 147/65     Weight: 120 kg (264 lb 8.8 oz)  Body mass index is 33.07 kg/m².    Intake/Output Summary (Last 24 hours) at 4/20/2025 1530  Last data filed at 4/20/2025 1100  Gross per 24 hour   Intake 240 ml   Output 900 ml   Net -660 ml         Physical Exam  Vitals and nursing note reviewed.   Constitutional:       General: He is not in acute distress.     Appearance: He is well-developed. He is not ill-appearing.   HENT:      Head: Normocephalic and atraumatic.   Eyes:      Pupils: Pupils are equal, round, and reactive to light.   Cardiovascular:      Rate and  Rhythm: Normal rate and regular rhythm.   Pulmonary:      Effort: Pulmonary effort is normal. No respiratory distress.      Breath sounds: Normal breath sounds. No wheezing or rales.   Abdominal:      Palpations: Abdomen is soft.      Tenderness: There is no abdominal tenderness.   Musculoskeletal:      Right lower leg: Edema present.      Left lower leg: No edema.      Comments: Sensation intact  RLE dressed per General surgery s/p hematoma evacuation- saturated dressing to lateral-posterior side   Skin:     General: Skin is warm and dry.   Neurological:      Mental Status: He is alert and oriented to person, place, and time.   Psychiatric:         Behavior: Behavior normal.               Significant Labs: All pertinent labs within the past 24 hours have been reviewed.  CBC:   Recent Labs   Lab 04/19/25 0312 04/20/25 0447   WBC 8.23 9.79   HGB 11.0* 10.4*   HCT 34.2* 31.9*    182     CMP:   Recent Labs   Lab 04/19/25 0312 04/20/25  0447 04/20/25  1202   * 130* 127*   K 4.9 5.0 4.8   CL 99 99 97   CO2 24 26 24   BUN 21 29* 24*   CREATININE 1.2 1.5* 1.2   CALCIUM 8.4* 8.3* 8.1*   ALBUMIN 3.1* 3.0*  --    BILITOT 1.1* 1.1*  --    ALKPHOS 84 84  --    AST 19 28  --    ALT 17 20  --    ANIONGAP 7* 5* 6*       Significant Imaging: I have reviewed all pertinent imaging results/findings within the past 24 hours.

## 2025-04-20 NOTE — ASSESSMENT & PLAN NOTE
Patient's blood pressure range in the last 24 hours was: BP  Min: 128/58  Max: 166/70.The patient's inpatient anti-hypertensive regimen is listed below:  Current Antihypertensives  amLODIPine tablet 10 mg, Daily, Oral    Plan  - BP is uncontrolled, will adjust as follows: increase amlodipine to 5mg  - Will hold Losartan 100mg for time being given up-trending creatinine

## 2025-04-20 NOTE — PROGRESS NOTES
[facility] - Telemetry Stepdown  General Surgery  Progress Note    Subjective:     History of Present Illness:  Froylan Borja is a 70 y.o. gentleman with PMH of alcohol abuse, cirrhosis, hepatocellular carcinoma undergoing Y90 therapy, and HTN who presents to the ED with R leg pain and swelling. He had Y90 through his R femoral artery 8 days ago. He has had some bruising and pain of his groin. He also bumped his calf on his car and has increasing swelling and discoloration. He did not notice until today, but he also has pitting edema to his right leg, much more than his left side. In the ED, he is hemodynamically normal though hypertensive. Labs significant for Na 129, K 5.7. US of his lower extremities show a R sided nonocclusive DVT and a R femo pseudoaneurysm. CT was also performed which demonstrated his hematoma over his R calf and extravasation of contrast into the pseudoaneusym. General surgery consulted for evaluation of drainage of his RLE hematoma.           Post-Op Info:  Procedure(s) (LRB):  DEBRIDEMENT, WOUND (Right)  APPLICATION, WOUND VAC (Right)         Interval History: Packing exchanged at bedside this am. Tender to palpation, otherwise doing well. OR tomorrow for debridement and vac application    Medications:  Continuous Infusions:   heparin (porcine) in D5W  0-40 Units/kg/hr Intravenous Continuous 13.2 mL/hr at 04/20/25 0548 11 Units/kg/hr at 04/20/25 0548     Scheduled Meds:   amLODIPine  10 mg Oral Daily    buPROPion  150 mg Oral BID    folic acid  1 mg Oral Daily    gabapentin  300 mg Oral BID    multivitamin  1 tablet Oral Daily    thiamine  100 mg Oral Daily     PRN Meds:  Current Facility-Administered Medications:     acetaminophen, 1,000 mg, Oral, Q8H PRN    albuterol-ipratropium, 3 mL, Nebulization, Q4H PRN    bisacodyL, 10 mg, Rectal, Daily PRN    dextrose 50%, 12.5 g, Intravenous, PRN    dextrose 50%, 25 g, Intravenous, PRN    glucagon (human recombinant), 1 mg, Intramuscular, PRN     glucose, 16 g, Oral, PRN    glucose, 24 g, Oral, PRN    heparin (PORCINE), 60 Units/kg, Intravenous, PRN    heparin (PORCINE), 30 Units/kg, Intravenous, PRN    HYDROmorphone, 1 mg, Intravenous, Q6H PRN    LORazepam, 2 mg, Oral, Q4H PRN    melatonin, 6 mg, Oral, Nightly PRN    ondansetron, 8 mg, Oral, Q8H PRN    oxyCODONE, 5 mg, Oral, Q4H PRN    oxyCODONE, 10 mg, Oral, Q6H PRN    promethazine, 25 mg, Oral, Q6H PRN    sodium chloride 0.9%, 10 mL, Intravenous, PRN     Review of patient's allergies indicates:   Allergen Reactions    Zoloft [sertraline] Other (See Comments)     hyponatremia     Objective:     Vital Signs (Most Recent):  Temp: 98.5 °F (36.9 °C) (04/20/25 0946)  Pulse: 89 (04/20/25 0946)  Resp: 20 (04/20/25 0946)  BP: (!) 170/75 (04/20/25 0946)  SpO2: 97 % (04/20/25 0946) Vital Signs (24h Range):  Temp:  [98.2 °F (36.8 °C)-98.8 °F (37.1 °C)] 98.5 °F (36.9 °C)  Pulse:  [75-89] 89  Resp:  [17-20] 20  SpO2:  [96 %-99 %] 97 %  BP: (119-170)/(58-75) 170/75     Weight: 120 kg (264 lb 8.8 oz)  Body mass index is 33.07 kg/m².    Intake/Output - Last 3 Shifts         04/18 0700  04/19 0659 04/19 0700 04/20 0659 04/20 0700  04/21 0659    P.O. 120 240     Total Intake(mL/kg) 120 (1) 240 (2)     Urine (mL/kg/hr)  300 (0.1)     Total Output  300     Net +120 -60            Urine Occurrence 1 x 1 x     Stool Occurrence 1 x 1 x              Physical Exam  Constitutional:       General: He is not in acute distress.     Appearance: Normal appearance.   HENT:      Head: Normocephalic and atraumatic.   Cardiovascular:      Rate and Rhythm: Normal rate and regular rhythm.   Pulmonary:      Effort: Pulmonary effort is normal. No respiratory distress.   Abdominal:      General: Abdomen is flat. There is no distension.      Palpations: Abdomen is soft.      Tenderness: There is no abdominal tenderness.   Musculoskeletal:      Comments: R sided femoral bruising  Large 7x7cm R calf hematoma, tense, skin discoloration. Tender to  palpation  3+ pitting edema RLE   Neurological:      General: No focal deficit present.      Mental Status: He is alert and oriented to person, place, and time.   Psychiatric:         Behavior: Behavior normal.         Thought Content: Thought content normal.          Significant Labs:  I have reviewed all pertinent lab results within the past 24 hours.  CBC:   Recent Labs   Lab 04/20/25  0447   WBC 9.79   RBC 3.15*   HGB 10.4*   HCT 31.9*      *   MCH 33.0*   MCHC 32.6     CMP:   Recent Labs   Lab 04/20/25  0447   CALCIUM 8.3*   ALBUMIN 3.0*   *   K 5.0   CO2 26   CL 99   BUN 29*   CREATININE 1.5*   ALKPHOS 84   ALT 20   AST 28   BILITOT 1.1*       Significant Diagnostics:  I have reviewed all pertinent imaging results/findings within the past 24 hours.  Assessment/Plan:     Sejal Borja is a 70 y.o. gentleman with HCC undergoing Y90 therapy with a RLE hematoma, R fem pseudoaneurysm, and a R sided DVT.    - OR tomorrow for wound debridement + vac application  - OOB/ambulate   - daily dressing / packing changes  - please hold anticoagulation        Krystal Connolly MD  General Surgery  [facility] - Tele[facility] Stepdown

## 2025-04-20 NOTE — ASSESSMENT & PLAN NOTE
Hyponatremia is likely due to Cirrhosis. The patient's most recent sodium results are listed below.  Recent Labs     04/19/25  0312 04/20/25  0447 04/20/25  1202   * 130* 127*     Plan  - Correct the sodium by 4-6mEq in 24 hours.   - Fluid and salt restriction  - Monitor sodium Daily.   - Patient hyponatremia is stable

## 2025-04-20 NOTE — ASSESSMENT & PLAN NOTE
Hyperkalemia is likely due to unclear.The patients most recent potassium results are listed below.  Recent Labs     04/18/25  0112 04/18/25  0938 04/19/25  0312   K 5.7* 4.5 4.9     Plan  - Monitor for arrhythmias with EKG and/or continuous telemetry.   - Treat the hyperkalemia with Potassium Binders.   - Monitor potassium: Daily  - The patient's hyperkalemia is stable  - BMP recheck this afternoon

## 2025-04-20 NOTE — ASSESSMENT & PLAN NOTE
- new swelling noted to RLE following IR procedure  - General surgery consulted- hematoma evacuation performed at bedside  - Plan on wound debridement and wound vac placement on 4/21. Heparin gtt held.

## 2025-04-20 NOTE — ASSESSMENT & PLAN NOTE
- RLE US showed a new right common femoral artery pseudoaneurysm   - CTA RLE showed extravasation within the pseudoaneurysm sac  - Vascular Surgery consulted- recommended IR guided thrombin injection. IR consulted, completed procedure on 4/18.   - Groin u/s on 4/19 showed continued thrombosis of the pseudoaneurysm  - Will need repeat U/S outpatient in 1 week and f/u with IR

## 2025-04-20 NOTE — ANESTHESIA PREPROCEDURE EVALUATION
[facility]  Anesthesia Pre-Operative Evaluation         Patient Name: Froylan Borja  YOB: 1955  MRN: [id number]    SUBJECTIVE:     Pre-operative evaluation for Procedure(s) (LRB):  DEBRIDEMENT, WOUND (Right)  APPLICATION, WOUND VAC (Right)     04/20/2025    Froylan Borja is a 70 y.o. male w/ a significant PMHx of alcohol abuse, cirrhosis, hepatocellular carcinoma undergoing Y90 treatment, and HTN who presents for complaint of R leg pain and swelling.   In the ED, he is hemodynamically normal though hypertensive. Labs significant for Na 129, K 5.7. US of his lower extremities show a R sided nonocclusive DVT and a R femo pseudoaneurysm. CT was also performed which demonstrated his hematoma over his R calf and extravasation of contrast into the pseudoaneusym.     Patient now presents for the above procedure(s).    Echo Summary  Results for orders placed in visit on 12/26/24    Echo Saline Bubble? No; Ultrasound enhancing contrast? No    Interpretation Summary    Left Ventricle: The left ventricle is normal in size. Ventricular mass is normal. Normal wall thickness. There is concentric remodeling. There is normal systolic function with a visually estimated ejection fraction of 55 - 60%. There is normal diastolic function.    Right Ventricle: Mild right ventricular enlargement. Wall thickness is normal. Systolic function is normal.    Aortic Valve: Aortic valve peak velocity is 1.2 m/s. Mean gradient is 2.8 mmHg.    Mitral Valve: There is mild regurgitation.       Prev airway: None documented.    LDA:        Peripheral IV - Single Lumen 04/19/25 0314 20 G Anterior;Right Upper Arm (Active)   Site Assessment Clean;Dry;Intact 04/20/25 1200   Line Securement Device Secured with sutureless device 04/19/25 2000   Line Status Flushed;Saline locked 04/20/25 1200   Dressing Status Clean;Dry;Intact 04/20/25 1200   Dressing Intervention Integrity maintained 04/20/25 1200   Site Change Due 04/23/25 04/19/25  2000   Reason Not Rotated Not due 04/19/25 2000   Number of days: 1       Drips: None documented.      Problem List[1]    Review of patient's allergies indicates:   Allergen Reactions    Zoloft [sertraline] Other (See Comments)     hyponatremia       Current Inpatient Medications:   amLODIPine  10 mg Oral Daily    buPROPion  150 mg Oral BID    folic acid  1 mg Oral Daily    gabapentin  300 mg Oral BID    multivitamin  1 tablet Oral Daily    thiamine  100 mg Oral Daily       Medications Ordered Prior to Encounter[2]    Past Surgical History:   Procedure Laterality Date    COLONOSCOPY N/A 9/19/2024    Procedure: COLONOSCOPY;  Surgeon: Mo Patino MD;  Location: [facility] ([address] [place] [address]);  Service: Endoscopy;  Laterality: N/A;  Ref by Dr. NIGHAT Tamez, PT/INR/CBC am procedure, Suprep, email - PC  9/16-lvm for pre call-tb-labs 8/22/24    ESOPHAGOGASTRODUODENOSCOPY N/A 9/19/2024    Procedure: EGD (ESOPHAGOGASTRODUODENOSCOPY);  Surgeon: Mo Patino MD;  Location: [address]);  Service: Endoscopy;  Laterality: N/A;    LIVER BIOPSY         Social History:  Tobacco Use: Medium Risk (4/18/2025)    Patient History     Smoking Tobacco Use: Former     Smokeless Tobacco Use: Never     Passive Exposure: Not on file      Alcohol Use: Alcohol Misuse (10/2/2024)    AUDIT-C     Frequency of Alcohol Consumption: 4 or more times a week     Average Number of Drinks: 5 or 6     Frequency of Binge Drinking: Weekly        OBJECTIVE:     Vital Signs Range (Last 24H):  Temp:  [36.8 °C (98.2 °F)-37.1 °C (98.8 °F)]   Pulse:  [76-89]   Resp:  [17-20]   BP: (119-170)/(58-75)   SpO2:  [96 %-98 %]       Significant Labs:  Lab Results   Component Value Date    WBC 9.79 04/20/2025    HGB 10.4 (L) 04/20/2025    HCT 31.9 (L) 04/20/2025     04/20/2025    CHOL 184 01/15/2025    TRIG 65 01/15/2025    HDL 80 (H) 01/15/2025    ALT 20 04/20/2025    AST 28 04/20/2025     (L) 04/20/2025    K 4.8 04/20/2025    CL 97 04/20/2025     CREATININE 1.2 04/20/2025    BUN 24 (H) 04/20/2025    CO2 24 04/20/2025    TSH 3.456 01/15/2025    PSA 5.77 (H) 03/22/2025    INR 1.0 04/18/2025    HGBA1C 4.7 01/15/2025       Diagnostic Studies: No relevant studies.    EKG:   Results for orders placed or performed during the hospital encounter of 04/18/25   EKG 12-lead    Collection Time: 04/18/25  2:26 AM   Result Value Ref Range    QRS Duration 82 ms    [facility] QTC Calculation 437 ms    Narrative    Test Reason : E87.5,    Vent. Rate :  83 BPM     Atrial Rate :  83 BPM     P-R Int : 158 ms          QRS Dur :  82 ms      QT Int : 372 ms       P-R-T Axes :  42  36  39 degrees    QTcB Int : 437 ms    Normal sinus rhythm  Abnormal R wave progression in the precordial leads - Cannot rule out  Anterior infarct ,age undetermined but doubt possibly lead placement  Abnormal ECG  When compared with ECG of 15-Dec-2023 14:09,  Minimal criteria for Anterior infarct are now possible  Confirmed by Froylan Pineda ([id number]) on 4/18/2025 9:13:03 AM    Referred By: AAAREFERRAL SELF           Confirmed By: Froylan Pineda       2D ECHO:  TTE:  Results for orders placed or performed in visit on 12/26/24   Echo Saline Bubble? No; Ultrasound enhancing contrast? No   Result Value Ref Range    LV Diastolic Volume 96.67 mL    Echo EF Estimated 47 %    LV Systolic Volume 51.42 mL    IVS 0.8 0.6 - 1.1 cm    LVIDd 4.6 3.5 - 6.0 cm    LVIDs 3.5 2.1 - 4.0 cm    LVOT diameter 2.4 cm    PW 1.0 0.6 - 1.1 cm    IVRT 91.34 ms    AV LVOT peak gradient 5 mmHg    LVOT mn grad 3 mmHg    LVOT peak talib 1.1 m/s    LVOT peak VTI 20.3 cm    RV- shannon basal diam 4.3 cm    RV S' 13.87 cm/s    LA size 3.85 cm    Left Atrium Major Axis 5.26 cm    Left Atrium Minor Axis 5.00 cm    LA Vol (MOD) 65.25 mL    RA Major Axis 5.10 cm    RA Area 14.5 cm2    AV valve area 4.0 cm2    AV area by cont VTI 4.0 cm2    AV peak gradient 5.8 mmHg    AV mean gradient 2.8 mmHg    Ao peak talib 1.2 m/s    Ao VTI 22.9 cm    MV Peak A Talib 0.78 m/s    E  "wave deceleration time 222.66 ms    MV Peak E Talib 0.84 m/s    E/A ratio 1.08     LV LATERAL E/E' RATIO 7.00     LV SEPTAL E/E' RATIO 10.50     TDI LATERAL 0.12 m/s    TDI SEPTAL 0.08 m/s    Ascending aorta 3.24 cm    STJ 2.99 cm    P vein A 0.3 m/s    MV "A" wave duration 134.16 ms    Pulm vein "A" wave 134.16 ms    PV Peak D Talib 0.38 m/s    PV Peak S Talib 0.49 m/s    IVC diameter 1.32 cm    Sinus 3.63 cm    RA Width 3.39 cm    TAPSE 2.49 cm    LA WIDTH 3.84 cm    BSA 2.52 m2    LVOT stroke volume 91.8 cm3    LV Systolic Volume Index 20.8 mL/m2    LV Diastolic Volume Index 39.14 mL/m2    LVOT area 4.5 cm2    FS 23.9 (A) 28 - 44 %    Left Ventricle Relative Wall Thickness 0.43 cm    LV mass 137.7 g    LV Mass Index 55.8 g/m2    E/E' ratio 8.40 m/s    JOSSIE 26.1 mL/m2    LA Vol 64.42 cm3    Pulm vein S/D ratio 1.29     RV/LV Ratio 0.93 cm    JOSSIE (MOD) 26.4 mL/m2    AV Velocity Ratio 0.92     AV index (prosthetic) 0.89     NEGRITO by Velocity Ratio 4.1 cm²    Mean e' 0.10 m/s    ZLVIDS -6.21     ZLVIDD -10.31     Est. RA pres 3 mmHg    Narrative      Left Ventricle: The left ventricle is normal in size. Ventricular mass   is normal. Normal wall thickness. There is concentric remodeling. There is   normal systolic function with a visually estimated ejection fraction of 55   - 60%. There is normal diastolic function.    Right Ventricle: Mild right ventricular enlargement. Wall thickness is   normal. Systolic function is normal.    Aortic Valve: Aortic valve peak velocity is 1.2 m/s. Mean gradient is   2.8 mmHg.    Mitral Valve: There is mild regurgitation.         STEPHANIE:  No results found for this or any previous visit.    ASSESSMENT/PLAN:       Pre-op Assessment    I have reviewed the Patient Summary Reports.     I have reviewed the Nursing Notes. I have reviewed the NPO Status.   I have reviewed the Medications.     Review of Systems  Anesthesia Hx:             Denies Family Hx of Anesthesia complications.    Denies Personal " Hx of Anesthesia complications.                    Social:  Alcohol Use       Hematology/Oncology:                      Current/Recent Cancer.            Oncology Comments: HCC undergoing Y90 treatment     Cardiovascular:     Denies Pacemaker. Hypertension    Denies CABG/stent.                                       Pulmonary:     Denies Asthma.   Denies Shortness of breath.                  Hepatic/GI:      Liver Disease, Hepatitis              Neurological:  Denies TIA.  Denies CVA.    Denies Seizures.                                Psych:  Psychiatric History                  Physical Exam  General: Well nourished, Cooperative, Alert and Oriented    Airway:  Mallampati: III / II  Mouth Opening: Normal  TM Distance: Normal  Tongue: Normal  Neck ROM: Normal ROM    Dental:  Intact    Chest/Lungs:  Normal Respiratory Rate    Heart:  Rate: Normal        Anesthesia Plan  Type of Anesthesia, risks & benefits discussed:    Anesthesia Type: Gen ETT, Gen Supraglottic Airway, Gen Natural Airway, MAC  Intra-op Monitoring Plan: Standard ASA Monitors  Post Op Pain Control Plan: multimodal analgesia and IV/PO Opioids PRN  Induction:  IV  Airway Plan: Direct and Video, Post-Induction  Informed Consent: Informed consent signed with the Patient and all parties understand the risks and agree with anesthesia plan.  All questions answered.   ASA Score: 3  Day of Surgery Review of History & Physical: H&P Update referred to the surgeon/provider.    Ready For Surgery From Anesthesia Perspective.     .           [1]   Patient Active Problem List  Diagnosis    Glaucoma    History of hepatitis C, s/p successful Rx w/ SVR24 - 1/2017    Cirrhosis    Anxiety    Erectile dysfunction    Essential hypertension    Alcohol use disorder, severe, dependence    Obesity (BMI 30-39.9)    Gross hematuria    Diverticulosis: seen on colonoscopy 2014    Hyponatremia    Septic arthritis of right ankle    Bacterial conjunctivitis    Major depressive disorder  in full remission    Alcohol withdrawal, uncomplicated    Fall    Homelessness    Impaired functional mobility and endurance    Nocturnal hypoxia    Primary osteoarthritis of right knee    Primary osteoarthritis of left knee    Rhabdomyolysis    Debility    Discharge planning issues    Irritant contact dermatitis due to fecal, urinary or dual incontinence    Alcohol-induced polyneuropathy    Calcified granuloma of lung    Purpura    HCC (hepatocellular carcinoma)    Benign prostatic hyperplasia    Pain following surgery or procedure    Hematoma    Pain of right lower extremity    Acute deep vein thrombosis (DVT) of right lower extremity    Pseudoaneurysm of femoral artery   [2]   No current facility-administered medications on file prior to encounter.     Current Outpatient Medications on File Prior to Encounter   Medication Sig Dispense Refill    amLODIPine (NORVASC) 2.5 MG tablet Take 1 tablet (2.5 mg total) by mouth once daily. 90 tablet 3    buPROPion (WELLBUTRIN SR) 150 MG TBSR 12 hr tablet Take 1 tablet (150 mg total) by mouth 2 (two) times daily. 180 tablet 1    gabapentin (NEURONTIN) 300 MG capsule One in AM and two at bedtime 90 capsule 6    losartan (COZAAR) 100 MG tablet Take 1 tablet (100 mg total) by mouth once daily. 90 tablet 3    multivitamin Tab Take 1 tablet by mouth once daily. (Patient not taking: Reported on 3/27/2025) 60 tablet 0    oxyCODONE-acetaminophen (PERCOCET)  mg per tablet Take 1 tablet by mouth every 6 (six) hours as needed for Pain. 5 tablet 0

## 2025-04-20 NOTE — ASSESSMENT & PLAN NOTE
Pain of R lower extremity   - RLE US showed a nonocclusive thrombus in the right common femoral vein   - Started Heparin gtt on 4/18 once cleared from IR standpoint  - Hg has been down-trending and dressings have been saturated with blood. Will give him one more day to stabilize  - If able to tolerate anticoagulation, will transition to oral DOAC. If not, will need to discuss IVC filter  - MM pain regimen

## 2025-04-20 NOTE — ASSESSMENT & PLAN NOTE
Froylan Borja is a 70 y.o. gentleman with HCC undergoing Y90 therapy with a RLE hematoma, R fem pseudoaneurysm, and a R sided DVT.    - OR tomorrow for wound debridement + vac application  - OOB/ambulate   - daily dressing / packing changes  - please hold anticoagulation

## 2025-04-20 NOTE — ASSESSMENT & PLAN NOTE
Hyponatremia is likely due to Cirrhosis. The patient's most recent sodium results are listed below.  Recent Labs     04/18/25  0112 04/18/25  0938 04/19/25  0312   * 131* 130*     Plan  - Correct the sodium by 4-6mEq in 24 hours.   - Monitor sodium Daily.   - Patient hyponatremia is stable

## 2025-04-20 NOTE — SUBJECTIVE & OBJECTIVE
Interval History: Packing exchanged at bedside this am. Tender to palpation, otherwise doing well. OR tomorrow for debridement and vac application    Medications:  Continuous Infusions:   heparin (porcine) in D5W  0-40 Units/kg/hr Intravenous Continuous 13.2 mL/hr at 04/20/25 0548 11 Units/kg/hr at 04/20/25 0548     Scheduled Meds:   amLODIPine  10 mg Oral Daily    buPROPion  150 mg Oral BID    folic acid  1 mg Oral Daily    gabapentin  300 mg Oral BID    multivitamin  1 tablet Oral Daily    thiamine  100 mg Oral Daily     PRN Meds:  Current Facility-Administered Medications:     acetaminophen, 1,000 mg, Oral, Q8H PRN    albuterol-ipratropium, 3 mL, Nebulization, Q4H PRN    bisacodyL, 10 mg, Rectal, Daily PRN    dextrose 50%, 12.5 g, Intravenous, PRN    dextrose 50%, 25 g, Intravenous, PRN    glucagon (human recombinant), 1 mg, Intramuscular, PRN    glucose, 16 g, Oral, PRN    glucose, 24 g, Oral, PRN    heparin (PORCINE), 60 Units/kg, Intravenous, PRN    heparin (PORCINE), 30 Units/kg, Intravenous, PRN    HYDROmorphone, 1 mg, Intravenous, Q6H PRN    LORazepam, 2 mg, Oral, Q4H PRN    melatonin, 6 mg, Oral, Nightly PRN    ondansetron, 8 mg, Oral, Q8H PRN    oxyCODONE, 5 mg, Oral, Q4H PRN    oxyCODONE, 10 mg, Oral, Q6H PRN    promethazine, 25 mg, Oral, Q6H PRN    sodium chloride 0.9%, 10 mL, Intravenous, PRN     Review of patient's allergies indicates:   Allergen Reactions    Zoloft [sertraline] Other (See Comments)     hyponatremia     Objective:     Vital Signs (Most Recent):  Temp: 98.5 °F (36.9 °C) (04/20/25 0946)  Pulse: 89 (04/20/25 0946)  Resp: 20 (04/20/25 0946)  BP: (!) 170/75 (04/20/25 0946)  SpO2: 97 % (04/20/25 0946) Vital Signs (24h Range):  Temp:  [98.2 °F (36.8 °C)-98.8 °F (37.1 °C)] 98.5 °F (36.9 °C)  Pulse:  [75-89] 89  Resp:  [17-20] 20  SpO2:  [96 %-99 %] 97 %  BP: (119-170)/(58-75) 170/75     Weight: 120 kg (264 lb 8.8 oz)  Body mass index is 33.07 kg/m².    Intake/Output - Last 3 Shifts          04/18 0700  04/19 0659 04/19 0700  04/20 0659 04/20 0700  04/21 0659    P.O. 120 240     Total Intake(mL/kg) 120 (1) 240 (2)     Urine (mL/kg/hr)  300 (0.1)     Total Output  300     Net +120 -60            Urine Occurrence 1 x 1 x     Stool Occurrence 1 x 1 x              Physical Exam  Constitutional:       General: He is not in acute distress.     Appearance: Normal appearance.   HENT:      Head: Normocephalic and atraumatic.   Cardiovascular:      Rate and Rhythm: Normal rate and regular rhythm.   Pulmonary:      Effort: Pulmonary effort is normal. No respiratory distress.   Abdominal:      General: Abdomen is flat. There is no distension.      Palpations: Abdomen is soft.      Tenderness: There is no abdominal tenderness.   Musculoskeletal:      Comments: R sided femoral bruising  Large 7x7cm R calf hematoma, tense, skin discoloration. Tender to palpation  3+ pitting edema RLE   Neurological:      General: No focal deficit present.      Mental Status: He is alert and oriented to person, place, and time.   Psychiatric:         Behavior: Behavior normal.         Thought Content: Thought content normal.          Significant Labs:  I have reviewed all pertinent lab results within the past 24 hours.  CBC:   Recent Labs   Lab 04/20/25  0447   WBC 9.79   RBC 3.15*   HGB 10.4*   HCT 31.9*      *   MCH 33.0*   MCHC 32.6     CMP:   Recent Labs   Lab 04/20/25  0447   CALCIUM 8.3*   ALBUMIN 3.0*   *   K 5.0   CO2 26   CL 99   BUN 29*   CREATININE 1.5*   ALKPHOS 84   ALT 20   AST 28   BILITOT 1.1*       Significant Diagnostics:  I have reviewed all pertinent imaging results/findings within the past 24 hours.

## 2025-04-20 NOTE — ASSESSMENT & PLAN NOTE
Pain of R lower extremity   - RLE US showed a nonocclusive thrombus in the right common femoral vein   - Started Heparin gtt on 4/18 once cleared from IR standpoint  - Hg has been down-trending and dressings have been saturated with blood.   - If able to tolerate anticoagulation, will transition to oral DOAC. If not, will need to discuss IVC filter  - Heparin held 4/20 in anticipation of surgical intervention tomorrow  - MM pain regimen

## 2025-04-20 NOTE — PROGRESS NOTES
Edmund García - Telemetry [facility] Medicine  Progress Note    Patient Name: Froylan Borja  MRN: [id number]  Patient Class: IP- Inpatient   Admission Date: 4/18/2025  Length of Stay: 2 days  Attending Physician: Danay Guzman MD  Primary Care Provider: Janki Tamez MD        Subjective     Principal Problem:Acute deep vein thrombosis (DVT) of right lower extremity        HPI:  Mr. Froylan Borja is a 71 y/o male with a PMHx alcohol abuse, cirrhosis, hepatocellular carcinoma, and HTN who presents for complaint of R leg pain and swelling. He reports yesterday he hit his R leg on the car door and shortly developed a bruise and pain to the R lateral leg. He tried tylenol at-home though pain, swelling, and bruising continued to worsen. Admits to paresthesias and numbness to the R foot as well as significant R foot pain with weight-bearing. Denies CP, palpitations, SOB, lightheadedness, dizziness, headaches, LOC, vomiting, diarrhea, abdominal pain, fever, chills. Reports some nausea following morphine and dilaudid for pain in ED. Of note, patient had angiogram y90 mapping study done with IR last Thursday (4/10/25). Has developed some bruising to the medial thigh and mild groin discomfort since procedure. Social history significant for daily alcohol consumption, 3-4 beers daily, last alcoholic beverage yesterday around 3PM.     In the ED, AF HDS. H&H 13.0/38.6 (BL~14.8), no leukocytosis. HypoNa 129, HyperK 5.7, CO2 20, Ca 8.6, Albumin 3.4, remainder of CMP unremarkable. PT/INR wnl at 10.9/1.0. LA 1.6. CPK wnl. Hep C Ab reactive, HCV RNA pending. EKG with normal sinus rhythm. R Lower Ext U/S w/ findings of nonocclusive thrombus in the right common femoral vein and right common femoral artery pseudoaneurysm. CTA Lower Ext pending. R lower extremity hematoma drained at bedside by general surgery with evacuation of 120 cc of hematoma. Compressive dressing applied. Given nebulized albuterol, lokelma, IV cefazolin, IV  dilaudid, IV morphine, IV zofran. Admitted to .     Overview/Hospital Course:  70 y.o M with HTN, alcohol/HepC cirrhosis c/b HCC s/p Y-90 mapping angiogram on 4/10 who presents with bruising and pain at angiogram site and RLE calf pain after he hit it on a car door. RLE U/S showed non-occlusive CFV thrombus and R CFA pseudoaneurysm. CTA re-demonstrated the psdueoaneurysm with active extravasation within the sac as well as a large R calf hematoma which GenSurg were consulted for and successfully drained (120ccs). Vascular Surgery were consulted, recommended IR guided percutaneous thrombin injection which IR were able to complete. Heparin gtt was started. Groin U/S done the following day showed continued thrombosis of the pseudoanurysm. GenSurg plan for OR on 4/21 for debridement and wound vac placement. Will need to f/u with IR for repeat U/S in 1 week.     Interval History: NAEON. AFVSS. GenSurg changed packing and dressing at bedside today. Hg continues to downtrend. Small Creatinine bump on AM labs, but came back down by afternoon re-check. Would like to avoid giving IVF to patient given his cirrhosis/hyponatremia. GenSurg plan on taking patient to OR tomorrow for wound debridement and wound vac placement. Hep gtt discontinued today in anticipation of procedure tomorrow.     Review of Systems   Constitutional:  Negative for chills and fever.   Respiratory:  Negative for chest tightness and shortness of breath.    Cardiovascular:  Positive for leg swelling. Negative for chest pain.   Gastrointestinal:  Negative for abdominal pain, constipation, diarrhea, nausea and vomiting.   Genitourinary:  Negative for dysuria, frequency and urgency.   Musculoskeletal:  Positive for arthralgias and myalgias.   Skin:  Positive for color change and wound.   Neurological:  Positive for numbness. Negative for dizziness, weakness, light-headedness and headaches.     Objective:     Vital Signs (Most Recent):  Temp: 98.5 °F (36.9 °C)  (04/20/25 1120)  Pulse: 80 (04/20/25 1120)  Resp: 18 (04/20/25 1301)  BP: (!) 147/65 (04/20/25 1120)  SpO2: 97 % (04/20/25 1120) Vital Signs (24h Range):  Temp:  [98.2 °F (36.8 °C)-98.8 °F (37.1 °C)] 98.5 °F (36.9 °C)  Pulse:  [76-89] 80  Resp:  [17-20] 18  SpO2:  [96 %-98 %] 97 %  BP: (119-170)/(58-75) 147/65     Weight: 120 kg (264 lb 8.8 oz)  Body mass index is 33.07 kg/m².    Intake/Output Summary (Last 24 hours) at 4/20/2025 1530  Last data filed at 4/20/2025 1100  Gross per 24 hour   Intake 240 ml   Output 900 ml   Net -660 ml         Physical Exam  Vitals and nursing note reviewed.   Constitutional:       General: He is not in acute distress.     Appearance: He is well-developed. He is not ill-appearing.   HENT:      Head: Normocephalic and atraumatic.   Eyes:      Pupils: Pupils are equal, round, and reactive to light.   Cardiovascular:      Rate and Rhythm: Normal rate and regular rhythm.   Pulmonary:      Effort: Pulmonary effort is normal. No respiratory distress.      Breath sounds: Normal breath sounds. No wheezing or rales.   Abdominal:      Palpations: Abdomen is soft.      Tenderness: There is no abdominal tenderness.   Musculoskeletal:      Right lower leg: Edema present.      Left lower leg: No edema.      Comments: Sensation intact  RLE dressed per General surgery s/p hematoma evacuation- saturated dressing to lateral-posterior side   Skin:     General: Skin is warm and dry.   Neurological:      Mental Status: He is alert and oriented to person, place, and time.   Psychiatric:         Behavior: Behavior normal.               Significant Labs: All pertinent labs within the past 24 hours have been reviewed.  CBC:   Recent Labs   Lab 04/19/25 0312 04/20/25  0447   WBC 8.23 9.79   HGB 11.0* 10.4*   HCT 34.2* 31.9*    182     CMP:   Recent Labs   Lab 04/19/25 0312 04/20/25  0447 04/20/25  1202   * 130* 127*   K 4.9 5.0 4.8   CL 99 99 97   CO2 24 26 24   BUN 21 29* 24*   CREATININE 1.2  1.5* 1.2   CALCIUM 8.4* 8.3* 8.1*   ALBUMIN 3.1* 3.0*  --    BILITOT 1.1* 1.1*  --    ALKPHOS 84 84  --    AST 19 28  --    ALT 17 20  --    ANIONGAP 7* 5* 6*       Significant Imaging: I have reviewed all pertinent imaging results/findings within the past 24 hours.      Assessment & Plan  Acute deep vein thrombosis (DVT) of right lower extremity  Pain of R lower extremity   - RLE US showed a nonocclusive thrombus in the right common femoral vein   - Started Heparin gtt on 4/18 once cleared from IR standpoint  - Hg has been down-trending and dressings have been saturated with blood.   - If able to tolerate anticoagulation, will transition to oral DOAC. If not, will need to discuss IVC filter  - Heparin held 4/20 in anticipation of surgical intervention tomorrow  - MM pain regimen   Essential hypertension  Patient's blood pressure range in the last 24 hours was: BP  Min: 119/58  Max: 170/75.The patient's inpatient anti-hypertensive regimen is listed below:  Current Antihypertensives  amLODIPine tablet 10 mg, Daily, Oral    Plan  - BP is uncontrolled, will adjust as follows: increase amlodipine to 10mg  - Will hold Losartan 100mg for time being given up-trending creatinine  Alcohol use disorder, severe, dependence  - daily drinker, last drink 4/17  - 3-4 beers daily, denies withdrawal symptoms  - continue thiamine, folic acid and multivitamin  - CIWA per nursing   Obesity (BMI 30-39.9)  Body mass index is 33.07 kg/m². Morbid obesity complicates all aspects of disease management from diagnostic modalities to treatment. Weight loss encouraged and health benefits explained to patient.   Hyponatremia  Hyponatremia is likely due to Cirrhosis. The patient's most recent sodium results are listed below.  Recent Labs     04/19/25  0312 04/20/25  0447 04/20/25  1202   * 130* 127*     Plan  - Correct the sodium by 4-6mEq in 24 hours.   - Fluid and salt restriction  - Monitor sodium Daily.   - Patient hyponatremia is  stable  Hematoma  - new swelling noted to RLE following IR procedure  - General surgery consulted- hematoma evacuation performed at bedside  - Plan on wound debridement and wound vac placement on 4/21. Heparin gtt held.   Pseudoaneurysm of femoral artery  - RLE US showed a new right common femoral artery pseudoaneurysm   - CTA RLE showed extravasation within the pseudoaneurysm sac  - Vascular Surgery consulted- recommended IR guided thrombin injection. IR consulted, completed procedure on 4/18.   - Groin u/s on 4/19 showed continued thrombosis of the pseudoaneurysm  - Will need repeat U/S outpatient in 1 week and f/u with IR  VTE Risk Mitigation (From admission, onward)           Ordered     IP VTE HIGH RISK PATIENT  Once         04/18/25 0725     Reason for No Pharmacological VTE Prophylaxis  Once        Question:  Reasons:  Answer:  Risk of Bleeding  Comment:  will start anticoagulation once medically cleared    04/18/25 0725                    Discharge Planning   KENNETH:      Code Status: Full Code   Medical Readiness for Discharge Date:            Danay Guzman MD  Department of Hospital Medicine   [facility] - Telemetry Stepdown

## 2025-04-20 NOTE — PROGRESS NOTES
Edmund García - Telemetry [facility] Medicine  Progress Note    Patient Name: Froylan Borja  MRN: [id number]  Patient Class: IP- Inpatient   Admission Date: 4/18/2025  Length of Stay: 1 days  Attending Physician: Danay Guzman MD  Primary Care Provider: Janki Tamez MD        Subjective     Principal Problem:Acute deep vein thrombosis (DVT) of right lower extremity        HPI:  Mr. Froylan Borja is a 71 y/o male with a PMHx alcohol abuse, cirrhosis, hepatocellular carcinoma, and HTN who presents for complaint of R leg pain and swelling. He reports yesterday he hit his R leg on the car door and shortly developed a bruise and pain to the R lateral leg. He tried tylenol at-home though pain, swelling, and bruising continued to worsen. Admits to paresthesias and numbness to the R foot as well as significant R foot pain with weight-bearing. Denies CP, palpitations, SOB, lightheadedness, dizziness, headaches, LOC, vomiting, diarrhea, abdominal pain, fever, chills. Reports some nausea following morphine and dilaudid for pain in ED. Of note, patient had angiogram y90 mapping study done with IR last Thursday (4/10/25). Has developed some bruising to the medial thigh and mild groin discomfort since procedure. Social history significant for daily alcohol consumption, 3-4 beers daily, last alcoholic beverage yesterday around 3PM.     In the ED, AF HDS. H&H 13.0/38.6 (BL~14.8), no leukocytosis. HypoNa 129, HyperK 5.7, CO2 20, Ca 8.6, Albumin 3.4, remainder of CMP unremarkable. PT/INR wnl at 10.9/1.0. LA 1.6. CPK wnl. Hep C Ab reactive, HCV RNA pending. EKG with normal sinus rhythm. R Lower Ext U/S w/ findings of nonocclusive thrombus in the right common femoral vein and right common femoral artery pseudoaneurysm. CTA Lower Ext pending. R lower extremity hematoma drained at bedside by general surgery with evacuation of 120 cc of hematoma. Compressive dressing applied. Given nebulized albuterol, lokelma, IV cefazolin, IV  dilaudid, IV morphine, IV zofran. Admitted to .     Overview/Hospital Course:  70 y.o M with HTN, alcohol/HepC cirrhosis c/b HCC s/p Y-90 mapping angiogram on 4/10 who presents with bruising and pain at angiogram site and RLE calf pain after he hit it on a car door. RLE U/S showed non-occlusive CFV thrombus and R CFA pseudoaneurysm. CTA re-demonstrated the psdueoaneurysm with active extravasation within the sac as well as a large R calf hematoma which GenSurg were consulted for and successfully drained (120ccs). Vascular Surgery were consulted, recommended IR guided percutaneous thrombin injection which IR were able to complete. Heparin gtt was started. Groin U/S done the following day showed continued thrombosis of the pseudoanurysm. Will need to f/u with IR for repeat U/S in 1 week.     Interval History: NAEON. AFVSS. GenSurg changed packing and dressing at bedside today. Dressing saturated with blood by time of my evaluation. Exchanged dressing with RN. Hg continues to downtrend. Asked GenSurg to take pics of wound everyday, if can't control bleeding or hematoma re-expanding, will have to hold anticoagulation and discuss IVC filter.     Review of Systems   Constitutional:  Negative for chills and fever.   Respiratory:  Negative for chest tightness and shortness of breath.    Cardiovascular:  Positive for leg swelling. Negative for chest pain.   Gastrointestinal:  Negative for abdominal pain, constipation, diarrhea, nausea and vomiting.   Genitourinary:  Negative for dysuria, frequency and urgency.   Musculoskeletal:  Positive for arthralgias and myalgias.   Skin:  Positive for color change and wound.   Neurological:  Positive for numbness. Negative for dizziness, weakness, light-headedness and headaches.     Objective:     Vital Signs (Most Recent):  Temp: 98.4 °F (36.9 °C) (04/19/25 1630)  Pulse: 81 (04/19/25 1630)  Resp: 20 (04/19/25 1630)  BP: (!) 132/58 (04/19/25 1630)  SpO2: 96 % (04/19/25 1630) Vital  Signs (24h Range):  Temp:  [97.7 °F (36.5 °C)-98.5 °F (36.9 °C)] 98.4 °F (36.9 °C)  Pulse:  [70-85] 81  Resp:  [18-20] 20  SpO2:  [96 %-99 %] 96 %  BP: (128-166)/(58-70) 132/58     Weight: 120 kg (264 lb 8.8 oz)  Body mass index is 33.07 kg/m².    Intake/Output Summary (Last 24 hours) at 4/19/2025 1859  Last data filed at 4/18/2025 2117  Gross per 24 hour   Intake 120 ml   Output --   Net 120 ml         Physical Exam  Vitals and nursing note reviewed.   Constitutional:       General: He is not in acute distress.     Appearance: He is well-developed. He is not ill-appearing.   HENT:      Head: Normocephalic and atraumatic.   Eyes:      Pupils: Pupils are equal, round, and reactive to light.   Cardiovascular:      Rate and Rhythm: Normal rate and regular rhythm.   Pulmonary:      Effort: Pulmonary effort is normal. No respiratory distress.      Breath sounds: Normal breath sounds. No wheezing or rales.   Abdominal:      Palpations: Abdomen is soft.      Tenderness: There is no abdominal tenderness.   Musculoskeletal:      Right lower leg: Edema present.      Left lower leg: No edema.      Comments: Sensation intact  RLE dressed per General surgery s/p hematoma evacuation- saturated dressing to lateral-posterior side   Skin:     General: Skin is warm and dry.   Neurological:      Mental Status: He is alert and oriented to person, place, and time.   Psychiatric:         Behavior: Behavior normal.               Significant Labs: All pertinent labs within the past 24 hours have been reviewed.  CBC:   Recent Labs   Lab 04/18/25  1153 04/18/25  1446 04/19/25  0312   WBC 10.52 8.78 8.23   HGB 12.7* 12.0* 11.0*   HCT 38.7* 36.3* 34.2*    206 189     CMP:   Recent Labs   Lab 04/18/25  0112 04/18/25  0938 04/19/25  0312   * 131* 130*   K 5.7* 4.5 4.9    99 99   CO2 20* 22* 24   BUN 14 14 21   CREATININE 1.1 0.9 1.2   CALCIUM 8.6* 8.6* 8.4*   ALBUMIN 3.4*  --  3.1*   BILITOT 0.7  --  1.1*   ALKPHOS 93  --  84    AST 37  --  19   ALT 24  --  17   ANIONGAP 8 10 7*       Significant Imaging: I have reviewed all pertinent imaging results/findings within the past 24 hours.      Assessment & Plan  Acute deep vein thrombosis (DVT) of right lower extremity  Pain of R lower extremity   - RLE US showed a nonocclusive thrombus in the right common femoral vein   - Started Heparin gtt on 4/18 once cleared from IR standpoint  - Hg has been down-trending and dressings have been saturated with blood. Will give him one more day to stabilize  - If able to tolerate anticoagulation, will transition to oral DOAC. If not, will need to discuss IVC filter  - MM pain regimen   Essential hypertension  Patient's blood pressure range in the last 24 hours was: BP  Min: 128/58  Max: 166/70.The patient's inpatient anti-hypertensive regimen is listed below:  Current Antihypertensives  amLODIPine tablet 10 mg, Daily, Oral    Plan  - BP is uncontrolled, will adjust as follows: increase amlodipine to 5mg  - Will hold Losartan 100mg for time being given up-trending creatinine  Alcohol use disorder, severe, dependence  - daily drinker, last drink 4/17  - 3-4 beers daily, denies withdrawal symptoms  - continue thiamine, folic acid and multivitamin  - CIWA per nursing   Obesity (BMI 30-39.9)  Body mass index is 33.07 kg/m². Morbid obesity complicates all aspects of disease management from diagnostic modalities to treatment. Weight loss encouraged and health benefits explained to patient.   Hyponatremia  Hyponatremia is likely due to Cirrhosis. The patient's most recent sodium results are listed below.  Recent Labs     04/18/25  0112 04/18/25  0938 04/19/25  0312   * 131* 130*     Plan  - Correct the sodium by 4-6mEq in 24 hours.   - Monitor sodium Daily.   - Patient hyponatremia is stable  Hematoma  - new swelling noted to RLE following IR procedure  - General surgery consulted- hematoma evacuation performed at bedside  - recommend admission to medicine  for electrolyte abnormalities, anticoagulation for DVT, and interdisciplinary coordination  - ok from general surgery standpoint for anticoagulation; would reach out to vascular surgery prior to anticoagulation   - hematoma drained at bedside with evacuation of 120 cc of hematoma. Compressive dressing applied.   - skin may necrose in coming days. Will check in on him. Discussed that he may need a skin excision but hopefully his skin can recover  Pseudoaneurysm of femoral artery  - RLE US showed a new right common femoral artery pseudoaneurysm   - CTA RLE showed extravasation within the pseudoaneurysm sac  - Vascular Surgery consulted- recommended IR guided thrombin injection. IR consulted, completed procedure on 4/18.   - Groin u/s on 4/19 showed continued thrombosis of the pseudoaneurysm  - Will need repeat U/S outpatient in 1 week and f/u with IR  Hyperkalemia (Resolved: 4/19/2025)  Hyperkalemia is likely due to  unclear .The patients most recent potassium results are listed below.  Recent Labs     04/18/25  0112 04/18/25  0938 04/19/25  0312   K 5.7* 4.5 4.9     Plan  - Monitor for arrhythmias with EKG and/or continuous telemetry.   - Treat the hyperkalemia with Potassium Binders.   - Monitor potassium: Daily  - The patient's hyperkalemia is stable  - BMP recheck this afternoon    VTE Risk Mitigation (From admission, onward)           Ordered     heparin 25,000 units in dextrose 5% (100 units/ml) IV bolus from bag HIGH INTENSITY nomogram - [facility]  As needed (PRN)        Question:  Heparin Infusion Adjustment (DO NOT MODIFY ANSWER)  Answer:  [email or web address]\Images\Pharmacy\HeparinInfusions\heparin HIGH INTENSITY nomogram for [facility] [id number]    04/18/25 1440     heparin 25,000 units in dextrose 5% (100 units/ml) IV bolus from bag HIGH INTENSITY nomogram - [facility]  As needed (PRN)        Question:  Heparin Infusion Adjustment (DO NOT MODIFY ANSWER)  Answer:  \[email or web address]\Images\Pharmacy\HeparinInfusions\heparin  HIGH INTENSITY nomogram for [facility] [id number]    04/18/25 1440     heparin 25,000 units in dextrose 5% 250 mL (100 units/mL) infusion HIGH INTENSITY nomogram - [facility]  Continuous        Question:  Begin at (units/kg/hr)  Answer:  18    04/18/25 1440     IP VTE HIGH RISK PATIENT  Once         04/18/25 0725     Reason for No Pharmacological VTE Prophylaxis  Once        Question:  Reasons:  Answer:  Risk of Bleeding  Comment:  will start anticoagulation once medically cleared    04/18/25 0725                    Discharge Planning   KENNETH:      Code Status: Full Code   Medical Readiness for Discharge Date:              Danay Guzman MD  Department of Hospital Medicine   [facility] - Telemetry Stepdown

## 2025-04-21 ENCOUNTER — ANESTHESIA (OUTPATIENT)
Dept: SURGERY | Facility: HOSPITAL | Age: 70
End: 2025-04-21
Payer: MEDICARE

## 2025-04-21 LAB
ABSOLUTE EOSINOPHIL (OHS): 0.31 K/UL
ABSOLUTE MONOCYTE (OHS): 1.66 K/UL (ref 0.3–1)
ABSOLUTE NEUTROPHIL COUNT (OHS): 8 K/UL (ref 1.8–7.7)
ALBUMIN SERPL BCP-MCNC: 3 G/DL (ref 3.5–5.2)
ALP SERPL-CCNC: 87 UNIT/L (ref 40–150)
ALT SERPL W/O P-5'-P-CCNC: 25 UNIT/L (ref 10–44)
ANION GAP (OHS): 7 MMOL/L (ref 8–16)
APTT PPP: 28 SECONDS (ref 21–32)
AST SERPL-CCNC: 29 UNIT/L (ref 11–45)
BASOPHILS # BLD AUTO: 0.11 K/UL
BASOPHILS NFR BLD AUTO: 0.9 %
BILIRUB SERPL-MCNC: 1.1 MG/DL (ref 0.1–1)
BUN SERPL-MCNC: 25 MG/DL (ref 8–23)
CALCIUM SERPL-MCNC: 8.3 MG/DL (ref 8.7–10.5)
CHLORIDE SERPL-SCNC: 98 MMOL/L (ref 95–110)
CO2 SERPL-SCNC: 25 MMOL/L (ref 23–29)
CREAT SERPL-MCNC: 1.3 MG/DL (ref 0.5–1.4)
ERYTHROCYTE [DISTWIDTH] IN BLOOD BY AUTOMATED COUNT: 13.7 % (ref 11.5–14.5)
GFR SERPLBLD CREATININE-BSD FMLA CKD-EPI: 59 ML/MIN/1.73/M2
GLUCOSE SERPL-MCNC: 109 MG/DL (ref 70–110)
HCT VFR BLD AUTO: 31.2 % (ref 40–54)
HCV RNA SERPL NAA+PROBE-LOG IU: NOT DETECTED {LOG_IU}/ML
HGB BLD-MCNC: 10 GM/DL (ref 14–18)
IMM GRANULOCYTES # BLD AUTO: 0.29 K/UL (ref 0–0.04)
IMM GRANULOCYTES NFR BLD AUTO: 2.4 % (ref 0–0.5)
LYMPHOCYTES # BLD AUTO: 1.49 K/UL (ref 1–4.8)
MAGNESIUM SERPL-MCNC: 2.1 MG/DL (ref 1.6–2.6)
MCH RBC QN AUTO: 32.3 PG (ref 27–31)
MCHC RBC AUTO-ENTMCNC: 32.1 G/DL (ref 32–36)
MCV RBC AUTO: 101 FL (ref 82–98)
NUCLEATED RBC (/100WBC) (OHS): 0 /100 WBC
PHOSPHATE SERPL-MCNC: 2.9 MG/DL (ref 2.7–4.5)
PLATELET # BLD AUTO: 193 K/UL (ref 150–450)
PMV BLD AUTO: 9.6 FL (ref 9.2–12.9)
POTASSIUM SERPL-SCNC: 4.8 MMOL/L (ref 3.5–5.1)
PROT SERPL-MCNC: 6.4 GM/DL (ref 6–8.4)
RBC # BLD AUTO: 3.1 M/UL (ref 4.6–6.2)
RELATIVE EOSINOPHIL (OHS): 2.6 %
RELATIVE LYMPHOCYTE (OHS): 12.6 % (ref 18–48)
RELATIVE MONOCYTE (OHS): 14 % (ref 4–15)
RELATIVE NEUTROPHIL (OHS): 67.5 % (ref 38–73)
SODIUM SERPL-SCNC: 130 MMOL/L (ref 136–145)
WBC # BLD AUTO: 11.86 K/UL (ref 3.9–12.7)

## 2025-04-21 PROCEDURE — 63600175 PHARM REV CODE 636 W HCPCS: Mod: HCNC

## 2025-04-21 PROCEDURE — 0JCN0ZZ EXTIRPATION OF MATTER FROM RIGHT LOWER LEG SUBCUTANEOUS TISSUE AND FASCIA, OPEN APPROACH: ICD-10-PCS | Performed by: STUDENT IN AN ORGANIZED HEALTH CARE EDUCATION/TRAINING PROGRAM

## 2025-04-21 PROCEDURE — 20600001 HC STEP DOWN PRIVATE ROOM: Mod: HCNC

## 2025-04-21 PROCEDURE — 71000015 HC POSTOP RECOV 1ST HR: Mod: HCNC | Performed by: STUDENT IN AN ORGANIZED HEALTH CARE EDUCATION/TRAINING PROGRAM

## 2025-04-21 PROCEDURE — 36000707: Mod: HCNC | Performed by: STUDENT IN AN ORGANIZED HEALTH CARE EDUCATION/TRAINING PROGRAM

## 2025-04-21 PROCEDURE — 25000003 PHARM REV CODE 250: Mod: HCNC

## 2025-04-21 PROCEDURE — 85025 COMPLETE CBC W/AUTO DIFF WBC: CPT | Mod: HCNC

## 2025-04-21 PROCEDURE — 37000008 HC ANESTHESIA 1ST 15 MINUTES: Mod: HCNC | Performed by: STUDENT IN AN ORGANIZED HEALTH CARE EDUCATION/TRAINING PROGRAM

## 2025-04-21 PROCEDURE — 71000033 HC RECOVERY, INTIAL HOUR: Mod: HCNC | Performed by: STUDENT IN AN ORGANIZED HEALTH CARE EDUCATION/TRAINING PROGRAM

## 2025-04-21 PROCEDURE — 85730 THROMBOPLASTIN TIME PARTIAL: CPT | Mod: HCNC

## 2025-04-21 PROCEDURE — 83735 ASSAY OF MAGNESIUM: CPT | Mod: HCNC

## 2025-04-21 PROCEDURE — 80053 COMPREHEN METABOLIC PANEL: CPT | Mod: HCNC

## 2025-04-21 PROCEDURE — 84100 ASSAY OF PHOSPHORUS: CPT | Mod: HCNC

## 2025-04-21 PROCEDURE — 27201423 OPTIME MED/SURG SUP & DEVICES STERILE SUPPLY: Mod: HCNC | Performed by: STUDENT IN AN ORGANIZED HEALTH CARE EDUCATION/TRAINING PROGRAM

## 2025-04-21 PROCEDURE — 37000009 HC ANESTHESIA EA ADD 15 MINS: Mod: HCNC | Performed by: STUDENT IN AN ORGANIZED HEALTH CARE EDUCATION/TRAINING PROGRAM

## 2025-04-21 PROCEDURE — 36415 COLL VENOUS BLD VENIPUNCTURE: CPT | Mod: HCNC

## 2025-04-21 PROCEDURE — 71000016 HC POSTOP RECOV ADDL HR: Mod: HCNC | Performed by: STUDENT IN AN ORGANIZED HEALTH CARE EDUCATION/TRAINING PROGRAM

## 2025-04-21 PROCEDURE — 36000706: Mod: HCNC | Performed by: STUDENT IN AN ORGANIZED HEALTH CARE EDUCATION/TRAINING PROGRAM

## 2025-04-21 PROCEDURE — 63600175 PHARM REV CODE 636 W HCPCS: Mod: HCNC | Performed by: STUDENT IN AN ORGANIZED HEALTH CARE EDUCATION/TRAINING PROGRAM

## 2025-04-21 RX ORDER — SODIUM CHLORIDE 0.9 % (FLUSH) 0.9 %
10 SYRINGE (ML) INJECTION
Status: DISCONTINUED | OUTPATIENT
Start: 2025-04-21 | End: 2025-04-21

## 2025-04-21 RX ORDER — HALOPERIDOL LACTATE 5 MG/ML
0.5 INJECTION, SOLUTION INTRAMUSCULAR EVERY 10 MIN PRN
Status: DISCONTINUED | OUTPATIENT
Start: 2025-04-21 | End: 2025-04-21

## 2025-04-21 RX ORDER — GLUCAGON 1 MG
1 KIT INJECTION
Status: DISCONTINUED | OUTPATIENT
Start: 2025-04-21 | End: 2025-04-21

## 2025-04-21 RX ORDER — ROCURONIUM BROMIDE 10 MG/ML
INJECTION, SOLUTION INTRAVENOUS
Status: DISCONTINUED | OUTPATIENT
Start: 2025-04-21 | End: 2025-04-21

## 2025-04-21 RX ORDER — OXYCODONE HYDROCHLORIDE 5 MG/1
5 TABLET ORAL
Refills: 0 | Status: DISCONTINUED | OUTPATIENT
Start: 2025-04-21 | End: 2025-04-21

## 2025-04-21 RX ORDER — FENTANYL CITRATE 50 UG/ML
25 INJECTION, SOLUTION INTRAMUSCULAR; INTRAVENOUS EVERY 5 MIN PRN
Refills: 0 | Status: COMPLETED | OUTPATIENT
Start: 2025-04-21 | End: 2025-04-21

## 2025-04-21 RX ORDER — ONDANSETRON HYDROCHLORIDE 2 MG/ML
INJECTION, SOLUTION INTRAVENOUS
Status: DISCONTINUED | OUTPATIENT
Start: 2025-04-21 | End: 2025-04-21

## 2025-04-21 RX ORDER — HALOPERIDOL LACTATE 5 MG/ML
0.5 INJECTION, SOLUTION INTRAMUSCULAR EVERY 10 MIN PRN
Status: DISCONTINUED | OUTPATIENT
Start: 2025-04-21 | End: 2025-04-21 | Stop reason: SDUPTHER

## 2025-04-21 RX ORDER — CEFAZOLIN SODIUM 1 G/3ML
INJECTION, POWDER, FOR SOLUTION INTRAMUSCULAR; INTRAVENOUS
Status: DISCONTINUED | OUTPATIENT
Start: 2025-04-21 | End: 2025-04-21

## 2025-04-21 RX ORDER — LIDOCAINE HYDROCHLORIDE 20 MG/ML
INJECTION, SOLUTION EPIDURAL; INFILTRATION; INTRACAUDAL; PERINEURAL
Status: DISCONTINUED | OUTPATIENT
Start: 2025-04-21 | End: 2025-04-21

## 2025-04-21 RX ORDER — LABETALOL HCL 20 MG/4 ML
10 SYRINGE (ML) INTRAVENOUS ONCE AS NEEDED
Status: DISCONTINUED | OUTPATIENT
Start: 2025-04-21 | End: 2025-04-21

## 2025-04-21 RX ORDER — HYDRALAZINE HYDROCHLORIDE 20 MG/ML
10 INJECTION INTRAMUSCULAR; INTRAVENOUS EVERY 6 HOURS PRN
Status: DISCONTINUED | OUTPATIENT
Start: 2025-04-21 | End: 2025-04-27

## 2025-04-21 RX ORDER — HYDROMORPHONE HYDROCHLORIDE 1 MG/ML
0.2 INJECTION, SOLUTION INTRAMUSCULAR; INTRAVENOUS; SUBCUTANEOUS EVERY 5 MIN PRN
Refills: 0 | Status: DISCONTINUED | OUTPATIENT
Start: 2025-04-21 | End: 2025-04-21

## 2025-04-21 RX ORDER — PROPOFOL 10 MG/ML
VIAL (ML) INTRAVENOUS
Status: DISCONTINUED | OUTPATIENT
Start: 2025-04-21 | End: 2025-04-21

## 2025-04-21 RX ORDER — FENTANYL CITRATE 50 UG/ML
INJECTION, SOLUTION INTRAMUSCULAR; INTRAVENOUS
Status: DISCONTINUED | OUTPATIENT
Start: 2025-04-21 | End: 2025-04-21

## 2025-04-21 RX ORDER — DEXAMETHASONE SODIUM PHOSPHATE 4 MG/ML
INJECTION, SOLUTION INTRA-ARTICULAR; INTRALESIONAL; INTRAMUSCULAR; INTRAVENOUS; SOFT TISSUE
Status: DISCONTINUED | OUTPATIENT
Start: 2025-04-21 | End: 2025-04-21

## 2025-04-21 RX ADMIN — HYDROMORPHONE HYDROCHLORIDE 0.2 MG: 1 INJECTION, SOLUTION INTRAMUSCULAR; INTRAVENOUS; SUBCUTANEOUS at 03:04

## 2025-04-21 RX ADMIN — AMLODIPINE BESYLATE 10 MG: 10 TABLET ORAL at 09:04

## 2025-04-21 RX ADMIN — FENTANYL CITRATE 25 MCG: 50 INJECTION INTRAMUSCULAR; INTRAVENOUS at 03:04

## 2025-04-21 RX ADMIN — CEFAZOLIN 2 G: 330 INJECTION, POWDER, FOR SOLUTION INTRAMUSCULAR; INTRAVENOUS at 02:04

## 2025-04-21 RX ADMIN — ONDANSETRON 4 MG: 2 INJECTION INTRAMUSCULAR; INTRAVENOUS at 03:04

## 2025-04-21 RX ADMIN — OXYCODONE HYDROCHLORIDE 10 MG: 10 TABLET ORAL at 09:04

## 2025-04-21 RX ADMIN — ACETAMINOPHEN 1000 MG: 500 TABLET ORAL at 05:04

## 2025-04-21 RX ADMIN — BUPROPION HYDROCHLORIDE 150 MG: 75 TABLET, FILM COATED ORAL at 09:04

## 2025-04-21 RX ADMIN — PROPOFOL 160 MG: 10 INJECTION, EMULSION INTRAVENOUS at 02:04

## 2025-04-21 RX ADMIN — SODIUM CHLORIDE: 0.9 INJECTION, SOLUTION INTRAVENOUS at 02:04

## 2025-04-21 RX ADMIN — GABAPENTIN 300 MG: 300 CAPSULE ORAL at 09:04

## 2025-04-21 RX ADMIN — Medication 6 MG: at 09:04

## 2025-04-21 RX ADMIN — FENTANYL CITRATE 25 MCG: 50 INJECTION INTRAMUSCULAR; INTRAVENOUS at 04:04

## 2025-04-21 RX ADMIN — FOLIC ACID 1 MG: 1 TABLET ORAL at 09:04

## 2025-04-21 RX ADMIN — OXYCODONE HYDROCHLORIDE 10 MG: 10 TABLET ORAL at 03:04

## 2025-04-21 RX ADMIN — HYDROMORPHONE HYDROCHLORIDE 1 MG: 0.5 INJECTION, SOLUTION INTRAMUSCULAR; INTRAVENOUS; SUBCUTANEOUS at 11:04

## 2025-04-21 RX ADMIN — OXYCODONE HYDROCHLORIDE 10 MG: 10 TABLET ORAL at 01:04

## 2025-04-21 RX ADMIN — SUGAMMADEX 200 MG: 100 INJECTION, SOLUTION INTRAVENOUS at 03:04

## 2025-04-21 RX ADMIN — Medication 1 TABLET: at 09:04

## 2025-04-21 RX ADMIN — DEXAMETHASONE SODIUM PHOSPHATE 4 MG: 4 INJECTION INTRA-ARTICULAR; INTRALESIONAL; INTRAMUSCULAR; INTRAVENOUS; SOFT TISSUE at 02:04

## 2025-04-21 RX ADMIN — ROCURONIUM BROMIDE 50 MG: 10 INJECTION, SOLUTION INTRAVENOUS at 02:04

## 2025-04-21 RX ADMIN — LIDOCAINE HYDROCHLORIDE 100 MG: 20 INJECTION, SOLUTION EPIDURAL; INFILTRATION; INTRACAUDAL; PERINEURAL at 02:04

## 2025-04-21 RX ADMIN — FENTANYL CITRATE 50 MCG: 50 INJECTION INTRAMUSCULAR; INTRAVENOUS at 02:04

## 2025-04-21 RX ADMIN — FENTANYL CITRATE 100 MCG: 50 INJECTION INTRAMUSCULAR; INTRAVENOUS at 02:04

## 2025-04-21 RX ADMIN — Medication 100 MG: at 09:04

## 2025-04-21 NOTE — PLAN OF CARE
Patient arrived to unit from hospital floor via stretcher. Pre procedure completed. Perioperative period discussed,all questions and concerns addressed.

## 2025-04-21 NOTE — ASSESSMENT & PLAN NOTE
Patient's blood pressure range in the last 24 hours was: BP  Min: 134/61  Max: 168/71.The patient's inpatient anti-hypertensive regimen is listed below:  Current Antihypertensives  amLODIPine tablet 10 mg, Daily, Oral    Plan  - BP is uncontrolled, will adjust as follows: increase amlodipine to 10mg  - Will hold Losartan 100mg for time being given up-trending creatinine

## 2025-04-21 NOTE — CONSULTS
[facility] - Telemetry Stepdown  Wound Care    Patient Name:  Froylan Borja   MRN:  [id number]  Date: 4/21/2025  Diagnosis: Acute deep vein thrombosis (DVT) of right lower extremity    History:     Past Medical History:   Diagnosis Date    Alcohol abuse     Anxiety     Cirrhosis     Glaucoma     Hepatocellular carcinoma     History of hepatitis C, s/p successful Rx w/ SVR24 - 1/2017     genotype 1;  relapse following PegIFN+RBV+Victrelis; prior relapse following PegIFN+RBV S/p 12 weeks harvoni + RBV w/ SVR    Hypertension     Paresthesia     feet, bilaterally       Social History[1]    Precautions:     Allergies as of 04/17/2025 - Reviewed 04/10/2025   Allergen Reaction Noted    Zoloft [sertraline] Other (See Comments) 01/27/2022       [facility] Assessment Details/Treatment     Patient seen for inpatient wound care consult. Per chart review and patient, patient to return to OR with general surgery for debridement and wound vac application. Inpatient wound care to sign off. Please reconsult if needed.    Reviewed chart for this encounter.  See flowsheet for findings.    Discussed POC with patient and primary nurse.  See EMR for orders and patient education.    Bedside nursing to continue care and monitoring.  Bedside to maintain pressure injury prevention interventions.        04/21/25 0800   WOCN Assessment   WOCN Total Time (mins) 30   Visit Date 04/21/25   Visit Time 0800   Consult Type New   WOCN Speciality Wound   Intervention assessed;chart review;coordination of care   Teaching on-going     Orders placed.   Luis Alberto BONILLA, RN, [facility]  04/21/2025         [1]   Social History  Socioeconomic History    Marital status:    Tobacco Use    Smoking status: Former     Types: Cigars    Smokeless tobacco: Never    Tobacco comments:     smokes cigars 20 per month   Substance and Sexual Activity    Alcohol use: Yes     Alcohol/week: 28.0 standard drinks of alcohol     Types: 28 Cans of beer per week     Comment: 2-3 beers  daily    Drug use: No   Social History Narrative     (mental health issues), retired  at [employer]. No kids. 2 dogs. No exercise.     Social Drivers of Health     Financial Resource Strain: Medium Risk (4/19/2025)    Overall Financial Resource Strain (CARDIA)     Difficulty of Paying Living Expenses: Somewhat hard   Food Insecurity: No Food Insecurity (4/19/2025)    Hunger Vital Sign     Worried About Running Out of Food in the Last Year: Never true     Ran Out of Food in the Last Year: Never true   Transportation Needs: No Transportation Needs (10/2/2024)    PRAPARE - Transportation     Lack of Transportation (Medical): No     Lack of Transportation (Non-Medical): No   Physical Activity: Inactive (10/2/2024)    Exercise Vital Sign     Days of Exercise per Week: 0 days     Minutes of Exercise per Session: 0 min   Stress: No Stress Concern Present (4/19/2025)    [employer] - Occupational Stress Questionnaire     Feeling of Stress : Not at all   Housing Stability: Low Risk  (4/19/2025)    Housing Stability Vital Sign     Unable to Pay for Housing in the Last Year: No     Homeless in the Last Year: No

## 2025-04-21 NOTE — BRIEF OP NOTE
[facility] - Telemetry Stepdown  Brief Operative Note    SUMMARY     Surgery Date: 4/21/2025     Surgeons and Role:     * Estella Ramsay MD - Primary     * Hernan Joy MD - Resident - Assisting     * Autumn Henderson MD - Resident - Assisting        Pre-op Diagnosis:  Hematoma [T14.8XXA]    Post-op Diagnosis:  Post-Op Diagnosis Codes:     * Hematoma [T14.8XXA]    Procedure(s) (LRB):  DEBRIDEMENT, WOUND (Right)  APPLICATION, WOUND VAC (Right)    Anesthesia: Choice    Implants:  * No implants in log *    Operative Findings: Debridement of necrotic skin, washout of wound and wound vac placement.    Estimated Blood Loss: * No values recorded between 4/21/2025  2:15 PM and 4/21/2025  3:05 PM *    Estimated Blood Loss has not been documented. EBL = 50.         Specimens:   Specimen (24h ago, onward)      None          * No specimens in log *    [id number]

## 2025-04-21 NOTE — SUBJECTIVE & OBJECTIVE
Interval History: NAEON. AFVSS. Hg continues to downtrend. Small Creatinine bump on AM labs. Would like to avoid giving IVF to patient given his cirrhosis/hyponatremia. GenSurg plan on taking patient to OR 4/21 for wound debridement and wound vac placement. Hep gtt discontinued for procedure . Will check with surgery after procedure to inquire of when to restart.     Review of Systems   Constitutional:  Negative for chills and fever.   Respiratory:  Negative for chest tightness and shortness of breath.    Cardiovascular:  Positive for leg swelling. Negative for chest pain.   Gastrointestinal:  Negative for abdominal pain, constipation, diarrhea, nausea and vomiting.   Genitourinary:  Negative for dysuria, frequency and urgency.   Musculoskeletal:  Positive for arthralgias and myalgias.   Skin:  Positive for color change and wound.   Neurological:  Positive for numbness. Negative for dizziness, weakness, light-headedness and headaches.     Objective:     Vital Signs (Most Recent):  Temp: 98.4 °F (36.9 °C) (04/21/25 0726)  Pulse: 83 (04/21/25 1000)  Resp: 18 (04/21/25 0726)  BP: (!) 148/70 (04/21/25 1000)  SpO2: 98 % (04/21/25 0726) Vital Signs (24h Range):  Temp:  [97.8 °F (36.6 °C)-99.2 °F (37.3 °C)] 98.4 °F (36.9 °C)  Pulse:  [76-99] 83  Resp:  [18-20] 18  SpO2:  [92 %-99 %] 98 %  BP: (134-168)/(61-71) 148/70     Weight: 120 kg (264 lb 8.8 oz)  Body mass index is 33.07 kg/m².    Intake/Output Summary (Last 24 hours) at 4/21/2025 1117  Last data filed at 4/20/2025 1946  Gross per 24 hour   Intake --   Output 200 ml   Net -200 ml         Physical Exam  Vitals and nursing note reviewed.   Constitutional:       General: He is not in acute distress.     Appearance: He is well-developed. He is not ill-appearing.   HENT:      Head: Normocephalic and atraumatic.   Eyes:      Pupils: Pupils are equal, round, and reactive to light.   Cardiovascular:      Rate and Rhythm: Normal rate and regular rhythm.   Pulmonary:       Effort: Pulmonary effort is normal. No respiratory distress.      Breath sounds: Normal breath sounds. No wheezing or rales.   Abdominal:      Palpations: Abdomen is soft.      Tenderness: There is no abdominal tenderness.   Musculoskeletal:      Right lower leg: Edema present.      Left lower leg: No edema.      Comments: Sensation intact  RLE dressed per General surgery s/p hematoma evacuation- saturated dressing to lateral-posterior side   Skin:     General: Skin is warm and dry.   Neurological:      Mental Status: He is alert and oriented to person, place, and time.   Psychiatric:         Behavior: Behavior normal.               Significant Labs: All pertinent labs within the past 24 hours have been reviewed.  CBC:   Recent Labs   Lab 04/20/25  0447 04/21/25  0531   WBC 9.79 11.86   HGB 10.4* 10.0*   HCT 31.9* 31.2*    193     CMP:   Recent Labs   Lab 04/20/25  0447 04/20/25  1202 04/21/25  0531   * 127* 130*   K 5.0 4.8 4.8   CL 99 97 98   CO2 26 24 25   BUN 29* 24* 25*   CREATININE 1.5* 1.2 1.3   CALCIUM 8.3* 8.1* 8.3*   ALBUMIN 3.0*  --  3.0*   BILITOT 1.1*  --  1.1*   ALKPHOS 84  --  87   AST 28  --  29   ALT 20  --  25   ANIONGAP 5* 6* 7*       Significant Imaging: I have reviewed all pertinent imaging results/findings within the past 24 hours.

## 2025-04-21 NOTE — PLAN OF CARE
[facility] - Telemetry Stepdown  Initial Discharge Assessment       Primary Care Provider: Janki Tamez MD    Admission Diagnosis: Hyperkalemia [E87.5]  Hematoma [T14.8XXA]  Thrombosis of right femoral vein [I82.411]  Pseudoaneurysm of femoral artery [I72.4]  Chest pain [R07.9]  Leg hematoma, right, initial encounter [S80.11XA]  Right leg swelling [M79.89]    Admission Date: 4/18/2025  Expected Discharge Date: 4/23/2025    Transition of Care Barriers: None    Payor: HUMANA MANAGED MEDICARE / Plan: HU[employer]NA MEDICARE SELECT PARTNER / Product Type: Medicare Advantage /     Extended Emergency Contact Information  Primary Emergency Contact: Mary Borja  Mobile Phone: [phone]  Relation: Spouse  Preferred language: English    Discharge Plan A: Home, Home with family  Discharge Plan B: Home, Home with family      [id number] [place] - [facility] [address] AT [facility] [address]  [facility] [address] [place] [address]  Phone: [phone] Fax: [phone]      Initial Assessment (most recent)       Adult Discharge Assessment - 04/21/25 1314          Discharge Assessment    Assessment Type Discharge Planning Assessment     Confirmed/corrected address, phone number and insurance Yes     Confirmed Demographics Correct on Facesheet     Source of Information patient     Communicated KENNETH with patient/caregiver Yes     Reason For Admission Hyperkalemia     People in Home spouse     Do you expect to return to your current living situation? Yes     Do you have help at home or someone to help you manage your care at home? Yes     Who are your caregiver(s) and their phone number(s)? Mary Borja - spouse -[phone]     Prior to hospitilization cognitive status: Alert/Oriented     Current cognitive status: Alert/Oriented     Walking or Climbing Stairs Difficulty yes     Walking or Climbing Stairs ambulation difficulty, requires equipment     Dressing/Bathing Difficulty no      Equipment Currently Used at Home cane, straight;walker, rolling     Readmission within 30 days? No     Patient currently being followed by outpatient case management? No     Do you currently have service(s) that help you manage your care at home? No     Do you take prescription medications? Yes     Do you have prescription coverage? Yes     Do you have any problems affording any of your prescribed medications? No     Is the patient taking medications as prescribed? yes     How do you get to doctors appointments? family or friend will provide     Are you on dialysis? No     Do you take coumadin? No     Discharge Plan A Home;Home with family     Discharge Plan B Home;Home with family     DME Needed Upon Discharge  other (see comments)   TBD    Discharge Plan discussed with: Patient     Transition of Care Barriers None        Physical Activity    On average, how many days per week do you engage in moderate to strenuous exercise (like a brisk walk)? 0 days     On average, how many minutes do you engage in exercise at this level? 0 min        Financial Resource Strain    How hard is it for you to pay for the very basics like food, housing, medical care, and heating? Not very hard        Housing Stability    In the last 12 months, was there a time when you were not able to pay the mortgage or rent on time? No     At any time in the past 12 months, were you homeless or living in a shelter (including now)? No        Transportation Needs    In the past 12 months, has lack of transportation kept you from medical appointments or from getting medications? No     In the past 12 months, has lack of transportation kept you from meetings, work, or from getting things needed for daily living? No        Food Insecurity    Within the past 12 months, you worried that your food would run out before you got the money to buy more. Never true     Within the past 12 months, the food you bought just didn't last and you didn't have money to  get more. Never true        Stress    Do you feel stress - tense, restless, nervous, or anxious, or unable to sleep at night because your mind is troubled all the time - these days? To some extent        Social Isolation    How often do you feel lonely or isolated from those around you?  Never        Alcohol Use    Q1: How often do you have a drink containing alcohol? Patient declined     Q2: How many drinks containing alcohol do you have on a typical day when you are drinking? Patient declined     Q3: How often do you have six or more drinks on one occasion? Patient declined        [employer]armac[employer]    In the past 12 months has the electric, gas, oil, or water company threatened to shut off services in your home? No        Health Literacy    How often do you need to have someone help you when you read instructions, pamphlets, or other written material from your doctor or pharmacy? Never                      Completed initial assessment with the patient. Pt lives with spouse and he may need a ride home upon D/C. Pt was dependent on DME for ambulation, but independent with all other ADL's. CM will continue to follow and offer support as needed.  Discharge Plan A and Plan B have been determined by review of patient's clinical status, future medical and therapeutic needs, and coverage/benefits for post-acute care in coordination with multidisciplinary team members.  Hubert Guo, [facility] [place]    [facility]  [phone]

## 2025-04-21 NOTE — PROGRESS NOTES
[facility] - Telemetry Stepdown  General Surgery  Progress Note    Subjective:     History of Present Illness:  Froylan Borja is a 70 y.o. gentleman with PMH of alcohol abuse, cirrhosis, hepatocellular carcinoma undergoing Y90 therapy, and HTN who presents to the ED with R leg pain and swelling. He had Y90 through his R femoral artery 8 days ago. He has had some bruising and pain of his groin. He also bumped his calf on his car and has increasing swelling and discoloration. He did not notice until today, but he also has pitting edema to his right leg, much more than his left side. In the ED, he is hemodynamically normal though hypertensive. Labs significant for Na 129, K 5.7. US of his lower extremities show a R sided nonocclusive DVT and a R femo pseudoaneurysm. CT was also performed which demonstrated his hematoma over his R calf and extravasation of contrast into the pseudoaneusym. General surgery consulted for evaluation of drainage of his RLE hematoma.           Post-Op Info:  Procedure(s) (LRB):  DEBRIDEMENT, WOUND (Right)  APPLICATION, WOUND VAC (Right)         Interval History: NAEO, OR today for debridement and vac application    Medications:  Continuous Infusions:      Scheduled Meds:   amLODIPine  10 mg Oral Daily    buPROPion  150 mg Oral BID    folic acid  1 mg Oral Daily    gabapentin  300 mg Oral BID    multivitamin  1 tablet Oral Daily    thiamine  100 mg Oral Daily     PRN Meds:  Current Facility-Administered Medications:     acetaminophen, 1,000 mg, Oral, Q8H PRN    albuterol-ipratropium, 3 mL, Nebulization, Q4H PRN    bisacodyL, 10 mg, Rectal, Daily PRN    dextrose 50%, 12.5 g, Intravenous, PRN    dextrose 50%, 25 g, Intravenous, PRN    glucagon (human recombinant), 1 mg, Intramuscular, PRN    glucose, 16 g, Oral, PRN    glucose, 24 g, Oral, PRN    HYDROmorphone, 1 mg, Intravenous, Q6H PRN    LORazepam, 2 mg, Oral, Q4H PRN    melatonin, 6 mg, Oral, Nightly PRN    ondansetron, 8 mg, Oral, Q8H PRN     oxyCODONE, 5 mg, Oral, Q4H PRN    oxyCODONE, 10 mg, Oral, Q6H PRN    promethazine, 25 mg, Oral, Q6H PRN    sodium chloride 0.9%, 10 mL, Intravenous, PRN     Review of patient's allergies indicates:   Allergen Reactions    Zoloft [sertraline] Other (See Comments)     hyponatremia     Objective:     Vital Signs (Most Recent):  Temp: 98.4 °F (36.9 °C) (04/21/25 0726)  Pulse: 76 (04/21/25 0726)  Resp: 18 (04/21/25 0726)  BP: 134/61 (04/21/25 0726)  SpO2: 98 % (04/21/25 0726) Vital Signs (24h Range):  Temp:  [97.8 °F (36.6 °C)-99.2 °F (37.3 °C)] 98.4 °F (36.9 °C)  Pulse:  [76-99] 76  Resp:  [18-20] 18  SpO2:  [92 %-99 %] 98 %  BP: (134-170)/(61-75) 134/61     Weight: 120 kg (264 lb 8.8 oz)  Body mass index is 33.07 kg/m².    Intake/Output - Last 3 Shifts         04/19 0700  04/20 0659 04/20 0700 04/21 0659 04/21 0700 04/22 0659    P.O. 240      Total Intake(mL/kg) 240 (2)      Urine (mL/kg/hr) 300 (0.1) 800 (0.3)     Stool  0     Total Output 300 800     Net -60 -800            Urine Occurrence 1 x      Stool Occurrence 1 x 0 x              Physical Exam  Constitutional:       General: He is not in acute distress.     Appearance: Normal appearance.   HENT:      Head: Normocephalic and atraumatic.   Cardiovascular:      Rate and Rhythm: Normal rate and regular rhythm.   Pulmonary:      Effort: Pulmonary effort is normal. No respiratory distress.   Abdominal:      General: Abdomen is flat. There is no distension.      Palpations: Abdomen is soft.      Tenderness: There is no abdominal tenderness.   Musculoskeletal:      Comments: R sided femoral bruising  Large 7x7cm R calf hematoma, tense, skin discoloration. Tender to palpation  3+ pitting edema RLE   Neurological:      General: No focal deficit present.      Mental Status: He is alert and oriented to person, place, and time.   Psychiatric:         Behavior: Behavior normal.         Thought Content: Thought content normal.          Significant Labs:  I have reviewed  all pertinent lab results within the past 24 hours.  CBC:   Recent Labs   Lab 04/21/25  0531   WBC 11.86   RBC 3.10*   HGB 10.0*   HCT 31.2*      *   MCH 32.3*   MCHC 32.1     CMP:   Recent Labs   Lab 04/21/25  0531   CALCIUM 8.3*   ALBUMIN 3.0*   *   K 4.8   CO2 25   CL 98   BUN 25*   CREATININE 1.3   ALKPHOS 87   ALT 25   AST 29   BILITOT 1.1*       Significant Diagnostics:  I have reviewed all pertinent imaging results/findings within the past 24 hours.  Assessment/Plan:     Hematoma  Froylan Borja is a 70 y.o. gentleman with HCC undergoing Y90 therapy with a RLE hematoma, R fem pseudoaneurysm, and a R sided DVT.    - OR today for wound debridement + vac application  - consented, marked  - OOB/ambulate   - daily dressing / packing changes  - please hold anticoagulation        Krystal Connolly MD  General Surgery  [facility] - Telemetry Stepdown

## 2025-04-21 NOTE — NURSING TRANSFER
Nursing Transfer Note      4/21/2025   4:25 PM    Nurse giving handoff: GERDA Nieto PACU  Nurse receiving handoff: GERDA Aguirre MTSU    Reason patient is being transferred: post anesthesia    Transfer To: [id number]    Transfer via stretcher    Transfer with 2L NC to O2    Transported by PCT    Order for Tele Monitor? No    Additional Lines: Oxygen    Any special needs or follow-up needed: routine care    Patient belongings transferred with patient: No    Chart send with patient: Yes    Notified: spouse    Patient reassessed at: 4/21/25 @ 6455 (date, time)

## 2025-04-21 NOTE — TRANSFER OF CARE
"Anesthesia Transfer of Care Note    Patient: Froylan Borja    Procedure(s) Performed: Procedure(s) (LRB):  DEBRIDEMENT, WOUND (Right)  APPLICATION, WOUND VAC (Right)    Patient location: PACU    Anesthesia Type: general    Transport from OR: Transported from OR on 6-10 L/min O2 by face mask with adequate spontaneous ventilation    Post pain: adequate analgesia    Post assessment: no apparent anesthetic complications    Post vital signs: stable    Level of consciousness: awake and alert    Nausea/Vomiting: no nausea/vomiting    Complications: none    Transfer of care protocol was followed      Last vitals: Visit Vitals  BP (!) 185/83 (BP Location: Left arm, Patient Position: Sitting)   Pulse 88   Temp 36.4 °C (97.5 °F) (Temporal)   Resp 16   Ht 6' 3" (1.905 m)   Wt 120 kg (264 lb 8.8 oz)   SpO2 98%   BMI 33.07 kg/m²     "

## 2025-04-21 NOTE — ANESTHESIA PROCEDURE NOTES
Intubation    Date/Time: 4/21/2025 2:26 PM    Performed by: Judith Foy MD  Authorized by: Julianne Joy MD    Intubation:     Induction:  Intravenous    Intubated:  Postinduction    Mask Ventilation:  Easy mask    Attempts:  1    Attempted By:  Resident anesthesiologist    Method of Intubation:  Video laryngoscopy    Blade:  Bundy 4    Laryngeal View Grade: Grade I - full view of cords      Difficult Airway Encountered?: No      Complications:  None    Airway Device:  Oral endotracheal tube    Airway Device Size:  7.5    Style/Cuff Inflation:  Cuffed    Inflation Amount (mL):  8    Tube secured:  25    Secured at:  The lips    Placement Verified By:  Capnometry    Complicating Factors:  None    Findings Post-Intubation:  BS equal bilateral and atraumatic/condition of teeth unchanged

## 2025-04-21 NOTE — SUBJECTIVE & OBJECTIVE
Interval History: NAEO, OR today for debridement and vac application    Medications:  Continuous Infusions:      Scheduled Meds:   amLODIPine  10 mg Oral Daily    buPROPion  150 mg Oral BID    folic acid  1 mg Oral Daily    gabapentin  300 mg Oral BID    multivitamin  1 tablet Oral Daily    thiamine  100 mg Oral Daily     PRN Meds:  Current Facility-Administered Medications:     acetaminophen, 1,000 mg, Oral, Q8H PRN    albuterol-ipratropium, 3 mL, Nebulization, Q4H PRN    bisacodyL, 10 mg, Rectal, Daily PRN    dextrose 50%, 12.5 g, Intravenous, PRN    dextrose 50%, 25 g, Intravenous, PRN    glucagon (human recombinant), 1 mg, Intramuscular, PRN    glucose, 16 g, Oral, PRN    glucose, 24 g, Oral, PRN    HYDROmorphone, 1 mg, Intravenous, Q6H PRN    LORazepam, 2 mg, Oral, Q4H PRN    melatonin, 6 mg, Oral, Nightly PRN    ondansetron, 8 mg, Oral, Q8H PRN    oxyCODONE, 5 mg, Oral, Q4H PRN    oxyCODONE, 10 mg, Oral, Q6H PRN    promethazine, 25 mg, Oral, Q6H PRN    sodium chloride 0.9%, 10 mL, Intravenous, PRN     Review of patient's allergies indicates:   Allergen Reactions    Zoloft [sertraline] Other (See Comments)     hyponatremia     Objective:     Vital Signs (Most Recent):  Temp: 98.4 °F (36.9 °C) (04/21/25 0726)  Pulse: 76 (04/21/25 0726)  Resp: 18 (04/21/25 0726)  BP: 134/61 (04/21/25 0726)  SpO2: 98 % (04/21/25 0726) Vital Signs (24h Range):  Temp:  [97.8 °F (36.6 °C)-99.2 °F (37.3 °C)] 98.4 °F (36.9 °C)  Pulse:  [76-99] 76  Resp:  [18-20] 18  SpO2:  [92 %-99 %] 98 %  BP: (134-170)/(61-75) 134/61     Weight: 120 kg (264 lb 8.8 oz)  Body mass index is 33.07 kg/m².    Intake/Output - Last 3 Shifts         04/19 0700 04/20 0659 04/20 0700 04/21 0659 04/21 0700 04/22 0659    P.O. 240      Total Intake(mL/kg) 240 (2)      Urine (mL/kg/hr) 300 (0.1) 800 (0.3)     Stool  0     Total Output 300 800     Net -60 -800            Urine Occurrence 1 x      Stool Occurrence 1 x 0 x              Physical  Exam  Constitutional:       General: He is not in acute distress.     Appearance: Normal appearance.   HENT:      Head: Normocephalic and atraumatic.   Cardiovascular:      Rate and Rhythm: Normal rate and regular rhythm.   Pulmonary:      Effort: Pulmonary effort is normal. No respiratory distress.   Abdominal:      General: Abdomen is flat. There is no distension.      Palpations: Abdomen is soft.      Tenderness: There is no abdominal tenderness.   Musculoskeletal:      Comments: R sided femoral bruising  Large 7x7cm R calf hematoma, tense, skin discoloration. Tender to palpation  3+ pitting edema RLE   Neurological:      General: No focal deficit present.      Mental Status: He is alert and oriented to person, place, and time.   Psychiatric:         Behavior: Behavior normal.         Thought Content: Thought content normal.          Significant Labs:  I have reviewed all pertinent lab results within the past 24 hours.  CBC:   Recent Labs   Lab 04/21/25  0531   WBC 11.86   RBC 3.10*   HGB 10.0*   HCT 31.2*      *   MCH 32.3*   MCHC 32.1     CMP:   Recent Labs   Lab 04/21/25  0531   CALCIUM 8.3*   ALBUMIN 3.0*   *   K 4.8   CO2 25   CL 98   BUN 25*   CREATININE 1.3   ALKPHOS 87   ALT 25   AST 29   BILITOT 1.1*       Significant Diagnostics:  I have reviewed all pertinent imaging results/findings within the past 24 hours.

## 2025-04-21 NOTE — ASSESSMENT & PLAN NOTE
Froylan Borja is a 70 y.o. gentleman with HCC undergoing Y90 therapy with a RLE hematoma, R fem pseudoaneurysm, and a R sided DVT.    - OR today for wound debridement + vac application  - consented, marked  - OOB/ambulate   - daily dressing / packing changes  - please hold anticoagulation

## 2025-04-21 NOTE — ASSESSMENT & PLAN NOTE
Pain of R lower extremity   - RLE US showed a nonocclusive thrombus in the right common femoral vein   - Started Heparin gtt on 4/18 once cleared from IR standpoint  - Hg has been down-trending and dressings have been saturated with blood.   - If able to tolerate anticoagulation, will transition to oral DOAC when not needing further procedures. If not, will need to discuss IVC filter  - Heparin held 4/20 in anticipation of surgical intervention  - will discuss with surgery post intervention about timing of reinitiating of AC  - MM pain regimen   - Plan for PT assessment after surgery if needed - has been ambulating for now

## 2025-04-21 NOTE — PROGRESS NOTES
[facility] - Telemetry [facility] Medicine  Progress Note    Patient Name: Froylan Borja  MRN: [id number]  Patient Class: IP- Inpatient   Admission Date: 4/18/2025  Length of Stay: 3 days  Attending Physician: Ramiro Silverman MD  Primary Care Provider: Janki Tamez MD        Subjective     Principal Problem:Acute deep vein thrombosis (DVT) of right lower extremity        HPI:  Mr. Froylan Borja is a 69 y/o male with a PMHx alcohol abuse, cirrhosis, hepatocellular carcinoma, and HTN who presents for complaint of R leg pain and swelling. He reports yesterday he hit his R leg on the car door and shortly developed a bruise and pain to the R lateral leg. He tried tylenol at-home though pain, swelling, and bruising continued to worsen. Admits to paresthesias and numbness to the R foot as well as significant R foot pain with weight-bearing. Denies CP, palpitations, SOB, lightheadedness, dizziness, headaches, LOC, vomiting, diarrhea, abdominal pain, fever, chills. Reports some nausea following morphine and dilaudid for pain in ED. Of note, patient had angiogram y90 mapping study done with IR last Thursday (4/10/25). Has developed some bruising to the medial thigh and mild groin discomfort since procedure. Social history significant for daily alcohol consumption, 3-4 beers daily, last alcoholic beverage yesterday around 3PM.     In the ED, AF HDS. H&H 13.0/38.6 (BL~14.8), no leukocytosis. HypoNa 129, HyperK 5.7, CO2 20, Ca 8.6, Albumin 3.4, remainder of CMP unremarkable. PT/INR wnl at 10.9/1.0. LA 1.6. CPK wnl. Hep C Ab reactive, HCV RNA pending. EKG with normal sinus rhythm. R Lower Ext U/S w/ findings of nonocclusive thrombus in the right common femoral vein and right common femoral artery pseudoaneurysm. CTA Lower Ext pending. R lower extremity hematoma drained at bedside by general surgery with evacuation of 120 cc of hematoma. Compressive dressing applied. Given nebulized albuterol, lokelma, IV cefazolin, IV dilaudid,  IV morphine, IV zofran. Admitted to .     Overview/Hospital Course:  70 y.o M with HTN, alcohol/HepC cirrhosis c/b HCC s/p Y-90 mapping angiogram on 4/10 who presents with bruising and pain at angiogram site and RLE calf pain after he hit it on a car door. RLE U/S showed non-occlusive CFV thrombus and R CFA pseudoaneurysm. CTA re-demonstrated the psdueoaneurysm with active extravasation within the sac as well as a large R calf hematoma which GenSurg were consulted for and successfully drained (120ccs). Vascular Surgery were consulted, recommended IR guided percutaneous thrombin injection which IR were able to complete. Heparin gtt was started. Groin U/S done the following day showed continued thrombosis of the pseudoanurysm. GenSurg plan for OR on 4/21 for debridement and wound vac placement. Will need to f/u with IR for repeat U/S in 1 week.     Interval History: NAEON. AFVSS. Hg continues to downtrend. Small Creatinine bump on AM labs. Would like to avoid giving IVF to patient given his cirrhosis/hyponatremia. GenSurg plan on taking patient to OR 4/21 for wound debridement and wound vac placement. Hep gtt discontinued for procedure . Will check with surgery after procedure to inquire of when to restart.     Review of Systems   Constitutional:  Negative for chills and fever.   Respiratory:  Negative for chest tightness and shortness of breath.    Cardiovascular:  Positive for leg swelling. Negative for chest pain.   Gastrointestinal:  Negative for abdominal pain, constipation, diarrhea, nausea and vomiting.   Genitourinary:  Negative for dysuria, frequency and urgency.   Musculoskeletal:  Positive for arthralgias and myalgias.   Skin:  Positive for color change and wound.   Neurological:  Positive for numbness. Negative for dizziness, weakness, light-headedness and headaches.     Objective:     Vital Signs (Most Recent):  Temp: 98.4 °F (36.9 °C) (04/21/25 0726)  Pulse: 83 (04/21/25 1000)  Resp: 18 (04/21/25  0726)  BP: (!) 148/70 (04/21/25 1000)  SpO2: 98 % (04/21/25 0726) Vital Signs (24h Range):  Temp:  [97.8 °F (36.6 °C)-99.2 °F (37.3 °C)] 98.4 °F (36.9 °C)  Pulse:  [76-99] 83  Resp:  [18-20] 18  SpO2:  [92 %-99 %] 98 %  BP: (134-168)/(61-71) 148/70     Weight: 120 kg (264 lb 8.8 oz)  Body mass index is 33.07 kg/m².    Intake/Output Summary (Last 24 hours) at 4/21/2025 1117  Last data filed at 4/20/2025 1946  Gross per 24 hour   Intake --   Output 200 ml   Net -200 ml         Physical Exam  Vitals and nursing note reviewed.   Constitutional:       General: He is not in acute distress.     Appearance: He is well-developed. He is not ill-appearing.   HENT:      Head: Normocephalic and atraumatic.   Eyes:      Pupils: Pupils are equal, round, and reactive to light.   Cardiovascular:      Rate and Rhythm: Normal rate and regular rhythm.   Pulmonary:      Effort: Pulmonary effort is normal. No respiratory distress.      Breath sounds: Normal breath sounds. No wheezing or rales.   Abdominal:      Palpations: Abdomen is soft.      Tenderness: There is no abdominal tenderness.   Musculoskeletal:      Right lower leg: Edema present.      Left lower leg: No edema.      Comments: Sensation intact  RLE dressed per General surgery s/p hematoma evacuation- saturated dressing to lateral-posterior side   Skin:     General: Skin is warm and dry.   Neurological:      Mental Status: He is alert and oriented to person, place, and time.   Psychiatric:         Behavior: Behavior normal.               Significant Labs: All pertinent labs within the past 24 hours have been reviewed.  CBC:   Recent Labs   Lab 04/20/25  0447 04/21/25  0531   WBC 9.79 11.86   HGB 10.4* 10.0*   HCT 31.9* 31.2*    193     CMP:   Recent Labs   Lab 04/20/25  0447 04/20/25  1202 04/21/25  0531   * 127* 130*   K 5.0 4.8 4.8   CL 99 97 98   CO2 26 24 25   BUN 29* 24* 25*   CREATININE 1.5* 1.2 1.3   CALCIUM 8.3* 8.1* 8.3*   ALBUMIN 3.0*  --  3.0*    BILITOT 1.1*  --  1.1*   ALKPHOS 84  --  87   AST 28  --  29   ALT 20  --  25   ANIONGAP 5* 6* 7*       Significant Imaging: I have reviewed all pertinent imaging results/findings within the past 24 hours.      Assessment & Plan  Acute deep vein thrombosis (DVT) of right lower extremity  Pain of R lower extremity   - RLE US showed a nonocclusive thrombus in the right common femoral vein   - Started Heparin gtt on 4/18 once cleared from IR standpoint  - Hg has been down-trending and dressings have been saturated with blood.   - If able to tolerate anticoagulation, will transition to oral DOAC when not needing further procedures. If not, will need to discuss IVC filter  - Heparin held 4/20 in anticipation of surgical intervention  - will discuss with surgery post intervention about timing of reinitiating of AC  - MM pain regimen   - Plan for PT assessment after surgery if needed - has been ambulating for now  Essential hypertension  Patient's blood pressure range in the last 24 hours was: BP  Min: 134/61  Max: 168/71.The patient's inpatient anti-hypertensive regimen is listed below:  Current Antihypertensives  amLODIPine tablet 10 mg, Daily, Oral    Plan  - BP is uncontrolled, will adjust as follows: increase amlodipine to 10mg  - Will hold Losartan 100mg for time being given up-trending creatinine  Alcohol use disorder, severe, dependence  - daily drinker, last drink 4/17  - 3-4 beers daily, denies withdrawal symptoms  - continue thiamine, folic acid and multivitamin  - CIWA per nursing   Obesity (BMI 30-39.9)  Body mass index is 33.07 kg/m². Morbid obesity complicates all aspects of disease management from diagnostic modalities to treatment. Weight loss encouraged and health benefits explained to patient.   Hyponatremia  Hyponatremia is likely due to Cirrhosis. The patient's most recent sodium results are listed below.  Recent Labs     04/20/25  0447 04/20/25  1202 04/21/25  0531   * 127* 130*     Plan  -  Correct the sodium by 4-6mEq in 24 hours.   - Fluid and salt restriction  - Monitor sodium Daily.   - Patient hyponatremia is stable  Hematoma  - New swelling noted to RLE following IR procedure  - General surgery consulted- hematoma evacuation performed at bedside  - Plan on wound debridement and wound vac placement on 4/21. Heparin gtt held.   Pseudoaneurysm of femoral artery  - RLE US showed a new right common femoral artery pseudoaneurysm   - CTA RLE showed extravasation within the pseudoaneurysm sac  - Vascular Surgery consulted- recommended IR guided thrombin injection. IR consulted, completed procedure on 4/18.   - Groin u/s on 4/19 showed continued thrombosis of the pseudoaneurysm  - Will need repeat U/S outpatient in 1 week and f/u with IR  VTE Risk Mitigation (From admission, onward)           Ordered     IP VTE HIGH RISK PATIENT  Once         04/18/25 0725     Reason for No Pharmacological VTE Prophylaxis  Once        Question:  Reasons:  Answer:  Risk of Bleeding  Comment:  will start anticoagulation once medically cleared    04/18/25 0725                    Discharge Planning   KENNETH: 4/23/2025     Code Status: Full Code   Medical Readiness for Discharge Date:                            Ramiro Silverman MD  Department of Hospital Medicine   [facility] - Telemetry Stepdown

## 2025-04-21 NOTE — ASSESSMENT & PLAN NOTE
Hyponatremia is likely due to Cirrhosis. The patient's most recent sodium results are listed below.  Recent Labs     04/20/25  0447 04/20/25  1202 04/21/25  0531   * 127* 130*     Plan  - Correct the sodium by 4-6mEq in 24 hours.   - Fluid and salt restriction  - Monitor sodium Daily.   - Patient hyponatremia is stable

## 2025-04-21 NOTE — PROCEDURES
Radiology Post-Procedure Note  4/18/25    Pre Op Diagnosis: R CFA PSA  Post Op Diagnosis: Same    Procedure: US-guided thrombin injection    Procedure performed by: Dimitry Cade MD    Written Informed Consent Obtained: Yes  Specimen Removed: NO  Estimated Blood Loss: Minimal    Findings:   Stable R CFA PSA injected with 600 units of thrombin with a 21g needle with complete stasis of flow.    Patient tolerated procedure well.    Dimitry Cade MD  Interventional Radiology  Department of Radiology

## 2025-04-21 NOTE — PLAN OF CARE
Problem: Adult Inpatient Plan of Care  Goal: Plan of Care Review  Outcome: Progressing     Problem: Infection  Goal: Absence of Infection Signs and Symptoms  Outcome: Progressing     Problem: Wound  Goal: Optimal Functional Ability  Outcome: Progressing  Goal: Skin Health and Integrity  Outcome: Progressing     Problem: Fall Injury Risk  Goal: Absence of Fall and Fall-Related Injury  Outcome: Progressing     \Patient alert and oriented x 4. Left unit at 1345 hrs for OR to have wound debridement of the Left Lower extremity. Wound vac placed. Patient returned to unit alert and oriented x 4, breathes on room air. Ace wrap noted to affected limb, same clean dry and intact. Wound vac at 125 . /95 , HR 81, RECHECK 184/80 map 115, HR 80. MD informed,order made for IV Hydralazine. Recheck before drug administration is 169/73 map 105, HR 79; MD informed and Hydralazine held. Meal provided. Continue to monitor and observe patient.

## 2025-04-21 NOTE — OP NOTE
Operative Note     ADMIT DATE: 4/18/2025    PROCEDURE DATE: 4/21/25    PRE-OP DIAGNOSIS: Hematoma [T14.8XXA]      POST-OP DIAGNOSIS: Post-Op Diagnosis Codes:     * Hematoma [T14.8XXA]    Procedure(s):  DEBRIDEMENT, WOUND  APPLICATION, WOUND VAC     SURGEON: Surgeons and Role:     * Estella Ramsay MD - Primary     * Hernan Joy MD - Resident - Assisting     * Autumn Henderson MD - Resident - Assisting    ANESTHESIA: Choice     OPERATIVE FINDINGS: Hematoma cavity with old clot and evidence of skin pressure necrosis at edges    INDICATION FOR PROCEDURE: This patient presents with a history of hematoma right leg    PROCEDURE IN DETAIL:  Froylan Borja is a 70 y.o. male brought to the operating room for definitive surgery of hematoma washout and wound vac placement. The patient was informed of the possible risks and complications of the procedure, including but not limited to anesthetic risks, bleeding, infection, and need for additional surgery.  The patient concurred with the proposed plan, and has given informed consent.  The site of surgery was properly noted/marked in the preoperative holding area.    The patient was then brought to the operating room and placed in the supin position with both upper extremities extended.  general anesthesia was administered. Perioperative antibiotics were administered consisting of Ancef and a time out was performed confirming the patient, site, and procedure.   The right leg was then prepped and draped in the usual sterile fashion.    The edges of the wound had evidence of pressure necrosis from hematoma with evidence of non-viable skin. Hematoma was evacuated from the area. This skin was excised circumferentially using bovie electrocautery to healthy bleeding tissue.  There was evidence of undermining several inches around the open wound with some threatened skin that we chose to leave in place with hope that will survive with offloading of the pressure. The wound  was irrigated with hydrogen peroxide and then saline copiously. Hemostasis was achieved using electrocautery. A wound vac was then placed to wound bed. A black sponge was cut to shape and slightly undermined the skin edges then wound vac adhesive tape applied with evidence of good seal. The leg was compressed with an ace wrap. This then completed the procedure.     ESTIMATED BLOOD LOSS: less than 100 mL    COMPLICATIONS: none    DISPOSITION: PACU - hemodynamically stable.

## 2025-04-22 ENCOUNTER — RESULTS FOLLOW-UP (OUTPATIENT)
Dept: EMERGENCY MEDICINE | Facility: HOSPITAL | Age: 70
End: 2025-04-22

## 2025-04-22 LAB
ABSOLUTE EOSINOPHIL (OHS): 0 K/UL
ABSOLUTE MONOCYTE (OHS): 1 K/UL (ref 0.3–1)
ABSOLUTE NEUTROPHIL COUNT (OHS): 10.17 K/UL (ref 1.8–7.7)
ALBUMIN SERPL BCP-MCNC: 3.1 G/DL (ref 3.5–5.2)
ALP SERPL-CCNC: 81 UNIT/L (ref 40–150)
ALT SERPL W/O P-5'-P-CCNC: 23 UNIT/L (ref 10–44)
ANION GAP (OHS): 6 MMOL/L (ref 8–16)
ASCENDING AORTA: 3.39 CM
AST SERPL-CCNC: 26 UNIT/L (ref 11–45)
AV AREA BY CONTINUOUS VTI: 3.3 CM2
AV INDEX (PROSTH): 0.9
AV LVOT MEAN GRADIENT: 3 MMHG
AV LVOT PEAK GRADIENT: 6 MMHG
AV MEAN GRADIENT: 5 MMHG
AV PEAK GRADIENT: 8 MMHG
AV VALVE AREA BY VELOCITY RATIO: 3.3 CM²
AV VALVE AREA: 3.4 CM2
AV VELOCITY RATIO: 0.86
BASOPHILS # BLD AUTO: 0.04 K/UL
BASOPHILS NFR BLD AUTO: 0.3 %
BILIRUB SERPL-MCNC: 0.8 MG/DL (ref 0.1–1)
BSA FOR ECHO PROCEDURE: 2.52 M2
BUN SERPL-MCNC: 21 MG/DL (ref 8–23)
CALCIUM SERPL-MCNC: 8.6 MG/DL (ref 8.7–10.5)
CHLORIDE SERPL-SCNC: 97 MMOL/L (ref 95–110)
CO2 SERPL-SCNC: 27 MMOL/L (ref 23–29)
CREAT SERPL-MCNC: 0.9 MG/DL (ref 0.5–1.4)
CV ECHO LV RWT: 0.38 CM
DOP CALC AO PEAK VEL: 1.4 M/S
DOP CALC AO VTI: 27.3 CM
DOP CALC LVOT AREA: 3.8 CM2
DOP CALC LVOT DIAMETER: 2.2 CM
DOP CALC LVOT PEAK VEL: 1.2 M/S
DOP CALC LVOT STROKE VOLUME: 93.5 CM3
DOP CALCLVOT PEAK VEL VTI: 24.6 CM
E WAVE DECELERATION TIME: 241 MS
E/A RATIO: 1.44
E/E' RATIO: 9 M/S
ECHO EF ESTIMATED: 72 %
ECHO LV POSTERIOR WALL: 0.9 CM (ref 0.6–1.1)
ERYTHROCYTE [DISTWIDTH] IN BLOOD BY AUTOMATED COUNT: 13.2 % (ref 11.5–14.5)
FRACTIONAL SHORTENING: 40.4 % (ref 28–44)
GFR SERPLBLD CREATININE-BSD FMLA CKD-EPI: >60 ML/MIN/1.73/M2
GLUCOSE SERPL-MCNC: 116 MG/DL (ref 70–110)
HCT VFR BLD AUTO: 32.1 % (ref 40–54)
HGB BLD-MCNC: 10.6 GM/DL (ref 14–18)
IMM GRANULOCYTES # BLD AUTO: 0.18 K/UL (ref 0–0.04)
IMM GRANULOCYTES NFR BLD AUTO: 1.5 % (ref 0–0.5)
INTERVENTRICULAR SEPTUM: 0.8 CM (ref 0.6–1.1)
IVC DIAMETER: 1.99 CM
LA MAJOR: 5.4 CM
LA MINOR: 5.8 CM
LA WIDTH: 4.2 CM
LEFT ATRIUM SIZE: 3.6 CM
LEFT ATRIUM VOLUME INDEX MOD: 28 ML/M2
LEFT ATRIUM VOLUME INDEX: 29 ML/M2
LEFT ATRIUM VOLUME MOD: 69 ML
LEFT ATRIUM VOLUME: 72 CM3
LEFT INTERNAL DIMENSION IN SYSTOLE: 2.8 CM (ref 2.1–4)
LEFT VENTRICLE DIASTOLIC VOLUME INDEX: 41.3 ML/M2
LEFT VENTRICLE DIASTOLIC VOLUME: 102 ML
LEFT VENTRICLE MASS INDEX: 53.6 G/M2
LEFT VENTRICLE SYSTOLIC VOLUME INDEX: 11.3 ML/M2
LEFT VENTRICLE SYSTOLIC VOLUME: 28 ML
LEFT VENTRICULAR INTERNAL DIMENSION IN DIASTOLE: 4.7 CM (ref 3.5–6)
LEFT VENTRICULAR MASS: 132.3 G
LV LATERAL E/E' RATIO: 7.7
LV SEPTAL E/E' RATIO: 10.2
LYMPHOCYTES # BLD AUTO: 0.66 K/UL (ref 1–4.8)
MAGNESIUM SERPL-MCNC: 2.2 MG/DL (ref 1.6–2.6)
MCH RBC QN AUTO: 32.9 PG (ref 27–31)
MCHC RBC AUTO-ENTMCNC: 33 G/DL (ref 32–36)
MCV RBC AUTO: 100 FL (ref 82–98)
MV PEAK A VEL: 0.64 M/S
MV PEAK E VEL: 0.92 M/S
NUCLEATED RBC (/100WBC) (OHS): 0 /100 WBC
OHS CV RV/LV RATIO: 0.66 CM
PLATELET # BLD AUTO: 241 K/UL (ref 150–450)
PMV BLD AUTO: 9.5 FL (ref 9.2–12.9)
POTASSIUM SERPL-SCNC: 4.9 MMOL/L (ref 3.5–5.1)
PROT SERPL-MCNC: 7 GM/DL (ref 6–8.4)
RA MAJOR: 5 CM
RA PRESSURE ESTIMATED: 3 MMHG
RA WIDTH: 2.41 CM
RBC # BLD AUTO: 3.22 M/UL (ref 4.6–6.2)
RELATIVE EOSINOPHIL (OHS): 0 %
RELATIVE LYMPHOCYTE (OHS): 5.5 % (ref 18–48)
RELATIVE MONOCYTE (OHS): 8.3 % (ref 4–15)
RELATIVE NEUTROPHIL (OHS): 84.4 % (ref 38–73)
RIGHT ATRIAL AREA: 9.2 CM2
RIGHT VENTRICLE DIASTOLIC BASEL DIMENSION: 3.1 CM
RV TISSUE DOPPLER FREE WALL SYSTOLIC VELOCITY 1 (APICAL 4 CHAMBER VIEW): 16.6 CM/S
SINUS: 3.85 CM
SODIUM SERPL-SCNC: 130 MMOL/L (ref 136–145)
STJ: 3.12 CM
TDI LATERAL: 0.12 M/S
TDI SEPTAL: 0.09 M/S
TDI: 0.11 M/S
TRICUSPID ANNULAR PLANE SYSTOLIC EXCURSION: 2.49 CM
WBC # BLD AUTO: 12.05 K/UL (ref 3.9–12.7)
Z-SCORE OF LEFT VENTRICULAR DIMENSION IN END DIASTOLE: -10.1
Z-SCORE OF LEFT VENTRICULAR DIMENSION IN END SYSTOLE: -7.97

## 2025-04-22 PROCEDURE — 25000003 PHARM REV CODE 250: Mod: HCNC | Performed by: STUDENT IN AN ORGANIZED HEALTH CARE EDUCATION/TRAINING PROGRAM

## 2025-04-22 PROCEDURE — 83735 ASSAY OF MAGNESIUM: CPT | Mod: HCNC | Performed by: STUDENT IN AN ORGANIZED HEALTH CARE EDUCATION/TRAINING PROGRAM

## 2025-04-22 PROCEDURE — 20600001 HC STEP DOWN PRIVATE ROOM: Mod: HCNC

## 2025-04-22 PROCEDURE — 85025 COMPLETE CBC W/AUTO DIFF WBC: CPT | Mod: HCNC | Performed by: STUDENT IN AN ORGANIZED HEALTH CARE EDUCATION/TRAINING PROGRAM

## 2025-04-22 PROCEDURE — 25000003 PHARM REV CODE 250: Mod: HCNC

## 2025-04-22 PROCEDURE — 63600175 PHARM REV CODE 636 W HCPCS: Mod: HCNC

## 2025-04-22 PROCEDURE — 36415 COLL VENOUS BLD VENIPUNCTURE: CPT | Mod: HCNC | Performed by: STUDENT IN AN ORGANIZED HEALTH CARE EDUCATION/TRAINING PROGRAM

## 2025-04-22 PROCEDURE — 82040 ASSAY OF SERUM ALBUMIN: CPT | Mod: HCNC | Performed by: STUDENT IN AN ORGANIZED HEALTH CARE EDUCATION/TRAINING PROGRAM

## 2025-04-22 RX ORDER — HYDROXYZINE PAMOATE 25 MG/1
25 CAPSULE ORAL EVERY 8 HOURS PRN
Status: DISCONTINUED | OUTPATIENT
Start: 2025-04-22 | End: 2025-05-13 | Stop reason: HOSPADM

## 2025-04-22 RX ORDER — LOSARTAN POTASSIUM 50 MG/1
100 TABLET ORAL DAILY
Status: DISCONTINUED | OUTPATIENT
Start: 2025-04-22 | End: 2025-04-25

## 2025-04-22 RX ORDER — TALC
6 POWDER (GRAM) TOPICAL NIGHTLY
Status: DISCONTINUED | OUTPATIENT
Start: 2025-04-22 | End: 2025-05-13 | Stop reason: HOSPADM

## 2025-04-22 RX ADMIN — GABAPENTIN 300 MG: 300 CAPSULE ORAL at 09:04

## 2025-04-22 RX ADMIN — MELATONIN TAB 3 MG 6 MG: 3 TAB at 08:04

## 2025-04-22 RX ADMIN — BUPROPION HYDROCHLORIDE 150 MG: 75 TABLET, FILM COATED ORAL at 08:04

## 2025-04-22 RX ADMIN — AMLODIPINE BESYLATE 10 MG: 10 TABLET ORAL at 09:04

## 2025-04-22 RX ADMIN — HYDROMORPHONE HYDROCHLORIDE 1 MG: 0.5 INJECTION, SOLUTION INTRAMUSCULAR; INTRAVENOUS; SUBCUTANEOUS at 06:04

## 2025-04-22 RX ADMIN — FOLIC ACID 1 MG: 1 TABLET ORAL at 09:04

## 2025-04-22 RX ADMIN — Medication 1 TABLET: at 09:04

## 2025-04-22 RX ADMIN — GABAPENTIN 300 MG: 300 CAPSULE ORAL at 08:04

## 2025-04-22 RX ADMIN — Medication 100 MG: at 09:04

## 2025-04-22 RX ADMIN — BUPROPION HYDROCHLORIDE 150 MG: 75 TABLET, FILM COATED ORAL at 09:04

## 2025-04-22 RX ADMIN — LOSARTAN POTASSIUM 100 MG: 50 TABLET, FILM COATED ORAL at 02:04

## 2025-04-22 RX ADMIN — OXYCODONE HYDROCHLORIDE 10 MG: 10 TABLET ORAL at 11:04

## 2025-04-22 RX ADMIN — OXYCODONE HYDROCHLORIDE 10 MG: 10 TABLET ORAL at 08:04

## 2025-04-22 RX ADMIN — HYDROMORPHONE HYDROCHLORIDE 1 MG: 0.5 INJECTION, SOLUTION INTRAMUSCULAR; INTRAVENOUS; SUBCUTANEOUS at 03:04

## 2025-04-22 RX ADMIN — OXYCODONE 5 MG: 5 TABLET ORAL at 09:04

## 2025-04-22 RX ADMIN — OXYCODONE HYDROCHLORIDE 10 MG: 10 TABLET ORAL at 03:04

## 2025-04-22 RX ADMIN — HYDROMORPHONE HYDROCHLORIDE 1 MG: 0.5 INJECTION, SOLUTION INTRAMUSCULAR; INTRAVENOUS; SUBCUTANEOUS at 09:04

## 2025-04-22 NOTE — SUBJECTIVE & OBJECTIVE
Interval History: Wound vac to suction this morning. In some pain.     Medications:  Continuous Infusions:      Scheduled Meds:   amLODIPine  10 mg Oral Daily    buPROPion  150 mg Oral BID    folic acid  1 mg Oral Daily    gabapentin  300 mg Oral BID    multivitamin  1 tablet Oral Daily    thiamine  100 mg Oral Daily     PRN Meds:  Current Facility-Administered Medications:     acetaminophen, 1,000 mg, Oral, Q8H PRN    albuterol-ipratropium, 3 mL, Nebulization, Q4H PRN    bisacodyL, 10 mg, Rectal, Daily PRN    dextrose 50%, 12.5 g, Intravenous, PRN    dextrose 50%, 25 g, Intravenous, PRN    glucagon (human recombinant), 1 mg, Intramuscular, PRN    glucose, 16 g, Oral, PRN    glucose, 24 g, Oral, PRN    hydrALAZINE, 10 mg, Intravenous, Q6H PRN    HYDROmorphone, 1 mg, Intravenous, Q6H PRN    LORazepam, 2 mg, Oral, Q4H PRN    melatonin, 6 mg, Oral, Nightly PRN    ondansetron, 8 mg, Oral, Q8H PRN    oxyCODONE, 5 mg, Oral, Q4H PRN    oxyCODONE, 10 mg, Oral, Q6H PRN    promethazine, 25 mg, Oral, Q6H PRN    sodium chloride 0.9%, 10 mL, Intravenous, PRN     Review of patient's allergies indicates:   Allergen Reactions    Zoloft [sertraline] Other (See Comments)     hyponatremia     Objective:     Vital Signs (Most Recent):  Temp: 98.4 °F (36.9 °C) (04/22/25 0720)  Pulse: 75 (04/22/25 0720)  Resp: 18 (04/22/25 0720)  BP: (!) 157/74 (04/22/25 0720)  SpO2: 96 % (04/22/25 0720) Vital Signs (24h Range):  Temp:  [97.5 °F (36.4 °C)-98.4 °F (36.9 °C)] 98.4 °F (36.9 °C)  Pulse:  [72-88] 75  Resp:  [12-20] 18  SpO2:  [95 %-100 %] 96 %  BP: (148-197)/(60-84) 157/74     Weight: 120 kg (264 lb 8.8 oz)  Body mass index is 33.07 kg/m².    Intake/Output - Last 3 Shifts         04/20 0700 04/21 0659 04/21 0700 04/22 0659 04/22 0700 04/23 0659    P.O.  240     IV Piggyback  250     Total Intake(mL/kg)  490 (4.1)     Urine (mL/kg/hr) 800 (0.3) 1950 (0.7)     Other  75     Stool 0 0     Total Output 800 2025     Net -988 -0601             Stool Occurrence 0 x 0 x              Physical Exam  Constitutional:       General: He is not in acute distress.     Appearance: Normal appearance.   HENT:      Head: Normocephalic and atraumatic.   Cardiovascular:      Rate and Rhythm: Normal rate and regular rhythm.   Pulmonary:      Effort: Pulmonary effort is normal. No respiratory distress.   Abdominal:      General: Abdomen is flat. There is no distension.      Palpations: Abdomen is soft.      Tenderness: There is no abdominal tenderness.   Musculoskeletal:      Comments: R calf w wound vac in place to suction, wrapped in ACE   Neurological:      General: No focal deficit present.      Mental Status: He is alert and oriented to person, place, and time.   Psychiatric:         Behavior: Behavior normal.         Thought Content: Thought content normal.          Significant Labs:  I have reviewed all pertinent lab results within the past 24 hours.  CBC:   Recent Labs   Lab 04/21/25  0531   WBC 11.86   RBC 3.10*   HGB 10.0*   HCT 31.2*      *   MCH 32.3*   MCHC 32.1     CMP:   Recent Labs   Lab 04/21/25  0531   CALCIUM 8.3*   ALBUMIN 3.0*   *   K 4.8   CO2 25   CL 98   BUN 25*   CREATININE 1.3   ALKPHOS 87   ALT 25   AST 29   BILITOT 1.1*       Significant Diagnostics:  I have reviewed all pertinent imaging results/findings within the past 24 hours.

## 2025-04-22 NOTE — PROGRESS NOTES
[facility] - Telemetry [facility] Medicine  Progress Note    Patient Name: Froylan Borja  MRN: [id number]  Patient Class: IP- Inpatient   Admission Date: 4/18/2025  Length of Stay: 4 days  Attending Physician: Ramiro Silverman MD  Primary Care Provider: Janki Tamez MD        Subjective     Principal Problem:Acute deep vein thrombosis (DVT) of right lower extremity        HPI:  Mr. Froylan Borja is a 71 y/o male with a PMHx alcohol abuse, cirrhosis, hepatocellular carcinoma, and HTN who presents for complaint of R leg pain and swelling. He reports yesterday he hit his R leg on the car door and shortly developed a bruise and pain to the R lateral leg. He tried tylenol at-home though pain, swelling, and bruising continued to worsen. Admits to paresthesias and numbness to the R foot as well as significant R foot pain with weight-bearing. Denies CP, palpitations, SOB, lightheadedness, dizziness, headaches, LOC, vomiting, diarrhea, abdominal pain, fever, chills. Reports some nausea following morphine and dilaudid for pain in ED. Of note, patient had angiogram y90 mapping study done with IR last Thursday (4/10/25). Has developed some bruising to the medial thigh and mild groin discomfort since procedure. Social history significant for daily alcohol consumption, 3-4 beers daily, last alcoholic beverage yesterday around 3PM.     In the ED, AF HDS. H&H 13.0/38.6 (BL~14.8), no leukocytosis. HypoNa 129, HyperK 5.7, CO2 20, Ca 8.6, Albumin 3.4, remainder of CMP unremarkable. PT/INR wnl at 10.9/1.0. LA 1.6. CPK wnl. Hep C Ab reactive, HCV RNA pending. EKG with normal sinus rhythm. R Lower Ext U/S w/ findings of nonocclusive thrombus in the right common femoral vein and right common femoral artery pseudoaneurysm. CTA Lower Ext pending. R lower extremity hematoma drained at bedside by general surgery with evacuation of 120 cc of hematoma. Compressive dressing applied. Given nebulized albuterol, lokelma, IV cefazolin, IV dilaudid,  IV morphine, IV zofran. Admitted to .     Overview/Hospital Course:  70 y.o M with HTN, alcohol/HepC cirrhosis c/b HCC s/p Y-90 mapping angiogram on 4/10 who presents with bruising and pain at angiogram site and RLE calf pain after he hit it on a car door. RLE U/S showed non-occlusive CFV thrombus and R CFA pseudoaneurysm. CTA re-demonstrated the psdueoaneurysm with active extravasation within the sac as well as a large R calf hematoma which GenSurg were consulted for and successfully drained (120ccs). Vascular Surgery were consulted, recommended IR guided percutaneous thrombin injection which IR were able to complete. Heparin gtt was started. Groin U/S done the following day showed continued thrombosis of the pseudoanurysm. GenSurg plan for OR on 4/21 for debridement and wound vac placement. Will need to f/u with IR for repeat U/S in 1 week.     Interval History: NAEON. AFVSS. AM labs pending from today. Hg stable. GenSurg took patient to OR 4/21 for wound debridement and wound vac placement. Plan for possible OR again on 4/24 - Hep gtt has been held since prior to 4/21 - discussed with surgery who recommend against starting heparin gtt or Lovenox prior to possible procedure on 4/24 .     Patient frustrated from pain, unclear timeline on going home given repeat procedure and some personal issues with poor sleep last night. Melatonin changed to scheduled.      Review of Systems   Constitutional:  Negative for chills and fever.   Respiratory:  Negative for chest tightness and shortness of breath.    Cardiovascular:  Positive for leg swelling. Negative for chest pain.   Gastrointestinal:  Negative for abdominal pain, constipation, diarrhea, nausea and vomiting.   Genitourinary:  Negative for dysuria, frequency and urgency.   Musculoskeletal:  Positive for arthralgias and myalgias.   Skin:  Positive for color change and wound.   Neurological:  Positive for numbness. Negative for dizziness, weakness, light-headedness  and headaches.     Objective:     Vital Signs (Most Recent):  Temp: 98.4 °F (36.9 °C) (04/22/25 0720)  Pulse: 75 (04/22/25 0720)  Resp: 18 (04/22/25 0937)  BP: (!) 157/74 (04/22/25 0720)  SpO2: 96 % (04/22/25 0720) Vital Signs (24h Range):  Temp:  [97.5 °F (36.4 °C)-98.4 °F (36.9 °C)] 98.4 °F (36.9 °C)  Pulse:  [72-88] 75  Resp:  [12-20] 18  SpO2:  [95 %-100 %] 96 %  BP: (152-197)/(60-84) 157/74     Weight: 120 kg (264 lb 8.8 oz)  Body mass index is 33.07 kg/m².    Intake/Output Summary (Last 24 hours) at 4/22/2025 1035  Last data filed at 4/22/2025 0627  Gross per 24 hour   Intake 490 ml   Output 2025 ml   Net -1535 ml         Physical Exam  Vitals and nursing note reviewed.   Constitutional:       General: He is not in acute distress.     Appearance: He is well-developed. He is not ill-appearing.   HENT:      Head: Normocephalic and atraumatic.   Eyes:      Pupils: Pupils are equal, round, and reactive to light.   Cardiovascular:      Rate and Rhythm: Normal rate and regular rhythm.   Pulmonary:      Effort: Pulmonary effort is normal. No respiratory distress.      Breath sounds: Normal breath sounds. No wheezing or rales.   Abdominal:      Palpations: Abdomen is soft.      Tenderness: There is no abdominal tenderness.   Musculoskeletal:      Right lower leg: Edema present.      Left lower leg: No edema.      Comments: Sensation intact  RLE dressed per General surgery - wound vac in place  Foot swelling - now elevated on pillow   Skin:     General: Skin is warm and dry.   Neurological:      Mental Status: He is alert and oriented to person, place, and time.   Psychiatric:         Behavior: Behavior normal.               Significant Labs: All pertinent labs within the past 24 hours have been reviewed.  CBC:   Recent Labs   Lab 04/21/25  0531   WBC 11.86   HGB 10.0*   HCT 31.2*        CMP:   Recent Labs   Lab 04/20/25  1202 04/21/25  0531   * 130*   K 4.8 4.8   CL 97 98   CO2 24 25   BUN 24* 25*    CREATININE 1.2 1.3   CALCIUM 8.1* 8.3*   ALBUMIN  --  3.0*   BILITOT  --  1.1*   ALKPHOS  --  87   AST  --  29   ALT  --  25   ANIONGAP 6* 7*       Significant Imaging: I have reviewed all pertinent imaging results/findings within the past 24 hours.      Assessment & Plan  Acute deep vein thrombosis (DVT) of right lower extremity  Pain of R lower extremity   - RLE US showed a nonocclusive thrombus in the right common femoral vein   - Started Heparin gtt on 4/18 once cleared from IR standpoint  - Hg has been down-trending and dressings have been saturated with blood.   - If able to tolerate anticoagulation, will transition to oral DOAC when not needing further procedures. If not, will need to discuss IVC filter  - Heparin held 4/20 for surgical intervention  - plan for repeat OR 4/22 - surgery recommend holding AC prior to that given significant bleeding risk  - will discuss with surgery post intervention about timing of reinitiating of AC  - MM pain regimen   - Plan for PT assessment after surgery if needed - was ambulating on 4/21 before procedure  - Wound Vac in place  Essential hypertension  Patient's blood pressure range in the last 24 hours was: BP  Min: 152/67  Max: 197/84.The patient's inpatient anti-hypertensive regimen is listed below:  Current Antihypertensives  amLODIPine tablet 10 mg, Daily, Oral  hydrALAZINE injection 10 mg, Every 6 hours PRN, Intravenous    Plan  - BP is uncontrolled, will adjust as follows: increase amlodipine to 10mg  - Losartan 100mg held or time being given up-trending creatinine - will restart if creatinine stable  - IV hydralazine PRN  Alcohol use disorder, severe, dependence  - daily drinker, last drink 4/17  - 3-4 beers daily, denies withdrawal symptoms  - continue thiamine, folic acid and multivitamin  - CIWA per nursing   Obesity (BMI 30-39.9)  Body mass index is 33.07 kg/m². Morbid obesity complicates all aspects of disease management from diagnostic modalities to  treatment. Weight loss encouraged and health benefits explained to patient.   Hyponatremia  Hyponatremia is likely due to Cirrhosis. The patient's most recent sodium results are listed below.  Recent Labs     04/20/25  0447 04/20/25  1202 04/21/25  0531   * 127* 130*     Plan  - Correct the sodium by 4-6mEq in 24 hours.   - Fluid and salt restriction  - Monitor sodium Daily.   - Patient hyponatremia is stable  Hematoma  - New swelling noted to RLE following IR procedure on presentation  - General surgery consulted- hematoma evacuation performed at bedside  - S/p  wound debridement and wound vac placement on 4/21. Plan to takeback to OR Thursday 4/24 for wound debridement + vac reapplication  - Heparin gtt held- remains held given risk of AC more that benefit at this time per discussion with surgery - communicated with patient and demonstrates understanding  - WV placed 4/21 - management per surgery  Pseudoaneurysm of femoral artery  - RLE US showed a new right common femoral artery pseudoaneurysm   - CTA RLE showed extravasation within the pseudoaneurysm sac  - Vascular Surgery consulted- recommended IR guided thrombin injection. IR consulted, completed procedure on 4/18.   - Groin u/s on 4/19 showed continued thrombosis of the pseudoaneurysm  - Will need repeat U/S outpatient in 1 week and f/u with IR  VTE Risk Mitigation (From admission, onward)           Ordered     IP VTE HIGH RISK PATIENT  Once         04/18/25 0725     Reason for No Pharmacological VTE Prophylaxis  Once        Question:  Reasons:  Answer:  Risk of Bleeding  Comment:  will start anticoagulation once medically cleared    04/18/25 0725                    Discharge Planning   KENNETH: 4/23/2025     Code Status: Full Code   Medical Readiness for Discharge Date:   Discharge Plan A: Home, Home with family                        Ramiro Silverman MD  Department of Hospital Medicine   [facility] - Telemetry Stepdown

## 2025-04-22 NOTE — ASSESSMENT & PLAN NOTE
Pain of R lower extremity   - RLE US showed a nonocclusive thrombus in the right common femoral vein   - Started Heparin gtt on 4/18 once cleared from IR standpoint  - Hg has been down-trending and dressings have been saturated with blood.   - If able to tolerate anticoagulation, will transition to oral DOAC when not needing further procedures. If not, will need to discuss IVC filter  - Heparin held 4/20 for surgical intervention  - plan for repeat OR 4/22 - surgery recommend holding AC prior to that given significant bleeding risk  - will discuss with surgery post intervention about timing of reinitiating of AC  - MM pain regimen   - Plan for PT assessment after surgery if needed - was ambulating on 4/21 before procedure  - Wound Vac in place

## 2025-04-22 NOTE — PROGRESS NOTES
[facility] - Telemetry Stepdown  General Surgery  Progress Note    Subjective:     History of Present Illness:  Froylan Borja is a 70 y.o. gentleman with PMH of alcohol abuse, cirrhosis, hepatocellular carcinoma undergoing Y90 therapy, and HTN who presents to the ED with R leg pain and swelling. He had Y90 through his R femoral artery 8 days ago. He has had some bruising and pain of his groin. He also bumped his calf on his car and has increasing swelling and discoloration. He did not notice until today, but he also has pitting edema to his right leg, much more than his left side. In the ED, he is hemodynamically normal though hypertensive. Labs significant for Na 129, K 5.7. US of his lower extremities show a R sided nonocclusive DVT and a R femo pseudoaneurysm. CT was also performed which demonstrated his hematoma over his R calf and extravasation of contrast into the pseudoaneusym. General surgery consulted for evaluation of drainage of his RLE hematoma.           Post-Op Info:  Procedure(s) (LRB):  DEBRIDEMENT, WOUND (Right)  APPLICATION, WOUND VAC (Right)   1 Day Post-Op     Interval History: Wound vac to suction this morning. In some pain.     Medications:  Continuous Infusions:      Scheduled Meds:   amLODIPine  10 mg Oral Daily    buPROPion  150 mg Oral BID    folic acid  1 mg Oral Daily    gabapentin  300 mg Oral BID    multivitamin  1 tablet Oral Daily    thiamine  100 mg Oral Daily     PRN Meds:  Current Facility-Administered Medications:     acetaminophen, 1,000 mg, Oral, Q8H PRN    albuterol-ipratropium, 3 mL, Nebulization, Q4H PRN    bisacodyL, 10 mg, Rectal, Daily PRN    dextrose 50%, 12.5 g, Intravenous, PRN    dextrose 50%, 25 g, Intravenous, PRN    glucagon (human recombinant), 1 mg, Intramuscular, PRN    glucose, 16 g, Oral, PRN    glucose, 24 g, Oral, PRN    hydrALAZINE, 10 mg, Intravenous, Q6H PRN    HYDROmorphone, 1 mg, Intravenous, Q6H PRN    LORazepam, 2 mg, Oral, Q4H PRN    melatonin, 6 mg, Oral,  Nightly PRN    ondansetron, 8 mg, Oral, Q8H PRN    oxyCODONE, 5 mg, Oral, Q4H PRN    oxyCODONE, 10 mg, Oral, Q6H PRN    promethazine, 25 mg, Oral, Q6H PRN    sodium chloride 0.9%, 10 mL, Intravenous, PRN     Review of patient's allergies indicates:   Allergen Reactions    Zoloft [sertraline] Other (See Comments)     hyponatremia     Objective:     Vital Signs (Most Recent):  Temp: 98.4 °F (36.9 °C) (04/22/25 0720)  Pulse: 75 (04/22/25 0720)  Resp: 18 (04/22/25 0720)  BP: (!) 157/74 (04/22/25 0720)  SpO2: 96 % (04/22/25 0720) Vital Signs (24h Range):  Temp:  [97.5 °F (36.4 °C)-98.4 °F (36.9 °C)] 98.4 °F (36.9 °C)  Pulse:  [72-88] 75  Resp:  [12-20] 18  SpO2:  [95 %-100 %] 96 %  BP: (148-197)/(60-84) 157/74     Weight: 120 kg (264 lb 8.8 oz)  Body mass index is 33.07 kg/m².    Intake/Output - Last 3 Shifts         04/20 0700  04/21 0659 04/21 0700  04/22 0659 04/22 0700  04/23 0659    P.O.  240     IV Piggyback  250     Total Intake(mL/kg)  490 (4.1)     Urine (mL/kg/hr) 800 (0.3) 1950 (0.7)     Other  75     Stool 0 0     Total Output 800 2025     Net -800 -1535            Stool Occurrence 0 x 0 x              Physical Exam  Constitutional:       General: He is not in acute distress.     Appearance: Normal appearance.   HENT:      Head: Normocephalic and atraumatic.   Cardiovascular:      Rate and Rhythm: Normal rate and regular rhythm.   Pulmonary:      Effort: Pulmonary effort is normal. No respiratory distress.   Abdominal:      General: Abdomen is flat. There is no distension.      Palpations: Abdomen is soft.      Tenderness: There is no abdominal tenderness.   Musculoskeletal:      Comments: R calf w wound vac in place to suction, wrapped in ACE   Neurological:      General: No focal deficit present.      Mental Status: He is alert and oriented to person, place, and time.   Psychiatric:         Behavior: Behavior normal.         Thought Content: Thought content normal.          Significant Labs:  I have  reviewed all pertinent lab results within the past 24 hours.  CBC:   Recent Labs   Lab 04/21/25  0531   WBC 11.86   RBC 3.10*   HGB 10.0*   HCT 31.2*      *   MCH 32.3*   MCHC 32.1     CMP:   Recent Labs   Lab 04/21/25  0531   CALCIUM 8.3*   ALBUMIN 3.0*   *   K 4.8   CO2 25   CL 98   BUN 25*   CREATININE 1.3   ALKPHOS 87   ALT 25   AST 29   BILITOT 1.1*       Significant Diagnostics:  I have reviewed all pertinent imaging results/findings within the past 24 hours.  Assessment/Plan:     Hematoma  Froylan Borja is a 70 y.o. gentleman with HCC undergoing Y90 therapy with a RLE hematoma, R fem pseudoaneurysm, and a R sided DVT.    - continue compression with ACE bandage/elevation  - Plan for takeback to OR Thursday for wound debridement + vac reapplication  - will reconsent  - OOB/ambulate   - please continue to hold anticoagulation if able        Krystal Connolly MD  General Surgery  [facility] - Telemetry Stepdown

## 2025-04-22 NOTE — SUBJECTIVE & OBJECTIVE
Interval History: NAEON. AFVSS. AM labs pending from today. Hg stable. GenSurg took patient to OR 4/21 for wound debridement and wound vac placement. Plan for possible OR again on 4/24 - Hep gtt has been held since prior to 4/21 - discussed with surgery who recommend against starting heparin gtt or Lovenox prior to possible procedure on 4/24 .     Patient frustrated from pain, unclear timeline on going home given repeat procedure and some personal issues with poor sleep last night. Melatonin changed to scheduled.      Review of Systems   Constitutional:  Negative for chills and fever.   Respiratory:  Negative for chest tightness and shortness of breath.    Cardiovascular:  Positive for leg swelling. Negative for chest pain.   Gastrointestinal:  Negative for abdominal pain, constipation, diarrhea, nausea and vomiting.   Genitourinary:  Negative for dysuria, frequency and urgency.   Musculoskeletal:  Positive for arthralgias and myalgias.   Skin:  Positive for color change and wound.   Neurological:  Positive for numbness. Negative for dizziness, weakness, light-headedness and headaches.     Objective:     Vital Signs (Most Recent):  Temp: 98.4 °F (36.9 °C) (04/22/25 0720)  Pulse: 75 (04/22/25 0720)  Resp: 18 (04/22/25 0937)  BP: (!) 157/74 (04/22/25 0720)  SpO2: 96 % (04/22/25 0720) Vital Signs (24h Range):  Temp:  [97.5 °F (36.4 °C)-98.4 °F (36.9 °C)] 98.4 °F (36.9 °C)  Pulse:  [72-88] 75  Resp:  [12-20] 18  SpO2:  [95 %-100 %] 96 %  BP: (152-197)/(60-84) 157/74     Weight: 120 kg (264 lb 8.8 oz)  Body mass index is 33.07 kg/m².    Intake/Output Summary (Last 24 hours) at 4/22/2025 1035  Last data filed at 4/22/2025 0627  Gross per 24 hour   Intake 490 ml   Output 2025 ml   Net -1535 ml         Physical Exam  Vitals and nursing note reviewed.   Constitutional:       General: He is not in acute distress.     Appearance: He is well-developed. He is not ill-appearing.   HENT:      Head: Normocephalic and atraumatic.    Eyes:      Pupils: Pupils are equal, round, and reactive to light.   Cardiovascular:      Rate and Rhythm: Normal rate and regular rhythm.   Pulmonary:      Effort: Pulmonary effort is normal. No respiratory distress.      Breath sounds: Normal breath sounds. No wheezing or rales.   Abdominal:      Palpations: Abdomen is soft.      Tenderness: There is no abdominal tenderness.   Musculoskeletal:      Right lower leg: Edema present.      Left lower leg: No edema.      Comments: Sensation intact  RLE dressed per General surgery - wound vac in place  Foot swelling - now elevated on pillow   Skin:     General: Skin is warm and dry.   Neurological:      Mental Status: He is alert and oriented to person, place, and time.   Psychiatric:         Behavior: Behavior normal.               Significant Labs: All pertinent labs within the past 24 hours have been reviewed.  CBC:   Recent Labs   Lab 04/21/25  0531   WBC 11.86   HGB 10.0*   HCT 31.2*        CMP:   Recent Labs   Lab 04/20/25  1202 04/21/25  0531   * 130*   K 4.8 4.8   CL 97 98   CO2 24 25   BUN 24* 25*   CREATININE 1.2 1.3   CALCIUM 8.1* 8.3*   ALBUMIN  --  3.0*   BILITOT  --  1.1*   ALKPHOS  --  87   AST  --  29   ALT  --  25   ANIONGAP 6* 7*       Significant Imaging: I have reviewed all pertinent imaging results/findings within the past 24 hours.

## 2025-04-22 NOTE — ASSESSMENT & PLAN NOTE
Patient's blood pressure range in the last 24 hours was: BP  Min: 152/67  Max: 197/84.The patient's inpatient anti-hypertensive regimen is listed below:  Current Antihypertensives  amLODIPine tablet 10 mg, Daily, Oral  hydrALAZINE injection 10 mg, Every 6 hours PRN, Intravenous    Plan  - BP is uncontrolled, will adjust as follows: increase amlodipine to 10mg  - Losartan 100mg held or time being given up-trending creatinine - will restart if creatinine stable  - IV hydralazine PRN

## 2025-04-22 NOTE — ANESTHESIA POSTPROCEDURE EVALUATION
Anesthesia Post Evaluation    Patient: Froylan Borja    Procedure(s) Performed: Procedure(s) (LRB):  DEBRIDEMENT, WOUND (Right)  APPLICATION, WOUND VAC (Right)    Final Anesthesia Type: general      Patient location during evaluation: PACU  Patient participation: Yes- Able to Participate  Level of consciousness: awake and alert  Post-procedure vital signs: reviewed and stable  Pain management: adequate  Airway patency: patent  MARIANELA mitigation strategies: Multimodal analgesia  PONV status at discharge: No PONV  Anesthetic complications: no      Cardiovascular status: blood pressure returned to baseline, hemodynamically stable and stable  Respiratory status: unassisted, room air and spontaneous ventilation  Hydration status: euvolemic  Follow-up not needed.              Vitals Value Taken Time   /74 04/22/25 07:20   Temp 36.9 °C (98.4 °F) 04/22/25 07:20   Pulse 75 04/22/25 07:20   Resp 18 04/22/25 07:20   SpO2 96 % 04/22/25 07:20         Event Time   Out of Recovery 04/21/2025 15:15:00         Pain/Matt Score: Pain Rating Prior to Med Admin: 6 (4/22/2025  6:20 AM)  Pain Rating Post Med Admin: 0 (4/22/2025  6:48 AM)  Matt Score: 10 (4/21/2025  3:15 PM)

## 2025-04-22 NOTE — ASSESSMENT & PLAN NOTE
Froylan Borja is a 70 y.o. gentleman with HCC undergoing Y90 therapy with a RLE hematoma, R fem pseudoaneurysm, and a R sided DVT.    - Plan for takeback to OR Thursday for wound debridement + vac reapplication  - will reconsent  - OOB/ambulate   - please hold anticoagulation prior to OR on Thursday, ok to restart now from our perspective

## 2025-04-23 ENCOUNTER — ANESTHESIA EVENT (OUTPATIENT)
Dept: SURGERY | Facility: HOSPITAL | Age: 70
End: 2025-04-23
Payer: MEDICARE

## 2025-04-23 LAB
ABSOLUTE EOSINOPHIL (OHS): 0.16 K/UL
ABSOLUTE MONOCYTE (OHS): 1.12 K/UL (ref 0.3–1)
ABSOLUTE NEUTROPHIL COUNT (OHS): 7.52 K/UL (ref 1.8–7.7)
ALBUMIN SERPL BCP-MCNC: 2.8 G/DL (ref 3.5–5.2)
ALP SERPL-CCNC: 72 UNIT/L (ref 40–150)
ALT SERPL W/O P-5'-P-CCNC: 20 UNIT/L (ref 10–44)
ANION GAP (OHS): 4 MMOL/L (ref 8–16)
ANION GAP (OHS): 7 MMOL/L (ref 8–16)
AST SERPL-CCNC: 22 UNIT/L (ref 11–45)
BASOPHILS # BLD AUTO: 0.06 K/UL
BASOPHILS NFR BLD AUTO: 0.6 %
BILIRUB SERPL-MCNC: 0.6 MG/DL (ref 0.1–1)
BUN SERPL-MCNC: 21 MG/DL (ref 8–23)
BUN SERPL-MCNC: 23 MG/DL (ref 8–23)
CALCIUM SERPL-MCNC: 8.8 MG/DL (ref 8.7–10.5)
CALCIUM SERPL-MCNC: 8.9 MG/DL (ref 8.7–10.5)
CHLORIDE SERPL-SCNC: 96 MMOL/L (ref 95–110)
CHLORIDE SERPL-SCNC: 99 MMOL/L (ref 95–110)
CO2 SERPL-SCNC: 29 MMOL/L (ref 23–29)
CO2 SERPL-SCNC: 30 MMOL/L (ref 23–29)
CREAT SERPL-MCNC: 0.9 MG/DL (ref 0.5–1.4)
CREAT SERPL-MCNC: 1 MG/DL (ref 0.5–1.4)
ERYTHROCYTE [DISTWIDTH] IN BLOOD BY AUTOMATED COUNT: 13.3 % (ref 11.5–14.5)
GFR SERPLBLD CREATININE-BSD FMLA CKD-EPI: >60 ML/MIN/1.73/M2
GFR SERPLBLD CREATININE-BSD FMLA CKD-EPI: >60 ML/MIN/1.73/M2
GLUCOSE SERPL-MCNC: 100 MG/DL (ref 70–110)
GLUCOSE SERPL-MCNC: 111 MG/DL (ref 70–110)
HCT VFR BLD AUTO: 30.5 % (ref 40–54)
HGB BLD-MCNC: 9.9 GM/DL (ref 14–18)
IMM GRANULOCYTES # BLD AUTO: 0.18 K/UL (ref 0–0.04)
IMM GRANULOCYTES NFR BLD AUTO: 1.7 % (ref 0–0.5)
LYMPHOCYTES # BLD AUTO: 1.4 K/UL (ref 1–4.8)
MAGNESIUM SERPL-MCNC: 2.1 MG/DL (ref 1.6–2.6)
MCH RBC QN AUTO: 32.7 PG (ref 27–31)
MCHC RBC AUTO-ENTMCNC: 32.5 G/DL (ref 32–36)
MCV RBC AUTO: 101 FL (ref 82–98)
NUCLEATED RBC (/100WBC) (OHS): 0 /100 WBC
PHOSPHATE SERPL-MCNC: 2.5 MG/DL (ref 2.7–4.5)
PLATELET # BLD AUTO: 224 K/UL (ref 150–450)
PMV BLD AUTO: 9.4 FL (ref 9.2–12.9)
POTASSIUM SERPL-SCNC: 4.2 MMOL/L (ref 3.5–5.1)
POTASSIUM SERPL-SCNC: 5.5 MMOL/L (ref 3.5–5.1)
PROT SERPL-MCNC: 6.2 GM/DL (ref 6–8.4)
RBC # BLD AUTO: 3.03 M/UL (ref 4.6–6.2)
RELATIVE EOSINOPHIL (OHS): 1.5 %
RELATIVE LYMPHOCYTE (OHS): 13.4 % (ref 18–48)
RELATIVE MONOCYTE (OHS): 10.7 % (ref 4–15)
RELATIVE NEUTROPHIL (OHS): 72.1 % (ref 38–73)
SODIUM SERPL-SCNC: 132 MMOL/L (ref 136–145)
SODIUM SERPL-SCNC: 133 MMOL/L (ref 136–145)
WBC # BLD AUTO: 10.44 K/UL (ref 3.9–12.7)

## 2025-04-23 PROCEDURE — 82947 ASSAY GLUCOSE BLOOD QUANT: CPT | Mod: HCNC | Performed by: STUDENT IN AN ORGANIZED HEALTH CARE EDUCATION/TRAINING PROGRAM

## 2025-04-23 PROCEDURE — 85025 COMPLETE CBC W/AUTO DIFF WBC: CPT | Mod: HCNC | Performed by: STUDENT IN AN ORGANIZED HEALTH CARE EDUCATION/TRAINING PROGRAM

## 2025-04-23 PROCEDURE — 25000003 PHARM REV CODE 250: Mod: HCNC | Performed by: STUDENT IN AN ORGANIZED HEALTH CARE EDUCATION/TRAINING PROGRAM

## 2025-04-23 PROCEDURE — 20600001 HC STEP DOWN PRIVATE ROOM: Mod: HCNC

## 2025-04-23 PROCEDURE — 25000003 PHARM REV CODE 250: Mod: HCNC

## 2025-04-23 PROCEDURE — 36415 COLL VENOUS BLD VENIPUNCTURE: CPT | Mod: HCNC | Performed by: STUDENT IN AN ORGANIZED HEALTH CARE EDUCATION/TRAINING PROGRAM

## 2025-04-23 PROCEDURE — 63600175 PHARM REV CODE 636 W HCPCS: Mod: HCNC | Performed by: STUDENT IN AN ORGANIZED HEALTH CARE EDUCATION/TRAINING PROGRAM

## 2025-04-23 PROCEDURE — 83735 ASSAY OF MAGNESIUM: CPT | Mod: HCNC | Performed by: STUDENT IN AN ORGANIZED HEALTH CARE EDUCATION/TRAINING PROGRAM

## 2025-04-23 PROCEDURE — 84100 ASSAY OF PHOSPHORUS: CPT | Mod: HCNC | Performed by: STUDENT IN AN ORGANIZED HEALTH CARE EDUCATION/TRAINING PROGRAM

## 2025-04-23 PROCEDURE — 80053 COMPREHEN METABOLIC PANEL: CPT | Mod: HCNC | Performed by: STUDENT IN AN ORGANIZED HEALTH CARE EDUCATION/TRAINING PROGRAM

## 2025-04-23 PROCEDURE — 63600175 PHARM REV CODE 636 W HCPCS: Mod: HCNC

## 2025-04-23 RX ORDER — SENNOSIDES 8.6 MG/1
8.6 TABLET ORAL 2 TIMES DAILY
Status: DISCONTINUED | OUTPATIENT
Start: 2025-04-23 | End: 2025-05-13 | Stop reason: HOSPADM

## 2025-04-23 RX ORDER — POLYETHYLENE GLYCOL 3350 17 G/17G
17 POWDER, FOR SOLUTION ORAL 2 TIMES DAILY
Status: DISCONTINUED | OUTPATIENT
Start: 2025-04-23 | End: 2025-05-13 | Stop reason: HOSPADM

## 2025-04-23 RX ORDER — FUROSEMIDE 10 MG/ML
20 INJECTION INTRAMUSCULAR; INTRAVENOUS ONCE
Status: COMPLETED | OUTPATIENT
Start: 2025-04-23 | End: 2025-04-23

## 2025-04-23 RX ADMIN — HYDROMORPHONE HYDROCHLORIDE 1 MG: 0.5 INJECTION, SOLUTION INTRAMUSCULAR; INTRAVENOUS; SUBCUTANEOUS at 04:04

## 2025-04-23 RX ADMIN — OXYCODONE 5 MG: 5 TABLET ORAL at 08:04

## 2025-04-23 RX ADMIN — OXYCODONE HYDROCHLORIDE 10 MG: 10 TABLET ORAL at 02:04

## 2025-04-23 RX ADMIN — FUROSEMIDE 20 MG: 10 INJECTION, SOLUTION INTRAMUSCULAR; INTRAVENOUS at 09:04

## 2025-04-23 RX ADMIN — FOLIC ACID 1 MG: 1 TABLET ORAL at 08:04

## 2025-04-23 RX ADMIN — HYDROXYZINE PAMOATE 25 MG: 25 CAPSULE ORAL at 12:04

## 2025-04-23 RX ADMIN — BUPROPION HYDROCHLORIDE 150 MG: 75 TABLET, FILM COATED ORAL at 08:04

## 2025-04-23 RX ADMIN — HYDROMORPHONE HYDROCHLORIDE 1 MG: 0.5 INJECTION, SOLUTION INTRAMUSCULAR; INTRAVENOUS; SUBCUTANEOUS at 02:04

## 2025-04-23 RX ADMIN — SODIUM ZIRCONIUM CYCLOSILICATE 10 G: 10 POWDER, FOR SUSPENSION ORAL at 09:04

## 2025-04-23 RX ADMIN — POLYETHYLENE GLYCOL 3350 17 G: 17 POWDER, FOR SOLUTION ORAL at 09:04

## 2025-04-23 RX ADMIN — OXYCODONE 5 MG: 5 TABLET ORAL at 12:04

## 2025-04-23 RX ADMIN — LOSARTAN POTASSIUM 100 MG: 50 TABLET, FILM COATED ORAL at 08:04

## 2025-04-23 RX ADMIN — MELATONIN TAB 3 MG 6 MG: 3 TAB at 09:04

## 2025-04-23 RX ADMIN — SENNOSIDES 8.6 MG: 8.6 TABLET, FILM COATED ORAL at 09:04

## 2025-04-23 RX ADMIN — GABAPENTIN 300 MG: 300 CAPSULE ORAL at 09:04

## 2025-04-23 RX ADMIN — BUPROPION HYDROCHLORIDE 150 MG: 75 TABLET, FILM COATED ORAL at 09:04

## 2025-04-23 RX ADMIN — GABAPENTIN 300 MG: 300 CAPSULE ORAL at 08:04

## 2025-04-23 RX ADMIN — OXYCODONE HYDROCHLORIDE 10 MG: 10 TABLET ORAL at 07:04

## 2025-04-23 RX ADMIN — Medication 1 TABLET: at 08:04

## 2025-04-23 RX ADMIN — Medication 100 MG: at 08:04

## 2025-04-23 RX ADMIN — HYDROXYZINE PAMOATE 25 MG: 25 CAPSULE ORAL at 09:04

## 2025-04-23 RX ADMIN — AMLODIPINE BESYLATE 10 MG: 10 TABLET ORAL at 08:04

## 2025-04-23 RX ADMIN — OXYCODONE HYDROCHLORIDE 10 MG: 10 TABLET ORAL at 12:04

## 2025-04-23 RX ADMIN — HYDROMORPHONE HYDROCHLORIDE 1 MG: 0.5 INJECTION, SOLUTION INTRAMUSCULAR; INTRAVENOUS; SUBCUTANEOUS at 09:04

## 2025-04-23 NOTE — ASSESSMENT & PLAN NOTE
Problem: Adult Mental Health  Goal: Reports or exhibits reduction in symptoms associated with elevated or labile mood/moose  Outcome: Outcome Not Met, Continue to Monitor       Patient continue to have labile mood. Continues to be irritable and demanding to be released/ demanding weathers to be removed.  Patient was observed to become increasingly irritable over sleep, but was able to fall asleep with de-escalation techniques of 1:1 sitter. Patient denied SI, Hi and AVH. Continues to have tangential thoughts. No to complaints noted or observed at this time.    Froylan Borja is a 70 y.o. gentleman with HCC undergoing Y90 therapy with a RLE hematoma, R fem pseudoaneurysm, and a R sided DVT.    - Plan for takeback to OR Thursday for wound debridement + vac reapplication  - will reconsent  - OOB/ambulate   - please hold anticoagulation prior to OR on Thursday, ok to restart now from our perspective

## 2025-04-23 NOTE — ANESTHESIA PREPROCEDURE EVALUATION
[facility]  Anesthesia Pre-Operative Evaluation         Patient Name: Froylan Borja  YOB: 1955  MRN: [id number]    SUBJECTIVE:     Pre-operative evaluation for Procedure(s) (LRB):  APPLICATION, WOUND VAC (Right)  REPLACEMENT, WOUND VAC (Right)     04/23/2025    Froylan Borja is a 70 y.o. male w/ a significant PMHx of PMHx alcohol abuse, cirrhosis, hepatocellular carcinoma, and HTN who presented with a R leg wound and pain at prior angiogram site in R groin. RLE U/S showed non-occlusive CFV thrombus and R CFA pseudoaneurysm. CTA re-demonstrated the psdueoaneurysm with active extravasation within the sac as well as a large R calf hematoma. He is s/p debridement and wound vac placement on 4/21/25.    Patient now presents for the above procedure(s).    Results for orders placed during the hospital encounter of 04/18/25    Echo    Interpretation Summary    Left Ventricle: The left ventricle is normal in size. Normal wall thickness. There is normal systolic function with a visually estimated ejection fraction of 65 - 70%. There is normal diastolic function.    Right Ventricle: The right ventricle is normal in size. Wall thickness is normal. Systolic function is normal.    IVC/SVC: Normal venous pressure at 3 mmHg.      LDA:        Peripheral IV - Single Lumen 04/19/25 0314 20 G Anterior;Right Upper Arm (Active)   Site Assessment Clean;Dry;No redness;Intact;No swelling;No drainage;No warmth 04/23/25 0800   Line Securement Device Secured with sutureless device 04/22/25 0937   Extremity Assessment Distal to IV No abnormal discoloration;No redness;No swelling;No warmth 04/23/25 0800   Line Status Flushed;Saline locked 04/23/25 0800   Dressing Status Clean;Dry;Intact 04/23/25 0800   Dressing Intervention Integrity maintained 04/23/25 0800   Site Change Due 04/23/25 04/21/25 2000   Reason Not Rotated Not due 04/23/25 0800   Number of days: 4            Peripheral IV - Single Lumen 04/21/25 1430 18 G  Left Forearm (Active)   Site Assessment Clean;Dry;No redness;Intact;No swelling;No warmth;No drainage 04/23/25 0800   Line Securement Device Secured with sutureless device 04/22/25 0937   Extremity Assessment Distal to IV No abnormal discoloration;No redness;No swelling;No warmth 04/23/25 0800   Line Status Flushed;Saline locked 04/23/25 0800   Dressing Status Clean;Dry;Intact 04/23/25 0800   Dressing Intervention Integrity maintained 04/23/25 0800   Site Change Due 04/25/25 04/21/25 2000   Reason Not Rotated Not due 04/23/25 0800   Number of days: 1       Prev airway:     Intubation     Date/Time: 4/21/2025 2:26 PM     Performed by: Judith Foy MD  Authorized by: Julianne Joy MD    Intubation:     Induction:  Intravenous    Intubated:  Postinduction    Mask Ventilation:  Easy mask    Attempts:  1    Attempted By:  Resident anesthesiologist    Method of Intubation:  Video laryngoscopy    Blade:  Bundy 4    Laryngeal View Grade: Grade I - full view of cords      Difficult Airway Encountered?: No      Complications:  None    Airway Device:  Oral endotracheal tube    Airway Device Size:  7.5    Style/Cuff Inflation:  Cuffed    Inflation Amount (mL):  8    Tube secured:  25    Secured at:  The lips    Placement Verified By:  Capnometry    Complicating Factors:  None    Findings Post-Intubation:  BS equal bilateral and atraumatic/condition of teeth unchanged       Drips: None documented.      Problem List[1]    Review of patient's allergies indicates:   Allergen Reactions    Zoloft [sertraline] Other (See Comments)     hyponatremia       Current Inpatient Medications:   amLODIPine  10 mg Oral Daily    buPROPion  150 mg Oral BID    folic acid  1 mg Oral Daily    gabapentin  300 mg Oral BID    losartan  100 mg Oral Daily    melatonin  6 mg Oral Nightly    multivitamin  1 tablet Oral Daily    polyethylene glycol  17 g Oral BID    senna  8.6 mg Oral BID    thiamine  100 mg Oral Daily       Medications  Ordered Prior to Encounter[2]    Past Surgical History:   Procedure Laterality Date    APPLICATION OF WOUND VACUUM-ASSISTED CLOSURE DEVICE Right 4/21/2025    Procedure: APPLICATION, WOUND VAC;  Surgeon: Estella Ramsay MD;  Location: [facility] OR 2ND FLR;  Service: General;  Laterality: Right;    COLONOSCOPY N/A 9/19/2024    Procedure: COLONOSCOPY;  Surgeon: Mo Patino MD;  Location: [facility] ENDO (4TH FLR);  Service: Endoscopy;  Laterality: N/A;  Ref by Dr. NIGHAT Tamez, PT/INR/CBC am procedure, Suprep, email - PC  9/16-lvm for pre call-tb-labs 8/22/24    ESOPHAGOGASTRODUODENOSCOPY N/A 9/19/2024    Procedure: EGD (ESOPHAGOGASTRODUODENOSCOPY);  Surgeon: Mo Patino MD;  Location: [facility] ENDO (4TH FLR);  Service: Endoscopy;  Laterality: N/A;    LIVER BIOPSY      WOUND DEBRIDEMENT Right 4/21/2025    Procedure: DEBRIDEMENT, WOUND;  Surgeon: Estella Ramsay MD;  Location: [facility] OR 2ND FLR;  Service: General;  Laterality: Right;       Social History[3]    OBJECTIVE:     Vital Signs Range (Last 24H):  Temp:  [36.3 °C (97.3 °F)-37.1 °C (98.7 °F)]   Pulse:  []   Resp:  [17-20]   BP: (130-173)/(60-82)   SpO2:  [95 %-99 %]       Significant Labs:  Lab Results   Component Value Date    WBC 10.44 04/23/2025    HGB 9.9 (L) 04/23/2025    HCT 30.5 (L) 04/23/2025     04/23/2025    CHOL 184 01/15/2025    TRIG 65 01/15/2025    HDL 80 (H) 01/15/2025    ALT 20 04/23/2025    AST 22 04/23/2025     (L) 04/23/2025    K 5.5 (H) 04/23/2025    CL 99 04/23/2025    CREATININE 1.0 04/23/2025    BUN 23 04/23/2025    CO2 29 04/23/2025    TSH 3.456 01/15/2025    PSA 5.77 (H) 03/22/2025    INR 1.0 04/18/2025    HGBA1C 4.7 01/15/2025       Diagnostic Studies: No relevant studies.    EKG:   Results for orders placed or performed during the hospital encounter of 04/18/25   EKG 12-lead    Collection Time: 04/18/25  2:26 AM   Result Value Ref Range    QRS Duration 82 ms    [facility] QTC Calculation 437 ms    Narrative    Test  Reason : E87.5,    Vent. Rate :  83 BPM     Atrial Rate :  83 BPM     P-R Int : 158 ms          QRS Dur :  82 ms      QT Int : 372 ms       P-R-T Axes :  42  36  39 degrees    QTcB Int : 437 ms    Normal sinus rhythm  Abnormal R wave progression in the precordial leads - Cannot rule out  Anterior infarct ,age undetermined but doubt possibly lead placement  Abnormal ECG  When compared with ECG of 15-Dec-2023 14:09,  Minimal criteria for Anterior infarct are now possible  Confirmed by Froylan Pineda ([id number]) on 4/18/2025 9:13:03 AM    Referred By: AAAREFERRAL SELF           Confirmed By: Froylan Pineda       2D ECHO:  TTE:  Results for orders placed or performed during the hospital encounter of 04/18/25   Echo   Result Value Ref Range    LV Diastolic Volume 102 mL    Echo EF Estimated 72 %    LV Systolic Volume 28 mL    IVS 0.8 0.6 - 1.1 cm    LVIDd 4.7 3.5 - 6.0 cm    LVIDs 2.8 2.1 - 4.0 cm    LVOT diameter 2.2 cm    PW 0.9 0.6 - 1.1 cm    AV LVOT peak gradient 6 mmHg    LVOT mn grad 3 mmHg    LVOT peak talib 1.2 m/s    LVOT peak VTI 24.6 cm    RV- shannon basal diam 3.1 cm    RV S' 16.60 cm/s    LA size 3.6 cm    Left Atrium Major Axis 5.4 cm    Left Atrium Minor Axis 5.8 cm    LA Vol (MOD) 69 mL    RA Major Axis 5.00 cm    RA Area 9.2 cm2    AV valve area 3.4 cm2    AV area by cont VTI 3.3 cm2    AV peak gradient 8 mmHg    AV mean gradient 5 mmHg    Ao peak talib 1.4 m/s    Ao VTI 27.3 cm    MV Peak A Talib 0.64 m/s    E wave deceleration time 241 ms    MV Peak E Talib 0.92 m/s    E/A ratio 1.44     LV LATERAL E/E' RATIO 7.7     LV SEPTAL E/E' RATIO 10.2     TDI LATERAL 0.12 m/s    TDI SEPTAL 0.09 m/s    Ascending aorta 3.39 cm    STJ 3.12 cm    IVC diameter 1.99 cm    Sinus 3.85 cm    LA WIDTH 4.2 cm    RA Width 2.41 cm    TAPSE 2.49 cm    BSA 2.52 m2    LVOT stroke volume 93.5 cm3    LV Systolic Volume Index 11.3 mL/m2    LV Diastolic Volume Index 41.30 mL/m2    LVOT area 3.8 cm2    FS 40.4 28 - 44 %    Left Ventricle Relative Wall  Thickness 0.38 cm    LV mass 132.3 g    LV Mass Index 53.6 g/m2    E/E' ratio 9 m/s    JOSSIE 29 mL/m2    LA Vol 72 cm3    RV/LV Ratio 0.66 cm    JOSSIE (MOD) 28 mL/m2    AV Velocity Ratio 0.86     AV index (prosthetic) 0.90     NEGRITO by Velocity Ratio 3.3 cm²    Mean e' 0.11 m/s    ZLVIDS -7.97     ZLVIDD -10.10     Est. RA pres 3 mmHg    Narrative      Left Ventricle: The left ventricle is normal in size. Normal wall   thickness. There is normal systolic function with a visually estimated   ejection fraction of 65 - 70%. There is normal diastolic function.    Right Ventricle: The right ventricle is normal in size. Wall thickness   is normal. Systolic function is normal.    IVC/SVC: Normal venous pressure at 3 mmHg.         STEPHANIE:  No results found for this or any previous visit.    ASSESSMENT/PLAN:           Pre-op Assessment    I have reviewed the Patient Summary Reports.     I have reviewed the Nursing Notes. I have reviewed the NPO Status.   I have reviewed the Medications.     Review of Systems  Anesthesia Hx:             Denies Family Hx of Anesthesia complications.    Denies Personal Hx of Anesthesia complications.                    Social:  Alcohol Use       Hematology/Oncology:                      Current/Recent Cancer.            Oncology Comments: HCC undergoing Y90 treatment     Cardiovascular:     Denies Pacemaker. Hypertension    Denies CABG/stent.                                       Pulmonary:     Denies Asthma.   Denies Shortness of breath.                  Hepatic/GI:      Liver Disease, Hepatitis              Neurological:  Denies TIA.  Denies CVA.    Denies Seizures.                                Psych:  Psychiatric History                Physical Exam  General: Well nourished, Cooperative, Alert and Oriented    Airway:  Mallampati: III / II  Mouth Opening: Normal  TM Distance: Normal  Tongue: Normal  Neck ROM: Normal ROM    Dental:  Intact        Anesthesia Plan  Type of Anesthesia, risks &  benefits discussed:    Anesthesia Type: Gen ETT  Intra-op Monitoring Plan: Standard ASA Monitors  Post Op Pain Control Plan: multimodal analgesia and IV/PO Opioids PRN  Induction:  IV  Airway Plan: Video and Direct, Post-Induction  Informed Consent: Informed consent signed with the Patient and all parties understand the risks and agree with anesthesia plan.  All questions answered.   ASA Score: 3  Day of Surgery Review of History & Physical: H&P Update referred to the surgeon/provider.    Ready For Surgery From Anesthesia Perspective.     .           [1]   Patient Active Problem List  Diagnosis    Glaucoma    History of hepatitis C, s/p successful Rx w/ SVR24 - 1/2017    Cirrhosis    Anxiety    Erectile dysfunction    Essential hypertension    Alcohol use disorder, severe, dependence    Obesity (BMI 30-39.9)    Gross hematuria    Diverticulosis: seen on colonoscopy 2014    Hyponatremia    Septic arthritis of right ankle    Bacterial conjunctivitis    Major depressive disorder in full remission    Alcohol withdrawal, uncomplicated    Fall    Homelessness    Impaired functional mobility and endurance    Nocturnal hypoxia    Primary osteoarthritis of right knee    Primary osteoarthritis of left knee    Rhabdomyolysis    Debility    Discharge planning issues    Irritant contact dermatitis due to fecal, urinary or dual incontinence    Alcohol-induced polyneuropathy    Calcified granuloma of lung    Purpura    HCC (hepatocellular carcinoma)    Benign prostatic hyperplasia    Pain following surgery or procedure    Hematoma    Pain of right lower extremity    Acute deep vein thrombosis (DVT) of right lower extremity    Pseudoaneurysm of femoral artery   [2]   No current facility-administered medications on file prior to encounter.     Current Outpatient Medications on File Prior to Encounter   Medication Sig Dispense Refill    amLODIPine (NORVASC) 2.5 MG tablet Take 1 tablet (2.5 mg total) by mouth once daily. 90 tablet 3     buPROPion (WELLBUTRIN SR) 150 MG TBSR 12 hr tablet Take 1 tablet (150 mg total) by mouth 2 (two) times daily. 180 tablet 1    gabapentin (NEURONTIN) 300 MG capsule One in AM and two at bedtime 90 capsule 6    losartan (COZAAR) 100 MG tablet Take 1 tablet (100 mg total) by mouth once daily. 90 tablet 3    multivitamin Tab Take 1 tablet by mouth once daily. (Patient not taking: Reported on 3/27/2025) 60 tablet 0    oxyCODONE-acetaminophen (PERCOCET)  mg per tablet Take 1 tablet by mouth every 6 (six) hours as needed for Pain. 5 tablet 0   [3]   Social History  Socioeconomic History    Marital status:    Tobacco Use    Smoking status: Former     Types: Cigars    Smokeless tobacco: Never    Tobacco comments:     smokes cigars 20 per month   Substance and Sexual Activity    Alcohol use: Yes     Alcohol/week: 28.0 standard drinks of alcohol     Types: 28 Cans of beer per week     Comment: 2-3 beers daily    Drug use: No   Social History Narrative     (mental health issues), retired  at [employer]. No kids. 2 dogs. No exercise.     Social Drivers of Health     Financial Resource Strain: Low Risk  (4/21/2025)    Overall Financial Resource Strain (CARDIA)     Difficulty of Paying Living Expenses: Not very hard   Recent Concern: Financial Resource Strain - Medium Risk (4/19/2025)    Overall Financial Resource Strain (CARDIA)     Difficulty of Paying Living Expenses: Somewhat hard   Food Insecurity: No Food Insecurity (4/21/2025)    Hunger Vital Sign     Worried About Running Out of Food in the Last Year: Never true     Ran Out of Food in the Last Year: Never true   Transportation Needs: No Transportation Needs (4/21/2025)    PRAPARE - Transportation     Lack of Transportation (Medical): No     Lack of Transportation (Non-Medical): No   Physical Activity: Inactive (4/21/2025)    Exercise Vital Sign     Days of Exercise per Week: 0 days     Minutes of Exercise per Session: 0 min   Stress: Stress  Concern Present (4/21/2025)    [facility] - Occupational Stress Questionnaire     Feeling of Stress : To some extent   Housing Stability: Low Risk  (4/21/2025)    Housing Stability Vital Sign     Unable to Pay for Housing in the Last Year: No     Homeless in the Last Year: No

## 2025-04-23 NOTE — ASSESSMENT & PLAN NOTE
Hyperkalemia is likely due to multiple afctors.The patients most recent potassium results are listed below.  Recent Labs     04/21/25  0531 04/22/25  0920 04/23/25  0309   K 4.8 4.9 5.5*     Plan  - Monitor for arrhythmias with EKG and/or continuous telemetry.   - Treat the hyperkalemia with Potassium Binders and Furosemide.   - Monitor potassium: Every 12 hours  - C/w ARB for now, if worsening or persistent will d/c

## 2025-04-23 NOTE — PROGRESS NOTES
[facility] - Telemetry Stepdown  General Surgery  Progress Note    Subjective:     History of Present Illness:  Froylan Borja is a 70 y.o. gentleman with PMH of alcohol abuse, cirrhosis, hepatocellular carcinoma undergoing Y90 therapy, and HTN who presents to the ED with R leg pain and swelling. He had Y90 through his R femoral artery 8 days ago. He has had some bruising and pain of his groin. He also bumped his calf on his car and has increasing swelling and discoloration. He did not notice until today, but he also has pitting edema to his right leg, much more than his left side. In the ED, he is hemodynamically normal though hypertensive. Labs significant for Na 129, K 5.7. US of his lower extremities show a R sided nonocclusive DVT and a R femo pseudoaneurysm. CT was also performed which demonstrated his hematoma over his R calf and extravasation of contrast into the pseudoaneusym. General surgery consulted for evaluation of drainage of his RLE hematoma.           Post-Op Info:  Procedure(s) (LRB):  DEBRIDEMENT, WOUND (Right)  APPLICATION, WOUND VAC (Right)   2 Days Post-Op     Interval History:  No significant changes overnight.  Hemoglobin remained stable.  Planning to go to the operating room tomorrow.    Medications:  Continuous Infusions:  Scheduled Meds:   amLODIPine  10 mg Oral Daily    buPROPion  150 mg Oral BID    folic acid  1 mg Oral Daily    gabapentin  300 mg Oral BID    losartan  100 mg Oral Daily    melatonin  6 mg Oral Nightly    multivitamin  1 tablet Oral Daily    polyethylene glycol  17 g Oral BID    senna  8.6 mg Oral BID    thiamine  100 mg Oral Daily     PRN Meds:  Current Facility-Administered Medications:     acetaminophen, 1,000 mg, Oral, Q8H PRN    albuterol-ipratropium, 3 mL, Nebulization, Q4H PRN    bisacodyL, 10 mg, Rectal, Daily PRN    dextrose 50%, 12.5 g, Intravenous, PRN    dextrose 50%, 25 g, Intravenous, PRN    glucagon (human recombinant), 1 mg, Intramuscular, PRN    glucose, 16 g,  Oral, PRN    glucose, 24 g, Oral, PRN    hydrALAZINE, 10 mg, Intravenous, Q6H PRN    HYDROmorphone, 1 mg, Intravenous, Q6H PRN    hydrOXYzine pamoate, 25 mg, Oral, Q8H PRN    LORazepam, 2 mg, Oral, Q4H PRN    ondansetron, 8 mg, Oral, Q8H PRN    oxyCODONE, 5 mg, Oral, Q4H PRN    oxyCODONE, 10 mg, Oral, Q6H PRN    promethazine, 25 mg, Oral, Q6H PRN    sodium chloride 0.9%, 10 mL, Intravenous, PRN     Review of patient's allergies indicates:   Allergen Reactions    Zoloft [sertraline] Other (See Comments)     hyponatremia     Objective:     Vital Signs (Most Recent):  Temp: 97.7 °F (36.5 °C) (04/23/25 0746)  Pulse: 75 (04/23/25 0746)  Resp: 18 (04/23/25 0820)  BP: (!) 163/75 (04/23/25 0746)  SpO2: 99 % (04/23/25 0746) Vital Signs (24h Range):  Temp:  [97.3 °F (36.3 °C)-98.7 °F (37.1 °C)] 97.7 °F (36.5 °C)  Pulse:  [] 75  Resp:  [17-20] 18  SpO2:  [95 %-99 %] 99 %  BP: (130-173)/(60-82) 163/75     Weight: 120 kg (264 lb 8.8 oz)  Body mass index is 33.07 kg/m².    Intake/Output - Last 3 Shifts         04/21 0700  04/22 0659 04/22 0700 04/23 0659 04/23 0700 04/24 0659    P.O. 240      IV Piggyback 250      Total Intake(mL/kg) 490 (4.1)      Urine (mL/kg/hr) 1950 (0.7) 2050 (0.7)     Other 75 50     Stool 0      Total Output 2025 2100     Net -1535 -2100            Stool Occurrence 0 x               Physical Exam  Constitutional:       General: He is not in acute distress.     Appearance: Normal appearance.   HENT:      Head: Normocephalic and atraumatic.   Cardiovascular:      Rate and Rhythm: Normal rate and regular rhythm.   Pulmonary:      Effort: Pulmonary effort is normal. No respiratory distress.   Abdominal:      General: Abdomen is flat. There is no distension.      Palpations: Abdomen is soft.      Tenderness: There is no abdominal tenderness.   Musculoskeletal:      Comments: R calf w wound vac in place to suction, wrapped in ACE   Neurological:      General: No focal deficit present.      Mental Status:  He is alert and oriented to person, place, and time.   Psychiatric:         Behavior: Behavior normal.         Thought Content: Thought content normal.          Significant Labs:  I have reviewed all pertinent lab results within the past 24 hours.    Significant Diagnostics:  I have reviewed all pertinent imaging results/findings within the past 24 hours.  Assessment/Plan:     Sejal Borja is a 70 y.o. gentleman with HCC undergoing Y90 therapy with a RLE hematoma, R fem pseudoaneurysm, and a R sided DVT.    - Plan for takeback to OR Thursday for wound debridement + vac reapplication  - will reconsent  - OOB/ambulate   - please hold anticoagulation prior to OR on Thursday, ok to restart now from our perspective        Pk Goss MD  General Surgery  [facility] - Telemetry Stepdown

## 2025-04-23 NOTE — PLAN OF CARE
Problem: Adult Inpatient Plan of Care  Goal: Plan of Care Review  Outcome: Progressing     Problem: Infection  Goal: Absence of Infection Signs and Symptoms  Outcome: Progressing     Problem: Wound  Goal: Optimal Coping  Outcome: Progressing     Problem: Fall Injury Risk  Goal: Absence of Fall and Fall-Related Injury  Outcome: Progressing

## 2025-04-23 NOTE — ASSESSMENT & PLAN NOTE
Patient's blood pressure range in the last 24 hours was: BP  Min: 130/60  Max: 173/79.The patient's inpatient anti-hypertensive regimen is listed below:  Current Antihypertensives  amLODIPine tablet 10 mg, Daily, Oral  hydrALAZINE injection 10 mg, Every 6 hours PRN, Intravenous  losartan tablet 100 mg, Daily, Oral    Plan  - BP is uncontrolled, will adjust as follows: increase amlodipine to 10mg  - Losartan 100mg restarted 4/22  - IV hydralazine PRN

## 2025-04-23 NOTE — SUBJECTIVE & OBJECTIVE
Interval History: NAEON. AFVSS. Hg stable. GenSurg on 4/21 did wound debridement and wound vac placement. Plan for possible OR again on 4/24 - Hep gtt has been held since prior to 4/21 - discussed with surgery who recommend against starting heparin gtt or Lovenox prior to possible procedure on 4/24 .     Patient got some sleep and less frustrated than yesterday. Wants to see how tomorrow goes. Has had small BM. Bowel regimen ordered with lokelma and lasix 20 IV x1 for elevated K. Pt reports he has been on losartan for a long time does not want to stop it - will repeat BMP to ensure K is improved.     Drain output 50 ml documented    Review of Systems   Constitutional:  Negative for chills and fever.   Respiratory:  Negative for chest tightness and shortness of breath.    Cardiovascular:  Positive for leg swelling. Negative for chest pain.   Gastrointestinal:  Negative for abdominal pain, constipation, diarrhea, nausea and vomiting.   Genitourinary:  Negative for dysuria, frequency and urgency.   Musculoskeletal:  Positive for arthralgias and myalgias.   Skin:  Positive for color change and wound.   Neurological:  Positive for numbness. Negative for dizziness, weakness, light-headedness and headaches.     Objective:     Vital Signs (Most Recent):  Temp: 97.9 °F (36.6 °C) (04/23/25 1149)  Pulse: 82 (04/23/25 1149)  Resp: 18 (04/23/25 1211)  BP: (!) 141/65 (04/23/25 1149)  SpO2: 96 % (04/23/25 1149) Vital Signs (24h Range):  Temp:  [97.3 °F (36.3 °C)-98.7 °F (37.1 °C)] 97.9 °F (36.6 °C)  Pulse:  [] 82  Resp:  [17-19] 18  SpO2:  [95 %-99 %] 96 %  BP: (130-173)/(60-82) 141/65     Weight: 120 kg (264 lb 8.8 oz)  Body mass index is 33.07 kg/m².    Intake/Output Summary (Last 24 hours) at 4/23/2025 1321  Last data filed at 4/23/2025 0655  Gross per 24 hour   Intake --   Output 2100 ml   Net -2100 ml         Physical Exam  Vitals and nursing note reviewed.   Constitutional:       General: He is not in acute distress.      Appearance: He is well-developed. He is not ill-appearing.   HENT:      Head: Normocephalic and atraumatic.   Eyes:      Pupils: Pupils are equal, round, and reactive to light.   Cardiovascular:      Rate and Rhythm: Normal rate and regular rhythm.   Pulmonary:      Effort: Pulmonary effort is normal. No respiratory distress.      Breath sounds: Normal breath sounds. No wheezing or rales.   Abdominal:      Palpations: Abdomen is soft.      Tenderness: There is no abdominal tenderness.   Musculoskeletal:      Right lower leg: Edema present.      Left lower leg: No edema.      Comments: Sensation intact  RLE dressed per General surgery - wound vac in place  Foot elevated and ACE wrapped   Skin:     General: Skin is warm and dry.   Neurological:      Mental Status: He is alert and oriented to person, place, and time.   Psychiatric:         Behavior: Behavior normal.               Significant Labs: All pertinent labs within the past 24 hours have been reviewed.  CBC:   Recent Labs   Lab 04/22/25  0920 04/23/25  0309   WBC 12.05 10.44   HGB 10.6* 9.9*   HCT 32.1* 30.5*    224     CMP:   Recent Labs   Lab 04/22/25  0920 04/23/25  0309   * 132*   K 4.9 5.5*   CL 97 99   CO2 27 29   BUN 21 23   CREATININE 0.9 1.0   CALCIUM 8.6* 8.8   ALBUMIN 3.1* 2.8*   BILITOT 0.8 0.6   ALKPHOS 81 72   AST 26 22   ALT 23 20   ANIONGAP 6* 4*       Significant Imaging: I have reviewed all pertinent imaging results/findings within the past 24 hours.

## 2025-04-23 NOTE — ASSESSMENT & PLAN NOTE
- New swelling noted to RLE following IR procedure on presentation  - General surgery consulted- hematoma evacuation performed at bedside  - S/p  wound debridement and wound vac placement on 4/21. Plan to takeback to OR Thursday 4/24 for wound debridement + vac reapplication  - Heparin gtt held- remains held given risk of AC more that benefit at this time per discussion with surgery - communicated with patient and demonstrates understanding  - WV placed 4/21 - management per surgery

## 2025-04-23 NOTE — ASSESSMENT & PLAN NOTE
Hyponatremia is likely due to Cirrhosis. The patient's most recent sodium results are listed below.  Recent Labs     04/21/25  0531 04/22/25  0920 04/23/25  0309   * 130* 132*     Plan  - Correct the sodium by 4-6mEq in 24 hours.   - Fluid and salt restriction  - Monitor sodium Daily.   - Patient hyponatremia is stable

## 2025-04-23 NOTE — PROGRESS NOTES
[facility] - Telemetry [facility]  Progress Note    Patient Name: Froylan Borja  MRN: [id number]  Patient Class: IP- Inpatient   Admission Date: 4/18/2025  Length of Stay: 5 days  Attending Physician: Ramiro Silverman MD  Primary Care Provider: Janki Tamez MD        Subjective     Principal Problem:Acute deep vein thrombosis (DVT) of right lower extremity        HPI:  Mr. Froylan Borja is a 71 y/o male with a PMHx alcohol abuse, cirrhosis, hepatocellular carcinoma, and HTN who presents for complaint of R leg pain and swelling. He reports yesterday he hit his R leg on the car door and shortly developed a bruise and pain to the R lateral leg. He tried tylenol at-home though pain, swelling, and bruising continued to worsen. Admits to paresthesias and numbness to the R foot as well as significant R foot pain with weight-bearing. Denies CP, palpitations, SOB, lightheadedness, dizziness, headaches, LOC, vomiting, diarrhea, abdominal pain, fever, chills. Reports some nausea following morphine and dilaudid for pain in ED. Of note, patient had angiogram y90 mapping study done with IR last Thursday (4/10/25). Has developed some bruising to the medial thigh and mild groin discomfort since procedure. Social history significant for daily alcohol consumption, 3-4 beers daily, last alcoholic beverage yesterday around 3PM.     In the ED, AF HDS. H&H 13.0/38.6 (BL~14.8), no leukocytosis. HypoNa 129, HyperK 5.7, CO2 20, Ca 8.6, Albumin 3.4, remainder of CMP unremarkable. PT/INR wnl at 10.9/1.0. LA 1.6. CPK wnl. Hep C Ab reactive, HCV RNA pending. EKG with normal sinus rhythm. R Lower Ext U/S w/ findings of nonocclusive thrombus in the right common femoral vein and right common femoral artery pseudoaneurysm. CTA Lower Ext pending. R lower extremity hematoma drained at bedside by general surgery with evacuation of 120 cc of hematoma. Compressive dressing applied. Given nebulized albuterol, lokelma, IV cefazolin, IV dilaudid,  IV morphine, IV zofran. Admitted to .     Overview/Hospital Course:  70 y.o M with HTN, alcohol/HepC cirrhosis c/b HCC s/p Y-90 mapping angiogram on 4/10 who presents with bruising and pain at angiogram site and RLE calf pain after he hit it on a car door. RLE U/S showed non-occlusive CFV thrombus and R CFA pseudoaneurysm. CTA re-demonstrated the psdueoaneurysm with active extravasation within the sac as well as a large R calf hematoma which GenSurg were consulted for and successfully drained (120ccs). Vascular Surgery were consulted, recommended IR guided percutaneous thrombin injection which IR were able to complete. Heparin gtt was started. Groin U/S done the following day showed continued thrombosis of the pseudoanurysm. GenSurg plan for OR on 4/21 for debridement and wound vac placement. Will need to f/u with IR for repeat U/S in 1 week.     Interval History: NAEON. AFVSS. Hg stable. GenSurg on 4/21 did wound debridement and wound vac placement. Plan for possible OR again on 4/24 - Hep gtt has been held since prior to 4/21 - discussed with surgery who recommend against starting heparin gtt or Lovenox prior to possible procedure on 4/24 .     Patient got some sleep and less frustrated than yesterday. Wants to see how tomorrow goes. Has had small BM. Bowel regimen ordered with lokelma and lasix 20 IV x1 for elevated K. Pt reports he has been on losartan for a long time does not want to stop it - will repeat BMP to ensure K is improved.     Drain output 50 ml documented    Review of Systems   Constitutional:  Negative for chills and fever.   Respiratory:  Negative for chest tightness and shortness of breath.    Cardiovascular:  Positive for leg swelling. Negative for chest pain.   Gastrointestinal:  Negative for abdominal pain, constipation, diarrhea, nausea and vomiting.   Genitourinary:  Negative for dysuria, frequency and urgency.   Musculoskeletal:  Positive for arthralgias and myalgias.   Skin:  Positive  for color change and wound.   Neurological:  Positive for numbness. Negative for dizziness, weakness, light-headedness and headaches.     Objective:     Vital Signs (Most Recent):  Temp: 97.9 °F (36.6 °C) (04/23/25 1149)  Pulse: 82 (04/23/25 1149)  Resp: 18 (04/23/25 1211)  BP: (!) 141/65 (04/23/25 1149)  SpO2: 96 % (04/23/25 1149) Vital Signs (24h Range):  Temp:  [97.3 °F (36.3 °C)-98.7 °F (37.1 °C)] 97.9 °F (36.6 °C)  Pulse:  [] 82  Resp:  [17-19] 18  SpO2:  [95 %-99 %] 96 %  BP: (130-173)/(60-82) 141/65     Weight: 120 kg (264 lb 8.8 oz)  Body mass index is 33.07 kg/m².    Intake/Output Summary (Last 24 hours) at 4/23/2025 1321  Last data filed at 4/23/2025 0655  Gross per 24 hour   Intake --   Output 2100 ml   Net -2100 ml         Physical Exam  Vitals and nursing note reviewed.   Constitutional:       General: He is not in acute distress.     Appearance: He is well-developed. He is not ill-appearing.   HENT:      Head: Normocephalic and atraumatic.   Eyes:      Pupils: Pupils are equal, round, and reactive to light.   Cardiovascular:      Rate and Rhythm: Normal rate and regular rhythm.   Pulmonary:      Effort: Pulmonary effort is normal. No respiratory distress.      Breath sounds: Normal breath sounds. No wheezing or rales.   Abdominal:      Palpations: Abdomen is soft.      Tenderness: There is no abdominal tenderness.   Musculoskeletal:      Right lower leg: Edema present.      Left lower leg: No edema.      Comments: Sensation intact  RLE dressed per General surgery - wound vac in place  Foot elevated and ACE wrapped   Skin:     General: Skin is warm and dry.   Neurological:      Mental Status: He is alert and oriented to person, place, and time.   Psychiatric:         Behavior: Behavior normal.               Significant Labs: All pertinent labs within the past 24 hours have been reviewed.  CBC:   Recent Labs   Lab 04/22/25  0920 04/23/25  0309   WBC 12.05 10.44   HGB 10.6* 9.9*   HCT 32.1* 30.5*     224     CMP:   Recent Labs   Lab 04/22/25  0920 04/23/25  0309   * 132*   K 4.9 5.5*   CL 97 99   CO2 27 29   BUN 21 23   CREATININE 0.9 1.0   CALCIUM 8.6* 8.8   ALBUMIN 3.1* 2.8*   BILITOT 0.8 0.6   ALKPHOS 81 72   AST 26 22   ALT 23 20   ANIONGAP 6* 4*       Significant Imaging: I have reviewed all pertinent imaging results/findings within the past 24 hours.      Assessment & Plan  Acute deep vein thrombosis (DVT) of right lower extremity  Pain of R lower extremity   - RLE US showed a nonocclusive thrombus in the right common femoral vein   - Started Heparin gtt on 4/18 once cleared from IR standpoint  - Hg has been down-trending and dressings have been saturated with blood.   - If able to tolerate anticoagulation, will transition to oral DOAC when not needing further procedures. If not, will need to discuss IVC filter  - Heparin held 4/20 for surgical intervention  - plan for repeat OR 4/22 - surgery recommend holding AC prior to that given significant bleeding risk  - will discuss with surgery post intervention about timing of reinitiating of AC  - MM pain regimen   - Plan for PT assessment after surgery if needed - was ambulating on 4/21 before procedure  - Wound Vac in place  Essential hypertension  Patient's blood pressure range in the last 24 hours was: BP  Min: 130/60  Max: 173/79.The patient's inpatient anti-hypertensive regimen is listed below:  Current Antihypertensives  amLODIPine tablet 10 mg, Daily, Oral  hydrALAZINE injection 10 mg, Every 6 hours PRN, Intravenous  losartan tablet 100 mg, Daily, Oral    Plan  - BP is uncontrolled, will adjust as follows: increase amlodipine to 10mg  - Losartan 100mg restarted 4/22  - IV hydralazine PRN  Alcohol use disorder, severe, dependence  - daily drinker, last drink 4/17  - 3-4 beers daily, denies withdrawal symptoms  - continue thiamine, folic acid and multivitamin  - CIWA per nursing   Obesity (BMI 30-39.9)  Body mass index is 33.07 kg/m².  Morbid obesity complicates all aspects of disease management from diagnostic modalities to treatment. Weight loss encouraged and health benefits explained to patient.   Hyponatremia  Hyponatremia is likely due to Cirrhosis. The patient's most recent sodium results are listed below.  Recent Labs     04/21/25  0531 04/22/25  0920 04/23/25  0309   * 130* 132*     Plan  - Correct the sodium by 4-6mEq in 24 hours.   - Fluid and salt restriction  - Monitor sodium Daily.   - Patient hyponatremia is stable  Hematoma  - New swelling noted to RLE following IR procedure on presentation  - General surgery consulted- hematoma evacuation performed at bedside  - S/p  wound debridement and wound vac placement on 4/21. Plan to takeback to OR Thursday 4/24 for wound debridement + vac reapplication  - Heparin gtt held- remains held given risk of AC more that benefit at this time per discussion with surgery - communicated with patient and demonstrates understanding  - WV placed 4/21 - management per surgery  Pseudoaneurysm of femoral artery  - RLE US showed a new right common femoral artery pseudoaneurysm   - CTA RLE showed extravasation within the pseudoaneurysm sac  - Vascular Surgery consulted- recommended IR guided thrombin injection. IR consulted, completed procedure on 4/18.   - Groin u/s on 4/19 showed continued thrombosis of the pseudoaneurysm  - Will need repeat U/S outpatient in ~ 1 week and f/u with IR - if still hospitalized may get inpatient early next week and have IR follow up  Hyperkalemia  Hyperkalemia is likely due to  multiple afctors .The patients most recent potassium results are listed below.  Recent Labs     04/21/25  0531 04/22/25  0920 04/23/25  0309   K 4.8 4.9 5.5*     Plan  - Monitor for arrhythmias with EKG and/or continuous telemetry.   - Treat the hyperkalemia with Potassium Binders and Furosemide.   - Monitor potassium: Every 12 hours  - C/w ARB for now, if worsening or persistent will  d/c        VTE Risk Mitigation (From admission, onward)           Ordered     IP VTE HIGH RISK PATIENT  Once         04/18/25 0725     Reason for No Pharmacological VTE Prophylaxis  Once        Question:  Reasons:  Answer:  Risk of Bleeding  Comment:  will start anticoagulation once medically cleared    04/18/25 0725                    Discharge Planning   KENNETH: 4/28/2025     Code Status: Full Code   Medical Readiness for Discharge Date:   Discharge Plan A: Home, Home with family                        Ramiro Silverman MD  Department of Hospital Medicine   [facility] - Telemetry Stepdown

## 2025-04-23 NOTE — SUBJECTIVE & OBJECTIVE
Interval History:  No significant changes overnight.  Hemoglobin remained stable.  Planning to go to the operating room tomorrow.    Medications:  Continuous Infusions:  Scheduled Meds:   amLODIPine  10 mg Oral Daily    buPROPion  150 mg Oral BID    folic acid  1 mg Oral Daily    gabapentin  300 mg Oral BID    losartan  100 mg Oral Daily    melatonin  6 mg Oral Nightly    multivitamin  1 tablet Oral Daily    polyethylene glycol  17 g Oral BID    senna  8.6 mg Oral BID    thiamine  100 mg Oral Daily     PRN Meds:  Current Facility-Administered Medications:     acetaminophen, 1,000 mg, Oral, Q8H PRN    albuterol-ipratropium, 3 mL, Nebulization, Q4H PRN    bisacodyL, 10 mg, Rectal, Daily PRN    dextrose 50%, 12.5 g, Intravenous, PRN    dextrose 50%, 25 g, Intravenous, PRN    glucagon (human recombinant), 1 mg, Intramuscular, PRN    glucose, 16 g, Oral, PRN    glucose, 24 g, Oral, PRN    hydrALAZINE, 10 mg, Intravenous, Q6H PRN    HYDROmorphone, 1 mg, Intravenous, Q6H PRN    hydrOXYzine pamoate, 25 mg, Oral, Q8H PRN    LORazepam, 2 mg, Oral, Q4H PRN    ondansetron, 8 mg, Oral, Q8H PRN    oxyCODONE, 5 mg, Oral, Q4H PRN    oxyCODONE, 10 mg, Oral, Q6H PRN    promethazine, 25 mg, Oral, Q6H PRN    sodium chloride 0.9%, 10 mL, Intravenous, PRN     Review of patient's allergies indicates:   Allergen Reactions    Zoloft [sertraline] Other (See Comments)     hyponatremia     Objective:     Vital Signs (Most Recent):  Temp: 97.7 °F (36.5 °C) (04/23/25 0746)  Pulse: 75 (04/23/25 0746)  Resp: 18 (04/23/25 0820)  BP: (!) 163/75 (04/23/25 0746)  SpO2: 99 % (04/23/25 0746) Vital Signs (24h Range):  Temp:  [97.3 °F (36.3 °C)-98.7 °F (37.1 °C)] 97.7 °F (36.5 °C)  Pulse:  [] 75  Resp:  [17-20] 18  SpO2:  [95 %-99 %] 99 %  BP: (130-173)/(60-82) 163/75     Weight: 120 kg (264 lb 8.8 oz)  Body mass index is 33.07 kg/m².    Intake/Output - Last 3 Shifts         04/21 0700 04/22 0659 04/22 0700 04/23 0659 04/23 0700 04/24 0659     P.O. 240      IV Piggyback 250      Total Intake(mL/kg) 490 (4.1)      Urine (mL/kg/hr) 1950 (0.7) 2050 (0.7)     Other 75 50     Stool 0      Total Output 2025 2100     Net -1535 -2100            Stool Occurrence 0 x               Physical Exam  Constitutional:       General: He is not in acute distress.     Appearance: Normal appearance.   HENT:      Head: Normocephalic and atraumatic.   Cardiovascular:      Rate and Rhythm: Normal rate and regular rhythm.   Pulmonary:      Effort: Pulmonary effort is normal. No respiratory distress.   Abdominal:      General: Abdomen is flat. There is no distension.      Palpations: Abdomen is soft.      Tenderness: There is no abdominal tenderness.   Musculoskeletal:      Comments: R calf w wound vac in place to suction, wrapped in ACE   Neurological:      General: No focal deficit present.      Mental Status: He is alert and oriented to person, place, and time.   Psychiatric:         Behavior: Behavior normal.         Thought Content: Thought content normal.          Significant Labs:  I have reviewed all pertinent lab results within the past 24 hours.    Significant Diagnostics:  I have reviewed all pertinent imaging results/findings within the past 24 hours.

## 2025-04-23 NOTE — ASSESSMENT & PLAN NOTE
- RLE US showed a new right common femoral artery pseudoaneurysm   - CTA RLE showed extravasation within the pseudoaneurysm sac  - Vascular Surgery consulted- recommended IR guided thrombin injection. IR consulted, completed procedure on 4/18.   - Groin u/s on 4/19 showed continued thrombosis of the pseudoaneurysm  - Will need repeat U/S outpatient in ~ 1 week and f/u with IR - if still hospitalized may get inpatient early next week and have IR follow up

## 2025-04-24 ENCOUNTER — ANESTHESIA (OUTPATIENT)
Dept: SURGERY | Facility: HOSPITAL | Age: 70
End: 2025-04-24
Payer: MEDICARE

## 2025-04-24 LAB
ABSOLUTE EOSINOPHIL (OHS): 0.44 K/UL
ABSOLUTE MONOCYTE (OHS): 1.45 K/UL (ref 0.3–1)
ABSOLUTE NEUTROPHIL COUNT (OHS): 5.05 K/UL (ref 1.8–7.7)
ALBUMIN SERPL BCP-MCNC: 2.9 G/DL (ref 3.5–5.2)
ALP SERPL-CCNC: 77 UNIT/L (ref 40–150)
ALT SERPL W/O P-5'-P-CCNC: 21 UNIT/L (ref 10–44)
ANION GAP (OHS): 6 MMOL/L (ref 8–16)
AST SERPL-CCNC: 26 UNIT/L (ref 11–45)
BASOPHILS # BLD AUTO: 0.09 K/UL
BASOPHILS NFR BLD AUTO: 1 %
BILIRUB SERPL-MCNC: 0.7 MG/DL (ref 0.1–1)
BUN SERPL-MCNC: 25 MG/DL (ref 8–23)
CALCIUM SERPL-MCNC: 8.5 MG/DL (ref 8.7–10.5)
CHLORIDE SERPL-SCNC: 98 MMOL/L (ref 95–110)
CO2 SERPL-SCNC: 30 MMOL/L (ref 23–29)
CREAT SERPL-MCNC: 1.1 MG/DL (ref 0.5–1.4)
ERYTHROCYTE [DISTWIDTH] IN BLOOD BY AUTOMATED COUNT: 13.3 % (ref 11.5–14.5)
GFR SERPLBLD CREATININE-BSD FMLA CKD-EPI: >60 ML/MIN/1.73/M2
GLUCOSE SERPL-MCNC: 106 MG/DL (ref 70–110)
HCT VFR BLD AUTO: 33.1 % (ref 40–54)
HGB BLD-MCNC: 10.6 GM/DL (ref 14–18)
IMM GRANULOCYTES # BLD AUTO: 0.14 K/UL (ref 0–0.04)
IMM GRANULOCYTES NFR BLD AUTO: 1.6 % (ref 0–0.5)
LYMPHOCYTES # BLD AUTO: 1.67 K/UL (ref 1–4.8)
MAGNESIUM SERPL-MCNC: 2 MG/DL (ref 1.6–2.6)
MCH RBC QN AUTO: 32.4 PG (ref 27–31)
MCHC RBC AUTO-ENTMCNC: 32 G/DL (ref 32–36)
MCV RBC AUTO: 101 FL (ref 82–98)
NUCLEATED RBC (/100WBC) (OHS): 0 /100 WBC
PHOSPHATE SERPL-MCNC: 3.6 MG/DL (ref 2.7–4.5)
PLATELET # BLD AUTO: 259 K/UL (ref 150–450)
PMV BLD AUTO: 9.5 FL (ref 9.2–12.9)
POTASSIUM SERPL-SCNC: 4.8 MMOL/L (ref 3.5–5.1)
PROT SERPL-MCNC: 6.3 GM/DL (ref 6–8.4)
RBC # BLD AUTO: 3.27 M/UL (ref 4.6–6.2)
RELATIVE EOSINOPHIL (OHS): 5 %
RELATIVE LYMPHOCYTE (OHS): 18.9 % (ref 18–48)
RELATIVE MONOCYTE (OHS): 16.4 % (ref 4–15)
RELATIVE NEUTROPHIL (OHS): 57.1 % (ref 38–73)
SODIUM SERPL-SCNC: 134 MMOL/L (ref 136–145)
WBC # BLD AUTO: 8.84 K/UL (ref 3.9–12.7)

## 2025-04-24 PROCEDURE — 36000704 HC OR TIME LEV I 1ST 15 MIN: Mod: HCNC | Performed by: STUDENT IN AN ORGANIZED HEALTH CARE EDUCATION/TRAINING PROGRAM

## 2025-04-24 PROCEDURE — 37000009 HC ANESTHESIA EA ADD 15 MINS: Mod: HCNC | Performed by: STUDENT IN AN ORGANIZED HEALTH CARE EDUCATION/TRAINING PROGRAM

## 2025-04-24 PROCEDURE — 71000033 HC RECOVERY, INTIAL HOUR: Mod: HCNC | Performed by: STUDENT IN AN ORGANIZED HEALTH CARE EDUCATION/TRAINING PROGRAM

## 2025-04-24 PROCEDURE — 37000008 HC ANESTHESIA 1ST 15 MINUTES: Mod: HCNC | Performed by: STUDENT IN AN ORGANIZED HEALTH CARE EDUCATION/TRAINING PROGRAM

## 2025-04-24 PROCEDURE — 63600175 PHARM REV CODE 636 W HCPCS: Mod: HCNC | Performed by: STUDENT IN AN ORGANIZED HEALTH CARE EDUCATION/TRAINING PROGRAM

## 2025-04-24 PROCEDURE — 36415 COLL VENOUS BLD VENIPUNCTURE: CPT | Mod: HCNC | Performed by: STUDENT IN AN ORGANIZED HEALTH CARE EDUCATION/TRAINING PROGRAM

## 2025-04-24 PROCEDURE — 63600175 PHARM REV CODE 636 W HCPCS: Mod: HCNC | Performed by: NURSE ANESTHETIST, CERTIFIED REGISTERED

## 2025-04-24 PROCEDURE — 84100 ASSAY OF PHOSPHORUS: CPT | Mod: HCNC | Performed by: STUDENT IN AN ORGANIZED HEALTH CARE EDUCATION/TRAINING PROGRAM

## 2025-04-24 PROCEDURE — 63600175 PHARM REV CODE 636 W HCPCS: Mod: HCNC

## 2025-04-24 PROCEDURE — 97606 NEG PRS WND THER DME>50 SQCM: CPT | Mod: HCNC,,, | Performed by: STUDENT IN AN ORGANIZED HEALTH CARE EDUCATION/TRAINING PROGRAM

## 2025-04-24 PROCEDURE — 80053 COMPREHEN METABOLIC PANEL: CPT | Mod: HCNC | Performed by: STUDENT IN AN ORGANIZED HEALTH CARE EDUCATION/TRAINING PROGRAM

## 2025-04-24 PROCEDURE — 83735 ASSAY OF MAGNESIUM: CPT | Mod: HCNC | Performed by: STUDENT IN AN ORGANIZED HEALTH CARE EDUCATION/TRAINING PROGRAM

## 2025-04-24 PROCEDURE — 20600001 HC STEP DOWN PRIVATE ROOM: Mod: HCNC

## 2025-04-24 PROCEDURE — 25000003 PHARM REV CODE 250: Mod: HCNC | Performed by: STUDENT IN AN ORGANIZED HEALTH CARE EDUCATION/TRAINING PROGRAM

## 2025-04-24 PROCEDURE — 25000003 PHARM REV CODE 250: Mod: HCNC

## 2025-04-24 PROCEDURE — 85025 COMPLETE CBC W/AUTO DIFF WBC: CPT | Mod: HCNC | Performed by: STUDENT IN AN ORGANIZED HEALTH CARE EDUCATION/TRAINING PROGRAM

## 2025-04-24 PROCEDURE — 71000015 HC POSTOP RECOV 1ST HR: Mod: HCNC | Performed by: STUDENT IN AN ORGANIZED HEALTH CARE EDUCATION/TRAINING PROGRAM

## 2025-04-24 PROCEDURE — 36000705 HC OR TIME LEV I EA ADD 15 MIN: Mod: HCNC | Performed by: STUDENT IN AN ORGANIZED HEALTH CARE EDUCATION/TRAINING PROGRAM

## 2025-04-24 PROCEDURE — 25000003 PHARM REV CODE 250: Mod: HCNC | Performed by: NURSE ANESTHETIST, CERTIFIED REGISTERED

## 2025-04-24 RX ORDER — PROPOFOL 10 MG/ML
VIAL (ML) INTRAVENOUS
Status: DISCONTINUED | OUTPATIENT
Start: 2025-04-24 | End: 2025-04-24

## 2025-04-24 RX ORDER — ROCURONIUM BROMIDE 10 MG/ML
INJECTION, SOLUTION INTRAVENOUS
Status: DISCONTINUED | OUTPATIENT
Start: 2025-04-24 | End: 2025-04-24

## 2025-04-24 RX ORDER — LIDOCAINE HYDROCHLORIDE 20 MG/ML
INJECTION INTRAVENOUS
Status: DISCONTINUED | OUTPATIENT
Start: 2025-04-24 | End: 2025-04-24

## 2025-04-24 RX ORDER — FENTANYL CITRATE 50 UG/ML
25 INJECTION, SOLUTION INTRAMUSCULAR; INTRAVENOUS EVERY 5 MIN PRN
Refills: 0 | Status: COMPLETED | OUTPATIENT
Start: 2025-04-24 | End: 2025-04-24

## 2025-04-24 RX ORDER — SODIUM CHLORIDE 0.9 % (FLUSH) 0.9 %
10 SYRINGE (ML) INJECTION
Status: DISCONTINUED | OUTPATIENT
Start: 2025-04-24 | End: 2025-04-24 | Stop reason: HOSPADM

## 2025-04-24 RX ORDER — FENTANYL CITRATE 50 UG/ML
INJECTION, SOLUTION INTRAMUSCULAR; INTRAVENOUS
Status: DISCONTINUED | OUTPATIENT
Start: 2025-04-24 | End: 2025-04-24

## 2025-04-24 RX ORDER — CEFAZOLIN SODIUM 1 G/3ML
INJECTION, POWDER, FOR SOLUTION INTRAMUSCULAR; INTRAVENOUS
Status: DISCONTINUED | OUTPATIENT
Start: 2025-04-24 | End: 2025-04-24

## 2025-04-24 RX ORDER — DEXAMETHASONE SODIUM PHOSPHATE 4 MG/ML
INJECTION, SOLUTION INTRA-ARTICULAR; INTRALESIONAL; INTRAMUSCULAR; INTRAVENOUS; SOFT TISSUE
Status: DISCONTINUED | OUTPATIENT
Start: 2025-04-24 | End: 2025-04-24

## 2025-04-24 RX ORDER — GLUCAGON 1 MG
1 KIT INJECTION
Status: DISCONTINUED | OUTPATIENT
Start: 2025-04-24 | End: 2025-04-24 | Stop reason: HOSPADM

## 2025-04-24 RX ORDER — ONDANSETRON HYDROCHLORIDE 2 MG/ML
INJECTION, SOLUTION INTRAVENOUS
Status: DISCONTINUED | OUTPATIENT
Start: 2025-04-24 | End: 2025-04-24

## 2025-04-24 RX ORDER — HALOPERIDOL LACTATE 5 MG/ML
0.5 INJECTION, SOLUTION INTRAMUSCULAR EVERY 10 MIN PRN
Status: DISCONTINUED | OUTPATIENT
Start: 2025-04-24 | End: 2025-04-24 | Stop reason: HOSPADM

## 2025-04-24 RX ADMIN — HYDROMORPHONE HYDROCHLORIDE 1 MG: 0.5 INJECTION, SOLUTION INTRAMUSCULAR; INTRAVENOUS; SUBCUTANEOUS at 04:04

## 2025-04-24 RX ADMIN — BUPROPION HYDROCHLORIDE 150 MG: 75 TABLET, FILM COATED ORAL at 08:04

## 2025-04-24 RX ADMIN — FENTANYL CITRATE 50 MCG: 50 INJECTION, SOLUTION INTRAMUSCULAR; INTRAVENOUS at 06:04

## 2025-04-24 RX ADMIN — OXYCODONE 5 MG: 5 TABLET ORAL at 04:04

## 2025-04-24 RX ADMIN — DEXAMETHASONE SODIUM PHOSPHATE 4 MG: 4 INJECTION, SOLUTION INTRAMUSCULAR; INTRAVENOUS at 05:04

## 2025-04-24 RX ADMIN — OXYCODONE 5 MG: 5 TABLET ORAL at 09:04

## 2025-04-24 RX ADMIN — Medication 1 TABLET: at 09:04

## 2025-04-24 RX ADMIN — MELATONIN TAB 3 MG 6 MG: 3 TAB at 08:04

## 2025-04-24 RX ADMIN — FENTANYL CITRATE 50 MCG: 50 INJECTION, SOLUTION INTRAMUSCULAR; INTRAVENOUS at 05:04

## 2025-04-24 RX ADMIN — ONDANSETRON 4 MG: 2 INJECTION INTRAMUSCULAR; INTRAVENOUS at 05:04

## 2025-04-24 RX ADMIN — BUPROPION HYDROCHLORIDE 150 MG: 75 TABLET, FILM COATED ORAL at 09:04

## 2025-04-24 RX ADMIN — CEFAZOLIN 2 G: 330 INJECTION, POWDER, FOR SOLUTION INTRAMUSCULAR; INTRAVENOUS at 05:04

## 2025-04-24 RX ADMIN — FENTANYL CITRATE 100 MCG: 50 INJECTION, SOLUTION INTRAMUSCULAR; INTRAVENOUS at 05:04

## 2025-04-24 RX ADMIN — OXYCODONE HYDROCHLORIDE 10 MG: 10 TABLET ORAL at 02:04

## 2025-04-24 RX ADMIN — Medication 100 MG: at 09:04

## 2025-04-24 RX ADMIN — FENTANYL CITRATE 25 MCG: 50 INJECTION INTRAMUSCULAR; INTRAVENOUS at 06:04

## 2025-04-24 RX ADMIN — AMLODIPINE BESYLATE 10 MG: 10 TABLET ORAL at 09:04

## 2025-04-24 RX ADMIN — SUGAMMADEX 200 MG: 100 INJECTION, SOLUTION INTRAVENOUS at 06:04

## 2025-04-24 RX ADMIN — ROCURONIUM BROMIDE 50 MG: 10 INJECTION, SOLUTION INTRAVENOUS at 05:04

## 2025-04-24 RX ADMIN — GABAPENTIN 300 MG: 300 CAPSULE ORAL at 09:04

## 2025-04-24 RX ADMIN — SODIUM CHLORIDE: 9 INJECTION, SOLUTION INTRAVENOUS at 05:04

## 2025-04-24 RX ADMIN — LIDOCAINE HYDROCHLORIDE 100 MG: 20 INJECTION INTRAVENOUS at 05:04

## 2025-04-24 RX ADMIN — FOLIC ACID 1 MG: 1 TABLET ORAL at 09:04

## 2025-04-24 RX ADMIN — PROPOFOL 200 MG: 10 INJECTION, EMULSION INTRAVENOUS at 05:04

## 2025-04-24 RX ADMIN — GABAPENTIN 300 MG: 300 CAPSULE ORAL at 08:04

## 2025-04-24 RX ADMIN — LOSARTAN POTASSIUM 100 MG: 50 TABLET, FILM COATED ORAL at 09:04

## 2025-04-24 NOTE — PROGRESS NOTES
[facility] - Telemetry Stepdown  General Surgery  Progress Note    Subjective:     History of Present Illness:  Froylan Borja is a 70 y.o. gentleman with PMH of alcohol abuse, cirrhosis, hepatocellular carcinoma undergoing Y90 therapy, and HTN who presents to the ED with R leg pain and swelling. He had Y90 through his R femoral artery 8 days ago. He has had some bruising and pain of his groin. He also bumped his calf on his car and has increasing swelling and discoloration. He did not notice until today, but he also has pitting edema to his right leg, much more than his left side. In the ED, he is hemodynamically normal though hypertensive. Labs significant for Na 129, K 5.7. US of his lower extremities show a R sided nonocclusive DVT and a R femo pseudoaneurysm. CT was also performed which demonstrated his hematoma over his R calf and extravasation of contrast into the pseudoaneusym. General surgery consulted for evaluation of drainage of his RLE hematoma.           Post-Op Info:  Procedure(s) (LRB):  DEBRIDEMENT, WOUND (Right)  APPLICATION, WOUND VAC (Right)   3 Days Post-Op     Interval History:  Issues with wound vac overnight, but holding suction now. Hgb stable. To OR today.    Medications:  Continuous Infusions:  Scheduled Meds:   amLODIPine  10 mg Oral Daily    buPROPion  150 mg Oral BID    folic acid  1 mg Oral Daily    gabapentin  300 mg Oral BID    losartan  100 mg Oral Daily    melatonin  6 mg Oral Nightly    multivitamin  1 tablet Oral Daily    polyethylene glycol  17 g Oral BID    senna  8.6 mg Oral BID    thiamine  100 mg Oral Daily     PRN Meds:  Current Facility-Administered Medications:     acetaminophen, 1,000 mg, Oral, Q8H PRN    albuterol-ipratropium, 3 mL, Nebulization, Q4H PRN    bisacodyL, 10 mg, Rectal, Daily PRN    dextrose 50%, 12.5 g, Intravenous, PRN    dextrose 50%, 25 g, Intravenous, PRN    glucagon (human recombinant), 1 mg, Intramuscular, PRN    glucose, 16 g, Oral, PRN    glucose, 24 g,  Oral, PRN    hydrALAZINE, 10 mg, Intravenous, Q6H PRN    HYDROmorphone, 1 mg, Intravenous, Q6H PRN    hydrOXYzine pamoate, 25 mg, Oral, Q8H PRN    LORazepam, 2 mg, Oral, Q4H PRN    ondansetron, 8 mg, Oral, Q8H PRN    oxyCODONE, 5 mg, Oral, Q4H PRN    oxyCODONE, 10 mg, Oral, Q6H PRN    promethazine, 25 mg, Oral, Q6H PRN    sodium chloride 0.9%, 10 mL, Intravenous, PRN     Review of patient's allergies indicates:   Allergen Reactions    Zoloft [sertraline] Other (See Comments)     hyponatremia     Objective:     Vital Signs (Most Recent):  Temp: 98.5 °F (36.9 °C) (04/24/25 0722)  Pulse: 73 (04/24/25 0722)  Resp: 18 (04/24/25 0722)  BP: (!) 152/68 (04/24/25 0722)  SpO2: 97 % (04/24/25 0722) Vital Signs (24h Range):  Temp:  [97.9 °F (36.6 °C)-99.2 °F (37.3 °C)] 98.5 °F (36.9 °C)  Pulse:  [73-82] 73  Resp:  [16-20] 18  SpO2:  [94 %-97 %] 97 %  BP: (125-165)/(60-80) 152/68     Weight: 120 kg (264 lb 8.8 oz)  Body mass index is 33.07 kg/m².    Intake/Output - Last 3 Shifts         04/22 0700 04/23 0659 04/23 0700 04/24 0659 04/24 0700 04/25 0659    P.O.       IV Piggyback       Total Intake(mL/kg)       Urine (mL/kg/hr) 2050 (0.7) 2600 (0.9)     Other 50 50     Stool       Total Output 2100 2650     Net -2100 -2650                     Physical Exam  Constitutional:       General: He is not in acute distress.     Appearance: Normal appearance.   HENT:      Head: Normocephalic and atraumatic.   Cardiovascular:      Rate and Rhythm: Normal rate and regular rhythm.   Pulmonary:      Effort: Pulmonary effort is normal. No respiratory distress.   Abdominal:      General: Abdomen is flat. There is no distension.      Palpations: Abdomen is soft.      Tenderness: There is no abdominal tenderness.   Musculoskeletal:      Comments: R calf w wound vac in place to suction, wrapped in ACE   Neurological:      General: No focal deficit present.      Mental Status: He is alert and oriented to person, place, and time.   Psychiatric:          Behavior: Behavior normal.         Thought Content: Thought content normal.          Significant Labs:  I have reviewed all pertinent lab results within the past 24 hours.    Significant Diagnostics:  I have reviewed all pertinent imaging results/findings within the past 24 hours.  Assessment/Plan:     Hematoma  Froylan Borja is a 70 y.o. gentleman with HCC undergoing Y90 therapy with a RLE hematoma, R fem pseudoaneurysm, and a R sided DVT.    - Plan for takeback to OR today for wound debridement + vac reapplication  -  consented, marked  - OOB/ambulate   - NPO  - please hold anticoagulation         Krystal Connolly MD  General Surgery  [facility] - Telemetry Stepdown

## 2025-04-24 NOTE — CARE UPDATE
Unit [facility] Care Support Interaction      I have reviewed the chart of Froylan Borja who is hospitalized for Acute deep vein thrombosis (DVT) of right lower extremity. The patient is currently located in the following unit: mtsu             I have  provided the following support:     Skin - nurse to take pic of wound       Kelly Dawkins NP  Unit Based [facility]

## 2025-04-24 NOTE — ASSESSMENT & PLAN NOTE
Pain of R lower extremity   - RLE US showed a nonocclusive thrombus in the right common femoral vein   - Started Heparin gtt on 4/18 once cleared from IR standpoint  - Hg has been down-trending and dressings have been saturated with blood.   - If able to tolerate anticoagulation, will transition to oral DOAC when not needing further procedures. If not, will need to discuss IVC filter  - Heparin held 4/20 for surgical intervention  - plan for repeat OR 4/24 for wound debridement + vac reapplication - surgery recommend holding AC prior to that given significant bleeding risk  - will discuss with surgery post intervention about timing of reinitiating of AC  - MM pain regimen   - Plan for PT assessment after surgery if needed - was ambulating on 4/21 before procedure  - Wound Vac in place

## 2025-04-24 NOTE — TRANSFER OF CARE
"Anesthesia Transfer of Care Note    Patient: Froylan Borja    Procedure(s) Performed: Procedure(s) (LRB):  APPLICATION, WOUND VAC (Right)  REPLACEMENT, WOUND VAC (Right)    Patient location: PACU    Anesthesia Type: general    Transport from OR: Transported from OR on 6-10 L/min O2 by face mask with adequate spontaneous ventilation    Post pain: adequate analgesia    Post assessment: no apparent anesthetic complications and tolerated procedure well    Post vital signs: stable    Level of consciousness: awake    Nausea/Vomiting: no nausea/vomiting    Complications: none    Transfer of care protocol was followed      Last vitals: Visit Vitals  BP (!) 177/51 (BP Location: Right arm, Patient Position: Lying)   Pulse 85   Temp 37 °C (98.6 °F)   Resp 18   Ht 6' 3" (1.905 m)   Wt 120 kg (264 lb 8.8 oz)   SpO2 98%   BMI 33.07 kg/m²     "

## 2025-04-24 NOTE — ASSESSMENT & PLAN NOTE
Hyponatremia is likely due to Cirrhosis. The patient's most recent sodium results are listed below.  Recent Labs     04/23/25  0309 04/23/25  1300 04/24/25  0512   * 133* 134*     Plan  - Correct the sodium by 4-6mEq in 24 hours.   - Fluid and salt restriction  - Monitor sodium Daily.   - Patient hyponatremia is stable

## 2025-04-24 NOTE — ASSESSMENT & PLAN NOTE
Froylan PERRY [place] is a 70 y.o. gentleman with HCC undergoing Y90 therapy with a RLE hematoma, R fem pseudoaneurysm, and a R sided DVT.    - Plan for takeback to OR today for wound debridement + vac reapplication  -  consented, marked  - OOB/ambulate   - please hold anticoagulation

## 2025-04-24 NOTE — BRIEF OP NOTE
[facility] - Surgery ([facility])  Brief Operative Note    SUMMARY     Surgery Date: 4/24/2025     Surgeons and Role:     * Estella Ramsay MD - Primary     * Autumn Henderson MD - Resident - Assisting     * Pk Goss MD - Resident - Chief        Pre-op Diagnosis:  Hematoma [T14.8XXA]    Post-op Diagnosis:  Post-Op Diagnosis Codes:     * Hematoma [T14.8XXA]    Procedure(s) (LRB):  APPLICATION, WOUND VAC (Right)  REPLACEMENT, WOUND VAC (Right)    Anesthesia: Choice    Implants:  * No implants in log *    Operative Findings: Further debrided of necrotic skin and wound vac change. Wound size measures approx 22x15 cm now.    Estimated Blood Loss: * No values recorded between 4/24/2025  5:21 PM and 4/24/2025  6:22 PM *    Estimated Blood Loss has not been documented. EBL = 20.         Specimens:   Specimen (24h ago, onward)      None          * No specimens in log *    [id number]

## 2025-04-24 NOTE — ASSESSMENT & PLAN NOTE
Patient's blood pressure range in the last 24 hours was: BP  Min: 125/60  Max: 167/77.The patient's inpatient anti-hypertensive regimen is listed below:  Current Antihypertensives  amLODIPine tablet 10 mg, Daily, Oral  hydrALAZINE injection 10 mg, Every 6 hours PRN, Intravenous  losartan tablet 100 mg, Daily, Oral    Plan  - BP is uncontrolled, will adjust as follows: increase amlodipine to 10mg  - Losartan 100mg restarted 4/22  - IV hydralazine PRN

## 2025-04-24 NOTE — NURSING
Unable to clear wound vac blockage. General surgery MD notified. Was instructed to take down wound vac and replace with wet to dry dressing.   Informed charge and she looked at wound vac. Blockage cleared at this time,if it resumes will take down dressing.

## 2025-04-24 NOTE — ANESTHESIA PROCEDURE NOTES
Intubation    Date/Time: 4/24/2025 5:39 PM    Performed by: Toy Sandra CRNA  Authorized by: Mere Escobedo MD    Intubation:     Induction:  Intravenous    Intubated:  Postinduction    Mask Ventilation:  Not attempted    Attempts:  1    Attempted By:  CRNA    Method of Intubation:  Video laryngoscopy    Blade:  Bundy 2    Laryngeal View Grade: Grade I - full view of cords      Difficult Airway Encountered?: No      Complications:  None    Airway Device:  Oral endotracheal tube    Airway Device Size:  7.5    Style/Cuff Inflation:  Cuffed (inflated to minimal occlusive pressure)    Tube secured:  22    Secured at:  The lips    Placement Verified By:  Capnometry    Complicating Factors:  None    Findings Post-Intubation:  BS equal bilateral and atraumatic/condition of teeth unchanged

## 2025-04-24 NOTE — PROGRESS NOTES
[facility] - Telemetry [facility] Medicine  Progress Note    Patient Name: Froylan Borja  MRN: [id number]  Patient Class: IP- Inpatient   Admission Date: 4/18/2025  Length of Stay: 6 days  Attending Physician: Ramiro Silverman MD  Primary Care Provider: Janki Tamez MD        Subjective     Principal Problem:Acute deep vein thrombosis (DVT) of right lower extremity        HPI:  Mr. Froylan Borja is a 71 y/o male with a PMHx alcohol abuse, cirrhosis, hepatocellular carcinoma, and HTN who presents for complaint of R leg pain and swelling. He reports yesterday he hit his R leg on the car door and shortly developed a bruise and pain to the R lateral leg. He tried tylenol at-home though pain, swelling, and bruising continued to worsen. Admits to paresthesias and numbness to the R foot as well as significant R foot pain with weight-bearing. Denies CP, palpitations, SOB, lightheadedness, dizziness, headaches, LOC, vomiting, diarrhea, abdominal pain, fever, chills. Reports some nausea following morphine and dilaudid for pain in ED. Of note, patient had angiogram y90 mapping study done with IR last Thursday (4/10/25). Has developed some bruising to the medial thigh and mild groin discomfort since procedure. Social history significant for daily alcohol consumption, 3-4 beers daily, last alcoholic beverage yesterday around 3PM.     In the ED, AF HDS. H&H 13.0/38.6 (BL~14.8), no leukocytosis. HypoNa 129, HyperK 5.7, CO2 20, Ca 8.6, Albumin 3.4, remainder of CMP unremarkable. PT/INR wnl at 10.9/1.0. LA 1.6. CPK wnl. Hep C Ab reactive, HCV RNA pending. EKG with normal sinus rhythm. R Lower Ext U/S w/ findings of nonocclusive thrombus in the right common femoral vein and right common femoral artery pseudoaneurysm. CTA Lower Ext pending. R lower extremity hematoma drained at bedside by general surgery with evacuation of 120 cc of hematoma. Compressive dressing applied. Given nebulized albuterol, lokelma, IV cefazolin, IV dilaudid,  IV morphine, IV zofran. Admitted to .     Overview/Hospital Course:  70 y.o M with HTN, alcohol/HepC cirrhosis c/b HCC s/p Y-90 mapping angiogram on 4/10 who presents with bruising and pain at angiogram site and RLE calf pain after he hit it on a car door. RLE U/S showed non-occlusive CFV thrombus and R CFA pseudoaneurysm. CTA re-demonstrated the psdueoaneurysm with active extravasation within the sac as well as a large R calf hematoma which GenSurg were consulted for and successfully drained (120ccs). Vascular Surgery were consulted, recommended IR guided percutaneous thrombin injection which IR were able to complete. Heparin gtt was started. Groin U/S done the following day showed continued thrombosis of the pseudoanurysm. GenSurg plan for OR on 4/21 for debridement and wound vac placement. Will need to f/u with IR for repeat U/S in 1 week.     Interval History: NAEON. AFVSS. Hg stable. GenSurg on 4/21 did wound debridement and wound vac placement. Plan for OR again on 4/24 for wound debridement + vac reapplication - Hep gtt has been held since prior to 4/21 per discussion with surgery - will discuss after OR again.    K improved. Will continue ARB for now.     Wound vac in place. Drain output 50 ml in last 24 hours documented    Review of Systems   Constitutional:  Negative for chills and fever.   Respiratory:  Negative for chest tightness and shortness of breath.    Cardiovascular:  Positive for leg swelling. Negative for chest pain.   Gastrointestinal:  Negative for abdominal pain, constipation, diarrhea, nausea and vomiting.   Genitourinary:  Negative for dysuria, frequency and urgency.   Musculoskeletal:  Positive for arthralgias and myalgias.   Skin:  Positive for color change and wound.   Neurological:  Positive for numbness. Negative for dizziness, weakness, light-headedness and headaches.     Objective:     Vital Signs (Most Recent):  Temp: 98.3 °F (36.8 °C) (04/24/25 1146)  Pulse: 80 (04/24/25  1146)  Resp: 18 (04/24/25 1146)  BP: (!) 167/77 (04/24/25 1146)  SpO2: 98 % (04/24/25 1146) Vital Signs (24h Range):  Temp:  [98 °F (36.7 °C)-99.2 °F (37.3 °C)] 98.3 °F (36.8 °C)  Pulse:  [73-82] 80  Resp:  [16-20] 18  SpO2:  [94 %-98 %] 98 %  BP: (125-167)/(60-80) 167/77     Weight: 120 kg (264 lb 8.8 oz)  Body mass index is 33.07 kg/m².    Intake/Output Summary (Last 24 hours) at 4/24/2025 1258  Last data filed at 4/24/2025 0617  Gross per 24 hour   Intake --   Output 2650 ml   Net -2650 ml         Physical Exam  Vitals and nursing note reviewed.   Constitutional:       General: He is not in acute distress.     Appearance: He is well-developed. He is not ill-appearing.   HENT:      Head: Normocephalic and atraumatic.   Eyes:      Pupils: Pupils are equal, round, and reactive to light.   Cardiovascular:      Rate and Rhythm: Normal rate and regular rhythm.   Pulmonary:      Effort: Pulmonary effort is normal. No respiratory distress.      Breath sounds: Normal breath sounds. No wheezing or rales.   Abdominal:      Palpations: Abdomen is soft.      Tenderness: There is no abdominal tenderness.   Musculoskeletal:      Right lower leg: Edema present.      Left lower leg: No edema.      Comments: Sensation intact  RLE dressed per General surgery - wound vac in place  Foot elevated and ACE wrapped   Skin:     General: Skin is warm and dry.   Neurological:      Mental Status: He is alert and oriented to person, place, and time.   Psychiatric:         Behavior: Behavior normal.               Significant Labs: All pertinent labs within the past 24 hours have been reviewed.  CBC:   Recent Labs   Lab 04/23/25  0309 04/24/25  0512   WBC 10.44 8.84   HGB 9.9* 10.6*   HCT 30.5* 33.1*    259     CMP:   Recent Labs   Lab 04/23/25  0309 04/23/25  1300 04/24/25  0512   * 133* 134*   K 5.5* 4.2 4.8   CL 99 96 98   CO2 29 30* 30*   BUN 23 21 25*   CREATININE 1.0 0.9 1.1   CALCIUM 8.8 8.9 8.5*   ALBUMIN 2.8*  --  2.9*    BILITOT 0.6  --  0.7   ALKPHOS 72  --  77   AST 22  --  26   ALT 20  --  21   ANIONGAP 4* 7* 6*       Significant Imaging: I have reviewed all pertinent imaging results/findings within the past 24 hours.      Assessment & Plan  Acute deep vein thrombosis (DVT) of right lower extremity  Pain of R lower extremity   - RLE US showed a nonocclusive thrombus in the right common femoral vein   - Started Heparin gtt on 4/18 once cleared from IR standpoint  - Hg has been down-trending and dressings have been saturated with blood.   - If able to tolerate anticoagulation, will transition to oral DOAC when not needing further procedures. If not, will need to discuss IVC filter  - Heparin held 4/20 for surgical intervention  - plan for repeat OR 4/24 for wound debridement + vac reapplication - surgery recommend holding AC prior to that given significant bleeding risk  - will discuss with surgery post intervention about timing of reinitiating of AC  - MM pain regimen   - Plan for PT assessment after surgery if needed - was ambulating on 4/21 before procedure  - Wound Vac in place  Essential hypertension  Patient's blood pressure range in the last 24 hours was: BP  Min: 125/60  Max: 167/77.The patient's inpatient anti-hypertensive regimen is listed below:  Current Antihypertensives  amLODIPine tablet 10 mg, Daily, Oral  hydrALAZINE injection 10 mg, Every 6 hours PRN, Intravenous  losartan tablet 100 mg, Daily, Oral    Plan  - BP is uncontrolled, will adjust as follows: increase amlodipine to 10mg  - Losartan 100mg restarted 4/22  - IV hydralazine PRN  Alcohol use disorder, severe, dependence  - daily drinker, last drink 4/17  - 3-4 beers daily, denies withdrawal symptoms  - continue thiamine, folic acid and multivitamin  - CIWA per nursing   Obesity (BMI 30-39.9)  Body mass index is 33.07 kg/m². Morbid obesity complicates all aspects of disease management from diagnostic modalities to treatment. Weight loss encouraged and health  benefits explained to patient.   Hyponatremia  Hyponatremia is likely due to Cirrhosis. The patient's most recent sodium results are listed below.  Recent Labs     04/23/25  0309 04/23/25  1300 04/24/25  0512   * 133* 134*     Plan  - Correct the sodium by 4-6mEq in 24 hours.   - Fluid and salt restriction  - Monitor sodium Daily.   - Patient hyponatremia is stable  Hematoma  - New swelling noted to RLE following IR procedure on presentation  - General surgery consulted- hematoma evacuation performed at bedside  - S/p  wound debridement and wound vac placement on 4/21. Plan to takeback to OR 4/24 for wound debridement + vac reapplication  - Heparin gtt held- remains held given risk of AC more that benefit at this time per discussion with surgery - communicated with patient and demonstrates understanding  - WV placed 4/21 - management per surgery  Pseudoaneurysm of femoral artery  - RLE US showed a new right common femoral artery pseudoaneurysm   - CTA RLE showed extravasation within the pseudoaneurysm sac  - Vascular Surgery consulted- recommended IR guided thrombin injection. IR consulted, completed procedure on 4/18.   - Groin u/s on 4/19 showed continued thrombosis of the pseudoaneurysm  - Will need repeat U/S outpatient in ~ 1 week and f/u with IR - if still hospitalized may get inpatient early next week and have IR follow up  Hyperkalemia  Hyperkalemia is likely due to  multiple afctors .The patients most recent potassium results are listed below.  Recent Labs     04/23/25  0309 04/23/25  1300 04/24/25  0512   K 5.5* 4.2 4.8     Plan  - Monitor for arrhythmias with EKG and/or continuous telemetry.   - Treat the hyperkalemia with Potassium Binders and Furosemide.   - Monitor potassium: Every 12 hours  - C/w ARB for now, if worsening or persistent will d/c        VTE Risk Mitigation (From admission, onward)           Ordered     IP VTE HIGH RISK PATIENT  Once         04/18/25 0725     Reason for No  Pharmacological VTE Prophylaxis  Once        Question:  Reasons:  Answer:  Risk of Bleeding  Comment:  will start anticoagulation once medically cleared    04/18/25 0725                    Discharge Planning   KENNETH: 4/28/2025     Code Status: Full Code   Medical Readiness for Discharge Date:   Discharge Plan A: Home, Home with family                        Ramiro Silverman MD  Department of Hospital Medicine   [facility] - Telemetry Stepdown

## 2025-04-24 NOTE — ASSESSMENT & PLAN NOTE
Hyperkalemia is likely due to multiple afctors.The patients most recent potassium results are listed below.  Recent Labs     04/23/25  0309 04/23/25  1300 04/24/25  0512   K 5.5* 4.2 4.8     Plan  - Monitor for arrhythmias with EKG and/or continuous telemetry.   - Treat the hyperkalemia with Potassium Binders and Furosemide.   - Monitor potassium: Every 12 hours  - C/w ARB for now, if worsening or persistent will d/c

## 2025-04-24 NOTE — SUBJECTIVE & OBJECTIVE
Interval History: NAEON. AFVSS. Hg stable. GenSurg on 4/21 did wound debridement and wound vac placement. Plan for OR again on 4/24 for wound debridement + vac reapplication - Hep gtt has been held since prior to 4/21 per discussion with surgery - will discuss after OR again.    K improved. Will continue ARB for now.     Wound vac in place. Drain output 50 ml in last 24 hours documented    Review of Systems   Constitutional:  Negative for chills and fever.   Respiratory:  Negative for chest tightness and shortness of breath.    Cardiovascular:  Positive for leg swelling. Negative for chest pain.   Gastrointestinal:  Negative for abdominal pain, constipation, diarrhea, nausea and vomiting.   Genitourinary:  Negative for dysuria, frequency and urgency.   Musculoskeletal:  Positive for arthralgias and myalgias.   Skin:  Positive for color change and wound.   Neurological:  Positive for numbness. Negative for dizziness, weakness, light-headedness and headaches.     Objective:     Vital Signs (Most Recent):  Temp: 98.3 °F (36.8 °C) (04/24/25 1146)  Pulse: 80 (04/24/25 1146)  Resp: 18 (04/24/25 1146)  BP: (!) 167/77 (04/24/25 1146)  SpO2: 98 % (04/24/25 1146) Vital Signs (24h Range):  Temp:  [98 °F (36.7 °C)-99.2 °F (37.3 °C)] 98.3 °F (36.8 °C)  Pulse:  [73-82] 80  Resp:  [16-20] 18  SpO2:  [94 %-98 %] 98 %  BP: (125-167)/(60-80) 167/77     Weight: 120 kg (264 lb 8.8 oz)  Body mass index is 33.07 kg/m².    Intake/Output Summary (Last 24 hours) at 4/24/2025 1258  Last data filed at 4/24/2025 0617  Gross per 24 hour   Intake --   Output 2650 ml   Net -2650 ml         Physical Exam  Vitals and nursing note reviewed.   Constitutional:       General: He is not in acute distress.     Appearance: He is well-developed. He is not ill-appearing.   HENT:      Head: Normocephalic and atraumatic.   Eyes:      Pupils: Pupils are equal, round, and reactive to light.   Cardiovascular:      Rate and Rhythm: Normal rate and regular rhythm.    Pulmonary:      Effort: Pulmonary effort is normal. No respiratory distress.      Breath sounds: Normal breath sounds. No wheezing or rales.   Abdominal:      Palpations: Abdomen is soft.      Tenderness: There is no abdominal tenderness.   Musculoskeletal:      Right lower leg: Edema present.      Left lower leg: No edema.      Comments: Sensation intact  RLE dressed per General surgery - wound vac in place  Foot elevated and ACE wrapped   Skin:     General: Skin is warm and dry.   Neurological:      Mental Status: He is alert and oriented to person, place, and time.   Psychiatric:         Behavior: Behavior normal.               Significant Labs: All pertinent labs within the past 24 hours have been reviewed.  CBC:   Recent Labs   Lab 04/23/25  0309 04/24/25  0512   WBC 10.44 8.84   HGB 9.9* 10.6*   HCT 30.5* 33.1*    259     CMP:   Recent Labs   Lab 04/23/25  0309 04/23/25  1300 04/24/25  0512   * 133* 134*   K 5.5* 4.2 4.8   CL 99 96 98   CO2 29 30* 30*   BUN 23 21 25*   CREATININE 1.0 0.9 1.1   CALCIUM 8.8 8.9 8.5*   ALBUMIN 2.8*  --  2.9*   BILITOT 0.6  --  0.7   ALKPHOS 72  --  77   AST 22  --  26   ALT 20  --  21   ANIONGAP 4* 7* 6*       Significant Imaging: I have reviewed all pertinent imaging results/findings within the past 24 hours.

## 2025-04-24 NOTE — SUBJECTIVE & OBJECTIVE
Interval History:  Issues with wound vac overnight, but holding suction now. Hgb stable. To OR today.    Medications:  Continuous Infusions:  Scheduled Meds:   amLODIPine  10 mg Oral Daily    buPROPion  150 mg Oral BID    folic acid  1 mg Oral Daily    gabapentin  300 mg Oral BID    losartan  100 mg Oral Daily    melatonin  6 mg Oral Nightly    multivitamin  1 tablet Oral Daily    polyethylene glycol  17 g Oral BID    senna  8.6 mg Oral BID    thiamine  100 mg Oral Daily     PRN Meds:  Current Facility-Administered Medications:     acetaminophen, 1,000 mg, Oral, Q8H PRN    albuterol-ipratropium, 3 mL, Nebulization, Q4H PRN    bisacodyL, 10 mg, Rectal, Daily PRN    dextrose 50%, 12.5 g, Intravenous, PRN    dextrose 50%, 25 g, Intravenous, PRN    glucagon (human recombinant), 1 mg, Intramuscular, PRN    glucose, 16 g, Oral, PRN    glucose, 24 g, Oral, PRN    hydrALAZINE, 10 mg, Intravenous, Q6H PRN    HYDROmorphone, 1 mg, Intravenous, Q6H PRN    hydrOXYzine pamoate, 25 mg, Oral, Q8H PRN    LORazepam, 2 mg, Oral, Q4H PRN    ondansetron, 8 mg, Oral, Q8H PRN    oxyCODONE, 5 mg, Oral, Q4H PRN    oxyCODONE, 10 mg, Oral, Q6H PRN    promethazine, 25 mg, Oral, Q6H PRN    sodium chloride 0.9%, 10 mL, Intravenous, PRN     Review of patient's allergies indicates:   Allergen Reactions    Zoloft [sertraline] Other (See Comments)     hyponatremia     Objective:     Vital Signs (Most Recent):  Temp: 98.5 °F (36.9 °C) (04/24/25 0722)  Pulse: 73 (04/24/25 0722)  Resp: 18 (04/24/25 0722)  BP: (!) 152/68 (04/24/25 0722)  SpO2: 97 % (04/24/25 0722) Vital Signs (24h Range):  Temp:  [97.9 °F (36.6 °C)-99.2 °F (37.3 °C)] 98.5 °F (36.9 °C)  Pulse:  [73-82] 73  Resp:  [16-20] 18  SpO2:  [94 %-97 %] 97 %  BP: (125-165)/(60-80) 152/68     Weight: 120 kg (264 lb 8.8 oz)  Body mass index is 33.07 kg/m².    Intake/Output - Last 3 Shifts         04/22 0700 04/23 0659 04/23 0700 04/24 0659 04/24 0700 04/25 0659    P.O.       IV Piggyback        Total Intake(mL/kg)       Urine (mL/kg/hr) 2050 (0.7) 2600 (0.9)     Other 50 50     Stool       Total Output 2100 2650     Net -2100 -2650                     Physical Exam  Constitutional:       General: He is not in acute distress.     Appearance: Normal appearance.   HENT:      Head: Normocephalic and atraumatic.   Cardiovascular:      Rate and Rhythm: Normal rate and regular rhythm.   Pulmonary:      Effort: Pulmonary effort is normal. No respiratory distress.   Abdominal:      General: Abdomen is flat. There is no distension.      Palpations: Abdomen is soft.      Tenderness: There is no abdominal tenderness.   Musculoskeletal:      Comments: R calf w wound vac in place to suction, wrapped in ACE   Neurological:      General: No focal deficit present.      Mental Status: He is alert and oriented to person, place, and time.   Psychiatric:         Behavior: Behavior normal.         Thought Content: Thought content normal.          Significant Labs:  I have reviewed all pertinent lab results within the past 24 hours.    Significant Diagnostics:  I have reviewed all pertinent imaging results/findings within the past 24 hours.

## 2025-04-24 NOTE — ASSESSMENT & PLAN NOTE
- New swelling noted to RLE following IR procedure on presentation  - General surgery consulted- hematoma evacuation performed at bedside  - S/p  wound debridement and wound vac placement on 4/21. Plan to takeback to OR 4/24 for wound debridement + vac reapplication  - Heparin gtt held- remains held given risk of AC more that benefit at this time per discussion with surgery - communicated with patient and demonstrates understanding  - WV placed 4/21 - management per surgery

## 2025-04-25 ENCOUNTER — ANESTHESIA EVENT (OUTPATIENT)
Dept: SURGERY | Facility: HOSPITAL | Age: 70
End: 2025-04-25
Payer: MEDICARE

## 2025-04-25 LAB
ABSOLUTE EOSINOPHIL (OHS): 0 K/UL
ABSOLUTE MONOCYTE (OHS): 0.35 K/UL (ref 0.3–1)
ABSOLUTE NEUTROPHIL COUNT (OHS): 5.38 K/UL (ref 1.8–7.7)
ALBUMIN SERPL BCP-MCNC: 2.9 G/DL (ref 3.5–5.2)
ALP SERPL-CCNC: 77 UNIT/L (ref 40–150)
ALT SERPL W/O P-5'-P-CCNC: 24 UNIT/L (ref 10–44)
ANION GAP (OHS): 8 MMOL/L (ref 8–16)
APTT PPP: 26.8 SECONDS (ref 21–32)
APTT PPP: 42.1 SECONDS (ref 21–32)
AST SERPL-CCNC: 30 UNIT/L (ref 11–45)
BASOPHILS # BLD AUTO: 0.03 K/UL
BASOPHILS NFR BLD AUTO: 0.5 %
BILIRUB SERPL-MCNC: 0.9 MG/DL (ref 0.1–1)
BUN SERPL-MCNC: 20 MG/DL (ref 8–23)
CALCIUM SERPL-MCNC: 8.7 MG/DL (ref 8.7–10.5)
CHLORIDE SERPL-SCNC: 100 MMOL/L (ref 95–110)
CO2 SERPL-SCNC: 25 MMOL/L (ref 23–29)
CREAT SERPL-MCNC: 0.9 MG/DL (ref 0.5–1.4)
ERYTHROCYTE [DISTWIDTH] IN BLOOD BY AUTOMATED COUNT: 13 % (ref 11.5–14.5)
GFR SERPLBLD CREATININE-BSD FMLA CKD-EPI: >60 ML/MIN/1.73/M2
GLUCOSE SERPL-MCNC: 129 MG/DL (ref 70–110)
HCT VFR BLD AUTO: 32 % (ref 40–54)
HGB BLD-MCNC: 10.7 GM/DL (ref 14–18)
IMM GRANULOCYTES # BLD AUTO: 0.15 K/UL (ref 0–0.04)
IMM GRANULOCYTES NFR BLD AUTO: 2.4 % (ref 0–0.5)
INR PPP: 1 (ref 0.8–1.2)
LYMPHOCYTES # BLD AUTO: 0.36 K/UL (ref 1–4.8)
MAGNESIUM SERPL-MCNC: 2.1 MG/DL (ref 1.6–2.6)
MCH RBC QN AUTO: 33.1 PG (ref 27–31)
MCHC RBC AUTO-ENTMCNC: 33.4 G/DL (ref 32–36)
MCV RBC AUTO: 99 FL (ref 82–98)
NUCLEATED RBC (/100WBC) (OHS): 0 /100 WBC
PHOSPHATE SERPL-MCNC: 3.7 MG/DL (ref 2.7–4.5)
PLATELET # BLD AUTO: 251 K/UL (ref 150–450)
PMV BLD AUTO: 9.3 FL (ref 9.2–12.9)
POTASSIUM SERPL-SCNC: 5.3 MMOL/L (ref 3.5–5.1)
PROT SERPL-MCNC: 6.7 GM/DL (ref 6–8.4)
PROTHROMBIN TIME: 11.1 SECONDS (ref 9–12.5)
RBC # BLD AUTO: 3.23 M/UL (ref 4.6–6.2)
RELATIVE EOSINOPHIL (OHS): 0 %
RELATIVE LYMPHOCYTE (OHS): 5.7 % (ref 18–48)
RELATIVE MONOCYTE (OHS): 5.6 % (ref 4–15)
RELATIVE NEUTROPHIL (OHS): 85.8 % (ref 38–73)
SODIUM SERPL-SCNC: 133 MMOL/L (ref 136–145)
WBC # BLD AUTO: 6.27 K/UL (ref 3.9–12.7)

## 2025-04-25 PROCEDURE — 25000003 PHARM REV CODE 250: Mod: HCNC | Performed by: STUDENT IN AN ORGANIZED HEALTH CARE EDUCATION/TRAINING PROGRAM

## 2025-04-25 PROCEDURE — 84100 ASSAY OF PHOSPHORUS: CPT | Mod: HCNC | Performed by: STUDENT IN AN ORGANIZED HEALTH CARE EDUCATION/TRAINING PROGRAM

## 2025-04-25 PROCEDURE — 80053 COMPREHEN METABOLIC PANEL: CPT | Mod: HCNC | Performed by: STUDENT IN AN ORGANIZED HEALTH CARE EDUCATION/TRAINING PROGRAM

## 2025-04-25 PROCEDURE — 36415 COLL VENOUS BLD VENIPUNCTURE: CPT | Mod: HCNC | Performed by: STUDENT IN AN ORGANIZED HEALTH CARE EDUCATION/TRAINING PROGRAM

## 2025-04-25 PROCEDURE — 85025 COMPLETE CBC W/AUTO DIFF WBC: CPT | Mod: HCNC | Performed by: STUDENT IN AN ORGANIZED HEALTH CARE EDUCATION/TRAINING PROGRAM

## 2025-04-25 PROCEDURE — 25000003 PHARM REV CODE 250: Mod: HCNC

## 2025-04-25 PROCEDURE — 0HBKXZZ EXCISION OF RIGHT LOWER LEG SKIN, EXTERNAL APPROACH: ICD-10-PCS | Performed by: STUDENT IN AN ORGANIZED HEALTH CARE EDUCATION/TRAINING PROGRAM

## 2025-04-25 PROCEDURE — 83735 ASSAY OF MAGNESIUM: CPT | Mod: HCNC | Performed by: STUDENT IN AN ORGANIZED HEALTH CARE EDUCATION/TRAINING PROGRAM

## 2025-04-25 PROCEDURE — 20600001 HC STEP DOWN PRIVATE ROOM: Mod: HCNC

## 2025-04-25 PROCEDURE — 63600175 PHARM REV CODE 636 W HCPCS: Mod: HCNC | Performed by: STUDENT IN AN ORGANIZED HEALTH CARE EDUCATION/TRAINING PROGRAM

## 2025-04-25 PROCEDURE — 85610 PROTHROMBIN TIME: CPT | Mod: HCNC | Performed by: STUDENT IN AN ORGANIZED HEALTH CARE EDUCATION/TRAINING PROGRAM

## 2025-04-25 PROCEDURE — 85730 THROMBOPLASTIN TIME PARTIAL: CPT | Mod: HCNC | Performed by: STUDENT IN AN ORGANIZED HEALTH CARE EDUCATION/TRAINING PROGRAM

## 2025-04-25 RX ORDER — HEPARIN SODIUM,PORCINE/D5W 25000/250
0-40 INTRAVENOUS SOLUTION INTRAVENOUS CONTINUOUS
Status: DISPENSED | OUTPATIENT
Start: 2025-04-25 | End: 2025-04-28

## 2025-04-25 RX ADMIN — POLYETHYLENE GLYCOL 3350 17 G: 17 POWDER, FOR SOLUTION ORAL at 09:04

## 2025-04-25 RX ADMIN — Medication 100 MG: at 09:04

## 2025-04-25 RX ADMIN — SENNOSIDES 8.6 MG: 8.6 TABLET, FILM COATED ORAL at 09:04

## 2025-04-25 RX ADMIN — BUPROPION HYDROCHLORIDE 150 MG: 75 TABLET, FILM COATED ORAL at 09:04

## 2025-04-25 RX ADMIN — Medication 1 TABLET: at 09:04

## 2025-04-25 RX ADMIN — MELATONIN TAB 3 MG 6 MG: 3 TAB at 09:04

## 2025-04-25 RX ADMIN — LACTULOSE 30 G: 20 SOLUTION ORAL at 01:04

## 2025-04-25 RX ADMIN — HEPARIN SODIUM 12 UNITS/KG/HR: 10000 INJECTION, SOLUTION INTRAVENOUS at 10:04

## 2025-04-25 RX ADMIN — SODIUM ZIRCONIUM CYCLOSILICATE 10 G: 10 POWDER, FOR SUSPENSION ORAL at 09:04

## 2025-04-25 RX ADMIN — GABAPENTIN 300 MG: 300 CAPSULE ORAL at 09:04

## 2025-04-25 RX ADMIN — OXYCODONE HYDROCHLORIDE 10 MG: 10 TABLET ORAL at 12:04

## 2025-04-25 RX ADMIN — OXYCODONE HYDROCHLORIDE 10 MG: 10 TABLET ORAL at 09:04

## 2025-04-25 RX ADMIN — AMLODIPINE BESYLATE 10 MG: 10 TABLET ORAL at 09:04

## 2025-04-25 RX ADMIN — FOLIC ACID 1 MG: 1 TABLET ORAL at 09:04

## 2025-04-25 NOTE — ASSESSMENT & PLAN NOTE
- New swelling noted to RLE following IR procedure on presentation  - General surgery consulted- hematoma evacuation performed at bedside  - S/p  wound debridement and wound vac placement on 4/21. and 4/24 for wound debridement + vac reapplication  - Heparin gtt plan per DBT section  - WV placed 4/21 and changed on 4/24 - management per surgery

## 2025-04-25 NOTE — SUBJECTIVE & OBJECTIVE
Interval History: Underwent debridement and wound vac change yesterday. Wound vac holding good seal this morning    Medications:  Continuous Infusions:  Scheduled Meds:   amLODIPine  10 mg Oral Daily    buPROPion  150 mg Oral BID    folic acid  1 mg Oral Daily    gabapentin  300 mg Oral BID    melatonin  6 mg Oral Nightly    multivitamin  1 tablet Oral Daily    polyethylene glycol  17 g Oral BID    senna  8.6 mg Oral BID    sodium zirconium cyclosilicate  10 g Oral Once    thiamine  100 mg Oral Daily     PRN Meds:  Current Facility-Administered Medications:     acetaminophen, 1,000 mg, Oral, Q8H PRN    albuterol-ipratropium, 3 mL, Nebulization, Q4H PRN    bisacodyL, 10 mg, Rectal, Daily PRN    dextrose 50%, 12.5 g, Intravenous, PRN    dextrose 50%, 25 g, Intravenous, PRN    glucagon (human recombinant), 1 mg, Intramuscular, PRN    glucose, 16 g, Oral, PRN    glucose, 24 g, Oral, PRN    hydrALAZINE, 10 mg, Intravenous, Q6H PRN    HYDROmorphone, 1 mg, Intravenous, Q6H PRN    hydrOXYzine pamoate, 25 mg, Oral, Q8H PRN    LORazepam, 2 mg, Oral, Q4H PRN    ondansetron, 8 mg, Oral, Q8H PRN    oxyCODONE, 5 mg, Oral, Q4H PRN    oxyCODONE, 10 mg, Oral, Q6H PRN    promethazine, 25 mg, Oral, Q6H PRN    sodium chloride 0.9%, 10 mL, Intravenous, PRN     Review of patient's allergies indicates:   Allergen Reactions    Zoloft [sertraline] Other (See Comments)     hyponatremia    Codeine Itching     Intolerance- doesn't like the way it makes him feel     Objective:     Vital Signs (Most Recent):  Temp: 97.4 °F (36.3 °C) (04/25/25 0732)  Pulse: 76 (04/25/25 0732)  Resp: 18 (04/25/25 0732)  BP: (!) 159/78 (04/25/25 0732)  SpO2: 96 % (04/25/25 0732) Vital Signs (24h Range):  Temp:  [96.9 °F (36.1 °C)-98.7 °F (37.1 °C)] 97.4 °F (36.3 °C)  Pulse:  [70-92] 76  Resp:  [14-23] 18  SpO2:  [92 %-100 %] 96 %  BP: (141-185)/(51-88) 159/78     Weight: 120 kg (264 lb 8.8 oz)  Body mass index is 33.07 kg/m².    Intake/Output - Last 3 Shifts          04/23 0700  04/24 0659 04/24 0700  04/25 0659 04/25 0700 04/26 0659    IV Piggyback  600     Total Intake(mL/kg)  600 (5)     Urine (mL/kg/hr) 2600 (0.9) 1400 (0.5)     Other 50 40     Total Output 2650 1440     Net -2650 -840            Stool Occurrence  0 x              Physical Exam  Constitutional:       General: He is not in acute distress.     Appearance: Normal appearance.   HENT:      Head: Normocephalic and atraumatic.   Cardiovascular:      Rate and Rhythm: Normal rate and regular rhythm.   Pulmonary:      Effort: Pulmonary effort is normal. No respiratory distress.   Abdominal:      General: Abdomen is flat. There is no distension.      Palpations: Abdomen is soft.      Tenderness: There is no abdominal tenderness.   Musculoskeletal:      Comments: R calf w wound vac in place to suction, wrapped in ACE   Neurological:      General: No focal deficit present.      Mental Status: He is alert and oriented to person, place, and time.   Psychiatric:         Behavior: Behavior normal.         Thought Content: Thought content normal.          Significant Labs:  I have reviewed all pertinent lab results within the past 24 hours.    Significant Diagnostics:  I have reviewed all pertinent imaging results/findings within the past 24 hours.

## 2025-04-25 NOTE — OP NOTE
[facility]  [facility]gery Department  Operative Note    SUMMARY     Date of Procedure: 4/24/2025     Procedure: Procedure(s) (LRB):  APPLICATION, WOUND VAC (Right)  REPLACEMENT, WOUND VAC (Right)     Surgeons and Role:     * Estella Ramsay MD - Primary     * Autumn Henderson MD - Resident - Assisting     * Pk Goss MD - Resident - Chief      Pre-Operative Diagnosis: Hematoma [T14.8XXA]    Post-Operative Diagnosis: Post-Op Diagnosis Codes:     * Hematoma [T14.8XXA]    Anesthesia: Choice    Operative Findings (including complications, if any): Further debrided of necrotic skin and wound vac change. Wound size measures approx 22x15 cm now     Description of Technical Procedures:  Patient was transported to the operating room and transferred over to the OR table.  General anesthesia was induced by the anesthesia team.  The patient was placed in supine position and prepped and draped appropriately.  A time-out was called confirming all patient identifiers, procedure, site, and laterality.   On initial inspection D skin just superior to the original defect.  Nonviable.  This was tested by incising this sharply with little to no blood flow noted.  The entirety of this devitalized skin was excised using Bovie electrocautery.  The underlying subcutaneous tissue all appeared healthy and bled without any issue.  There was no purulence or evidence of infection.  The wound bed appears healthy and has good granulation tissue.  Following a full assessment, final hemostasis was achieved using Bovie electrocautery.  A wound VAC was placed to the area with a large black sponge.  This was placed to -125 mm of mercury suctioned.  The wound measures 15 cm wide by 22 cm long.  It extends down to the subcutaneous tissue approximately 1 cm.   The patient was awoken from general anesthesia and transferred to the recovery room in satisfactory condition.  All needle, sponge, and instrument counts were  correct at the end of the case.    Significant Surgical Tasks Conducted by the Assistant(s), if Applicable: None    Estimated Blood Loss (EBL): * No values recorded between 4/24/2025  5:21 PM and 4/24/2025  6:29 PM *           Implants: * No implants in log *    Specimens:   Specimen (24h ago, onward)      None           * No specimens in log *           Condition: Good    Disposition: PACU - hemodynamically stable.    Attestation: Op Note Attestation: The attending physician was present for the entire procedure.

## 2025-04-25 NOTE — NURSING TRANSFER
Nursing Transfer Note      4/24/2025   7:31 PM    Nurse giving handoff: GERDA Barrientos  Nurse receiving handoff: GERDA Napoles    Transfer To: [id number]    Transfer via stretcher    Transported by pct    Order for Tele Monitor? No    Additional Lines: wound vac    Patient belongings transferred with patient: No    Chart send with patient: Yes    Notified: spouse    Patient reassessed at: [id number]

## 2025-04-25 NOTE — PROGRESS NOTES
[facility] - Telemetry [facility] Medicine  Progress Note    Patient Name: Froylan Borja  MRN: [id number]  Patient Class: IP- Inpatient   Admission Date: 4/18/2025  Length of Stay: 7 days  Attending Physician: Ramiro Silverman MD  Primary Care Provider: Janki Tamez MD        Subjective     Principal Problem:Acute deep vein thrombosis (DVT) of right lower extremity        HPI:  Mr. Froylan Borja is a 69 y/o male with a PMHx alcohol abuse, cirrhosis, hepatocellular carcinoma, and HTN who presents for complaint of R leg pain and swelling. He reports yesterday he hit his R leg on the car door and shortly developed a bruise and pain to the R lateral leg. He tried tylenol at-home though pain, swelling, and bruising continued to worsen. Admits to paresthesias and numbness to the R foot as well as significant R foot pain with weight-bearing. Denies CP, palpitations, SOB, lightheadedness, dizziness, headaches, LOC, vomiting, diarrhea, abdominal pain, fever, chills. Reports some nausea following morphine and dilaudid for pain in ED. Of note, patient had angiogram y90 mapping study done with IR last Thursday (4/10/25). Has developed some bruising to the medial thigh and mild groin discomfort since procedure. Social history significant for daily alcohol consumption, 3-4 beers daily, last alcoholic beverage yesterday around 3PM.     In the ED, AF HDS. H&H 13.0/38.6 (BL~14.8), no leukocytosis. HypoNa 129, HyperK 5.7, CO2 20, Ca 8.6, Albumin 3.4, remainder of CMP unremarkable. PT/INR wnl at 10.9/1.0. LA 1.6. CPK wnl. Hep C Ab reactive, HCV RNA pending. EKG with normal sinus rhythm. R Lower Ext U/S w/ findings of nonocclusive thrombus in the right common femoral vein and right common femoral artery pseudoaneurysm. CTA Lower Ext pending. R lower extremity hematoma drained at bedside by general surgery with evacuation of 120 cc of hematoma. Compressive dressing applied. Given nebulized albuterol, lokelma, IV cefazolin, IV dilaudid,  IV morphine, IV zofran. Admitted to .     Overview/Hospital Course:  70 y.o M with HTN, alcohol/HepC cirrhosis c/b HCC s/p Y-90 mapping angiogram on 4/10 who presents with bruising and pain at angiogram site and RLE calf pain after he hit it on a car door. RLE U/S showed non-occlusive CFV thrombus and R CFA pseudoaneurysm. CTA re-demonstrated the psdueoaneurysm with active extravasation within the sac as well as a large R calf hematoma which GenSurg were consulted for and successfully drained (120ccs). Vascular Surgery were consulted, recommended IR guided percutaneous thrombin injection which IR were able to complete. Heparin gtt was started. Groin U/S done the following day showed continued thrombosis of the pseudoanurysm. GenSurg plan for OR on 4/21 for debridement and wound vac placement. Will need to f/u with IR for repeat U/S in 1 week.     Interval History: NAEON. AFVSS. Hg stable. GenSurg on 4/21 did wound debridement and wound vac placement and again on 4/24 went to OR for wound debridement + vac reapplication - Hep gtt was held since 4/20 for surgery and after per discussion with surgery - discussed again and restarted at low dose. Clarifying as note not updated yet and says to hold despite discussion.     K 5.3 - held ARB and gave one dose of lokelma today.     Wound vac in place. PT/OT ordered as no weight bearing limitation at this time per surgery team.     Review of Systems   Constitutional:  Negative for chills and fever.   Respiratory:  Negative for chest tightness and shortness of breath.    Cardiovascular:  Positive for leg swelling. Negative for chest pain.   Gastrointestinal:  Negative for abdominal pain, constipation, diarrhea, nausea and vomiting.   Genitourinary:  Negative for dysuria, frequency and urgency.   Musculoskeletal:  Positive for arthralgias and myalgias.   Skin:  Positive for color change and wound.   Neurological:  Positive for numbness. Negative for dizziness, weakness,  light-headedness and headaches.     Objective:     Vital Signs (Most Recent):  Temp: 97.9 °F (36.6 °C) (04/25/25 1120)  Pulse: 78 (04/25/25 1120)  Resp: 18 (04/25/25 1120)  BP: (!) 141/69 (04/25/25 1120)  SpO2: 97 % (04/25/25 1120) Vital Signs (24h Range):  Temp:  [96.9 °F (36.1 °C)-98.7 °F (37.1 °C)] 97.9 °F (36.6 °C)  Pulse:  [70-92] 78  Resp:  [14-23] 18  SpO2:  [92 %-100 %] 97 %  BP: (141-185)/(51-88) 141/69     Weight: 120 kg (264 lb 8.8 oz)  Body mass index is 33.07 kg/m².    Intake/Output Summary (Last 24 hours) at 4/25/2025 1154  Last data filed at 4/25/2025 0939  Gross per 24 hour   Intake 600 ml   Output 1640 ml   Net -1040 ml         Physical Exam  Vitals and nursing note reviewed.   Constitutional:       General: He is not in acute distress.     Appearance: He is well-developed. He is not ill-appearing.   HENT:      Head: Normocephalic and atraumatic.   Eyes:      Pupils: Pupils are equal, round, and reactive to light.   Cardiovascular:      Rate and Rhythm: Normal rate and regular rhythm.   Pulmonary:      Effort: Pulmonary effort is normal. No respiratory distress.      Breath sounds: Normal breath sounds. No wheezing or rales.   Abdominal:      Palpations: Abdomen is soft.      Tenderness: There is no abdominal tenderness.   Musculoskeletal:      Right lower leg: Edema present.      Left lower leg: No edema.      Comments: Sensation intact  RLE dressed per General surgery - wound vac in place  Foot elevated and ACE wrapped   Skin:     General: Skin is warm and dry.   Neurological:      Mental Status: He is alert and oriented to person, place, and time.   Psychiatric:         Behavior: Behavior normal.               Significant Labs: All pertinent labs within the past 24 hours have been reviewed.  CBC:   Recent Labs   Lab 04/24/25  0512 04/25/25  0457   WBC 8.84 6.27   HGB 10.6* 10.7*   HCT 33.1* 32.0*    251     CMP:   Recent Labs   Lab 04/23/25  1300 04/24/25  0512 04/25/25  0457   *  134* 133*   K 4.2 4.8 5.3*   CL 96 98 100   CO2 30* 30* 25   BUN 21 25* 20   CREATININE 0.9 1.1 0.9   CALCIUM 8.9 8.5* 8.7   ALBUMIN  --  2.9* 2.9*   BILITOT  --  0.7 0.9   ALKPHOS  --  77 77   AST  --  26 30   ALT  --  21 24   ANIONGAP 7* 6* 8       Significant Imaging: I have reviewed all pertinent imaging results/findings within the past 24 hours.      Assessment & Plan  Acute deep vein thrombosis (DVT) of right lower extremity  Pain of R lower extremity   - RLE US showed a nonocclusive thrombus in the right common femoral vein   - Started Heparin gtt on 4/18 once cleared from IR standpoint  - Hg has been down-trending and dressings have been saturated with blood.   - If able to tolerate anticoagulation, will transition to oral DOAC when not needing further procedures. If not, will need to discuss IVC filter  - Hep gtt was held since 4/20 for surgery and after per discussion with surgery - discussed again and restarted at low dose. - hold on Sunday MN for OR on 4/28  - MM pain regimen   - Plan for PT assessment after surgery if needed - was ambulating on 4/21 before procedure  - Wound Vac in place  Essential hypertension  Patient's blood pressure range in the last 24 hours was: BP  Min: 141/69  Max: 185/88.The patient's inpatient anti-hypertensive regimen is listed below:  Current Antihypertensives  amLODIPine tablet 10 mg, Daily, Oral  hydrALAZINE injection 10 mg, Every 6 hours PRN, Intravenous    Plan  - BP is uncontrolled, will adjust as follows: increase amlodipine to 10mg  - d/c Losartan 100mg - due to hyperkalemia - was restarted 4/22 - may need to use alternative agent  - IV hydralazine PRN  Alcohol use disorder, severe, dependence  - daily drinker, last drink 4/17  - 3-4 beers daily, denies withdrawal symptoms  - continue thiamine, folic acid and multivitamin  - CIWA per nursing   Obesity (BMI 30-39.9)  Body mass index is 33.07 kg/m². Morbid obesity complicates all aspects of disease management from  diagnostic modalities to treatment. Weight loss encouraged and health benefits explained to patient.   Hyponatremia  Hyponatremia is likely due to Cirrhosis. The patient's most recent sodium results are listed below.  Recent Labs     04/23/25  1300 04/24/25  0512 04/25/25  0457   * 134* 133*     Plan  - Correct the sodium by 4-6mEq in 24 hours.   - Fluid and salt restriction  - Monitor sodium Daily.   - Patient hyponatremia is stable  Hematoma  - New swelling noted to RLE following IR procedure on presentation  - General surgery consulted- hematoma evacuation performed at bedside  - S/p  wound debridement and wound vac placement on 4/21. and 4/24 for wound debridement + vac reapplication  - Heparin gtt plan per DBT section  - WV placed 4/21 and changed on 4/24 - management per surgery  Pseudoaneurysm of femoral artery  - RLE US showed a new right common femoral artery pseudoaneurysm   - CTA RLE showed extravasation within the pseudoaneurysm sac  - Vascular Surgery consulted- recommended IR guided thrombin injection. IR consulted, completed procedure on 4/18.   - Groin u/s on 4/19 showed continued thrombosis of the pseudoaneurysm  - Will need repeat U/S outpatient in ~ 1 week and f/u with IR - if still hospitalized may get inpatient early next week and have IR follow up  Hyperkalemia  Hyperkalemia is likely due to  multiple afctors .The patients most recent potassium results are listed below.  Recent Labs     04/23/25  1300 04/24/25  0512 04/25/25  0457   K 4.2 4.8 5.3*     Plan  - Monitor for arrhythmias with EKG and/or continuous telemetry.   - Treat the hyperkalemia with Potassium Binders.   - Monitor potassium: Every 12 hours  -hold ARB for now,        VTE Risk Mitigation (From admission, onward)           Ordered     heparin 25,000 units in dextrose 5% 250 ml (100 units/mL) infusion MINIMAL INTENSITY nomogram - [facility]  Continuous        Question:  Begin at (units/kg/hr)  Answer:  12 04/25/25 0808     IP  VTE HIGH RISK PATIENT  Once         04/18/25 0725     Reason for No Pharmacological VTE Prophylaxis  Once        Question:  Reasons:  Answer:  Risk of Bleeding  Comment:  will start anticoagulation once medically cleared    04/18/25 0725                    Discharge Planning   KENNETH: 4/28/2025     Code Status: Full Code   Medical Readiness for Discharge Date:   Discharge Plan A: Home, Home with family                Please place Justification for DME        Ramiro Silverman MD  Department of Hospital Medicine   [facility] - Telemetry Stepdown

## 2025-04-25 NOTE — ASSESSMENT & PLAN NOTE
Patient's blood pressure range in the last 24 hours was: BP  Min: 141/69  Max: 185/88.The patient's inpatient anti-hypertensive regimen is listed below:  Current Antihypertensives  amLODIPine tablet 10 mg, Daily, Oral  hydrALAZINE injection 10 mg, Every 6 hours PRN, Intravenous    Plan  - BP is uncontrolled, will adjust as follows: increase amlodipine to 10mg  - d/c Losartan 100mg - due to hyperkalemia - was restarted 4/22 - may need to use alternative agent  - IV hydralazine PRN

## 2025-04-25 NOTE — ASSESSMENT & PLAN NOTE
Pain of R lower extremity   - RLE US showed a nonocclusive thrombus in the right common femoral vein   - Started Heparin gtt on 4/18 once cleared from IR standpoint  - Hg has been down-trending and dressings have been saturated with blood.   - If able to tolerate anticoagulation, will transition to oral DOAC when not needing further procedures. If not, will need to discuss IVC filter  - Hep gtt was held since 4/20 for surgery and after per discussion with surgery - discussed again and restarted at low dose. - hold on Sunday MN for OR on 4/28  - MM pain regimen   - Plan for PT assessment after surgery if needed - was ambulating on 4/21 before procedure  - Wound Vac in place

## 2025-04-25 NOTE — ASSESSMENT & PLAN NOTE
Froylan Borja is a 70 y.o. gentleman with HCC undergoing Y90 therapy with a RLE hematoma, R fem pseudoaneurysm, and a R sided DVT.    - Plan for takeback to OR Monday for  vac reapplication, will have plastics evaluate wound at that time for possibility of skin graft  -  Rest of care per primary team  - OOB/ambulate   - please hold anticoagulation

## 2025-04-25 NOTE — ASSESSMENT & PLAN NOTE
Hyperkalemia is likely due to multiple afctors.The patients most recent potassium results are listed below.  Recent Labs     04/23/25  1300 04/24/25  0512 04/25/25  0457   K 4.2 4.8 5.3*     Plan  - Monitor for arrhythmias with EKG and/or continuous telemetry.   - Treat the hyperkalemia with Potassium Binders.   - Monitor potassium: Every 12 hours  -hold ARB for now,

## 2025-04-25 NOTE — PROGRESS NOTES
[facility] - Telemetry Stepdown  General Surgery  Progress Note    Subjective:     History of Present Illness:  Froylan Borja is a 70 y.o. gentleman with PMH of alcohol abuse, cirrhosis, hepatocellular carcinoma undergoing Y90 therapy, and HTN who presents to the ED with R leg pain and swelling. He had Y90 through his R femoral artery 8 days ago. He has had some bruising and pain of his groin. He also bumped his calf on his car and has increasing swelling and discoloration. He did not notice until today, but he also has pitting edema to his right leg, much more than his left side. In the ED, he is hemodynamically normal though hypertensive. Labs significant for Na 129, K 5.7. US of his lower extremities show a R sided nonocclusive DVT and a R femo pseudoaneurysm. CT was also performed which demonstrated his hematoma over his R calf and extravasation of contrast into the pseudoaneusym. General surgery consulted for evaluation of drainage of his RLE hematoma.           Post-Op Info:  Procedure(s) (LRB):  APPLICATION, WOUND VAC (Right)  REPLACEMENT, WOUND VAC (Right)   1 Day Post-Op     Interval History: Underwent debridement and wound vac change yesterday. Wound vac holding good seal this morning    Medications:  Continuous Infusions:  Scheduled Meds:   amLODIPine  10 mg Oral Daily    buPROPion  150 mg Oral BID    folic acid  1 mg Oral Daily    gabapentin  300 mg Oral BID    melatonin  6 mg Oral Nightly    multivitamin  1 tablet Oral Daily    polyethylene glycol  17 g Oral BID    senna  8.6 mg Oral BID    sodium zirconium cyclosilicate  10 g Oral Once    thiamine  100 mg Oral Daily     PRN Meds:  Current Facility-Administered Medications:     acetaminophen, 1,000 mg, Oral, Q8H PRN    albuterol-ipratropium, 3 mL, Nebulization, Q4H PRN    bisacodyL, 10 mg, Rectal, Daily PRN    dextrose 50%, 12.5 g, Intravenous, PRN    dextrose 50%, 25 g, Intravenous, PRN    glucagon (human recombinant), 1 mg, Intramuscular, PRN    glucose,  16 g, Oral, PRN    glucose, 24 g, Oral, PRN    hydrALAZINE, 10 mg, Intravenous, Q6H PRN    HYDROmorphone, 1 mg, Intravenous, Q6H PRN    hydrOXYzine pamoate, 25 mg, Oral, Q8H PRN    LORazepam, 2 mg, Oral, Q4H PRN    ondansetron, 8 mg, Oral, Q8H PRN    oxyCODONE, 5 mg, Oral, Q4H PRN    oxyCODONE, 10 mg, Oral, Q6H PRN    promethazine, 25 mg, Oral, Q6H PRN    sodium chloride 0.9%, 10 mL, Intravenous, PRN     Review of patient's allergies indicates:   Allergen Reactions    Zoloft [sertraline] Other (See Comments)     hyponatremia    Codeine Itching     Intolerance- doesn't like the way it makes him feel     Objective:     Vital Signs (Most Recent):  Temp: 97.4 °F (36.3 °C) (04/25/25 0732)  Pulse: 76 (04/25/25 0732)  Resp: 18 (04/25/25 0732)  BP: (!) 159/78 (04/25/25 0732)  SpO2: 96 % (04/25/25 0732) Vital Signs (24h Range):  Temp:  [96.9 °F (36.1 °C)-98.7 °F (37.1 °C)] 97.4 °F (36.3 °C)  Pulse:  [70-92] 76  Resp:  [14-23] 18  SpO2:  [92 %-100 %] 96 %  BP: (141-185)/(51-88) 159/78     Weight: 120 kg (264 lb 8.8 oz)  Body mass index is 33.07 kg/m².    Intake/Output - Last 3 Shifts         04/23 0700 04/24 0659 04/24 0700 04/25 0659 04/25 0700 04/26 0659    IV Piggyback  600     Total Intake(mL/kg)  600 (5)     Urine (mL/kg/hr) 2600 (0.9) 1400 (0.5)     Other 50 40     Total Output 2650 1440     Net -2650 -840            Stool Occurrence  0 x              Physical Exam  Constitutional:       General: He is not in acute distress.     Appearance: Normal appearance.   HENT:      Head: Normocephalic and atraumatic.   Cardiovascular:      Rate and Rhythm: Normal rate and regular rhythm.   Pulmonary:      Effort: Pulmonary effort is normal. No respiratory distress.   Abdominal:      General: Abdomen is flat. There is no distension.      Palpations: Abdomen is soft.      Tenderness: There is no abdominal tenderness.   Musculoskeletal:      Comments: R calf w wound vac in place to suction, wrapped in ACE   Neurological:       General: No focal deficit present.      Mental Status: He is alert and oriented to person, place, and time.   Psychiatric:         Behavior: Behavior normal.         Thought Content: Thought content normal.          Significant Labs:  I have reviewed all pertinent lab results within the past 24 hours.    Significant Diagnostics:  I have reviewed all pertinent imaging results/findings within the past 24 hours.  Assessment/Plan:     Hematoma  Froylan Borja is a 70 y.o. gentleman with HCC undergoing Y90 therapy with a RLE hematoma, R fem pseudoaneurysm, and a R sided DVT.    - Plan for takeback to OR Monday for  vac reapplication, will have plastics evaluate wound at that time for possibility of skin graft  -  Rest of care per primary team  - OOB/ambulate   - please hold anticoagulation Sunday at midnight in anticipation for OR Monday        Autumn Henderson MD  General Surgery  [facility] - Telemetry Stepdown

## 2025-04-25 NOTE — SUBJECTIVE & OBJECTIVE
Interval History: NAEON. AFVSS. Hg stable. GenSurg on 4/21 did wound debridement and wound vac placement and again on 4/24 went to OR for wound debridement + vac reapplication - Hep gtt was held since 4/20 for surgery and after per discussion with surgery - discussed again and restarted at low dose. Clarifying as note not updated yet and says to hold despite discussion.     K 5.3 - held ARB and gave one dose of lokelma today.     Wound vac in place. PT/OT ordered as no weight bearing limitation at this time per surgery team.     Review of Systems   Constitutional:  Negative for chills and fever.   Respiratory:  Negative for chest tightness and shortness of breath.    Cardiovascular:  Positive for leg swelling. Negative for chest pain.   Gastrointestinal:  Negative for abdominal pain, constipation, diarrhea, nausea and vomiting.   Genitourinary:  Negative for dysuria, frequency and urgency.   Musculoskeletal:  Positive for arthralgias and myalgias.   Skin:  Positive for color change and wound.   Neurological:  Positive for numbness. Negative for dizziness, weakness, light-headedness and headaches.     Objective:     Vital Signs (Most Recent):  Temp: 97.9 °F (36.6 °C) (04/25/25 1120)  Pulse: 78 (04/25/25 1120)  Resp: 18 (04/25/25 1120)  BP: (!) 141/69 (04/25/25 1120)  SpO2: 97 % (04/25/25 1120) Vital Signs (24h Range):  Temp:  [96.9 °F (36.1 °C)-98.7 °F (37.1 °C)] 97.9 °F (36.6 °C)  Pulse:  [70-92] 78  Resp:  [14-23] 18  SpO2:  [92 %-100 %] 97 %  BP: (141-185)/(51-88) 141/69     Weight: 120 kg (264 lb 8.8 oz)  Body mass index is 33.07 kg/m².    Intake/Output Summary (Last 24 hours) at 4/25/2025 1154  Last data filed at 4/25/2025 0939  Gross per 24 hour   Intake 600 ml   Output 1640 ml   Net -1040 ml         Physical Exam  Vitals and nursing note reviewed.   Constitutional:       General: He is not in acute distress.     Appearance: He is well-developed. He is not ill-appearing.   HENT:      Head: Normocephalic and  atraumatic.   Eyes:      Pupils: Pupils are equal, round, and reactive to light.   Cardiovascular:      Rate and Rhythm: Normal rate and regular rhythm.   Pulmonary:      Effort: Pulmonary effort is normal. No respiratory distress.      Breath sounds: Normal breath sounds. No wheezing or rales.   Abdominal:      Palpations: Abdomen is soft.      Tenderness: There is no abdominal tenderness.   Musculoskeletal:      Right lower leg: Edema present.      Left lower leg: No edema.      Comments: Sensation intact  RLE dressed per General surgery - wound vac in place  Foot elevated and ACE wrapped   Skin:     General: Skin is warm and dry.   Neurological:      Mental Status: He is alert and oriented to person, place, and time.   Psychiatric:         Behavior: Behavior normal.               Significant Labs: All pertinent labs within the past 24 hours have been reviewed.  CBC:   Recent Labs   Lab 04/24/25  0512 04/25/25  0457   WBC 8.84 6.27   HGB 10.6* 10.7*   HCT 33.1* 32.0*    251     CMP:   Recent Labs   Lab 04/23/25  1300 04/24/25  0512 04/25/25  0457   * 134* 133*   K 4.2 4.8 5.3*   CL 96 98 100   CO2 30* 30* 25   BUN 21 25* 20   CREATININE 0.9 1.1 0.9   CALCIUM 8.9 8.5* 8.7   ALBUMIN  --  2.9* 2.9*   BILITOT  --  0.7 0.9   ALKPHOS  --  77 77   AST  --  26 30   ALT  --  21 24   ANIONGAP 7* 6* 8       Significant Imaging: I have reviewed all pertinent imaging results/findings within the past 24 hours.

## 2025-04-25 NOTE — ASSESSMENT & PLAN NOTE
Hyponatremia is likely due to Cirrhosis. The patient's most recent sodium results are listed below.  Recent Labs     04/23/25  1300 04/24/25  0512 04/25/25  0457   * 134* 133*     Plan  - Correct the sodium by 4-6mEq in 24 hours.   - Fluid and salt restriction  - Monitor sodium Daily.   - Patient hyponatremia is stable

## 2025-04-25 NOTE — ANESTHESIA POSTPROCEDURE EVALUATION
Anesthesia Post Evaluation    Patient: Froylan Borja    Procedure(s) Performed: Procedure(s) (LRB):  APPLICATION, WOUND VAC (Right)  REPLACEMENT, WOUND VAC (Right)    Final Anesthesia Type: general      Patient location during evaluation: PACU  Patient participation: Yes- Able to Participate  Level of consciousness: awake and alert  Post-procedure vital signs: reviewed and stable  Pain management: adequate  Airway patency: patent    PONV status at discharge: No PONV  Anesthetic complications: no      Cardiovascular status: hemodynamically stable  Respiratory status: unassisted and spontaneous ventilation  Hydration status: euvolemic  Follow-up not needed.              Vitals Value Taken Time   /71 04/24/25 19:17   Temp 36.6 °C (97.9 °F) 04/24/25 19:15   Pulse 73 04/24/25 19:25   Resp 17 04/24/25 19:25   SpO2 99 % 04/24/25 19:25   Vitals shown include unfiled device data.      Event Time   Out of Recovery 04/24/2025 18:45:00         Pain/Matt Score: Pain Rating Prior to Med Admin: 7 (4/25/2025 12:29 AM)  Pain Rating Post Med Admin: 0 (4/25/2025  1:24 AM)  Matt Score: 9 (4/24/2025  6:30 PM)

## 2025-04-26 LAB
ABSOLUTE EOSINOPHIL (OHS): 0.21 K/UL
ABSOLUTE MONOCYTE (OHS): 0.99 K/UL (ref 0.3–1)
ABSOLUTE NEUTROPHIL COUNT (OHS): 5.12 K/UL (ref 1.8–7.7)
ALBUMIN SERPL BCP-MCNC: 2.9 G/DL (ref 3.5–5.2)
ALP SERPL-CCNC: 86 UNIT/L (ref 40–150)
ALT SERPL W/O P-5'-P-CCNC: 34 UNIT/L (ref 10–44)
ANION GAP (OHS): 5 MMOL/L (ref 8–16)
APTT PPP: 47 SECONDS (ref 21–32)
APTT PPP: 48.8 SECONDS (ref 21–32)
AST SERPL-CCNC: 39 UNIT/L (ref 11–45)
BASOPHILS # BLD AUTO: 0.06 K/UL
BASOPHILS NFR BLD AUTO: 0.7 %
BILIRUB SERPL-MCNC: 0.6 MG/DL (ref 0.1–1)
BUN SERPL-MCNC: 21 MG/DL (ref 8–23)
CALCIUM SERPL-MCNC: 8.7 MG/DL (ref 8.7–10.5)
CHLORIDE SERPL-SCNC: 101 MMOL/L (ref 95–110)
CO2 SERPL-SCNC: 29 MMOL/L (ref 23–29)
CREAT SERPL-MCNC: 0.9 MG/DL (ref 0.5–1.4)
ERYTHROCYTE [DISTWIDTH] IN BLOOD BY AUTOMATED COUNT: 13.1 % (ref 11.5–14.5)
GFR SERPLBLD CREATININE-BSD FMLA CKD-EPI: >60 ML/MIN/1.73/M2
GLUCOSE SERPL-MCNC: 109 MG/DL (ref 70–110)
HCT VFR BLD AUTO: 32.1 % (ref 40–54)
HGB BLD-MCNC: 10.4 GM/DL (ref 14–18)
IMM GRANULOCYTES # BLD AUTO: 0.14 K/UL (ref 0–0.04)
IMM GRANULOCYTES NFR BLD AUTO: 1.7 % (ref 0–0.5)
LYMPHOCYTES # BLD AUTO: 1.63 K/UL (ref 1–4.8)
MAGNESIUM SERPL-MCNC: 2.1 MG/DL (ref 1.6–2.6)
MCH RBC QN AUTO: 32 PG (ref 27–31)
MCHC RBC AUTO-ENTMCNC: 32.4 G/DL (ref 32–36)
MCV RBC AUTO: 99 FL (ref 82–98)
NUCLEATED RBC (/100WBC) (OHS): 0 /100 WBC
PHOSPHATE SERPL-MCNC: 3.3 MG/DL (ref 2.7–4.5)
PLATELET # BLD AUTO: 256 K/UL (ref 150–450)
PMV BLD AUTO: 9.3 FL (ref 9.2–12.9)
POTASSIUM SERPL-SCNC: 4.9 MMOL/L (ref 3.5–5.1)
PROT SERPL-MCNC: 6.4 GM/DL (ref 6–8.4)
RBC # BLD AUTO: 3.25 M/UL (ref 4.6–6.2)
RELATIVE EOSINOPHIL (OHS): 2.6 %
RELATIVE LYMPHOCYTE (OHS): 20 % (ref 18–48)
RELATIVE MONOCYTE (OHS): 12.1 % (ref 4–15)
RELATIVE NEUTROPHIL (OHS): 62.9 % (ref 38–73)
SODIUM SERPL-SCNC: 135 MMOL/L (ref 136–145)
WBC # BLD AUTO: 8.15 K/UL (ref 3.9–12.7)

## 2025-04-26 PROCEDURE — 97165 OT EVAL LOW COMPLEX 30 MIN: CPT | Mod: HCNC

## 2025-04-26 PROCEDURE — 25000003 PHARM REV CODE 250: Mod: HCNC

## 2025-04-26 PROCEDURE — 97161 PT EVAL LOW COMPLEX 20 MIN: CPT | Mod: HCNC

## 2025-04-26 PROCEDURE — 80053 COMPREHEN METABOLIC PANEL: CPT | Mod: HCNC | Performed by: STUDENT IN AN ORGANIZED HEALTH CARE EDUCATION/TRAINING PROGRAM

## 2025-04-26 PROCEDURE — 94761 N-INVAS EAR/PLS OXIMETRY MLT: CPT | Mod: HCNC

## 2025-04-26 PROCEDURE — 20600001 HC STEP DOWN PRIVATE ROOM: Mod: HCNC

## 2025-04-26 PROCEDURE — 84100 ASSAY OF PHOSPHORUS: CPT | Mod: HCNC | Performed by: STUDENT IN AN ORGANIZED HEALTH CARE EDUCATION/TRAINING PROGRAM

## 2025-04-26 PROCEDURE — 85730 THROMBOPLASTIN TIME PARTIAL: CPT | Mod: HCNC | Performed by: STUDENT IN AN ORGANIZED HEALTH CARE EDUCATION/TRAINING PROGRAM

## 2025-04-26 PROCEDURE — 83735 ASSAY OF MAGNESIUM: CPT | Mod: HCNC | Performed by: STUDENT IN AN ORGANIZED HEALTH CARE EDUCATION/TRAINING PROGRAM

## 2025-04-26 PROCEDURE — 97535 SELF CARE MNGMENT TRAINING: CPT | Mod: HCNC

## 2025-04-26 PROCEDURE — 36415 COLL VENOUS BLD VENIPUNCTURE: CPT | Mod: HCNC | Performed by: STUDENT IN AN ORGANIZED HEALTH CARE EDUCATION/TRAINING PROGRAM

## 2025-04-26 PROCEDURE — 25000003 PHARM REV CODE 250: Mod: HCNC | Performed by: STUDENT IN AN ORGANIZED HEALTH CARE EDUCATION/TRAINING PROGRAM

## 2025-04-26 PROCEDURE — 97116 GAIT TRAINING THERAPY: CPT | Mod: HCNC

## 2025-04-26 PROCEDURE — 63600175 PHARM REV CODE 636 W HCPCS: Mod: HCNC | Performed by: STUDENT IN AN ORGANIZED HEALTH CARE EDUCATION/TRAINING PROGRAM

## 2025-04-26 PROCEDURE — 85025 COMPLETE CBC W/AUTO DIFF WBC: CPT | Mod: HCNC | Performed by: STUDENT IN AN ORGANIZED HEALTH CARE EDUCATION/TRAINING PROGRAM

## 2025-04-26 RX ADMIN — OXYCODONE HYDROCHLORIDE 10 MG: 10 TABLET ORAL at 03:04

## 2025-04-26 RX ADMIN — AMLODIPINE BESYLATE 10 MG: 10 TABLET ORAL at 08:04

## 2025-04-26 RX ADMIN — GABAPENTIN 300 MG: 300 CAPSULE ORAL at 10:04

## 2025-04-26 RX ADMIN — POLYETHYLENE GLYCOL 3350 17 G: 17 POWDER, FOR SOLUTION ORAL at 08:04

## 2025-04-26 RX ADMIN — BUPROPION HYDROCHLORIDE 150 MG: 75 TABLET, FILM COATED ORAL at 10:04

## 2025-04-26 RX ADMIN — SENNOSIDES 8.6 MG: 8.6 TABLET, FILM COATED ORAL at 08:04

## 2025-04-26 RX ADMIN — OXYCODONE HYDROCHLORIDE 10 MG: 10 TABLET ORAL at 08:04

## 2025-04-26 RX ADMIN — Medication 100 MG: at 08:04

## 2025-04-26 RX ADMIN — BUPROPION HYDROCHLORIDE 150 MG: 75 TABLET, FILM COATED ORAL at 08:04

## 2025-04-26 RX ADMIN — Medication 1 TABLET: at 08:04

## 2025-04-26 RX ADMIN — OXYCODONE HYDROCHLORIDE 10 MG: 10 TABLET ORAL at 10:04

## 2025-04-26 RX ADMIN — FOLIC ACID 1 MG: 1 TABLET ORAL at 08:04

## 2025-04-26 RX ADMIN — HEPARIN SODIUM 12 UNITS/KG/HR: 10000 INJECTION, SOLUTION INTRAVENOUS at 07:04

## 2025-04-26 RX ADMIN — MELATONIN TAB 3 MG 6 MG: 3 TAB at 10:04

## 2025-04-26 RX ADMIN — GABAPENTIN 300 MG: 300 CAPSULE ORAL at 08:04

## 2025-04-26 NOTE — PT/OT/SLP EVAL
"Physical Therapy Evaluation  Co-evaluation with OT due to acuity of condition, level of skilled assist needed for assessment of safety with mobility.   Patient Name:  Froylan Borja   MRN:  [id number]    Recommendations:     Discharge Recommendations: Low Intensity Therapy   Discharge Equipment Recommendations: walker, rolling   Barriers to discharge:  patient below functional baseline    The patient is safe and appropriate to mobilize with RN staff outside of therapy sessions: The patient is safe to ambulate with RN using RW, RW ordered via C&D. RN alerted.       Assessment:     Froylan Borja is a 70 y.o. male admitted with a medical diagnosis of Acute deep vein thrombosis (DVT) of right lower extremity.  He presents with the following impairments/functional limitations: weakness, impaired functional mobility, gait instability, impaired endurance, impaired self care skills, impaired balance, pain, decreased lower extremity function, edema, decreased ROM, impaired skin.The patient's mobility is limited due to R calf wound pain and generalized weakness from prolonged bed rest and complex medical condition. The patient was able to stand with RW and contact guard assist. He performed dynamic standing weight shifts using RW, static marching with RW, and ambulated 2' with RW from bed to chair. Gait tolerance limited due to pain.     Rehab Prognosis: Good; patient would benefit from acute skilled PT services to address these deficits and reach maximum level of function.    Recent Surgery: Procedure(s) (LRB):  APPLICATION, WOUND VAC (Right)  REPLACEMENT, WOUND VAC (Right) 2 Days Post-Op    Plan:     During this hospitalization, patient to be seen 4 x/week to address the identified rehab impairments via gait training, therapeutic activities, therapeutic exercises, neuromuscular re-education and progress toward the following goals:    Plan of Care Expires:  05/26/25    Subjective     Chief Complaint: "One of the doctors didn't know if " "I was allowed to put weight through my leg so I haven't gotten out of bed since Thursday"  Patient/Family Comments/goals: return to PLOF, "I know I have to start moving more"  Pain/Comfort:  Pain Rating 1: 0/10 (at rest in supine)  Location - Side 1: Right  Location - Orientation 1: lower  Location 1: leg  Pain Addressed 1: Reposition, Distraction, Cessation of Activity  Pain Rating Post-Intervention 1: 10/10 (in standing)    Patients cultural, spiritual, Zoroastrian conflicts given the current situation: no    Living Environment:  The patient lives in an apt on the ground floor with threshold to enter. [employer], working part time.   Prior to admission, patients level of function was independent .  Equipment used at home:  (owns RW and [employer]).  DME owned (not currently used): none.  Upon discharge, patient will have assistance from wife.    Objective:     Communicated with RN prior to session.  Patient found HOB elevated with peripheral IV, wound vac  upon PT entry to room.    General Precautions: Standard, fall  Orthopedic Precautions:N/A   Braces: N/A  Respiratory Status: Room air    Exams:    Cognitive Exam  Patient is A&O x4 and follows 100% of one -step commands    Fine Motor Coordination   -       WFL     Postural Exam Patient presented with the following abnormalities:    -       Rounded shoulders  -       Forward head  -       Kyphosis  -       Posterior pelvic tilt  -         Sensation    -       Light touch intact HUB[place]  LE   Skin Integrity/Edema     -       Skin integrity: R lower leg wound dressed, wound vac in place  -       Edema: R lower leg and foot swelling    R LE ROM Limited due to pain and stiffness   R LE Strength 3/5 hip flexion, knee ext/flex, and ankle DF/PF; pain and stiffness limited    L LE ROM WFL   L LE Strength  WFL       Balance   Static Sitting  supervision assistance    Dynamic Sitting stand by assistance    Static Standing contact guard assist with RW   Dynamic Standing       " contact guard assist with RW         Functional Mobility:    Bed Mobility  Supine to Sit on the R side:  contact guard assist   Transfers Sit to Stand:  contact guard assist with RW from edge of bed and chair   Gait  Gait Distance:   -Standing weight shift L to R foot with RW, 6 reps ea  -Static marching with RW, 6 reps ea  -2 ft with RW  Assistance Level: contact guard assist   Description: antalgic gait, forward flexed posture, decreased weight bearing through R LE, decreased step length, short shuffling steps, slow deliberate gait speed           AM-PAC 6 CLICK MOBILITY  Total Score:15       Treatment & Education:  Patient  educated on:  -role of therapy  -goals of session  -PT POC  -benefits of out of bed mobility and consequences of immobility  -calling for staff assist to mobilize safely  Patient agreeable to mobilize with therapy.      Seated R LE therex for ROM and strength:   -R LAQ, cued for full ROM, isometric hold at end range, eccentric control in lowering LE; 6 reps  -AP; 10 reps    Sit to stand training, verbal/tactile cues for:   -scooting hips to edge of chair  -positioning LE in [place] close to patient's [place]  -hand placement on chair hand rail  -facilitation for anterior rocking to initiate transfer  -cued to extend R LE anteriorly for sit<>stand to decrease [place] through R LE  Performed 2 reps of sit to stand from bed and chair with RW  Patient required cues 50% of the time for correct transfer technique.      Gait training: cued for upright posture, cued for sequencing with stepping and RW progression, cued to ambulate inside RW [place], pacing for energy conservation     Patient encouraged to ambulate, sit up in chair 3x/day to prevent deconditioning during hospitalization. Patient verbalized understanding and agreement to mobilize only with RN assist for safety.     Patient left up in chair with all lines intact, call button in reach, and RN notified.    GOALS:   Multidisciplinary Problems        Physical Therapy Goals          Problem: Physical Therapy    Goal Priority Disciplines Outcome Interventions   Physical Therapy Goal     PT, PT/OT Progressing    Description: Goals to be met by:      Patient will increase functional independence with mobility by performin. Supine to sit with Modified [place]  2. Sit to supine with Modified [place]  3. Sit to stand transfer with Supervision  4. Bed to chair transfer with Supervision using LRAD  5. Gait  x 100 feet with Stand-by Assistance using LRAD.                          DME Justifications:  Patient demonstrates a mobility limitation that significantly impairs their ability to participate in one or more mobility related activities of daily living. Patient's mobility limitation cannot be sufficiently resolved with the use of a cane, but can be sufficiently resolved with the use of a rolling walker.The use of a rolling walker will considerably improve their ability to participate in MRADLs. Patient will use the walker on a regular basis at home.      History:     Past Medical History:   Diagnosis Date    Alcohol abuse     Anxiety     Cirrhosis     Glaucoma     Hepatocellular carcinoma     History of hepatitis C, s/p successful Rx w/ SVR - 2017     genotype 1;  relapse following PegIFN+RBV+Victrelis; prior relapse following PegIFN+RBV S/p 12 weeks harvoni + RBV w/ SVR    Hypertension     Paresthesia     feet, bilaterally       Past Surgical History:   Procedure Laterality Date    APPLICATION OF WOUND VACUUM-ASSISTED CLOSURE DEVICE Right 2025    Procedure: APPLICATION, WOUND VAC;  Surgeon: Estella Ramsay MD;  Location: [address];  Service: General;  Laterality: Right;    APPLICATION OF WOUND VACUUM-ASSISTED CLOSURE DEVICE Right 2025    Procedure: APPLICATION, WOUND VAC;  Surgeon: Estella Ramsay MD;  Location: [facility] OR [address] [place] [address];  Service: General;  Laterality: Right;    COLONOSCOPY N/A 2024    Procedure:  COLONOSCOPY;  Surgeon: Mo Patino MD;  Location: [facility] (4TH FLR);  Service: Endoscopy;  Laterality: N/A;  Ref by Dr. NIGHAT Tamez, PT/INR/CBC am procedure, Suprep, email - PC  9/16-lvm for pre call-tb-labs 8/22/24    ESOPHAGOGASTRODUODENOSCOPY N/A 9/19/2024    Procedure: EGD (ESOPHAGOGASTRODUODENOSCOPY);  Surgeon: Mo Patino MD;  [facility]  Service: Endoscopy;  Laterality: N/A;    LIVER BIOPSY      REPLACEMENT OF WOUND VACUUM-ASSISTED CLOSURE DEVICE Right 4/24/2025    Procedure: REPLACEMENT, WOUND VAC;  Surgeon: Estella Ramsay MD;  [facility];  Service: General;  Laterality: Right;    WOUND DEBRIDEMENT Right 4/21/2025    Procedure: DEBRIDEMENT, WOUND;  Surgeon: Estella Ramsay MD;  Location: [facility] OR [address] [place] [address];  Service: General;  Laterality: Right;       Time Tracking:     PT Received On: 04/26/25  PT Start Time: 1008     PT Stop Time: 1033  PT Total Time (min): 25 min     Billable Minutes: Evaluation 10 and Gait Training 15      04/26/2025

## 2025-04-26 NOTE — ASSESSMENT & PLAN NOTE
Hyponatremia is likely due to Cirrhosis. The patient's most recent sodium results are listed below.  Recent Labs     04/24/25  0512 04/25/25  0457 04/26/25  0430   * 133* 135*     Plan  - Correct the sodium by 4-6mEq in 24 hours.   - Fluid and salt restriction  - Monitor sodium Daily.   - Patient hyponatremia is stable

## 2025-04-26 NOTE — ASSESSMENT & PLAN NOTE
- New swelling noted to RLE following IR procedure on presentation  - General surgery consulted- hematoma evacuation performed at bedside  - S/p  wound debridement and wound vac placement on 4/21. and 4/24 for wound debridement + vac reapplication  - Heparin gtt plan per DBT section  - WV placed 4/21 and changed on 4/24 - management per surgery  - Plan for OR takeback on Monday 4/28.

## 2025-04-26 NOTE — PLAN OF CARE
Problem: Physical Therapy  Goal: Physical Therapy Goal  Description: Goals to be met by:      Patient will increase functional independence with mobility by performin. Supine to sit with Modified [place]  2. Sit to supine with Modified [place]  3. Sit to stand transfer with Supervision  4. Bed to chair transfer with Supervision using LRAD  5. Gait  x 100 feet with Stand-by Assistance using LRAD.     Outcome: Progressing   Evaluation completed, initiated plan of care.   Kathleen Lozano, PT  2025

## 2025-04-26 NOTE — PT/OT/SLP EVAL
Occupational  Therapy Co-Evaluation and Treatment  Co tx performed with OT due to patient need for 2 skilled therapist to ensure patient and staff safety and to accommondate for activity tolerance.      Name: Froylan Borja  MRN: [id number]  Admitting Diagnosis: Acute deep vein thrombosis (DVT) of right lower extremity  Recent Surgery: Procedure(s) (LRB):  APPLICATION, WOUND VAC (Right)  REPLACEMENT, WOUND VAC (Right) 2 Days Post-Op    Recommendations:     Discharge Recommendations:  Home Health OT (low intensity therapy)  Level of Assistance Recommended: Intermittent assistance  Discharge Equipment Recommendations: walker, rolling  Barriers to discharge: None    Assessment:     Froylan Borja is a 70 y.o. male with a medical diagnosis of Acute deep vein thrombosis (DVT) of right lower extremity.Pt tolerated session well and without incident and shows excellent motivation and potential to improve, but the pt continues to require assistance to perform self-care tasks and mobility. Pt strengths include Functional cognition, Following multi-step tasks, and Attention to task and PLOF, Family support, Motivation, and Willingness to participate.  However, pt would continue to benefit from cont'd OT services in the acute setting to improve safety and independence /c functional tasks and ADLs upon discharge.    He presents with performance deficits affecting function including weakness, impaired endurance, pain, gait instability, impaired balance.     Rehab Prognosis: Good; patient would benefit from acute OT services to address these deficits and reach maximum level of function.    Plan:     Patient to be seen 4 x/week to address the above listed problems via self-care/home management, therapeutic activities, therapeutic exercises  Plan of Care Expires: 05/25/25  Plan of Care Reviewed with: patient    Subjective     Chief Complaint: Pain in RLE increasing /c movement and standing  Patient Comments/Goals: Return  home  Pain/Comfort:  Pain Rating 1: 0/10 (in supine)  Location - Side 1: Right  Location - Orientation 1: lower  Location 1: leg  Pain Addressed 1: Distraction  Pain Rating Post-Intervention 1: 10/10 (in standing)    Patients cultural, spiritual, Yazidi conflicts given the current situation: no    Social History:  Living Environment: Patient lives with their significant other in a first floor apartment with number of outside stair(s): 0 and walk-in shower  Prior Level of Function: Prior to admission, patient was independent  Roles and Routines: Patient was driving and working as part time employee at [employer]  prior to admission.  Equipment Used at Home:  (Vocab)  [employer] owned (not currently used):  Vocab  Assistance Upon Discharge: significant other    Objective:     Communicated with NSG prior to session. Patient found HOB elevated with wound vac, peripheral IV upon OT entry to room.    General Precautions: Standard, fall   Orthopedic Precautions: N/A   Braces: N/A    Respiratory Status: Room air    Occupational Performance    Gait belt applied - N/A    Bed Mobility:   Supine to sit from right side of bed with stand by assistance    Functional Mobility/Transfers:  Sit <> Stand Transfer with contact guard assistance with rolling walker  Bed <> Chair Transfer using Step Transfer technique with contact guard assistance with rolling walker  Functional Mobility: Pt able to ambulate ~5 feet /c CGA and RW. Decreased leisa 2/2 pain.     Activities of Daily Living:  Upper Body Dressing: set up assistance to don hospital gown around back (like a jacket)  Footwear: Mod A to doff/don socks. Pt req (A) for doffing/donning R sock    Cognitive/Visual Perceptual:  Cognitive/Psychosocial Skills: -     Oriented to: Person, Place, Time, and Situation  -     Follows Commands/attention: Follows multistep  commands  -     Communication: clear/fluent  -     Memory: No Deficits noted  -     Safety awareness/insight to  disability: intact  -     Mood/Affect/Coping skills/emotional control: Appropriate to situation    Physical Exam:  Balance: -     Sitting: stand by assistance  -     Standing: contact guard assistance and RW  Postural examination/scapula alignment: -       No postural abnormalities identified  Motor Planning: Intact  Dominant hand: Right  Upper Extremity Range of Motion:  -       Right Upper Extremity: WFL  -       Left Upper Extremity: WFL  Upper Extremity Strength: -       Right Upper Extremity: 5/5 grossly  -       Left Upper Extremity: 5/5 grossly   Strength: -       Right Upper Extremity: WFL  -       Left Upper Extremity: WFL  Fine Motor Coordination: -       Intact  Gross motor coordination:   WFL    AMPAC 6 Click ADL:  AMPAC Total Score: 19    Treatment & Education:  Therapist provided facilitation and instruction of proper body mechanics, energy conservation, and fall prevention strategies during tasks listed above  Patient educated on role of OT, POC, and goals for therapy  Patient educated on importance of OOB activities with staff member assistance and sitting OOB majority of the day      Patient left up in chair with all lines intact, call button in reach, RN notified, and all needs met .    GOALS:   Multidisciplinary Problems       Occupational Therapy Goals          Problem: Occupational Therapy    Goal Priority Disciplines Outcome Interventions   Occupational Therapy Goal     OT, PT/OT Progressing    Description: Goals to be met by: 5/25/2025     Patient will increase functional independence with ADLs by performing:    UE Dressing with [employer].  LE Dressing with [employer].  Grooming while standing at sink with [employer].  Toileting from toilet with [employer] for hygiene and clothing management.   Toilet transfer to toilet with [employer].                         DME Justifications:   Vics mobility limitation cannot be sufficiently resolved by the use of a cane. His functional  mobility deficit can be sufficiently resolved with the use of a Rolling Walker. Patient's mobility limitation significantly impairs their ability to participate in one of more activities of daily living.  The use of a RW will significantly improve the patient's ability to participate in MRADLS and the patient will use it on regular basis in the home.    History:     Past Medical History:   Diagnosis Date    Alcohol abuse     Anxiety     Cirrhosis     Glaucoma     Hepatocellular carcinoma     History of hepatitis C, s/p successful Rx w/ SVR24 - 1/2017     genotype 1;  relapse following PegIFN+RBV+Victrelis; prior relapse following PegIFN+RBV S/p 12 weeks harvoni + RBV w/ SVR    Hypertension     Paresthesia     feet, bilaterally         Past Surgical History:   Procedure Laterality Date    APPLICATION OF WOUND VACUUM-ASSISTED CLOSURE DEVICE Right 4/21/2025    Procedure: APPLICATION, WOUND VAC;  Surgeon: Estella Ramsay MD;  Location: [facility] OR 2ND FLR;  Service: General;  Laterality: Right;    APPLICATION OF WOUND VACUUM-ASSISTED CLOSURE DEVICE Right 4/24/2025    Procedure: APPLICATION, WOUND VAC;  Surgeon: Estella Ramsay MD;  [facility];  Service: General;  Laterality: Right;    COLONOSCOPY N/A 9/19/2024    Procedure: COLONOSCOPY;  Surgeon: Mo Patino MD;  Location: [facility] ENDO (4TH FLR);  Service: Endoscopy;  Laterality: N/A;  Ref by Dr. NIGHAT Tamez, PT/INR/CBC am procedure, Suprep, email - PC  9/16-lvm for pre call-tb-labs 8/22/24    ESOPHAGOGASTRODUODENOSCOPY N/A 9/19/2024    Procedure: EGD (ESOPHAGOGASTRODUODENOSCOPY);  Surgeon: Mo Patino MD;  Location: [facility] ENDO (4TH FLR);  Service: Endoscopy;  Laterality: N/A;    LIVER BIOPSY      REPLACEMENT OF WOUND VACUUM-ASSISTED CLOSURE DEVICE Right 4/24/2025    Procedure: REPLACEMENT, WOUND VAC;  Surgeon: Estella Ramsay MD;  Location: [facility] OR 2ND FLR;  Service: General;  Laterality: Right;    WOUND DEBRIDEMENT Right  4/21/2025    Procedure: DEBRIDEMENT, WOUND;  Surgeon: Estella Ramsay MD;  Location: [facility] OR [address] [place] [address];  Service: General;  Laterality: Right;       Time Tracking:     OT Date of Treatment: 04/26/25  OT Start Time: 1008  OT Stop Time: 1033  OT Total Time (min): 25 min    Billable Minutes: Evaluation 15 and Self Care/Home Management 10    4/26/2025

## 2025-04-26 NOTE — PLAN OF CARE
Problem: Occupational Therapy  Goal: Occupational Therapy Goal  Description: Goals to be met by: 5/25/2025     Patient will increase functional independence with ADLs by performing:    UE Dressing with [employer].  LE Dressing with [employer].  Grooming while standing at sink with [employer].  Toileting from toilet with [employer] for hygiene and clothing management.   Toilet transfer to toilet with [employer].    Outcome: Progressing     Pt evaluated and goals set based on functional performance.

## 2025-04-26 NOTE — ASSESSMENT & PLAN NOTE
Hyperkalemia is likely due to multiple afctors.The patients most recent potassium results are listed below.  Recent Labs     04/24/25  0512 04/25/25  0457 04/26/25  0430   K 4.8 5.3* 4.9     Plan  - Monitor for arrhythmias with EKG and/or continuous telemetry.   - Treat the hyperkalemia with Potassium Binders.   - Monitor potassium: Daily  -hold ARB for now

## 2025-04-26 NOTE — ASSESSMENT & PLAN NOTE
Patient's blood pressure range in the last 24 hours was: BP  Min: 130/69  Max: 150/69.The patient's inpatient anti-hypertensive regimen is listed below:  Current Antihypertensives  amLODIPine tablet 10 mg, Daily, Oral  hydrALAZINE injection 10 mg, Every 6 hours PRN, Intravenous    Plan  - BP is uncontrolled, will adjust as follows: increase amlodipine to 10mg  - d/c Losartan 100mg - due to hyperkalemia - was restarted 4/22 - may need to use alternative agent  - IV hydralazine PRN

## 2025-04-26 NOTE — PROGRESS NOTES
[facility] - Telemetry [facility]  Progress Note    Patient Name: Froylan Borja  MRN: [id number]  Patient Class: IP- Inpatient   Admission Date: 4/18/2025  Length of Stay: 8 days  Attending Physician: Pamela Jacobs MD  Primary Care Provider: Janki Tamez MD      Subjective     Principal Problem:Acute deep vein thrombosis (DVT) of right lower extremity      HPI:  Mr. Froylan Borja is a 71 y/o male with a PMHx alcohol abuse, cirrhosis, hepatocellular carcinoma, and HTN who presents for complaint of R leg pain and swelling. He reports yesterday he hit his R leg on the car door and shortly developed a bruise and pain to the R lateral leg. He tried tylenol at-home though pain, swelling, and bruising continued to worsen. Admits to paresthesias and numbness to the R foot as well as significant R foot pain with weight-bearing. Denies CP, palpitations, SOB, lightheadedness, dizziness, headaches, LOC, vomiting, diarrhea, abdominal pain, fever, chills. Reports some nausea following morphine and dilaudid for pain in ED. Of note, patient had angiogram y90 mapping study done with IR last Thursday (4/10/25). Has developed some bruising to the medial thigh and mild groin discomfort since procedure. Social history significant for daily alcohol consumption, 3-4 beers daily, last alcoholic beverage yesterday around 3PM.     In the ED, AF HDS. H&H 13.0/38.6 (BL~14.8), no leukocytosis. HypoNa 129, HyperK 5.7, CO2 20, Ca 8.6, Albumin 3.4, remainder of CMP unremarkable. PT/INR wnl at 10.9/1.0. LA 1.6. CPK wnl. Hep C Ab reactive, HCV RNA pending. EKG with normal sinus rhythm. R Lower Ext U/S w/ findings of nonocclusive thrombus in the right common femoral vein and right common femoral artery pseudoaneurysm. CTA Lower Ext pending. R lower extremity hematoma drained at bedside by general surgery with evacuation of 120 cc of hematoma. Compressive dressing applied. Given nebulized albuterol, lokelma, IV cefazolin, IV dilaudid,  IV morphine, IV zofran. Admitted to .     Overview/Hospital Course:  70 y.o M with HTN, alcohol/HepC cirrhosis c/b HCC s/p Y-90 mapping angiogram on 4/10 who presents with bruising and pain at angiogram site and RLE calf pain after he hit it on a car door. RLE U/S showed non-occlusive CFV thrombus and R CFA pseudoaneurysm. CTA re-demonstrated the psdueoaneurysm with active extravasation within the sac as well as a large R calf hematoma which GenSurg were consulted for and successfully drained (120ccs). Vascular Surgery were consulted, recommended IR guided percutaneous thrombin injection which IR were able to complete. Heparin gtt was started. Groin U/S done the following day showed continued thrombosis of the pseudoanurysm. He was taken to the OR on 4/21 for debridement and placement of wound vac. He was taken back to the OR on 4/24 for further debridement and wound vac change. Plan is for takeback to the OR on Monday 4/28 for vac reapplication and plastic surgery evaluation of the wound for possibility of skin graft.     Interval History:   No events overnight. Feeling tired this morning otherwise no complaints.     Objective:     Vital Signs (Most Recent):  Temp: 98.4 °F (36.9 °C) (04/26/25 0813)  Pulse: 73 (04/26/25 0813)  Resp: 16 (04/26/25 0840)  BP: (!) 145/71 (04/26/25 0813)  SpO2: (!) 94 % (04/26/25 0813) Vital Signs (24h Range):  Temp:  [97.5 °F (36.4 °C)-98.5 °F (36.9 °C)] 98.4 °F (36.9 °C)  Pulse:  [] 73  Resp:  [16-18] 16  SpO2:  [92 %-98 %] 94 %  BP: (130-150)/(66-73) 145/71     Weight: 120 kg (264 lb 8.8 oz)  Body mass index is 33.07 kg/m².    Intake/Output Summary (Last 24 hours) at 4/26/2025 0911  Last data filed at 4/26/2025 0507  Gross per 24 hour   Intake 264 ml   Output 1815 ml   Net -1551 ml         Physical Exam  Vitals and nursing note reviewed.   Constitutional:       General: He is not in acute distress.     Appearance: He is well-developed. He is not ill-appearing.   HENT:       Head: Normocephalic.   Cardiovascular:      Rate and Rhythm: Normal rate and regular rhythm.   Pulmonary:      Effort: Pulmonary effort is normal. No respiratory distress.      Breath sounds: No rales.   Abdominal:      General: There is no distension.   Musculoskeletal:      Comments: RLE dressed with wound vac in place   Neurological:      Mental Status: He is alert and oriented to person, place, and time.             Significant Labs: All pertinent labs within the past 24 hours have been reviewed.    Assessment & Plan  Acute deep vein thrombosis (DVT) of right lower extremity  Pain of R lower extremity   - RLE US showed a nonocclusive thrombus in the right common femoral vein   - Started Heparin gtt on 4/18 once cleared from IR standpoint  - Hg has been down-trending and dressings have been saturated with blood.   - If able to tolerate anticoagulation, will transition to oral DOAC when not needing further procedures. If not, will need to discuss IVC filter  - Hep gtt was held since 4/20 for surgery and after per discussion with surgery - discussed again and restarted at low dose. - hold on Sunday MN for OR on 4/28  - MM pain regimen   - Plan for PT assessment after surgery if needed - was ambulating on 4/21 before procedure  - Wound Vac in place  Essential hypertension  Patient's blood pressure range in the last 24 hours was: BP  Min: 130/69  Max: 150/69.The patient's inpatient anti-hypertensive regimen is listed below:  Current Antihypertensives  amLODIPine tablet 10 mg, Daily, Oral  hydrALAZINE injection 10 mg, Every 6 hours PRN, Intravenous    Plan  - BP is uncontrolled, will adjust as follows: increase amlodipine to 10mg  - d/c Losartan 100mg - due to hyperkalemia - was restarted 4/22 - may need to use alternative agent  - IV hydralazine PRN  Alcohol use disorder, severe, dependence  - daily drinker, last drink 4/17  - 3-4 beers daily, denies withdrawal symptoms  - continue thiamine, folic acid and  multivitamin  - CIWA per nursing   Obesity (BMI 30-39.9)  Body mass index is 33.07 kg/m². Morbid obesity complicates all aspects of disease management from diagnostic modalities to treatment. Weight loss encouraged and health benefits explained to patient.   Hyponatremia  Hyponatremia is likely due to Cirrhosis. The patient's most recent sodium results are listed below.  Recent Labs     04/24/25  0512 04/25/25  0457 04/26/25  0430   * 133* 135*     Plan  - Correct the sodium by 4-6mEq in 24 hours.   - Fluid and salt restriction  - Monitor sodium Daily.   - Patient hyponatremia is stable  Hematoma  - New swelling noted to RLE following IR procedure on presentation  - General surgery consulted- hematoma evacuation performed at bedside  - S/p  wound debridement and wound vac placement on 4/21. and 4/24 for wound debridement + vac reapplication  - Heparin gtt plan per DBT section  - WV placed 4/21 and changed on 4/24 - management per surgery  - Plan for OR takeback on Monday 4/28.  Pseudoaneurysm of femoral artery  - RLE US showed a new right common femoral artery pseudoaneurysm   - CTA RLE showed extravasation within the pseudoaneurysm sac  - Vascular Surgery consulted- recommended IR guided thrombin injection. IR consulted, completed procedure on 4/18.   - Groin u/s on 4/19 showed continued thrombosis of the pseudoaneurysm  - Will need repeat U/S outpatient in ~ 1 week and f/u with IR - if still hospitalized may get inpatient early next week and have IR follow up  Hyperkalemia  Hyperkalemia is likely due to  multiple afctors .The patients most recent potassium results are listed below.  Recent Labs     04/24/25  0512 04/25/25 0457 04/26/25  0430   K 4.8 5.3* 4.9     Plan  - Monitor for arrhythmias with EKG and/or continuous telemetry.   - Treat the hyperkalemia with Potassium Binders.   - Monitor potassium: Daily  -hold ARB for now        VTE Risk Mitigation (From admission, onward)           Ordered      heparin 25,000 units in dextrose 5% 250 ml (100 units/mL) infusion MINIMAL INTENSITY nomogram - [facility]  Continuous        Question:  Begin at (units/kg/hr)  Answer:  12    04/25/25 0808     IP VTE HIGH RISK PATIENT  Once         04/18/25 0725     Reason for No Pharmacological VTE Prophylaxis  Once        Question:  Reasons:  Answer:  Risk of Bleeding  Comment:  will start anticoagulation once medically cleared    04/18/25 0725                    Discharge Planning   KENNETH: 4/28/2025     Code Status: Full Code   Medical Readiness for Discharge Date:   Discharge Plan A: Home, Home with family                Please place Justification for DME      Pamela Jacobs MD  Department of Hospital Medicine   [facility] - Telemetry Stepdown

## 2025-04-27 LAB
ABSOLUTE EOSINOPHIL (OHS): 0.28 K/UL
ABSOLUTE MONOCYTE (OHS): 0.89 K/UL (ref 0.3–1)
ABSOLUTE NEUTROPHIL COUNT (OHS): 4.09 K/UL (ref 1.8–7.7)
ALBUMIN SERPL BCP-MCNC: 3 G/DL (ref 3.5–5.2)
ALP SERPL-CCNC: 80 UNIT/L (ref 40–150)
ALT SERPL W/O P-5'-P-CCNC: 30 UNIT/L (ref 10–44)
ANION GAP (OHS): 6 MMOL/L (ref 8–16)
APTT PPP: 21.4 SECONDS (ref 21–32)
APTT PPP: 56 SECONDS (ref 21–32)
AST SERPL-CCNC: 26 UNIT/L (ref 11–45)
BASOPHILS # BLD AUTO: 0.07 K/UL
BASOPHILS NFR BLD AUTO: 1 %
BILIRUB SERPL-MCNC: 0.6 MG/DL (ref 0.1–1)
BUN SERPL-MCNC: 23 MG/DL (ref 8–23)
CALCIUM SERPL-MCNC: 9 MG/DL (ref 8.7–10.5)
CHLORIDE SERPL-SCNC: 101 MMOL/L (ref 95–110)
CO2 SERPL-SCNC: 28 MMOL/L (ref 23–29)
CREAT SERPL-MCNC: 1.1 MG/DL (ref 0.5–1.4)
ERYTHROCYTE [DISTWIDTH] IN BLOOD BY AUTOMATED COUNT: 13.1 % (ref 11.5–14.5)
GFR SERPLBLD CREATININE-BSD FMLA CKD-EPI: >60 ML/MIN/1.73/M2
GLUCOSE SERPL-MCNC: 114 MG/DL (ref 70–110)
HCT VFR BLD AUTO: 32.2 % (ref 40–54)
HGB BLD-MCNC: 10.3 GM/DL (ref 14–18)
IMM GRANULOCYTES # BLD AUTO: 0.14 K/UL (ref 0–0.04)
IMM GRANULOCYTES NFR BLD AUTO: 2.1 % (ref 0–0.5)
LYMPHOCYTES # BLD AUTO: 1.32 K/UL (ref 1–4.8)
MAGNESIUM SERPL-MCNC: 2.1 MG/DL (ref 1.6–2.6)
MCH RBC QN AUTO: 32 PG (ref 27–31)
MCHC RBC AUTO-ENTMCNC: 32 G/DL (ref 32–36)
MCV RBC AUTO: 100 FL (ref 82–98)
NUCLEATED RBC (/100WBC) (OHS): 0 /100 WBC
PHOSPHATE SERPL-MCNC: 3.5 MG/DL (ref 2.7–4.5)
PLATELET # BLD AUTO: 247 K/UL (ref 150–450)
PMV BLD AUTO: 9.2 FL (ref 9.2–12.9)
POTASSIUM SERPL-SCNC: 5 MMOL/L (ref 3.5–5.1)
PROT SERPL-MCNC: 6.5 GM/DL (ref 6–8.4)
RBC # BLD AUTO: 3.22 M/UL (ref 4.6–6.2)
RELATIVE EOSINOPHIL (OHS): 4.1 %
RELATIVE LYMPHOCYTE (OHS): 19.4 % (ref 18–48)
RELATIVE MONOCYTE (OHS): 13.1 % (ref 4–15)
RELATIVE NEUTROPHIL (OHS): 60.3 % (ref 38–73)
SODIUM SERPL-SCNC: 135 MMOL/L (ref 136–145)
WBC # BLD AUTO: 6.79 K/UL (ref 3.9–12.7)

## 2025-04-27 PROCEDURE — 85025 COMPLETE CBC W/AUTO DIFF WBC: CPT | Mod: HCNC | Performed by: STUDENT IN AN ORGANIZED HEALTH CARE EDUCATION/TRAINING PROGRAM

## 2025-04-27 PROCEDURE — 63600175 PHARM REV CODE 636 W HCPCS: Mod: HCNC | Performed by: STUDENT IN AN ORGANIZED HEALTH CARE EDUCATION/TRAINING PROGRAM

## 2025-04-27 PROCEDURE — 25000003 PHARM REV CODE 250: Mod: HCNC | Performed by: STUDENT IN AN ORGANIZED HEALTH CARE EDUCATION/TRAINING PROGRAM

## 2025-04-27 PROCEDURE — 36415 COLL VENOUS BLD VENIPUNCTURE: CPT | Mod: HCNC | Performed by: STUDENT IN AN ORGANIZED HEALTH CARE EDUCATION/TRAINING PROGRAM

## 2025-04-27 PROCEDURE — 85730 THROMBOPLASTIN TIME PARTIAL: CPT | Mod: HCNC | Performed by: STUDENT IN AN ORGANIZED HEALTH CARE EDUCATION/TRAINING PROGRAM

## 2025-04-27 PROCEDURE — 80053 COMPREHEN METABOLIC PANEL: CPT | Mod: HCNC | Performed by: STUDENT IN AN ORGANIZED HEALTH CARE EDUCATION/TRAINING PROGRAM

## 2025-04-27 PROCEDURE — 20600001 HC STEP DOWN PRIVATE ROOM: Mod: HCNC

## 2025-04-27 PROCEDURE — 83735 ASSAY OF MAGNESIUM: CPT | Mod: HCNC | Performed by: STUDENT IN AN ORGANIZED HEALTH CARE EDUCATION/TRAINING PROGRAM

## 2025-04-27 PROCEDURE — 25000003 PHARM REV CODE 250: Mod: HCNC

## 2025-04-27 PROCEDURE — 84100 ASSAY OF PHOSPHORUS: CPT | Mod: HCNC | Performed by: STUDENT IN AN ORGANIZED HEALTH CARE EDUCATION/TRAINING PROGRAM

## 2025-04-27 RX ADMIN — GABAPENTIN 300 MG: 300 CAPSULE ORAL at 08:04

## 2025-04-27 RX ADMIN — BUPROPION HYDROCHLORIDE 150 MG: 75 TABLET, FILM COATED ORAL at 08:04

## 2025-04-27 RX ADMIN — OXYCODONE HYDROCHLORIDE 10 MG: 10 TABLET ORAL at 11:04

## 2025-04-27 RX ADMIN — POLYETHYLENE GLYCOL 3350 17 G: 17 POWDER, FOR SOLUTION ORAL at 08:04

## 2025-04-27 RX ADMIN — GABAPENTIN 300 MG: 300 CAPSULE ORAL at 09:04

## 2025-04-27 RX ADMIN — BUPROPION HYDROCHLORIDE 150 MG: 75 TABLET, FILM COATED ORAL at 09:04

## 2025-04-27 RX ADMIN — OXYCODONE HYDROCHLORIDE 10 MG: 10 TABLET ORAL at 08:04

## 2025-04-27 RX ADMIN — HEPARIN SODIUM 12 UNITS/KG/HR: 10000 INJECTION, SOLUTION INTRAVENOUS at 09:04

## 2025-04-27 RX ADMIN — MELATONIN TAB 3 MG 6 MG: 3 TAB at 09:04

## 2025-04-27 RX ADMIN — Medication 1 TABLET: at 08:04

## 2025-04-27 RX ADMIN — FOLIC ACID 1 MG: 1 TABLET ORAL at 08:04

## 2025-04-27 RX ADMIN — SENNOSIDES 8.6 MG: 8.6 TABLET, FILM COATED ORAL at 09:04

## 2025-04-27 RX ADMIN — AMLODIPINE BESYLATE 10 MG: 10 TABLET ORAL at 08:04

## 2025-04-27 RX ADMIN — OXYCODONE HYDROCHLORIDE 10 MG: 10 TABLET ORAL at 04:04

## 2025-04-27 RX ADMIN — Medication 100 MG: at 08:04

## 2025-04-27 RX ADMIN — HEPARIN SODIUM 10 UNITS/KG/HR: 10000 INJECTION, SOLUTION INTRAVENOUS at 07:04

## 2025-04-27 RX ADMIN — SENNOSIDES 8.6 MG: 8.6 TABLET, FILM COATED ORAL at 08:04

## 2025-04-27 RX ADMIN — HEPARIN SODIUM 12 UNITS/KG/HR: 10000 INJECTION, SOLUTION INTRAVENOUS at 01:04

## 2025-04-27 NOTE — ASSESSMENT & PLAN NOTE
Patient's blood pressure range in the last 24 hours was: BP  Min: 135/70  Max: 163/70.The patient's inpatient anti-hypertensive regimen is listed below:  Current Antihypertensives  amLODIPine tablet 10 mg, Daily, Oral    Plan  - BP is uncontrolled, will adjust as follows: increase amlodipine to 10mg/ possible transition to nifedipine if needed  - d/c Losartan 100mg - due to hyperkalemia during hospital stay

## 2025-04-27 NOTE — ASSESSMENT & PLAN NOTE
Hyponatremia is likely due to Cirrhosis. The patient's most recent sodium results are listed below.  Recent Labs     04/25/25  0457 04/26/25  0430 04/27/25  0700   * 135* 135*     Plan  - Correct the sodium by 4-6mEq in 24 hours.   - Fluid and salt restriction  - Monitor sodium Daily.   - Patient hyponatremia is stable

## 2025-04-27 NOTE — ASSESSMENT & PLAN NOTE
Froylan Borja is a 70 y.o. gentleman with HCC undergoing Y90 therapy with a RLE hematoma, R fem pseudoaneurysm, and a R sided DVT.    - Plan for takeback to OR tomorrow for  vac reapplication,   - will have plastics evaluate wound at that time for possibility of skin graft  - NPO at midnight  - hold heparin gtt at 3am 4/28  -  Rest of care per primary team  - OOB/ambulate

## 2025-04-27 NOTE — ASSESSMENT & PLAN NOTE
Pain of R lower extremity   - RLE US showed a nonocclusive thrombus in the right common femoral vein   - Started Heparin gtt on 4/18 once cleared from IR standpoint  - Hg has been down-trending and dressings have been saturated with blood. - Hgb stabilized   - If able to tolerate anticoagulation, will transition to oral DOAC when not needing further procedures. If not, will need to discuss IVC filter  - Hep gtt was held since 4/20 for surgery and after per discussion with surgery - discussed again and restarted at low dose  - Wound Vac in place  - General Surgery consulted  - Plan for takeback to OR tomorrow for  vac reapplication,   - will have plastics evaluate wound at that time for possibility of skin graft  - NPO at midnight  - hold heparin gtt at 3am 4/28  - MM pain regimen   - Plan for PT assessment after surgery if needed - has been ambulating to the bathroom

## 2025-04-27 NOTE — PROGRESS NOTES
[facility] - Telemetry [facility] Medicine  Progress Note    Patient Name: Froylan Borja  MRN: [id number]  Patient Class: IP- Inpatient   Admission Date: 4/18/2025  Length of Stay: 9 days  Attending Physician: Jose G Avila MD  Primary Care Provider: Janki Tamez MD        Subjective     Principal Problem:Acute deep vein thrombosis (DVT) of right lower extremity        HPI:  Mr. Froylan Borja is a 69 y/o male with a PMHx alcohol abuse, cirrhosis, hepatocellular carcinoma, and HTN who presents for complaint of R leg pain and swelling. He reports yesterday he hit his R leg on the car door and shortly developed a bruise and pain to the R lateral leg. He tried tylenol at-home though pain, swelling, and bruising continued to worsen. Admits to paresthesias and numbness to the R foot as well as significant R foot pain with weight-bearing. Denies CP, palpitations, SOB, lightheadedness, dizziness, headaches, LOC, vomiting, diarrhea, abdominal pain, fever, chills. Reports some nausea following morphine and dilaudid for pain in ED. Of note, patient had angiogram y90 mapping study done with IR last Thursday (4/10/25). Has developed some bruising to the medial thigh and mild groin discomfort since procedure. Social history significant for daily alcohol consumption, 3-4 beers daily, last alcoholic beverage yesterday around 3PM.     In the ED, AF HDS. H&H 13.0/38.6 (BL~14.8), no leukocytosis. HypoNa 129, HyperK 5.7, CO2 20, Ca 8.6, Albumin 3.4, remainder of CMP unremarkable. PT/INR wnl at 10.9/1.0. LA 1.6. CPK wnl. Hep C Ab reactive, HCV RNA pending. EKG with normal sinus rhythm. R Lower Ext U/S w/ findings of nonocclusive thrombus in the right common femoral vein and right common femoral artery pseudoaneurysm. CTA Lower Ext pending. R lower extremity hematoma drained at bedside by general surgery with evacuation of 120 cc of hematoma. Compressive dressing applied. Given nebulized albuterol, lokelma, IV cefazolin, IV  dilaudid, IV morphine, IV zofran. Admitted to .     Overview/Hospital Course:  70 y.o M with HTN, alcohol/HepC cirrhosis c/b HCC s/p Y-90 mapping angiogram on 4/10 who presents with bruising and pain at angiogram site and RLE calf pain after he hit it on a car door. RLE U/S showed non-occlusive CFV thrombus and R CFA pseudoaneurysm. CTA re-demonstrated the psdueoaneurysm with active extravasation within the sac as well as a large R calf hematoma which GenSurg were consulted for and successfully drained (120ccs). Vascular Surgery were consulted, recommended IR guided percutaneous thrombin injection which IR were able to complete. Heparin gtt was started. Groin U/S done the following day showed continued thrombosis of the pseudoanurysm. He was taken to the OR on 4/21 for debridement and placement of wound vac. He was taken back to the OR on 4/24 for further debridement and wound vac change. Plan is for takeback to the OR on Monday 4/28 for vac reapplication and plastic surgery evaluation of the wound for possibility of skin graft.     Interval History: NAEON. AF, VSS. Patient reports no complaints today. Denies worsening pain, numbness or tingling. Has worked with PT/OT and been out of bed. Plan for OR with General Surgery tomorrow for wound vac reapplication.     Review of Systems   Constitutional:  Negative for chills and fever.   Respiratory:  Negative for chest tightness and shortness of breath.    Cardiovascular:  Negative for chest pain and leg swelling.   Gastrointestinal:  Negative for abdominal pain and nausea.   Genitourinary: Negative.    Musculoskeletal:  Positive for myalgias.   Skin:  Positive for color change and wound.   Neurological:  Negative for dizziness and weakness.     Objective:     Vital Signs (Most Recent):  Temp: 97.9 °F (36.6 °C) (04/27/25 0830)  Pulse: 71 (04/27/25 0830)  Resp: 16 (04/27/25 0854)  BP: (!) 161/78 (04/27/25 0830)  SpO2: 99 % (04/27/25 0830) Vital Signs (24h Range):  Temp:   [97.9 °F (36.6 °C)-98.7 °F (37.1 °C)] 97.9 °F (36.6 °C)  Pulse:  [71-86] 71  Resp:  [16-19] 16  SpO2:  [95 %-99 %] 99 %  BP: (135-163)/(65-78) 161/78     Weight: 120 kg (264 lb 8.8 oz)  Body mass index is 33.07 kg/m².    Intake/Output Summary (Last 24 hours) at 4/27/2025 0951  Last data filed at 4/27/2025 0600  Gross per 24 hour   Intake 120 ml   Output 1075 ml   Net -955 ml         Physical Exam  Vitals and nursing note reviewed.   Constitutional:       General: He is not in acute distress.     Appearance: He is well-developed. He is not ill-appearing.   HENT:      Head: Normocephalic and atraumatic.   Eyes:      Pupils: Pupils are equal, round, and reactive to light.   Cardiovascular:      Rate and Rhythm: Normal rate and regular rhythm.   Pulmonary:      Effort: Pulmonary effort is normal.      Breath sounds: Normal breath sounds.   Abdominal:      Palpations: Abdomen is soft.      Tenderness: There is no abdominal tenderness.   Musculoskeletal:      Comments: RLE dressed with wound vac in place  Sensation intact   Skin:     General: Skin is warm and dry.   Neurological:      Mental Status: He is alert and oriented to person, place, and time.   Psychiatric:         Behavior: Behavior normal.               Significant Labs: All pertinent labs within the past 24 hours have been reviewed.    Significant Imaging: I have reviewed all pertinent imaging results/findings within the past 24 hours.      Assessment & Plan  Acute deep vein thrombosis (DVT) of right lower extremity  Pain of R lower extremity   - RLE US showed a nonocclusive thrombus in the right common femoral vein   - Started Heparin gtt on 4/18 once cleared from IR standpoint  - Hg has been down-trending and dressings have been saturated with blood. - Hgb stabilized   - If able to tolerate anticoagulation, will transition to oral DOAC when not needing further procedures. If not, will need to discuss IVC filter  - Hep gtt was held since 4/20 for surgery and  after per discussion with surgery - discussed again and restarted at low dose  - Wound Vac in place  - General Surgery consulted  - Plan for takeback to OR tomorrow for  vac reapplication,   - will have plastics evaluate wound at that time for possibility of skin graft  - NPO at midnight  - hold heparin gtt at 3am 4/28  - MM pain regimen   - Plan for PT assessment after surgery if needed - has been ambulating to the bathroom   Essential hypertension  Patient's blood pressure range in the last 24 hours was: BP  Min: 135/70  Max: 163/70.The patient's inpatient anti-hypertensive regimen is listed below:  Current Antihypertensives  amLODIPine tablet 10 mg, Daily, Oral    Plan  - BP is uncontrolled, will adjust as follows: increase amlodipine to 10mg / possible transition to nifedipine if needed  - d/c Losartan 100mg - due to hyperkalemia during hospital stay  Alcohol use disorder, severe, dependence  - daily drinker, last drink 4/17  - 3-4 beers daily, denies withdrawal symptoms  - continue thiamine, folic acid and multivitamin  - CIWA per nursing   Obesity (BMI 30-39.9)  Body mass index is 33.07 kg/m². Morbid obesity complicates all aspects of disease management from diagnostic modalities to treatment. Weight loss encouraged and health benefits explained to patient.   Hyponatremia  Hyponatremia is likely due to Cirrhosis. The patient's most recent sodium results are listed below.  Recent Labs     04/25/25  0457 04/26/25  0430 04/27/25  0700   * 135* 135*     Plan  - Correct the sodium by 4-6mEq in 24 hours.   - Fluid and salt restriction  - Monitor sodium Daily.   - Patient hyponatremia is stable  Hematoma  - New swelling noted to RLE following IR procedure on presentation  - General surgery consulted- hematoma evacuation performed at bedside  - S/p  wound debridement and wound vac placement on 4/21. and 4/24 for wound debridement + vac reapplication  - Heparin gtt plan per DBT section  - WV placed 4/21 and  changed on 4/24 - management per surgery  - Plan for OR takeback on Monday 4/28.  Pseudoaneurysm of femoral artery  - RLE US showed a new right common femoral artery pseudoaneurysm   - CTA RLE showed extravasation within the pseudoaneurysm sac  - Vascular Surgery consulted- recommended IR guided thrombin injection. IR consulted, completed procedure on 4/18.   - Groin u/s on 4/19 showed continued thrombosis of the pseudoaneurysm  - Will need repeat U/S outpatient in ~ 1 week and f/u with IR - if still hospitalized may get inpatient early next week and have IR follow up  Hyperkalemia  Hyperkalemia is likely due to  multiple afctors .The patients most recent potassium results are listed below.  Recent Labs     04/25/25  0457 04/26/25  0430 04/27/25  0700   K 5.3* 4.9 5.0     Plan  - Monitor for arrhythmias with EKG and/or continuous telemetry.   - Treat the hyperkalemia with Potassium Binders.   - Monitor potassium: Daily  - hold ARB     VTE Risk Mitigation (From admission, onward)           Ordered     heparin 25,000 units in dextrose 5% 250 ml (100 units/mL) infusion MINIMAL INTENSITY nomogram - [facility]  Continuous        Question:  Begin at (units/kg/hr)  Answer:  12    04/25/25 0808     IP VTE HIGH RISK PATIENT  Once         04/18/25 0725     Reason for No Pharmacological VTE Prophylaxis  Once        Question:  Reasons:  Answer:  Risk of Bleeding  Comment:  will start anticoagulation once medically cleared    04/18/25 0725                    Discharge Planning   KENNETH: 4/30/2025     Code Status: Full Code   Medical Readiness for Discharge Date:   Discharge Plan A: Home, Home with family              Brittney Trujillo PA-C  Department of Hospital Medicine   [facility] - Telemetry Stepdown

## 2025-04-27 NOTE — PROGRESS NOTES
[facility] - Telemetry Stepdown  General Surgery  Progress Note    Subjective:     History of Present Illness:  Froylan Borja is a 70 y.o. gentleman with PMH of alcohol abuse, cirrhosis, hepatocellular carcinoma undergoing Y90 therapy, and HTN who presents to the ED with R leg pain and swelling. He had Y90 through his R femoral artery 8 days ago. He has had some bruising and pain of his groin. He also bumped his calf on his car and has increasing swelling and discoloration. He did not notice until today, but he also has pitting edema to his right leg, much more than his left side. In the ED, he is hemodynamically normal though hypertensive. Labs significant for Na 129, K 5.7. US of his lower extremities show a R sided nonocclusive DVT and a R femo pseudoaneurysm. CT was also performed which demonstrated his hematoma over his R calf and extravasation of contrast into the pseudoaneusym. General surgery consulted for evaluation of drainage of his RLE hematoma.           Post-Op Info:  Procedure(s) (LRB):  APPLICATION, WOUND VAC (Right)  REPLACEMENT, WOUND VAC (Right)   3 Days Post-Op     Interval History: Patient seen and examined. Planning to proceed to OR tomorrow for wound vac change.     Medications:  Continuous Infusions:   heparin (porcine) in D5W  0-40 Units/kg/hr Intravenous Continuous 14.4 mL/hr at 04/27/25 0103 12 Units/kg/hr at 04/27/25 0103     Scheduled Meds:   amLODIPine  10 mg Oral Daily    buPROPion  150 mg Oral BID    folic acid  1 mg Oral Daily    gabapentin  300 mg Oral BID    melatonin  6 mg Oral Nightly    multivitamin  1 tablet Oral Daily    polyethylene glycol  17 g Oral BID    senna  8.6 mg Oral BID    thiamine  100 mg Oral Daily     PRN Meds:  Current Facility-Administered Medications:     acetaminophen, 1,000 mg, Oral, Q8H PRN    albuterol-ipratropium, 3 mL, Nebulization, Q4H PRN    bisacodyL, 10 mg, Rectal, Daily PRN    dextrose 50%, 12.5 g, Intravenous, PRN    dextrose 50%, 25 g, Intravenous,  PRN    glucagon (human recombinant), 1 mg, Intramuscular, PRN    glucose, 16 g, Oral, PRN    glucose, 24 g, Oral, PRN    hydrALAZINE, 10 mg, Intravenous, Q6H PRN    HYDROmorphone, 1 mg, Intravenous, Q6H PRN    hydrOXYzine pamoate, 25 mg, Oral, Q8H PRN    LORazepam, 2 mg, Oral, Q4H PRN    ondansetron, 8 mg, Oral, Q8H PRN    oxyCODONE, 5 mg, Oral, Q4H PRN    oxyCODONE, 10 mg, Oral, Q6H PRN    promethazine, 25 mg, Oral, Q6H PRN    sodium chloride 0.9%, 10 mL, Intravenous, PRN     Review of patient's allergies indicates:   Allergen Reactions    Zoloft [sertraline] Other (See Comments)     hyponatremia    Codeine Itching     Intolerance- doesn't like the way it makes him feel     Objective:     Vital Signs (Most Recent):  Temp: 97.9 °F (36.6 °C) (04/27/25 0830)  Pulse: 71 (04/27/25 0830)  Resp: 16 (04/27/25 0854)  BP: (!) 161/78 (04/27/25 0830)  SpO2: 99 % (04/27/25 0830) Vital Signs (24h Range):  Temp:  [97.9 °F (36.6 °C)-98.7 °F (37.1 °C)] 97.9 °F (36.6 °C)  Pulse:  [71-86] 71  Resp:  [16-19] 16  SpO2:  [95 %-99 %] 99 %  BP: (135-163)/(65-78) 161/78     Weight: 120 kg (264 lb 8.8 oz)  Body mass index is 33.07 kg/m².    Intake/Output - Last 3 Shifts         04/25 0700 04/26 0659 04/26 0700 04/27 0659 04/27 0700 04/28 0659    P.O. 120 120     I.V. (mL/kg) 144 (1.2)      IV Piggyback       Total Intake(mL/kg) 264 (2.2) 120 (1)     Urine (mL/kg/hr) 1975 (0.7) 1575 (0.5)     Other 40      Stool 0 0     Total Output 2015 1575     Net -1751 -5916            Urine Occurrence  2 x     Stool Occurrence 1 x 2 x              Physical Exam  Constitutional:       General: He is not in acute distress.     Appearance: Normal appearance.   HENT:      Head: Normocephalic and atraumatic.   Cardiovascular:      Rate and Rhythm: Normal rate and regular rhythm.   Pulmonary:      Effort: Pulmonary effort is normal. No respiratory distress.   Abdominal:      General: Abdomen is flat. There is no distension.      Palpations: Abdomen is  soft.      Tenderness: There is no abdominal tenderness.   Musculoskeletal:      Comments: R calf w wound vac in place to suction, wrapped in ACE   Neurological:      General: No focal deficit present.      Mental Status: He is alert and oriented to person, place, and time.   Psychiatric:         Behavior: Behavior normal.         Thought Content: Thought content normal.          Significant Labs:  I have reviewed all pertinent lab results within the past 24 hours.  CBC:   Recent Labs   Lab 04/27/25  0700   WBC 6.79   RBC 3.22*   HGB 10.3*   HCT 32.2*      *   MCH 32.0*   MCHC 32.0     CMP:   Recent Labs   Lab 04/27/25  0700   CALCIUM 9.0   ALBUMIN 3.0*   *   K 5.0   CO2 28      BUN 23   CREATININE 1.1   ALKPHOS 80   ALT 30   AST 26   BILITOT 0.6       Significant Diagnostics:  I have reviewed all pertinent imaging results/findings within the past 24 hours.  Assessment/Plan:     Sejal Borja is a 70 y.o. gentleman with HCC undergoing Y90 therapy with a RLE hematoma, R fem pseudoaneurysm, and a R sided DVT.    - Plan for takeback to OR tomorrow for  vac reapplication,   - will have plastics evaluate wound at that time for possibility of skin graft  - NPO at midnight  - hold heparin gtt at 3am 4/28  -  Rest of care per primary team  - OOB/ambulate         Hernan Joy MD  General Surgery  [facility] - Telemetry Stepdown

## 2025-04-27 NOTE — ASSESSMENT & PLAN NOTE
Hyperkalemia is likely due to multiple afctors.The patients most recent potassium results are listed below.  Recent Labs     04/25/25  0457 04/26/25  0430 04/27/25  0700   K 5.3* 4.9 5.0     Plan  - Monitor for arrhythmias with EKG and/or continuous telemetry.   - Treat the hyperkalemia with Potassium Binders.   - Monitor potassium: Daily  - hold ARB

## 2025-04-27 NOTE — SUBJECTIVE & OBJECTIVE
Interval History: NAEON. AF, VSS. Patient reports no complaints today. Denies worsening pain, numbness or tingling. Has worked with PT/OT and been out of bed. Plan for OR with General Surgery tomorrow for wound vac reapplication.     Review of Systems   Constitutional:  Negative for chills and fever.   Respiratory:  Negative for chest tightness and shortness of breath.    Cardiovascular:  Negative for chest pain and leg swelling.   Gastrointestinal:  Negative for abdominal pain and nausea.   Genitourinary: Negative.    Musculoskeletal:  Positive for myalgias.   Skin:  Positive for color change and wound.   Neurological:  Negative for dizziness and weakness.     Objective:     Vital Signs (Most Recent):  Temp: 97.9 °F (36.6 °C) (04/27/25 0830)  Pulse: 71 (04/27/25 0830)  Resp: 16 (04/27/25 0854)  BP: (!) 161/78 (04/27/25 0830)  SpO2: 99 % (04/27/25 0830) Vital Signs (24h Range):  Temp:  [97.9 °F (36.6 °C)-98.7 °F (37.1 °C)] 97.9 °F (36.6 °C)  Pulse:  [71-86] 71  Resp:  [16-19] 16  SpO2:  [95 %-99 %] 99 %  BP: (135-163)/(65-78) 161/78     Weight: 120 kg (264 lb 8.8 oz)  Body mass index is 33.07 kg/m².    Intake/Output Summary (Last 24 hours) at 4/27/2025 0951  Last data filed at 4/27/2025 0600  Gross per 24 hour   Intake 120 ml   Output 1075 ml   Net -955 ml         Physical Exam  Vitals and nursing note reviewed.   Constitutional:       General: He is not in acute distress.     Appearance: He is well-developed. He is not ill-appearing.   HENT:      Head: Normocephalic and atraumatic.   Eyes:      Pupils: Pupils are equal, round, and reactive to light.   Cardiovascular:      Rate and Rhythm: Normal rate and regular rhythm.   Pulmonary:      Effort: Pulmonary effort is normal.      Breath sounds: Normal breath sounds.   Abdominal:      Palpations: Abdomen is soft.      Tenderness: There is no abdominal tenderness.   Musculoskeletal:      Comments: RLE dressed with wound vac in place  Sensation intact   Skin:      General: Skin is warm and dry.   Neurological:      Mental Status: He is alert and oriented to person, place, and time.   Psychiatric:         Behavior: Behavior normal.               Significant Labs: All pertinent labs within the past 24 hours have been reviewed.    Significant Imaging: I have reviewed all pertinent imaging results/findings within the past 24 hours.

## 2025-04-27 NOTE — SUBJECTIVE & OBJECTIVE
Interval History: Patient seen and examined. Planning to proceed to OR tomorrow for wound vac change.     Medications:  Continuous Infusions:   heparin (porcine) in D5W  0-40 Units/kg/hr Intravenous Continuous 14.4 mL/hr at 04/27/25 0103 12 Units/kg/hr at 04/27/25 0103     Scheduled Meds:   amLODIPine  10 mg Oral Daily    buPROPion  150 mg Oral BID    folic acid  1 mg Oral Daily    gabapentin  300 mg Oral BID    melatonin  6 mg Oral Nightly    multivitamin  1 tablet Oral Daily    polyethylene glycol  17 g Oral BID    senna  8.6 mg Oral BID    thiamine  100 mg Oral Daily     PRN Meds:  Current Facility-Administered Medications:     acetaminophen, 1,000 mg, Oral, Q8H PRN    albuterol-ipratropium, 3 mL, Nebulization, Q4H PRN    bisacodyL, 10 mg, Rectal, Daily PRN    dextrose 50%, 12.5 g, Intravenous, PRN    dextrose 50%, 25 g, Intravenous, PRN    glucagon (human recombinant), 1 mg, Intramuscular, PRN    glucose, 16 g, Oral, PRN    glucose, 24 g, Oral, PRN    hydrALAZINE, 10 mg, Intravenous, Q6H PRN    HYDROmorphone, 1 mg, Intravenous, Q6H PRN    hydrOXYzine pamoate, 25 mg, Oral, Q8H PRN    LORazepam, 2 mg, Oral, Q4H PRN    ondansetron, 8 mg, Oral, Q8H PRN    oxyCODONE, 5 mg, Oral, Q4H PRN    oxyCODONE, 10 mg, Oral, Q6H PRN    promethazine, 25 mg, Oral, Q6H PRN    sodium chloride 0.9%, 10 mL, Intravenous, PRN     Review of patient's allergies indicates:   Allergen Reactions    Zoloft [sertraline] Other (See Comments)     hyponatremia    Codeine Itching     Intolerance- doesn't like the way it makes him feel     Objective:     Vital Signs (Most Recent):  Temp: 97.9 °F (36.6 °C) (04/27/25 0830)  Pulse: 71 (04/27/25 0830)  Resp: 16 (04/27/25 0854)  BP: (!) 161/78 (04/27/25 0830)  SpO2: 99 % (04/27/25 0830) Vital Signs (24h Range):  Temp:  [97.9 °F (36.6 °C)-98.7 °F (37.1 °C)] 97.9 °F (36.6 °C)  Pulse:  [71-86] 71  Resp:  [16-19] 16  SpO2:  [95 %-99 %] 99 %  BP: (135-163)/(65-78) 161/78     Weight: 120 kg (264 lb 8.8  oz)  Body mass index is 33.07 kg/m².    Intake/Output - Last 3 Shifts         04/25 0700  04/26 0659 04/26 0700 04/27 0659 04/27 0700  04/28 0659    P.O. 120 120     I.V. (mL/kg) 144 (1.2)      IV Piggyback       Total Intake(mL/kg) 264 (2.2) 120 (1)     Urine (mL/kg/hr) 1975 (0.7) 1575 (0.5)     Other 40      Stool 0 0     Total Output 2015 1575     Net -1751 -1455            Urine Occurrence  2 x     Stool Occurrence 1 x 2 x              Physical Exam  Constitutional:       General: He is not in acute distress.     Appearance: Normal appearance.   HENT:      Head: Normocephalic and atraumatic.   Cardiovascular:      Rate and Rhythm: Normal rate and regular rhythm.   Pulmonary:      Effort: Pulmonary effort is normal. No respiratory distress.   Abdominal:      General: Abdomen is flat. There is no distension.      Palpations: Abdomen is soft.      Tenderness: There is no abdominal tenderness.   Musculoskeletal:      Comments: R calf w wound vac in place to suction, wrapped in ACE   Neurological:      General: No focal deficit present.      Mental Status: He is alert and oriented to person, place, and time.   Psychiatric:         Behavior: Behavior normal.         Thought Content: Thought content normal.          Significant Labs:  I have reviewed all pertinent lab results within the past 24 hours.  CBC:   Recent Labs   Lab 04/27/25  0700   WBC 6.79   RBC 3.22*   HGB 10.3*   HCT 32.2*      *   MCH 32.0*   MCHC 32.0     CMP:   Recent Labs   Lab 04/27/25  0700   CALCIUM 9.0   ALBUMIN 3.0*   *   K 5.0   CO2 28      BUN 23   CREATININE 1.1   ALKPHOS 80   ALT 30   AST 26   BILITOT 0.6       Significant Diagnostics:  I have reviewed all pertinent imaging results/findings within the past 24 hours.

## 2025-04-28 ENCOUNTER — ANESTHESIA (OUTPATIENT)
Dept: SURGERY | Facility: HOSPITAL | Age: 70
End: 2025-04-28
Payer: MEDICARE

## 2025-04-28 PROBLEM — S81.801A LEG WOUND, RIGHT: Status: ACTIVE | Noted: 2025-04-28

## 2025-04-28 LAB
ABSOLUTE EOSINOPHIL (OHS): 0.27 K/UL
ABSOLUTE MONOCYTE (OHS): 0.77 K/UL (ref 0.3–1)
ABSOLUTE NEUTROPHIL COUNT (OHS): 3.81 K/UL (ref 1.8–7.7)
ALBUMIN SERPL BCP-MCNC: 2.9 G/DL (ref 3.5–5.2)
ALP SERPL-CCNC: 74 UNIT/L (ref 40–150)
ALT SERPL W/O P-5'-P-CCNC: 25 UNIT/L (ref 10–44)
ANION GAP (OHS): 6 MMOL/L (ref 8–16)
APTT PPP: 25.7 SECONDS (ref 21–32)
APTT PPP: 26.4 SECONDS (ref 21–32)
APTT PPP: 36.1 SECONDS (ref 21–32)
AST SERPL-CCNC: 22 UNIT/L (ref 11–45)
BASOPHILS # BLD AUTO: 0.09 K/UL
BASOPHILS NFR BLD AUTO: 1.4 %
BILIRUB SERPL-MCNC: 0.5 MG/DL (ref 0.1–1)
BUN SERPL-MCNC: 17 MG/DL (ref 8–23)
CALCIUM SERPL-MCNC: 9 MG/DL (ref 8.7–10.5)
CHLORIDE SERPL-SCNC: 101 MMOL/L (ref 95–110)
CO2 SERPL-SCNC: 26 MMOL/L (ref 23–29)
CREAT SERPL-MCNC: 1 MG/DL (ref 0.5–1.4)
ERYTHROCYTE [DISTWIDTH] IN BLOOD BY AUTOMATED COUNT: 13.1 % (ref 11.5–14.5)
GFR SERPLBLD CREATININE-BSD FMLA CKD-EPI: >60 ML/MIN/1.73/M2
GLUCOSE SERPL-MCNC: 111 MG/DL (ref 70–110)
HCT VFR BLD AUTO: 33.7 % (ref 40–54)
HGB BLD-MCNC: 10.9 GM/DL (ref 14–18)
IMM GRANULOCYTES # BLD AUTO: 0.17 K/UL (ref 0–0.04)
IMM GRANULOCYTES NFR BLD AUTO: 2.6 % (ref 0–0.5)
LYMPHOCYTES # BLD AUTO: 1.32 K/UL (ref 1–4.8)
MAGNESIUM SERPL-MCNC: 2 MG/DL (ref 1.6–2.6)
MCH RBC QN AUTO: 32 PG (ref 27–31)
MCHC RBC AUTO-ENTMCNC: 32.3 G/DL (ref 32–36)
MCV RBC AUTO: 99 FL (ref 82–98)
NUCLEATED RBC (/100WBC) (OHS): 0 /100 WBC
PHOSPHATE SERPL-MCNC: 3.1 MG/DL (ref 2.7–4.5)
PLATELET # BLD AUTO: 274 K/UL (ref 150–450)
PMV BLD AUTO: 9.6 FL (ref 9.2–12.9)
POTASSIUM SERPL-SCNC: 4.4 MMOL/L (ref 3.5–5.1)
PROT SERPL-MCNC: 6.4 GM/DL (ref 6–8.4)
RBC # BLD AUTO: 3.41 M/UL (ref 4.6–6.2)
RELATIVE EOSINOPHIL (OHS): 4.2 %
RELATIVE LYMPHOCYTE (OHS): 20.5 % (ref 18–48)
RELATIVE MONOCYTE (OHS): 12 % (ref 4–15)
RELATIVE NEUTROPHIL (OHS): 59.3 % (ref 38–73)
SODIUM SERPL-SCNC: 133 MMOL/L (ref 136–145)
WBC # BLD AUTO: 6.43 K/UL (ref 3.9–12.7)

## 2025-04-28 PROCEDURE — 20600001 HC STEP DOWN PRIVATE ROOM: Mod: HCNC

## 2025-04-28 PROCEDURE — 37000009 HC ANESTHESIA EA ADD 15 MINS: Mod: HCNC | Performed by: SURGERY

## 2025-04-28 PROCEDURE — 85025 COMPLETE CBC W/AUTO DIFF WBC: CPT | Mod: HCNC | Performed by: STUDENT IN AN ORGANIZED HEALTH CARE EDUCATION/TRAINING PROGRAM

## 2025-04-28 PROCEDURE — 36415 COLL VENOUS BLD VENIPUNCTURE: CPT | Mod: HCNC | Performed by: HOSPITALIST

## 2025-04-28 PROCEDURE — 97606 NEG PRS WND THER DME>50 SQCM: CPT | Mod: HCNC,,, | Performed by: SURGERY

## 2025-04-28 PROCEDURE — 85730 THROMBOPLASTIN TIME PARTIAL: CPT | Mod: HCNC | Performed by: HOSPITALIST

## 2025-04-28 PROCEDURE — 99222 1ST HOSP IP/OBS MODERATE 55: CPT | Mod: HCNC,GC,ICN, | Performed by: SURGERY

## 2025-04-28 PROCEDURE — 25000003 PHARM REV CODE 250: Mod: HCNC | Performed by: HOSPITALIST

## 2025-04-28 PROCEDURE — 25000003 PHARM REV CODE 250: Mod: HCNC | Performed by: STUDENT IN AN ORGANIZED HEALTH CARE EDUCATION/TRAINING PROGRAM

## 2025-04-28 PROCEDURE — 63600175 PHARM REV CODE 636 W HCPCS: Mod: HCNC | Performed by: HOSPITALIST

## 2025-04-28 PROCEDURE — 85730 THROMBOPLASTIN TIME PARTIAL: CPT | Mod: HCNC | Performed by: STUDENT IN AN ORGANIZED HEALTH CARE EDUCATION/TRAINING PROGRAM

## 2025-04-28 PROCEDURE — 25000003 PHARM REV CODE 250: Mod: HCNC

## 2025-04-28 PROCEDURE — 36415 COLL VENOUS BLD VENIPUNCTURE: CPT | Mod: HCNC | Performed by: STUDENT IN AN ORGANIZED HEALTH CARE EDUCATION/TRAINING PROGRAM

## 2025-04-28 PROCEDURE — 94761 N-INVAS EAR/PLS OXIMETRY MLT: CPT | Mod: HCNC

## 2025-04-28 PROCEDURE — 71000015 HC POSTOP RECOV 1ST HR: Mod: HCNC | Performed by: SURGERY

## 2025-04-28 PROCEDURE — 80053 COMPREHEN METABOLIC PANEL: CPT | Mod: HCNC | Performed by: STUDENT IN AN ORGANIZED HEALTH CARE EDUCATION/TRAINING PROGRAM

## 2025-04-28 PROCEDURE — 3E10X8Z IRRIGATION OF SKIN AND MUCOUS MEMBRANES USING IRRIGATING SUBSTANCE: ICD-10-PCS | Performed by: SURGERY

## 2025-04-28 PROCEDURE — 84100 ASSAY OF PHOSPHORUS: CPT | Mod: HCNC | Performed by: STUDENT IN AN ORGANIZED HEALTH CARE EDUCATION/TRAINING PROGRAM

## 2025-04-28 PROCEDURE — 63600175 PHARM REV CODE 636 W HCPCS: Mod: HCNC

## 2025-04-28 PROCEDURE — 97116 GAIT TRAINING THERAPY: CPT | Mod: HCNC,CQ

## 2025-04-28 PROCEDURE — 27201423 OPTIME MED/SURG SUP & DEVICES STERILE SUPPLY: Mod: HCNC | Performed by: SURGERY

## 2025-04-28 PROCEDURE — 36000704 HC OR TIME LEV I 1ST 15 MIN: Mod: HCNC | Performed by: SURGERY

## 2025-04-28 PROCEDURE — 2W1LX6Z COMPRESSION OF RIGHT LOWER EXTREMITY USING PRESSURE DRESSING: ICD-10-PCS | Performed by: SURGERY

## 2025-04-28 PROCEDURE — 37000008 HC ANESTHESIA 1ST 15 MINUTES: Mod: HCNC | Performed by: SURGERY

## 2025-04-28 PROCEDURE — 71000033 HC RECOVERY, INTIAL HOUR: Mod: HCNC | Performed by: SURGERY

## 2025-04-28 PROCEDURE — 36000705 HC OR TIME LEV I EA ADD 15 MIN: Mod: HCNC | Performed by: SURGERY

## 2025-04-28 PROCEDURE — 83735 ASSAY OF MAGNESIUM: CPT | Mod: HCNC | Performed by: STUDENT IN AN ORGANIZED HEALTH CARE EDUCATION/TRAINING PROGRAM

## 2025-04-28 RX ORDER — HYDROMORPHONE HYDROCHLORIDE 1 MG/ML
0.2 INJECTION, SOLUTION INTRAMUSCULAR; INTRAVENOUS; SUBCUTANEOUS EVERY 5 MIN PRN
Refills: 0 | Status: COMPLETED | OUTPATIENT
Start: 2025-04-28 | End: 2025-04-28

## 2025-04-28 RX ORDER — ONDANSETRON HYDROCHLORIDE 2 MG/ML
INJECTION, SOLUTION INTRAVENOUS
Status: DISCONTINUED | OUTPATIENT
Start: 2025-04-28 | End: 2025-04-28

## 2025-04-28 RX ORDER — NIFEDIPINE 30 MG/1
90 TABLET, EXTENDED RELEASE ORAL DAILY
Status: DISCONTINUED | OUTPATIENT
Start: 2025-04-29 | End: 2025-05-13 | Stop reason: HOSPADM

## 2025-04-28 RX ORDER — FENTANYL CITRATE 50 UG/ML
INJECTION, SOLUTION INTRAMUSCULAR; INTRAVENOUS
Status: DISCONTINUED | OUTPATIENT
Start: 2025-04-28 | End: 2025-04-28

## 2025-04-28 RX ORDER — LIDOCAINE HYDROCHLORIDE 20 MG/ML
INJECTION INTRAVENOUS
Status: DISCONTINUED | OUTPATIENT
Start: 2025-04-28 | End: 2025-04-28

## 2025-04-28 RX ORDER — OXYCODONE HYDROCHLORIDE 5 MG/1
5 TABLET ORAL
Refills: 0 | Status: DISCONTINUED | OUTPATIENT
Start: 2025-04-28 | End: 2025-04-28 | Stop reason: HOSPADM

## 2025-04-28 RX ORDER — GLUCAGON 1 MG
1 KIT INJECTION
Status: DISCONTINUED | OUTPATIENT
Start: 2025-04-28 | End: 2025-04-28 | Stop reason: HOSPADM

## 2025-04-28 RX ORDER — PROPOFOL 10 MG/ML
VIAL (ML) INTRAVENOUS
Status: DISCONTINUED | OUTPATIENT
Start: 2025-04-28 | End: 2025-04-28

## 2025-04-28 RX ORDER — HEPARIN SODIUM,PORCINE/D5W 25000/250
0-40 INTRAVENOUS SOLUTION INTRAVENOUS CONTINUOUS
Status: DISCONTINUED | OUTPATIENT
Start: 2025-04-28 | End: 2025-04-30

## 2025-04-28 RX ORDER — LOSARTAN POTASSIUM 50 MG/1
100 TABLET ORAL DAILY
Status: DISCONTINUED | OUTPATIENT
Start: 2025-04-28 | End: 2025-04-28

## 2025-04-28 RX ORDER — ROCURONIUM BROMIDE 10 MG/ML
INJECTION, SOLUTION INTRAVENOUS
Status: DISCONTINUED | OUTPATIENT
Start: 2025-04-28 | End: 2025-04-28

## 2025-04-28 RX ORDER — HYDROMORPHONE HYDROCHLORIDE 1 MG/ML
INJECTION, SOLUTION INTRAMUSCULAR; INTRAVENOUS; SUBCUTANEOUS
Status: COMPLETED
Start: 2025-04-28 | End: 2025-04-28

## 2025-04-28 RX ORDER — SODIUM CHLORIDE 0.9 % (FLUSH) 0.9 %
10 SYRINGE (ML) INJECTION
Status: DISCONTINUED | OUTPATIENT
Start: 2025-04-28 | End: 2025-04-28 | Stop reason: HOSPADM

## 2025-04-28 RX ORDER — PROCHLORPERAZINE EDISYLATE 5 MG/ML
5 INJECTION INTRAMUSCULAR; INTRAVENOUS EVERY 30 MIN PRN
Status: DISCONTINUED | OUTPATIENT
Start: 2025-04-28 | End: 2025-04-28 | Stop reason: HOSPADM

## 2025-04-28 RX ADMIN — HYDROMORPHONE HYDROCHLORIDE 0.2 MG: 1 INJECTION, SOLUTION INTRAMUSCULAR; INTRAVENOUS; SUBCUTANEOUS at 10:04

## 2025-04-28 RX ADMIN — OXYCODONE HYDROCHLORIDE 10 MG: 10 TABLET ORAL at 05:04

## 2025-04-28 RX ADMIN — GABAPENTIN 300 MG: 300 CAPSULE ORAL at 09:04

## 2025-04-28 RX ADMIN — HEPARIN SODIUM 13 UNITS/KG/HR: 10000 INJECTION, SOLUTION INTRAVENOUS at 07:04

## 2025-04-28 RX ADMIN — FOLIC ACID 1 MG: 1 TABLET ORAL at 12:04

## 2025-04-28 RX ADMIN — POLYETHYLENE GLYCOL 3350 17 G: 17 POWDER, FOR SOLUTION ORAL at 09:04

## 2025-04-28 RX ADMIN — SUGAMMADEX 200 MG: 100 INJECTION, SOLUTION INTRAVENOUS at 09:04

## 2025-04-28 RX ADMIN — AMLODIPINE BESYLATE 10 MG: 10 TABLET ORAL at 09:04

## 2025-04-28 RX ADMIN — ROCURONIUM BROMIDE 50 MG: 10 INJECTION, SOLUTION INTRAVENOUS at 09:04

## 2025-04-28 RX ADMIN — ONDANSETRON 4 MG: 2 INJECTION INTRAMUSCULAR; INTRAVENOUS at 09:04

## 2025-04-28 RX ADMIN — SENNOSIDES 8.6 MG: 8.6 TABLET, FILM COATED ORAL at 09:04

## 2025-04-28 RX ADMIN — OXYCODONE HYDROCHLORIDE 10 MG: 10 TABLET ORAL at 09:04

## 2025-04-28 RX ADMIN — HYDROMORPHONE HYDROCHLORIDE 0.2 MG: 1 INJECTION, SOLUTION INTRAMUSCULAR; INTRAVENOUS; SUBCUTANEOUS at 09:04

## 2025-04-28 RX ADMIN — PROPOFOL 200 MG: 10 INJECTION, EMULSION INTRAVENOUS at 09:04

## 2025-04-28 RX ADMIN — Medication 1 TABLET: at 09:04

## 2025-04-28 RX ADMIN — LOSARTAN POTASSIUM 100 MG: 25 TABLET, FILM COATED ORAL at 09:04

## 2025-04-28 RX ADMIN — BUPROPION HYDROCHLORIDE 150 MG: 75 TABLET, FILM COATED ORAL at 09:04

## 2025-04-28 RX ADMIN — HYDROXYZINE PAMOATE 25 MG: 25 CAPSULE ORAL at 10:04

## 2025-04-28 RX ADMIN — SODIUM CHLORIDE: 9 INJECTION, SOLUTION INTRAVENOUS at 08:04

## 2025-04-28 RX ADMIN — BUPROPION HYDROCHLORIDE 150 MG: 75 TABLET, FILM COATED ORAL at 12:04

## 2025-04-28 RX ADMIN — FOLIC ACID 1 MG: 1 TABLET ORAL at 09:04

## 2025-04-28 RX ADMIN — Medication 100 MG: at 12:04

## 2025-04-28 RX ADMIN — FENTANYL CITRATE 50 MCG: 50 INJECTION, SOLUTION INTRAMUSCULAR; INTRAVENOUS at 09:04

## 2025-04-28 RX ADMIN — LIDOCAINE HYDROCHLORIDE 100 MG: 20 INJECTION INTRAVENOUS at 09:04

## 2025-04-28 RX ADMIN — MELATONIN TAB 3 MG 6 MG: 3 TAB at 09:04

## 2025-04-28 RX ADMIN — HEPARIN SODIUM 12 UNITS/KG/HR: 10000 INJECTION, SOLUTION INTRAVENOUS at 12:04

## 2025-04-28 NOTE — PROGRESS NOTES
[facility] - Telemetry Stepdown  General Surgery  Progress Note    Subjective:     History of Present Illness:  Froylan Borja is a 70 y.o. gentleman with PMH of alcohol abuse, cirrhosis, hepatocellular carcinoma undergoing Y90 therapy, and HTN who presents to the ED with R leg pain and swelling. He had Y90 through his R femoral artery 8 days ago. He has had some bruising and pain of his groin. He also bumped his calf on his car and has increasing swelling and discoloration. He did not notice until today, but he also has pitting edema to his right leg, much more than his left side. In the ED, he is hemodynamically normal though hypertensive. Labs significant for Na 129, K 5.7. US of his lower extremities show a R sided nonocclusive DVT and a R femo pseudoaneurysm. CT was also performed which demonstrated his hematoma over his R calf and extravasation of contrast into the pseudoaneusym. General surgery consulted for evaluation of drainage of his RLE hematoma.           Post-Op Info:  Procedure(s) (LRB):  REPLACEMENT, WOUND VAC (Right)   * Day of Surgery *     Interval History: Patient seen and examined. To OR today for wound vac exchange. Surgical consent obtained.     Medications:  Continuous Infusions:  Scheduled Meds:   amLODIPine  10 mg Oral Daily    buPROPion  150 mg Oral BID    folic acid  1 mg Oral Daily    gabapentin  300 mg Oral BID    melatonin  6 mg Oral Nightly    multivitamin  1 tablet Oral Daily    polyethylene glycol  17 g Oral BID    senna  8.6 mg Oral BID    thiamine  100 mg Oral Daily     PRN Meds:  Current Facility-Administered Medications:     acetaminophen, 1,000 mg, Oral, Q8H PRN    albuterol-ipratropium, 3 mL, Nebulization, Q4H PRN    bisacodyL, 10 mg, Rectal, Daily PRN    dextrose 50%, 12.5 g, Intravenous, PRN    dextrose 50%, 25 g, Intravenous, PRN    glucagon (human recombinant), 1 mg, Intramuscular, PRN    glucose, 16 g, Oral, PRN    glucose, 24 g, Oral, PRN    HYDROmorphone, 1 mg, Intravenous,  Q6H PRN    hydrOXYzine pamoate, 25 mg, Oral, Q8H PRN    LORazepam, 2 mg, Oral, Q4H PRN    ondansetron, 8 mg, Oral, Q8H PRN    oxyCODONE, 5 mg, Oral, Q4H PRN    oxyCODONE, 10 mg, Oral, Q6H PRN    promethazine, 25 mg, Oral, Q6H PRN    sodium chloride 0.9%, 10 mL, Intravenous, PRN     Review of patient's allergies indicates:   Allergen Reactions    Zoloft [sertraline] Other (See Comments)     hyponatremia    Codeine Itching     Intolerance- doesn't like the way it makes him feel     Objective:     Vital Signs (Most Recent):  Temp: 97.8 °F (36.6 °C) (04/28/25 0716)  Pulse: 68 (04/28/25 0716)  Resp: 18 (04/28/25 0716)  BP: (!) 176/79 (04/28/25 0716)  SpO2: 96 % (04/28/25 0716) Vital Signs (24h Range):  Temp:  [97.8 °F (36.6 °C)-98.5 °F (36.9 °C)] 97.8 °F (36.6 °C)  Pulse:  [68-75] 68  Resp:  [16-20] 18  SpO2:  [96 %-100 %] 96 %  BP: (139-176)/(67-79) 176/79     Weight: 120 kg (264 lb 8.8 oz)  Body mass index is 33.07 kg/m².    Intake/Output - Last 3 Shifts         04/26 0700 04/27 0659 04/27 0700 04/28 0659 04/28 0700 04/29 0659    P.O. 120      I.V. (mL/kg)       Total Intake(mL/kg) 120 (1)      Urine (mL/kg/hr) 1575 (0.5) 1875 (0.7) 300 (3.8)    Other  100     Stool 0 0     Total Output 1575 1975 300    Net -1455 -1975 -300           Urine Occurrence 2 x      Stool Occurrence 2 x 0 x              Physical Exam  Constitutional:       General: He is not in acute distress.     Appearance: Normal appearance.   HENT:      Head: Normocephalic and atraumatic.   Cardiovascular:      Rate and Rhythm: Normal rate and regular rhythm.   Pulmonary:      Effort: Pulmonary effort is normal. No respiratory distress.   Abdominal:      General: Abdomen is flat. There is no distension.      Palpations: Abdomen is soft.      Tenderness: There is no abdominal tenderness.   Musculoskeletal:      Comments: R calf w wound vac in place to suction, wrapped in ACE   Neurological:      General: No focal deficit present.      Mental Status: He  is alert and oriented to person, place, and time.   Psychiatric:         Behavior: Behavior normal.         Thought Content: Thought content normal.          Significant Labs:  I have reviewed all pertinent lab results within the past 24 hours.  CBC:   Recent Labs   Lab 04/27/25  0700   WBC 6.79   RBC 3.22*   HGB 10.3*   HCT 32.2*      *   MCH 32.0*   MCHC 32.0     CMP:   Recent Labs   Lab 04/27/25  0700   CALCIUM 9.0   ALBUMIN 3.0*   *   K 5.0   CO2 28      BUN 23   CREATININE 1.1   ALKPHOS 80   ALT 30   AST 26   BILITOT 0.6       Significant Diagnostics:  I have reviewed all pertinent imaging results/findings within the past 24 hours.  Assessment/Plan:     Sejal Borja is a 70 y.o. gentleman with HCC undergoing Y90 therapy with a RLE hematoma, R fem pseudoaneurysm, and a R sided DVT.    - to OR today for vac exchange  - planning for plastics evaluate wound at that time for possibility of skin graft  - NPO   - hold heparin gtt at 3am 4/28  -  Rest of care per primary team  - OOB/ambulate         Hernan Joy MD  General Surgery  [facility] - Telemetry Stepdown

## 2025-04-28 NOTE — CONSULTS
Plastic and Reconstructive Surgery   Consult Note    Date of Consultation:   04/28/2025    Reason for Consultation:  RLE wound    History of Present Illness:  70-year-old male with PMH of EtOH abuse, cirrhosis, HCC undergoing Y90 therapy, initially presented with RLE pain in swelling.  He had previously received Y90 through his right femoral artery and also had previously reported pumping his RLE pain and increased swelling and discoloration.  [place]. his lower extremity showed a right-sided nonocclusive DVT in right femoral pseudoaneurysm.  CT also showed hematoma of his right calf.  He has now undergone multiple debridements with drainage of this previous hematoma leading to an orally wound for which Plastic surgery was consulted for soft tissue coverage.    Past Medical History:    has a past medical history of Alcohol abuse, Anxiety, Cirrhosis, Glaucoma, Hepatocellular carcinoma, History of hepatitis C, s/p successful Rx w/ SVR24 - 1/2017, Hypertension, and Paresthesia.    Past Surgical History:    has a past surgical history that includes Liver biopsy; Esophagogastroduodenoscopy (N/A, 9/19/2024); Colonoscopy (N/A, 9/19/2024); Wound debridement (Right, 4/21/2025); Application of wound vacuum-assisted closure device (Right, 4/21/2025); Application of wound vacuum-assisted closure device (Right, 4/24/2025); and Replacement of wound vacuum-assisted closure device (Right, 4/24/2025).    Social History:  Social History     Tobacco Use    Smoking status: Former     Types: Cigars    Smokeless tobacco: Never    Tobacco comments:     smokes cigars 20 per month   Substance Use Topics    Alcohol use: Yes     Alcohol/week: 28.0 standard drinks of alcohol     Types: 28 Cans of beer per week     Comment: 2-3 beers daily     Social History     Substance and Sexual Activity   Drug Use No       Family History:  Family History   Problem Relation Name Age of Onset    Colonic polyp Mother 95     Cancer Mother 95         colon vs  polyps, colectomy    Diabetes Mellitus Father      Hypertension Father      Cancer Father          ? CRC    Diabetes Father      Cancer Sister          ? CRC    Colonic polyp Brother      Cirrhosis Neg Hx         Allergies:  Review of patient's allergies indicates:   Allergen Reactions    Zoloft [sertraline] Other (See Comments)     hyponatremia    Codeine Itching     Intolerance- doesn't like the way it makes him feel       Home Medications:  Prior to Admission medications    Medication Sig Start Date End Date Taking? Authorizing Provider   amLODIPine (NORVASC) 2.5 MG tablet Take 1 tablet (2.5 mg total) by mouth once daily. 24  Janki Tamez MD   buPROPion (WELLBUTRIN SR) 150 MG TBSR 12 hr tablet Take 1 tablet (150 mg total) by mouth 2 (two) times daily. 12/26/24 4/10/25  Janki Tamez MD   gabapentin (NEURONTIN) 300 MG capsule One in AM and two at bedtime 24   Janki Tamez MD   losartan (COZAAR) 100 MG tablet Take 1 tablet (100 mg total) by mouth once daily. 24   Janki Tamez MD   multivitamin Tab Take 1 tablet by mouth once daily.  Patient not taking: Reported on 3/27/2025 1/3/24   Moy Cantu MD   oxyCODONE-acetaminophen (PERCOCET)  mg per tablet Take 1 tablet by mouth every 6 (six) hours as needed for Pain. 4/10/25   Parish Fernandez MD       Review of Systems:  Negative except for what is noted in HPI    Physical Exam:  VITAL SIGNS:   Vitals:    25 0716 25 0758 25 0759 25 0806   BP: (!) 176/79  (!) 181/83 (!) 181/83   BP Location:   Left arm    Patient Position:   Lying    Pulse: 68 68 69    Resp: 18  12    Temp: 97.8 °F (36.6 °C)  97.7 °F (36.5 °C)    TempSrc: Oral  Temporal    SpO2: 96%  100%    Weight:       Height:         TMAX: Temp (24hrs), Av.1 °F (36.7 °C), Min:97.7 °F (36.5 °C), Max:98.5 °F (36.9 °C)    Patient seen intraoperatively, defer remaining physical examination at this time  General:   Intubated, sedated  Cardiovascular: Regular rate   Respiratory:  Intubated  Abdomen: Soft, nontender, nondistended  Extremity:  RLE wound proximally 22 x 15 cm, seen intraoperatively, with good granulation, no exposed vital structures.           Diagnostic Data:  Recent Results (from the past 2 weeks)   CBC with Differential    Collection Time: 04/28/25  7:19 AM   Result Value Ref Range    WBC 6.43 3.90 - 12.70 K/uL    HGB 10.9 (L) 14.0 - 18.0 gm/dL    HCT 33.7 (L) 40.0 - 54.0 %    Platelet Count 274 150 - 450 K/uL   CBC with Differential    Collection Time: 04/27/25  7:00 AM   Result Value Ref Range    WBC 6.79 3.90 - 12.70 K/uL    HGB 10.3 (L) 14.0 - 18.0 gm/dL    HCT 32.2 (L) 40.0 - 54.0 %    Platelet Count 247 150 - 450 K/uL   CBC with Differential    Collection Time: 04/26/25  4:30 AM   Result Value Ref Range    WBC 8.15 3.90 - 12.70 K/uL    HGB 10.4 (L) 14.0 - 18.0 gm/dL    HCT 32.1 (L) 40.0 - 54.0 %    Platelet Count 256 150 - 450 K/uL     Recent Results (from the past 2 weeks)   Basic metabolic panel    Collection Time: 04/23/25  1:00 PM   Result Value Ref Range    Sodium 133 (L) 136 - 145 mmol/L    Potassium 4.2 3.5 - 5.1 mmol/L    Chloride 96 95 - 110 mmol/L    CO2 30 (H) 23 - 29 mmol/L    Glucose 100 70 - 110 mg/dL    BUN 21 8 - 23 mg/dL    Creatinine 0.9 0.5 - 1.4 mg/dL    Calcium 8.9 8.7 - 10.5 mg/dL    Anion Gap 7 (L) 8 - 16 mmol/L   Basic metabolic panel    Collection Time: 04/20/25 12:02 PM   Result Value Ref Range    Sodium 127 (L) 136 - 145 mmol/L    Potassium 4.8 3.5 - 5.1 mmol/L    Chloride 97 95 - 110 mmol/L    CO2 24 23 - 29 mmol/L    Glucose 103 70 - 110 mg/dL    BUN 24 (H) 8 - 23 mg/dL    Creatinine 1.2 0.5 - 1.4 mg/dL    Calcium 8.1 (L) 8.7 - 10.5 mg/dL    Anion Gap 6 (L) 8 - 16 mmol/L   Basic metabolic panel    Collection Time: 04/18/25  9:38 AM   Result Value Ref Range    Sodium 131 (L) 136 - 145 mmol/L    Potassium 4.5 3.5 - 5.1 mmol/L    Chloride 99 95 - 110 mmol/L    CO2 22 (L) 23 - 29  "mmol/L    Glucose 136 (H) 70 - 110 mg/dL    BUN 14 8 - 23 mg/dL    Creatinine 0.9 0.5 - 1.4 mg/dL    Calcium 8.6 (L) 8.7 - 10.5 mg/dL    Anion Gap 10 8 - 16 mmol/L     Lab Results   Component Value Date    ALBUMIN 2.9 (L) 04/28/2025     Lab Results   Component Value Date    CRP 3.9 04/18/2025     Lab Results   Component Value Date    INR 1.0 04/25/2025     No results found for: "PTT"    Microbiology Results (last 7 days)       ** No results found for the last 168 hours. **            Assessment:  70 y.o.male previous ETOH abuse, HCC, receiving Y 90 therapy, and a right DVT, CT showing a RLE hematoma now status post debridements and drainage with RLE wound    Plan:  We will plan for OR on Wednesday for RLE wound exploration, debridement, possible split-thickness skin grafting versus skin substitute, wound VAC placement   We will obtain consent once extubated   NPO Wednesday night  Remainder of care per primary in the interim        Pk Duque MD  Plastic Surgery Fellow  04/28/2025    "

## 2025-04-28 NOTE — SUBJECTIVE & OBJECTIVE
Interval History: NAEON. In OR with General Surgery today for wound vac reapplication. Plastics taking to OR Wed.     Review of Systems   Constitutional:  Negative for chills and fever.   Respiratory:  Negative for chest tightness and shortness of breath.    Cardiovascular:  Negative for chest pain and leg swelling.   Gastrointestinal:  Negative for abdominal pain and nausea.   Genitourinary: Negative.    Musculoskeletal:  Positive for myalgias.   Skin:  Positive for color change and wound.   Neurological:  Negative for dizziness and weakness.     Objective:     Vital Signs (Most Recent):  Temp: 97.5 °F (36.4 °C) (04/28/25 1030)  Pulse: 62 (04/28/25 1045)  Resp: 10 (04/28/25 1045)  BP: (!) 157/76 (04/28/25 1045)  SpO2: 95 % (04/28/25 1045) Vital Signs (24h Range):  Temp:  [97.5 °F (36.4 °C)-98.5 °F (36.9 °C)] 97.5 °F (36.4 °C)  Pulse:  [62-82] 62  Resp:  [10-20] 10  SpO2:  [95 %-100 %] 95 %  BP: (139-181)/(67-87) 157/76     Weight: 120 kg (264 lb 8.8 oz)  Body mass index is 33.07 kg/m².    Intake/Output Summary (Last 24 hours) at 4/28/2025 1114  Last data filed at 4/28/2025 0936  Gross per 24 hour   Intake 300 ml   Output 2275 ml   Net -1975 ml         Physical Exam  Vitals and nursing note reviewed.   Constitutional:       General: He is not in acute distress.     Appearance: He is well-developed. He is not ill-appearing.   HENT:      Head: Normocephalic and atraumatic.   Eyes:      Pupils: Pupils are equal, round, and reactive to light.   Cardiovascular:      Rate and Rhythm: Normal rate and regular rhythm.   Pulmonary:      Effort: Pulmonary effort is normal.      Breath sounds: Normal breath sounds.   Abdominal:      Palpations: Abdomen is soft.      Tenderness: There is no abdominal tenderness.   Musculoskeletal:      Comments: RLE dressed with wound vac in place  Sensation intact   Skin:     General: Skin is warm and dry.   Neurological:      Mental Status: He is alert and oriented to person, place, and time.    Psychiatric:         Behavior: Behavior normal.               Significant Labs: All pertinent labs within the past 24 hours have been reviewed.    Significant Imaging: I have reviewed all pertinent imaging results/findings within the past 24 hours.

## 2025-04-28 NOTE — OP NOTE
Operative Note     ADMIT DATE: 4/18/2025    PROCEDURE DATE: 4/28/25    PRE-OP DIAGNOSIS: Hematoma [T14.8XXA]      POST-OP DIAGNOSIS: Post-Op Diagnosis Codes:     * Hematoma [T14.8XXA]    Procedure(s):  REPLACEMENT, WOUND VAC     SURGEON: Surgeons and Role:     * Jose G Mackay MD - Primary     * kP Goss MD - Resident - Assisting     * Autumn Henderson MD - Resident - Assisting    ANESTHESIA: Choice     OPERATIVE FINDINGS: Right leg wound with good granulation tissue in bed    INDICATION FOR PROCEDURE: This patient presents with a history of hematoma of right leg    PROCEDURE IN DETAIL:  Froylan Borja is a 70 y.o. male brought to the operating room for definitive surgery of wound vac change. The patient was informed of the possible risks and complications of the procedure, including but not limited to anesthetic risks, bleeding, infection, and need for additional surgery.  The patient concurred with the proposed plan, and has given informed consent.  The site of surgery was properly noted/marked in the preoperative holding area.    The patient was then brought to the operating room and placed in the lazy lateral position with both upper extremities extended.  general anesthesia was administered.A time out was performed confirming the patient, site, and procedure.  The prior wound vac was taken down. The right leg was then prepped and draped in the usual sterile fashion.    Wound was irrigated with saline. The wound bed appeared healthy with good granulation tissue present. Plastics fellow evaluated wound in OR prior to vac replacement for possible skin graft. A black sponge was cut to size and the edges approximated with staples. Adhesive tape applied over black sponge. Wound vac turned on and holding appropriate seal. This then completed the procedure.     ESTIMATED BLOOD LOSS: Minimal    COMPLICATIONS: none    DISPOSITION: PACU - hemodynamically stable.

## 2025-04-28 NOTE — NURSING
Pt arrived to unit via stretcher. Assisted to bed x 2. Aaox4 able to communicate needs. Pain 6/10 right leg. No other distress at this time. Wound vac in place. Personal belongings at bedside. Call light in reach bed alarm set bed in lowest position and locked SR up x 2.

## 2025-04-28 NOTE — PT/OT/SLP PROGRESS
Occupational Therapy      Patient Name:  Froylan Borja   MRN:  [id number]    Patient not seen today secondary to patient with discussion with plastic surgery upon OT attempt, when discussion concluded patient requesting time to eat lunch . OT unable to return in PM. Will follow-up as appropriate.    4/28/2025

## 2025-04-28 NOTE — ASSESSMENT & PLAN NOTE
Froylan ORLANDO Jonh is a 70 y.o. gentleman with HCC undergoing Y90 therapy with a RLE hematoma, R fem pseudoaneurysm, and a R sided DVT.    - to OR today for vac exchange  - planning for plastics evaluate wound at that time for possibility of skin graft  - NPO   - hold heparin gtt at 3am 4/28  -  Rest of care per primary team  - OOB/ambulate

## 2025-04-28 NOTE — NURSING TRANSFER
Nursing Transfer Note      4/28/2025   10:50 AM    Nurse giving handoff:chelsie holden  Nurse receiving handoff:chelsie morales    Reason patient is being transferred: per md order    Transfer To: [id number]    Transfer via stretcher    Transfer with wound vac    Transported by pct    Additional Lines: wound vac    Medicines sent: n/a    Any special needs or follow-up needed: n/a    Patient belongings transferred with patient: No    Chart send with patient: Yes    Notified: spouse

## 2025-04-28 NOTE — ASSESSMENT & PLAN NOTE
Hyponatremia is likely due to Cirrhosis. The patient's most recent sodium results are listed below.  Recent Labs     04/26/25  0430 04/27/25  0700 04/28/25  0719   * 135* 133*     Plan  - Correct the sodium by 4-6mEq in 24 hours.   - Fluid and salt restriction  - Monitor sodium Daily.   - Patient hyponatremia is stable

## 2025-04-28 NOTE — ASSESSMENT & PLAN NOTE
- RLE US showed a nonocclusive thrombus in the right common femoral vein   - Started Heparin gtt on 4/18 once cleared from IR standpoint  - Hg had been down-trending and dressings saturated with blood. - Hgb stabilized   - If able to tolerate anticoagulation, will transition to oral DOAC when not needing further procedures. If not, will need to discuss IVC filter  - General surgery consulted- hematoma evacuation performed at bedside  - S/p  wound debridement and wound vac placement on 4/21. and 4/24 for wound debridement + vac reapplication  - Wound Vac in place  - MM pain regimen   - Plan for PT assessment after surgery if needed - has been ambulating to the bathroom   - Heparin gtt   - OR with Plastics 4/30

## 2025-04-28 NOTE — ANESTHESIA PROCEDURE NOTES
Intubation    Date/Time: 4/28/2025 9:06 AM    Performed by: Wendy Nobles CRNA  Authorized by: Mike Loja MD    Intubation:     Induction:  Intravenous    Intubated:  Postinduction    Mask Ventilation:  2 handed mask ventilation with 2 providers    Attempts:  1    Attempted By:  CRNA    Method of Intubation:  Video laryngoscopy    Blade:  Bundy 3    Laryngeal View Grade: Grade I - full view of cords      Difficult Airway Encountered?: No      Complications:  None    Airway Device:  Oral endotracheal tube    Airway Device Size:  7.5    Style/Cuff Inflation:  Cuffed (inflated to minimal occlusive pressure)    Inflation Amount (mL):  10    Tube secured:  22    Secured at:  The lips    Placement Verified By:  Capnometry    Complicating Factors:  None    Findings Post-Intubation:  BS equal bilateral and atraumatic/condition of teeth unchanged

## 2025-04-28 NOTE — ASSESSMENT & PLAN NOTE
Hyperkalemia is likely due to multiple afctors.The patients most recent potassium results are listed below.  Recent Labs     04/26/25  0430 04/27/25  0700 04/28/25  0719   K 4.9 5.0 4.4     Plan  - Monitor for arrhythmias with EKG and/or continuous telemetry.   - Treat the hyperkalemia with Potassium Binders.   - Monitor potassium: Daily  - hold ARB

## 2025-04-28 NOTE — BRIEF OP NOTE
[facility] - Surgery ([facility])  Brief Operative Note    SUMMARY     Surgery Date: 4/28/2025     Surgeons and Role:     * Jose G Mackay MD - Primary     * Pk Goss MD - Resident - Assisting     * Autumn Henderson MD - Resident - Assisting        Pre-op Diagnosis:  Hematoma [T14.8XXA]    Post-op Diagnosis:  Post-Op Diagnosis Codes:     * Hematoma [T14.8XXA]    Procedure(s) (LRB):  REPLACEMENT, WOUND VAC (Right)    Anesthesia: Choice    Implants:  * No implants in log *    Operative Findings: Wound vac changed. No further debridements required. Wound still measures 22x15 cm.     Estimated Blood Loss: * No values recorded between 4/28/2025  8:53 AM and 4/28/2025  9:41 AM *    Estimated Blood Loss has not been documented. EBL = minimal.         Specimens:   Specimen (24h ago, onward)      None          * No specimens in log *    [id number]

## 2025-04-28 NOTE — PROGRESS NOTES
[facility] - Telemetry [facility] Medicine  Progress Note    Patient Name: Froylan Borja  MRN: [id number]  Patient Class: IP- Inpatient   Admission Date: 4/18/2025  Length of Stay: 10 days  Attending Physician: Tamra Nguyen MD  Primary Care Provider: Janki Tamez MD        Subjective     Principal Problem:Acute deep vein thrombosis (DVT) of right lower extremity        HPI:  Mr. Froylan Borja is a 69 y/o male with a PMHx alcohol abuse, cirrhosis, hepatocellular carcinoma, and HTN who presents for complaint of R leg pain and swelling. He reports yesterday he hit his R leg on the car door and shortly developed a bruise and pain to the R lateral leg. He tried tylenol at-home though pain, swelling, and bruising continued to worsen. Admits to paresthesias and numbness to the R foot as well as significant R foot pain with weight-bearing. Denies CP, palpitations, SOB, lightheadedness, dizziness, headaches, LOC, vomiting, diarrhea, abdominal pain, fever, chills. Reports some nausea following morphine and dilaudid for pain in ED. Of note, patient had angiogram y90 mapping study done with IR last Thursday (4/10/25). Has developed some bruising to the medial thigh and mild groin discomfort since procedure. Social history significant for daily alcohol consumption, 3-4 beers daily, last alcoholic beverage yesterday around 3PM.     In the ED, AF HDS. H&H 13.0/38.6 (BL~14.8), no leukocytosis. HypoNa 129, HyperK 5.7, CO2 20, Ca 8.6, Albumin 3.4, remainder of CMP unremarkable. PT/INR wnl at 10.9/1.0. LA 1.6. CPK wnl. Hep C Ab reactive, HCV RNA pending. EKG with normal sinus rhythm. R Lower Ext U/S w/ findings of nonocclusive thrombus in the right common femoral vein and right common femoral artery pseudoaneurysm. CTA Lower Ext pending. R lower extremity hematoma drained at bedside by general surgery with evacuation of 120 cc of hematoma. Compressive dressing applied. Given nebulized albuterol, lokelma, IV cefazolin, IV  dilaudid, IV morphine, IV zofran. Admitted to .     Overview/Hospital Course:  70 y.o M with HTN, alcohol/HepC cirrhosis c/b HCC s/p Y-90 mapping angiogram on 4/10 who presents with bruising and pain at angiogram site and RLE calf pain after he hit it on a car door. RLE U/S showed non-occlusive CFV thrombus and R CFA pseudoaneurysm. CTA re-demonstrated the psdueoaneurysm with active extravasation within the sac as well as a large R calf hematoma which GenSurg were consulted for and successfully drained (120ccs). Vascular Surgery were consulted, recommended IR guided percutaneous thrombin injection which IR were able to complete. Heparin gtt was started. Groin U/S done the following day showed continued thrombosis of the pseudoanurysm. He was taken to the OR on 4/21 for debridement and placement of wound vac. He was taken back to the OR on 4/24 for further debridement and wound vac change. OR 4/28 for vac reapplication. RLE wound exploration, debridement, possible split-thickness skin grafting versus skin substitute, wound VAC placement 4/30 with Plastics.    Interval History: NAEON. In OR with General Surgery today for wound vac reapplication. Plastics taking to OR Wed.     Review of Systems   Constitutional:  Negative for chills and fever.   Respiratory:  Negative for chest tightness and shortness of breath.    Cardiovascular:  Negative for chest pain and leg swelling.   Gastrointestinal:  Negative for abdominal pain and nausea.   Genitourinary: Negative.    Musculoskeletal:  Positive for myalgias.   Skin:  Positive for color change and wound.   Neurological:  Negative for dizziness and weakness.     Objective:     Vital Signs (Most Recent):  Temp: 97.5 °F (36.4 °C) (04/28/25 1030)  Pulse: 62 (04/28/25 1045)  Resp: 10 (04/28/25 1045)  BP: (!) 157/76 (04/28/25 1045)  SpO2: 95 % (04/28/25 1045) Vital Signs (24h Range):  Temp:  [97.5 °F (36.4 °C)-98.5 °F (36.9 °C)] 97.5 °F (36.4 °C)  Pulse:  [62-82] 62  Resp:   [10-20] 10  SpO2:  [95 %-100 %] 95 %  BP: (139-181)/(67-87) 157/76     Weight: 120 kg (264 lb 8.8 oz)  Body mass index is 33.07 kg/m².    Intake/Output Summary (Last 24 hours) at 4/28/2025 1114  Last data filed at 4/28/2025 0936  Gross per 24 hour   Intake 300 ml   Output 2275 ml   Net -1975 ml         Physical Exam  Vitals and nursing note reviewed.   Constitutional:       General: He is not in acute distress.     Appearance: He is well-developed. He is not ill-appearing.   HENT:      Head: Normocephalic and atraumatic.   Eyes:      Pupils: Pupils are equal, round, and reactive to light.   Cardiovascular:      Rate and Rhythm: Normal rate and regular rhythm.   Pulmonary:      Effort: Pulmonary effort is normal.      Breath sounds: Normal breath sounds.   Abdominal:      Palpations: Abdomen is soft.      Tenderness: There is no abdominal tenderness.   Musculoskeletal:      Comments: RLE dressed with wound vac in place  Sensation intact   Skin:     General: Skin is warm and dry.   Neurological:      Mental Status: He is alert and oriented to person, place, and time.   Psychiatric:         Behavior: Behavior normal.               Significant Labs: All pertinent labs within the past 24 hours have been reviewed.    Significant Imaging: I have reviewed all pertinent imaging results/findings within the past 24 hours.      Assessment & Plan  Acute deep vein thrombosis (DVT) of right lower extremity  Hematoma  Leg wound, right  - RLE US showed a nonocclusive thrombus in the right common femoral vein   - Started Heparin gtt on 4/18 once cleared from IR standpoint  - Hg had been down-trending and dressings saturated with blood. - Hgb stabilized   - If able to tolerate anticoagulation, will transition to oral DOAC when not needing further procedures. If not, will need to discuss IVC filter  - General surgery consulted- hematoma evacuation performed at bedside  - S/p  wound debridement and wound vac placement on 4/21. and 4/24  for wound debridement + vac reapplication  - Wound Vac in place  - MM pain regimen   - Plan for PT assessment after surgery if needed - has been ambulating to the bathroom   - Heparin gtt   - OR with Plastics 4/30  Essential hypertension  Patient's blood pressure range in the last 24 hours was: BP  Min: 139/69  Max: 181/83.The patient's inpatient anti-hypertensive regimen is listed below:  Current Antihypertensives  amLODIPine tablet 10 mg, Daily, Oral  losartan tablet 100 mg, Daily, Oral    Plan  - BP is uncontrolled, will adjust as follows: increase amlodipine to 10mg / change to nifedipine  Alcohol use disorder, severe, dependence  - daily drinker, last drink 4/17  - 3-4 beers daily, denies withdrawal symptoms  - continue thiamine, folic acid and multivitamin  - CIWA per nursing   Obesity (BMI 30-39.9)  Body mass index is 33.07 kg/m². Morbid obesity complicates all aspects of disease management from diagnostic modalities to treatment. Weight loss encouraged and health benefits explained to patient.   Hyponatremia  Hyponatremia is likely due to Cirrhosis. The patient's most recent sodium results are listed below.  Recent Labs     04/26/25  0430 04/27/25  0700 04/28/25  0719   * 135* 133*     Plan  - Correct the sodium by 4-6mEq in 24 hours.   - Fluid and salt restriction  - Monitor sodium Daily.   - Patient hyponatremia is stable  Pseudoaneurysm of femoral artery  - RLE US showed a new right common femoral artery pseudoaneurysm   - CTA RLE showed extravasation within the pseudoaneurysm sac  - Vascular Surgery consulted- recommended IR guided thrombin injection. IR consulted, completed procedure on 4/18.   - Groin u/s on 4/19 showed continued thrombosis of the pseudoaneurysm  - Will need repeat U/S outpatient in ~ 1 week and f/u with IR - if still hospitalized may get inpatient early next week and have IR follow up  Hyperkalemia  Hyperkalemia is likely due to  multiple afctors .The patients most recent  potassium results are listed below.  Recent Labs     04/26/25  0430 04/27/25  0700 04/28/25  0719   K 4.9 5.0 4.4     Plan  - Monitor for arrhythmias with EKG and/or continuous telemetry.   - Treat the hyperkalemia with Potassium Binders.   - Monitor potassium: Daily  - hold ARB     VTE Risk Mitigation (From admission, onward)           Ordered     heparin 25,000 units in dextrose 5% 250 ml (100 units/mL) infusion MINIMAL INTENSITY nomogram - [facility]  Continuous        Question:  Begin at (units/kg/hr)  Answer:  12 04/28/25 1113     heparin 25,000 units in dextrose 5% 250 ml (100 units/mL) infusion MINIMAL INTENSITY nomogram - [facility]  Continuous        Question:  Begin at (units/kg/hr)  Answer:  12 04/25/25 0808     IP VTE HIGH RISK PATIENT  Once         04/18/25 0725     Reason for No Pharmacological VTE Prophylaxis  Once        Question:  Reasons:  Answer:  Risk of Bleeding  Comment:  will start anticoagulation once medically cleared    04/18/25 0725                    Discharge Planning   KENNETH: 5/1/2025     Code Status: Full Code   Medical Readiness for Discharge Date:   Discharge Plan A: Home, Home with family                Please place Justification for DME        Tamra Nguyen MD  Department of Hospital Medicine   [facility] - Telemetry Stepdown

## 2025-04-28 NOTE — PT/OT/SLP PROGRESS
Physical Therapy Treatment    Patient Name:  Froylan Borja   MRN:  [id number]    Recommendations:     Discharge Recommendations: Low Intensity Therapy  Discharge Equipment Recommendations: walker, rolling  Barriers to discharge:  Impaired functional mobility     Assessment:     Froylan Borja is a 70 y.o. male admitted with a medical diagnosis of Acute deep vein thrombosis (DVT) of right lower extremity.  He presents with the following impairments/functional limitations: weakness, impaired endurance, impaired self care skills, impaired functional mobility, decreased safety awareness, decreased lower extremity function, impaired skin, edema, impaired cardiopulmonary response to activity Pt tolerated treatment session well today. Pt requiring little to no assistance for bed mobility, transfers and gait training. Pt was able to increase distance ambulated during today's session. Patient remains appropriate for continued skilled services within the acute environment and goals remain appropriate.   .    Rehab Prognosis: Good; patient would benefit from acute skilled PT services to address these deficits and reach maximum level of function.    Recent Surgery: Procedure(s) (LRB):  REPLACEMENT, WOUND VAC (Right) * Day of Surgery *    Plan:     During this hospitalization, patient to be seen 4 x/week to address the identified rehab impairments via gait training, therapeutic activities, therapeutic exercises, neuromuscular re-education and progress toward the following goals:    Plan of Care Expires:  05/26/25    Subjective     Chief Complaint: None stated  Patient/Family Comments/goals: Pt agreeable to PT  Pain/Comfort:  Pain Rating 1: 0/10      Objective:     Communicated with Rn prior to session.  Patient found supine with peripheral IV, wound vac upon PT entry to room.     General Precautions: Standard, fall  Orthopedic Precautions: N/A  Braces: N/A  Respiratory Status: Room air     Functional Mobility:  Bed Mobility:     Supine to  Sit: stand by assistance  EOB sitting: SBA   Sit to Supine: stand by assistance    Transfers:     Sit to Stand x 2:  contact guard assistance with rolling walker  Gait: 8 ft + 40 ft CGA with RW   Pt ambulated with decreased leisa and step length    Pt performed 10 repetitions of seated B LE exercises consisting of: Marching, LAQ, ABD/ADD, heel raises, and toe raises.        AM-PAC 6 CLICK MOBILITY  Turning over in bed (including adjusting bedclothes, sheets and blankets)?: 3  Sitting down on and standing up from a chair with arms (e.g., wheelchair, bedside commode, etc.): 3  Moving from lying on back to sitting on the side of the bed?: 3  Moving to and from a bed to a chair (including a wheelchair)?: 3  Need to walk in hospital room?: 3  Climbing 3-5 steps with a railing?: 2  Basic Mobility Total Score: 17       Treatment & Education:  Therapist provided instruction and educated for safety during transfers and gait training. As well as proper body mechanics, energy conservation, and fall prevention strategies during tasks listed above, and the effects of prolonged immobility and the importance of performing EOB/OOB activity and exercises to promote healing and reduce recovery time.       Patient left supine with all lines intact, call button in reach, bed alarm on, and Rn notified..    GOALS:   Multidisciplinary Problems       Physical Therapy Goals          Problem: Physical Therapy    Goal Priority Disciplines Outcome Interventions   Physical Therapy Goal     PT, PT/OT Progressing    Description: Goals to be met by:      Patient will increase functional independence with mobility by performin. Supine to sit with Modified [place]  2. Sit to supine with Modified [place]  3. Sit to stand transfer with Supervision  4. Bed to chair transfer with Supervision using LRAD  5. Gait  x 100 feet with Stand-by Assistance using LRAD.                          DME Justifications:   Froylan's mobility  limitation cannot be sufficiently resolved by the use of a cane. His functional mobility deficit can be sufficiently resolved with the use of a Rolling Walker. Patient's mobility limitation significantly impairs their ability to participate in one of more activities of daily living.  The use of a RW will significantly improve the patient's ability to participate in MRADLS and the patient will use it on regular basis in the home.    Time Tracking:     PT Received On: 04/28/25  PT Start Time: 1410     PT Stop Time: 1430  PT Total Time (min): 20 min     Billable Minutes: Gait Training 20    Treatment Type: Treatment  PT/PTA: PTA     Number of PTA visits since last PT visit: 1 04/28/2025

## 2025-04-28 NOTE — TRANSFER OF CARE
"Anesthesia Transfer of Care Note    Patient: Froylan Borja    Procedure(s) Performed: Procedure(s) (LRB):  REPLACEMENT, WOUND VAC (Right)    Patient location: PACU    Anesthesia Type: general    Transport from OR: Transported from OR on 6-10 L/min O2 by face mask with adequate spontaneous ventilation    Post pain: adequate analgesia    Post assessment: no apparent anesthetic complications    Post vital signs: stable    Level of consciousness: awake, alert and oriented    Nausea/Vomiting: no nausea/vomiting    Complications: none    Transfer of care protocol was followed      Last vitals: Visit Vitals  BP (!) 181/83   Pulse 69   Temp 36.5 °C (97.7 °F) (Temporal)   Resp 12   Ht 6' 3" (1.905 m)   Wt 120 kg (264 lb 8.8 oz)   SpO2 100%   BMI 33.07 kg/m²     "

## 2025-04-28 NOTE — SUBJECTIVE & OBJECTIVE
Interval History: Patient seen and examined. To OR today for wound vac exchange. Surgical consent obtained.     Medications:  Continuous Infusions:  Scheduled Meds:   amLODIPine  10 mg Oral Daily    buPROPion  150 mg Oral BID    folic acid  1 mg Oral Daily    gabapentin  300 mg Oral BID    melatonin  6 mg Oral Nightly    multivitamin  1 tablet Oral Daily    polyethylene glycol  17 g Oral BID    senna  8.6 mg Oral BID    thiamine  100 mg Oral Daily     PRN Meds:  Current Facility-Administered Medications:     acetaminophen, 1,000 mg, Oral, Q8H PRN    albuterol-ipratropium, 3 mL, Nebulization, Q4H PRN    bisacodyL, 10 mg, Rectal, Daily PRN    dextrose 50%, 12.5 g, Intravenous, PRN    dextrose 50%, 25 g, Intravenous, PRN    glucagon (human recombinant), 1 mg, Intramuscular, PRN    glucose, 16 g, Oral, PRN    glucose, 24 g, Oral, PRN    HYDROmorphone, 1 mg, Intravenous, Q6H PRN    hydrOXYzine pamoate, 25 mg, Oral, Q8H PRN    LORazepam, 2 mg, Oral, Q4H PRN    ondansetron, 8 mg, Oral, Q8H PRN    oxyCODONE, 5 mg, Oral, Q4H PRN    oxyCODONE, 10 mg, Oral, Q6H PRN    promethazine, 25 mg, Oral, Q6H PRN    sodium chloride 0.9%, 10 mL, Intravenous, PRN     Review of patient's allergies indicates:   Allergen Reactions    Zoloft [sertraline] Other (See Comments)     hyponatremia    Codeine Itching     Intolerance- doesn't like the way it makes him feel     Objective:     Vital Signs (Most Recent):  Temp: 97.8 °F (36.6 °C) (04/28/25 0716)  Pulse: 68 (04/28/25 0716)  Resp: 18 (04/28/25 0716)  BP: (!) 176/79 (04/28/25 0716)  SpO2: 96 % (04/28/25 0716) Vital Signs (24h Range):  Temp:  [97.8 °F (36.6 °C)-98.5 °F (36.9 °C)] 97.8 °F (36.6 °C)  Pulse:  [68-75] 68  Resp:  [16-20] 18  SpO2:  [96 %-100 %] 96 %  BP: (139-176)/(67-79) 176/79     Weight: 120 kg (264 lb 8.8 oz)  Body mass index is 33.07 kg/m².    Intake/Output - Last 3 Shifts         04/26 0700 04/27 0659 04/27 0700 04/28 0659 04/28 0700 04/29 0659    P.O. 120      I.V.  (mL/kg)       Total Intake(mL/kg) 120 (1)      Urine (mL/kg/hr) 1575 (0.5) 1875 (0.7) 300 (3.8)    Other  100     Stool 0 0     Total Output 1575 1975 300    Net -1455 -1975 -300           Urine Occurrence 2 x      Stool Occurrence 2 x 0 x              Physical Exam  Constitutional:       General: He is not in acute distress.     Appearance: Normal appearance.   HENT:      Head: Normocephalic and atraumatic.   Cardiovascular:      Rate and Rhythm: Normal rate and regular rhythm.   Pulmonary:      Effort: Pulmonary effort is normal. No respiratory distress.   Abdominal:      General: Abdomen is flat. There is no distension.      Palpations: Abdomen is soft.      Tenderness: There is no abdominal tenderness.   Musculoskeletal:      Comments: R calf w wound vac in place to suction, wrapped in ACE   Neurological:      General: No focal deficit present.      Mental Status: He is alert and oriented to person, place, and time.   Psychiatric:         Behavior: Behavior normal.         Thought Content: Thought content normal.          Significant Labs:  I have reviewed all pertinent lab results within the past 24 hours.  CBC:   Recent Labs   Lab 04/27/25  0700   WBC 6.79   RBC 3.22*   HGB 10.3*   HCT 32.2*      *   MCH 32.0*   MCHC 32.0     CMP:   Recent Labs   Lab 04/27/25  0700   CALCIUM 9.0   ALBUMIN 3.0*   *   K 5.0   CO2 28      BUN 23   CREATININE 1.1   ALKPHOS 80   ALT 30   AST 26   BILITOT 0.6       Significant Diagnostics:  I have reviewed all pertinent imaging results/findings within the past 24 hours.

## 2025-04-28 NOTE — ASSESSMENT & PLAN NOTE
Patient's blood pressure range in the last 24 hours was: BP  Min: 139/69  Max: 181/83.The patient's inpatient anti-hypertensive regimen is listed below:  Current Antihypertensives  amLODIPine tablet 10 mg, Daily, Oral  losartan tablet 100 mg, Daily, Oral    Plan  - BP is uncontrolled, will adjust as follows: increase amlodipine to 10mg/ change to nifedipine

## 2025-04-29 ENCOUNTER — ANESTHESIA EVENT (OUTPATIENT)
Dept: SURGERY | Facility: HOSPITAL | Age: 70
End: 2025-04-29
Payer: MEDICARE

## 2025-04-29 LAB
ABSOLUTE EOSINOPHIL (OHS): 0.43 K/UL
ABSOLUTE MONOCYTE (OHS): 0.96 K/UL (ref 0.3–1)
ABSOLUTE NEUTROPHIL COUNT (OHS): 4.61 K/UL (ref 1.8–7.7)
ALBUMIN SERPL BCP-MCNC: 2.9 G/DL (ref 3.5–5.2)
ALP SERPL-CCNC: 78 UNIT/L (ref 40–150)
ALT SERPL W/O P-5'-P-CCNC: 23 UNIT/L (ref 10–44)
ANION GAP (OHS): 7 MMOL/L (ref 8–16)
APTT PPP: 44.6 SECONDS (ref 21–32)
APTT PPP: 47.7 SECONDS (ref 21–32)
AST SERPL-CCNC: 21 UNIT/L (ref 11–45)
BASOPHILS # BLD AUTO: 0.13 K/UL
BASOPHILS NFR BLD AUTO: 1.5 %
BILIRUB SERPL-MCNC: 0.5 MG/DL (ref 0.1–1)
BUN SERPL-MCNC: 23 MG/DL (ref 8–23)
CALCIUM SERPL-MCNC: 8.9 MG/DL (ref 8.7–10.5)
CHLORIDE SERPL-SCNC: 102 MMOL/L (ref 95–110)
CO2 SERPL-SCNC: 26 MMOL/L (ref 23–29)
CREAT SERPL-MCNC: 1.1 MG/DL (ref 0.5–1.4)
ERYTHROCYTE [DISTWIDTH] IN BLOOD BY AUTOMATED COUNT: 13.3 % (ref 11.5–14.5)
GFR SERPLBLD CREATININE-BSD FMLA CKD-EPI: >60 ML/MIN/1.73/M2
GLUCOSE SERPL-MCNC: 104 MG/DL (ref 70–110)
HCT VFR BLD AUTO: 34.3 % (ref 40–54)
HGB BLD-MCNC: 10.9 GM/DL (ref 14–18)
IMM GRANULOCYTES # BLD AUTO: 0.37 K/UL (ref 0–0.04)
IMM GRANULOCYTES NFR BLD AUTO: 4.3 % (ref 0–0.5)
LYMPHOCYTES # BLD AUTO: 2.07 K/UL (ref 1–4.8)
MAGNESIUM SERPL-MCNC: 2.1 MG/DL (ref 1.6–2.6)
MCH RBC QN AUTO: 32.2 PG (ref 27–31)
MCHC RBC AUTO-ENTMCNC: 31.8 G/DL (ref 32–36)
MCV RBC AUTO: 101 FL (ref 82–98)
NUCLEATED RBC (/100WBC) (OHS): 0 /100 WBC
PHOSPHATE SERPL-MCNC: 3.9 MG/DL (ref 2.7–4.5)
PLATELET # BLD AUTO: 277 K/UL (ref 150–450)
PMV BLD AUTO: 9.5 FL (ref 9.2–12.9)
POTASSIUM SERPL-SCNC: 5.2 MMOL/L (ref 3.5–5.1)
PROT SERPL-MCNC: 6.5 GM/DL (ref 6–8.4)
RBC # BLD AUTO: 3.39 M/UL (ref 4.6–6.2)
RELATIVE EOSINOPHIL (OHS): 5 %
RELATIVE LYMPHOCYTE (OHS): 24.2 % (ref 18–48)
RELATIVE MONOCYTE (OHS): 11.2 % (ref 4–15)
RELATIVE NEUTROPHIL (OHS): 53.8 % (ref 38–73)
SODIUM SERPL-SCNC: 135 MMOL/L (ref 136–145)
WBC # BLD AUTO: 8.57 K/UL (ref 3.9–12.7)

## 2025-04-29 PROCEDURE — 25000003 PHARM REV CODE 250: Mod: HCNC

## 2025-04-29 PROCEDURE — 85025 COMPLETE CBC W/AUTO DIFF WBC: CPT | Mod: HCNC | Performed by: STUDENT IN AN ORGANIZED HEALTH CARE EDUCATION/TRAINING PROGRAM

## 2025-04-29 PROCEDURE — 85730 THROMBOPLASTIN TIME PARTIAL: CPT | Mod: HCNC | Performed by: HOSPITALIST

## 2025-04-29 PROCEDURE — 83735 ASSAY OF MAGNESIUM: CPT | Mod: HCNC | Performed by: STUDENT IN AN ORGANIZED HEALTH CARE EDUCATION/TRAINING PROGRAM

## 2025-04-29 PROCEDURE — 80053 COMPREHEN METABOLIC PANEL: CPT | Mod: HCNC | Performed by: STUDENT IN AN ORGANIZED HEALTH CARE EDUCATION/TRAINING PROGRAM

## 2025-04-29 PROCEDURE — 97530 THERAPEUTIC ACTIVITIES: CPT | Mod: HCNC

## 2025-04-29 PROCEDURE — 97535 SELF CARE MNGMENT TRAINING: CPT | Mod: HCNC

## 2025-04-29 PROCEDURE — 36415 COLL VENOUS BLD VENIPUNCTURE: CPT | Mod: HCNC | Performed by: STUDENT IN AN ORGANIZED HEALTH CARE EDUCATION/TRAINING PROGRAM

## 2025-04-29 PROCEDURE — 84100 ASSAY OF PHOSPHORUS: CPT | Mod: HCNC | Performed by: STUDENT IN AN ORGANIZED HEALTH CARE EDUCATION/TRAINING PROGRAM

## 2025-04-29 PROCEDURE — 94761 N-INVAS EAR/PLS OXIMETRY MLT: CPT | Mod: HCNC

## 2025-04-29 PROCEDURE — 25000003 PHARM REV CODE 250: Mod: HCNC | Performed by: STUDENT IN AN ORGANIZED HEALTH CARE EDUCATION/TRAINING PROGRAM

## 2025-04-29 PROCEDURE — 36415 COLL VENOUS BLD VENIPUNCTURE: CPT | Mod: HCNC | Performed by: HOSPITALIST

## 2025-04-29 PROCEDURE — 63600175 PHARM REV CODE 636 W HCPCS: Mod: HCNC | Performed by: HOSPITALIST

## 2025-04-29 PROCEDURE — 25000003 PHARM REV CODE 250: Mod: HCNC | Performed by: HOSPITALIST

## 2025-04-29 PROCEDURE — 20600001 HC STEP DOWN PRIVATE ROOM: Mod: HCNC

## 2025-04-29 RX ORDER — SODIUM CHLORIDE, SODIUM LACTATE, POTASSIUM CHLORIDE, CALCIUM CHLORIDE 600; 310; 30; 20 MG/100ML; MG/100ML; MG/100ML; MG/100ML
INJECTION, SOLUTION INTRAVENOUS CONTINUOUS
Status: DISCONTINUED | OUTPATIENT
Start: 2025-04-30 | End: 2025-04-30

## 2025-04-29 RX ADMIN — BUPROPION HYDROCHLORIDE 150 MG: 75 TABLET, FILM COATED ORAL at 09:04

## 2025-04-29 RX ADMIN — Medication 1 TABLET: at 08:04

## 2025-04-29 RX ADMIN — NIFEDIPINE 90 MG: 30 TABLET, FILM COATED, EXTENDED RELEASE ORAL at 08:04

## 2025-04-29 RX ADMIN — HEPARIN SODIUM 13 UNITS/KG/HR: 10000 INJECTION, SOLUTION INTRAVENOUS at 09:04

## 2025-04-29 RX ADMIN — GABAPENTIN 300 MG: 300 CAPSULE ORAL at 08:04

## 2025-04-29 RX ADMIN — SENNOSIDES 8.6 MG: 8.6 TABLET, FILM COATED ORAL at 08:04

## 2025-04-29 RX ADMIN — MELATONIN TAB 3 MG 6 MG: 3 TAB at 09:04

## 2025-04-29 RX ADMIN — OXYCODONE 5 MG: 5 TABLET ORAL at 08:04

## 2025-04-29 RX ADMIN — POLYETHYLENE GLYCOL 3350 17 G: 17 POWDER, FOR SOLUTION ORAL at 09:04

## 2025-04-29 RX ADMIN — FOLIC ACID 1 MG: 1 TABLET ORAL at 08:04

## 2025-04-29 RX ADMIN — SENNOSIDES 8.6 MG: 8.6 TABLET, FILM COATED ORAL at 09:04

## 2025-04-29 RX ADMIN — OXYCODONE HYDROCHLORIDE 10 MG: 10 TABLET ORAL at 01:04

## 2025-04-29 RX ADMIN — Medication 100 MG: at 08:04

## 2025-04-29 RX ADMIN — GABAPENTIN 300 MG: 300 CAPSULE ORAL at 09:04

## 2025-04-29 RX ADMIN — OXYCODONE HYDROCHLORIDE 10 MG: 10 TABLET ORAL at 09:04

## 2025-04-29 NOTE — PLAN OF CARE
Problem: Adult Inpatient Plan of Care  Goal: Absence of Hospital-Acquired Illness or Injury  Outcome: Progressing  Intervention: Identify and Manage Fall Risk  Flowsheets (Taken 4/29/2025 1814)  Safety Promotion/Fall Prevention:   assistive device/personal item within reach   medications reviewed   lighting adjusted   side rails raised x 2   Fall Risk reviewed with patient/family  Plan of care was reviewed with the pt. AAOx4. VSS. Continuous heparin gtt is administering at 13 units/kg/hr and two therapeutic results were documented. Next APTT will be drawn in the early AM. Pain management was reviewed with the pt. PRN pain meds were given. US of the right lower leg was done. Intake and output was documented. No major changes throughout the day. Safety precautions were in placed. Pt is aware about getting skin graft done tomorrow.

## 2025-04-29 NOTE — ANESTHESIA PREPROCEDURE EVALUATION
[facility]  Anesthesia Pre-Operative Evaluation         Patient Name: Froylan Borja  YOB: 1955  MRN: [id number]    SUBJECTIVE:     Pre-operative evaluation for Procedure(s) (LRB):  Right lower extremity wound, debridement, split thickness skin graft vs skin substitute placement, wound vac (Right)     04/29/2025    **PAPER CONSENT IN CHART**    Froylan Borja is a 70 y.o. male w/ a significant PMHx of alcohol abuse, cirrhosis, hepatocellular carcinoma, and HTN who presented with a R leg wound and pain at prior angiogram site in R groin. RLE U/S showed non-occlusive CFV thrombus and R CFA pseudoaneurysm. CTA re-demonstrated the psdueoaneurysm with active extravasation within the sac as well as a large R calf hematoma. He is s/p debridement and wound vac placement on 4/21/25, 4/24/25, and 4/28/25.    Patient now presents for the above procedure(s).    Results for orders placed during the hospital encounter of 04/18/25    Echo    Interpretation Summary    Left Ventricle: The left ventricle is normal in size. Normal wall thickness. There is normal systolic function with a visually estimated ejection fraction of 65 - 70%. There is normal diastolic function.    Right Ventricle: The right ventricle is normal in size. Wall thickness is normal. Systolic function is normal.    IVC/SVC: Normal venous pressure at 3 mmHg.      LDA:        Peripheral IV - Single Lumen 04/25/25 0040 20 G Left;Posterior Hand (Active)   Site Assessment Clean;Dry;Intact 04/29/25 0400   Line Securement Device Secured with sutureless device 04/28/25 2000   Extremity Assessment Distal to IV No abnormal discoloration;No swelling;No warmth;No redness 04/28/25 1030   Line Status Infusing 04/29/25 0400   Dressing Status Clean;Dry;Intact 04/29/25 0400   Dressing Intervention Integrity maintained 04/29/25 0400   Dressing Change Due 04/29/25 04/28/25 0715   Site Change Due 04/29/25 04/28/25 2000   Reason Not Rotated Not due 04/28/25 2000    Number of days: 4       Prev airway:   Intubation     Date/Time: 4/28/2025 9:06 AM     Performed by: Wendy Nobles CRNA  Authorized by: Mike Loja MD    Intubation:     Induction:  Intravenous    Intubated:  Postinduction    Mask Ventilation:  2 handed mask ventilation with 2 providers    Attempts:  1    Attempted By:  CRNA    Method of Intubation:  Video laryngoscopy    Blade:  Bundy 3    Laryngeal View Grade: Grade I - full view of cords      Difficult Airway Encountered?: No      Complications:  None    Airway Device:  Oral endotracheal tube    Airway Device Size:  7.5    Style/Cuff Inflation:  Cuffed (inflated to minimal occlusive pressure)    Inflation Amount (mL):  10    Tube secured:  22    Secured at:  The lips    Placement Verified By:  Capnometry    Complicating Factors:  None    Findings Post-Intubation:  BS equal bilateral and atraumatic/condition of teeth unchanged       Drips:    heparin (porcine) in D5W  0-40 Units/kg/hr Intravenous Continuous 15.6 mL/hr at 04/29/25 0612 13 Units/kg/hr at 04/29/25 0612       Problem List[1]    Review of patient's allergies indicates:   Allergen Reactions    Zoloft [sertraline] Other (See Comments)     hyponatremia    Codeine Itching     Intolerance- doesn't like the way it makes him feel       Current Inpatient Medications:   buPROPion  150 mg Oral BID    folic acid  1 mg Oral Daily    gabapentin  300 mg Oral BID    melatonin  6 mg Oral Nightly    multivitamin  1 tablet Oral Daily    NIFEdipine  90 mg Oral Daily    polyethylene glycol  17 g Oral BID    senna  8.6 mg Oral BID    thiamine  100 mg Oral Daily       Medications Ordered Prior to Encounter[2]    Past Surgical History:   Procedure Laterality Date    APPLICATION OF WOUND VACUUM-ASSISTED CLOSURE DEVICE Right 4/21/2025    Procedure: APPLICATION, WOUND VAC;  Surgeon: Estella Ramsay MD;  Location: [facility] OR [address] [place] [address];  Service: General;  Laterality: Right;    APPLICATION OF WOUND VACUUM-ASSISTED  CLOSURE DEVICE Right 4/24/2025    Procedure: APPLICATION, WOUND VAC;  Surgeon: Estella Ramsay MD;  Location: [facility] OR 2ND FLR;  Service: General;  Laterality: Right;    COLONOSCOPY N/A 9/19/2024    Procedure: COLONOSCOPY;  Surgeon: Mo Patino MD;  Location: [facility] (4TH FLR);  Service: Endoscopy;  Laterality: N/A;  Ref by Dr. NIGHAT Tamez, PT/INR/CBC am procedure, Suprep, email - PC  9/16-lvm for pre call-tb-labs 8/22/24    ESOPHAGOGASTRODUODENOSCOPY N/A 9/19/2024    Procedure: EGD (ESOPHAGOGASTRODUODENOSCOPY);  Surgeon: Mo Patino MD;  Location: [facility] (4TH FLR);  Service: Endoscopy;  Laterality: N/A;    LIVER BIOPSY      REPLACEMENT OF WOUND VACUUM-ASSISTED CLOSURE DEVICE Right 4/24/2025    Procedure: REPLACEMENT, WOUND VAC;  Surgeon: Estella Ramsay MD;  Location: [facility] OR 2ND FLR;  Service: General;  Laterality: Right;    REPLACEMENT OF WOUND VACUUM-ASSISTED CLOSURE DEVICE Right 4/28/2025    Procedure: REPLACEMENT, WOUND VAC;  Surgeon: Jose G Mackay MD;  Location: [facility] OR 2ND FLR;  Service: General;  Laterality: Right;    WOUND DEBRIDEMENT Right 4/21/2025    Procedure: DEBRIDEMENT, WOUND;  Surgeon: Estella Ramsay MD;  Location: [facility] OR 2ND FLR;  Service: General;  Laterality: Right;       Social History[3]    OBJECTIVE:     Vital Signs Range (Last 24H):  Temp:  [36.4 °C (97.5 °F)-36.7 °C (98 °F)]   Pulse:  [62-82]   Resp:  [10-20]   BP: (118-181)/(65-87)   SpO2:  [95 %-100 %]       Significant Labs:  Lab Results   Component Value Date    WBC 8.57 04/29/2025    HGB 10.9 (L) 04/29/2025    HCT 34.3 (L) 04/29/2025     04/29/2025    CHOL 184 01/15/2025    TRIG 65 01/15/2025    HDL 80 (H) 01/15/2025    ALT 23 04/29/2025    AST 21 04/29/2025     (L) 04/29/2025    K 5.2 (H) 04/29/2025     04/29/2025    CREATININE 1.1 04/29/2025    BUN 23 04/29/2025    CO2 26 04/29/2025    TSH 3.456 01/15/2025    PSA 5.77 (H) 03/22/2025    INR 1.0 04/25/2025    HGBA1C 4.7  01/15/2025       Diagnostic Studies: No relevant studies.    EKG:   Results for orders placed or performed during the hospital encounter of 04/18/25   EKG 12-lead    Collection Time: 04/18/25  2:26 AM   Result Value Ref Range    QRS Duration 82 ms    [facility] QTC Calculation 437 ms    Narrative    Test Reason : E87.5,    Vent. Rate :  83 BPM     Atrial Rate :  83 BPM     P-R Int : 158 ms          QRS Dur :  82 ms      QT Int : 372 ms       P-R-T Axes :  42  36  39 degrees    QTcB Int : 437 ms    Normal sinus rhythm  Abnormal R wave progression in the precordial leads - Cannot rule out  Anterior infarct ,age undetermined but doubt possibly lead placement  Abnormal ECG  When compared with ECG of 15-Dec-2023 14:09,  Minimal criteria for Anterior infarct are now possible  Confirmed by Froylan Pineda ([id number]) on 4/18/2025 9:13:03 AM    Referred By: AAAREFERRAL SELF           Confirmed By: Froylan Pineda       2D ECHO:  TTE:  Results for orders placed or performed during the hospital encounter of 04/18/25   Echo   Result Value Ref Range    LV Diastolic Volume 102 mL    Echo EF Estimated 72 %    LV Systolic Volume 28 mL    IVS 0.8 0.6 - 1.1 cm    LVIDd 4.7 3.5 - 6.0 cm    LVIDs 2.8 2.1 - 4.0 cm    LVOT diameter 2.2 cm    PW 0.9 0.6 - 1.1 cm    AV LVOT peak gradient 6 mmHg    LVOT mn grad 3 mmHg    LVOT peak talib 1.2 m/s    LVOT peak VTI 24.6 cm    RV- shannon basal diam 3.1 cm    RV S' 16.60 cm/s    LA size 3.6 cm    Left Atrium Major Axis 5.4 cm    Left Atrium Minor Axis 5.8 cm    LA Vol (MOD) 69 mL    RA Major Axis 5.00 cm    RA Area 9.2 cm2    AV valve area 3.4 cm2    AV area by cont VTI 3.3 cm2    AV peak gradient 8 mmHg    AV mean gradient 5 mmHg    Ao peak talib 1.4 m/s    Ao VTI 27.3 cm    MV Peak A Talib 0.64 m/s    E wave deceleration time 241 ms    MV Peak E Talib 0.92 m/s    E/A ratio 1.44     LV LATERAL E/E' RATIO 7.7     LV SEPTAL E/E' RATIO 10.2     TDI LATERAL 0.12 m/s    TDI SEPTAL 0.09 m/s    Ascending aorta 3.39 cm    STJ 3.12 cm     IVC diameter 1.99 cm    Sinus 3.85 cm    LA WIDTH 4.2 cm    RA Width 2.41 cm    TAPSE 2.49 cm    BSA 2.52 m2    LVOT stroke volume 93.5 cm3    LV Systolic Volume Index 11.3 mL/m2    LV Diastolic Volume Index 41.30 mL/m2    LVOT area 3.8 cm2    FS 40.4 28 - 44 %    Left Ventricle Relative Wall Thickness 0.38 cm    LV mass 132.3 g    LV Mass Index 53.6 g/m2    E/E' ratio 9 m/s    JOSSIE 29 mL/m2    LA Vol 72 cm3    RV/LV Ratio 0.66 cm    JOSSIE (MOD) 28 mL/m2    AV Velocity Ratio 0.86     AV index (prosthetic) 0.90     NEGRITO by Velocity Ratio 3.3 cm²    Mean e' 0.11 m/s    ZLVIDS -7.97     ZLVIDD -10.10     Est. RA pres 3 mmHg    Narrative      Left Ventricle: The left ventricle is normal in size. Normal wall   thickness. There is normal systolic function with a visually estimated   ejection fraction of 65 - 70%. There is normal diastolic function.    Right Ventricle: The right ventricle is normal in size. Wall thickness   is normal. Systolic function is normal.    IVC/SVC: Normal venous pressure at 3 mmHg.         STEPHANIE:  No results found for this or any previous visit.    ASSESSMENT/PLAN:           Pre-op Assessment    I have reviewed the Patient Summary Reports.     I have reviewed the Nursing Notes. I have reviewed the NPO Status.   I have reviewed the Medications.     Review of Systems  Anesthesia Hx:             Denies Family Hx of Anesthesia complications.    Denies Personal Hx of Anesthesia complications.                    Social:  Alcohol Use       Hematology/Oncology:                      Current/Recent Cancer.            Oncology Comments: HCC undergoing Y90 treatment     Cardiovascular:     Denies Pacemaker. Hypertension    Denies CABG/stent.                                       Pulmonary:     Denies Asthma.   Denies Shortness of breath.                  Hepatic/GI:      Liver Disease, Hepatitis              Neurological:  Denies TIA.  Denies CVA.    Denies Seizures.                                 Psych:  Psychiatric History                  Physical Exam  General: Well nourished, Cooperative, Alert and Oriented    Airway:  Mallampati: III / II  Mouth Opening: Normal  TM Distance: Normal  Tongue: Normal  Neck ROM: Normal ROM    Dental:  Intact        Anesthesia Plan  Type of Anesthesia, risks & benefits discussed:    Anesthesia Type: Gen ETT  Intra-op Monitoring Plan: Standard ASA Monitors  Post Op Pain Control Plan: multimodal analgesia and IV/PO Opioids PRN  Induction:  IV  Airway Plan: Video and Direct, Post-Induction  Informed Consent: Informed consent signed with the Patient and all parties understand the risks and agree with anesthesia plan.  All questions answered.   ASA Score: 3  Day of Surgery Review of History & Physical: H&P Update referred to the surgeon/provider.    Ready For Surgery From Anesthesia Perspective.     .           [1]   Patient Active Problem List  Diagnosis    Glaucoma    History of hepatitis C, s/p successful Rx w/ SVR24 - 1/2017    Cirrhosis    Anxiety    Erectile dysfunction    Essential hypertension    Alcohol use disorder, severe, dependence    Obesity (BMI 30-39.9)    Gross hematuria    Diverticulosis: seen on colonoscopy 2014    Hyponatremia    Septic arthritis of right ankle    Bacterial conjunctivitis    Major depressive disorder in full remission    Alcohol withdrawal, uncomplicated    Fall    Homelessness    Impaired functional mobility and endurance    Nocturnal hypoxia    Primary osteoarthritis of right knee    Primary osteoarthritis of left knee    Rhabdomyolysis    Debility    Discharge planning issues    Irritant contact dermatitis due to fecal, urinary or dual incontinence    Alcohol-induced polyneuropathy    Calcified granuloma of lung    Purpura    HCC (hepatocellular carcinoma)    Benign prostatic hyperplasia    Pain following surgery or procedure    Hematoma    Pain of right lower extremity    Acute deep vein thrombosis (DVT) of right lower extremity     Pseudoaneurysm of femoral artery    Hyperkalemia    Leg wound, right   [2]   No current facility-administered medications on file prior to encounter.     Current Outpatient Medications on File Prior to Encounter   Medication Sig Dispense Refill    amLODIPine (NORVASC) 2.5 MG tablet Take 1 tablet (2.5 mg total) by mouth once daily. 90 tablet 3    buPROPion (WELLBUTRIN SR) 150 MG TBSR 12 hr tablet Take 1 tablet (150 mg total) by mouth 2 (two) times daily. 180 tablet 1    gabapentin (NEURONTIN) 300 MG capsule One in AM and two at bedtime 90 capsule 6    losartan (COZAAR) 100 MG tablet Take 1 tablet (100 mg total) by mouth once daily. 90 tablet 3    multivitamin Tab Take 1 tablet by mouth once daily. (Patient not taking: Reported on 3/27/2025) 60 tablet 0    oxyCODONE-acetaminophen (PERCOCET)  mg per tablet Take 1 tablet by mouth every 6 (six) hours as needed for Pain. 5 tablet 0   [3]   Social History  Socioeconomic History    Marital status:    Tobacco Use    Smoking status: Former     Types: Cigars    Smokeless tobacco: Never    Tobacco comments:     smokes cigars 20 per month   Substance and Sexual Activity    Alcohol use: Yes     Alcohol/week: 28.0 standard drinks of alcohol     Types: 28 Cans of beer per week     Comment: 2-3 beers daily    Drug use: No   Social History Narrative     (mental health issues), retired  at [employer]. No kids. 2 dogs. No exercise.     Social Drivers of Health     Financial Resource Strain: Low Risk  (4/21/2025)    Overall Financial Resource Strain (CARDIA)     Difficulty of Paying Living Expenses: Not very hard   Recent Concern: Financial Resource Strain - Medium Risk (4/19/2025)    Overall Financial Resource Strain (CARDIA)     Difficulty of Paying Living Expenses: Somewhat hard   Food Insecurity: No Food Insecurity (4/21/2025)    Hunger Vital Sign     Worried About Running Out of Food in the Last Year: Never true     Ran Out of Food in the Last Year: Never  true   Transportation Needs: No Transportation Needs (4/21/2025)    PRAPARE - Transportation     Lack of Transportation (Medical): No     Lack of Transportation (Non-Medical): No   Physical Activity: Inactive (4/21/2025)    Exercise Vital Sign     Days of Exercise per Week: 0 days     Minutes of Exercise per Session: 0 min   Stress: Stress Concern Present (4/21/2025)    [employer] [place] [employer] - Occupational Stress Questionnaire     Feeling of Stress : To some extent   Housing Stability: Low Risk  (4/21/2025)    Housing Stability Vital Sign     Unable to Pay for Housing in the Last Year: No     Homeless in the Last Year: No

## 2025-04-29 NOTE — PLAN OF CARE
Problem: Occupational Therapy  Goal: Occupational Therapy Goal  Description: Goals to be met by: 5/25/2025     Patient will increase functional independence with ADLs by performing:    UE Dressing with [place].  LE Dressing with [place].  Grooming while standing at sink with [place].  Toileting from toilet with [place] for hygiene and clothing management.   Toilet transfer to toilet with [place].    Outcome: Progressing     Goals remain appropriate.

## 2025-04-29 NOTE — PLAN OF CARE
Recommendations  1. Continue Low Na diet.   2. Offer Boost once daily for additonal protein (wound healing)   3. Vitamin C 500 mg BID and Zinc 220mg once daily x 30 days.   4. Monitor wound healing.   5. Document weekly weights.   6. Document PO itnake in flow sheets.    Goals:   1. Maintain % EEN.   2. Wound healing.   3. Weight maintenance.    Nutrition Goal Status: new

## 2025-04-29 NOTE — ASSESSMENT & PLAN NOTE
Patient's blood pressure range in the last 24 hours was: BP  Min: 118/65  Max: 155/66.The patient's inpatient anti-hypertensive regimen is listed below:  Current Antihypertensives  NIFEdipine 24 hr tablet 90 mg, Daily, Oral  Hold ARB due to hyperkalemia  Plan  - BP is uncontrolled, will adjust as follows: change to nifedipine

## 2025-04-29 NOTE — ASSESSMENT & PLAN NOTE
Hyponatremia is likely due to Cirrhosis. The patient's most recent sodium results are listed below.  Recent Labs     04/27/25  0700 04/28/25  0719 04/29/25  0459   * 133* 135*     Plan  - Correct the sodium by 4-6mEq in 24 hours.   - Fluid and salt restriction  - Monitor sodium Daily.   - Patient hyponatremia is stable

## 2025-04-29 NOTE — PROGRESS NOTES
Plastic and Reconstructive Surgery   Progress Note    Subjective:    No issues  Discussed again Risks, benefits, alternatives with OR, he still wishes to proceed w/ OR tomorrow  Pain controlled  Vac in place, min output    Objective:  Vital signs in last 24 hours:  Temp:  [97.5 °F (36.4 °C)-98 °F (36.7 °C)] 97.8 °F (36.6 °C)  Pulse:  [62-82] 72  Resp:  [10-20] 18  SpO2:  [95 %-100 %] 99 %  BP: (118-179)/(65-87) 144/67    Intake/Output last 3 shifts:  I/O last 3 completed shifts:  In: 940 [P.O.:640; IV Piggyback:300]  Out: 3250 [Urine:3150; Other:100]    Intake/Output this shift:  No intake/output data recorded.        Physical Exam:  VITAL SIGNS:   Vitals:    25 0117 25 0119 25 0802 25 0836   BP: 133/66  (!) 144/67    BP Location: Left arm      Patient Position: Lying      Pulse: 73  72    Resp: 20 20 18 18   Temp:   97.8 °F (36.6 °C)    TempSrc:       SpO2: 98%  99%    Weight:       Height:         TMAX: Temp (24hrs), Av.7 °F (36.5 °C), Min:97.5 °F (36.4 °C), Max:98 °F (36.7 °C)    General: Alert; No acute distress  Cardiovascular: Regular rate   Respiratory: Normal respiratory effort. Chest rise symmetric.   Abdomen: Soft, nontender, nondistended  Extremity: RLE wound in w/vac in place, min output  Neurologic: No focal deficit. Speech normal      Scheduled Medications buPROPion, 150 mg, BID  folic acid, 1 mg, Daily  gabapentin, 300 mg, BID  melatonin, 6 mg, Nightly  multivitamin, 1 tablet, Daily  NIFEdipine, 90 mg, Daily  polyethylene glycol, 17 g, BID  senna, 8.6 mg, BID  thiamine, 100 mg, Daily      PRN Medications   Current Facility-Administered Medications:     acetaminophen, 1,000 mg, Oral, Q8H PRN    albuterol-ipratropium, 3 mL, Nebulization, Q4H PRN    bisacodyL, 10 mg, Rectal, Daily PRN    dextrose 50%, 12.5 g, Intravenous, PRN    dextrose 50%, 25 g, Intravenous, PRN    glucagon (human recombinant), 1 mg, Intramuscular, PRN    glucose, 16 g, Oral, PRN    glucose, 24 g, Oral,  PRN    HYDROmorphone, 1 mg, Intravenous, Q6H PRN    hydrOXYzine pamoate, 25 mg, Oral, Q8H PRN    LORazepam, 2 mg, Oral, Q4H PRN    ondansetron, 8 mg, Oral, Q8H PRN    oxyCODONE, 5 mg, Oral, Q4H PRN    oxyCODONE, 10 mg, Oral, Q6H PRN    promethazine, 25 mg, Oral, Q6H PRN    sodium chloride 0.9%, 10 mL, Intravenous, PRN    Recent Labs:   Lab Results   Component Value Date    WBC 8.57 04/29/2025    HGB 10.9 (L) 04/29/2025    HCT 34.3 (L) 04/29/2025     (H) 04/29/2025     04/29/2025     Lab Results   Component Value Date     (H) 01/15/2025     (L) 04/29/2025    K 5.2 (H) 04/29/2025     04/29/2025    BUN 23 04/29/2025         Assessment: 70 y.o. y/o male 1 Day Post-Op s/p Procedure(s):  REPLACEMENT, WOUND VAC     Plan  Plan for OR tomorrow for STSG vs skin sub, poss vac   NPO mn  Continue abx  Hold Hep gtt on way to OR in Am     Discussed with patient and/or family the risks and benefits of surgical intervention.  Conservative measures have been exhausted and patient would like to proceed with surgery.      We have discussed risks, which include but are not limited to blood clots in the legs that can travel to the lungs (pulmonary embolism). Pulmonary embolism can cause shortness of breath, chest pain, and even shock. Other risks include urinary tract infection, nausea and vomiting (usually related to pain medication), chronic pain, bleeding, nerve damage, blood vessel injury, scarring and infection, which can require re-operation. Furthermore, the risks of anesthesia include potential heart, lung, kidney, and liver damage.  Informed consent was obtained.  The patient understands and would like to proceed with surgery.        Pk Duque MD  Plastic Surgery Fellow  04/29/2025

## 2025-04-29 NOTE — PROGRESS NOTES
"  [facility] - Telemetry Stepdown  Adult Nutrition  Consult Note    SUMMARY     Recommendations  1. Continue Low Na diet.   2. Offer Boost once daily for additonal protein (wound healing)   3. Vitamin C 500 mg BID and Zinc 220mg once daily x 30 days.   4. Monitor wound healing.   5. Document weekly weights.   6. Document PO itnake in flow sheets.    Goals:   1. Maintain % EEN.   2. Wound healing.   3. Weight maintenance.    Nutrition Goal Status: new  Communication of RD Recs: other (comment) (POC)    Nutrition Discharge Planning     Nutrition Discharge Planning: Therapeutic diet (comments)  Therapeutic diet (comments): Low Sodium    Assessment and Plan    No new Assessment & Plan notes have been filed under this hospital service since the last note was generated.  Service: Nutrition       Nutrition Related Social Determinants of Health:   SDOH: None Identified    Nutrition Problem  Increased nutritional needs (protein)    Related to (etiology):   Increased metabolic needs    Signs and Symptoms (as evidenced by):   Incision wound, wound vac.      Interventions/Recommendations (treatment strategy):  1. Offer Boost once daily for additonal protein (wound healing).   2. Vitamin C 500 mg BID and Zinc 220mg once daily x 30 days.     Nutrition Diagnosis Status:   New    Reason for Assessment    Reason For Assessment: length of stay  Diagnosis: other (see comments) (DVT)  General Information Comments: Patient assessed today for LOS. Receives low sodium diet. PO intake is 100%. Provided low sodium nutrition education. Patient verbalized understanding. Weight is stable. Pts. current weight status and dietary data does not indicate risk for malnutrition. BLE edema noted. Incision to right leg noted. LBM 4/27/25. RD team following.    Nutrition/Diet History    Spiritual, Cultural Beliefs, Temple Practices, Values that Affect Care: no  Food Allergies: NKFA    Anthropometrics    Height: 6' 3" (190.5 cm)  Height (inches): " 75 in  Height Method: Stated  Weight: 120 kg (264 lb 8.8 oz)  Weight (lb): 264.55 lb  Weight Method: Stated  Ideal Body Weight (IBW), Male: 196 lb  % Ideal Body Weight, Male (lb): 134.97 %  BMI (Calculated): 33.1       Lab/Procedures/Meds    Pertinent Labs Reviewed: reviewed  Pertinent Labs Comments: 4/29/25: H/H 10.9/34.3L, Na 135L, K 5.2H, Alb 2.9L  Pertinent Medications Reviewed: reviewed  Pertinent Medications Comments: Folic acid, thiamine, gabapentin, multivitamin, nifedipine, polyethylene glycol, senna, heparin    Estimated/Assessed Needs    Weight Used For Calorie Calculations: 120 kg (264 lb 8.8 oz)  Energy Calorie Requirements (kcal): 2160- 2400/day (18-20/kg CBW)     Protein Requirements: 108- 132gm/day  (0.9- 1.1gm/kg CBW)  Weight Used For Protein Calculations: 120 kg (264 lb 8.8 oz)        RDA Method (mL): 2160         Nutrition Prescription Ordered    Current Diet Order: Low Sodium    Evaluation of Received Nutrient/Fluid Intake    Tolerance: tolerating  % Intake of Estimated Energy Needs: 75 - 100 %  % Meal Intake: 75 - 100 %    Nutrition Risk    Level of Risk/Frequency of Follow-up: moderate       Monitor and Evaluation    Monitor and Evaluation: Protein intake, Food and beverage intake, Diet order, Weight, Skin       Nutrition Follow-Up    RD Follow-up?: Yes

## 2025-04-29 NOTE — PROGRESS NOTES
[facility] - Telemetry [facility] Medicine  Progress Note    Patient Name: Froylan Borja  MRN: [id number]  Patient Class: IP- Inpatient   Admission Date: 4/18/2025  Length of Stay: 11 days  Attending Physician: Tamra Nguyen MD  Primary Care Provider: Janki Tmaez MD        Subjective     Principal Problem:Acute deep vein thrombosis (DVT) of right lower extremity        HPI:  Mr. Froylan Borja is a 71 y/o male with a PMHx alcohol abuse, cirrhosis, hepatocellular carcinoma, and HTN who presents for complaint of R leg pain and swelling. He reports yesterday he hit his R leg on the car door and shortly developed a bruise and pain to the R lateral leg. He tried tylenol at-home though pain, swelling, and bruising continued to worsen. Admits to paresthesias and numbness to the R foot as well as significant R foot pain with weight-bearing. Denies CP, palpitations, SOB, lightheadedness, dizziness, headaches, LOC, vomiting, diarrhea, abdominal pain, fever, chills. Reports some nausea following morphine and dilaudid for pain in ED. Of note, patient had angiogram y90 mapping study done with IR last Thursday (4/10/25). Has developed some bruising to the medial thigh and mild groin discomfort since procedure. Social history significant for daily alcohol consumption, 3-4 beers daily, last alcoholic beverage yesterday around 3PM.     In the ED, AF HDS. H&H 13.0/38.6 (BL~14.8), no leukocytosis. HypoNa 129, HyperK 5.7, CO2 20, Ca 8.6, Albumin 3.4, remainder of CMP unremarkable. PT/INR wnl at 10.9/1.0. LA 1.6. CPK wnl. Hep C Ab reactive, HCV RNA pending. EKG with normal sinus rhythm. R Lower Ext U/S w/ findings of nonocclusive thrombus in the right common femoral vein and right common femoral artery pseudoaneurysm. CTA Lower Ext pending. R lower extremity hematoma drained at bedside by general surgery with evacuation of 120 cc of hematoma. Compressive dressing applied. Given nebulized albuterol, lokelma, IV cefazolin, IV  dilaudid, IV morphine, IV zofran. Admitted to .     Overview/Hospital Course:  70 y.o M with HTN, alcohol/HepC cirrhosis c/b HCC s/p Y-90 mapping angiogram on 4/10 who presents with bruising and pain at angiogram site and RLE calf pain after he hit it on a car door. RLE U/S showed non-occlusive CFV thrombus and R CFA pseudoaneurysm. CTA re-demonstrated the psdueoaneurysm with active extravasation within the sac as well as a large R calf hematoma which GenSurg were consulted for and successfully drained (120ccs). Vascular Surgery were consulted, recommended IR guided percutaneous thrombin injection which IR were able to complete. Heparin gtt was started. Groin U/S done the following day showed continued thrombosis of the pseudoanurysm. He was taken to the OR on 4/21 for debridement and placement of wound vac. He was taken back to the OR on 4/24 for further debridement and wound vac change. OR 4/28 for vac reapplication. RLE wound exploration, debridement, possible split-thickness skin grafting versus skin substitute, wound VAC placement 4/30 with Plastics.    Interval History: NAEON or complaints. Plastics taking to OR tomorrow.     Review of Systems   Constitutional:  Negative for chills and fever.   Respiratory:  Negative for chest tightness and shortness of breath.    Cardiovascular:  Negative for chest pain and leg swelling.   Gastrointestinal:  Negative for abdominal pain and nausea.   Genitourinary: Negative.    Musculoskeletal:  Negative for myalgias.   Skin:  Positive for color change and wound.   Neurological:  Negative for dizziness and weakness.     Objective:     Vital Signs (Most Recent):  Temp: 97.9 °F (36.6 °C) (04/29/25 1143)  Pulse: 67 (04/29/25 1143)  Resp: 18 (04/29/25 1143)  BP: (!) 148/68 (04/29/25 1143)  SpO2: 98 % (04/29/25 1143) Vital Signs (24h Range):  Temp:  [97.6 °F (36.4 °C)-97.9 °F (36.6 °C)] 97.9 °F (36.6 °C)  Pulse:  [67-75] 67  Resp:  [16-20] 18  SpO2:  [96 %-99 %] 98 %  BP:  (118-155)/(65-68) 148/68     Weight: 120 kg (264 lb 8.8 oz)  Body mass index is 33.07 kg/m².    Intake/Output Summary (Last 24 hours) at 4/29/2025 1252  Last data filed at 4/29/2025 1203  Gross per 24 hour   Intake 880 ml   Output 1575 ml   Net -695 ml         Physical Exam  Vitals and nursing note reviewed.   Constitutional:       General: He is not in acute distress.     Appearance: He is well-developed. He is not ill-appearing.   HENT:      Head: Normocephalic and atraumatic.   Eyes:      Pupils: Pupils are equal, round, and reactive to light.   Cardiovascular:      Rate and Rhythm: Normal rate and regular rhythm.   Pulmonary:      Effort: Pulmonary effort is normal.      Breath sounds: Normal breath sounds.   Abdominal:      Palpations: Abdomen is soft.      Tenderness: There is no abdominal tenderness.   Musculoskeletal:      Comments: RLE wound vac in place  Sensation intact   Skin:     General: Skin is warm and dry.   Neurological:      Mental Status: He is alert and oriented to person, place, and time.   Psychiatric:         Behavior: Behavior normal.               Significant Labs: All pertinent labs within the past 24 hours have been reviewed.    Significant Imaging: I have reviewed all pertinent imaging results/findings within the past 24 hours.      Assessment & Plan  Acute deep vein thrombosis (DVT) of right lower extremity  Hematoma  Leg wound, right  - RLE US showed a nonocclusive thrombus in the right common femoral vein   - Started Heparin gtt on 4/18 once cleared from IR standpoint  - Hg had been down-trending and dressings saturated with blood. - Hgb stabilized   - If able to tolerate anticoagulation, will transition to oral DOAC when not needing further procedures. If not, will need to discuss IVC filter  - General surgery consulted- hematoma evacuation performed at bedside  - S/p  wound debridement and wound vac placement on 4/21. and 4/24 for wound debridement + vac reapplication  - Wound Vac  in place  - MM pain regimen   - Plan for PT assessment after surgery if needed - has been ambulating to the bathroom   - Heparin gtt   - OR with Plastics 4/30  Essential hypertension  Patient's blood pressure range in the last 24 hours was: BP  Min: 118/65  Max: 155/66.The patient's inpatient anti-hypertensive regimen is listed below:  Current Antihypertensives  NIFEdipine 24 hr tablet 90 mg, Daily, Oral  Hold ARB due to hyperkalemia  Plan  - BP is uncontrolled, will adjust as follows: change to nifedipine  Alcohol use disorder, severe, dependence  - daily drinker, last drink 4/17  - 3-4 beers daily, denies withdrawal symptoms  - continue thiamine, folic acid and multivitamin  - CIWA per nursing   Obesity (BMI 30-39.9)  Body mass index is 33.07 kg/m². Morbid obesity complicates all aspects of disease management from diagnostic modalities to treatment. Weight loss encouraged and health benefits explained to patient.   Hyponatremia  Hyponatremia is likely due to Cirrhosis. The patient's most recent sodium results are listed below.  Recent Labs     04/27/25  0700 04/28/25  0719 04/29/25  0459   * 133* 135*     Plan  - Correct the sodium by 4-6mEq in 24 hours.   - Fluid and salt restriction  - Monitor sodium Daily.   - Patient hyponatremia is stable  Pseudoaneurysm of femoral artery  - RLE US showed a new right common femoral artery pseudoaneurysm   - CTA RLE showed extravasation within the pseudoaneurysm sac  - Vascular Surgery consulted- recommended IR guided thrombin injection. IR consulted, completed procedure on 4/18.   - Groin u/s on 4/19 showed continued thrombosis of the pseudoaneurysm  - Will need repeat U/S outpatient in ~ 1 week and f/u with IR - if still hospitalized may get inpatient early next week and have IR follow up  Hyperkalemia  Hyperkalemia is likely due to  multiple afctors .The patients most recent potassium results are listed below.  Recent Labs     04/27/25  0700 04/28/25  0719  04/29/25  0459   K 5.0 4.4 5.2*     Plan  - Monitor for arrhythmias with EKG and/or continuous telemetry.   - Treat the hyperkalemia with Potassium Binders.   - Monitor potassium: Daily  - Hold ARB     VTE Risk Mitigation (From admission, onward)           Ordered     heparin 25,000 units in dextrose 5% 250 ml (100 units/mL) infusion MINIMAL INTENSITY nomogram - [facility]  Continuous        Question:  Begin at (units/kg/hr)  Answer:  12 04/28/25 1113     heparin 25,000 units in dextrose 5% 250 ml (100 units/mL) infusion MINIMAL INTENSITY nomogram - [facility]  Continuous        Question:  Begin at (units/kg/hr)  Answer:  12 04/25/25 0808     IP VTE HIGH RISK PATIENT  Once         04/18/25 0725     Reason for No Pharmacological VTE Prophylaxis  Once        Question:  Reasons:  Answer:  Risk of Bleeding  Comment:  will start anticoagulation once medically cleared    04/18/25 0725                    Discharge Planning   KENNETH: 5/2/2025     Code Status: Full Code   Medical Readiness for Discharge Date:   Discharge Plan A: Home, Home with family                Please place Justification for DME        Tamra Nguyen MD  Department of Hospital Medicine   [facility] - Telemetry Stepdown

## 2025-04-29 NOTE — PT/OT/SLP PROGRESS
"Occupational Therapy   Treatment    Name: Froylan Borja  MRN: [id number]  Admitting Diagnosis:  Acute deep vein thrombosis (DVT) of right lower extremity  1 Day Post-Op    Recommendations:     Discharge Recommendations: Low Intensity Therapy  Discharge Equipment Recommendations:  walker, rolling  Barriers to discharge:  None    Assessment:     Froylan Borja is a 70 y.o. male with a medical diagnosis of Acute deep vein thrombosis (DVT) of right lower extremity.  He presents with good motivation and participation. Performance deficits affecting function are weakness, impaired endurance, impaired self care skills, impaired functional mobility, gait instability, decreased lower extremity function, decreased safety awareness, impaired cardiopulmonary response to activity, edema, impaired skin.     Rehab Prognosis:  Good; patient would benefit from acute skilled OT services to address these deficits and reach maximum level of function.       Plan:     Patient to be seen 4 x/week to address the above listed problems via self-care/home management, therapeutic activities, therapeutic exercises, neuromuscular re-education  Plan of Care Expires: 05/25/25  Plan of Care Reviewed with: patient    Subjective     Chief Complaint: "I would like to get out of this bed."  Patient/Family Comments/goals: Return to PLOF  Pain/Comfort:  Pain Rating 1: 1/10  Location - Side 1: Right  Location - Orientation 1: lower  Location 1: leg  Pain Addressed 1: Reposition, Distraction  Pain Rating Post-Intervention 1: 1/10    Objective:     Communicated with: RN prior to session.  Patient found supine with wound vac, peripheral IV (no family present) upon OT entry to room.    General Precautions: Standard, fall    Orthopedic Precautions:N/A  Braces: N/A  Respiratory Status: Room air     Occupational Performance:     Bed Mobility:    Patient completed Scooting/Bridging with stand by assistance to scoot forward seated at EOB.   Patient completed Supine to Sit " with stand by assistance     Functional Mobility/Transfers:  Patient completed Sit <> Stand Transfer with contact guard assistance  with  rolling walker from EOB.   Functional Mobility: Patient completed functional mobility of household/community distances with CGA using rolling walker in room and hallway.     Activities of Daily Living:  Grooming: stand by assistance to brush teeth standing at bathroom sink.   Upper Body Dressing: minimum assistance to don gown around back seated at EOB. Pt required min A due to line management.       [facility] 6 Click ADL: 19    Treatment & Education:  Educated patient regarding importance of having assistance from staff for safety when he would like to get up.     Pt had no further questions & when asked whether there were any concerns pt reported none.       Patient left up in chair with all lines intact, call button in reach, and RN notified    GOALS:   Multidisciplinary Problems       Occupational Therapy Goals          Problem: Occupational Therapy    Goal Priority Disciplines Outcome Interventions   Occupational Therapy Goal     OT, PT/OT Progressing    Description: Goals to be met by: 5/25/2025     Patient will increase functional independence with ADLs by performing:    UE Dressing with Stevens.  LE Dressing with Stevens.  Grooming while standing at sink with Stevens.  Toileting from toilet with Stevens for hygiene and clothing management.   Toilet transfer to toilet with Stevens.                         DME Justifications:   Froylan's mobility limitation cannot be sufficiently resolved by the use of a cane. His functional mobility deficit can be sufficiently resolved with the use of a Rolling Walker. Patient's mobility limitation significantly impairs their ability to participate in one of more activities of daily living.  The use of a RW will significantly improve the patient's ability to participate in MRADLS and the patient will use it on regular  basis in the home.    Time Tracking:     OT Date of Treatment: 04/29/25  OT Start Time: 1150  OT Stop Time: 1216  OT Total Time (min): 26 min    Billable Minutes:Self Care/Home Management 10  Therapeutic Activity 16    OT/MARQUITA: OT     Number of MARQUITA visits since last OT visit: 0    4/29/2025

## 2025-04-29 NOTE — ASSESSMENT & PLAN NOTE
Hyperkalemia is likely due to multiple afctors.The patients most recent potassium results are listed below.  Recent Labs     04/27/25  0700 04/28/25  0719 04/29/25  0459   K 5.0 4.4 5.2*     Plan  - Monitor for arrhythmias with EKG and/or continuous telemetry.   - Treat the hyperkalemia with Potassium Binders.   - Monitor potassium: Daily  - Hold ARB

## 2025-04-29 NOTE — ANESTHESIA POSTPROCEDURE EVALUATION
Anesthesia Post Evaluation    Patient: Froylan Borja    Procedure(s) Performed: Procedure(s) (LRB):  REPLACEMENT, WOUND VAC (Right)    Final Anesthesia Type: general      Patient location during evaluation: PACU  Patient participation: Yes- Able to Participate  Level of consciousness: awake and alert  Post-procedure vital signs: reviewed and stable  Pain management: adequate  Airway patency: patent    PONV status at discharge: No PONV  Anesthetic complications: no      Cardiovascular status: blood pressure returned to baseline  Respiratory status: unassisted  Hydration status: euvolemic  Follow-up not needed.              Vitals Value Taken Time   /68 04/29/25 11:43   Temp 36.6 °C (97.9 °F) 04/29/25 11:43   Pulse 67 04/29/25 11:43   Resp 18 04/29/25 11:43   SpO2 98 % 04/29/25 11:43         Event Time   Out of Recovery 04/28/2025 10:15:00         Pain/Matt Score: Pain Rating Prior to Med Admin: 5 (4/29/2025  8:36 AM)  Pain Rating Post Med Admin: 0 (4/29/2025  9:28 AM)  Matt Score: 10 (4/28/2025 10:15 AM)

## 2025-04-29 NOTE — SUBJECTIVE & OBJECTIVE
Interval History: NAEON or complaints. Plastics taking to OR tomorrow.     Review of Systems   Constitutional:  Negative for chills and fever.   Respiratory:  Negative for chest tightness and shortness of breath.    Cardiovascular:  Negative for chest pain and leg swelling.   Gastrointestinal:  Negative for abdominal pain and nausea.   Genitourinary: Negative.    Musculoskeletal:  Negative for myalgias.   Skin:  Positive for color change and wound.   Neurological:  Negative for dizziness and weakness.     Objective:     Vital Signs (Most Recent):  Temp: 97.9 °F (36.6 °C) (04/29/25 1143)  Pulse: 67 (04/29/25 1143)  Resp: 18 (04/29/25 1143)  BP: (!) 148/68 (04/29/25 1143)  SpO2: 98 % (04/29/25 1143) Vital Signs (24h Range):  Temp:  [97.6 °F (36.4 °C)-97.9 °F (36.6 °C)] 97.9 °F (36.6 °C)  Pulse:  [67-75] 67  Resp:  [16-20] 18  SpO2:  [96 %-99 %] 98 %  BP: (118-155)/(65-68) 148/68     Weight: 120 kg (264 lb 8.8 oz)  Body mass index is 33.07 kg/m².    Intake/Output Summary (Last 24 hours) at 4/29/2025 1252  Last data filed at 4/29/2025 1203  Gross per 24 hour   Intake 880 ml   Output 1575 ml   Net -695 ml         Physical Exam  Vitals and nursing note reviewed.   Constitutional:       General: He is not in acute distress.     Appearance: He is well-developed. He is not ill-appearing.   HENT:      Head: Normocephalic and atraumatic.   Eyes:      Pupils: Pupils are equal, round, and reactive to light.   Cardiovascular:      Rate and Rhythm: Normal rate and regular rhythm.   Pulmonary:      Effort: Pulmonary effort is normal.      Breath sounds: Normal breath sounds.   Abdominal:      Palpations: Abdomen is soft.      Tenderness: There is no abdominal tenderness.   Musculoskeletal:      Comments: RLE wound vac in place  Sensation intact   Skin:     General: Skin is warm and dry.   Neurological:      Mental Status: He is alert and oriented to person, place, and time.   Psychiatric:         Behavior: Behavior normal.                Significant Labs: All pertinent labs within the past 24 hours have been reviewed.    Significant Imaging: I have reviewed all pertinent imaging results/findings within the past 24 hours.

## 2025-04-30 ENCOUNTER — ANESTHESIA (OUTPATIENT)
Dept: SURGERY | Facility: HOSPITAL | Age: 70
End: 2025-04-30
Payer: MEDICARE

## 2025-04-30 LAB
ABSOLUTE EOSINOPHIL (OHS): 0.3 K/UL
ABSOLUTE MONOCYTE (OHS): 1.04 K/UL (ref 0.3–1)
ABSOLUTE NEUTROPHIL COUNT (OHS): 6.77 K/UL (ref 1.8–7.7)
ALBUMIN SERPL BCP-MCNC: 2.7 G/DL (ref 3.5–5.2)
ALP SERPL-CCNC: 74 UNIT/L (ref 40–150)
ALT SERPL W/O P-5'-P-CCNC: 23 UNIT/L (ref 10–44)
ANION GAP (OHS): 10 MMOL/L (ref 8–16)
APTT PPP: 52.7 SECONDS (ref 21–32)
AST SERPL-CCNC: 22 UNIT/L (ref 11–45)
BASOPHILS # BLD AUTO: 0.1 K/UL
BASOPHILS NFR BLD AUTO: 1 %
BILIRUB SERPL-MCNC: 0.5 MG/DL (ref 0.1–1)
BUN SERPL-MCNC: 19 MG/DL (ref 8–23)
CALCIUM SERPL-MCNC: 8.5 MG/DL (ref 8.7–10.5)
CHLORIDE SERPL-SCNC: 101 MMOL/L (ref 95–110)
CO2 SERPL-SCNC: 23 MMOL/L (ref 23–29)
CREAT SERPL-MCNC: 1.1 MG/DL (ref 0.5–1.4)
ERYTHROCYTE [DISTWIDTH] IN BLOOD BY AUTOMATED COUNT: 13.2 % (ref 11.5–14.5)
GFR SERPLBLD CREATININE-BSD FMLA CKD-EPI: >60 ML/MIN/1.73/M2
GLUCOSE SERPL-MCNC: 107 MG/DL (ref 70–110)
HCT VFR BLD AUTO: 33.3 % (ref 40–54)
HGB BLD-MCNC: 10.8 GM/DL (ref 14–18)
IMM GRANULOCYTES # BLD AUTO: 0.33 K/UL (ref 0–0.04)
IMM GRANULOCYTES NFR BLD AUTO: 3.3 % (ref 0–0.5)
LYMPHOCYTES # BLD AUTO: 1.33 K/UL (ref 1–4.8)
MAGNESIUM SERPL-MCNC: 1.9 MG/DL (ref 1.6–2.6)
MCH RBC QN AUTO: 32 PG (ref 27–31)
MCHC RBC AUTO-ENTMCNC: 32.4 G/DL (ref 32–36)
MCV RBC AUTO: 99 FL (ref 82–98)
NUCLEATED RBC (/100WBC) (OHS): 0 /100 WBC
PHOSPHATE SERPL-MCNC: 2.7 MG/DL (ref 2.7–4.5)
PLATELET # BLD AUTO: 268 K/UL (ref 150–450)
PMV BLD AUTO: 9.7 FL (ref 9.2–12.9)
POTASSIUM SERPL-SCNC: 4.8 MMOL/L (ref 3.5–5.1)
PROT SERPL-MCNC: 6 GM/DL (ref 6–8.4)
RBC # BLD AUTO: 3.38 M/UL (ref 4.6–6.2)
RELATIVE EOSINOPHIL (OHS): 3 %
RELATIVE LYMPHOCYTE (OHS): 13.5 % (ref 18–48)
RELATIVE MONOCYTE (OHS): 10.5 % (ref 4–15)
RELATIVE NEUTROPHIL (OHS): 68.7 % (ref 38–73)
SODIUM SERPL-SCNC: 134 MMOL/L (ref 136–145)
WBC # BLD AUTO: 9.87 K/UL (ref 3.9–12.7)

## 2025-04-30 PROCEDURE — 15002 WOUND PREP TRK/ARM/LEG: CPT | Mod: HCNC,,, | Performed by: SURGERY

## 2025-04-30 PROCEDURE — 25000003 PHARM REV CODE 250: Mod: HCNC | Performed by: STUDENT IN AN ORGANIZED HEALTH CARE EDUCATION/TRAINING PROGRAM

## 2025-04-30 PROCEDURE — 37000008 HC ANESTHESIA 1ST 15 MINUTES: Mod: HCNC | Performed by: SURGERY

## 2025-04-30 PROCEDURE — 80053 COMPREHEN METABOLIC PANEL: CPT | Mod: HCNC | Performed by: STUDENT IN AN ORGANIZED HEALTH CARE EDUCATION/TRAINING PROGRAM

## 2025-04-30 PROCEDURE — 85730 THROMBOPLASTIN TIME PARTIAL: CPT | Mod: HCNC | Performed by: HOSPITALIST

## 2025-04-30 PROCEDURE — 37000009 HC ANESTHESIA EA ADD 15 MINS: Mod: HCNC | Performed by: SURGERY

## 2025-04-30 PROCEDURE — 71000015 HC POSTOP RECOV 1ST HR: Mod: HCNC | Performed by: SURGERY

## 2025-04-30 PROCEDURE — 25000003 PHARM REV CODE 250: Mod: HCNC

## 2025-04-30 PROCEDURE — 36415 COLL VENOUS BLD VENIPUNCTURE: CPT | Mod: HCNC | Performed by: HOSPITALIST

## 2025-04-30 PROCEDURE — 63600175 PHARM REV CODE 636 W HCPCS: Mod: HCNC

## 2025-04-30 PROCEDURE — 63600175 PHARM REV CODE 636 W HCPCS: Mod: HCNC | Performed by: SURGERY

## 2025-04-30 PROCEDURE — 25000003 PHARM REV CODE 250: Mod: HCNC | Performed by: NURSE ANESTHETIST, CERTIFIED REGISTERED

## 2025-04-30 PROCEDURE — 63600175 PHARM REV CODE 636 W HCPCS: Mod: HCNC | Performed by: NURSE ANESTHETIST, CERTIFIED REGISTERED

## 2025-04-30 PROCEDURE — 20600001 HC STEP DOWN PRIVATE ROOM: Mod: HCNC

## 2025-04-30 PROCEDURE — 0JBL0ZZ EXCISION OF RIGHT UPPER LEG SUBCUTANEOUS TISSUE AND FASCIA, OPEN APPROACH: ICD-10-PCS | Performed by: SURGERY

## 2025-04-30 PROCEDURE — 63600175 PHARM REV CODE 636 W HCPCS: Mod: HCNC | Performed by: HOSPITALIST

## 2025-04-30 PROCEDURE — 36000707: Mod: HCNC | Performed by: SURGERY

## 2025-04-30 PROCEDURE — 36000706: Mod: HCNC | Performed by: SURGERY

## 2025-04-30 PROCEDURE — 94761 N-INVAS EAR/PLS OXIMETRY MLT: CPT | Mod: HCNC

## 2025-04-30 PROCEDURE — 27201423 OPTIME MED/SURG SUP & DEVICES STERILE SUPPLY: Mod: HCNC | Performed by: SURGERY

## 2025-04-30 PROCEDURE — 71000033 HC RECOVERY, INTIAL HOUR: Mod: HCNC | Performed by: SURGERY

## 2025-04-30 PROCEDURE — 29515 APPLICATION SHORT LEG SPLINT: CPT | Mod: 51,XU,HCNC,RT | Performed by: SURGERY

## 2025-04-30 PROCEDURE — 0HRKX74 REPLACEMENT OF RIGHT LOWER LEG SKIN WITH AUTOLOGOUS TISSUE SUBSTITUTE, PARTIAL THICKNESS, EXTERNAL APPROACH: ICD-10-PCS | Performed by: SURGERY

## 2025-04-30 PROCEDURE — 85025 COMPLETE CBC W/AUTO DIFF WBC: CPT | Mod: HCNC | Performed by: STUDENT IN AN ORGANIZED HEALTH CARE EDUCATION/TRAINING PROGRAM

## 2025-04-30 PROCEDURE — 63600175 PHARM REV CODE 636 W HCPCS: Mod: HCNC | Performed by: ANESTHESIOLOGY

## 2025-04-30 PROCEDURE — 84100 ASSAY OF PHOSPHORUS: CPT | Mod: HCNC | Performed by: STUDENT IN AN ORGANIZED HEALTH CARE EDUCATION/TRAINING PROGRAM

## 2025-04-30 PROCEDURE — 15101 SPLT AGRFT T/A/L EA ADDL 100: CPT | Mod: HCNC,,, | Performed by: SURGERY

## 2025-04-30 PROCEDURE — 15100 SPLT AGRFT T/A/L 1ST 100SQCM: CPT | Mod: HCNC,,, | Performed by: SURGERY

## 2025-04-30 PROCEDURE — 83735 ASSAY OF MAGNESIUM: CPT | Mod: HCNC | Performed by: STUDENT IN AN ORGANIZED HEALTH CARE EDUCATION/TRAINING PROGRAM

## 2025-04-30 PROCEDURE — 15003 WOUND PREP ADDL 100 CM: CPT | Mod: HCNC,,, | Performed by: SURGERY

## 2025-04-30 PROCEDURE — 25000003 PHARM REV CODE 250: Mod: HCNC | Performed by: HOSPITALIST

## 2025-04-30 RX ORDER — PHENYLEPHRINE HYDROCHLORIDE 10 MG/ML
INJECTION INTRAVENOUS
Status: DISCONTINUED | OUTPATIENT
Start: 2025-04-30 | End: 2025-04-30

## 2025-04-30 RX ORDER — LIDOCAINE HYDROCHLORIDE AND EPINEPHRINE 10; 10 UG/ML; MG/ML
INJECTION, SOLUTION INFILTRATION; PERINEURAL
Status: DISCONTINUED | OUTPATIENT
Start: 2025-04-30 | End: 2025-04-30 | Stop reason: HOSPADM

## 2025-04-30 RX ORDER — CEFAZOLIN SODIUM 1 G/3ML
INJECTION, POWDER, FOR SOLUTION INTRAMUSCULAR; INTRAVENOUS
Status: DISCONTINUED | OUTPATIENT
Start: 2025-04-30 | End: 2025-04-30

## 2025-04-30 RX ORDER — ONDANSETRON HYDROCHLORIDE 2 MG/ML
INJECTION, SOLUTION INTRAVENOUS
Status: DISCONTINUED | OUTPATIENT
Start: 2025-04-30 | End: 2025-04-30

## 2025-04-30 RX ORDER — PROPOFOL 10 MG/ML
VIAL (ML) INTRAVENOUS
Status: DISCONTINUED | OUTPATIENT
Start: 2025-04-30 | End: 2025-04-30

## 2025-04-30 RX ORDER — HYDROMORPHONE HYDROCHLORIDE 1 MG/ML
0.2 INJECTION, SOLUTION INTRAMUSCULAR; INTRAVENOUS; SUBCUTANEOUS EVERY 5 MIN PRN
Status: DISCONTINUED | OUTPATIENT
Start: 2025-04-30 | End: 2025-04-30 | Stop reason: HOSPADM

## 2025-04-30 RX ORDER — ONDANSETRON HYDROCHLORIDE 2 MG/ML
4 INJECTION, SOLUTION INTRAVENOUS DAILY PRN
Status: DISCONTINUED | OUTPATIENT
Start: 2025-04-30 | End: 2025-04-30 | Stop reason: HOSPADM

## 2025-04-30 RX ORDER — HEPARIN SODIUM,PORCINE/D5W 25000/250
0-40 INTRAVENOUS SOLUTION INTRAVENOUS CONTINUOUS
Status: DISCONTINUED | OUTPATIENT
Start: 2025-04-30 | End: 2025-05-01

## 2025-04-30 RX ORDER — ONDANSETRON HYDROCHLORIDE 2 MG/ML
4 INJECTION, SOLUTION INTRAVENOUS ONCE AS NEEDED
Status: DISCONTINUED | OUTPATIENT
Start: 2025-04-30 | End: 2025-04-30 | Stop reason: HOSPADM

## 2025-04-30 RX ORDER — ACETAMINOPHEN 10 MG/ML
INJECTION, SOLUTION INTRAVENOUS
Status: DISCONTINUED | OUTPATIENT
Start: 2025-04-30 | End: 2025-04-30

## 2025-04-30 RX ORDER — LIDOCAINE HYDROCHLORIDE 20 MG/ML
INJECTION INTRAVENOUS
Status: DISCONTINUED | OUTPATIENT
Start: 2025-04-30 | End: 2025-04-30

## 2025-04-30 RX ORDER — DEXAMETHASONE SODIUM PHOSPHATE 4 MG/ML
INJECTION, SOLUTION INTRA-ARTICULAR; INTRALESIONAL; INTRAMUSCULAR; INTRAVENOUS; SOFT TISSUE
Status: DISCONTINUED | OUTPATIENT
Start: 2025-04-30 | End: 2025-04-30

## 2025-04-30 RX ORDER — ROCURONIUM BROMIDE 10 MG/ML
INJECTION, SOLUTION INTRAVENOUS
Status: DISCONTINUED | OUTPATIENT
Start: 2025-04-30 | End: 2025-04-30

## 2025-04-30 RX ORDER — MIDAZOLAM HYDROCHLORIDE 1 MG/ML
INJECTION INTRAMUSCULAR; INTRAVENOUS
Status: DISCONTINUED | OUTPATIENT
Start: 2025-04-30 | End: 2025-04-30

## 2025-04-30 RX ORDER — FENTANYL CITRATE 50 UG/ML
INJECTION, SOLUTION INTRAMUSCULAR; INTRAVENOUS
Status: DISCONTINUED | OUTPATIENT
Start: 2025-04-30 | End: 2025-04-30

## 2025-04-30 RX ORDER — SODIUM CHLORIDE 9 MG/ML
INJECTION, SOLUTION INTRAVENOUS CONTINUOUS PRN
Status: DISCONTINUED | OUTPATIENT
Start: 2025-04-30 | End: 2025-04-30

## 2025-04-30 RX ADMIN — MIDAZOLAM HYDROCHLORIDE 2 MG: 1 INJECTION, SOLUTION INTRAMUSCULAR; INTRAVENOUS at 07:04

## 2025-04-30 RX ADMIN — ACETAMINOPHEN 1000 MG: 10 INJECTION INTRAVENOUS at 08:04

## 2025-04-30 RX ADMIN — PHENYLEPHRINE HYDROCHLORIDE 100 MCG: 10 INJECTION INTRAVENOUS at 08:04

## 2025-04-30 RX ADMIN — PHENYLEPHRINE HYDROCHLORIDE 200 MCG: 10 INJECTION INTRAVENOUS at 08:04

## 2025-04-30 RX ADMIN — LIDOCAINE HYDROCHLORIDE 100 MG: 20 INJECTION INTRAVENOUS at 08:04

## 2025-04-30 RX ADMIN — HYDROXYZINE PAMOATE 25 MG: 25 CAPSULE ORAL at 12:04

## 2025-04-30 RX ADMIN — OXYCODONE HYDROCHLORIDE 10 MG: 10 TABLET ORAL at 01:04

## 2025-04-30 RX ADMIN — POLYETHYLENE GLYCOL 3350 17 G: 17 POWDER, FOR SOLUTION ORAL at 10:04

## 2025-04-30 RX ADMIN — SODIUM CHLORIDE, SODIUM ACETATE ANHYDROUS, SODIUM GLUCONATE, POTASSIUM CHLORIDE, AND MAGNESIUM CHLORIDE: 526; 222; 502; 37; 30 INJECTION, SOLUTION INTRAVENOUS at 09:04

## 2025-04-30 RX ADMIN — FOLIC ACID 1 MG: 1 TABLET ORAL at 10:04

## 2025-04-30 RX ADMIN — ONDANSETRON 4 MG: 2 INJECTION INTRAMUSCULAR; INTRAVENOUS at 09:04

## 2025-04-30 RX ADMIN — Medication 1 TABLET: at 10:04

## 2025-04-30 RX ADMIN — ONDANSETRON 8 MG: 4 TABLET, ORALLY DISINTEGRATING ORAL at 12:04

## 2025-04-30 RX ADMIN — DEXAMETHASONE SODIUM PHOSPHATE 8 MG: 4 INJECTION, SOLUTION INTRAMUSCULAR; INTRAVENOUS at 08:04

## 2025-04-30 RX ADMIN — ACETAMINOPHEN 1000 MG: 500 TABLET ORAL at 09:04

## 2025-04-30 RX ADMIN — HYDROMORPHONE HYDROCHLORIDE 0.2 MG: 1 INJECTION, SOLUTION INTRAMUSCULAR; INTRAVENOUS; SUBCUTANEOUS at 10:04

## 2025-04-30 RX ADMIN — HEPARIN SODIUM AND DEXTROSE 13 UNITS/KG/HR: 10000; 5 INJECTION INTRAVENOUS at 07:04

## 2025-04-30 RX ADMIN — MELATONIN TAB 3 MG 6 MG: 3 TAB at 09:04

## 2025-04-30 RX ADMIN — ROCURONIUM BROMIDE 50 MG: 10 INJECTION, SOLUTION INTRAVENOUS at 08:04

## 2025-04-30 RX ADMIN — BUPROPION HYDROCHLORIDE 150 MG: 75 TABLET, FILM COATED ORAL at 01:04

## 2025-04-30 RX ADMIN — SODIUM CHLORIDE: 0.9 INJECTION, SOLUTION INTRAVENOUS at 07:04

## 2025-04-30 RX ADMIN — BUPROPION HYDROCHLORIDE 150 MG: 75 TABLET, FILM COATED ORAL at 09:04

## 2025-04-30 RX ADMIN — HYDROMORPHONE HYDROCHLORIDE 1 MG: 0.5 INJECTION, SOLUTION INTRAMUSCULAR; INTRAVENOUS; SUBCUTANEOUS at 12:04

## 2025-04-30 RX ADMIN — PROPOFOL 180 MG: 10 INJECTION, EMULSION INTRAVENOUS at 08:04

## 2025-04-30 RX ADMIN — NIFEDIPINE 90 MG: 30 TABLET, FILM COATED, EXTENDED RELEASE ORAL at 12:04

## 2025-04-30 RX ADMIN — GABAPENTIN 300 MG: 300 CAPSULE ORAL at 09:04

## 2025-04-30 RX ADMIN — POLYETHYLENE GLYCOL 3350 17 G: 17 POWDER, FOR SOLUTION ORAL at 09:04

## 2025-04-30 RX ADMIN — ACETAMINOPHEN 1000 MG: 500 TABLET ORAL at 01:04

## 2025-04-30 RX ADMIN — GABAPENTIN 300 MG: 300 CAPSULE ORAL at 10:04

## 2025-04-30 RX ADMIN — FENTANYL CITRATE 100 MCG: 50 INJECTION, SOLUTION INTRAMUSCULAR; INTRAVENOUS at 08:04

## 2025-04-30 RX ADMIN — SODIUM CHLORIDE, POTASSIUM CHLORIDE, SODIUM LACTATE AND CALCIUM CHLORIDE: 600; 310; 30; 20 INJECTION, SOLUTION INTRAVENOUS at 02:04

## 2025-04-30 RX ADMIN — Medication 100 MG: at 12:04

## 2025-04-30 RX ADMIN — HEPARIN SODIUM AND DEXTROSE 13 UNITS/KG/HR: 10000; 5 INJECTION INTRAVENOUS at 04:04

## 2025-04-30 RX ADMIN — CEFAZOLIN 3 G: 330 INJECTION, POWDER, FOR SOLUTION INTRAMUSCULAR; INTRAVENOUS at 08:04

## 2025-04-30 RX ADMIN — SUGAMMADEX 200 MG: 100 INJECTION, SOLUTION INTRAVENOUS at 10:04

## 2025-04-30 RX ADMIN — SENNOSIDES 8.6 MG: 8.6 TABLET, FILM COATED ORAL at 09:04

## 2025-04-30 NOTE — ASSESSMENT & PLAN NOTE
Hyponatremia is likely due to Cirrhosis. The patient's most recent sodium results are listed below.  Recent Labs     04/28/25  0719 04/29/25  0459 04/30/25  0416   * 135* 134*     Plan  - Correct the sodium by 4-6mEq in 24 hours.   - Fluid and salt restriction  - Monitor sodium Daily.   - Patient hyponatremia is stable

## 2025-04-30 NOTE — TRANSFER OF CARE
"Anesthesia Transfer of Care Note    Patient: Froylan Borja    Procedure(s) Performed: Procedure(s) (LRB):  RIGHT LOWER EXTREMITY SPLIT THICKNESS SKIN GRAFT APPLICATION (Right)  SURGICAL PROCUREMENT, GRAFT, SKIN (Right)  APPLICATION, WOUND VAC (Right)  IRRIGATION AND DEBRIDEMENT, LOWER EXTREMITY (Right)    Patient location: PACU    Anesthesia Type: general    Transport from OR: Transported from OR on room air with adequate spontaneous ventilation    Post pain: adequate analgesia    Post assessment: no apparent anesthetic complications and tolerated procedure well    Post vital signs: stable    Level of consciousness: lethargic and responds to stimulation    Nausea/Vomiting: no nausea/vomiting    Complications: none    Transfer of care protocol was followed      Last vitals: Visit Vitals  BP (!) 153/69 (BP Location: Left arm, Patient Position: Lying)   Pulse 87   Temp 36.3 °C (97.3 °F) (Temporal)   Resp 19   Ht 6' 3" (1.905 m)   Wt 120 kg (264 lb 8.8 oz)   SpO2 97%   BMI 33.07 kg/m²     "

## 2025-04-30 NOTE — BRIEF OP NOTE
[facility] (Formerly [facility])  Brief Operative Note    SUMMARY     Surgery Date: 4/30/2025     Surgeons and Role:     * Alli Strong MD - Primary     * Pk Duque MD - Resident - Assisting        Pre-op Diagnosis:  Non-healing wound of lower extremity, right, initial encounter [S81.801A]    Post-op Diagnosis:  Post-Op Diagnosis Codes:     * Non-healing wound of lower extremity, right, initial encounter [S81.801A]    Procedure(s) (LRB):  RIGHT LOWER EXTREMITY SPLIT THICKNESS SKIN GRAFT APPLICATION (Right)  SURGICAL PROCUREMENT, GRAFT, SKIN (Right)  APPLICATION, WOUND VAC (Right)  IRRIGATION AND DEBRIDEMENT, LOWER EXTREMITY (Right)    Anesthesia: General    Implants:  * No implants in log *    Operative Findings: STSG RLE, wound vac placement, RLE splint    Estimated Blood Loss: * No values recorded between 4/30/2025  7:56 AM and 4/30/2025 10:27 AM *    Estimated Blood Loss has been documented.         Specimens:   Specimen (24h ago, onward)      None          * No specimens in log *    [id number]

## 2025-04-30 NOTE — ASSESSMENT & PLAN NOTE
- RLE US showed a nonocclusive thrombus in the right common femoral vein   - Started Heparin gtt on 4/18 once cleared from IR standpoint  - Hg had been down-trending and dressings saturated with blood. - Hgb stabilized   - If able to tolerate anticoagulation, will transition to oral DOAC when not needing further procedures. If not, will need to discuss IVC filter  - General surgery consulted- hematoma evacuation performed at bedside  - S/p  wound debridement and wound vac placement on 4/21. and 4/24 for wound debridement + vac reapplication  - Wound Vac in place  - MM pain regimen   - Plan for PT assessment after surgery if needed - has been ambulating to the bathroom   - OR with Plastics today:  Surgical preparation right leg wound, 330 sq cm   Split-thickness skin graft right thigh to right leg, 330 sq cm   Application of negative pressure wound therapy, 230 sq cm   Application lower leg splint  - Heparin gtt to resume 4-6 hours post-op. Transition to oral AC if tolerates.

## 2025-04-30 NOTE — NURSING TRANSFER
Nursing Transfer Note      4/30/2025   10:57 AM    Nurse giving handoff:Radha GALLEGOS PACU RN  Nurse receiving handoff:Hernan    Reason patient is being transferred: post procedure    Transfer To: 8070    Transfer via stretcher    Transfer with Wound Vac    Transported by Cata Mann for Tele Monitor? No    Additional Lines: Wound Vac    Patient belongings transferred with patient: No    Chart send with patient: Yes    Notified: spouse    Patient reassessed at: 1058

## 2025-04-30 NOTE — OP NOTE
Plastic, Reconstructive, and Hand Surgery  Operative Note     Patient:  Froylan Borja   MRN:  [id number]   YOB: 1955     Date of Surgery: 4/30/2025     Preoperative Diagnosis:  Right leg wound       Postoperative Diagnosis:  Same     Procedure Performed:   Surgical preparation right leg wound, 330 sq cm   Split-thickness skin graft right thigh to right leg, 330 sq cm   Application of negative pressure wound therapy, 230 sq cm   Application lower leg splint     Attending Surgeon: Alli Strong MD     Surgical Team: Surgeons and Role:     * Alli Strong MD - Primary     * Pk Duque MD - Resident - Assisting     Anesthesia: General endotracheal     Key Findings:  Viable wound bed for grafting.     Estimated Blood Loss: 5 mL     Drains:  none     Implants: none     Complications:  none     Specimens:  none     Tourniquet:  none     Indications for Procedure:  Froylan Borja is a 70 y.o. male who presented with right lower extremity pain and swelling.  Workup demonstrated hematoma in the status post multiple debridements to his right lower leg.  Plastic surgery was consulted for soft tissue coverage. I recommended split-thickness skin graft. Risks and rationale for this procedure were explained to him at length and he agreed to proceed.     Procedure in Detail:  The patient was seen in the preoperative holding area and marked in the upright position.  After informed consent was obtained, the patient was brought back to the operating room and placed into the supine position.  SCDs were placed to the left lower extremities and appropriate padding of pressure points were ensured.   After adequate general endotracheal anesthesia was obtained, patient was then placed in the supine position with arms out, prepped and draped in usual sterile fashion.  A time out procedure was then performed, where the patient was properly identified, and there was verification of the procedure to be performed as well  as administration of appropriate pre-operative antibiotics.       Attention was turned to the right leg.  The large wound was noted to the posterior lateral aspect of his right lower leg.  There was healthy-appearing granulation tissue without any exposed critical structures.  We began by irrigating and minimally debriding the wound.  This was done with a surgical scrub brush until there was healthy bleeding tissue noted.  Meticulous hemostasis was achieved with cautery.  The wound was irrigated copiously with a both saline and Vashe.  Total area prepared was 330 sq cm.  Next, the area necessary for grafting was transposed to the upper thigh and this area was tumesced with 1% lidocaine with epinephrine solution.  Next, a dermatome set to 14 1 thousands of an inch was used to harvest a split-thickness graft from the thigh.  This was brought to the back table and meshed in a 1:1.5 ratio.  This was then brought back onto the field and trimmed to appropriate size, and inset to the wound using 4-0 chromic suture in running continuous fashion.  Total area grafted was 330 sq cm.  There was complete coverage without any exposed critical structures.     Needle, instrument, and sponge count was correct at this time.  Sterile dressings were applied, consisting of Adaptic and black foam wound VAC (22 x 15 x 1 cm), padding, and lower leg posterior splint, and the thigh donor site was dressed with Tegaderm, ABD pad, Kerlix, and Ace wrap.  Patient tolerated the procedure in its entirety without complication and successfully aroused from anesthesia.  The patient was transferred to the recovery room in stable condition and is to be transferred back to hospital for ongoing care.  I reviewed the intra-op findings with his wife via telephone.  All questions were answered.     Post-operative Plan:  Stable, to PACU.    Transferred back to hospital for ongoing care.    Wound VAC to suction.  This was not to be changed unless done by the  Plastic surgery Service.  Elevate left leg when able.    Minimal ambulation to right leg.    Attestation of Presence:  I was present for the entirety of the procedure.     Alli Strong MD  04/30/2025 1:23 PM

## 2025-04-30 NOTE — ANESTHESIA PROCEDURE NOTES
Intubation    Date/Time: 4/30/2025 8:13 AM    Performed by: Lesley Borrero CRNA  Authorized by: Efrain Hansen MD    Intubation:     Induction:  Intravenous    Intubated:  Postinduction    Mask Ventilation:  Easy with oral airway    Attempts:  1    Attempted By:  CRNA    Method of Intubation:  Video laryngoscopy    Blade:  Bundy 3    Laryngeal View Grade: Grade I - full view of cords      Difficult Airway Encountered?: No      Complications:  None    Airway Device:  Oral endotracheal tube    Airway Device Size:  7.5    Style/Cuff Inflation:  Cuffed (inflated to minimal occlusive pressure)    Inflation Amount (mL):  10    Tube secured:  23    Secured at:  The lips    Placement Verified By:  Capnometry    Complicating Factors:  None    Findings Post-Intubation:  BS equal bilateral and atraumatic/condition of teeth unchanged

## 2025-04-30 NOTE — PLAN OF CARE
Problem: Adult Inpatient Plan of Care  Goal: Plan of Care Review  4/30/2025 1725 by Shruthi Bueno RN  Outcome: Progressing  4/30/2025 1725 by Shruthi Bueno RN  Outcome: Progressing  Goal: Patient-Specific Goal (Individualized)  Outcome: Progressing  Goal: Absence of Hospital-Acquired Illness or Injury  Outcome: Progressing  Goal: Optimal Comfort and Wellbeing  Outcome: Progressing  Goal: Readiness for Transition of Care  Outcome: Progressing     Problem: Infection  Goal: Absence of Infection Signs and Symptoms  Outcome: Progressing     Problem: Wound  Goal: Optimal Coping  Outcome: Progressing  Goal: Optimal Functional Ability  Outcome: Progressing  Goal: Absence of Infection Signs and Symptoms  Outcome: Progressing  Goal: Improved Oral Intake  Outcome: Progressing  Goal: Optimal Pain Control and Function  Outcome: Progressing  Goal: Skin Health and Integrity  Outcome: Progressing  Goal: Optimal Wound Healing  Outcome: Progressing     Problem: Fall Injury Risk  Goal: Absence of Fall and Fall-Related Injury  Outcome: Progressing

## 2025-04-30 NOTE — ASSESSMENT & PLAN NOTE
Hyperkalemia is likely due to multiple afctors.The patients most recent potassium results are listed below.  Recent Labs     04/28/25  0719 04/29/25  0459 04/30/25  0416   K 4.4 5.2* 4.8     Plan  - Monitor for arrhythmias with EKG and/or continuous telemetry.   - Treat the hyperkalemia with Potassium Binders.   - Monitor potassium: Daily  - Hold ARB

## 2025-04-30 NOTE — PT/OT/SLP PROGRESS
Physical Therapy      Patient Name:  Froylan Borja   MRN:  [id number]    Patient not seen today secondary to Off the floor for procedure/surgery. Will follow-up at next PT POC date.

## 2025-04-30 NOTE — SUBJECTIVE & OBJECTIVE
Interval History: Pt in OR today with Plastics. They recommend resuming heparin gtt 4-6 hours after surgery, and are arranging home [place] with CM    Review of Systems   Constitutional:  Negative for chills and fever.   Respiratory:  Negative for chest tightness and shortness of breath.    Cardiovascular:  Negative for chest pain and leg swelling.   Gastrointestinal:  Negative for abdominal pain and nausea.   Genitourinary: Negative.    Musculoskeletal:  Negative for myalgias.   Skin:  Positive for color change and wound.   Neurological:  Negative for dizziness and weakness.     Objective:     Vital Signs (Most Recent):  Temp: 96.3 °F (35.7 °C) (04/30/25 1539)  Pulse: 75 (04/30/25 1539)  Resp: 18 (04/30/25 1539)  BP: 134/63 (04/30/25 1539)  SpO2: 97 % (04/30/25 1539) Vital Signs (24h Range):  Temp:  [96.3 °F (35.7 °C)-98.4 °F (36.9 °C)] 96.3 °F (35.7 °C)  Pulse:  [] 75  Resp:  [14-22] 18  SpO2:  [94 %-97 %] 97 %  BP: (134-165)/(63-74) 134/63     Weight: 120 kg (264 lb 8.8 oz)  Body mass index is 33.07 kg/m².    Intake/Output Summary (Last 24 hours) at 4/30/2025 1558  Last data filed at 4/30/2025 0941  Gross per 24 hour   Intake 1460 ml   Output 725 ml   Net 735 ml         Physical Exam  Vitals and nursing note reviewed.   Constitutional:       General: He is not in acute distress.     Appearance: He is well-developed. He is not ill-appearing.   HENT:      Head: Normocephalic and atraumatic.   Eyes:      Pupils: Pupils are equal, round, and reactive to light.   Cardiovascular:      Rate and Rhythm: Normal rate and regular rhythm.   Pulmonary:      Effort: Pulmonary effort is normal.      Breath sounds: Normal breath sounds.   Abdominal:      Palpations: Abdomen is soft.      Tenderness: There is no abdominal tenderness.   Musculoskeletal:      Comments: RLE wound vac in place  Sensation intact   Skin:     General: Skin is warm and dry.   Neurological:      Mental Status: He is alert and oriented to person, place,  and time.   Psychiatric:         Behavior: Behavior normal.               Significant Labs: All pertinent labs within the past 24 hours have been reviewed.    Significant Imaging: I have reviewed all pertinent imaging results/findings within the past 24 hours.

## 2025-04-30 NOTE — PT/OT/SLP PROGRESS
Occupational Therapy      Patient Name:  Froylan Borja   MRN:  [id number]    Patient not seen today secondary to  (pt in surgery in morning.  OT unable to return later in day.  New OT orders written on this date by medical team.). Will follow-up as appropriate.    4/30/2025

## 2025-04-30 NOTE — PLAN OF CARE
[facility] wound vac formed filled out and faxed over to [facility] for home wound vac.      Hubert Guo [facility]    [facility]  [phone]

## 2025-04-30 NOTE — PROGRESS NOTES
Plastic and Reconstructive Surgery   Progress Note    Subjective:    No issues  OR today   Pain controlled  Vac in place, min output    Objective:  Vital signs in last 24 hours:  Temp:  [97.8 °F (36.6 °C)-98.4 °F (36.9 °C)] 97.9 °F (36.6 °C)  Pulse:  [] 78  Resp:  [14-20] 16  SpO2:  [94 %-99 %] 95 %  BP: (139-165)/(67-74) 145/69    Intake/Output last 3 shifts:  I/O last 3 completed shifts:  In: 1080 [P.O.:1080]  Out: 2100 [Urine:2100]    Intake/Output this shift:  No intake/output data recorded.        Physical Exam:  VITAL SIGNS:   Vitals:    25 2334 25 0038 25 0602 25 0647   BP: (!) 160/70  (!) 144/74 (!) 145/69   BP Location:    Left arm   Patient Position:    Lying   Pulse: 87 107 88 78   Resp:  18 20 16   Temp: 97.9 °F (36.6 °C)  98.1 °F (36.7 °C) 97.9 °F (36.6 °C)   TempSrc: Oral  Oral Temporal   SpO2: 95% (!) 94% 96% 95%   Weight:       Height:         TMAX: Temp (24hrs), Av °F (36.7 °C), Min:97.8 °F (36.6 °C), Max:98.4 °F (36.9 °C)    General: Alert; No acute distress  Cardiovascular: Regular rate   Respiratory: Normal respiratory effort. Chest rise symmetric.   Abdomen: Soft, nontender, nondistended  Extremity: RLE wound in w/vac in place, min output  Neurologic: No focal deficit. Speech normal      Scheduled Medications buPROPion, 150 mg, BID  folic acid, 1 mg, Daily  gabapentin, 300 mg, BID  melatonin, 6 mg, Nightly  multivitamin, 1 tablet, Daily  NIFEdipine, 90 mg, Daily  polyethylene glycol, 17 g, BID  senna, 8.6 mg, BID  thiamine, 100 mg, Daily      PRN Medications   Current Facility-Administered Medications:     acetaminophen, 1,000 mg, Oral, Q8H PRN    albuterol-ipratropium, 3 mL, Nebulization, Q4H PRN    bisacodyL, 10 mg, Rectal, Daily PRN    dextrose 50%, 12.5 g, Intravenous, PRN    dextrose 50%, 25 g, Intravenous, PRN    glucagon (human recombinant), 1 mg, Intramuscular, PRN    glucose, 16 g, Oral, PRN    glucose, 24 g, Oral, PRN    HYDROmorphone, 1 mg,  Intravenous, Q6H PRN    hydrOXYzine pamoate, 25 mg, Oral, Q8H PRN    LORazepam, 2 mg, Oral, Q4H PRN    ondansetron, 8 mg, Oral, Q8H PRN    oxyCODONE, 5 mg, Oral, Q4H PRN    oxyCODONE, 10 mg, Oral, Q6H PRN    promethazine, 25 mg, Oral, Q6H PRN    sodium chloride 0.9%, 10 mL, Intravenous, PRN    Recent Labs:   Lab Results   Component Value Date    WBC 9.87 04/30/2025    HGB 10.8 (L) 04/30/2025    HCT 33.3 (L) 04/30/2025    MCV 99 (H) 04/30/2025     04/30/2025     Lab Results   Component Value Date     04/30/2025     (L) 04/30/2025    K 4.8 04/30/2025     04/30/2025    BUN 19 04/30/2025         Assessment: 70 y.o. y/o male * Day of Surgery * s/p Procedure(s):  REPLACEMENT, WOUND VAC     Plan  Plan for OR todayfor STSG vs skin sub, poss vac   NPO/IVF  Continue abx  Hep gtt held    Discussed with patient and/or family the risks and benefits of surgical intervention.  Conservative measures have been exhausted and patient would like to proceed with surgery.      We have discussed risks, which include but are not limited to blood clots in the legs that can travel to the lungs (pulmonary embolism). Pulmonary embolism can cause shortness of breath, chest pain, and even shock. Other risks include urinary tract infection, nausea and vomiting (usually related to pain medication), chronic pain, bleeding, nerve damage, blood vessel injury, scarring and infection, which can require re-operation. Furthermore, the risks of anesthesia include potential heart, lung, kidney, and liver damage.  Informed consent was obtained.  The patient understands and would like to proceed with surgery.        Pk Duque MD  Plastic Surgery Fellow  04/30/2025

## 2025-04-30 NOTE — PROGRESS NOTES
[facility] - Tele[facility]  Progress Note    Patient Name: Froylan Borja  MRN: [id number]  Patient Class: IP- Inpatient   Admission Date: 4/18/2025  Length of Stay: 12 days  Attending Physician: Tamra Nguyen MD  Primary Care Provider: Janki Tamez MD        Subjective     Principal Problem:Acute deep vein thrombosis (DVT) of right lower extremity        HPI:  Mr. Froylan Borja is a 69 y/o male with a PMHx alcohol abuse, cirrhosis, hepatocellular carcinoma, and HTN who presents for complaint of R leg pain and swelling. He reports yesterday he hit his R leg on the car door and shortly developed a bruise and pain to the R lateral leg. He tried tylenol at-home though pain, swelling, and bruising continued to worsen. Admits to paresthesias and numbness to the R foot as well as significant R foot pain with weight-bearing. Denies CP, palpitations, SOB, lightheadedness, dizziness, headaches, LOC, vomiting, diarrhea, abdominal pain, fever, chills. Reports some nausea following morphine and dilaudid for pain in ED. Of note, patient had angiogram y90 mapping study done with IR last Thursday (4/10/25). Has developed some bruising to the medial thigh and mild groin discomfort since procedure. Social history significant for daily alcohol consumption, 3-4 beers daily, last alcoholic beverage yesterday around 3PM.     In the ED, AF HDS. H&H 13.0/38.6 (BL~14.8), no leukocytosis. HypoNa 129, HyperK 5.7, CO2 20, Ca 8.6, Albumin 3.4, remainder of CMP unremarkable. PT/INR wnl at 10.9/1.0. LA 1.6. CPK wnl. Hep C Ab reactive, HCV RNA pending. EKG with normal sinus rhythm. R Lower Ext U/S w/ findings of nonocclusive thrombus in the right common femoral vein and right common femoral artery pseudoaneurysm. CTA Lower Ext pending. R lower extremity hematoma drained at bedside by general surgery with evacuation of 120 cc of hematoma. Compressive dressing applied. Given nebulized albuterol, lokelma, IV cefazolin, IV  dilaudid, IV morphine, IV zofran. Admitted to .     Overview/Hospital Course:  70 y.o M with HTN, alcohol/HepC cirrhosis c/b HCC s/p Y-90 mapping angiogram on 4/10 who presents with bruising and pain at angiogram site and RLE calf pain after he hit it on a car door. RLE U/S showed non-occlusive CFV thrombus and R CFA pseudoaneurysm. CTA re-demonstrated the psdueoaneurysm with active extravasation within the sac as well as a large R calf hematoma which GenSurg were consulted for and successfully drained (120ccs). Vascular Surgery were consulted, recommended IR guided percutaneous thrombin injection which IR were able to complete. Heparin gtt was started. Groin U/S done the following day showed continued thrombosis of the pseudoanurysm. He was taken to the OR on 4/21 for debridement and placement of wound vac. He was taken back to the OR on 4/24 for further debridement and wound vac change. OR 4/28 for vac reapplication. RLE wound exploration, debridement, possible split-thickness skin grafting versus skin substitute, wound VAC placement 4/30 with Plastics. Ok to resume heparin gtt 4-6 hours after surgery. Plastics arranging home wound vac prior to discharge    Interval History: Pt in OR today with Plastics. They recommend resuming heparin gtt 4-6 hours after surgery, and are arranging home WV with CM    Review of Systems   Constitutional:  Negative for chills and fever.   Respiratory:  Negative for chest tightness and shortness of breath.    Cardiovascular:  Negative for chest pain and leg swelling.   Gastrointestinal:  Negative for abdominal pain and nausea.   Genitourinary: Negative.    Musculoskeletal:  Negative for myalgias.   Skin:  Positive for color change and wound.   Neurological:  Negative for dizziness and weakness.     Objective:     Vital Signs (Most Recent):  Temp: 96.3 °F (35.7 °C) (04/30/25 1539)  Pulse: 75 (04/30/25 1539)  Resp: 18 (04/30/25 1539)  BP: 134/63 (04/30/25 1539)  SpO2: 97 % (04/30/25  1539) Vital Signs (24h Range):  Temp:  [96.3 °F (35.7 °C)-98.4 °F (36.9 °C)] 96.3 °F (35.7 °C)  Pulse:  [] 75  Resp:  [14-22] 18  SpO2:  [94 %-97 %] 97 %  BP: (134-165)/(63-74) 134/63     Weight: 120 kg (264 lb 8.8 oz)  Body mass index is 33.07 kg/m².    Intake/Output Summary (Last 24 hours) at 4/30/2025 1558  Last data filed at 4/30/2025 0941  Gross per 24 hour   Intake 1460 ml   Output 725 ml   Net 735 ml         Physical Exam  Vitals and nursing note reviewed.   Constitutional:       General: He is not in acute distress.     Appearance: He is well-developed. He is not ill-appearing.   HENT:      Head: Normocephalic and atraumatic.   Eyes:      Pupils: Pupils are equal, round, and reactive to light.   Cardiovascular:      Rate and Rhythm: Normal rate and regular rhythm.   Pulmonary:      Effort: Pulmonary effort is normal.      Breath sounds: Normal breath sounds.   Abdominal:      Palpations: Abdomen is soft.      Tenderness: There is no abdominal tenderness.   Musculoskeletal:      Comments: RLE wound vac in place  Sensation intact   Skin:     General: Skin is warm and dry.   Neurological:      Mental Status: He is alert and oriented to person, place, and time.   Psychiatric:         Behavior: Behavior normal.               Significant Labs: All pertinent labs within the past 24 hours have been reviewed.    Significant Imaging: I have reviewed all pertinent imaging results/findings within the past 24 hours.      Assessment & Plan  Acute deep vein thrombosis (DVT) of right lower extremity  Hematoma  Leg wound, right  - RLE US showed a nonocclusive thrombus in the right common femoral vein   - Started Heparin gtt on 4/18 once cleared from IR standpoint  - Hg had been down-trending and dressings saturated with blood. - Hgb stabilized   - If able to tolerate anticoagulation, will transition to oral DOAC when not needing further procedures. If not, will need to discuss IVC filter  - General surgery consulted-  hematoma evacuation performed at bedside  - S/p  wound debridement and wound vac placement on 4/21. and 4/24 for wound debridement + vac reapplication  - Wound Vac in place  - MM pain regimen   - Plan for PT assessment after surgery if needed - has been ambulating to the bathroom   - OR with Plastics today:  Surgical preparation right leg wound, 330 sq cm   Split-thickness skin graft right thigh to right leg, 330 sq cm   Application of negative pressure wound therapy, 230 sq cm   Application lower leg splint  - Heparin gtt to resume 4-6 hours post-op. Transition to oral AC if tolerates.  Essential hypertension  Patient's blood pressure range in the last 24 hours was: BP  Min: 134/63  Max: 165/68.The patient's inpatient anti-hypertensive regimen is listed below:  Current Antihypertensives  NIFEdipine 24 hr tablet 90 mg, Daily, Oral  Hold ARB due to hyperkalemia  Plan  - BP is uncontrolled, will adjust as follows: change to nifedipine  Alcohol use disorder, severe, dependence  - daily drinker, last drink 4/17  - 3-4 beers daily, denies withdrawal symptoms  - continue thiamine, folic acid and multivitamin  - CIWA per nursing   Obesity (BMI 30-39.9)  Body mass index is 33.07 kg/m². Morbid obesity complicates all aspects of disease management from diagnostic modalities to treatment. Weight loss encouraged and health benefits explained to patient.   Hyponatremia  Hyponatremia is likely due to Cirrhosis. The patient's most recent sodium results are listed below.  Recent Labs     04/28/25  0719 04/29/25  0459 04/30/25  0416   * 135* 134*     Plan  - Correct the sodium by 4-6mEq in 24 hours.   - Fluid and salt restriction  - Monitor sodium Daily.   - Patient hyponatremia is stable  Pseudoaneurysm of femoral artery  - RLE US showed a new right common femoral artery pseudoaneurysm   - CTA RLE showed extravasation within the pseudoaneurysm sac  - Vascular Surgery consulted- recommended IR guided thrombin injection. IR  consulted, completed procedure on 4/18.   - Groin u/s on 4/19 showed continued thrombosis of the pseudoaneurysm  - Repeat u/s 4/29 stable, discussed with IR  Hyperkalemia  Hyperkalemia is likely due to  multiple afctors .The patients most recent potassium results are listed below.  Recent Labs     04/28/25  0719 04/29/25  0459 04/30/25  0416   K 4.4 5.2* 4.8     Plan  - Monitor for arrhythmias with EKG and/or continuous telemetry.   - Treat the hyperkalemia with Potassium Binders.   - Monitor potassium: Daily  - Hold ARB     VTE Risk Mitigation (From admission, onward)           Ordered     heparin 25,000 units in dextrose 5% 250 ml (100 units/mL) infusion MINIMAL INTENSITY nomogram - [facility]  Continuous        Question:  Begin at (units/kg/hr)  Answer:  12    04/30/25 1311     heparin 25,000 units in dextrose 5% 250 ml (100 units/mL) infusion MINIMAL INTENSITY nomogram - [facility]  Continuous        Question:  Begin at (units/kg/hr)  Answer:  12    04/25/25 0808     IP VTE HIGH RISK PATIENT  Once         04/18/25 0725     Reason for No Pharmacological VTE Prophylaxis  Once        Question:  Reasons:  Answer:  Risk of Bleeding  Comment:  will start anticoagulation once medically cleared    04/18/25 0725                    Discharge Planning   KENNETH: 5/1/2025     Code Status: Full Code   Medical Readiness for Discharge Date:   Discharge Plan A: Home, Home with family                Please place Justification for DME        Tamra Nguyen MD  Department of Hospital Medicine   [facility] - Telemetry Stepdown

## 2025-04-30 NOTE — ASSESSMENT & PLAN NOTE
Patient's blood pressure range in the last 24 hours was: BP  Min: 134/63  Max: 165/68.The patient's inpatient anti-hypertensive regimen is listed below:  Current Antihypertensives  NIFEdipine 24 hr tablet 90 mg, Daily, Oral  Hold ARB due to hyperkalemia  Plan  - BP is uncontrolled, will adjust as follows: change to nifedipine

## 2025-04-30 NOTE — ASSESSMENT & PLAN NOTE
- RLE US showed a new right common femoral artery pseudoaneurysm   - CTA RLE showed extravasation within the pseudoaneurysm sac  - Vascular Surgery consulted- recommended IR guided thrombin injection. IR consulted, completed procedure on 4/18.   - Groin u/s on 4/19 showed continued thrombosis of the pseudoaneurysm  - Repeat u/s 4/29 stable, discussed with IR

## 2025-05-01 LAB
ABSOLUTE EOSINOPHIL (OHS): 0.01 K/UL
ABSOLUTE MONOCYTE (OHS): 0.66 K/UL (ref 0.3–1)
ABSOLUTE NEUTROPHIL COUNT (OHS): 9.62 K/UL (ref 1.8–7.7)
ALBUMIN SERPL BCP-MCNC: 3 G/DL (ref 3.5–5.2)
ALP SERPL-CCNC: 88 UNIT/L (ref 40–150)
ALT SERPL W/O P-5'-P-CCNC: 33 UNIT/L (ref 10–44)
ANION GAP (OHS): 8 MMOL/L (ref 8–16)
APTT PPP: 40.4 SECONDS (ref 21–32)
APTT PPP: 64.5 SECONDS (ref 21–32)
AST SERPL-CCNC: 43 UNIT/L (ref 11–45)
BASOPHILS # BLD AUTO: 0.03 K/UL
BASOPHILS NFR BLD AUTO: 0.3 %
BILIRUB SERPL-MCNC: 0.4 MG/DL (ref 0.1–1)
BUN SERPL-MCNC: 21 MG/DL (ref 8–23)
CALCIUM SERPL-MCNC: 8.7 MG/DL (ref 8.7–10.5)
CHLORIDE SERPL-SCNC: 105 MMOL/L (ref 95–110)
CO2 SERPL-SCNC: 22 MMOL/L (ref 23–29)
CREAT SERPL-MCNC: 1 MG/DL (ref 0.5–1.4)
ERYTHROCYTE [DISTWIDTH] IN BLOOD BY AUTOMATED COUNT: 13.1 % (ref 11.5–14.5)
GFR SERPLBLD CREATININE-BSD FMLA CKD-EPI: >60 ML/MIN/1.73/M2
GLUCOSE SERPL-MCNC: 132 MG/DL (ref 70–110)
HCT VFR BLD AUTO: 33.4 % (ref 40–54)
HGB BLD-MCNC: 10.7 GM/DL (ref 14–18)
IMM GRANULOCYTES # BLD AUTO: 0.19 K/UL (ref 0–0.04)
IMM GRANULOCYTES NFR BLD AUTO: 1.7 % (ref 0–0.5)
LYMPHOCYTES # BLD AUTO: 0.78 K/UL (ref 1–4.8)
MAGNESIUM SERPL-MCNC: 2.2 MG/DL (ref 1.6–2.6)
MCH RBC QN AUTO: 31.8 PG (ref 27–31)
MCHC RBC AUTO-ENTMCNC: 32 G/DL (ref 32–36)
MCV RBC AUTO: 99 FL (ref 82–98)
NUCLEATED RBC (/100WBC) (OHS): 0 /100 WBC
PHOSPHATE SERPL-MCNC: 2.9 MG/DL (ref 2.7–4.5)
PLATELET # BLD AUTO: 232 K/UL (ref 150–450)
PMV BLD AUTO: 10 FL (ref 9.2–12.9)
POTASSIUM SERPL-SCNC: 5.1 MMOL/L (ref 3.5–5.1)
PROT SERPL-MCNC: 6.7 GM/DL (ref 6–8.4)
RBC # BLD AUTO: 3.36 M/UL (ref 4.6–6.2)
RELATIVE EOSINOPHIL (OHS): 0.1 %
RELATIVE LYMPHOCYTE (OHS): 6.9 % (ref 18–48)
RELATIVE MONOCYTE (OHS): 5.8 % (ref 4–15)
RELATIVE NEUTROPHIL (OHS): 85.2 % (ref 38–73)
SODIUM SERPL-SCNC: 135 MMOL/L (ref 136–145)
WBC # BLD AUTO: 11.29 K/UL (ref 3.9–12.7)

## 2025-05-01 PROCEDURE — 25000003 PHARM REV CODE 250: Mod: HCNC

## 2025-05-01 PROCEDURE — 97116 GAIT TRAINING THERAPY: CPT | Mod: HCNC

## 2025-05-01 PROCEDURE — 63600175 PHARM REV CODE 636 W HCPCS: Mod: HCNC

## 2025-05-01 PROCEDURE — 25000003 PHARM REV CODE 250: Mod: HCNC | Performed by: STUDENT IN AN ORGANIZED HEALTH CARE EDUCATION/TRAINING PROGRAM

## 2025-05-01 PROCEDURE — 84100 ASSAY OF PHOSPHORUS: CPT | Mod: HCNC | Performed by: STUDENT IN AN ORGANIZED HEALTH CARE EDUCATION/TRAINING PROGRAM

## 2025-05-01 PROCEDURE — 36415 COLL VENOUS BLD VENIPUNCTURE: CPT | Mod: HCNC | Performed by: STUDENT IN AN ORGANIZED HEALTH CARE EDUCATION/TRAINING PROGRAM

## 2025-05-01 PROCEDURE — 97168 OT RE-EVAL EST PLAN CARE: CPT | Mod: HCNC

## 2025-05-01 PROCEDURE — 80053 COMPREHEN METABOLIC PANEL: CPT | Mod: HCNC | Performed by: STUDENT IN AN ORGANIZED HEALTH CARE EDUCATION/TRAINING PROGRAM

## 2025-05-01 PROCEDURE — 63600175 PHARM REV CODE 636 W HCPCS: Mod: HCNC | Performed by: HOSPITALIST

## 2025-05-01 PROCEDURE — 25000003 PHARM REV CODE 250: Mod: HCNC | Performed by: HOSPITALIST

## 2025-05-01 PROCEDURE — 85025 COMPLETE CBC W/AUTO DIFF WBC: CPT | Mod: HCNC | Performed by: STUDENT IN AN ORGANIZED HEALTH CARE EDUCATION/TRAINING PROGRAM

## 2025-05-01 PROCEDURE — 97530 THERAPEUTIC ACTIVITIES: CPT | Mod: HCNC

## 2025-05-01 PROCEDURE — 25000003 PHARM REV CODE 250: Mod: HCNC | Performed by: PHYSICIAN ASSISTANT

## 2025-05-01 PROCEDURE — 85730 THROMBOPLASTIN TIME PARTIAL: CPT | Mod: HCNC | Performed by: HOSPITALIST

## 2025-05-01 PROCEDURE — 85730 THROMBOPLASTIN TIME PARTIAL: CPT | Mod: HCNC | Performed by: STUDENT IN AN ORGANIZED HEALTH CARE EDUCATION/TRAINING PROGRAM

## 2025-05-01 PROCEDURE — 83735 ASSAY OF MAGNESIUM: CPT | Mod: HCNC | Performed by: STUDENT IN AN ORGANIZED HEALTH CARE EDUCATION/TRAINING PROGRAM

## 2025-05-01 PROCEDURE — 20600001 HC STEP DOWN PRIVATE ROOM: Mod: HCNC

## 2025-05-01 PROCEDURE — 99024 POSTOP FOLLOW-UP VISIT: CPT | Mod: HCNC,GC,, | Performed by: SURGERY

## 2025-05-01 RX ORDER — ACETAMINOPHEN 500 MG
1000 TABLET ORAL 3 TIMES DAILY
Status: DISCONTINUED | OUTPATIENT
Start: 2025-05-01 | End: 2025-05-13 | Stop reason: HOSPADM

## 2025-05-01 RX ORDER — OXYCODONE HYDROCHLORIDE 10 MG/1
10 TABLET ORAL EVERY 4 HOURS PRN
Refills: 0 | Status: DISCONTINUED | OUTPATIENT
Start: 2025-05-01 | End: 2025-05-13 | Stop reason: HOSPADM

## 2025-05-01 RX ADMIN — Medication 100 MG: at 09:05

## 2025-05-01 RX ADMIN — BUPROPION HYDROCHLORIDE 150 MG: 75 TABLET, FILM COATED ORAL at 09:05

## 2025-05-01 RX ADMIN — GABAPENTIN 300 MG: 300 CAPSULE ORAL at 09:05

## 2025-05-01 RX ADMIN — APIXABAN 10 MG: 5 TABLET, FILM COATED ORAL at 01:05

## 2025-05-01 RX ADMIN — ACETAMINOPHEN 1000 MG: 500 TABLET ORAL at 09:05

## 2025-05-01 RX ADMIN — OXYCODONE HYDROCHLORIDE 10 MG: 10 TABLET ORAL at 09:05

## 2025-05-01 RX ADMIN — POLYETHYLENE GLYCOL 3350 17 G: 17 POWDER, FOR SOLUTION ORAL at 09:05

## 2025-05-01 RX ADMIN — HEPARIN SODIUM AND DEXTROSE 13 UNITS/KG/HR: 10000; 5 INJECTION INTRAVENOUS at 07:05

## 2025-05-01 RX ADMIN — SENNOSIDES 8.6 MG: 8.6 TABLET, FILM COATED ORAL at 09:05

## 2025-05-01 RX ADMIN — HYDROMORPHONE HYDROCHLORIDE 1 MG: 0.5 INJECTION, SOLUTION INTRAMUSCULAR; INTRAVENOUS; SUBCUTANEOUS at 06:05

## 2025-05-01 RX ADMIN — FOLIC ACID 1 MG: 1 TABLET ORAL at 09:05

## 2025-05-01 RX ADMIN — Medication 1 TABLET: at 09:05

## 2025-05-01 RX ADMIN — OXYCODONE HYDROCHLORIDE 10 MG: 10 TABLET ORAL at 02:05

## 2025-05-01 RX ADMIN — ACETAMINOPHEN 1000 MG: 500 TABLET ORAL at 03:05

## 2025-05-01 RX ADMIN — NIFEDIPINE 90 MG: 30 TABLET, FILM COATED, EXTENDED RELEASE ORAL at 09:05

## 2025-05-01 RX ADMIN — OXYCODONE HYDROCHLORIDE 10 MG: 10 TABLET ORAL at 03:05

## 2025-05-01 RX ADMIN — MELATONIN TAB 3 MG 6 MG: 3 TAB at 09:05

## 2025-05-01 RX ADMIN — APIXABAN 10 MG: 5 TABLET, FILM COATED ORAL at 09:05

## 2025-05-01 NOTE — ASSESSMENT & PLAN NOTE
Hyperkalemia is likely due to multiple afctors.The patients most recent potassium results are listed below.  Recent Labs     04/29/25  0459 04/30/25  0416 05/01/25  0449   K 5.2* 4.8 5.1     Plan  - Monitor for arrhythmias with EKG and/or continuous telemetry.   - Treat the hyperkalemia with Potassium Binders.   - Monitor potassium: Daily  - Hold ARB

## 2025-05-01 NOTE — PLAN OF CARE
Received an email from [facility] (Pending sale to [facility]) that the got the order and have sent it off to get authorization.  11:48 AM  [facility] not in network with patient's insurance [employer] not in network with patient's insurance.  [facility] doesn't do wound Vac's. Also looked at [employer] website and [employer] doesn't do wound vac's  12:03 PM  Called [employer] customer service line and couldn't get any information about who is in network for the patient to get a wound vac.  12:13 PM  [facility] They are sending me their form so I can send to the MD to fill out.  4:03 PM  [employer] form filled out and emailed to [email or web address] Awaiting authorization as well as delivery.  Hubert Guo, ROSELINE    [facility]  [phone]

## 2025-05-01 NOTE — PLAN OF CARE
Patient stable, AOX4 , VSS.Heparin infusion in progressing.  Safety measures ensured.call light within reach.bed in low position. No distress at night.

## 2025-05-01 NOTE — ASSESSMENT & PLAN NOTE
Patient's blood pressure range in the last 24 hours was: BP  Min: 130/62  Max: 157/66.The patient's inpatient anti-hypertensive regimen is listed below:  Current Antihypertensives  NIFEdipine 24 hr tablet 90 mg, Daily, Oral  Hold ARB due to hyperkalemia  Plan  - BP is uncontrolled, will adjust as follows: change to nifedipine

## 2025-05-01 NOTE — SUBJECTIVE & OBJECTIVE
Interval History: Patient seen at bedside. Reports 8/10 pain to right thigh area, only improved to 6/10 after IV dilaudid. Scheduled tylenol, increased oxycodone from q6 to q4 PRN. Plan to transition to oral DOAC today, but checking with plastics first. Some oozing from right thigh, okay per plastics. Raised concerns about going home in terms of wound vac, mobility. Working on pain control today and will monitor with oral DOAC. Will d/c home with HH. Follow up with plastics on Monday.     Review of Systems   Constitutional:  Negative for chills and fever.   Respiratory:  Negative for chest tightness and shortness of breath.    Cardiovascular:  Negative for chest pain and leg swelling.   Gastrointestinal:  Negative for abdominal pain and nausea.   Genitourinary: Negative.    Musculoskeletal:  Negative for myalgias.   Skin:  Positive for color change and wound.   Neurological:  Negative for dizziness and weakness.     Objective:     Vital Signs (Most Recent):  Temp: 97.8 °F (36.6 °C) (05/01/25 0750)  Pulse: 69 (05/01/25 0750)  Resp: 20 (05/01/25 0750)  BP: (!) 143/69 (05/01/25 0750)  SpO2: 98 % (05/01/25 0750) Vital Signs (24h Range):  Temp:  [96.3 °F (35.7 °C)-98.7 °F (37.1 °C)] 97.8 °F (36.6 °C)  Pulse:  [69-87] 69  Resp:  [18-22] 20  SpO2:  [95 %-98 %] 98 %  BP: (130-157)/(62-71) 143/69     Weight: 120 kg (264 lb 8.8 oz)  Body mass index is 33.07 kg/m².    Intake/Output Summary (Last 24 hours) at 5/1/2025 0944  Last data filed at 5/1/2025 0621  Gross per 24 hour   Intake 500 ml   Output 2400 ml   Net -1900 ml         Physical Exam  Vitals and nursing note reviewed.   Constitutional:       General: He is not in acute distress.     Appearance: Normal appearance. He is well-developed. He is not ill-appearing.   HENT:      Head: Normocephalic and atraumatic.   Eyes:      Extraocular Movements: Extraocular movements intact.      Pupils: Pupils are equal, round, and reactive to light.   Cardiovascular:      Rate and  Rhythm: Normal rate and regular rhythm.   Pulmonary:      Effort: Pulmonary effort is normal.      Breath sounds: Normal breath sounds.   Abdominal:      General: Abdomen is flat. Bowel sounds are normal.      Palpations: Abdomen is soft.      Tenderness: There is no abdominal tenderness.   Musculoskeletal:      Comments: RLE wound vac in place, RLE wrapped. Thigh with some bloody seeping through dressings, okay per plastics  Sensation intact   Skin:     General: Skin is warm and dry.   Neurological:      General: No focal deficit present.      Mental Status: He is alert and oriented to person, place, and time.   Psychiatric:         Mood and Affect: Mood normal.         Behavior: Behavior normal.               Significant Labs: All pertinent labs within the past 24 hours have been reviewed.  CBC:   Recent Labs   Lab 04/30/25 0416 05/01/25  0449   WBC 9.87 11.29   HGB 10.8* 10.7*   HCT 33.3* 33.4*    232     CMP:   Recent Labs   Lab 04/30/25 0416 05/01/25  0449   * 135*   K 4.8 5.1    105   CO2 23 22*    132*   BUN 19 21   CREATININE 1.1 1.0   CALCIUM 8.5* 8.7   PROT 6.0 6.7   ALBUMIN 2.7* 3.0*   BILITOT 0.5 0.4   ALKPHOS 74 88   AST 22 43   ALT 23 33   ANIONGAP 10 8       Significant Imaging: I have reviewed all pertinent imaging results/findings within the past 24 hours.

## 2025-05-01 NOTE — ASSESSMENT & PLAN NOTE
Hyponatremia is likely due to Cirrhosis. The patient's most recent sodium results are listed below.  Recent Labs     04/29/25  0459 04/30/25  0416 05/01/25  0449   * 134* 135*     Plan  - Correct the sodium by 4-6mEq in 24 hours.   - Fluid and salt restriction  - Monitor sodium Daily.   - Patient hyponatremia is stable

## 2025-05-01 NOTE — PROGRESS NOTES
[facility] - Telemetry [facility] Medicine  Progress Note    Patient Name: Froylan Borja  MRN: [id number]  Patient Class: IP- Inpatient   Admission Date: 4/18/2025  Length of Stay: 13 days  Attending Physician: Jose G Avila MD  Primary Care Provider: Janki Tamez MD        Subjective     Principal Problem:Acute deep vein thrombosis (DVT) of right lower extremity        HPI:  Mr. Froylan Borja is a 71 y/o male with a PMHx alcohol abuse, cirrhosis, hepatocellular carcinoma, and HTN who presents for complaint of R leg pain and swelling. He reports yesterday he hit his R leg on the car door and shortly developed a bruise and pain to the R lateral leg. He tried tylenol at-home though pain, swelling, and bruising continued to worsen. Admits to paresthesias and numbness to the R foot as well as significant R foot pain with weight-bearing. Denies CP, palpitations, SOB, lightheadedness, dizziness, headaches, LOC, vomiting, diarrhea, abdominal pain, fever, chills. Reports some nausea following morphine and dilaudid for pain in ED. Of note, patient had angiogram y90 mapping study done with IR last Thursday (4/10/25). Has developed some bruising to the medial thigh and mild groin discomfort since procedure. Social history significant for daily alcohol consumption, 3-4 beers daily, last alcoholic beverage yesterday around 3PM.     In the ED, AF HDS. H&H 13.0/38.6 (BL~14.8), no leukocytosis. HypoNa 129, HyperK 5.7, CO2 20, Ca 8.6, Albumin 3.4, remainder of CMP unremarkable. PT/INR wnl at 10.9/1.0. LA 1.6. CPK wnl. Hep C Ab reactive, HCV RNA pending. EKG with normal sinus rhythm. R Lower Ext U/S w/ findings of nonocclusive thrombus in the right common femoral vein and right common femoral artery pseudoaneurysm. CTA Lower Ext pending. R lower extremity hematoma drained at bedside by general surgery with evacuation of 120 cc of hematoma. Compressive dressing applied. Given nebulized albuterol, lokelma, IV cefazolin, IV  dilaudid, IV morphine, IV zofran. Admitted to .     Overview/Hospital Course:  70 y.o M with HTN, alcohol/HepC cirrhosis c/b HCC s/p Y-90 mapping angiogram on 4/10 who presents with bruising and pain at angiogram site and RLE calf pain after he hit it on a car door. RLE U/S showed non-occlusive CFV thrombus and R CFA pseudoaneurysm. CTA re-demonstrated the psdueoaneurysm with active extravasation within the sac as well as a large R calf hematoma which GenSurg were consulted for and successfully drained (120ccs). Vascular Surgery were consulted, recommended IR guided percutaneous thrombin injection which IR were able to complete. Heparin gtt was started. Groin U/S done the following day showed continued thrombosis of the pseudoanurysm. He was taken to the OR on 4/21 for debridement and placement of wound vac. He was taken back to the OR on 4/24 for further debridement and wound vac change. OR 4/28 for vac reapplication. RLE wound exploration, debridement, possible split-thickness skin grafting versus skin substitute, wound VAC placement 4/30 with Plastics. Ok to resume heparin gtt 4-6 hours after surgery. Plastics arranging home wound vac prior to discharge    Interval History: Patient seen at bedside. Reports 8/10 pain to right thigh area, only improved to 6/10 after IV dilaudid. Scheduled tylenol, increased oxycodone from q6 to q4 PRN. Plan to transition to oral DOAC today, but checking with plastics first. Some oozing from right thigh, okay per plastics. Raised concerns about going home in terms of wound vac, mobility. Working on pain control today and will monitor with oral DOAC. Will d/c home with HH. Follow up with plastics on Monday.     Review of Systems   Constitutional:  Negative for chills and fever.   Respiratory:  Negative for chest tightness and shortness of breath.    Cardiovascular:  Negative for chest pain and leg swelling.   Gastrointestinal:  Negative for abdominal pain and nausea.    Genitourinary: Negative.    Musculoskeletal:  Negative for myalgias.   Skin:  Positive for color change and wound.   Neurological:  Negative for dizziness and weakness.     Objective:     Vital Signs (Most Recent):  Temp: 97.8 °F (36.6 °C) (05/01/25 0750)  Pulse: 69 (05/01/25 0750)  Resp: 20 (05/01/25 0750)  BP: (!) 143/69 (05/01/25 0750)  SpO2: 98 % (05/01/25 0750) Vital Signs (24h Range):  Temp:  [96.3 °F (35.7 °C)-98.7 °F (37.1 °C)] 97.8 °F (36.6 °C)  Pulse:  [69-87] 69  Resp:  [18-22] 20  SpO2:  [95 %-98 %] 98 %  BP: (130-157)/(62-71) 143/69     Weight: 120 kg (264 lb 8.8 oz)  Body mass index is 33.07 kg/m².    Intake/Output Summary (Last 24 hours) at 5/1/2025 0926  Last data filed at 5/1/2025 0621  Gross per 24 hour   Intake 500 ml   Output 2400 ml   Net -1900 ml         Physical Exam  Vitals and nursing note reviewed.   Constitutional:       General: He is not in acute distress.     Appearance: Normal appearance. He is well-developed. He is not ill-appearing.   HENT:      Head: Normocephalic and atraumatic.   Eyes:      Extraocular Movements: Extraocular movements intact.      Pupils: Pupils are equal, round, and reactive to light.   Cardiovascular:      Rate and Rhythm: Normal rate and regular rhythm.   Pulmonary:      Effort: Pulmonary effort is normal.      Breath sounds: Normal breath sounds.   Abdominal:      General: Abdomen is flat. Bowel sounds are normal.      Palpations: Abdomen is soft.      Tenderness: There is no abdominal tenderness.   Musculoskeletal:      Comments: RLE wound vac in place, RLE wrapped. Thigh with some bloody seeping through dressings, okay per plastics  Sensation intact   Skin:     General: Skin is warm and dry.   Neurological:      General: No focal deficit present.      Mental Status: He is alert and oriented to person, place, and time.   Psychiatric:         Mood and Affect: Mood normal.         Behavior: Behavior normal.               Significant Labs: All pertinent labs  within the past 24 hours have been reviewed.  CBC:   Recent Labs   Lab 04/30/25  0416 05/01/25  0449   WBC 9.87 11.29   HGB 10.8* 10.7*   HCT 33.3* 33.4*    232     CMP:   Recent Labs   Lab 04/30/25  0416 05/01/25  0449   * 135*   K 4.8 5.1    105   CO2 23 22*    132*   BUN 19 21   CREATININE 1.1 1.0   CALCIUM 8.5* 8.7   PROT 6.0 6.7   ALBUMIN 2.7* 3.0*   BILITOT 0.5 0.4   ALKPHOS 74 88   AST 22 43   ALT 23 33   ANIONGAP 10 8       Significant Imaging: I have reviewed all pertinent imaging results/findings within the past 24 hours.      Assessment & Plan  Acute deep vein thrombosis (DVT) of right lower extremity  Hematoma  Leg wound, right  - RLE US showed a nonocclusive thrombus in the right common femoral vein   - Started Heparin gtt on 4/18 once cleared from IR standpoint  - Hg had been down-trending and dressings saturated with blood. - Hgb stabilized   - If able to tolerate anticoagulation, will transition to oral DOAC when not needing further procedures. If not, will need to discuss IVC filter  - General surgery consulted- hematoma evacuation performed at bedside  - S/p  wound debridement and wound vac placement on 4/21. and 4/24 for wound debridement + vac reapplication  - Wound Vac in place  - MM pain regimen   - Plan for PT assessment after surgery if needed - has been ambulating to the bathroom   - OR with Plastics today:  Surgical preparation right leg wound, 330 sq cm   Split-thickness skin graft right thigh to right leg, 330 sq cm   Application of negative pressure wound therapy, 230 sq cm   Application lower leg splint  - Heparin gtt to resume 4-6 hours post-op. Transition to oral AC if tolerates> hopeful for today  - pain control  - follow up with plastics 5/5    Essential hypertension  Patient's blood pressure range in the last 24 hours was: BP  Min: 130/62  Max: 157/66.The patient's inpatient anti-hypertensive regimen is listed below:  Current Antihypertensives  NIFEdipine  24 hr tablet 90 mg, Daily, Oral  Hold ARB due to hyperkalemia  Plan  - BP is uncontrolled, will adjust as follows: change to nifedipine  Alcohol use disorder, severe, dependence  - daily drinker, last drink 4/17  - 3-4 beers daily, denies withdrawal symptoms  - continue thiamine, folic acid and multivitamin  - CIWA per nursing   Obesity (BMI 30-39.9)  Body mass index is 33.07 kg/m². Morbid obesity complicates all aspects of disease management from diagnostic modalities to treatment. Weight loss encouraged and health benefits explained to patient.   Hyponatremia  Hyponatremia is likely due to Cirrhosis. The patient's most recent sodium results are listed below.  Recent Labs     04/29/25 0459 04/30/25 0416 05/01/25 0449   * 134* 135*     Plan  - Correct the sodium by 4-6mEq in 24 hours.   - Fluid and salt restriction  - Monitor sodium Daily.   - Patient hyponatremia is stable  Pseudoaneurysm of femoral artery  - RLE US showed a new right common femoral artery pseudoaneurysm   - CTA RLE showed extravasation within the pseudoaneurysm sac  - Vascular Surgery consulted- recommended IR guided thrombin injection. IR consulted, completed procedure on 4/18.   - Groin u/s on 4/19 showed continued thrombosis of the pseudoaneurysm  - Repeat u/s 4/29 stable, discussed with IR  Hyperkalemia  Hyperkalemia is likely due to  multiple afctors .The patients most recent potassium results are listed below.  Recent Labs     04/29/25 0459 04/30/25 0416 05/01/25 0449   K 5.2* 4.8 5.1     Plan  - Monitor for arrhythmias with EKG and/or continuous telemetry.   - Treat the hyperkalemia with Potassium Binders.   - Monitor potassium: Daily  - Hold ARB     VTE Risk Mitigation (From admission, onward)           Ordered     heparin 25,000 units in dextrose 5% 250 ml (100 units/mL) infusion MINIMAL INTENSITY nomogram - [facility]  Continuous        Question:  Begin at (units/kg/hr)  Answer:  12 04/30/25 1311     heparin 25,000 units in  dextrose 5% 250 ml (100 units/mL) infusion MINIMAL INTENSITY nomogram - [facility]  Continuous        Question:  Begin at (units/kg/hr)  Answer:  12    04/25/25 0808     IP VTE HIGH RISK PATIENT  Once         04/18/25 0725     Reason for No Pharmacological VTE Prophylaxis  Once        Question:  Reasons:  Answer:  Risk of Bleeding  Comment:  will start anticoagulation once medically cleared    04/18/25 0725                    Discharge Planning   KENNETH: 5/1/2025     Code Status: Full Code   Medical Readiness for Discharge Date:   Discharge Plan A: Home, Home with family                Please place Justification for DME        Remedios Goel PA-C  Department of Hospital Medicine   [facility] - Telemetry Stepdown

## 2025-05-01 NOTE — PT/OT/SLP RE-EVAL
Occupational Therapy   Re-evaluation    Name: Froylan Borja  MRN: [id number]  Admitting Diagnosis:  Acute deep vein thrombosis (DVT) of right lower extremity  Recent Surgery: Procedure(s) (LRB):  RIGHT LOWER EXTREMITY SPLIT THICKNESS SKIN GRAFT APPLICATION (Right)  SURGICAL PROCUREMENT, GRAFT, SKIN (Right)  APPLICATION, WOUND VAC (Right)  IRRIGATION AND DEBRIDEMENT, LOWER EXTREMITY (Right) 1 Day Post-Op    Recommendations:     Discharge Recommendations: High Intensity Therapy  Discharge Equipment Recommendations: wheelchair  Barriers to discharge:  Inaccessible home environment    Assessment:     Froylan Borja is a 70 y.o. male with a medical diagnosis of Acute deep vein thrombosis (DVT) of right lower extremity.  He presents with good participation and motivation.  Performance deficits affecting function are weakness, impaired endurance, impaired self care skills, impaired functional mobility, gait instability, impaired balance, decreased upper extremity function, decreased lower extremity function, decreased safety awareness, pain, impaired cardiopulmonary response to activity, edema, impaired skin, orthopedic precautions.      Rehab Prognosis:  Good; patient would benefit from acute skilled OT services to address these deficits and reach maximum level of function.       Plan:     Patient to be seen 4 x/week to address the above listed problems via self-care/home management, wheelchair management/training, therapeutic activities, therapeutic exercises, neuromuscular re-education  Plan of Care Expires: 05/31/25  Plan of Care Reviewed with: patient    Subjective     Chief Complaint: Anxiety regarding possible discharge home  Patient/Family stated goals: Adapt to his weight bearing precautions   Communicated with: RN prior to session.  Pain/Comfort:  Pain Rating 1:  (did not rate)  Location - Side 1: Right  Location 1:  (hip > leg)  Pain Addressed 1: Reposition, Distraction, Pre-medicate for activity  Pain Rating  Post-Intervention 1:  (did not rate)    Objective:     Communicated with: RN prior to session.  Patient found supine with: ace wrapping, wound vac, peripheral IV (no family present) upon OT entry to room.    General Precautions: Standard, fall, seizure  Orthopedic Precautions: RLE non weight bearing  Braces: N/A  Respiratory Status: Room air    Occupational Performance:    Bed Mobility:    Patient completed Scooting/Bridging with contact guard assistance to scoot back seated at EOB and to scoot toward HOB in supine.   Patient completed Supine to Sit with contact guard assistance  Patient completed Sit to Supine with contact guard assistance    Functional Mobility/Transfers:  Patient completed Sit <> Stand Transfer with minimum assistance x 2 trials. Trial 1, pt completed transfer with no AD and used RW for balance once standing. Trial 2, pt used RW for transfer and balance in standing. One person for balance in transfer and one person to ensure NWB precaution.   Functional Mobility: Pt took 2 steps forward with min A of one person for balance and min A of one person to maintain NWB precautions.     Activities of Daily Living:  Upper Body Dressing: minimum assistance to don gown around back seated at EOB. Pt required min A for line management.     Cognitive/Visual Perceptual:  Cognitive/Psychosocial Skills:     -       Oriented to: Person, Place, Time, and Situation   -       Follows Commands/attention:Follows multistep  commands  -       Communication: clear/fluent  -       Memory: No Deficits noted  -       Safety awareness/insight to disability: impaired   -       Mood/Affect/Coping skills/emotional control: Appropriate to situation and Anxious    Physical Exam:  Balance:    -       Patient requires SBA while seated at EOB. Patient required CGA-min A with RW for balance in standing.   Skin integrity: skin graft taken from R hip placed on R calf  Sensation:    -       Intact  Dominant hand:    -       R  Upper  Extremity Range of Motion: BUE WFL  Upper Extremity Strength: BUE WFL    Strength: BUE WFL   Fine Motor Coordination:    -       Intact    AMPAC 6 Click:  AMPAC Total Score: 18    Treatment & Education:  Patient demonstrating nervousness regarding possible discharge home on Monday and his weight bearing precautions. Therapeutic listening provided and provided education regarding role of OT, POC, & discharge recommendations. Discussed possible treatments that patient may participate in to increase his independence and mobility upon discharge while maintaining NWB RLE. Pt verbalized understanding. Pt had no further questions & when asked whether there were any concerns pt reported none.      Patient left supine with all lines intact, call button in reach, and RN notified    GOALS:   Multidisciplinary Problems       Occupational Therapy Goals          Problem: Occupational Therapy    Goal Priority Disciplines Outcome Interventions   Occupational Therapy Goal     OT, PT/OT Progressing    Description: Goals to be met by: 5/31/2025     Patient will increase functional independence with ADLs by performing:    LE Dressing with Stand-by Assistance.  Grooming while standing with Set-up Assistance.  Toileting from toilet with Modified [place] for hygiene and clothing management.   Bathing from  shower chair/bench with Stand-by Assistance.  Stand pivot transfers with Stand-by Assistance and maintaining weight-bearing precaution(s).  Step transfer with Stand-by Assistance and maintaining weight-bearing precaution(s)  Upper extremity exercise program x10-15 reps per handout, with independence.                           DME Justifications:  Froylan Borja has a mobility limitation that significantly impairs his ability to participate in one or more mobility related activities of daily living (MRADLs) such as toileting, feeding, dressing, grooming, and bathing in customary locations in the home.  The mobility limitation cannot  be sufficiently resolved by the use of a cane or walker.   The use of a manual wheelchair will significantly improve the patients ability to participate in MRADLS and the patient will use it on regular basis in the home.  Froylan Borja has expressed his willingness to use a manual wheelchair in the home. Patients upper body strength is sufficient for propulsion.  He also has a caregiver who is available, willing, and able to provide assistance with the wheelchair when needed.      History:     Past Medical History:   Diagnosis Date    Alcohol abuse     Anxiety     Cirrhosis     Glaucoma     Hepatocellular carcinoma     History of hepatitis C, s/p successful Rx w/ SVR24 - 1/2017     genotype 1;  relapse following PegIFN+RBV+Victrelis; prior relapse following PegIFN+RBV S/p 12 weeks harvoni + RBV w/ SVR    Hypertension     Paresthesia     feet, bilaterally         Past Surgical History:   Procedure Laterality Date    APPLICATION OF SPLIT-THICKNESS SKIN GRAFT (STSG) TO LOWER EXTREMITY Right 4/30/2025    Procedure: RIGHT LOWER EXTREMITY SPLIT THICKNESS SKIN GRAFT APPLICATION;  Surgeon: Alli tSrong MD;  Location: [facility] OR 2ND FLR;  Service: Plastics;  Laterality: Right;    APPLICATION OF WOUND VACUUM-ASSISTED CLOSURE DEVICE Right 4/21/2025    Procedure: APPLICATION, WOUND VAC;  Surgeon: Estella Ramsay MD;  Location: [facility] OR 2ND FLR;  Service: General;  Laterality: Right;    APPLICATION OF WOUND VACUUM-ASSISTED CLOSURE DEVICE Right 4/24/2025    Procedure: APPLICATION, WOUND VAC;  Surgeon: Estella Ramsay MD;  Location: [facility] OR 2ND FLR;  Service: General;  Laterality: Right;    APPLICATION OF WOUND VACUUM-ASSISTED CLOSURE DEVICE Right 4/30/2025    Procedure: APPLICATION, WOUND VAC;  Surgeon: Alli Strong MD;  Location: [facility] OR 2ND FLR;  Service: Plastics;  Laterality: Right;    COLONOSCOPY N/A 9/19/2024    Procedure: COLONOSCOPY;  Surgeon: Mo Patino MD;  Location: [facility] ENDO (4TH FLR);   Service: Endoscopy;  Laterality: N/A;  Ref by Dr. NIGHAT Tamez, PT/INR/CBC am procedure, Suprep, email - PC  9/16-lvm for pre call-tb-labs 8/22/24    ESOPHAGOGASTRODUODENOSCOPY N/A 9/19/2024    Procedure: EGD (ESOPHAGOGASTRODUODENOSCOPY);  Surgeon: Mo Patino MD;  Location: [facility] ENDO (4TH FLR);  Service: Endoscopy;  Laterality: N/A;    HARVESTING OF SKIN GRAFT Right 4/30/2025    Procedure: SURGICAL PROCUREMENT, GRAFT, SKIN;  Surgeon: Alli Strong MD;  Location: [facility] OR 2ND FLR;  Service: Plastics;  Laterality: Right;    IRRIGATION AND DEBRIDEMENT OF LOWER EXTREMITY Right 4/30/2025    Procedure: IRRIGATION AND DEBRIDEMENT, LOWER EXTREMITY;  Surgeon: Alli Strong MD;  Location: [facility] OR 2ND FLR;  Service: Plastics;  Laterality: Right;    LIVER BIOPSY      REPLACEMENT OF WOUND VACUUM-ASSISTED CLOSURE DEVICE Right 4/24/2025    Procedure: REPLACEMENT, WOUND VAC;  Surgeon: Estella Ramsay MD;  Location: [facility] OR 2ND FLR;  Service: General;  Laterality: Right;    REPLACEMENT OF WOUND VACUUM-ASSISTED CLOSURE DEVICE Right 4/28/2025    Procedure: REPLACEMENT, WOUND VAC;  Surgeon: Jose G Mackay MD;  Location: [facility] OR 2ND FLR;  Service: General;  Laterality: Right;    WOUND DEBRIDEMENT Right 4/21/2025    Procedure: DEBRIDEMENT, WOUND;  Surgeon: Estella Ramsay MD;  Location: [facility] OR 2ND FLR;  Service: General;  Laterality: Right;       Time Tracking:     OT Date of Treatment: 05/01/25  OT Start Time: 1021  OT Stop Time: 1048  OT Total Time (min): 27 min    Billable Minutes:Re-eval 12  Therapeutic Activity 15    5/1/2025

## 2025-05-01 NOTE — PLAN OF CARE
Problem: Physical Therapy  Goal: Physical Therapy Goal  Description: Goals to be met by:      Patient will increase functional independence with mobility by performin. Supine to sit with Modified [place]  2. Sit to supine with Modified [place]  3. Sit to stand transfer with contact guard assist and RW NWB RLE  4. Bed to chair transfer with Supervision using RW NWB RLE  5. Gait  x 50 feet with Stand-by Assistance using RW NWB RLE    Outcome: Progressing   Goals updated for content and is appropriate. 2025

## 2025-05-01 NOTE — PT/OT/SLP PROGRESS
Physical Therapy Treatment    Patient Name:  Froylan Borja   MRN:  [id number]    Recommendations:     Discharge Recommendations: High Intensity Therapy  Discharge Equipment Recommendations: wheelchair  Barriers to discharge: Decreased caregiver support    Assessment:     Froylan Borja is a 70 y.o. male admitted with a medical diagnosis of Acute deep vein thrombosis (DVT) of right lower extremity.  He presents with the following impairments/functional limitations: impaired endurance, impaired functional mobility, gait instability, impaired balance, decreased safety awareness pt tolerated treatment well but is significantly below previous functional level. Pt will benefit from cont skilled PT 4x/wk to progress physically.  Pt is significantly below previous functional level, increased risk of falls and increased burden of care currently. Patient has demonstrated sufficient progression to warrant high intensity therapy evidenced by objectives noted below. Pt is s/p I&D RLE with skin graft 4/30/25    Rehab Prognosis: Good; patient would benefit from acute skilled PT services to address these deficits and reach maximum level of function.    Recent Surgery: Procedure(s) (LRB):  RIGHT LOWER EXTREMITY SPLIT THICKNESS SKIN GRAFT APPLICATION (Right)  SURGICAL PROCUREMENT, GRAFT, SKIN (Right)  APPLICATION, WOUND VAC (Right)  IRRIGATION AND DEBRIDEMENT, LOWER EXTREMITY (Right) 1 Day Post-Op    Plan:     During this hospitalization, patient to be seen 4 x/week to address the identified rehab impairments via gait training, therapeutic activities and progress toward the following goals:    Plan of Care Expires:  05/26/25    Subjective     Chief Complaint: pt c/o pain during treatment.   Patient/Family Comments/goals:  to get better and go home.   Pain/Comfort:  Pain Rating 1: 6/10 (R leg)  Pain Addressed 1: Reposition, Distraction  Pain Rating Post-Intervention 1: 6/10 (R leg)      Objective:     Communicated with nurse prior to session.   Patient found supine with bed alarm, wound vac (hep lock IV) upon PT entry to room.     General Precautions: Standard, fall  Orthopedic Precautions: RLE non weight bearing  Braces: N/A  Respiratory Status: Room air     Functional Mobility:  Bed Mobility:  pt needed verbal cues for NWB RLE.    Rolling Right: stand by assistance  Supine to Sit: stand by assistance    Transfers:     Sit to Stand:  minimum assistance with rolling walker    Gait: pt received gait training ~ 4 ft with RW and CGA. Pt was NWB RLE.     Balance: pt sat on EOB with SBA.     Pt white board updated with current therapists name and level of mobility assistance needed.         AM-PAC 6 CLICK MOBILITY  Turning over in bed (including adjusting bedclothes, sheets and blankets)?: 3  Sitting down on and standing up from a chair with arms (e.g., wheelchair, bedside commode, etc.): 3  Moving from lying on back to sitting on the side of the bed?: 3  Moving to and from a bed to a chair (including a wheelchair)?: 3  Need to walk in hospital room?: 3  Climbing 3-5 steps with a railing?: 1  Basic Mobility Total Score: 16       Treatment & Education:  Pt received verbal instructions in PT POC. Pt verbally expressed understanding of such.     Patient left up in chair with all lines intact, call button in reach, and RN  present..    GOALS:   Multidisciplinary Problems       Physical Therapy Goals          Problem: Physical Therapy    Goal Priority Disciplines Outcome Interventions   Physical Therapy Goal     PT, PT/OT Progressing    Description: Goals to be met by:      Patient will increase functional independence with mobility by performin. Supine to sit with Modified [place]  2. Sit to supine with Modified [place]  3. Sit to stand transfer with contact guard assist and RW NWB RLE  4. Bed to chair transfer with Supervision using RW NWB RLE  5. Gait  x 50 feet with Stand-by Assistance using RW NWB RLE                         DME  Justifications:  Froylan Borja has a mobility limitation that significantly impairs his ability to participate in one or more mobility related activities of daily living (MRADLs) such as toileting, feeding, dressing, grooming, and bathing in customary locations in the home.  The mobility limitation cannot be sufficiently resolved by the use of a cane or walker.   The use of a manual wheelchair will significantly improve the patients ability to participate in MRADLS and the patient will use it on regular basis in the home.  Froylan Borja has expressed his willingness to use a manual wheelchair in the home. Patients upper body strength is sufficient for propulsion.  He also has a caregiver who is available, willing, and able to provide assistance with the wheelchair when needed.      Time Tracking:     PT Received On: 05/01/25  PT Start Time: 1303     PT Stop Time: 1315  PT Total Time (min): 12 min     Billable Minutes: Gait Training 12 min     Treatment Type: Treatment  PT/PTA: PT     Number of PTA visits since last PT visit: 0     05/01/2025

## 2025-05-01 NOTE — ANESTHESIA POSTPROCEDURE EVALUATION
Anesthesia Post Evaluation    Patient: Froylan Borja    Procedure(s) Performed: Procedure(s) (LRB):  RIGHT LOWER EXTREMITY SPLIT THICKNESS SKIN GRAFT APPLICATION (Right)  SURGICAL PROCUREMENT, GRAFT, SKIN (Right)  APPLICATION, WOUND VAC (Right)  IRRIGATION AND DEBRIDEMENT, LOWER EXTREMITY (Right)    Final Anesthesia Type: general      Patient location during evaluation: PACU  Patient participation: Yes- Able to Participate  Level of consciousness: awake  Post-procedure vital signs: reviewed and stable  Pain management: adequate  Airway patency: patent    PONV status at discharge: No PONV  Anesthetic complications: no      Cardiovascular status: blood pressure returned to baseline  Respiratory status: unassisted  Hydration status: euvolemic  Follow-up not needed.              Vitals Value Taken Time   /65 05/01/25 11:54   Temp 36.7 °C (98 °F) 05/01/25 11:54   Pulse 80 05/01/25 11:54   Resp 19 05/01/25 11:54   SpO2 97 % 05/01/25 11:54         Event Time   Out of Recovery 04/30/2025 10:30:00         Pain/Matt Score: Pain Rating Prior to Med Admin: 8 (5/1/2025  6:18 AM)  Pain Rating Post Med Admin: 5 (5/1/2025  6:29 AM)  Amtt Score: 10 (4/30/2025 10:30 AM)

## 2025-05-01 NOTE — ASSESSMENT & PLAN NOTE
- RLE US showed a nonocclusive thrombus in the right common femoral vein   - Started Heparin gtt on 4/18 once cleared from IR standpoint  - Hg had been down-trending and dressings saturated with blood. - Hgb stabilized   - If able to tolerate anticoagulation, will transition to oral DOAC when not needing further procedures. If not, will need to discuss IVC filter  - General surgery consulted- hematoma evacuation performed at bedside  - S/p  wound debridement and wound vac placement on 4/21. and 4/24 for wound debridement + vac reapplication  - Wound Vac in place  - MM pain regimen   - Plan for PT assessment after surgery if needed - has been ambulating to the bathroom   - OR with Plastics today:  Surgical preparation right leg wound, 330 sq cm   Split-thickness skin graft right thigh to right leg, 330 sq cm   Application of negative pressure wound therapy, 230 sq cm   Application lower leg splint  - Heparin gtt to resume 4-6 hours post-op. Transition to oral AC if tolerates> hopeful for today  - pain control  - follow up with plastics 5/5

## 2025-05-01 NOTE — PROGRESS NOTES
Plastic and Reconstructive Surgery   Progress Note    Subjective:    Pt is s/p STSG to right leg with wound vac placement .   NAEON. Pain well controlled. Some SS drainage from donor site, expected.  Vac in place, min output    Objective:  Vital signs in last 24 hours:  Temp:  [96.3 °F (35.7 °C)-98.7 °F (37.1 °C)] 97.8 °F (36.6 °C)  Pulse:  [69-84] 69  Resp:  [18-20] 20  SpO2:  [95 %-98 %] 98 %  BP: (130-144)/(62-71) 143/69    Intake/Output last 3 shifts:  I/O last 3 completed shifts:  In: 1220 [P.O.:120; I.V.:1000; IV Piggyback:100]  Out: 3125 [Urine:3125]    Intake/Output this shift:  No intake/output data recorded.        Physical Exam:  VITAL SIGNS:   Vitals:    25 0240 25 0358 25 0618 25 0750   BP:  137/70  (!) 143/69   BP Location:  Left arm  Left arm   Patient Position:  Lying  Lying   Pulse:  77  69   Resp: 18 19 18 20   Temp:  98.7 °F (37.1 °C)  97.8 °F (36.6 °C)   TempSrc:  Oral     SpO2:  97%  98%   Weight:       Height:         TMAX: Temp (24hrs), Av.6 °F (36.4 °C), Min:96.3 °F (35.7 °C), Max:98.7 °F (37.1 °C)    General: Alert; No acute distress  Cardiovascular: Regular rate   Respiratory: Normal respiratory effort. Chest rise symmetric.   Abdomen: Soft, nontender, nondistended  Extremity: RLE wound in w/vac in place, min output. SS drainage from donor site  Neurologic: No focal deficit. Speech normal      Scheduled Medications acetaminophen, 1,000 mg, TID  buPROPion, 150 mg, BID  folic acid, 1 mg, Daily  gabapentin, 300 mg, BID  melatonin, 6 mg, Nightly  multivitamin, 1 tablet, Daily  NIFEdipine, 90 mg, Daily  polyethylene glycol, 17 g, BID  senna, 8.6 mg, BID  thiamine, 100 mg, Daily      PRN Medications   Current Facility-Administered Medications:     albuterol-ipratropium, 3 mL, Nebulization, Q4H PRN    bisacodyL, 10 mg, Rectal, Daily PRN    dextrose 50%, 12.5 g, Intravenous, PRN    dextrose 50%, 25 g, Intravenous, PRN    glucagon (human recombinant), 1 mg,  Intramuscular, PRN    glucose, 16 g, Oral, PRN    glucose, 24 g, Oral, PRN    HYDROmorphone, 1 mg, Intravenous, Q6H PRN    hydrOXYzine pamoate, 25 mg, Oral, Q8H PRN    LORazepam, 2 mg, Oral, Q4H PRN    ondansetron, 8 mg, Oral, Q8H PRN    oxyCODONE, 5 mg, Oral, Q4H PRN    oxyCODONE, 10 mg, Oral, Q4H PRN    promethazine, 25 mg, Oral, Q6H PRN    sodium chloride 0.9%, 10 mL, Intravenous, PRN    Recent Labs:   Lab Results   Component Value Date    WBC 11.29 05/01/2025    HGB 10.7 (L) 05/01/2025    HCT 33.4 (L) 05/01/2025    MCV 99 (H) 05/01/2025     05/01/2025     Lab Results   Component Value Date     (H) 05/01/2025     (L) 05/01/2025    K 5.1 05/01/2025     05/01/2025    BUN 21 05/01/2025         Assessment: 70 y.o. y/o male s/p STSG to right leg with wound vac placement 4/30.     Plan  Ok for discharge from surgical perspective  PT/OT while inpatient  Keep wound vac in place at discharge  Remainder of care per primary team    Esvin Delcid MD  Plastic Surgery  05/01/2025

## 2025-05-01 NOTE — PLAN OF CARE
Problem: Occupational Therapy  Goal: Occupational Therapy Goal  Description: Goals to be met by: 5/31/2025     Patient will increase functional independence with ADLs by performing:    LE Dressing with Stand-by Assistance.  Grooming while standing with Set-up Assistance.  Toileting from toilet with Modified [place] for hygiene and clothing management.   Bathing from  shower chair/bench with Stand-by Assistance.  Stand pivot transfers with Stand-by Assistance and maintaining weight-bearing precaution(s).  Step transfer with Stand-by Assistance and maintaining weight-bearing precaution(s)  Upper extremity exercise program x10-15 reps per handout, with independence.      Outcome: Progressing     OT re-evaluation complete.

## 2025-05-02 DIAGNOSIS — C22.0 HCC (HEPATOCELLULAR CARCINOMA): Primary | ICD-10-CM

## 2025-05-02 LAB
ABSOLUTE EOSINOPHIL (OHS): 0.13 K/UL
ABSOLUTE MONOCYTE (OHS): 0.87 K/UL (ref 0.3–1)
ABSOLUTE NEUTROPHIL COUNT (OHS): 5.43 K/UL (ref 1.8–7.7)
ALBUMIN SERPL BCP-MCNC: 3 G/DL (ref 3.5–5.2)
ALP SERPL-CCNC: 80 UNIT/L (ref 40–150)
ALT SERPL W/O P-5'-P-CCNC: 34 UNIT/L (ref 10–44)
ANION GAP (OHS): 7 MMOL/L (ref 8–16)
AST SERPL-CCNC: 31 UNIT/L (ref 11–45)
BASOPHILS # BLD AUTO: 0.07 K/UL
BASOPHILS NFR BLD AUTO: 0.8 %
BILIRUB SERPL-MCNC: 0.3 MG/DL (ref 0.1–1)
BUN SERPL-MCNC: 25 MG/DL (ref 8–23)
CALCIUM SERPL-MCNC: 8.6 MG/DL (ref 8.7–10.5)
CHLORIDE SERPL-SCNC: 105 MMOL/L (ref 95–110)
CO2 SERPL-SCNC: 23 MMOL/L (ref 23–29)
CREAT SERPL-MCNC: 1 MG/DL (ref 0.5–1.4)
ERYTHROCYTE [DISTWIDTH] IN BLOOD BY AUTOMATED COUNT: 13.2 % (ref 11.5–14.5)
GFR SERPLBLD CREATININE-BSD FMLA CKD-EPI: >60 ML/MIN/1.73/M2
GLUCOSE SERPL-MCNC: 120 MG/DL (ref 70–110)
HCT VFR BLD AUTO: 31.6 % (ref 40–54)
HGB BLD-MCNC: 10.2 GM/DL (ref 14–18)
IMM GRANULOCYTES # BLD AUTO: 0.43 K/UL (ref 0–0.04)
IMM GRANULOCYTES NFR BLD AUTO: 4.7 % (ref 0–0.5)
LYMPHOCYTES # BLD AUTO: 2.15 K/UL (ref 1–4.8)
MAGNESIUM SERPL-MCNC: 2 MG/DL (ref 1.6–2.6)
MCH RBC QN AUTO: 31.7 PG (ref 27–31)
MCHC RBC AUTO-ENTMCNC: 32.3 G/DL (ref 32–36)
MCV RBC AUTO: 98 FL (ref 82–98)
NUCLEATED RBC (/100WBC) (OHS): 0 /100 WBC
PHOSPHATE SERPL-MCNC: 2.5 MG/DL (ref 2.7–4.5)
PLATELET # BLD AUTO: 238 K/UL (ref 150–450)
PMV BLD AUTO: 9.5 FL (ref 9.2–12.9)
POTASSIUM SERPL-SCNC: 4.4 MMOL/L (ref 3.5–5.1)
PROT SERPL-MCNC: 6.4 GM/DL (ref 6–8.4)
RBC # BLD AUTO: 3.22 M/UL (ref 4.6–6.2)
RELATIVE EOSINOPHIL (OHS): 1.4 %
RELATIVE LYMPHOCYTE (OHS): 23.7 % (ref 18–48)
RELATIVE MONOCYTE (OHS): 9.6 % (ref 4–15)
RELATIVE NEUTROPHIL (OHS): 59.8 % (ref 38–73)
SODIUM SERPL-SCNC: 135 MMOL/L (ref 136–145)
WBC # BLD AUTO: 9.08 K/UL (ref 3.9–12.7)

## 2025-05-02 PROCEDURE — 85025 COMPLETE CBC W/AUTO DIFF WBC: CPT | Mod: HCNC | Performed by: STUDENT IN AN ORGANIZED HEALTH CARE EDUCATION/TRAINING PROGRAM

## 2025-05-02 PROCEDURE — 97110 THERAPEUTIC EXERCISES: CPT | Mod: HCNC

## 2025-05-02 PROCEDURE — 25000003 PHARM REV CODE 250: Mod: HCNC | Performed by: HOSPITALIST

## 2025-05-02 PROCEDURE — 25000003 PHARM REV CODE 250: Mod: HCNC | Performed by: STUDENT IN AN ORGANIZED HEALTH CARE EDUCATION/TRAINING PROGRAM

## 2025-05-02 PROCEDURE — 97530 THERAPEUTIC ACTIVITIES: CPT | Mod: HCNC

## 2025-05-02 PROCEDURE — 20600001 HC STEP DOWN PRIVATE ROOM: Mod: HCNC

## 2025-05-02 PROCEDURE — 83735 ASSAY OF MAGNESIUM: CPT | Mod: HCNC | Performed by: STUDENT IN AN ORGANIZED HEALTH CARE EDUCATION/TRAINING PROGRAM

## 2025-05-02 PROCEDURE — 94761 N-INVAS EAR/PLS OXIMETRY MLT: CPT | Mod: HCNC

## 2025-05-02 PROCEDURE — 25000003 PHARM REV CODE 250: Mod: HCNC

## 2025-05-02 PROCEDURE — 25000003 PHARM REV CODE 250: Mod: HCNC | Performed by: PHYSICIAN ASSISTANT

## 2025-05-02 PROCEDURE — 36415 COLL VENOUS BLD VENIPUNCTURE: CPT | Mod: HCNC | Performed by: STUDENT IN AN ORGANIZED HEALTH CARE EDUCATION/TRAINING PROGRAM

## 2025-05-02 PROCEDURE — 99900035 HC TECH TIME PER 15 MIN (STAT): Mod: HCNC

## 2025-05-02 PROCEDURE — 84100 ASSAY OF PHOSPHORUS: CPT | Mod: HCNC | Performed by: STUDENT IN AN ORGANIZED HEALTH CARE EDUCATION/TRAINING PROGRAM

## 2025-05-02 PROCEDURE — 80053 COMPREHEN METABOLIC PANEL: CPT | Mod: HCNC | Performed by: STUDENT IN AN ORGANIZED HEALTH CARE EDUCATION/TRAINING PROGRAM

## 2025-05-02 RX ADMIN — SENNOSIDES 8.6 MG: 8.6 TABLET, FILM COATED ORAL at 09:05

## 2025-05-02 RX ADMIN — Medication 100 MG: at 09:05

## 2025-05-02 RX ADMIN — Medication 1 TABLET: at 09:05

## 2025-05-02 RX ADMIN — GABAPENTIN 300 MG: 300 CAPSULE ORAL at 09:05

## 2025-05-02 RX ADMIN — BUPROPION HYDROCHLORIDE 150 MG: 75 TABLET, FILM COATED ORAL at 09:05

## 2025-05-02 RX ADMIN — POLYETHYLENE GLYCOL 3350 17 G: 17 POWDER, FOR SOLUTION ORAL at 09:05

## 2025-05-02 RX ADMIN — FOLIC ACID 1 MG: 1 TABLET ORAL at 09:05

## 2025-05-02 RX ADMIN — ACETAMINOPHEN 1000 MG: 500 TABLET ORAL at 09:05

## 2025-05-02 RX ADMIN — OXYCODONE HYDROCHLORIDE 10 MG: 10 TABLET ORAL at 09:05

## 2025-05-02 RX ADMIN — NIFEDIPINE 90 MG: 30 TABLET, FILM COATED, EXTENDED RELEASE ORAL at 09:05

## 2025-05-02 RX ADMIN — OXYCODONE HYDROCHLORIDE 10 MG: 10 TABLET ORAL at 04:05

## 2025-05-02 RX ADMIN — MELATONIN TAB 3 MG 6 MG: 3 TAB at 09:05

## 2025-05-02 RX ADMIN — APIXABAN 10 MG: 5 TABLET, FILM COATED ORAL at 09:05

## 2025-05-02 NOTE — PLAN OF CARE
Problem: Occupational Therapy  Goal: Occupational Therapy Goal  Description: Goals to be met by: 5/31/2025     Patient will increase functional independence with ADLs by performing:    LE Dressing with Stand-by Assistance.  Grooming while standing with Set-up Assistance.  Toileting from toilet with Modified [place] for hygiene and clothing management.   Bathing from  shower chair/bench with Stand-by Assistance.  Stand pivot transfers with Stand-by Assistance and maintaining weight-bearing precaution(s).  Step transfer with Stand-by Assistance and maintaining weight-bearing precaution(s)  Upper extremity exercise program x10-15 reps per handout, with independence.      Outcome: Progressing     Goals remain appropriate

## 2025-05-02 NOTE — PROGRESS NOTES
[facility] - Tele[facility] Medicine  Progress Note    Patient Name: Froylan Borja  MRN: [id number]  Patient Class: IP- Inpatient   Admission Date: 4/18/2025  Length of Stay: 14 days  Attending Physician: Sherita Hardwick,*  Primary Care Provider: Janki Tamez MD        Subjective     Principal Problem:Acute deep vein thrombosis (DVT) of right lower extremity        HPI:  Mr. Froylan Borja is a 71 y/o male with a PMHx alcohol abuse, cirrhosis, hepatocellular carcinoma, and HTN who presents for complaint of R leg pain and swelling. He reports yesterday he hit his R leg on the car door and shortly developed a bruise and pain to the R lateral leg. He tried tylenol at-home though pain, swelling, and bruising continued to worsen. Admits to paresthesias and numbness to the R foot as well as significant R foot pain with weight-bearing. Denies CP, palpitations, SOB, lightheadedness, dizziness, headaches, LOC, vomiting, diarrhea, abdominal pain, fever, chills. Reports some nausea following morphine and dilaudid for pain in ED. Of note, patient had angiogram y90 mapping study done with IR last Thursday (4/10/25). Has developed some bruising to the medial thigh and mild groin discomfort since procedure. Social history significant for daily alcohol consumption, 3-4 beers daily, last alcoholic beverage yesterday around 3PM.     In the ED, AF HDS. H&H 13.0/38.6 (BL~14.8), no leukocytosis. HypoNa 129, HyperK 5.7, CO2 20, Ca 8.6, Albumin 3.4, remainder of CMP unremarkable. PT/INR wnl at 10.9/1.0. LA 1.6. CPK wnl. Hep C Ab reactive, HCV RNA pending. EKG with normal sinus rhythm. R Lower Ext U/S w/ findings of nonocclusive thrombus in the right common femoral vein and right common femoral artery pseudoaneurysm. CTA Lower Ext pending. R lower extremity hematoma drained at bedside by general surgery with evacuation of 120 cc of hematoma. Compressive dressing applied. Given nebulized albuterol, lokelma, IV cefazolin, IV  dilaudid, IV morphine, IV zofran. Admitted to .     Overview/Hospital Course:  70 y.o M with HTN, alcohol/HepC cirrhosis c/b HCC s/p Y-90 mapping angiogram on 4/10 who presents with bruising and pain at angiogram site and RLE calf pain after he hit it on a car door. RLE U/S showed non-occlusive CFV thrombus and R CFA pseudoaneurysm. CTA re-demonstrated the psdueoaneurysm with active extravasation within the sac as well as a large R calf hematoma which GenSurg were consulted for and successfully drained (120ccs). Vascular Surgery were consulted, recommended IR guided percutaneous thrombin injection which IR were able to complete. Heparin gtt was started. Groin U/S done the following day showed continued thrombosis of the pseudoanurysm. He was taken to the OR on 4/21 for debridement and placement of wound vac. He was taken back to the OR on 4/24 for further debridement and wound vac change. OR 4/28 for vac reapplication. RLE wound exploration, debridement, possible split-thickness skin grafting versus skin substitute, wound VAC placement 4/30 with Plastics.  Resumed Eliquis on 05/01. Plastics arranging home wound vac prior to discharge    Interval History:     Review of Systems  Objective:     Vital Signs (Most Recent):  Temp: 98.5 °F (36.9 °C) (05/02/25 1135)  Pulse: 72 (05/02/25 1135)  Resp: 19 (05/02/25 1135)  BP: 124/65 (05/02/25 1135)  SpO2: 96 % (05/02/25 1135) Vital Signs (24h Range):  Temp:  [97.8 °F (36.6 °C)-98.7 °F (37.1 °C)] 98.5 °F (36.9 °C)  Pulse:  [72-88] 72  Resp:  [18-19] 19  SpO2:  [94 %-97 %] 96 %  BP: (124-152)/(62-71) 124/65     Weight: 120 kg (264 lb 8.8 oz)  Body mass index is 33.07 kg/m².    Intake/Output Summary (Last 24 hours) at 5/2/2025 1022  Last data filed at 5/2/2025 0408  Gross per 24 hour   Intake --   Output 1000 ml   Net -1000 ml         Physical Exam      Physical Exam  Vitals and nursing note reviewed.   Constitutional:       General: He is not in acute distress.     Appearance:  Normal appearance. He is well-developed. He is not ill-appearing.   HENT:      Head: Normocephalic and atraumatic.   Eyes:      Extraocular Movements: Extraocular movements intact.      Pupils: Pupils are equal, round, and reactive to light.   Cardiovascular:      Rate and Rhythm: Normal rate and regular rhythm.   Pulmonary:      Effort: Pulmonary effort is normal.      Breath sounds: Normal breath sounds.   Abdominal:      General: Abdomen is flat. Bowel sounds are normal.      Palpations: Abdomen is soft.      Tenderness: There is no abdominal tenderness.   Musculoskeletal:      Comments: RLE wound vac in place, RLE wrapped. Thigh with some bloody seeping through dressings, Sensation intact   Skin:     General: Skin is warm and dry.   Neurological:      General: No focal deficit present.      Mental Status: He is alert and oriented to person, place, and time.   Psychiatric:         Mood and Affect: Mood normal.         Behavior: Behavior normal.       Significant Labs: All pertinent labs within the past 24 hours have been reviewed.    Significant Imaging: I have reviewed all pertinent imaging results/findings within the past 24 hours.        Assessment & Plan  Acute deep vein thrombosis (DVT) of right lower extremity  Hematoma  Leg wound, right  - RLE US showed a nonocclusive thrombus in the right common femoral vein   - Started Heparin gtt on 4/18 once cleared from IR standpoint  - Hg had been down-trending and dressings saturated with blood. - Hgb stabilized   - If able to tolerate anticoagulation, will transition to oral DOAC when not needing further procedures. If not, will need to discuss IVC filter  - General surgery consulted- hematoma evacuation performed at bedside  - S/p  wound debridement and wound vac placement on 4/21. and 4/24 for wound debridement + vac reapplication  - Wound Vac in place  - MM pain regimen   s/p STSG to right leg with wound vac placement 4/30 by plastics.   -  Transitioned to  eliquis 05/01  - follow up with plastics 5/5    Essential hypertension  Patient's blood pressure range in the last 24 hours was: BP  Min: 124/65  Max: 152/71.The patient's inpatient anti-hypertensive regimen is listed below:  Current Antihypertensives  NIFEdipine 24 hr tablet 90 mg, Daily, Oral  Hold ARB due to hyperkalemia  Plan  - BP is uncontrolled, will adjust as follows: change to nifedipine  Alcohol use disorder, severe, dependence  - daily drinker, last drink 4/17  - 3-4 beers daily, denies withdrawal symptoms  - continue thiamine, folic acid and multivitamin  - CIWA per nursing   Obesity (BMI 30-39.9)  Body mass index is 33.07 kg/m². Morbid obesity complicates all aspects of disease management from diagnostic modalities to treatment. Weight loss encouraged and health benefits explained to patient.   Hyponatremia  Hyponatremia is likely due to Cirrhosis. The patient's most recent sodium results are listed below.  Recent Labs     04/30/25 0416 05/01/25 0449 05/02/25  0201   * 135* 135*     Plan  - Correct the sodium by 4-6mEq in 24 hours.   - Fluid and salt restriction  - Monitor sodium Daily.   - Patient hyponatremia is stable  Pseudoaneurysm of femoral artery  - RLE US showed a new right common femoral artery pseudoaneurysm   - CTA RLE showed extravasation within the pseudoaneurysm sac  - Vascular Surgery consulted- recommended IR guided thrombin injection. IR consulted, completed procedure on 4/18.   - Groin u/s on 4/19 showed continued thrombosis of the pseudoaneurysm  - Repeat u/s 4/29 stable, discussed with IR  Hyperkalemia  Hyperkalemia is likely due to multiple afctors.The patients most recent potassium results are listed below.  Recent Labs     04/30/25  0416 05/01/25 0449 05/02/25  0201   K 4.8 5.1 4.4     Plan  - Monitor for arrhythmias with EKG and/or continuous telemetry.   - Treat the hyperkalemia with Potassium Binders.   - Monitor potassium: Daily  - Hold ARB     VTE Risk Mitigation  (From admission, onward)           Ordered     apixaban tablet 10 mg  2 times daily         05/01/25 1104     heparin 25,000 units in dextrose 5% 250 ml (100 units/mL) infusion MINIMAL INTENSITY nomogram - [facility]  Continuous        Question:  Begin at (units/kg/hr)  Answer:  12    04/25/25 0808     IP VTE HIGH RISK PATIENT  Once         04/18/25 0725     Reason for No Pharmacological VTE Prophylaxis  Once        Question:  Reasons:  Answer:  Risk of Bleeding  Comment:  will start anticoagulation once medically cleared    04/18/25 0725                    Discharge Planning   KENNETH: 5/5/2025     Code Status: Full Code   Medical Readiness for Discharge Date:   Discharge Plan A: Home, Home with family                Please place Justification for DME        Sherita Hardwick MD  Department of Hospital Medicine   [facility] - Telemetry Stepdown

## 2025-05-02 NOTE — ASSESSMENT & PLAN NOTE
Hyperkalemia is likely due to multiple afctors.The patients most recent potassium results are listed below.  Recent Labs     04/30/25  0416 05/01/25  0449 05/02/25  0201   K 4.8 5.1 4.4     Plan  - Monitor for arrhythmias with EKG and/or continuous telemetry.   - Treat the hyperkalemia with Potassium Binders.   - Monitor potassium: Daily  - Hold ARB

## 2025-05-02 NOTE — SUBJECTIVE & OBJECTIVE
Interval History:     Review of Systems  Objective:     Vital Signs (Most Recent):  Temp: 98.5 °F (36.9 °C) (05/02/25 1135)  Pulse: 72 (05/02/25 1135)  Resp: 19 (05/02/25 1135)  BP: 124/65 (05/02/25 1135)  SpO2: 96 % (05/02/25 1135) Vital Signs (24h Range):  Temp:  [97.8 °F (36.6 °C)-98.7 °F (37.1 °C)] 98.5 °F (36.9 °C)  Pulse:  [72-88] 72  Resp:  [18-19] 19  SpO2:  [94 %-97 %] 96 %  BP: (124-152)/(62-71) 124/65     Weight: 120 kg (264 lb 8.8 oz)  Body mass index is 33.07 kg/m².    Intake/Output Summary (Last 24 hours) at 5/2/2025 1451  Last data filed at 5/2/2025 0408  Gross per 24 hour   Intake --   Output 1000 ml   Net -1000 ml         Physical Exam      Physical Exam  Vitals and nursing note reviewed.   Constitutional:       General: He is not in acute distress.     Appearance: Normal appearance. He is well-developed. He is not ill-appearing.   HENT:      Head: Normocephalic and atraumatic.   Eyes:      Extraocular Movements: Extraocular movements intact.      Pupils: Pupils are equal, round, and reactive to light.   Cardiovascular:      Rate and Rhythm: Normal rate and regular rhythm.   Pulmonary:      Effort: Pulmonary effort is normal.      Breath sounds: Normal breath sounds.   Abdominal:      General: Abdomen is flat. Bowel sounds are normal.      Palpations: Abdomen is soft.      Tenderness: There is no abdominal tenderness.   Musculoskeletal:      Comments: RLE wound vac in place, RLE wrapped. Thigh with some bloody seeping through dressings, Sensation intact   Skin:     General: Skin is warm and dry.   Neurological:      General: No focal deficit present.      Mental Status: He is alert and oriented to person, place, and time.   Psychiatric:         Mood and Affect: Mood normal.         Behavior: Behavior normal.       Significant Labs: All pertinent labs within the past 24 hours have been reviewed.    Significant Imaging: I have reviewed all pertinent imaging results/findings within the past 24  hours.

## 2025-05-02 NOTE — PT/OT/SLP PROGRESS
Physical Therapy Treatment    Patient Name:  Froylan Borja   MRN:  [id number]    Recommendations:     Discharge Recommendations: High Intensity Therapy  Discharge Equipment Recommendations: wheelchair  Barriers to discharge: Inaccessible home and Decreased caregiver support, patient's home is NOT accessible via wheelchair but he will need a wheelchair for community mobility    Assessment:     Froylan Borja is a 70 y.o. male admitted with a medical diagnosis of Acute deep vein thrombosis (DVT) of right lower extremity.  He presents with the following impairments/functional limitations: weakness, impaired endurance, impaired self care skills, impaired functional mobility, impaired balance, decreased lower extremity function, gait instability, pain, impaired skin, edema, orthopedic precautions. The patient's mobility is limited due to generalized weakness from prolonged bed rest and complex medical condition, NWB to R LE following I&D and skin graft. Patient reports he will need to be able to ambulate with RW in order to discharge home as his apt is not accessible via w/c. Patient able to stand from elevated bed height with RW and contact guard assist while maintaining R LE NWBing, patient requiring significant assist to stand from standard bed height using RW. This session emphasized gait training, progressed from stationary hopping using RW to taking lateral hopping steps, ambulating forward/backwards 4 steps ea. Performed step transfer from bed to chair with RW and contact guard assist while maintaining R LE NWBing. He is not safe to return home.   Based on the patient's progress with therapy, ability to tolerate 3 hrs of therapy daily, motivation to participate in treatment, and prior level of independence, they are an excellent candidate for inpatient rehabilitation and they would benefit from rehab to maximize their functional potential.      Rehab Prognosis: Good; patient would benefit from acute skilled PT services to  "address these deficits and reach maximum level of function.    Recent Surgery: Procedure(s) (LRB):  RIGHT LOWER EXTREMITY SPLIT THICKNESS SKIN GRAFT APPLICATION (Right)  SURGICAL PROCUREMENT, GRAFT, SKIN (Right)  APPLICATION, WOUND VAC (Right)  IRRIGATION AND DEBRIDEMENT, LOWER EXTREMITY (Right) 2 Days Post-Op    Plan:     During this hospitalization, patient to be seen 4 x/week to address the identified rehab impairments via gait training, therapeutic activities, therapeutic exercises, neuromuscular re-education and progress toward the following goals:    Plan of Care Expires:  05/26/25    Subjective     Chief Complaint: "I need to be able to walk around at home"  Patient/Family Comments/goals: return to PLOF   Pain/Comfort:  Pain Rating 1:  (mild pain that increased with standing)  Location - Side 1: Right  Location - Orientation 1: generalized  Location 1: leg  Pain Addressed 1: Reposition, Distraction  Pain Rating Post-Intervention 1:  (did not report)      Objective:     Communicated with RN prior to session.  Patient found HOB elevated with wound vac upon PT entry to room.     General Precautions: Standard, fall  Orthopedic Precautions: RLE non weight bearing  Braces: N/A  Respiratory Status: Room air     Functional Mobility:    Bed Mobility  Supine to Sit on the R side:  stand by assistance      Transfers Sit to Stand:    -Rep 1 from elevated bed height: contact guard assist with RW, maintained R LE NWBing  -Rep 2 from standard bed height: moderate assistance with RW,  maintained R LE NWBing   Gait  Gait Distance:   -static hopping on L LE with RW, 6 reps with contact guard assist   -Lateral steps 2' to L and R with RW, contact guard assist   -Forward/backward 4 steps with RW and contact guard assist    Description: forward flexed posture at trunk and hips, Wbing heavily through UE, minimal L foot clearance, able to maintain R LE NWBing           AM-PAC 6 CLICK MOBILITY  Turning over in bed (including " adjusting bedclothes, sheets and blankets)?: 3  Sitting down on and standing up from a chair with arms (e.g., wheelchair, bedside commode, etc.): 2  Moving from lying on back to sitting on the side of the bed?: 3  Moving to and from a bed to a chair (including a wheelchair)?: 3  Need to walk in hospital room?: 2  Climbing 3-5 steps with a railing?: 1  Basic Mobility Total Score: 14       Treatment & Education:  Patient educated on:  -role of therapy  -goals of session  -PT POC  -benefits of out of bed mobility and consequences of immobility  -calling for staff assist to mobilize safely  Patient agreeable to mobilize with therapy.      Sit to stand training, verbal/tactile cues for:   -scooting hips to edge of chair  -positioning L LE in Wbing close to patient's [place], cued to extend R LE anteriorly  -R hand placement on bed  -facilitation for anterior rocking to initiate transfer  Performed 2 reps of sit to stand from elevated bed height and standard height  Patient required cues 75% of the time for correct transfer technique.      Gait training: cued for upright posture, cued for sequencing with RW progression and stepping, cued to ambulate inside RW [place], pacing for energy conservation     Patient left up in chair (elevated seat height) with all lines intact, call button in reach, and RN notified..OT present at end of session    GOALS:   Multidisciplinary Problems       Physical Therapy Goals          Problem: Physical Therapy    Goal Priority Disciplines Outcome Interventions   Physical Therapy Goal     PT, PT/OT Progressing    Description: Goals to be met by:      Patient will increase functional independence with mobility by performin. Supine to sit with Modified [place]  2. Sit to supine with Modified [place]  3. Sit to stand transfer with contact guard assist and RW NWB RLE  4. Bed to chair transfer with Supervision using RW NWB RLE  5. Gait  x 50 feet with Stand-by Assistance using RW NWB  RLE                         DME Justifications:  Patient has a mobility limitation that significantly impairs their ability to participate in one or more mobility related activities of daily living in customary locations in the home. The mobility limitation cannot be sufficiently resolved by the use of a cane or walker. The use of a manual wheelchair will greatly improve the patient's ability to participate in MRADLs. The patient will use the wheelchair on a regular basis at home. They have expressed their willingness to use a manual wheelchair in the home, and have a caregiver who is available and willing to assist with the wheelchair if needed.     Time Tracking:     PT Received On: 05/02/25  PT Start Time: 1138     PT Stop Time: 1210  PT Total Time (min): 32 min     Billable Minutes: Gait Training 32    Treatment Type: Treatment  PT/PTA: PT     Number of PTA visits since last PT visit: 0     05/02/2025

## 2025-05-02 NOTE — ASSESSMENT & PLAN NOTE
- RLE US showed a nonocclusive thrombus in the right common femoral vein   - Started Heparin gtt on 4/18 once cleared from IR standpoint  - Hg had been down-trending and dressings saturated with blood. - Hgb stabilized   - If able to tolerate anticoagulation, will transition to oral DOAC when not needing further procedures. If not, will need to discuss IVC filter  - General surgery consulted- hematoma evacuation performed at bedside  - S/p  wound debridement and wound vac placement on 4/21. and 4/24 for wound debridement + vac reapplication  - Wound Vac in place  - MM pain regimen   s/p STSG to right leg with wound vac placement 4/30 by plastics.   -  Transitioned to eliquis 05/01  - follow up with plastics 5/5

## 2025-05-02 NOTE — PT/OT/SLP PROGRESS
Occupational Therapy   Treatment    Name: Froylan Borja  MRN: [id number]  Admitting Diagnosis:  Acute deep vein thrombosis (DVT) of right lower extremity  2 Days Post-Op    Recommendations:     Discharge Recommendations: High Intensity Therapy  Discharge Equipment Recommendations:  wheelchair  Barriers to discharge:  Inaccessible home environment    Assessment:     Froylan Borja is a 70 y.o. male with a medical diagnosis of Acute deep vein thrombosis (DVT) of right lower extremity.  He presents with good participation and motivation. Performance deficits affecting function are weakness, impaired endurance, impaired self care skills, impaired functional mobility, impaired balance, decreased lower extremity function, decreased upper extremity function, orthopedic precautions.     Rehab Prognosis:  Good; patient would benefit from acute skilled OT services to address these deficits and reach maximum level of function.       Plan:     Patient to be seen 4 x/week to address the above listed problems via self-care/home management, therapeutic activities, therapeutic exercises, neuromuscular re-education  Plan of Care Expires: 05/31/25  Plan of Care Reviewed with: patient    Subjective     Chief Complaint/comment: worries about being home with NWB precautions  Pain/Comfort:  Pain Rating 1:  (did not report)  Pain Rating Post-Intervention 1:  (did not report)    Objective:     Communicated with: RN prior to session.  Patient found up in chair with wound vac (no family present) upon OT entry to room.    General Precautions: Standard, fall    Orthopedic Precautions:RLE non weight bearing  Braces: N/A  Respiratory Status: Room air     Occupational Performance:     AMPAC 6 Click ADL: 18    Treatment & Education:  Pt see for UE strengthening as just finished several transfers with PT.  Provided pt with written HEP & red theraband for theraband exercises. Pt performed theraband exercises with BUE in 6 planes x 10 reps each while seated in  chair.  Pt reviewed multiple times technique for exercises.  Provided education on safety with theraband.  Pt with questions regarding toileting therefore recommended use of bedside commode & discussed potential for scoot pivot transfers from EOB to bedside commode due to commode in room has a drop arm feature.  Pt had no further questions & when asked whether there were any concerns pt reported none.      Patient left up in chair with all lines intact, call button in reach, and RN notified    GOALS:   Multidisciplinary Problems       Occupational Therapy Goals          Problem: Occupational Therapy    Goal Priority Disciplines Outcome Interventions   Occupational Therapy Goal     OT, PT/OT Progressing    Description: Goals to be met by: 5/31/2025     Patient will increase functional independence with ADLs by performing:    LE Dressing with Stand-by Assistance.  Grooming while standing with Set-up Assistance.  Toileting from toilet with Modified [place] for hygiene and clothing management.   Bathing from  shower chair/bench with Stand-by Assistance.  Stand pivot transfers with Stand-by Assistance and maintaining weight-bearing precaution(s).  Step transfer with Stand-by Assistance and maintaining weight-bearing precaution(s)  Upper extremity exercise program x10-15 reps per handout, with independence.                           DME Justifications:  Froylan Borja has a mobility limitation that significantly impairs his ability to participate in one or more mobility related activities of daily living (MRADLs) such as toileting, feeding, dressing, grooming, and bathing in customary locations in the home.  The mobility limitation cannot be sufficiently resolved by the use of a cane or walker.   The use of a manual wheelchair will significantly improve the patients ability to participate in MRADLS and the patient will use it on regular basis in the home.  Froylan Borja has expressed his willingness to use a manual wheelchair  in the home. Patients upper body strength is sufficient for propulsion.  He also has a caregiver who is available, willing, and able to provide assistance with the wheelchair when needed.      Time Tracking:     OT Date of Treatment: 05/02/25  OT Start Time: 1212  OT Stop Time: 1238  OT Total Time (min): 26 min    Billable Minutes:Therapeutic Activity 10  Therapeutic Exercise 16    OT/MARQUITA: OT     Number of MARQUITA visits since last OT visit: 0    5/2/2025

## 2025-05-02 NOTE — ASSESSMENT & PLAN NOTE
Patient's blood pressure range in the last 24 hours was: BP  Min: 124/65  Max: 152/71.The patient's inpatient anti-hypertensive regimen is listed below:  Current Antihypertensives  NIFEdipine 24 hr tablet 90 mg, Daily, Oral  Hold ARB due to hyperkalemia  Plan  - BP is uncontrolled, will adjust as follows: change to nifedipine

## 2025-05-02 NOTE — ASSESSMENT & PLAN NOTE
Hyponatremia is likely due to Cirrhosis. The patient's most recent sodium results are listed below.  Recent Labs     04/30/25  0416 05/01/25  0449 05/02/25  0201   * 135* 135*     Plan  - Correct the sodium by 4-6mEq in 24 hours.   - Fluid and salt restriction  - Monitor sodium Daily.   - Patient hyponatremia is stable

## 2025-05-02 NOTE — PLAN OF CARE
Contacted Fatuma Lainez( [phone])with [facility] by email asd asked how long till the patient got his wound vac. Awaiting an answer.  11:23 AM  Updated notes sent to [facility]. Awaiting authorization.  Hubert Guo, [facility]  [phone]

## 2025-05-02 NOTE — PLAN OF CARE
Patient stable, AOX4 , VSS.  Continuous wound VAC on, Safety measures ensured.call light within reach.bed in low position. No distress at night.

## 2025-05-03 LAB
ABSOLUTE EOSINOPHIL (OHS): 0.22 K/UL
ABSOLUTE MONOCYTE (OHS): 0.96 K/UL (ref 0.3–1)
ABSOLUTE NEUTROPHIL COUNT (OHS): 5.34 K/UL (ref 1.8–7.7)
ALBUMIN SERPL BCP-MCNC: 2.9 G/DL (ref 3.5–5.2)
ALP SERPL-CCNC: 92 UNIT/L (ref 40–150)
ALT SERPL W/O P-5'-P-CCNC: 35 UNIT/L (ref 10–44)
ANION GAP (OHS): 7 MMOL/L (ref 8–16)
AST SERPL-CCNC: 31 UNIT/L (ref 11–45)
BASOPHILS # BLD AUTO: 0.12 K/UL
BASOPHILS NFR BLD AUTO: 1.3 %
BILIRUB SERPL-MCNC: 0.3 MG/DL (ref 0.1–1)
BUN SERPL-MCNC: 24 MG/DL (ref 8–23)
CALCIUM SERPL-MCNC: 8.7 MG/DL (ref 8.7–10.5)
CHLORIDE SERPL-SCNC: 105 MMOL/L (ref 95–110)
CO2 SERPL-SCNC: 24 MMOL/L (ref 23–29)
CREAT SERPL-MCNC: 0.9 MG/DL (ref 0.5–1.4)
ERYTHROCYTE [DISTWIDTH] IN BLOOD BY AUTOMATED COUNT: 13.3 % (ref 11.5–14.5)
GFR SERPLBLD CREATININE-BSD FMLA CKD-EPI: >60 ML/MIN/1.73/M2
GLUCOSE SERPL-MCNC: 113 MG/DL (ref 70–110)
HCT VFR BLD AUTO: 33.4 % (ref 40–54)
HGB BLD-MCNC: 10.7 GM/DL (ref 14–18)
IMM GRANULOCYTES # BLD AUTO: 0.38 K/UL (ref 0–0.04)
IMM GRANULOCYTES NFR BLD AUTO: 4 % (ref 0–0.5)
LYMPHOCYTES # BLD AUTO: 2.5 K/UL (ref 1–4.8)
MAGNESIUM SERPL-MCNC: 2 MG/DL (ref 1.6–2.6)
MCH RBC QN AUTO: 31.8 PG (ref 27–31)
MCHC RBC AUTO-ENTMCNC: 32 G/DL (ref 32–36)
MCV RBC AUTO: 99 FL (ref 82–98)
NUCLEATED RBC (/100WBC) (OHS): 0 /100 WBC
PHOSPHATE SERPL-MCNC: 3.7 MG/DL (ref 2.7–4.5)
PLATELET # BLD AUTO: 254 K/UL (ref 150–450)
PMV BLD AUTO: 10.1 FL (ref 9.2–12.9)
POTASSIUM SERPL-SCNC: 4.5 MMOL/L (ref 3.5–5.1)
PROT SERPL-MCNC: 6.3 GM/DL (ref 6–8.4)
RBC # BLD AUTO: 3.36 M/UL (ref 4.6–6.2)
RELATIVE EOSINOPHIL (OHS): 2.3 %
RELATIVE LYMPHOCYTE (OHS): 26.3 % (ref 18–48)
RELATIVE MONOCYTE (OHS): 10.1 % (ref 4–15)
RELATIVE NEUTROPHIL (OHS): 56 % (ref 38–73)
SODIUM SERPL-SCNC: 136 MMOL/L (ref 136–145)
WBC # BLD AUTO: 9.52 K/UL (ref 3.9–12.7)

## 2025-05-03 PROCEDURE — 25000003 PHARM REV CODE 250: Mod: HCNC

## 2025-05-03 PROCEDURE — 97116 GAIT TRAINING THERAPY: CPT | Mod: HCNC,CQ

## 2025-05-03 PROCEDURE — 25000003 PHARM REV CODE 250: Mod: HCNC | Performed by: HOSPITALIST

## 2025-05-03 PROCEDURE — 25000003 PHARM REV CODE 250: Mod: HCNC | Performed by: STUDENT IN AN ORGANIZED HEALTH CARE EDUCATION/TRAINING PROGRAM

## 2025-05-03 PROCEDURE — 20600001 HC STEP DOWN PRIVATE ROOM: Mod: HCNC

## 2025-05-03 PROCEDURE — 36415 COLL VENOUS BLD VENIPUNCTURE: CPT | Mod: HCNC | Performed by: STUDENT IN AN ORGANIZED HEALTH CARE EDUCATION/TRAINING PROGRAM

## 2025-05-03 PROCEDURE — 25000003 PHARM REV CODE 250: Mod: HCNC | Performed by: PHYSICIAN ASSISTANT

## 2025-05-03 RX ADMIN — SENNOSIDES 8.6 MG: 8.6 TABLET, FILM COATED ORAL at 10:05

## 2025-05-03 RX ADMIN — NIFEDIPINE 90 MG: 30 TABLET, FILM COATED, EXTENDED RELEASE ORAL at 10:05

## 2025-05-03 RX ADMIN — BUPROPION HYDROCHLORIDE 150 MG: 75 TABLET, FILM COATED ORAL at 10:05

## 2025-05-03 RX ADMIN — FOLIC ACID 1 MG: 1 TABLET ORAL at 10:05

## 2025-05-03 RX ADMIN — OXYCODONE HYDROCHLORIDE 10 MG: 10 TABLET ORAL at 09:05

## 2025-05-03 RX ADMIN — ACETAMINOPHEN 1000 MG: 500 TABLET ORAL at 09:05

## 2025-05-03 RX ADMIN — APIXABAN 10 MG: 5 TABLET, FILM COATED ORAL at 10:05

## 2025-05-03 RX ADMIN — Medication 1 TABLET: at 10:05

## 2025-05-03 RX ADMIN — MELATONIN TAB 3 MG 6 MG: 3 TAB at 09:05

## 2025-05-03 RX ADMIN — Medication 100 MG: at 10:05

## 2025-05-03 RX ADMIN — ACETAMINOPHEN 1000 MG: 500 TABLET ORAL at 02:05

## 2025-05-03 RX ADMIN — OXYCODONE HYDROCHLORIDE 10 MG: 10 TABLET ORAL at 05:05

## 2025-05-03 RX ADMIN — GABAPENTIN 300 MG: 300 CAPSULE ORAL at 10:05

## 2025-05-03 RX ADMIN — ACETAMINOPHEN 1000 MG: 500 TABLET ORAL at 10:05

## 2025-05-03 RX ADMIN — POLYETHYLENE GLYCOL 3350 17 G: 17 POWDER, FOR SOLUTION ORAL at 10:05

## 2025-05-03 RX ADMIN — BUPROPION HYDROCHLORIDE 150 MG: 75 TABLET, FILM COATED ORAL at 09:05

## 2025-05-03 RX ADMIN — GABAPENTIN 300 MG: 300 CAPSULE ORAL at 09:05

## 2025-05-03 NOTE — ASSESSMENT & PLAN NOTE
Hyperkalemia is likely due to multiple afctors.The patients most recent potassium results are listed below.  Recent Labs     05/01/25  0449 05/02/25  0201 05/03/25  0345   K 5.1 4.4 4.5     Plan  - Monitor for arrhythmias with EKG and/or continuous telemetry.   - Treat the hyperkalemia with Potassium Binders.   - Monitor potassium: Daily  - Hold ARB

## 2025-05-03 NOTE — ASSESSMENT & PLAN NOTE
- RLE US showed a nonocclusive thrombus in the right common femoral vein   - Started Heparin gtt on 4/18 once cleared from IR standpoint  - Hg had been down-trending and dressings saturated with blood. - Hgb stabilized   - If able to tolerate anticoagulation, will transition to oral DOAC when not needing further procedures. If not, will need to discuss IVC filter  - General surgery consulted- hematoma evacuation performed at bedside  - S/p  wound debridement and wound vac placement on 4/21. and 4/24 for wound debridement + vac reapplication  - Wound Vac in place  - MM pain regimen   s/p STSG to right leg with wound vac placement 4/30 by plastics.   -  Transitioned to eliquis 05/01, holding for now given active oozing from R thigh surgical site Awaiting plastics recs

## 2025-05-03 NOTE — PROGRESS NOTES
[facility] - Telemetry [facility] Medicine  Progress Note    Patient Name: Froylan Borja  MRN: [id number]  Patient Class: IP- Inpatient   Admission Date: 4/18/2025  Length of Stay: 15 days  Attending Physician: Sherita Hardwick,*  Primary Care Provider: Janki Tamez MD        Subjective     Principal Problem:Acute deep vein thrombosis (DVT) of right lower extremity        HPI:  Mr. Froylan Borja is a 71 y/o male with a PMHx alcohol abuse, cirrhosis, hepatocellular carcinoma, and HTN who presents for complaint of R leg pain and swelling. He reports yesterday he hit his R leg on the car door and shortly developed a bruise and pain to the R lateral leg. He tried tylenol at-home though pain, swelling, and bruising continued to worsen. Admits to paresthesias and numbness to the R foot as well as significant R foot pain with weight-bearing. Denies CP, palpitations, SOB, lightheadedness, dizziness, headaches, LOC, vomiting, diarrhea, abdominal pain, fever, chills. Reports some nausea following morphine and dilaudid for pain in ED. Of note, patient had angiogram y90 mapping study done with IR last Thursday (4/10/25). Has developed some bruising to the medial thigh and mild groin discomfort since procedure. Social history significant for daily alcohol consumption, 3-4 beers daily, last alcoholic beverage yesterday around 3PM.     In the ED, AF HDS. H&H 13.0/38.6 (BL~14.8), no leukocytosis. HypoNa 129, HyperK 5.7, CO2 20, Ca 8.6, Albumin 3.4, remainder of CMP unremarkable. PT/INR wnl at 10.9/1.0. LA 1.6. CPK wnl. Hep C Ab reactive, HCV RNA pending. EKG with normal sinus rhythm. R Lower Ext U/S w/ findings of nonocclusive thrombus in the right common femoral vein and right common femoral artery pseudoaneurysm. CTA Lower Ext pending. R lower extremity hematoma drained at bedside by general surgery with evacuation of 120 cc of hematoma. Compressive dressing applied. Given nebulized albuterol, lokelma, IV cefazolin, IV  dilaudid, IV morphine, IV zofran. Admitted to .     Overview/Hospital Course:  70 y.o M with HTN, alcohol/HepC cirrhosis c/b HCC s/p Y-90 mapping angiogram on 4/10 who presents with bruising and pain at angiogram site and RLE calf pain after he hit it on a car door. RLE U/S showed non-occlusive CFV thrombus and R CFA pseudoaneurysm. CTA re-demonstrated the psdueoaneurysm with active extravasation within the sac as well as a large R calf hematoma which GenSurg were consulted for and successfully drained (120ccs). Vascular Surgery were consulted, recommended IR guided percutaneous thrombin injection which IR were able to complete. Heparin gtt was started. Groin U/S done the following day showed continued thrombosis of the pseudoanurysm. He was taken to the OR on 4/21 for debridement and placement of wound vac. He was taken back to the OR on 4/24 for further debridement and wound vac change. OR 4/28 for vac reapplication. RLE wound exploration, debridement, possible split-thickness skin grafting versus skin substitute, wound VAC placement 4/30 with Plastics.  Resumed Eliquis on 05/01. Plastics arranging home wound vac prior to discharge    Interval History:  Noted to have oozing from right thigh surgical site.  Pending plastic surgery recommendations.  Hold Eliquis    Review of Systems  Objective:     Vital Signs (Most Recent):  Temp: 97.9 °F (36.6 °C) (05/03/25 1121)  Pulse: 74 (05/03/25 1121)  Resp: 18 (05/03/25 1121)  BP: (!) 157/72 (05/03/25 1121)  SpO2: 97 % (05/03/25 1121) Vital Signs (24h Range):  Temp:  [97.5 °F (36.4 °C)-98.6 °F (37 °C)] 97.9 °F (36.6 °C)  Pulse:  [74-79] 74  Resp:  [18-19] 18  SpO2:  [94 %-99 %] 97 %  BP: (138-157)/(63-76) 157/72     Weight: 120 kg (264 lb 8.8 oz)  Body mass index is 33.07 kg/m².    Intake/Output Summary (Last 24 hours) at 5/3/2025 1543  Last data filed at 5/2/2025 1828  Gross per 24 hour   Intake --   Output 200 ml   Net -200 ml         Physical Exam      Physical  Exam  Vitals and nursing note reviewed.   Constitutional:       General: He is not in acute distress.     Appearance: Normal appearance. He is well-developed. He is not ill-appearing.   HENT:      Head: Normocephalic and atraumatic.   Eyes:      Extraocular Movements: Extraocular movements intact.      Pupils: Pupils are equal, round, and reactive to light.   Cardiovascular:      Rate and Rhythm: Normal rate and regular rhythm.   Pulmonary:      Effort: Pulmonary effort is normal.      Breath sounds: Normal breath sounds.   Abdominal:      General: Abdomen is flat. Bowel sounds are normal.      Palpations: Abdomen is soft.      Tenderness: There is no abdominal tenderness.   Musculoskeletal:      Comments: RLE wound vac in place, RLE wrapped. Thigh with some bloody seeping through dressings, Sensation intact   Skin:     General: Skin is warm and dry.   Neurological:      General: No focal deficit present.      Mental Status: He is alert and oriented to person, place, and time.   Psychiatric:         Mood and Affect: Mood normal.         Behavior: Behavior normal.     Significant Labs: All pertinent labs within the past 24 hours have been reviewed.    Significant Imaging: I have reviewed all pertinent imaging results/findings within the past 24 hours.        Assessment & Plan  Acute deep vein thrombosis (DVT) of right lower extremity  Hematoma  Leg wound, right  - RLE US showed a nonocclusive thrombus in the right common femoral vein   - Started Heparin gtt on 4/18 once cleared from IR standpoint  - Hg had been down-trending and dressings saturated with blood. - Hgb stabilized   - If able to tolerate anticoagulation, will transition to oral DOAC when not needing further procedures. If not, will need to discuss IVC filter  - General surgery consulted- hematoma evacuation performed at bedside  - S/p  wound debridement and wound vac placement on 4/21. and 4/24 for wound debridement + vac reapplication  - Wound Vac in  place  - MM pain regimen   s/p STSG to right leg with wound vac placement 4/30 by plastics.   -  Transitioned to eliquis 05/01, holding for now given active oozing from R thigh surgical site Awaiting plastics recs      Essential hypertension  Patient's blood pressure range in the last 24 hours was: BP  Min: 138/63  Max: 157/72.The patient's inpatient anti-hypertensive regimen is listed below:  Current Antihypertensives  NIFEdipine 24 hr tablet 90 mg, Daily, Oral  Hold ARB due to hyperkalemia  Plan  - BP is uncontrolled, will adjust as follows: change to nifedipine  Alcohol use disorder, severe, dependence  - daily drinker, last drink 4/17  - 3-4 beers daily, denies withdrawal symptoms  - continue thiamine, folic acid and multivitamin  - CIWA per nursing   Obesity (BMI 30-39.9)  Body mass index is 33.07 kg/m². Morbid obesity complicates all aspects of disease management from diagnostic modalities to treatment. Weight loss encouraged and health benefits explained to patient.   Hyponatremia  Hyponatremia is likely due to Cirrhosis. The patient's most recent sodium results are listed below.  Recent Labs     05/01/25 0449 05/02/25 0201 05/03/25  0345   * 135* 136     Plan  - Correct the sodium by 4-6mEq in 24 hours.   - Fluid and salt restriction  - Monitor sodium Daily.   - Patient hyponatremia is stable  Pseudoaneurysm of femoral artery  - RLE US showed a new right common femoral artery pseudoaneurysm   - CTA RLE showed extravasation within the pseudoaneurysm sac  - Vascular Surgery consulted- recommended IR guided thrombin injection. IR consulted, completed procedure on 4/18.   - Groin u/s on 4/19 showed continued thrombosis of the pseudoaneurysm  - Repeat u/s 4/29 stable, discussed with IR  Hyperkalemia  Hyperkalemia is likely due to multiple afctors.The patients most recent potassium results are listed below.  Recent Labs     05/01/25 0449 05/02/25 0201 05/03/25  0345   K 5.1 4.4 4.5     Plan  - Monitor  "for arrhythmias with EKG and/or continuous telemetry.   - Treat the hyperkalemia with Potassium Binders.   - Monitor potassium: Daily  - Hold ARB     VTE Risk Mitigation (From admission, onward)           Ordered     apixaban tablet 5 mg  2 times daily        Placed in "Followed by" Linked Group    05/02/25 1452     apixaban tablet 10 mg  2 times daily        Placed in "Followed by" Linked Group    05/02/25 1452     heparin 25,000 units in dextrose 5% 250 ml (100 units/mL) infusion MINIMAL INTENSITY nomogram - [facility]  Continuous        Question:  Begin at (units/kg/hr)  Answer:  12    04/25/25 0808     IP VTE HIGH RISK PATIENT  Once         04/18/25 0725     Reason for No Pharmacological VTE Prophylaxis  Once        Question:  Reasons:  Answer:  Risk of Bleeding  Comment:  will start anticoagulation once medically cleared    04/18/25 0725                    Discharge Planning   KENNETH: 5/6/2025     Code Status: Full Code   Medical Readiness for Discharge Date:   Discharge Plan A: Home, Home with family                Please place Justification for DME        Sherita Hardwick MD  Department of Hospital Medicine   [facility] - Telemetry Stepdown    "

## 2025-05-03 NOTE — PT/OT/SLP PROGRESS
"Physical Therapy Treatment    Patient Name:  Froylan Borja   MRN:  [id number]    Recommendations:     Discharge Recommendations: High Intensity Therapy  Discharge Equipment Recommendations: wheelchair  Barriers to discharge: None    Assessment:     Froylan Borja is a 70 y.o. male admitted with a medical diagnosis of Acute deep vein thrombosis (DVT) of right lower extremity.  He presents with the following impairments/functional limitations: weakness, impaired endurance, impaired self care skills, impaired functional mobility, gait instability, impaired balance, decreased lower extremity function, orthopedic precautions. Pt agreeable for therapy, had heavy drainage  earlier in the day, cleared by RN to participate after dressing change by Dr, pt required SBA for sup <> sit, min A for sit <> stand which progressed to SBA for sit <> stand, able to perform gait w/CGA while maintaining NWB to RLE    Rehab Prognosis: Fair; patient would benefit from acute skilled PT services to address these deficits and reach maximum level of function.    Recent Surgery: Procedure(s) (LRB):  RIGHT LOWER EXTREMITY SPLIT THICKNESS SKIN GRAFT APPLICATION (Right)  SURGICAL PROCUREMENT, GRAFT, SKIN (Right)  APPLICATION, WOUND VAC (Right)  IRRIGATION AND DEBRIDEMENT, LOWER EXTREMITY (Right) 3 Days Post-Op    Plan:     During this hospitalization, patient to be seen 4 x/week to address the identified rehab impairments via gait training, therapeutic activities, therapeutic exercises, neuromuscular re-education and progress toward the following goals:    Plan of Care Expires:  05/26/25    Subjective     Chief Complaint: Drainage to RLE  Patient/Family Comments/goals: "The Dr said it was normal"  Pain/Comfort:  Pain Rating 1: other (see comments) (unrated)  Location - Side 1: Right  Location - Orientation 1: generalized  Location 1: leg  Pain Addressed 1: Reposition, Distraction  Pain Rating Post-Intervention 1: other (see comments) " (unrated)      Objective:     Communicated with RN prior to session.  Patient found supine with wound vac upon PT entry to room.     General Precautions: Standard, fall  Orthopedic Precautions: RLE non weight bearing  Braces: N/A  Respiratory Status: Room air     Functional Mobility:    Bed Mobility:   Rolling: to R side with stand by assistance  Supine > Sit: stand by assistance  Sit > Supine: stand by assistance    Transfers:   Sit <> Stand Transfer: minimum assistance from EOB using rolling walker ; performed 3 stands total, first stand CGA, next two stand SBA, requires extra time for stand but pushes well w/BUE, maintains NWB to RLE during sit and stand, 2-3min seated rest break  in between attempts    Balance:   Sitting balance: GOOD: Maintains balance through MODERATE excursions of active trunk movement  Standing balance:   FAIR: Maintains without assist but unable to take challenges  FAIR: Needs CONTACT GUARD during gait                 Gait:  Distance: 8' x 2   Assistive Device: RW  Assistance Level: contact guard assistance  Gait Assessment: pt takes hop steps around  room near bed, maintains NWB to RLE, mild SOB after activity, two attempts, 2-3min seated rest break in between attempts              AM-PAC 6 CLICK MOBILITY  Turning over in bed (including adjusting bedclothes, sheets and blankets)?: 3  Sitting down on and standing up from a chair with arms (e.g., wheelchair, bedside commode, etc.): 3  Moving from lying on back to sitting on the side of the bed?: 3  Moving to and from a bed to a chair (including a wheelchair)?: 3  Need to walk in hospital room?: 3  Climbing 3-5 steps with a railing?: 1  Basic Mobility Total Score: 16       Treatment & Education:  Education:  PT role and PoC to increase strength and endurance    Patient left supine with all lines intact, call button in reach, and RN notified..    GOALS:   Multidisciplinary Problems       Physical Therapy Goals          Problem: Physical  Therapy    Goal Priority Disciplines Outcome Interventions   Physical Therapy Goal     PT, PT/OT Progressing    Description: Goals to be met by:      Patient will increase functional independence with mobility by performin. Supine to sit with Modified [place]  2. Sit to supine with Modified [place]  3. Sit to stand transfer with contact guard assist and RW NWB RLE  4. Bed to chair transfer with Supervision using RW NWB RLE  5. Gait  x 50 feet with Stand-by Assistance using RW NWB RLE                         DME Justifications:  Patient has a mobility limitation that significantly impairs their ability to participate in one or more mobility related activities of daily living in customary locations in the home. The mobility limitation cannot be sufficiently resolved by the use of a cane or walker. The use of a manual wheelchair will greatly improve the patient's ability to participate in MRADLs. The patient will use the wheelchair on a regular basis at home. They have expressed their willingness to use a manual wheelchair in the home, and have a caregiver who is available and willing to assist with the wheelchair if needed.     Time Tracking:     PT Received On: 25  PT Start Time: 1424     PT Stop Time: 1447  PT Total Time (min): 23 min     Billable Minutes: Gait Training 23    Treatment Type: Treatment  PT/PTA: PTA     Number of PTA visits since last PT visit: 2025

## 2025-05-03 NOTE — ASSESSMENT & PLAN NOTE
Hyponatremia is likely due to Cirrhosis. The patient's most recent sodium results are listed below.  Recent Labs     05/01/25  0449 05/02/25  0201 05/03/25  0345   * 135* 136     Plan  - Correct the sodium by 4-6mEq in 24 hours.   - Fluid and salt restriction  - Monitor sodium Daily.   - Patient hyponatremia is stable

## 2025-05-03 NOTE — ASSESSMENT & PLAN NOTE
Patient's blood pressure range in the last 24 hours was: BP  Min: 138/63  Max: 157/72.The patient's inpatient anti-hypertensive regimen is listed below:  Current Antihypertensives  NIFEdipine 24 hr tablet 90 mg, Daily, Oral  Hold ARB due to hyperkalemia  Plan  - BP is uncontrolled, will adjust as follows: change to nifedipine

## 2025-05-03 NOTE — SUBJECTIVE & OBJECTIVE
Interval History:  Noted to have oozing from right thigh surgical site.  Pending plastic surgery recommendations.  Hold Eliquis    Review of Systems  Objective:     Vital Signs (Most Recent):  Temp: 97.9 °F (36.6 °C) (05/03/25 1121)  Pulse: 74 (05/03/25 1121)  Resp: 18 (05/03/25 1121)  BP: (!) 157/72 (05/03/25 1121)  SpO2: 97 % (05/03/25 1121) Vital Signs (24h Range):  Temp:  [97.5 °F (36.4 °C)-98.6 °F (37 °C)] 97.9 °F (36.6 °C)  Pulse:  [74-79] 74  Resp:  [18-19] 18  SpO2:  [94 %-99 %] 97 %  BP: (138-157)/(63-76) 157/72     Weight: 120 kg (264 lb 8.8 oz)  Body mass index is 33.07 kg/m².    Intake/Output Summary (Last 24 hours) at 5/3/2025 1544  Last data filed at 5/2/2025 1828  Gross per 24 hour   Intake --   Output 200 ml   Net -200 ml         Physical Exam      Physical Exam  Vitals and nursing note reviewed.   Constitutional:       General: He is not in acute distress.     Appearance: Normal appearance. He is well-developed. He is not ill-appearing.   HENT:      Head: Normocephalic and atraumatic.   Eyes:      Extraocular Movements: Extraocular movements intact.      Pupils: Pupils are equal, round, and reactive to light.   Cardiovascular:      Rate and Rhythm: Normal rate and regular rhythm.   Pulmonary:      Effort: Pulmonary effort is normal.      Breath sounds: Normal breath sounds.   Abdominal:      General: Abdomen is flat. Bowel sounds are normal.      Palpations: Abdomen is soft.      Tenderness: There is no abdominal tenderness.   Musculoskeletal:      Comments: RLE wound vac in place, RLE wrapped. Thigh with some bloody seeping through dressings, Sensation intact   Skin:     General: Skin is warm and dry.   Neurological:      General: No focal deficit present.      Mental Status: He is alert and oriented to person, place, and time.   Psychiatric:         Mood and Affect: Mood normal.         Behavior: Behavior normal.     Significant Labs: All pertinent labs within the past 24 hours have been  reviewed.    Significant Imaging: I have reviewed all pertinent imaging results/findings within the past 24 hours.

## 2025-05-03 NOTE — PLAN OF CARE
Patient stable, AOX4 , VSS. Continuous wound VAC on,Safety measures ensured.call light within reach.bed in low position. No distress at night.

## 2025-05-04 LAB
ABSOLUTE EOSINOPHIL (OHS): 0.25 K/UL
ABSOLUTE MONOCYTE (OHS): 0.71 K/UL (ref 0.3–1)
ABSOLUTE NEUTROPHIL COUNT (OHS): 5.38 K/UL (ref 1.8–7.7)
ALBUMIN SERPL BCP-MCNC: 3 G/DL (ref 3.5–5.2)
ALP SERPL-CCNC: 111 UNIT/L (ref 40–150)
ALT SERPL W/O P-5'-P-CCNC: 42 UNIT/L (ref 10–44)
ANION GAP (OHS): 8 MMOL/L (ref 8–16)
AST SERPL-CCNC: 39 UNIT/L (ref 11–45)
BASOPHILS # BLD AUTO: 0.11 K/UL
BASOPHILS NFR BLD AUTO: 1.2 %
BILIRUB SERPL-MCNC: 0.4 MG/DL (ref 0.1–1)
BUN SERPL-MCNC: 23 MG/DL (ref 8–23)
CALCIUM SERPL-MCNC: 9 MG/DL (ref 8.7–10.5)
CHLORIDE SERPL-SCNC: 104 MMOL/L (ref 95–110)
CO2 SERPL-SCNC: 22 MMOL/L (ref 23–29)
CREAT SERPL-MCNC: 0.8 MG/DL (ref 0.5–1.4)
ERYTHROCYTE [DISTWIDTH] IN BLOOD BY AUTOMATED COUNT: 13 % (ref 11.5–14.5)
GFR SERPLBLD CREATININE-BSD FMLA CKD-EPI: >60 ML/MIN/1.73/M2
GLUCOSE SERPL-MCNC: 113 MG/DL (ref 70–110)
HCT VFR BLD AUTO: 35 % (ref 40–54)
HGB BLD-MCNC: 11.2 GM/DL (ref 14–18)
IMM GRANULOCYTES # BLD AUTO: 0.34 K/UL (ref 0–0.04)
IMM GRANULOCYTES NFR BLD AUTO: 3.8 % (ref 0–0.5)
LYMPHOCYTES # BLD AUTO: 2.22 K/UL (ref 1–4.8)
MAGNESIUM SERPL-MCNC: 2 MG/DL (ref 1.6–2.6)
MCH RBC QN AUTO: 31.2 PG (ref 27–31)
MCHC RBC AUTO-ENTMCNC: 32 G/DL (ref 32–36)
MCV RBC AUTO: 98 FL (ref 82–98)
NUCLEATED RBC (/100WBC) (OHS): 0 /100 WBC
PHOSPHATE SERPL-MCNC: 3.5 MG/DL (ref 2.7–4.5)
PLATELET # BLD AUTO: 277 K/UL (ref 150–450)
PMV BLD AUTO: 9.5 FL (ref 9.2–12.9)
POTASSIUM SERPL-SCNC: 4.3 MMOL/L (ref 3.5–5.1)
PROT SERPL-MCNC: 6.5 GM/DL (ref 6–8.4)
RBC # BLD AUTO: 3.59 M/UL (ref 4.6–6.2)
RELATIVE EOSINOPHIL (OHS): 2.8 %
RELATIVE LYMPHOCYTE (OHS): 24.6 % (ref 18–48)
RELATIVE MONOCYTE (OHS): 7.9 % (ref 4–15)
RELATIVE NEUTROPHIL (OHS): 59.7 % (ref 38–73)
SODIUM SERPL-SCNC: 134 MMOL/L (ref 136–145)
WBC # BLD AUTO: 9.01 K/UL (ref 3.9–12.7)

## 2025-05-04 PROCEDURE — 25000003 PHARM REV CODE 250: Mod: HCNC | Performed by: HOSPITALIST

## 2025-05-04 PROCEDURE — 85025 COMPLETE CBC W/AUTO DIFF WBC: CPT | Mod: HCNC | Performed by: STUDENT IN AN ORGANIZED HEALTH CARE EDUCATION/TRAINING PROGRAM

## 2025-05-04 PROCEDURE — 25000003 PHARM REV CODE 250: Mod: HCNC | Performed by: PHYSICIAN ASSISTANT

## 2025-05-04 PROCEDURE — 25000003 PHARM REV CODE 250: Mod: HCNC | Performed by: STUDENT IN AN ORGANIZED HEALTH CARE EDUCATION/TRAINING PROGRAM

## 2025-05-04 PROCEDURE — 84100 ASSAY OF PHOSPHORUS: CPT | Mod: HCNC | Performed by: STUDENT IN AN ORGANIZED HEALTH CARE EDUCATION/TRAINING PROGRAM

## 2025-05-04 PROCEDURE — 97530 THERAPEUTIC ACTIVITIES: CPT | Mod: HCNC,CO

## 2025-05-04 PROCEDURE — 20600001 HC STEP DOWN PRIVATE ROOM: Mod: HCNC

## 2025-05-04 PROCEDURE — 97535 SELF CARE MNGMENT TRAINING: CPT | Mod: HCNC,CO

## 2025-05-04 PROCEDURE — 80053 COMPREHEN METABOLIC PANEL: CPT | Mod: HCNC | Performed by: STUDENT IN AN ORGANIZED HEALTH CARE EDUCATION/TRAINING PROGRAM

## 2025-05-04 PROCEDURE — 25000003 PHARM REV CODE 250: Mod: HCNC

## 2025-05-04 PROCEDURE — 36415 COLL VENOUS BLD VENIPUNCTURE: CPT | Mod: HCNC | Performed by: STUDENT IN AN ORGANIZED HEALTH CARE EDUCATION/TRAINING PROGRAM

## 2025-05-04 PROCEDURE — 83735 ASSAY OF MAGNESIUM: CPT | Mod: HCNC | Performed by: STUDENT IN AN ORGANIZED HEALTH CARE EDUCATION/TRAINING PROGRAM

## 2025-05-04 RX ADMIN — Medication 1 TABLET: at 09:05

## 2025-05-04 RX ADMIN — ACETAMINOPHEN 1000 MG: 500 TABLET ORAL at 09:05

## 2025-05-04 RX ADMIN — ACETAMINOPHEN 1000 MG: 500 TABLET ORAL at 02:05

## 2025-05-04 RX ADMIN — APIXABAN 10 MG: 5 TABLET, FILM COATED ORAL at 09:05

## 2025-05-04 RX ADMIN — NIFEDIPINE 90 MG: 30 TABLET, FILM COATED, EXTENDED RELEASE ORAL at 09:05

## 2025-05-04 RX ADMIN — Medication 100 MG: at 09:05

## 2025-05-04 RX ADMIN — SENNOSIDES 8.6 MG: 8.6 TABLET, FILM COATED ORAL at 09:05

## 2025-05-04 RX ADMIN — GABAPENTIN 300 MG: 300 CAPSULE ORAL at 09:05

## 2025-05-04 RX ADMIN — MELATONIN TAB 3 MG 6 MG: 3 TAB at 09:05

## 2025-05-04 RX ADMIN — FOLIC ACID 1 MG: 1 TABLET ORAL at 09:05

## 2025-05-04 RX ADMIN — BUPROPION HYDROCHLORIDE 150 MG: 75 TABLET, FILM COATED ORAL at 09:05

## 2025-05-04 RX ADMIN — OXYCODONE HYDROCHLORIDE 10 MG: 10 TABLET ORAL at 05:05

## 2025-05-04 RX ADMIN — OXYCODONE HYDROCHLORIDE 10 MG: 10 TABLET ORAL at 09:05

## 2025-05-04 RX ADMIN — OXYCODONE HYDROCHLORIDE 10 MG: 10 TABLET ORAL at 03:05

## 2025-05-04 NOTE — ASSESSMENT & PLAN NOTE
Hyperkalemia is likely due to multiple afctors.The patients most recent potassium results are listed below.  Recent Labs     05/02/25  0201 05/03/25  0345 05/04/25  0242   K 4.4 4.5 4.3     Plan  - Monitor for arrhythmias with EKG and/or continuous telemetry.   - Treat the hyperkalemia with Potassium Binders.   - Monitor potassium: Daily  - Hold ARB

## 2025-05-04 NOTE — ASSESSMENT & PLAN NOTE
- RLE US showed a nonocclusive thrombus in the right common femoral vein   - Started Heparin gtt on 4/18 once cleared from IR standpoint  - Hg had been down-trending and dressings saturated with blood. - Hgb stabilized   - If able to tolerate anticoagulation, will transition to oral DOAC when not needing further procedures. If not, will need to discuss IVC filter  - General surgery consulted- hematoma evacuation performed at bedside  - S/p  wound debridement and wound vac placement on 4/21. and 4/24 for wound debridement + vac reapplication  - Wound Vac in place  - MM pain regimen   s/p STSG to right leg with wound vac placement 4/30 by plastics.   -  Transitioned to eliquis 05/01,hb stable.   Plastics to re eval in am

## 2025-05-04 NOTE — ASSESSMENT & PLAN NOTE
Hyponatremia is likely due to Cirrhosis. The patient's most recent sodium results are listed below.  Recent Labs     05/02/25  0201 05/03/25  0345 05/04/25  0242   * 136 134*     Plan  - Correct the sodium by 4-6mEq in 24 hours.   - Fluid and salt restriction  - Monitor sodium Daily.   - Patient hyponatremia is stable

## 2025-05-04 NOTE — PLAN OF CARE
Problem: Adult Inpatient Plan of Care  Goal: Plan of Care Review  Outcome: Progressing  Goal: Patient-Specific Goal (Individualized)  Outcome: Progressing  Goal: Absence of Hospital-Acquired Illness or Injury  Outcome: Progressing  Goal: Optimal Comfort and Wellbeing  Outcome: Progressing  Goal: Readiness for Transition of Care  Outcome: Progressing     Problem: Infection  Goal: Absence of Infection Signs and Symptoms  Outcome: Progressing     Problem: Wound  Goal: Optimal Coping  Outcome: Progressing  Goal: Optimal Functional Ability  Outcome: Progressing  Goal: Absence of Infection Signs and Symptoms  Outcome: Progressing  Goal: Improved Oral Intake  Outcome: Progressing  Goal: Optimal Pain Control and Function  Outcome: Progressing  Goal: Skin Health and Integrity  Outcome: Progressing  Goal: Optimal Wound Healing  Outcome: Progressing     Problem: Fall Injury Risk  Goal: Absence of Fall and Fall-Related Injury  Outcome: Progressing     Problem: Skin Injury Risk Increased  Goal: Skin Health and Integrity  Outcome: Progressing    Patient vitally stable with no ss of distress within the shift. Safety maintained with bed wheels locked, side rails up, call light and frequently needed items kept within reach. Needs attended. Meds given per MAR. Vac dressing maintained intact. Rest promoted. Will continue POC.

## 2025-05-04 NOTE — PROGRESS NOTES
[facility] - Telemetry [facility] Medicine  Progress Note    Patient Name: Froylan Borja  MRN: [id number]  Patient Class: IP- Inpatient   Admission Date: 4/18/2025  Length of Stay: 16 days  Attending Physician: Sherita Hardwick,*  Primary Care Provider: Janki Tamez MD        Subjective     Principal Problem:Acute deep vein thrombosis (DVT) of right lower extremity        HPI:  Mr. Froylan Borja is a 71 y/o male with a PMHx alcohol abuse, cirrhosis, hepatocellular carcinoma, and HTN who presents for complaint of R leg pain and swelling. He reports yesterday he hit his R leg on the car door and shortly developed a bruise and pain to the R lateral leg. He tried tylenol at-home though pain, swelling, and bruising continued to worsen. Admits to paresthesias and numbness to the R foot as well as significant R foot pain with weight-bearing. Denies CP, palpitations, SOB, lightheadedness, dizziness, headaches, LOC, vomiting, diarrhea, abdominal pain, fever, chills. Reports some nausea following morphine and dilaudid for pain in ED. Of note, patient had angiogram y90 mapping study done with IR last Thursday (4/10/25). Has developed some bruising to the medial thigh and mild groin discomfort since procedure. Social history significant for daily alcohol consumption, 3-4 beers daily, last alcoholic beverage yesterday around 3PM.     In the ED, AF HDS. H&H 13.0/38.6 (BL~14.8), no leukocytosis. HypoNa 129, HyperK 5.7, CO2 20, Ca 8.6, Albumin 3.4, remainder of CMP unremarkable. PT/INR wnl at 10.9/1.0. LA 1.6. CPK wnl. Hep C Ab reactive, HCV RNA pending. EKG with normal sinus rhythm. R Lower Ext U/S w/ findings of nonocclusive thrombus in the right common femoral vein and right common femoral artery pseudoaneurysm. CTA Lower Ext pending. R lower extremity hematoma drained at bedside by general surgery with evacuation of 120 cc of hematoma. Compressive dressing applied. Given nebulized albuterol, lokelma, IV cefazolin, IV  dilaudid, IV morphine, IV zofran. Admitted to .     Overview/Hospital Course:  70 y.o M with HTN, alcohol/HepC cirrhosis c/b HCC s/p Y-90 mapping angiogram on 4/10 who presents with bruising and pain at angiogram site and RLE calf pain after he hit it on a car door. RLE U/S showed non-occlusive CFV thrombus and R CFA pseudoaneurysm. CTA re-demonstrated the psdueoaneurysm with active extravasation within the sac as well as a large R calf hematoma which GenSurg were consulted for and successfully drained (120ccs). Vascular Surgery were consulted, recommended IR guided percutaneous thrombin injection which IR were able to complete. Heparin gtt was started. Groin U/S done the following day showed continued thrombosis of the pseudoanurysm. He was taken to the OR on 4/21 for debridement and placement of wound vac. He was taken back to the OR on 4/24 for further debridement and wound vac change. OR 4/28 for vac reapplication. RLE wound exploration, debridement, possible split-thickness skin grafting versus skin substitute, wound VAC placement 4/30 with Plastics.  Resumed Eliquis on 05/01. Plastics arranging home wound vac prior to discharge    Interval History: R thigh oozing improving.  Hemoglobin stable resumed Eliquis.      Review of Systems  Objective:     Vital Signs (Most Recent):  Temp: 98.4 °F (36.9 °C) (05/04/25 1145)  Pulse: 79 (05/04/25 1145)  Resp: 18 (05/04/25 1145)  BP: (!) 151/76 (05/04/25 1145)  SpO2: 97 % (05/04/25 1145) Vital Signs (24h Range):  Temp:  [96.2 °F (35.7 °C)-98.4 °F (36.9 °C)] 98.4 °F (36.9 °C)  Pulse:  [73-85] 79  Resp:  [18-20] 18  SpO2:  [95 %-99 %] 97 %  BP: (134-155)/(60-76) 151/76     Weight: 120 kg (264 lb 8.8 oz)  Body mass index is 33.07 kg/m².    Intake/Output Summary (Last 24 hours) at 5/4/2025 1323  Last data filed at 5/3/2025 2122  Gross per 24 hour   Intake --   Output 1500 ml   Net -1500 ml         Physical Exam  Cardiovascular:      Rate and Rhythm: Normal rate.      Pulses:  Normal pulses.   Pulmonary:      Effort: No respiratory distress.      Breath sounds: Normal breath sounds. No wheezing.   Abdominal:      General: Bowel sounds are normal. There is no distension.      Palpations: Abdomen is soft.      Tenderness: There is no abdominal tenderness.   Skin:     Findings: Lesion (: RLE wound vac in place, RLE wrapped. Thigh with some bloody seeping through dressings, Sensation intact) present.   Neurological:      Mental Status: He is alert and oriented to person, place, and time. Mental status is at baseline.               Significant Labs: All pertinent labs within the past 24 hours have been reviewed.    Significant Imaging: I have reviewed all pertinent imaging results/findings within the past 24 hours.        Assessment & Plan  Acute deep vein thrombosis (DVT) of right lower extremity  Hematoma  Leg wound, right  - RLE US showed a nonocclusive thrombus in the right common femoral vein   - Started Heparin gtt on 4/18 once cleared from IR standpoint  - Hg had been down-trending and dressings saturated with blood. - Hgb stabilized   - If able to tolerate anticoagulation, will transition to oral DOAC when not needing further procedures. If not, will need to discuss IVC filter  - General surgery consulted- hematoma evacuation performed at bedside  - S/p  wound debridement and wound vac placement on 4/21. and 4/24 for wound debridement + vac reapplication  - Wound Vac in place  - MM pain regimen   s/p STSG to right leg with wound vac placement 4/30 by plastics.   -  Transitioned to eliquis 05/01,hb stable.   Plastics to re eval in am    Essential hypertension  Patient's blood pressure range in the last 24 hours was: BP  Min: 134/60  Max: 155/73.The patient's inpatient anti-hypertensive regimen is listed below:  Current Antihypertensives  NIFEdipine 24 hr tablet 90 mg, Daily, Oral  Hold ARB due to hyperkalemia  Plan  - BP is uncontrolled, will adjust as follows: change to  nifedipine  Alcohol use disorder, severe, dependence  - daily drinker, last drink 4/17  - 3-4 beers daily, denies withdrawal symptoms  - continue thiamine, folic acid and multivitamin  - CIWA per nursing   Obesity (BMI 30-39.9)  Body mass index is 33.07 kg/m². Morbid obesity complicates all aspects of disease management from diagnostic modalities to treatment. Weight loss encouraged and health benefits explained to patient.   Hyponatremia  Hyponatremia is likely due to Cirrhosis. The patient's most recent sodium results are listed below.  Recent Labs     05/02/25 0201 05/03/25 0345 05/04/25  0242   * 136 134*     Plan  - Correct the sodium by 4-6mEq in 24 hours.   - Fluid and salt restriction  - Monitor sodium Daily.   - Patient hyponatremia is stable  Pseudoaneurysm of femoral artery  - RLE US showed a new right common femoral artery pseudoaneurysm   - CTA RLE showed extravasation within the pseudoaneurysm sac  - Vascular Surgery consulted- recommended IR guided thrombin injection. IR consulted, completed procedure on 4/18.   - Groin u/s on 4/19 showed continued thrombosis of the pseudoaneurysm  - Repeat u/s 4/29 stable, discussed with IR  Hyperkalemia  Hyperkalemia is likely due to multiple afctors.The patients most recent potassium results are listed below.  Recent Labs     05/02/25 0201 05/03/25 0345 05/04/25  0242   K 4.4 4.5 4.3     Plan  - Monitor for arrhythmias with EKG and/or continuous telemetry.   - Treat the hyperkalemia with Potassium Binders.   - Monitor potassium: Daily  - Hold ARB     VTE Risk Mitigation (From admission, onward)           Ordered     apixaban tablet 10 mg  2 times daily         05/04/25 0922     heparin 25,000 units in dextrose 5% 250 ml (100 units/mL) infusion MINIMAL INTENSITY nomogram - [facility]  Continuous        Question:  Begin at (units/kg/hr)  Answer:  12    04/25/25 0808     IP VTE HIGH RISK PATIENT  Once         04/18/25 0725     Reason for No Pharmacological VTE  Prophylaxis  Once        Question:  Reasons:  Answer:  Risk of Bleeding  Comment:  will start anticoagulation once medically cleared    04/18/25 0725                    Discharge Planning   KENNETH: 5/6/2025     Code Status: Full Code   Medical Readiness for Discharge Date:   Discharge Plan A: Home, Home with family                Please place Justification for DME        Sherita Hardwick MD  Department of Hospital Medicine   [facility] - Telemetry Stepdown

## 2025-05-04 NOTE — ASSESSMENT & PLAN NOTE
Patient's blood pressure range in the last 24 hours was: BP  Min: 134/60  Max: 155/73.The patient's inpatient anti-hypertensive regimen is listed below:  Current Antihypertensives  NIFEdipine 24 hr tablet 90 mg, Daily, Oral  Hold ARB due to hyperkalemia  Plan  - BP is uncontrolled, will adjust as follows: change to nifedipine

## 2025-05-04 NOTE — NURSING
Noted minimal to moderate bleeding from the graft site; notified plastic surgery on-call PERNELL Ruiz MD, reinforcement done per instruction; no further orders made. Will continue to monitor.

## 2025-05-04 NOTE — SUBJECTIVE & OBJECTIVE
Interval History: R thigh oozing improving.  Hemoglobin stable resumed Eliquis.      Review of Systems  Objective:     Vital Signs (Most Recent):  Temp: 98.4 °F (36.9 °C) (05/04/25 1145)  Pulse: 79 (05/04/25 1145)  Resp: 18 (05/04/25 1145)  BP: (!) 151/76 (05/04/25 1145)  SpO2: 97 % (05/04/25 1145) Vital Signs (24h Range):  Temp:  [96.2 °F (35.7 °C)-98.4 °F (36.9 °C)] 98.4 °F (36.9 °C)  Pulse:  [73-85] 79  Resp:  [18-20] 18  SpO2:  [95 %-99 %] 97 %  BP: (134-155)/(60-76) 151/76     Weight: 120 kg (264 lb 8.8 oz)  Body mass index is 33.07 kg/m².    Intake/Output Summary (Last 24 hours) at 5/4/2025 1323  Last data filed at 5/3/2025 2122  Gross per 24 hour   Intake --   Output 1500 ml   Net -1500 ml         Physical Exam  Cardiovascular:      Rate and Rhythm: Normal rate.      Pulses: Normal pulses.   Pulmonary:      Effort: No respiratory distress.      Breath sounds: Normal breath sounds. No wheezing.   Abdominal:      General: Bowel sounds are normal. There is no distension.      Palpations: Abdomen is soft.      Tenderness: There is no abdominal tenderness.   Skin:     Findings: Lesion (: RLE wound vac in place, RLE wrapped. Thigh with some bloody seeping through dressings, Sensation intact) present.   Neurological:      Mental Status: He is alert and oriented to person, place, and time. Mental status is at baseline.               Significant Labs: All pertinent labs within the past 24 hours have been reviewed.    Significant Imaging: I have reviewed all pertinent imaging results/findings within the past 24 hours.

## 2025-05-04 NOTE — PT/OT/SLP PROGRESS
"Occupational Therapy   Treatment    Name: Froylan Borja  MRN: [id number]  Admitting Diagnosis:  Acute deep vein thrombosis (DVT) of right lower extremity  4 Days Post-Op    Recommendations:     Discharge Recommendations: High Intensity Therapy  Discharge Equipment Recommendations:  wheelchair  Barriers to discharge:  Inaccessible home environment    Assessment:     Froylan Borja is a 70 y.o. male with a medical diagnosis of Acute deep vein thrombosis (DVT) of right lower extremity.  He presents with the following performance deficits affecting function are weakness, impaired endurance, impaired self care skills, impaired functional mobility, gait instability, decreased lower extremity function, impaired balance, pain. Patient is progressing well with OT intervention. He was able to participate in seated ADLs and functional mobility/transfer training with good compliance of NWBing precautions. He is highly motivated and was participative throughout therapy today. Patient has demonstrated sufficient progression to warrant high intensity therapy evidenced by objectives noted below.    Rehab Prognosis:  Good; patient would benefit from acute skilled OT services to address these deficits and reach maximum level of function.       Plan:     Patient to be seen 4 x/week to address the above listed problems via self-care/home management, therapeutic activities, therapeutic exercises, neuromuscular re-education  Plan of Care Expires: 05/31/25  Plan of Care Reviewed with: patient    Subjective     Chief Complaint: "Can I get some pain meds?" Re: post therapy session  Patient/Family Comments/goals: "I think I will be doing better once I can put weight on this leg again."  Pain/Comfort:  Pain Rating 1: 0/10  Location - Side 1: Right  Location - Orientation 1: generalized  Location 1: leg  Pain Addressed 1: Nurse notified  Pain Rating Post-Intervention 1:  (unrated; patient requested pain meds, nurse notified)    Objective: "     Communicated with: nurse [place]ks[place] and OTR prior to session.  Patient found HOB elevated with wound vac upon OT entry to room.  A client care conference was completed by the OTR and the BARBA prior to treatment by the BARBA to discuss the patient's POC and current status.    General Precautions: Standard, fall    Orthopedic Precautions:RLE non weight bearing  Braces: N/A  Respiratory Status: Room air     Occupational Performance:     Bed Mobility:    Patient completed Supine to Sit with stand by assistance  Patient completed Sit to Supine with contact guard assistance   Scooting to HOB via B overhead rails with CGA    Functional Mobility/Transfers:  Patient completed Sit <> Stand Transfer with contact guard assistance  with  rolling walker   2nd trial: CGA, bariatric RW  3rd-4th trial: Min(A), bariatric RW  Functional Mobility: within room 16 ft using bariatric RW with CGA, good compliance with NWB    Activities of Daily Living:  Grooming: modified independence oral and facial hygiene seated EOB  Bathing: contact guard assistance abbreviated UB and anterior casey-hygiene wipe down seated EOB  Upper Body Dressing: minimum assistance doff soiled gown and don clean gown seated EOB      [facility] 6 Click ADL: 18    Treatment & Education:  Patient completed scapular retractions x12 reps seated EOB. Discussed current progress. Patient reported doing HEP independently. Educated patient on positioning techniques to maintain joint integrity. Addressed all patient questions/concerns within BARBA scope of practice.     Patient left HOB elevated with all lines intact, call button in reach, nurse  notified, and R LE elevated    GOALS:   Multidisciplinary Problems       Occupational Therapy Goals          Problem: Occupational Therapy    Goal Priority Disciplines Outcome Interventions   Occupational Therapy Goal     OT, PT/OT Progressing    Description: Goals to be met by: 5/31/2025     Patient will increase functional independence  with ADLs by performing:    LE Dressing with Stand-by Assistance.  Grooming while standing with Set-up Assistance.  Toileting from toilet with Modified [place] for hygiene and clothing management.   Bathing from  shower chair/bench with Stand-by Assistance.  Stand pivot transfers with Stand-by Assistance and maintaining weight-bearing precaution(s).  Step transfer with Stand-by Assistance and maintaining weight-bearing precaution(s)  Upper extremity exercise program x10-15 reps per handout, with independence.                           DME Justifications:  Froylan Borja has a mobility limitation that significantly impairs his ability to participate in one or more mobility related activities of daily living (MRADLs) such as toileting, feeding, dressing, grooming, and bathing in customary locations in the home.  The mobility limitation cannot be sufficiently resolved by the use of a cane or walker.   The use of a manual wheelchair will significantly improve the patients ability to participate in MRADLS and the patient will use it on regular basis in the home.  Froylan Borja has expressed his willingness to use a manual wheelchair in the home. Patients upper body strength is sufficient for propulsion.  He also has a caregiver who is available, willing, and able to provide assistance with the wheelchair when needed.      Time Tracking:     OT Date of Treatment: 05/04/25  OT Start Time: 1417  OT Stop Time: 1451  OT Total Time (min): 34 min    Billable Minutes:Self Care/Home Management 20  Therapeutic Activity 14    OT/MARQUITA: MARQUITA     Number of MARQUITA visits since last OT visit: 1    5/4/2025

## 2025-05-05 LAB
ABSOLUTE EOSINOPHIL (OHS): 0.33 K/UL
ABSOLUTE MONOCYTE (OHS): 0.76 K/UL (ref 0.3–1)
ABSOLUTE NEUTROPHIL COUNT (OHS): 5.86 K/UL (ref 1.8–7.7)
ALBUMIN SERPL BCP-MCNC: 3.1 G/DL (ref 3.5–5.2)
ALP SERPL-CCNC: 112 UNIT/L (ref 40–150)
ALT SERPL W/O P-5'-P-CCNC: 45 UNIT/L (ref 10–44)
ANION GAP (OHS): 9 MMOL/L (ref 8–16)
AST SERPL-CCNC: 40 UNIT/L (ref 11–45)
BASOPHILS # BLD AUTO: 0.1 K/UL
BASOPHILS NFR BLD AUTO: 1 %
BILIRUB SERPL-MCNC: 0.4 MG/DL (ref 0.1–1)
BUN SERPL-MCNC: 25 MG/DL (ref 8–23)
CALCIUM SERPL-MCNC: 8.8 MG/DL (ref 8.7–10.5)
CHLORIDE SERPL-SCNC: 104 MMOL/L (ref 95–110)
CO2 SERPL-SCNC: 21 MMOL/L (ref 23–29)
CREAT SERPL-MCNC: 1 MG/DL (ref 0.5–1.4)
ERYTHROCYTE [DISTWIDTH] IN BLOOD BY AUTOMATED COUNT: 13.2 % (ref 11.5–14.5)
GFR SERPLBLD CREATININE-BSD FMLA CKD-EPI: >60 ML/MIN/1.73/M2
GLUCOSE SERPL-MCNC: 84 MG/DL (ref 70–110)
HCT VFR BLD AUTO: 35.9 % (ref 40–54)
HGB BLD-MCNC: 11.6 GM/DL (ref 14–18)
IMM GRANULOCYTES # BLD AUTO: 0.39 K/UL (ref 0–0.04)
IMM GRANULOCYTES NFR BLD AUTO: 4 % (ref 0–0.5)
LYMPHOCYTES # BLD AUTO: 2.38 K/UL (ref 1–4.8)
MAGNESIUM SERPL-MCNC: 1.9 MG/DL (ref 1.6–2.6)
MCH RBC QN AUTO: 31.7 PG (ref 27–31)
MCHC RBC AUTO-ENTMCNC: 32.3 G/DL (ref 32–36)
MCV RBC AUTO: 98 FL (ref 82–98)
NUCLEATED RBC (/100WBC) (OHS): 0 /100 WBC
PHOSPHATE SERPL-MCNC: 3.4 MG/DL (ref 2.7–4.5)
PLATELET # BLD AUTO: 297 K/UL (ref 150–450)
PMV BLD AUTO: 10 FL (ref 9.2–12.9)
POTASSIUM SERPL-SCNC: 4.2 MMOL/L (ref 3.5–5.1)
PROT SERPL-MCNC: 6.4 GM/DL (ref 6–8.4)
RBC # BLD AUTO: 3.66 M/UL (ref 4.6–6.2)
RELATIVE EOSINOPHIL (OHS): 3.4 %
RELATIVE LYMPHOCYTE (OHS): 24.2 % (ref 18–48)
RELATIVE MONOCYTE (OHS): 7.7 % (ref 4–15)
RELATIVE NEUTROPHIL (OHS): 59.7 % (ref 38–73)
SODIUM SERPL-SCNC: 134 MMOL/L (ref 136–145)
WBC # BLD AUTO: 9.82 K/UL (ref 3.9–12.7)

## 2025-05-05 PROCEDURE — 25000003 PHARM REV CODE 250: Mod: HCNC | Performed by: PHYSICIAN ASSISTANT

## 2025-05-05 PROCEDURE — 84100 ASSAY OF PHOSPHORUS: CPT | Mod: HCNC | Performed by: STUDENT IN AN ORGANIZED HEALTH CARE EDUCATION/TRAINING PROGRAM

## 2025-05-05 PROCEDURE — 80053 COMPREHEN METABOLIC PANEL: CPT | Mod: HCNC | Performed by: STUDENT IN AN ORGANIZED HEALTH CARE EDUCATION/TRAINING PROGRAM

## 2025-05-05 PROCEDURE — 25000003 PHARM REV CODE 250: Mod: HCNC | Performed by: STUDENT IN AN ORGANIZED HEALTH CARE EDUCATION/TRAINING PROGRAM

## 2025-05-05 PROCEDURE — 85025 COMPLETE CBC W/AUTO DIFF WBC: CPT | Mod: HCNC | Performed by: STUDENT IN AN ORGANIZED HEALTH CARE EDUCATION/TRAINING PROGRAM

## 2025-05-05 PROCEDURE — 36415 COLL VENOUS BLD VENIPUNCTURE: CPT | Mod: HCNC | Performed by: STUDENT IN AN ORGANIZED HEALTH CARE EDUCATION/TRAINING PROGRAM

## 2025-05-05 PROCEDURE — 25000003 PHARM REV CODE 250: Mod: HCNC

## 2025-05-05 PROCEDURE — 20600001 HC STEP DOWN PRIVATE ROOM: Mod: HCNC

## 2025-05-05 PROCEDURE — 97116 GAIT TRAINING THERAPY: CPT | Mod: HCNC

## 2025-05-05 PROCEDURE — 25000003 PHARM REV CODE 250: Mod: HCNC | Performed by: HOSPITALIST

## 2025-05-05 PROCEDURE — 83735 ASSAY OF MAGNESIUM: CPT | Mod: HCNC | Performed by: STUDENT IN AN ORGANIZED HEALTH CARE EDUCATION/TRAINING PROGRAM

## 2025-05-05 RX ADMIN — SENNOSIDES 8.6 MG: 8.6 TABLET, FILM COATED ORAL at 09:05

## 2025-05-05 RX ADMIN — ACETAMINOPHEN 1000 MG: 500 TABLET ORAL at 08:05

## 2025-05-05 RX ADMIN — APIXABAN 10 MG: 5 TABLET, FILM COATED ORAL at 09:05

## 2025-05-05 RX ADMIN — OXYCODONE HYDROCHLORIDE 10 MG: 10 TABLET ORAL at 09:05

## 2025-05-05 RX ADMIN — FOLIC ACID 1 MG: 1 TABLET ORAL at 08:05

## 2025-05-05 RX ADMIN — NIFEDIPINE 90 MG: 30 TABLET, FILM COATED, EXTENDED RELEASE ORAL at 08:05

## 2025-05-05 RX ADMIN — APIXABAN 10 MG: 5 TABLET, FILM COATED ORAL at 08:05

## 2025-05-05 RX ADMIN — OXYCODONE HYDROCHLORIDE 10 MG: 10 TABLET ORAL at 07:05

## 2025-05-05 RX ADMIN — SENNOSIDES 8.6 MG: 8.6 TABLET, FILM COATED ORAL at 08:05

## 2025-05-05 RX ADMIN — GABAPENTIN 300 MG: 300 CAPSULE ORAL at 09:05

## 2025-05-05 RX ADMIN — Medication 100 MG: at 08:05

## 2025-05-05 RX ADMIN — POLYETHYLENE GLYCOL 3350 17 G: 17 POWDER, FOR SOLUTION ORAL at 09:05

## 2025-05-05 RX ADMIN — GABAPENTIN 300 MG: 300 CAPSULE ORAL at 08:05

## 2025-05-05 RX ADMIN — ACETAMINOPHEN 1000 MG: 500 TABLET ORAL at 09:05

## 2025-05-05 RX ADMIN — ACETAMINOPHEN 1000 MG: 500 TABLET ORAL at 02:05

## 2025-05-05 RX ADMIN — BUPROPION HYDROCHLORIDE 150 MG: 75 TABLET, FILM COATED ORAL at 08:05

## 2025-05-05 RX ADMIN — Medication 1 TABLET: at 08:05

## 2025-05-05 NOTE — ASSESSMENT & PLAN NOTE
daily drinker, last drink 4/17  3-4 beers daily, denies withdrawal symptoms  Continue thiamine, folic acid and multivitamin  CIWA per nursing

## 2025-05-05 NOTE — ASSESSMENT & PLAN NOTE
RLE US showed a nonocclusive thrombus in the right common femoral vein   Started Heparin gtt on 4/18 once cleared from IR standpoint  Hg had been down-trending and dressings saturated with blood. - Hgb stabilized   If able to tolerate anticoagulation, will transition to oral DOAC when not needing further procedures. If not, will need to discuss IVC filter  General surgery consulted- hematoma evacuation performed at bedside  S/p  wound debridement and wound vac placement on 4/21. and 4/24 for wound debridement + vac reapplication  MM pain regimen   s/p STSG to right leg with wound vac placement 4/30 by plastics.   Transitioned to eliquis 05/01,hb stable.   Plastics removed wound vac

## 2025-05-05 NOTE — SUBJECTIVE & OBJECTIVE
Interval History:  Seen and evaluated on step down unit  No acute events overnight    Clinical status stable, unchanged  No new complaints  Wound vac removed today    Objective:     Vital Signs (Most Recent):  Temp: 97.3 °F (36.3 °C) (05/05/25 1532)  Pulse: 87 (05/05/25 1532)  Resp: 19 (05/05/25 1532)  BP: (!) 144/70 (05/05/25 1532)  SpO2: 98 % (05/05/25 1532) Vital Signs (24h Range):  Temp:  [96.3 °F (35.7 °C)-98.5 °F (36.9 °C)] 97.3 °F (36.3 °C)  Pulse:  [] 87  Resp:  [17-20] 19  SpO2:  [96 %-99 %] 98 %  BP: (137-167)/(67-88) 144/70     Weight: 120 kg (264 lb 8.8 oz)  Body mass index is 33.07 kg/m².    Intake/Output Summary (Last 24 hours) at 5/5/2025 1554  Last data filed at 5/5/2025 1200  Gross per 24 hour   Intake 300 ml   Output 1450 ml   Net -1150 ml         Physical Exam  Cardiovascular:      Rate and Rhythm: Normal rate.      Pulses: Normal pulses.   Pulmonary:      Effort: No respiratory distress.      Breath sounds: Normal breath sounds. No wheezing.   Abdominal:      General: Bowel sounds are normal. There is no distension.      Palpations: Abdomen is soft.      Tenderness: There is no abdominal tenderness.   Skin:     Findings: Lesion (RLE) present.   Neurological:      Mental Status: He is alert and oriented to person, place, and time. Mental status is at baseline.               Significant Labs: All pertinent labs within the past 24 hours have been reviewed.    Significant Imaging: I have reviewed all pertinent imaging results/findings within the past 24 hours.

## 2025-05-05 NOTE — PLAN OF CARE
Problem: Adult Inpatient Plan of Care  Goal: Plan of Care Review  Outcome: Progressing  Goal: Patient-Specific Goal (Individualized)  Outcome: Progressing  Goal: Absence of Hospital-Acquired Illness or Injury  Outcome: Progressing     Problem: Infection  Goal: Absence of Infection Signs and Symptoms  Outcome: Progressing     Problem: Wound  Goal: Optimal Coping  Outcome: Progressing  Goal: Improved Oral Intake  Outcome: Progressing  Goal: Optimal Pain Control and Function  Outcome: Progressing  Goal: Skin Health and Integrity  Outcome: Progressing     Patient remains awake and alert. Sat out of bed with PT. Wound vac DC today, patient reported that pain is felt most with mobility. Dressing remain clean and intact. Medicated per MAR, tolerated nutrition.

## 2025-05-05 NOTE — PROGRESS NOTES
[facility] - Telemetry [facility] Medicine  Progress Note    Patient Name: Froylan Borja  MRN: [id number]  Patient Class: IP- Inpatient   Admission Date: 4/18/2025  Length of Stay: 17 days  Attending Physician: Lit Garcia DO  Primary Care Provider: Janki Tamez MD        Subjective     Principal Problem:Acute deep vein thrombosis (DVT) of right lower extremity        HPI:  Mr. Froylan Borja is a 69 y/o male with a PMHx alcohol abuse, cirrhosis, hepatocellular carcinoma, and HTN who presents for complaint of R leg pain and swelling. He reports yesterday he hit his R leg on the car door and shortly developed a bruise and pain to the R lateral leg. He tried tylenol at-home though pain, swelling, and bruising continued to worsen. Admits to paresthesias and numbness to the R foot as well as significant R foot pain with weight-bearing. Denies CP, palpitations, SOB, lightheadedness, dizziness, headaches, LOC, vomiting, diarrhea, abdominal pain, fever, chills. Reports some nausea following morphine and dilaudid for pain in ED. Of note, patient had angiogram y90 mapping study done with IR last Thursday (4/10/25). Has developed some bruising to the medial thigh and mild groin discomfort since procedure. Social history significant for daily alcohol consumption, 3-4 beers daily, last alcoholic beverage yesterday around 3PM.     In the ED, AF HDS. H&H 13.0/38.6 (BL~14.8), no leukocytosis. HypoNa 129, HyperK 5.7, CO2 20, Ca 8.6, Albumin 3.4, remainder of CMP unremarkable. PT/INR wnl at 10.9/1.0. LA 1.6. CPK wnl. Hep C Ab reactive, HCV RNA pending. EKG with normal sinus rhythm. R Lower Ext U/S w/ findings of nonocclusive thrombus in the right common femoral vein and right common femoral artery pseudoaneurysm. CTA Lower Ext pending. R lower extremity hematoma drained at bedside by general surgery with evacuation of 120 cc of hematoma. Compressive dressing applied. Given nebulized albuterol, lokelma, IV cefazolin, IV  dilaudid, IV morphine, IV zofran. Admitted to .     Overview/Hospital Course:  70 y.o M with HTN, alcohol/HepC cirrhosis c/b HCC s/p Y-90 mapping angiogram on 4/10 who presents with bruising and pain at angiogram site and RLE calf pain after he hit it on a car door. RLE U/S showed non-occlusive CFV thrombus and R CFA pseudoaneurysm. CTA re-demonstrated the psdueoaneurysm with active extravasation within the sac as well as a large R calf hematoma which GenSurg were consulted for and successfully drained (120ccs). Vascular Surgery were consulted, recommended IR guided percutaneous thrombin injection which IR were able to complete. Heparin gtt was started. Groin U/S done the following day showed continued thrombosis of the pseudoanurysm. He was taken to the OR on 4/21 for debridement and placement of wound vac. He was taken back to the OR on 4/24 for further debridement and wound vac change. OR 4/28 for vac reapplication. RLE wound exploration, debridement, possible split-thickness skin grafting versus skin substitute, wound VAC placement 4/30 with Plastics.  Resumed Eliquis on 05/01. Plastics removed wound vac; will not need on discharge    Interval History:  Seen and evaluated on step down unit  No acute events overnight    Clinical status stable, unchanged  No new complaints  Wound vac removed today    Objective:     Vital Signs (Most Recent):  Temp: 97.3 °F (36.3 °C) (05/05/25 1532)  Pulse: 87 (05/05/25 1532)  Resp: 19 (05/05/25 1532)  BP: (!) 144/70 (05/05/25 1532)  SpO2: 98 % (05/05/25 1532) Vital Signs (24h Range):  Temp:  [96.3 °F (35.7 °C)-98.5 °F (36.9 °C)] 97.3 °F (36.3 °C)  Pulse:  [] 87  Resp:  [17-20] 19  SpO2:  [96 %-99 %] 98 %  BP: (137-167)/(67-88) 144/70     Weight: 120 kg (264 lb 8.8 oz)  Body mass index is 33.07 kg/m².    Intake/Output Summary (Last 24 hours) at 5/5/2025 1554  Last data filed at 5/5/2025 1200  Gross per 24 hour   Intake 300 ml   Output 1450 ml   Net -1150 ml         Physical  Exam  Cardiovascular:      Rate and Rhythm: Normal rate.      Pulses: Normal pulses.   Pulmonary:      Effort: No respiratory distress.      Breath sounds: Normal breath sounds. No wheezing.   Abdominal:      General: Bowel sounds are normal. There is no distension.      Palpations: Abdomen is soft.      Tenderness: There is no abdominal tenderness.   Skin:     Findings: Lesion (RLE) present.   Neurological:      Mental Status: He is alert and oriented to person, place, and time. Mental status is at baseline.               Significant Labs: All pertinent labs within the past 24 hours have been reviewed.    Significant Imaging: I have reviewed all pertinent imaging results/findings within the past 24 hours.      Assessment & Plan  Acute deep vein thrombosis (DVT) of right lower extremity  Hematoma  Leg wound, right  RLE US showed a nonocclusive thrombus in the right common femoral vein   Started Heparin gtt on 4/18 once cleared from IR standpoint  Hg had been down-trending and dressings saturated with blood. - Hgb stabilized   If able to tolerate anticoagulation, will transition to oral DOAC when not needing further procedures. If not, will need to discuss IVC filter  General surgery consulted- hematoma evacuation performed at bedside  S/p  wound debridement and wound vac placement on 4/21. and 4/24 for wound debridement + vac reapplication  MM pain regimen   s/p STSG to right leg with wound vac placement 4/30 by plastics.   Transitioned to eliquis 05/01,hb stable.   Plastics removed wound vac    Essential hypertension  Patient's blood pressure range in the last 24 hours was: BP  Min: 137/67  Max: 167/88.The patient's inpatient anti-hypertensive regimen is listed below:  Current Antihypertensives  NIFEdipine 24 hr tablet 90 mg, Daily, Oral  Hold ARB due to hyperkalemia    BP is uncontrolled, will adjust as follows: change to nifedipine    Alcohol use disorder, severe, dependence  daily drinker, last drink 4/17  3-4  beers daily, denies withdrawal symptoms  Continue thiamine, folic acid and multivitamin  CIWA per nursing    Obesity (BMI 30-39.9)  Body mass index is 33.07 kg/m². Morbid obesity complicates all aspects of disease management from diagnostic modalities to treatment. Weight loss encouraged and health benefits explained to patient.   Hyponatremia  Hyponatremia is likely due to Cirrhosis    Correct the sodium by 4-6mEq in 24 hours.   Fluid and salt restriction  Monitor sodium Daily.   Patient hyponatremia is stable    Pseudoaneurysm of femoral artery  RLE US showed a new right common femoral artery pseudoaneurysm   CTA RLE showed extravasation within the pseudoaneurysm sac  Vascular Surgery consulted- recommended IR guided thrombin injection. IR consulted, completed procedure on 4/18.   Groin u/s on 4/19 showed continued thrombosis of the pseudoaneurysm  Repeat u/s 4/29 stable, discussed with IR    Hyperkalemia  Resolved    VTE Risk Mitigation (From admission, onward)           Ordered     apixaban tablet 10 mg  2 times daily         05/04/25 0922     heparin 25,000 units in dextrose 5% 250 ml (100 units/mL) infusion MINIMAL INTENSITY nomogram - [facility]  Continuous        Question:  Begin at (units/kg/hr)  Answer:  12    04/25/25 0808     IP VTE HIGH RISK PATIENT  Once         04/18/25 0725     Reason for No Pharmacological VTE Prophylaxis  Once        Question:  Reasons:  Answer:  Risk of Bleeding  Comment:  will start anticoagulation once medically cleared    04/18/25 0725                    Discharge Planning   KENNETH: 5/6/2025     Code Status: Full Code   Medical Readiness for Discharge Date:   Discharge Plan A: Home, Home with family, Home Health   Discharge Delays: None known at this time            Please place Justification for DME        Lit Garcia DO  Department of Hospital Medicine   [facility] - Telemetry Stepdown

## 2025-05-05 NOTE — ASSESSMENT & PLAN NOTE
Hyponatremia is likely due to Cirrhosis    Correct the sodium by 4-6mEq in 24 hours.   Fluid and salt restriction  Monitor sodium Daily.   Patient hyponatremia is stable

## 2025-05-05 NOTE — PROGRESS NOTES
Plastic and Reconstructive Surgery   Progress Note    Subjective:    Pt is s/p STSG to right leg with wound vac placement . Wound vac taken down on rounds this am replace with telfa/adaptic dressing. Donor site dressing changed, tegarderm, kerlix, and ace wrap replaced. Both sites healing very well, donor site with some expected SS drainage.    Objective:  Vital signs in last 24 hours:  Temp:  [96.3 °F (35.7 °C)-98.5 °F (36.9 °C)] 97.9 °F (36.6 °C)  Pulse:  [72-82] 82  Resp:  [17-20] 20  SpO2:  [96 %-99 %] 99 %  BP: (137-151)/(67-76) 150/75    Intake/Output last 3 shifts:  I/O last 3 completed shifts:  In: 780 [P.O.:780]  Out: 1500 [Urine:1500]    Intake/Output this shift:  No intake/output data recorded.        Physical Exam:  VITAL SIGNS:   Vitals:    25 2320 25 0401 25 0730 25 0738   BP: (!) 146/68 (!) 142/71  (!) 150/75   BP Location: Left arm Left arm  Left arm   Patient Position: Lying Lying  Lying   Pulse: 72 75  82   Resp: 17 19 18 20   Temp: 96.7 °F (35.9 °C) 97 °F (36.1 °C)  97.9 °F (36.6 °C)   TempSrc: Oral Oral     SpO2: 97% 96%  99%   Weight:       Height:         TMAX: Temp (24hrs), Av.3 °F (36.3 °C), Min:96.3 °F (35.7 °C), Max:98.5 °F (36.9 °C)    General: Alert; No acute distress  Cardiovascular: Regular rate   Respiratory: Normal respiratory effort. Chest rise symmetric.   Abdomen: Soft, nontender, nondistended  Extremity: RLE wound healing very well, SS drainage from donor site  Neurologic: No focal deficit. Speech normal      Scheduled Medications acetaminophen, 1,000 mg, TID  apixaban, 10 mg, BID  buPROPion, 150 mg, BID  folic acid, 1 mg, Daily  gabapentin, 300 mg, BID  melatonin, 6 mg, Nightly  multivitamin, 1 tablet, Daily  NIFEdipine, 90 mg, Daily  polyethylene glycol, 17 g, BID  senna, 8.6 mg, BID  thiamine, 100 mg, Daily      PRN Medications   Current Facility-Administered Medications:     albuterol-ipratropium, 3 mL, Nebulization, Q4H PRN    bisacodyL, 10 mg,  Rectal, Daily PRN    dextrose 50%, 12.5 g, Intravenous, PRN    dextrose 50%, 25 g, Intravenous, PRN    glucagon (human recombinant), 1 mg, Intramuscular, PRN    glucose, 16 g, Oral, PRN    glucose, 24 g, Oral, PRN    HYDROmorphone, 1 mg, Intravenous, Q6H PRN    hydrOXYzine pamoate, 25 mg, Oral, Q8H PRN    LORazepam, 2 mg, Oral, Q4H PRN    ondansetron, 8 mg, Oral, Q8H PRN    oxyCODONE, 5 mg, Oral, Q4H PRN    oxyCODONE, 10 mg, Oral, Q4H PRN    promethazine, 25 mg, Oral, Q6H PRN    sodium chloride 0.9%, 10 mL, Intravenous, PRN    Recent Labs:   Lab Results   Component Value Date    WBC 9.82 05/05/2025    HGB 11.6 (L) 05/05/2025    HCT 35.9 (L) 05/05/2025    MCV 98 05/05/2025     05/05/2025     Lab Results   Component Value Date    GLU 84 05/05/2025     (L) 05/05/2025    K 4.2 05/05/2025     05/05/2025    BUN 25 (H) 05/05/2025         Assessment: 70 y.o. y/o male s/p STSG to right leg with wound vac placement 4/30. Wound vac taken down and donor site dressing replaced today 5/5, both sites progressing very well.    Plan  Minimize weight bearing to RLE, TTWB OK, OK to be up out of bed now  Okay to have SS drainage from donor site (right thigh)  PT/OT while inpatient  Daily Adaptic, Telfa, ABD pad, Ace wrap light to RLE Skin graft  Donor sight dressing can remain in place to thigh and reinforce as needed  Remainder of care per primary team  Ok to DC from Plastic surgery standpoint    Pk Duque MD  Plastic Surgery  05/05/2025

## 2025-05-05 NOTE — ASSESSMENT & PLAN NOTE
RLE US showed a new right common femoral artery pseudoaneurysm   CTA RLE showed extravasation within the pseudoaneurysm sac  Vascular Surgery consulted- recommended IR guided thrombin injection. IR consulted, completed procedure on 4/18.   Groin u/s on 4/19 showed continued thrombosis of the pseudoaneurysm  Repeat u/s 4/29 stable, discussed with IR

## 2025-05-05 NOTE — PLAN OF CARE
[facility] - Telemetry Stepdown  Discharge Reassessment    Primary Care Provider: Janki Tamez MD    Expected Discharge Date: 5/6/2025    Reassessment (most recent)       Discharge Reassessment - 05/05/25 1114          Discharge Reassessment    Assessment Type Discharge Planning Reassessment     Did the patient's condition or plan change since previous assessment? No     Discharge Plan discussed with: Patient     Communicated KENNETH with patient/caregiver Yes     Discharge Plan A Home;Home with family;Home Health     Discharge Plan B Home;Home with family;Home Health     DME Needed Upon Discharge  other (see comments)   TBD    Transition of Care Barriers None     Why the patient remains in the hospital Requires continued medical care        Post-Acute Status    Discharge Delays None known at this time                   Pt continues to need medical care. Pt no longer needs a wound vac for home. Os wound vac was cancelled with [employer]. Cm will continue to follow and offer support as needed. Discharge Plan A and Plan B have been determined by review of patient's clinical status, future medical and therapeutic needs, and coverage/benefits for post-acute care in coordination with multidisciplinary team members.  Hubert Guo, [facility]    [facility]  [phone]

## 2025-05-05 NOTE — PLAN OF CARE
Problem: Adult Inpatient Plan of Care  Goal: Plan of Care Review  Outcome: Progressing  Goal: Patient-Specific Goal (Individualized)  Outcome: Progressing  Goal: Absence of Hospital-Acquired Illness or Injury  Outcome: Progressing  Goal: Optimal Comfort and Wellbeing  Outcome: Progressing  Goal: Readiness for Transition of Care  Outcome: Progressing     Problem: Infection  Goal: Absence of Infection Signs and Symptoms  Outcome: Progressing     Problem: Wound  Goal: Optimal Coping  Outcome: Progressing  Goal: Optimal Functional Ability  Outcome: Progressing  Goal: Absence of Infection Signs and Symptoms  Outcome: Progressing  Goal: Improved Oral Intake  Outcome: Progressing  Goal: Optimal Pain Control and Function  Outcome: Progressing  Goal: Skin Health and Integrity  Outcome: Progressing  Goal: Optimal Wound Healing  Outcome: Progressing     Problem: Fall Injury Risk  Goal: Absence of Fall and Fall-Related Injury  Outcome: Progressing     Problem: Skin Injury Risk Increased  Goal: Skin Health and Integrity  Outcome: Progressing    Patient vitally stable with no ss of distress within the shift. Safety maintained with bed wheels locked, side rails up, call light and frequently needed items kept within reach. Needs attended. Meds given per MAR. Rest promoted. Patient notified to keep NPO as ordered for possible surgery today as per plastic surgery. Cared for wound vac maintained on continuous suction at 125mmHg as ordered. Will continue POC.

## 2025-05-05 NOTE — ASSESSMENT & PLAN NOTE
Patient's blood pressure range in the last 24 hours was: BP  Min: 137/67  Max: 167/88.The patient's inpatient anti-hypertensive regimen is listed below:  Current Antihypertensives  NIFEdipine 24 hr tablet 90 mg, Daily, Oral  Hold ARB due to hyperkalemia    BP is uncontrolled, will adjust as follows: change to nifedipine

## 2025-05-05 NOTE — PT/OT/SLP PROGRESS
Physical Therapy Treatment    Patient Name:  Froylan Borja   MRN:  [id number]    Recommendations:     Discharge Recommendations: High Intensity Therapy  Discharge Equipment Recommendations: wheelchair, bedside commode  Barriers to discharge: Inaccessible home and Decreased caregiver support, patient's home is NOT accessible via wheelchair but he will need a wheelchair for community mobility    Assessment:     Froylan Borja is a 70 y.o. male admitted with a medical diagnosis of Acute deep vein thrombosis (DVT) of right lower extremity.  He presents with the following impairments/functional limitations: weakness, impaired endurance, impaired self care skills, impaired functional mobility, gait instability, decreased lower extremity function, impaired skin, edema, orthopedic precautions. The patient's mobility is limited due to generalized weakness from prolonged bed rest and complex medical condition, now TTWB to R LE per plastics. Patient reports he will need to be able to ambulate with RW in order to discharge home as his apt is not accessible via w/c. Patient showing improvement in independence with sit to stand transfers from standard height surfaces- progressed from minimum assistance to contact guard assist using RW.  Patient showing progress with gait tolerance, ambulated 2 trials 10' + 10' using bariatric RW and contact guard assist while maintaining R LE NWBing. He is not safe to return home.   Based on the patient's progress with therapy, ability to tolerate 3 hrs of therapy daily, motivation to participate in treatment, and prior level of independence, they are an excellent candidate for inpatient rehabilitation and they would benefit from rehab to maximize their functional potential.      Rehab Prognosis: Good; patient would benefit from acute skilled PT services to address these deficits and reach maximum level of function.    Recent Surgery: Procedure(s) (LRB):  RIGHT LOWER EXTREMITY SPLIT THICKNESS SKIN GRAFT  "APPLICATION (Right)  SURGICAL PROCUREMENT, GRAFT, SKIN (Right)  APPLICATION, WOUND VAC (Right)  IRRIGATION AND DEBRIDEMENT, LOWER EXTREMITY (Right) 5 Days Post-Op    Plan:     During this hospitalization, patient to be seen 4 x/week to address the identified rehab impairments via gait training, therapeutic activities, therapeutic exercises, neuromuscular re-education and progress toward the following goals:    Plan of Care Expires:  05/26/25    Subjective     Chief Complaint: "I need to be able to walk around at home"  Patient/Family Comments/goals: return to PLOF   Pain/Comfort:  Pain Rating 1: 0/10      Objective:     Communicated with RN prior to session.  Patient found HOB elevated with peripheral IV upon PT entry to room.     General Precautions: Standard, fall  Orthopedic Precautions: RLE toe touch weight bearing  Braces: N/A  Respiratory Status: Room air     Functional Mobility:    Bed Mobility  Supine to Sit on the R side:  stand by assistance      Transfers Sit to Stand:    -Rep 1 from standard bed height: minimum assist with RW, pushing up from bed HUBERT UE- difficulty/instability transitioning to UE on RW, maintained R LE NWBing  -Rep 2-4 from slightly elevated bedside chair: minimum assistance progressing to contact guard assist with RW,  maintained R LE NWBing; cued to keep R hand on RW, cued to scoot to edge of chair with improvement in mechanics    Gait  Gait Distance: 10' + 10' with frank RW, 2 gait trials with seated rest  Description: forward flexed posture at trunk and hips, Wbing heavily through UE, minimal L foot clearance, able to maintain R LE NWBing in spite of cues for TTWBing and sequencing for stepping          AM-PAC 6 CLICK MOBILITY  Turning over in bed (including adjusting bedclothes, sheets and blankets)?: 3  Sitting down on and standing up from a chair with arms (e.g., wheelchair, bedside commode, etc.): 3  Moving from lying on back to sitting on the side of the bed?: 3  Moving to and " from a bed to a chair (including a wheelchair)?: 3  Need to walk in hospital room?: 3  Climbing 3-5 steps with a railing?: 1  Basic Mobility Total Score: 16       Treatment & Education:  Patient educated on:  -role of therapy  -goals of session  -PT POC  -benefits of out of bed mobility and consequences of immobility  -calling for staff assist to mobilize safely  Patient agreeable to mobilize with therapy.      Sit to stand training, verbal/tactile cues for:   -scooting hips to edge of chair  -positioning L LE in Wbing close to patient's [place], cued to extend R LE anteriorly  -R hand placement on bed  -facilitation for anterior rocking to initiate transfer  Performed 4 reps of sit to stand from standard bed height and slightly elevated   Patient required cues 75% of the time for correct transfer technique.      Gait training: sequencing for stepping with RW, cued for TTWBing on R, cued for upright posture, cued for sequencing with RW progression and stepping, cued to ambulate inside RW [place], pacing for energy conservation     Patient encouraged to ambulate, sit up in chair 3x/day to prevent deconditioning during hospitalization. Patient verbalized understanding and agreement to mobilize only with RN assist for safety.     Patient left up in chair (elevated seat height) with all lines intact, call button in reach, and RN notified..OT present at end of session    GOALS:   Multidisciplinary Problems       Physical Therapy Goals          Problem: Physical Therapy    Goal Priority Disciplines Outcome Interventions   Physical Therapy Goal     PT, PT/OT Progressing    Description: Goals to be met by:      Patient will increase functional independence with mobility by performin. Supine to sit with Modified [place]  2. Sit to supine with Modified [place]  3. Sit to stand transfer with contact guard assist and RW NWB RLE  4. Bed to chair transfer with Supervision using RW NWB RLE  5. Gait  x 50 feet with  Stand-by Assistance using RW NWB RLE                         DME Justifications:  Patient has a mobility limitation that significantly impairs their ability to participate in one or more mobility related activities of daily living in customary locations in the home. The mobility limitation cannot be sufficiently resolved by the use of a cane or walker. The use of a manual wheelchair will greatly improve the patient's ability to participate in MRADLs. The patient will use the wheelchair on a regular basis at home. They have expressed their willingness to use a manual wheelchair in the home, and have a caregiver who is available and willing to assist with the wheelchair if needed.     Time Tracking:     PT Received On: 05/05/25  PT Start Time: 1135     PT Stop Time: 1200  PT Total Time (min): 25 min     Billable Minutes: Gait Training 25    Treatment Type: Treatment  PT/PTA: PT     Number of PTA visits since last PT visit: 0     05/05/2025

## 2025-05-06 LAB
ABSOLUTE EOSINOPHIL (OHS): 0.31 K/UL
ABSOLUTE MONOCYTE (OHS): 0.76 K/UL (ref 0.3–1)
ABSOLUTE NEUTROPHIL COUNT (OHS): 6.52 K/UL (ref 1.8–7.7)
ALBUMIN SERPL BCP-MCNC: 3.2 G/DL (ref 3.5–5.2)
ALP SERPL-CCNC: 117 UNIT/L (ref 40–150)
ALT SERPL W/O P-5'-P-CCNC: 45 UNIT/L (ref 10–44)
ANION GAP (OHS): 7 MMOL/L (ref 8–16)
AST SERPL-CCNC: 41 UNIT/L (ref 11–45)
BASOPHILS # BLD AUTO: 0.1 K/UL
BASOPHILS NFR BLD AUTO: 1 %
BILIRUB SERPL-MCNC: 0.4 MG/DL (ref 0.1–1)
BUN SERPL-MCNC: 27 MG/DL (ref 8–23)
CALCIUM SERPL-MCNC: 9.2 MG/DL (ref 8.7–10.5)
CHLORIDE SERPL-SCNC: 105 MMOL/L (ref 95–110)
CO2 SERPL-SCNC: 24 MMOL/L (ref 23–29)
CREAT SERPL-MCNC: 1 MG/DL (ref 0.5–1.4)
ERYTHROCYTE [DISTWIDTH] IN BLOOD BY AUTOMATED COUNT: 13.2 % (ref 11.5–14.5)
GFR SERPLBLD CREATININE-BSD FMLA CKD-EPI: >60 ML/MIN/1.73/M2
GLUCOSE SERPL-MCNC: 102 MG/DL (ref 70–110)
HCT VFR BLD AUTO: 36.7 % (ref 40–54)
HGB BLD-MCNC: 11.6 GM/DL (ref 14–18)
IMM GRANULOCYTES # BLD AUTO: 0.23 K/UL (ref 0–0.04)
IMM GRANULOCYTES NFR BLD AUTO: 2.4 % (ref 0–0.5)
LYMPHOCYTES # BLD AUTO: 1.67 K/UL (ref 1–4.8)
MAGNESIUM SERPL-MCNC: 2 MG/DL (ref 1.6–2.6)
MCH RBC QN AUTO: 31.7 PG (ref 27–31)
MCHC RBC AUTO-ENTMCNC: 31.6 G/DL (ref 32–36)
MCV RBC AUTO: 100 FL (ref 82–98)
NUCLEATED RBC (/100WBC) (OHS): 0 /100 WBC
PHOSPHATE SERPL-MCNC: 3.4 MG/DL (ref 2.7–4.5)
PLATELET # BLD AUTO: 296 K/UL (ref 150–450)
PMV BLD AUTO: 10.1 FL (ref 9.2–12.9)
POTASSIUM SERPL-SCNC: 4.7 MMOL/L (ref 3.5–5.1)
PROT SERPL-MCNC: 6.5 GM/DL (ref 6–8.4)
RBC # BLD AUTO: 3.66 M/UL (ref 4.6–6.2)
RELATIVE EOSINOPHIL (OHS): 3.2 %
RELATIVE LYMPHOCYTE (OHS): 17.4 % (ref 18–48)
RELATIVE MONOCYTE (OHS): 7.9 % (ref 4–15)
RELATIVE NEUTROPHIL (OHS): 68.1 % (ref 38–73)
SODIUM SERPL-SCNC: 136 MMOL/L (ref 136–145)
WBC # BLD AUTO: 9.59 K/UL (ref 3.9–12.7)

## 2025-05-06 PROCEDURE — 97110 THERAPEUTIC EXERCISES: CPT | Mod: HCNC

## 2025-05-06 PROCEDURE — 25000003 PHARM REV CODE 250: Mod: HCNC

## 2025-05-06 PROCEDURE — 85025 COMPLETE CBC W/AUTO DIFF WBC: CPT | Mod: HCNC | Performed by: STUDENT IN AN ORGANIZED HEALTH CARE EDUCATION/TRAINING PROGRAM

## 2025-05-06 PROCEDURE — 80053 COMPREHEN METABOLIC PANEL: CPT | Mod: HCNC | Performed by: STUDENT IN AN ORGANIZED HEALTH CARE EDUCATION/TRAINING PROGRAM

## 2025-05-06 PROCEDURE — 25000003 PHARM REV CODE 250: Mod: HCNC | Performed by: STUDENT IN AN ORGANIZED HEALTH CARE EDUCATION/TRAINING PROGRAM

## 2025-05-06 PROCEDURE — 20600001 HC STEP DOWN PRIVATE ROOM: Mod: HCNC

## 2025-05-06 PROCEDURE — 25000003 PHARM REV CODE 250: Mod: HCNC | Performed by: HOSPITALIST

## 2025-05-06 PROCEDURE — 36415 COLL VENOUS BLD VENIPUNCTURE: CPT | Mod: HCNC | Performed by: STUDENT IN AN ORGANIZED HEALTH CARE EDUCATION/TRAINING PROGRAM

## 2025-05-06 PROCEDURE — 83735 ASSAY OF MAGNESIUM: CPT | Mod: HCNC | Performed by: STUDENT IN AN ORGANIZED HEALTH CARE EDUCATION/TRAINING PROGRAM

## 2025-05-06 PROCEDURE — 25000003 PHARM REV CODE 250: Mod: HCNC | Performed by: PHYSICIAN ASSISTANT

## 2025-05-06 PROCEDURE — 97530 THERAPEUTIC ACTIVITIES: CPT | Mod: HCNC

## 2025-05-06 PROCEDURE — 84100 ASSAY OF PHOSPHORUS: CPT | Mod: HCNC | Performed by: STUDENT IN AN ORGANIZED HEALTH CARE EDUCATION/TRAINING PROGRAM

## 2025-05-06 RX ADMIN — FOLIC ACID 1 MG: 1 TABLET ORAL at 09:05

## 2025-05-06 RX ADMIN — OXYCODONE 5 MG: 5 TABLET ORAL at 09:05

## 2025-05-06 RX ADMIN — BUPROPION HYDROCHLORIDE 150 MG: 75 TABLET, FILM COATED ORAL at 10:05

## 2025-05-06 RX ADMIN — APIXABAN 10 MG: 5 TABLET, FILM COATED ORAL at 09:05

## 2025-05-06 RX ADMIN — NIFEDIPINE 90 MG: 30 TABLET, FILM COATED, EXTENDED RELEASE ORAL at 09:05

## 2025-05-06 RX ADMIN — SENNOSIDES 8.6 MG: 8.6 TABLET, FILM COATED ORAL at 10:05

## 2025-05-06 RX ADMIN — ACETAMINOPHEN 1000 MG: 500 TABLET ORAL at 09:05

## 2025-05-06 RX ADMIN — ACETAMINOPHEN 1000 MG: 500 TABLET ORAL at 03:05

## 2025-05-06 RX ADMIN — Medication 1 TABLET: at 09:05

## 2025-05-06 RX ADMIN — OXYCODONE HYDROCHLORIDE 10 MG: 10 TABLET ORAL at 10:05

## 2025-05-06 RX ADMIN — APIXABAN 10 MG: 5 TABLET, FILM COATED ORAL at 10:05

## 2025-05-06 RX ADMIN — ACETAMINOPHEN 1000 MG: 500 TABLET ORAL at 10:05

## 2025-05-06 RX ADMIN — BUPROPION HYDROCHLORIDE 150 MG: 75 TABLET, FILM COATED ORAL at 09:05

## 2025-05-06 RX ADMIN — GABAPENTIN 300 MG: 300 CAPSULE ORAL at 09:05

## 2025-05-06 RX ADMIN — Medication 100 MG: at 09:05

## 2025-05-06 RX ADMIN — SENNOSIDES 8.6 MG: 8.6 TABLET, FILM COATED ORAL at 09:05

## 2025-05-06 RX ADMIN — MELATONIN TAB 3 MG 6 MG: 3 TAB at 10:05

## 2025-05-06 RX ADMIN — GABAPENTIN 300 MG: 300 CAPSULE ORAL at 10:05

## 2025-05-06 NOTE — PROGRESS NOTES
[facility] - Telemetry [facility]  Progress Note    Patient Name: Froylan Borja  MRN: [id number]  Patient Class: IP- Inpatient   Admission Date: 4/18/2025  Length of Stay: 18 days  Attending Physician: Lit Garcia DO  Primary Care Provider: Janki Tamez MD        Subjective     Principal Problem:Acute deep vein thrombosis (DVT) of right lower extremity        HPI:  Mr. Froylan Borja is a 69 y/o male with a PMHx alcohol abuse, cirrhosis, hepatocellular carcinoma, and HTN who presents for complaint of R leg pain and swelling. He reports yesterday he hit his R leg on the car door and shortly developed a bruise and pain to the R lateral leg. He tried tylenol at-home though pain, swelling, and bruising continued to worsen. Admits to paresthesias and numbness to the R foot as well as significant R foot pain with weight-bearing. Denies CP, palpitations, SOB, lightheadedness, dizziness, headaches, LOC, vomiting, diarrhea, abdominal pain, fever, chills. Reports some nausea following morphine and dilaudid for pain in ED. Of note, patient had angiogram y90 mapping study done with IR last Thursday (4/10/25). Has developed some bruising to the medial thigh and mild groin discomfort since procedure. Social history significant for daily alcohol consumption, 3-4 beers daily, last alcoholic beverage yesterday around 3PM.     In the ED, AF HDS. H&H 13.0/38.6 (BL~14.8), no leukocytosis. HypoNa 129, HyperK 5.7, CO2 20, Ca 8.6, Albumin 3.4, remainder of CMP unremarkable. PT/INR wnl at 10.9/1.0. LA 1.6. CPK wnl. Hep C Ab reactive, HCV RNA pending. EKG with normal sinus rhythm. R Lower Ext U/S w/ findings of nonocclusive thrombus in the right common femoral vein and right common femoral artery pseudoaneurysm. CTA Lower Ext pending. R lower extremity hematoma drained at bedside by general surgery with evacuation of 120 cc of hematoma. Compressive dressing applied. Given nebulized albuterol, lokelma, IV cefazolin, IV  dilaudid, IV morphine, IV zofran. Admitted to .     Overview/Hospital Course:  70 y.o M with HTN, alcohol/HepC cirrhosis c/b HCC s/p Y-90 mapping angiogram on 4/10 who presents with bruising and pain at angiogram site and RLE calf pain after he hit it on a car door. RLE U/S showed non-occlusive CFV thrombus and R CFA pseudoaneurysm. CTA re-demonstrated the psdueoaneurysm with active extravasation within the sac as well as a large R calf hematoma which GenSurg were consulted for and successfully drained (120ccs). Vascular Surgery were consulted, recommended IR guided percutaneous thrombin injection which IR were able to complete. Heparin gtt was started. Groin U/S done the following day showed continued thrombosis of the pseudoanurysm. He was taken to the OR on 4/21 for debridement and placement of wound vac. He was taken back to the OR on 4/24 for further debridement and wound vac change. OR 4/28 for vac reapplication. RLE wound exploration, debridement, possible split-thickness skin grafting versus skin substitute, wound VAC placement 4/30 with Plastics.  Resumed Eliquis on 05/01. Plastics removed wound vac; will not need on discharge    Interval History:  Seen and evaluated on general medical floor  No acute events overnight    Clinical status stable, unchanged  No new complaints    Objective:     Vital Signs (Most Recent):  Temp: 98.4 °F (36.9 °C) (05/06/25 1214)  Pulse: 87 (05/06/25 1214)  Resp: 17 (05/06/25 1214)  BP: (!) 141/71 (05/06/25 1214)  SpO2: 98 % (05/06/25 1214) Vital Signs (24h Range):  Temp:  [97.3 °F (36.3 °C)-98.6 °F (37 °C)] 98.4 °F (36.9 °C)  Pulse:  [73-87] 87  Resp:  [17-19] 17  SpO2:  [97 %-99 %] 98 %  BP: (141-159)/(70-74) 141/71     Weight: 120 kg (264 lb 8.8 oz)  Body mass index is 33.07 kg/m².  No intake or output data in the 24 hours ending 05/06/25 1413      Physical Exam  Cardiovascular:      Rate and Rhythm: Normal rate.      Pulses: Normal pulses.   Pulmonary:      Effort: No  respiratory distress.      Breath sounds: Normal breath sounds. No wheezing.   Abdominal:      General: Bowel sounds are normal. There is no distension.      Palpations: Abdomen is soft.      Tenderness: There is no abdominal tenderness.   Skin:     Findings: Lesion (RLE) present.   Neurological:      Mental Status: He is alert and oriented to person, place, and time. Mental status is at baseline.               Significant Labs: All pertinent labs within the past 24 hours have been reviewed.    Significant Imaging: I have reviewed all pertinent imaging results/findings within the past 24 hours.      Assessment & Plan  Acute deep vein thrombosis (DVT) of right lower extremity  Hematoma  Leg wound, right  RLE US showed a nonocclusive thrombus in the right common femoral vein   Started Heparin gtt on 4/18 once cleared from IR standpoint  Hg had been down-trending and dressings saturated with blood. - Hgb stabilized   If able to tolerate anticoagulation, will transition to oral DOAC when not needing further procedures. If not, will need to discuss IVC filter  General surgery consulted- hematoma evacuation performed at bedside  S/p  wound debridement and wound vac placement on 4/21. and 4/24 for wound debridement + vac reapplication  MM pain regimen   s/p STSG to right leg with wound vac placement 4/30 by plastics.   Transitioned to eliquis 05/01,hb stable.   Plastics removed wound vac    Essential hypertension  Patient's blood pressure range in the last 24 hours was: BP  Min: 141/71  Max: 159/74.The patient's inpatient anti-hypertensive regimen is listed below:  Current Antihypertensives  NIFEdipine 24 hr tablet 90 mg, Daily, Oral  Hold ARB due to hyperkalemia    BP is uncontrolled, will adjust as follows: change to nifedipine    Alcohol use disorder, severe, dependence  daily drinker, last drink 4/17  3-4 beers daily, denies withdrawal symptoms  Continue thiamine, folic acid and multivitamin  CIWA per  "nursing    Obesity (BMI 30-39.9)  Body mass index is 33.07 kg/m². Morbid obesity complicates all aspects of disease management from diagnostic modalities to treatment. Weight loss encouraged and health benefits explained to patient.   Hyponatremia  Resolved    Pseudoaneurysm of femoral artery  RLE US showed a new right common femoral artery pseudoaneurysm   CTA RLE showed extravasation within the pseudoaneurysm sac  Vascular Surgery consulted- recommended IR guided thrombin injection. IR consulted, completed procedure on 4/18.   Groin u/s on 4/19 showed continued thrombosis of the pseudoaneurysm  Repeat u/s 4/29 stable, discussed with IR    Hyperkalemia  Resolved    VTE Risk Mitigation (From admission, onward)           Ordered     apixaban tablet 5 mg  2 times daily        Placed in "Followed by" Linked Group    05/06/25 1138     apixaban tablet 10 mg  2 times daily        Placed in "Followed by" Linked Group    05/06/25 1138     heparin 25,000 units in dextrose 5% 250 ml (100 units/mL) infusion MINIMAL INTENSITY nomogram - [facility]  Continuous        Question:  Begin at (units/kg/hr)  Answer:  12    04/25/25 0808     IP VTE HIGH RISK PATIENT  Once         04/18/25 0725     Reason for No Pharmacological VTE Prophylaxis  Once        Question:  Reasons:  Answer:  Risk of Bleeding  Comment:  will start anticoagulation once medically cleared    04/18/25 0725                    Discharge Planning   KENNETH: 5/6/2025     Code Status: Full Code   Medical Readiness for Discharge Date:   Discharge Plan A: Home, Home with family, Home Health   Discharge Delays: None known at this time            Please place Justification for SYED Garcia DO  Department of Hospital Medicine   [facility] - Telemetry Stepdown    "

## 2025-05-06 NOTE — PLAN OF CARE
05/06/25 1442   Post-Acute Status   Post-Acute Authorization Placement   Post-Acute Placement Status Referrals Sent   Discharge Plan   Discharge Plan A Rehab   Discharge Plan B Rehab     Met with patient to review discharge recommendation of Rehab and is agreeable to plan    Patient/family provided list of facilities in-network with patient's payor plan. Providers that are owned, operated, or affiliated with [facility] are included on the list.     Notified that referral sent to below listed facilities from in-network list based on proximity to home/family support:   [facility]  2.  3.  4.  5. (can send more than 5)    Patient/family instructed to identify preference.    Preferred Facility: (if more than 1, listed in order of descending preference)  1.  OR  Patient has declined to select a preferred provider and elects placement with the first accepting in network provider that is available to provide services as ordered by the physician       If an additional preferred facility not listed above is identified, additional referral to be sent. If above facilities unable to accept, will send additional referrals to in-network providers.    Discharge Plan A and Plan B have been determined by review of patient's clinical status, future medical and therapeutic needs, and coverage/benefits for post-acute care in coordination with multidisciplinary team members.  Hubert Guo, [facility] [place]    [facility]  [phone]

## 2025-05-06 NOTE — PLAN OF CARE
Problem: Adult Inpatient Plan of Care  Goal: Plan of Care Review  Outcome: Progressing  Goal: Patient-Specific Goal (Individualized)  Outcome: Progressing  Goal: Absence of Hospital-Acquired Illness or Injury  Outcome: Progressing     Problem: Infection  Goal: Absence of Infection Signs and Symptoms  Outcome: Progressing     Problem: Wound  Goal: Optimal Coping  Outcome: Progressing  Goal: Absence of Infection Signs and Symptoms  Outcome: Progressing  Goal: Optimal Pain Control and Function  Outcome: Progressing

## 2025-05-06 NOTE — PLAN OF CARE
Problem: Occupational Therapy  Goal: Occupational Therapy Goal  Description: Goals to be met by: 5/31/2025     Patient will increase functional independence with ADLs by performing:    LE Dressing with Stand-by Assistance.  Grooming while standing with Set-up Assistance.  Toileting from toilet with Modified [place] for hygiene and clothing management.   Bathing from  shower chair/bench with Stand-by Assistance.  Stand pivot transfers with Stand-by Assistance and maintaining weight-bearing precaution(s).  Step transfer with Stand-by Assistance and maintaining weight-bearing precaution(s)  Upper extremity exercise program x10-15 reps per handout, with independence.      Outcome: Progressing   Goals remain appropriate.

## 2025-05-06 NOTE — SUBJECTIVE & OBJECTIVE
Interval History:  Seen and evaluated on general medical floor  No acute events overnight    Clinical status stable, unchanged  No new complaints    Objective:     Vital Signs (Most Recent):  Temp: 98.4 °F (36.9 °C) (05/06/25 1214)  Pulse: 87 (05/06/25 1214)  Resp: 17 (05/06/25 1214)  BP: (!) 141/71 (05/06/25 1214)  SpO2: 98 % (05/06/25 1214) Vital Signs (24h Range):  Temp:  [97.3 °F (36.3 °C)-98.6 °F (37 °C)] 98.4 °F (36.9 °C)  Pulse:  [73-87] 87  Resp:  [17-19] 17  SpO2:  [97 %-99 %] 98 %  BP: (141-159)/(70-74) 141/71     Weight: 120 kg (264 lb 8.8 oz)  Body mass index is 33.07 kg/m².  No intake or output data in the 24 hours ending 05/06/25 1413      Physical Exam  Cardiovascular:      Rate and Rhythm: Normal rate.      Pulses: Normal pulses.   Pulmonary:      Effort: No respiratory distress.      Breath sounds: Normal breath sounds. No wheezing.   Abdominal:      General: Bowel sounds are normal. There is no distension.      Palpations: Abdomen is soft.      Tenderness: There is no abdominal tenderness.   Skin:     Findings: Lesion (RLE) present.   Neurological:      Mental Status: He is alert and oriented to person, place, and time. Mental status is at baseline.               Significant Labs: All pertinent labs within the past 24 hours have been reviewed.    Significant Imaging: I have reviewed all pertinent imaging results/findings within the past 24 hours.

## 2025-05-06 NOTE — PLAN OF CARE
Recommendations  1. Continue regular diet.   2. Vitamin C 500 mg BID and Zinc 220mg once daily x 30 days - per last RD note  3. Monitor wound healing.   4. Document weekly weights.   5. Document PO itnake in flow sheets.    Goals: 1. Maintain % EEN. 2. Wound healing. 3. Weight maintenance.  Nutrition Goal Status: goal met  Communication of RD Recs: other (comment) (POC)

## 2025-05-06 NOTE — ASSESSMENT & PLAN NOTE
Patient's blood pressure range in the last 24 hours was: BP  Min: 141/71  Max: 159/74.The patient's inpatient anti-hypertensive regimen is listed below:  Current Antihypertensives  NIFEdipine 24 hr tablet 90 mg, Daily, Oral  Hold ARB due to hyperkalemia    BP is uncontrolled, will adjust as follows: change to nifedipine

## 2025-05-06 NOTE — PT/OT/SLP PROGRESS
Occupational Therapy   Treatment    Name: Froylan Borja  MRN: [id number]  Admitting Diagnosis:  Acute deep vein thrombosis (DVT) of right lower extremity  6 Days Post-Op    Recommendations:     Discharge Recommendations: High Intensity Therapy  Discharge Equipment Recommendations:  bedside commode, wheelchair  Barriers to discharge:  Inaccessible home environment    Assessment:     Froylan Borja is a 70 y.o. male with a medical diagnosis of Acute deep vein thrombosis (DVT) of right lower extremity.  He presents with good participation and motivation. Performance deficits affecting function are weakness, impaired endurance, impaired self care skills, impaired functional mobility, gait instability, impaired balance, decreased coordination, decreased upper extremity function, decreased lower extremity function, decreased safety awareness, pain, impaired skin, impaired cardiopulmonary response to activity, orthopedic precautions.     Rehab Prognosis:  Good; patient would benefit from acute skilled OT services to address these deficits and reach maximum level of function.       Plan:     Patient to be seen 4 x/week to address the above listed problems via self-care/home management, therapeutic activities, therapeutic exercises, neuromuscular re-education  Plan of Care Expires: 05/31/25  Plan of Care Reviewed with: patient    Subjective     Chief Complaint: Maintaining TTWB precaution  Patient/Family Comments/goals: Return home  Pain/Comfort:  Pain Rating 1:  (did not rate)  Location - Side 1: Right  Location - Orientation 1: upper  Location 1: leg  Pain Addressed 1: Pre-medicate for activity, Reposition, Distraction  Pain Rating Post-Intervention 1:  (did not rate)    Objective:     Communicated with: RN prior to session.  Patient found supine with  (no active lines, no family present) upon OT entry to room.    General Precautions: Standard, fall    Orthopedic Precautions:RLE toe touch weight bearing  Braces: N/A  Respiratory  Status: Room air     Occupational Performance:     Bed Mobility:    Patient completed Scooting/Bridging with stand by assistance to scoot forward seated at EOB.  Patient completed Supine to Sit with stand by assistance  Patient completed Sit to Supine with stand by assistance     Functional Mobility/Transfers:  Patient completed Sit <> Stand Transfer with contact guard assistance  with  rolling walker   Patient completed Toilet Transfer Step Transfer technique with minimum assistance with  rolling walker and bedside commode  Functional Mobility: Pt completed FM of household distances within room using RW with CGA, taking seated rest breaks as needed.     Activities of Daily Living:  Grooming: Set up assistance to brush teeth and wash face seated at EOB.  Upper Body Dressing: minimum assistance to don gown around back seated at EOB.      [facility] 6 Click ADL: 18    Treatment & Education:  Pt completed banded BUE exercises with red band in 4 planes, 2 x 10 reps each while seated at EOB.     Patient left supine with all lines intact, call button in reach, bed alarm on, and RN notified    GOALS:   Multidisciplinary Problems       Occupational Therapy Goals          Problem: Occupational Therapy    Goal Priority Disciplines Outcome Interventions   Occupational Therapy Goal     OT, PT/OT Progressing    Description: Goals to be met by: 5/31/2025     Patient will increase functional independence with ADLs by performing:    LE Dressing with Stand-by Assistance.  Grooming while standing with Set-up Assistance.  Toileting from toilet with Modified [place] for hygiene and clothing management.   Bathing from  shower chair/bench with Stand-by Assistance.  Stand pivot transfers with Stand-by Assistance and maintaining weight-bearing precaution(s).  Step transfer with Stand-by Assistance and maintaining weight-bearing precaution(s)  Upper extremity exercise program x10-15 reps per handout, with independence.                            DME Justifications:   Froylan requires a commode for home use because he is confined to a single room.  Froylan Borja has a mobility limitation that significantly impairs his ability to participate in one or more mobility related activities of daily living (MRADLs) such as toileting, feeding, dressing, grooming, and bathing in customary locations in the home.  The mobility limitation cannot be sufficiently resolved by the use of a cane or walker.   The use of a manual wheelchair will significantly improve the patients ability to participate in MRADLS and the patient will use it on regular basis in the home.  Froylan Borja has expressed his willingness to use a manual wheelchair in the home. Patients upper body strength is sufficient for propulsion.  He also has a caregiver who is available, willing, and able to provide assistance with the wheelchair when needed.      Time Tracking:     OT Date of Treatment: 05/06/25  OT Start Time: 1122  OT Stop Time: 1157  OT Total Time (min): 35 min    Billable Minutes:Therapeutic Activity 22  Therapeutic Exercise 13    OT/MARQUITA: OT     Number of MARQUITA visits since last OT visit: 0    5/6/2025

## 2025-05-06 NOTE — PROGRESS NOTES
"[facility] - Telemetry Stepdown  Adult Nutrition  Progress Note    SUMMARY   Recommendations  1. Continue regular diet.   2. Vitamin C 500 mg BID and Zinc 220mg once daily x 30 days - per last RD note  3. Monitor wound healing.   4. Document weekly weights.   5. Document PO itnake in flow sheets.    Goals: 1. Maintain % EEN. 2. Wound healing. 3. Weight maintenance.  Nutrition Goal Status: goal met  Communication of RD Recs: other (comment) (POC)    Nutrition Discharge Planning    Nutrition Discharge Planning: Therapeutic diet (comments)  Therapeutic diet (comments): Low Sodium    Reason for Assessment    Reason For Assessment: RD follow-up  Diagnosis: other (see comments) (DVT)  General Information Comments: RD f/u. Continue with % intake of meals. Wts stable. RD to f/u and monitor.  Nutrition Related Social Determinants of Health: SDOH: None Identified  Food Insecurity: No Food Insecurity (4/21/2025)    Hunger Vital Sign     Worried About Running Out of Food in the Last Year: Never true     Ran Out of Food in the Last Year: Never true     Nutrition/Diet History    Spiritual, Cultural Beliefs, Muslim Practices, Values that Affect Care: no  Food Allergies: NKFA    Anthropometrics    Height: 6' 3" (190.5 cm)  Height (inches): 75 in  Height Method: Stated  Weight: 120 kg (264 lb 8.8 oz)  Weight (lb): 264.55 lb  Weight Method: Stated  Ideal Body Weight (IBW), Male: 196 lb  % Ideal Body Weight, Male (lb): 134.97 %  BMI (Calculated): 33.1       Lab/Procedures/Meds    Pertinent Labs Reviewed: reviewed  Pertinent Labs Comments: BUN 27 (H), ALT 45 (H)  Pertinent Medications Reviewed: reviewed  Pertinent Medications Comments: Folic acid, gabapentin, melatonin, mvi, bowel reg, thiamine    Estimated/Assessed Needs    Weight Used For Calorie Calculations: 120 kg (264 lb 8.8 oz)  Energy Calorie Requirements (kcal): 2160- 2400/day (18-20/kg CBW)     Protein Requirements: 108- 132gm/day  (0.9- 1.1gm/kg CBW)  Weight " Used For Protein Calculations: 120 kg (264 lb 8.8 oz)        RDA Method (mL): 2160         Nutrition Prescription Ordered    Current Diet Order: Regular    Evaluation of Received Nutrient/Fluid Intake    I/O: -  Comments: LBM 5/5  Tolerance: tolerating  % Intake of Estimated Energy Needs: 75 - 100 %  % Meal Intake: 75 - 100 %    PES Statement  Nutrition PES Problem: No nutrition diagnosis at this time    Nutrition Risk    Level of Risk/Frequency of Follow-up: moderate     Monitor and Evaluation    Monitor and Evaluation: Protein intake, Food and beverage intake, Diet order, Weight, Skin     Nutrition Follow-Up    RD Follow-up?: Yes

## 2025-05-07 LAB
ABSOLUTE EOSINOPHIL (OHS): 0.35 K/UL
ABSOLUTE MONOCYTE (OHS): 0.77 K/UL (ref 0.3–1)
ABSOLUTE NEUTROPHIL COUNT (OHS): 5.24 K/UL (ref 1.8–7.7)
ALBUMIN SERPL BCP-MCNC: 3 G/DL (ref 3.5–5.2)
ALP SERPL-CCNC: 117 UNIT/L (ref 40–150)
ALT SERPL W/O P-5'-P-CCNC: 42 UNIT/L (ref 10–44)
ANION GAP (OHS): 8 MMOL/L (ref 8–16)
AST SERPL-CCNC: 37 UNIT/L (ref 11–45)
BASOPHILS # BLD AUTO: 0.08 K/UL
BASOPHILS NFR BLD AUTO: 0.9 %
BILIRUB SERPL-MCNC: 0.4 MG/DL (ref 0.1–1)
BUN SERPL-MCNC: 23 MG/DL (ref 8–23)
CALCIUM SERPL-MCNC: 8.8 MG/DL (ref 8.7–10.5)
CHLORIDE SERPL-SCNC: 106 MMOL/L (ref 95–110)
CO2 SERPL-SCNC: 20 MMOL/L (ref 23–29)
CREAT SERPL-MCNC: 0.8 MG/DL (ref 0.5–1.4)
ERYTHROCYTE [DISTWIDTH] IN BLOOD BY AUTOMATED COUNT: 13.2 % (ref 11.5–14.5)
GFR SERPLBLD CREATININE-BSD FMLA CKD-EPI: >60 ML/MIN/1.73/M2
GLUCOSE SERPL-MCNC: 107 MG/DL (ref 70–110)
HCT VFR BLD AUTO: 35.8 % (ref 40–54)
HGB BLD-MCNC: 11.7 GM/DL (ref 14–18)
IMM GRANULOCYTES # BLD AUTO: 0.2 K/UL (ref 0–0.04)
IMM GRANULOCYTES NFR BLD AUTO: 2.3 % (ref 0–0.5)
LYMPHOCYTES # BLD AUTO: 1.88 K/UL (ref 1–4.8)
MAGNESIUM SERPL-MCNC: 1.9 MG/DL (ref 1.6–2.6)
MCH RBC QN AUTO: 32.3 PG (ref 27–31)
MCHC RBC AUTO-ENTMCNC: 32.7 G/DL (ref 32–36)
MCV RBC AUTO: 99 FL (ref 82–98)
NUCLEATED RBC (/100WBC) (OHS): 0 /100 WBC
PHOSPHATE SERPL-MCNC: 3 MG/DL (ref 2.7–4.5)
PLATELET # BLD AUTO: 274 K/UL (ref 150–450)
PMV BLD AUTO: 9.6 FL (ref 9.2–12.9)
POCT GLUCOSE: 117 MG/DL (ref 70–110)
POCT GLUCOSE: 128 MG/DL (ref 70–110)
POCT GLUCOSE: 81 MG/DL (ref 70–110)
POTASSIUM SERPL-SCNC: 4.3 MMOL/L (ref 3.5–5.1)
PROT SERPL-MCNC: 6.4 GM/DL (ref 6–8.4)
RBC # BLD AUTO: 3.62 M/UL (ref 4.6–6.2)
RELATIVE EOSINOPHIL (OHS): 4.1 %
RELATIVE LYMPHOCYTE (OHS): 22.1 % (ref 18–48)
RELATIVE MONOCYTE (OHS): 9 % (ref 4–15)
RELATIVE NEUTROPHIL (OHS): 61.6 % (ref 38–73)
SODIUM SERPL-SCNC: 134 MMOL/L (ref 136–145)
WBC # BLD AUTO: 8.52 K/UL (ref 3.9–12.7)

## 2025-05-07 PROCEDURE — 83735 ASSAY OF MAGNESIUM: CPT | Mod: HCNC | Performed by: STUDENT IN AN ORGANIZED HEALTH CARE EDUCATION/TRAINING PROGRAM

## 2025-05-07 PROCEDURE — 25000003 PHARM REV CODE 250: Mod: HCNC

## 2025-05-07 PROCEDURE — 99222 1ST HOSP IP/OBS MODERATE 55: CPT | Mod: HCNC,,, | Performed by: NURSE PRACTITIONER

## 2025-05-07 PROCEDURE — 84100 ASSAY OF PHOSPHORUS: CPT | Mod: HCNC | Performed by: STUDENT IN AN ORGANIZED HEALTH CARE EDUCATION/TRAINING PROGRAM

## 2025-05-07 PROCEDURE — 63600175 PHARM REV CODE 636 W HCPCS: Mod: HCNC

## 2025-05-07 PROCEDURE — 85025 COMPLETE CBC W/AUTO DIFF WBC: CPT | Mod: HCNC | Performed by: STUDENT IN AN ORGANIZED HEALTH CARE EDUCATION/TRAINING PROGRAM

## 2025-05-07 PROCEDURE — 20600001 HC STEP DOWN PRIVATE ROOM: Mod: HCNC

## 2025-05-07 PROCEDURE — 36415 COLL VENOUS BLD VENIPUNCTURE: CPT | Mod: HCNC | Performed by: STUDENT IN AN ORGANIZED HEALTH CARE EDUCATION/TRAINING PROGRAM

## 2025-05-07 PROCEDURE — 25000003 PHARM REV CODE 250: Mod: HCNC | Performed by: STUDENT IN AN ORGANIZED HEALTH CARE EDUCATION/TRAINING PROGRAM

## 2025-05-07 PROCEDURE — 80053 COMPREHEN METABOLIC PANEL: CPT | Mod: HCNC | Performed by: STUDENT IN AN ORGANIZED HEALTH CARE EDUCATION/TRAINING PROGRAM

## 2025-05-07 PROCEDURE — 25000003 PHARM REV CODE 250: Mod: HCNC | Performed by: HOSPITALIST

## 2025-05-07 PROCEDURE — 97110 THERAPEUTIC EXERCISES: CPT | Mod: HCNC,CQ

## 2025-05-07 PROCEDURE — 97116 GAIT TRAINING THERAPY: CPT | Mod: HCNC,CQ

## 2025-05-07 PROCEDURE — 25000003 PHARM REV CODE 250: Mod: HCNC | Performed by: PHYSICIAN ASSISTANT

## 2025-05-07 RX ADMIN — APIXABAN 10 MG: 5 TABLET, FILM COATED ORAL at 09:05

## 2025-05-07 RX ADMIN — HYDROMORPHONE HYDROCHLORIDE 1 MG: 0.5 INJECTION, SOLUTION INTRAMUSCULAR; INTRAVENOUS; SUBCUTANEOUS at 09:05

## 2025-05-07 RX ADMIN — GABAPENTIN 300 MG: 300 CAPSULE ORAL at 08:05

## 2025-05-07 RX ADMIN — FOLIC ACID 1 MG: 1 TABLET ORAL at 09:05

## 2025-05-07 RX ADMIN — Medication 100 MG: at 09:05

## 2025-05-07 RX ADMIN — SENNOSIDES 8.6 MG: 8.6 TABLET, FILM COATED ORAL at 09:05

## 2025-05-07 RX ADMIN — OXYCODONE HYDROCHLORIDE 10 MG: 10 TABLET ORAL at 08:05

## 2025-05-07 RX ADMIN — MELATONIN TAB 3 MG 6 MG: 3 TAB at 08:05

## 2025-05-07 RX ADMIN — POLYETHYLENE GLYCOL 3350 17 G: 17 POWDER, FOR SOLUTION ORAL at 09:05

## 2025-05-07 RX ADMIN — ACETAMINOPHEN 1000 MG: 500 TABLET ORAL at 08:05

## 2025-05-07 RX ADMIN — BUPROPION HYDROCHLORIDE 150 MG: 75 TABLET, FILM COATED ORAL at 09:05

## 2025-05-07 RX ADMIN — GABAPENTIN 300 MG: 300 CAPSULE ORAL at 09:05

## 2025-05-07 RX ADMIN — NIFEDIPINE 90 MG: 30 TABLET, FILM COATED, EXTENDED RELEASE ORAL at 09:05

## 2025-05-07 RX ADMIN — SENNOSIDES 8.6 MG: 8.6 TABLET, FILM COATED ORAL at 08:05

## 2025-05-07 RX ADMIN — ACETAMINOPHEN 1000 MG: 500 TABLET ORAL at 02:05

## 2025-05-07 RX ADMIN — Medication 1 TABLET: at 09:05

## 2025-05-07 RX ADMIN — BUPROPION HYDROCHLORIDE 150 MG: 75 TABLET, FILM COATED ORAL at 08:05

## 2025-05-07 RX ADMIN — ACETAMINOPHEN 1000 MG: 500 TABLET ORAL at 09:05

## 2025-05-07 RX ADMIN — POLYETHYLENE GLYCOL 3350 17 G: 17 POWDER, FOR SOLUTION ORAL at 08:05

## 2025-05-07 RX ADMIN — OXYCODONE HYDROCHLORIDE 10 MG: 10 TABLET ORAL at 03:05

## 2025-05-07 RX ADMIN — APIXABAN 10 MG: 5 TABLET, FILM COATED ORAL at 08:05

## 2025-05-07 NOTE — ASSESSMENT & PLAN NOTE
RLE US showed a nonocclusive thrombus in the right common femoral vein   Started Heparin gtt on 4/18 once cleared from IR standpoint  Hg had been down-trending and dressings saturated with blood. - Hgb stabilized   If able to tolerate anticoagulation, will transition to oral DOAC when not needing further procedures. If not, will need to discuss IVC filter  General surgery consulted- hematoma evacuation performed at bedside  S/p  wound debridement and wound vac placement on 4/21. and 4/24 for wound debridement + vac reapplication  MM pain regimen   s/p STSG to right leg with wound vac placement 4/30 by plastics.   Transitioned to eliquis 05/01,hb stable.   Plastics removed wound vac  Table for DC to rehab; placement pending

## 2025-05-07 NOTE — HOSPITAL COURSE
Per chart review,    PT- 05/05    Bed Mobility  Supine to Sit on the R side:  stand by assistance       Transfers Sit to Stand:    -Rep 1 from standard bed height: minimum assist with RW, pushing up from bed HUBERT UE- difficulty/instability transitioning to UE on RW, maintained R LE NWBing  -Rep 2-4 from slightly elevated bedside chair: minimum assistance progressing to contact guard assist with RW,  maintained R LE NWBing; cued to keep R hand on RW, cued to scoot to edge of chair with improvement in mechanics    Gait  Gait Distance: 10' + 10' with frank RW

## 2025-05-07 NOTE — ASSESSMENT & PLAN NOTE
Patient's blood pressure range in the last 24 hours was: BP  Min: 139/71  Max: 149/68.The patient's inpatient anti-hypertensive regimen is listed below:  Current Antihypertensives  NIFEdipine 24 hr tablet 90 mg, Daily, Oral  Hold ARB due to hyperkalemia    BP is uncontrolled, will adjust as follows: change to nifedipine

## 2025-05-07 NOTE — HPI
Per chart review, Mr. Froylan Borja is a 71 y/o male with a PMHx alcohol abuse, cirrhosis, hepatocellular carcinoma, and HTN who presents for complaint of R leg pain and swelling. Of note, pt had an angiogram done with IR on 04/10/2025. On admit, pt was fund with R Lower Ext U/S w/ findings of nonocclusive thrombus in the right common femoral vein and right common femoral artery pseudoaneurysm. CTA re-demonstrated the psdueoaneurysm with active extravasation within the sac as well as a large R calf hematoma. He is S/P drainage per general surgery. Groin US showed continued thrombosis of pseudoaneurysm.  Pt was taken to OR on 04/21 for debridement and placement of wound vac. Pt had subsequent debridements and wound vac changes on 04/24 and 04/28. Plastics was consulted. And pt underwent RLE split thickness skin graft application on 04/30/2025. Wound vac was removed  on 05/06/2025 with no anticipation to continue at discharge as per plastics. PM &R was consulted to evaluate pt for post acute placement recommendation.       Functional History: Patient lives in  apt with wife. Prior to admission, Pt was I.  DME: None.

## 2025-05-07 NOTE — PLAN OF CARE
Pt has been accepted to [facility] and they will submit for auth today.    Hubert Guo, [facility]    [facility]  [phone]

## 2025-05-07 NOTE — SUBJECTIVE & OBJECTIVE
Interval History:  Seen and evaluated on step down unit  No acute events overnight    Clinical status stable, unchanged  No new complaints  Awaiting rehab placement    Objective:     Vital Signs (Most Recent):  Temp: 98.1 °F (36.7 °C) (05/07/25 1121)  Pulse: 85 (05/07/25 1121)  Resp: 20 (05/07/25 1121)  BP: 139/71 (05/07/25 1121)  SpO2: 99 % (05/07/25 1121) Vital Signs (24h Range):  Temp:  [97.5 °F (36.4 °C)-98.4 °F (36.9 °C)] 98.1 °F (36.7 °C)  Pulse:  [79-87] 85  Resp:  [17-20] 20  SpO2:  [98 %-99 %] 99 %  BP: (139-149)/(67-72) 139/71     Weight: 120 kg (264 lb 8.8 oz)  Body mass index is 33.07 kg/m².    Intake/Output Summary (Last 24 hours) at 5/7/2025 1213  Last data filed at 5/7/2025 0454  Gross per 24 hour   Intake 560 ml   Output 900 ml   Net -340 ml         Physical Exam  Cardiovascular:      Rate and Rhythm: Normal rate.      Pulses: Normal pulses.   Pulmonary:      Effort: No respiratory distress.      Breath sounds: Normal breath sounds. No wheezing.   Abdominal:      General: Bowel sounds are normal. There is no distension.      Palpations: Abdomen is soft.      Tenderness: There is no abdominal tenderness.   Skin:     Findings: Lesion (RLE) present.   Neurological:      Mental Status: He is alert and oriented to person, place, and time. Mental status is at baseline.               Significant Labs: All pertinent labs within the past 24 hours have been reviewed.    Significant Imaging: I have reviewed all pertinent imaging results/findings within the past 24 hours.

## 2025-05-07 NOTE — CONSULTS
[facility] - Telemetry Stepdown  Physical Medicine & Rehab  Consult Note    Patient Name: Froylan Borja  MRN: [id number]  Admission Date: 4/18/2025  Hospital Length of Stay: 19 days  Attending Physician: Lit Garcia DO   Consults  Subjective:     Principal Problem: Acute deep vein thrombosis (DVT) of right lower extremity    HPI: Per chart review, Mr. Froylan Borja is a 69 y/o male with a PMHx alcohol abuse, cirrhosis, hepatocellular carcinoma, and HTN who presents for complaint of R leg pain and swelling. Of note, pt had an angiogram done with IR on 04/10/2025. On admit, pt was fund with R Lower Ext U/S w/ findings of nonocclusive thrombus in the right common femoral vein and right common femoral artery pseudoaneurysm. CTA re-demonstrated the psdueoaneurysm with active extravasation within the sac as well as a large R calf hematoma. He is S/P drainage per general surgery. Groin US showed continued thrombosis of pseudoaneurysm.  Pt was taken to OR on 04/21 for debridement and placement of wound vac. Pt had subsequent debridements and wound vac changes on 04/24 and 04/28. Plastics was consulted. And pt underwent RLE split thickness skin graft application on 04/30/2025. Wound vac was removed  on 05/06/2025 with no anticipation to continue at discharge as per plastics. PM &R was consulted to evaluate pt for post acute placement recommendation.       Functional History: Patient lives in  apt with wife. Prior to admission, Pt was I.  DME: None.      Hospital Course: Per chart review,    PT- 05/05    Bed Mobility  Supine to Sit on the R side:  stand by assistance       Transfers Sit to Stand:    -Rep 1 from standard bed height: minimum assist with RW, pushing up from bed HUBERT UE- difficulty/instability transitioning to UE on RW, maintained R LE NWBing  -Rep 2-4 from slightly elevated bedside chair: minimum assistance progressing to contact guard assist with RW,  maintained R LE NWBing; cued to keep R hand on RW, cued to scoot to  edge of chair with improvement in mechanics    Gait  Gait Distance: 10' + 10' with frank RW       Past Medical History:   Diagnosis Date    Alcohol abuse     Anxiety     Cirrhosis     Glaucoma     Hepatocellular carcinoma     History of hepatitis C, s/p successful Rx w/ SVR24 - 1/2017     genotype 1;  relapse following PegIFN+RBV+Victrelis; prior relapse following PegIFN+RBV S/p 12 weeks harvoni + RBV w/ SVR    Hypertension     Paresthesia     feet, bilaterally     Past Surgical History:   Procedure Laterality Date    APPLICATION OF SPLIT-THICKNESS SKIN GRAFT (STSG) TO LOWER EXTREMITY Right 4/30/2025    Procedure: RIGHT LOWER EXTREMITY SPLIT THICKNESS SKIN GRAFT APPLICATION;  Surgeon: Alli Strong MD;  Location: [facility] OR 2ND FLR;  Service: Plastics;  Laterality: Right;    APPLICATION OF WOUND VACUUM-ASSISTED CLOSURE DEVICE Right 4/21/2025    Procedure: APPLICATION, WOUND VAC;  Surgeon: Estella Ramsay MD;  Location: [facility] OR 2ND FLR;  Service: General;  Laterality: Right;    APPLICATION OF WOUND VACUUM-ASSISTED CLOSURE DEVICE Right 4/24/2025    Procedure: APPLICATION, WOUND VAC;  Surgeon: Estella Ramsay MD;  Location: [facility] OR 2ND FLR;  Service: General;  Laterality: Right;    APPLICATION OF WOUND VACUUM-ASSISTED CLOSURE DEVICE Right 4/30/2025    Procedure: APPLICATION, WOUND VAC;  Surgeon: Alli Strong MD;  Location: [facility] OR 2ND FLR;  Service: Plastics;  Laterality: Right;    COLONOSCOPY N/A 9/19/2024    Procedure: COLONOSCOPY;  Surgeon: Mo Patino MD;  Location: [facility] MERLY (4TH FLR);  Service: Endoscopy;  Laterality: N/A;  Ref by Dr. NIGHAT Tamez, PT/INR/CBC am procedure, Suprep, email - PC  9/16-lvm for pre call-tb-labs 8/22/24    ESOPHAGOGASTRODUODENOSCOPY N/A 9/19/2024    Procedure: EGD (ESOPHAGOGASTRODUODENOSCOPY);  Surgeon: Mo Patino MD;  Location: [facility] ENDO (4TH FLR);  Service: Endoscopy;  Laterality: N/A;    HARVESTING OF SKIN GRAFT Right 4/30/2025    Procedure:  SURGICAL PROCUREMENT, GRAFT, SKIN;  Surgeon: Alli Strong MD;  Location: [facility] OR [facility] FLR;  Service: Plastics;  Laterality: Right;    IRRIGATION AND DEBRIDEMENT OF LOWER EXTREMITY Right 4/30/2025    Procedure: IRRIGATION AND DEBRIDEMENT, LOWER EXTREMITY;  Surgeon: Alli Strong MD;  [facility];  Service: Plastics;  Laterality: Right;    LIVER BIOPSY      REPLACEMENT OF WOUND VACUUM-ASSISTED CLOSURE DEVICE Right 4/24/2025    Procedure: REPLACEMENT, WOUND VAC;  Surgeon: Estella Ramsay MD;  Location: [facility] OR [facility] FLR;  Service: General;  Laterality: Right;    REPLACEMENT OF WOUND VACUUM-ASSISTED CLOSURE DEVICE Right 4/28/2025    Procedure: REPLACEMENT, WOUND VAC;  Surgeon: Jose G Mackay MD;  [facility];  Service: General;  Laterality: Right;    WOUND DEBRIDEMENT Right 4/21/2025    Procedure: DEBRIDEMENT, WOUND;  Surgeon: Estella Ramsay MD;  [facility];  Service: General;  Laterality: Right;     Review of patient's allergies indicates:   Allergen Reactions    Zoloft [sertraline] Other (See Comments)     hyponatremia    Codeine Itching     Intolerance- doesn't like the way it makes him feel       Scheduled Medications:    acetaminophen  1,000 mg Oral TID    apixaban  10 mg Oral BID    Followed by    [START ON 5/8/2025] apixaban  5 mg Oral BID    buPROPion  150 mg Oral BID    folic acid  1 mg Oral Daily    gabapentin  300 mg Oral BID    melatonin  6 mg Oral Nightly    multivitamin  1 tablet Oral Daily    NIFEdipine  90 mg Oral Daily    polyethylene glycol  17 g Oral BID    senna  8.6 mg Oral BID    thiamine  100 mg Oral Daily       PRN Medications:   Current Facility-Administered Medications:     albuterol-ipratropium, 3 mL, Nebulization, Q4H PRN    bisacodyL, 10 mg, Rectal, Daily PRN    dextrose 50%, 12.5 g, Intravenous, PRN    dextrose 50%, 25 g, Intravenous, PRN    glucagon (human recombinant), 1 mg, Intramuscular, PRN    glucose, 16 g, Oral,  PRN    glucose, 24 g, Oral, PRN    HYDROmorphone, 1 mg, Intravenous, Q6H PRN    hydrOXYzine pamoate, 25 mg, Oral, Q8H PRN    LORazepam, 2 mg, Oral, Q4H PRN    ondansetron, 8 mg, Oral, Q8H PRN    oxyCODONE, 5 mg, Oral, Q4H PRN    oxyCODONE, 10 mg, Oral, Q4H PRN    promethazine, 25 mg, Oral, Q6H PRN    sodium chloride 0.9%, 10 mL, Intravenous, PRN    Family History       Problem Relation (Age of Onset)    Cancer Mother, Father, Sister    Colonic polyp Mother, Brother    Diabetes Father    Diabetes Mellitus Father    Hypertension Father          Tobacco Use    Smoking status: Former     Types: Cigars    Smokeless tobacco: Never    Tobacco comments:     smokes cigars 20 per month   Substance and Sexual Activity    Alcohol use: Yes     Alcohol/week: 28.0 standard drinks of alcohol     Types: 28 Cans of beer per week     Comment: 2-3 beers daily    Drug use: No    Sexual activity: Not on file     Review of Systems   Constitutional:  Positive for activity change.   Musculoskeletal:  Positive for gait problem.   Skin:  Positive for wound.   Neurological:  Positive for weakness.     Objective:     Vital Signs (Most Recent):  Temp: 98.1 °F (36.7 °C) (05/07/25 1121)  Pulse: 85 (05/07/25 1121)  Resp: 20 (05/07/25 1121)  BP: 139/71 (05/07/25 1121)  SpO2: 99 % (05/07/25 1121)    Vital Signs (24h Range):  Temp:  [97.5 °F (36.4 °C)-98.4 °F (36.9 °C)] 98.1 °F (36.7 °C)  Pulse:  [79-87] 85  Resp:  [17-20] 20  SpO2:  [98 %-99 %] 99 %  BP: (139-149)/(67-72) 139/71     Body mass index is 33.07 kg/m².     Physical Exam  Vitals and nursing note reviewed.   Constitutional:       General: He is awake.      Appearance: Normal appearance.   Pulmonary:      Effort: Pulmonary effort is normal. No respiratory distress.   Abdominal:      General: There is no distension.      Palpations: Abdomen is soft.   Musculoskeletal:         General: Normal range of motion.      Cervical back: Normal range of motion and neck supple.   Skin:     General: Skin  is warm and dry.      Capillary Refill: Capillary refill takes 2 to 3 seconds.      Comments: Right thigh and RLE dressing with ACE wrap in place.   Neurological:      General: No focal deficit present.      Mental Status: He is alert and oriented to person, place, and time.      GCS: GCS eye subscore is 4. GCS verbal subscore is 5. GCS motor subscore is 6.      Motor: Weakness present.      Coordination: Coordination abnormal. Heel to Shin Test abnormal. Impaired rapid alternating movements.      Gait: Gait abnormal.   Psychiatric:         Mood and Affect: Mood normal.         Behavior: Behavior normal. Behavior is cooperative.          NEUROLOGICAL EXAMINATION:     MENTAL STATUS   Oriented to person, place, and time.       Diagnostic Results: Labs: Reviewed  Assessment/Plan:     * Acute deep vein thrombosis (DVT) of right lower extremity  -S/P RLE split thickness skin graft application per Plastics on 04/30.  -Now on Eliquis.   -S/P Heparin gtt.  -S/P thrombin injection.  -S/P hematoma drainage per general surgery.     Pseudoaneurysm of femoral artery  -S/P RLE split thickness skin graft application on 04/30.  -Wound care as per plastics.   -TTWB RLE as per plastics.     Hematoma  -S/P drainage per general surgery.     Alcohol use disorder, severe, dependence  -Continue MVI, Folic acid, Thiamine.     PM&R Recommendation:     At this time, the PM&R team has reviewed this patient's ongoing medical case including inpatient diagnosis, medical history, clinical examination, labs, vitals, current social and functional history to provide the post-acute recommendation as follows:     RECOMMENDATIONS: inpatient rehabilitation due to fair to good potential for improvement with therapies and need of physician oversight for management of ongoing active medical issues, once medically stable.       The patient will be admitted for comprehensive interdisciplinary inpatient rehabilitation to address the impairments due to his  medical diagnosis of Acute deep vein thrombosis (DVT) of right lower extremity . The patient will benefit from an inpatient rehabilitation program to promote functional recovery, implement compensatory strategies and will undergo assessment for needs for durable medical equipment for safe discharge to the community. This patient will benefit from a coordinated interdisciplinary rehabilitation program services that require close monitoring and treatment with 24-hour rehabilitative nursing and physical/occupational therapies for 3 hours/day for 5 days/week. This interdisciplinary program will be performed under the direction of a physiatrist.        We will sign off with the transfer of the patient to [facility] liaison who will continue to follow going forward until admission to [facility].       Thank you for your consult.     Lesley Rodriguez NP  Department of Physical Medicine & Rehab  [facility] - Telemetry Stepdown

## 2025-05-07 NOTE — ASSESSMENT & PLAN NOTE
-S/P RLE split thickness skin graft application on 04/30.  -Wound care as per plastics.   -TTWB RLE as per plastics.

## 2025-05-07 NOTE — CONSULTS
Inpatient consult to Physical Medicine Rehab  Consult performed by: Janelle Busch NP  Consult ordered by: Jose G Mackay MD  Reason for consult: Rehab      Consult received.     BETH Lawrence, FNP-C  Physical Medicine & Rehabilitation   05/07/2025

## 2025-05-07 NOTE — SUBJECTIVE & OBJECTIVE
Past Medical History:   Diagnosis Date    Alcohol abuse     Anxiety     Cirrhosis     Glaucoma     Hepatocellular carcinoma     History of hepatitis C, s/p successful Rx w/ SVR24 - 1/2017     genotype 1;  relapse following PegIFN+RBV+Victrelis; prior relapse following PegIFN+RBV S/p 12 weeks harvoni + RBV w/ SVR    Hypertension     Paresthesia     feet, bilaterally     Past Surgical History:   Procedure Laterality Date    APPLICATION OF SPLIT-THICKNESS SKIN GRAFT (STSG) TO LOWER EXTREMITY Right 4/30/2025    Procedure: RIGHT LOWER EXTREMITY SPLIT THICKNESS SKIN GRAFT APPLICATION;  Surgeon: Alli Strong MD;  [facility];  Service: Plastics;  Laterality: Right;    APPLICATION OF WOUND VACUUM-ASSISTED CLOSURE DEVICE Right 4/21/2025    Procedure: APPLICATION, WOUND VAC;  Surgeon: Estella Ramsay MD;  [facility];  Service: General;  Laterality: Right;    APPLICATION OF WOUND VACUUM-ASSISTED CLOSURE DEVICE Right 4/24/2025    Procedure: APPLICATION, WOUND VAC;  Surgeon: Estella Ramsya MD;  [facility];  Service: General;  Laterality: Right;    APPLICATION OF WOUND VACUUM-ASSISTED CLOSURE DEVICE Right 4/30/2025    Procedure: APPLICATION, WOUND VAC;  Surgeon: Alli Strong MD;  [facility];  Service: Plastics;  Laterality: Right;    COLONOSCOPY N/A 9/19/2024    Procedure: COLONOSCOPY;  Surgeon: Mo Patino MD;  Location: [facility] ENDO (4TH FLR);  Service: Endoscopy;  Laterality: N/A;  Ref by Dr. NIGHAT Tamez, PT/INR/CBC am procedure, Suprep, email - PC  9/16-lv for pre call-tb-labs 8/22/24    ESOPHAGOGASTRODUODENOSCOPY N/A 9/19/2024    Procedure: EGD (ESOPHAGOGASTRODUODENOSCOPY);  Surgeon: Mo Patino MD;  Location: [facility] ENDO (4TH FLR);  Service: Endoscopy;  Laterality: N/A;    HARVESTING OF SKIN GRAFT Right 4/30/2025    Procedure: SURGICAL PROCUREMENT, GRAFT, SKIN;  Surgeon: Alli Strong MD;  [facility];   Service: Plastics;  Laterality: Right;    IRRIGATION AND DEBRIDEMENT OF LOWER EXTREMITY Right 4/30/2025    Procedure: IRRIGATION AND DEBRIDEMENT, LOWER EXTREMITY;  Surgeon: Alli Strong MD;  [facility];  Service: Plastics;  Laterality: Right;    LIVER BIOPSY      REPLACEMENT OF WOUND VACUUM-ASSISTED CLOSURE DEVICE Right 4/24/2025    Procedure: REPLACEMENT, WOUND VAC;  Surgeon: Estella Ramsay MD;  [facility];  Service: General;  Laterality: Right;    REPLACEMENT OF WOUND VACUUM-ASSISTED CLOSURE DEVICE Right 4/28/2025    Procedure: REPLACEMENT, WOUND VAC;  Surgeon: Jose G Mackay MD;  Location: [facility] FLR;  Service: General;  Laterality: Right;    WOUND DEBRIDEMENT Right 4/21/2025    Procedure: DEBRIDEMENT, WOUND;  Surgeon: Estella Ramsay MD;  [facility];  Service: General;  Laterality: Right;     Review of patient's allergies indicates:   Allergen Reactions    Zoloft [sertraline] Other (See Comments)     hyponatremia    Codeine Itching     Intolerance- doesn't like the way it makes him feel       Scheduled Medications:    acetaminophen  1,000 mg Oral TID    apixaban  10 mg Oral BID    Followed by    [START ON 5/8/2025] apixaban  5 mg Oral BID    buPROPion  150 mg Oral BID    folic acid  1 mg Oral Daily    gabapentin  300 mg Oral BID    melatonin  6 mg Oral Nightly    multivitamin  1 tablet Oral Daily    NIFEdipine  90 mg Oral Daily    polyethylene glycol  17 g Oral BID    senna  8.6 mg Oral BID    thiamine  100 mg Oral Daily       PRN Medications:   Current Facility-Administered Medications:     albuterol-ipratropium, 3 mL, Nebulization, Q4H PRN    bisacodyL, 10 mg, Rectal, Daily PRN    dextrose 50%, 12.5 g, Intravenous, PRN    dextrose 50%, 25 g, Intravenous, PRN    glucagon (human recombinant), 1 mg, Intramuscular, PRN    glucose, 16 g, Oral, PRN    glucose, 24 g, Oral, PRN    HYDROmorphone, 1 mg, Intravenous, Q6H PRN    hydrOXYzine pamoate,  25 mg, Oral, Q8H PRN    LORazepam, 2 mg, Oral, Q4H PRN    ondansetron, 8 mg, Oral, Q8H PRN    oxyCODONE, 5 mg, Oral, Q4H PRN    oxyCODONE, 10 mg, Oral, Q4H PRN    promethazine, 25 mg, Oral, Q6H PRN    sodium chloride 0.9%, 10 mL, Intravenous, PRN    Family History       Problem Relation (Age of Onset)    Cancer Mother, Father, Sister    Colonic polyp Mother, Brother    Diabetes Father    Diabetes Mellitus Father    Hypertension Father          Tobacco Use    Smoking status: Former     Types: Cigars    Smokeless tobacco: Never    Tobacco comments:     smokes cigars 20 per month   Substance and Sexual Activity    Alcohol use: Yes     Alcohol/week: 28.0 standard drinks of alcohol     Types: 28 Cans of beer per week     Comment: 2-3 beers daily    Drug use: No    Sexual activity: Not on file     Review of Systems   Constitutional:  Positive for activity change.   Musculoskeletal:  Positive for gait problem.   Skin:  Positive for wound.   Neurological:  Positive for weakness.     Objective:     Vital Signs (Most Recent):  Temp: 98.1 °F (36.7 °C) (05/07/25 1121)  Pulse: 85 (05/07/25 1121)  Resp: 20 (05/07/25 1121)  BP: 139/71 (05/07/25 1121)  SpO2: 99 % (05/07/25 1121)    Vital Signs (24h Range):  Temp:  [97.5 °F (36.4 °C)-98.4 °F (36.9 °C)] 98.1 °F (36.7 °C)  Pulse:  [79-87] 85  Resp:  [17-20] 20  SpO2:  [98 %-99 %] 99 %  BP: (139-149)/(67-72) 139/71     Body mass index is 33.07 kg/m².     Physical Exam  Vitals and nursing note reviewed.   Constitutional:       General: He is awake.      Appearance: Normal appearance.   Pulmonary:      Effort: Pulmonary effort is normal. No respiratory distress.   Abdominal:      General: There is no distension.      Palpations: Abdomen is soft.   Musculoskeletal:         General: Normal range of motion.      Cervical back: Normal range of motion and neck supple.   Skin:     General: Skin is warm and dry.      Capillary Refill: Capillary refill takes 2 to 3 seconds.      Comments: Right  thigh and RLE dressing with ACE wrap in place.   Neurological:      General: No focal deficit present.      Mental Status: He is alert and oriented to person, place, and time.      GCS: GCS eye subscore is 4. GCS verbal subscore is 5. GCS motor subscore is 6.      Motor: Weakness present.      Coordination: Coordination abnormal. Heel to Shin Test abnormal. Impaired rapid alternating movements.      Gait: Gait abnormal.   Psychiatric:         Mood and Affect: Mood normal.         Behavior: Behavior normal. Behavior is cooperative.          NEUROLOGICAL EXAMINATION:     MENTAL STATUS   Oriented to person, place, and time.       Diagnostic Results: Labs: Reviewed

## 2025-05-07 NOTE — ASSESSMENT & PLAN NOTE
-S/P RLE split thickness skin graft application per Plastics on 04/30.  -Now on Eliquis.   -S/P Heparin gtt.  -S/P thrombin injection.  -S/P hematoma drainage per general surgery.

## 2025-05-07 NOTE — PROGRESS NOTES
[facility] - Telemetry [facility] Medicine  Progress Note    Patient Name: Froylan Borja  MRN: [id number]  Patient Class: IP- Inpatient   Admission Date: 4/18/2025  Length of Stay: 19 days  Attending Physician: Lit Garcia DO  Primary Care Provider: Janki Tamez MD        Subjective     Principal Problem:Acute deep vein thrombosis (DVT) of right lower extremity        HPI:  Mr. Froylan Borja is a 69 y/o male with a PMHx alcohol abuse, cirrhosis, hepatocellular carcinoma, and HTN who presents for complaint of R leg pain and swelling. He reports yesterday he hit his R leg on the car door and shortly developed a bruise and pain to the R lateral leg. He tried tylenol at-home though pain, swelling, and bruising continued to worsen. Admits to paresthesias and numbness to the R foot as well as significant R foot pain with weight-bearing. Denies CP, palpitations, SOB, lightheadedness, dizziness, headaches, LOC, vomiting, diarrhea, abdominal pain, fever, chills. Reports some nausea following morphine and dilaudid for pain in ED. Of note, patient had angiogram y90 mapping study done with IR last Thursday (4/10/25). Has developed some bruising to the medial thigh and mild groin discomfort since procedure. Social history significant for daily alcohol consumption, 3-4 beers daily, last alcoholic beverage yesterday around 3PM.     In the ED, AF HDS. H&H 13.0/38.6 (BL~14.8), no leukocytosis. HypoNa 129, HyperK 5.7, CO2 20, Ca 8.6, Albumin 3.4, remainder of CMP unremarkable. PT/INR wnl at 10.9/1.0. LA 1.6. CPK wnl. Hep C Ab reactive, HCV RNA pending. EKG with normal sinus rhythm. R Lower Ext U/S w/ findings of nonocclusive thrombus in the right common femoral vein and right common femoral artery pseudoaneurysm. CTA Lower Ext pending. R lower extremity hematoma drained at bedside by general surgery with evacuation of 120 cc of hematoma. Compressive dressing applied. Given nebulized albuterol, lokelma, IV cefazolin, IV  dilaudid, IV morphine, IV zofran. Admitted to .     Overview/Hospital Course:  70 y.o M with HTN, alcohol/HepC cirrhosis c/b HCC s/p Y-90 mapping angiogram on 4/10 who presents with bruising and pain at angiogram site and RLE calf pain after he hit it on a car door. RLE U/S showed non-occlusive CFV thrombus and R CFA pseudoaneurysm. CTA re-demonstrated the psdueoaneurysm with active extravasation within the sac as well as a large R calf hematoma which GenSurg were consulted for and successfully drained (120ccs). Vascular Surgery were consulted, recommended IR guided percutaneous thrombin injection which IR were able to complete. Heparin gtt was started. Groin U/S done the following day showed continued thrombosis of the pseudoanurysm. He was taken to the OR on 4/21 for debridement and placement of wound vac. He was taken back to the OR on 4/24 for further debridement and wound vac change. OR 4/28 for vac reapplication. RLE wound exploration, debridement, possible split-thickness skin grafting versus skin substitute, wound VAC placement 4/30 with Plastics.  Resumed Eliquis on 05/01. Plastics removed wound vac; will not need on discharge    Interval History:  Seen and evaluated on step down unit  No acute events overnight    Clinical status stable, unchanged  No new complaints  Awaiting rehab placement    Objective:     Vital Signs (Most Recent):  Temp: 98.1 °F (36.7 °C) (05/07/25 1121)  Pulse: 85 (05/07/25 1121)  Resp: 20 (05/07/25 1121)  BP: 139/71 (05/07/25 1121)  SpO2: 99 % (05/07/25 1121) Vital Signs (24h Range):  Temp:  [97.5 °F (36.4 °C)-98.4 °F (36.9 °C)] 98.1 °F (36.7 °C)  Pulse:  [79-87] 85  Resp:  [17-20] 20  SpO2:  [98 %-99 %] 99 %  BP: (139-149)/(67-72) 139/71     Weight: 120 kg (264 lb 8.8 oz)  Body mass index is 33.07 kg/m².    Intake/Output Summary (Last 24 hours) at 5/7/2025 1213  Last data filed at 5/7/2025 0454  Gross per 24 hour   Intake 560 ml   Output 900 ml   Net -340 ml         Physical  Exam  Cardiovascular:      Rate and Rhythm: Normal rate.      Pulses: Normal pulses.   Pulmonary:      Effort: No respiratory distress.      Breath sounds: Normal breath sounds. No wheezing.   Abdominal:      General: Bowel sounds are normal. There is no distension.      Palpations: Abdomen is soft.      Tenderness: There is no abdominal tenderness.   Skin:     Findings: Lesion (RLE) present.   Neurological:      Mental Status: He is alert and oriented to person, place, and time. Mental status is at baseline.               Significant Labs: All pertinent labs within the past 24 hours have been reviewed.    Significant Imaging: I have reviewed all pertinent imaging results/findings within the past 24 hours.      Assessment & Plan  Acute deep vein thrombosis (DVT) of right lower extremity  Hematoma  Leg wound, right  RLE US showed a nonocclusive thrombus in the right common femoral vein   Started Heparin gtt on 4/18 once cleared from IR standpoint  Hg had been down-trending and dressings saturated with blood. - Hgb stabilized   If able to tolerate anticoagulation, will transition to oral DOAC when not needing further procedures. If not, will need to discuss IVC filter  General surgery consulted- hematoma evacuation performed at bedside  S/p  wound debridement and wound vac placement on 4/21. and 4/24 for wound debridement + vac reapplication  MM pain regimen   s/p STSG to right leg with wound vac placement 4/30 by plastics.   Transitioned to eliquis 05/01,hb stable.   Plastics removed wound vac  Table for DC to rehab; placement pending    Essential hypertension  Patient's blood pressure range in the last 24 hours was: BP  Min: 139/71  Max: 149/68.The patient's inpatient anti-hypertensive regimen is listed below:  Current Antihypertensives  NIFEdipine 24 hr tablet 90 mg, Daily, Oral  Hold ARB due to hyperkalemia    BP is uncontrolled, will adjust as follows: change to nifedipine    Alcohol use disorder, severe,  "dependence  daily drinker, last drink 4/17  3-4 beers daily, denies withdrawal symptoms  Continue thiamine, folic acid and multivitamin  CIWA per nursing    Obesity (BMI 30-39.9)  Body mass index is 33.07 kg/m². Morbid obesity complicates all aspects of disease management from diagnostic modalities to treatment. Weight loss encouraged and health benefits explained to patient.   Hyponatremia  Resolved    Pseudoaneurysm of femoral artery  RLE US showed a new right common femoral artery pseudoaneurysm   CTA RLE showed extravasation within the pseudoaneurysm sac  Vascular Surgery consulted- recommended IR guided thrombin injection. IR consulted, completed procedure on 4/18.   Groin u/s on 4/19 showed continued thrombosis of the pseudoaneurysm  Repeat u/s 4/29 stable, discussed with IR    Hyperkalemia  Resolved    VTE Risk Mitigation (From admission, onward)           Ordered     apixaban tablet 5 mg  2 times daily        Placed in "Followed by" Linked Group    05/06/25 1138     apixaban tablet 10 mg  2 times daily        Placed in "Followed by" Linked Group    05/06/25 1138     heparin 25,000 units in dextrose 5% 250 ml (100 units/mL) infusion MINIMAL INTENSITY nomogram - [facility]  Continuous        Question:  Begin at (units/kg/hr)  Answer:  12    04/25/25 0808     IP VTE HIGH RISK PATIENT  Once         04/18/25 0725     Reason for No Pharmacological VTE Prophylaxis  Once        Question:  Reasons:  Answer:  Risk of Bleeding  Comment:  will start anticoagulation once medically cleared    04/18/25 0725                    Discharge Planning   KENNETH: 5/9/2025     Code Status: Full Code   Medical Readiness for Discharge Date:   Discharge Plan A: Rehab   Discharge Delays: None known at this time            Please place Justification for SYED Garcia DO  Department of Hospital Medicine   [facility] - Telemetry Stepdown    "

## 2025-05-07 NOTE — PT/OT/SLP PROGRESS
Physical Therapy Treatment    Patient Name:  Froylan Borja   MRN:  [id number]    Recommendations:     Discharge Recommendations: High Intensity Therapy  Discharge Equipment Recommendations: wheelchair, bedside commode  Barriers to discharge: None    Assessment:     Froylan Borja is a 70 y.o. male admitted with a medical diagnosis of Acute deep vein thrombosis (DVT) of right lower extremity.  He presents with the following impairments/functional limitations: weakness, impaired endurance, impaired self care skills, impaired functional mobility, pain, decreased safety awareness, decreased lower extremity function, impaired skin, edema, orthopedic precautions Pt tolerated treatment session well today. Patient remains appropriate for continued skilled services within the acute environment and goals remain appropriate.   .    Rehab Prognosis: Good; patient would benefit from acute skilled PT services to address these deficits and reach maximum level of function.    Recent Surgery: Procedure(s) (LRB):  RIGHT LOWER EXTREMITY SPLIT THICKNESS SKIN GRAFT APPLICATION (Right)  SURGICAL PROCUREMENT, GRAFT, SKIN (Right)  APPLICATION, WOUND VAC (Right)  IRRIGATION AND DEBRIDEMENT, LOWER EXTREMITY (Right) 7 Days Post-Op    Plan:     During this hospitalization, patient to be seen 4 x/week to address the identified rehab impairments via gait training, therapeutic activities, therapeutic exercises, neuromuscular re-education and progress toward the following goals:    Plan of Care Expires:  05/26/25    Subjective     Chief Complaint: R leg pain  Patient/Family Comments/goals: Pt agreeable to PT   Pain/Comfort:  Pain Rating 1:  (not rated)  Location - Side 1: Right  Location - Orientation 1: lower  Location 1: leg  Pain Addressed 1: Reposition, Distraction  Pain Rating Post-Intervention 1:  (not rated)      Objective:     Communicated with Rn prior to session.  Patient found supine with  (no active lines) upon PT entry to room.     General  Precautions: Standard, fall  Orthopedic Precautions: RLE toe touch weight bearing  Braces: N/A  Respiratory Status: Room air     Functional Mobility:  Bed Mobility:     Supine to Sit: stand by assistance  EOB sitting: SBA     Transfers:     Sit to Stand x 6 (bed and chair (5):  contact guard assistance with rolling walker  Gait: 16 ft CGA with RW (able to maintain WB status throughout)  Fatigue noted with prolonged ambulation     Pt performed 2 sets of 10 repetitions of seated B LE exercises consisting of: Marching, LAQ, ABD/ADD, heel raises, and toe raises.         AM-PAC 6 CLICK MOBILITY  Turning over in bed (including adjusting bedclothes, sheets and blankets)?: 3  Sitting down on and standing up from a chair with arms (e.g., wheelchair, bedside commode, etc.): 3  Moving from lying on back to sitting on the side of the bed?: 3  Moving to and from a bed to a chair (including a wheelchair)?: 3  Need to walk in hospital room?: 3  Climbing 3-5 steps with a railing?: 2  Basic Mobility Total Score: 17       Treatment & Education:  Therapist provided instruction and educated for safety during transfers and gait training. As well as proper body mechanics, energy conservation, and fall prevention strategies during tasks listed above, and the effects of prolonged immobility and the importance of performing EOB/OOB activity and exercises to promote healing and reduce recovery time.     Patient left up in chair with all lines intact, call button in reach, and Rn notified..    GOALS:   Multidisciplinary Problems       Physical Therapy Goals          Problem: Physical Therapy    Goal Priority Disciplines Outcome Interventions   Physical Therapy Goal     PT, PT/OT Progressing    Description: Goals to be met by:      Patient will increase functional independence with mobility by performin. Supine to sit with Modified [place]  2. Sit to supine with Modified [place]  3. Sit to stand transfer with contact guard  assist and RW NWB RLE  4. Bed to chair transfer with Supervision using RW NWB RLE  5. Gait  x 50 feet with Stand-by Assistance using RW NWB RLE                         DME Justifications:   Froylan requires a commode for home use because he is confined to a single room.  Froylan Borja has a mobility limitation that significantly impairs his ability to participate in one or more mobility related activities of daily living (MRADLs) such as toileting, feeding, dressing, grooming, and bathing in customary locations in the home.  The mobility limitation cannot be sufficiently resolved by the use of a cane or walker.   The use of a manual wheelchair will significantly improve the patients ability to participate in MRADLS and the patient will use it on regular basis in the home.  Froylan Borja has expressed his willingness to use a manual wheelchair in the home. Patients upper body strength is sufficient for propulsion.  He also has a caregiver who is available, willing, and able to provide assistance with the wheelchair when needed.      Time Tracking:     PT Received On: 05/07/25  PT Start Time: 0837     PT Stop Time: 0907  PT Total Time (min): 30 min     Billable Minutes: Gait Training 15 and Therapeutic Exercise 15    Treatment Type: Treatment  PT/PTA: PTA     Number of PTA visits since last PT visit: 1 05/07/2025

## 2025-05-08 LAB
ABSOLUTE EOSINOPHIL (OHS): 0.38 K/UL
ABSOLUTE MONOCYTE (OHS): 1.01 K/UL (ref 0.3–1)
ABSOLUTE NEUTROPHIL COUNT (OHS): 6.28 K/UL (ref 1.8–7.7)
ALBUMIN SERPL BCP-MCNC: 3 G/DL (ref 3.5–5.2)
ALP SERPL-CCNC: 119 UNIT/L (ref 40–150)
ALT SERPL W/O P-5'-P-CCNC: 42 UNIT/L (ref 10–44)
ANION GAP (OHS): 6 MMOL/L (ref 8–16)
AST SERPL-CCNC: 40 UNIT/L (ref 11–45)
BASOPHILS # BLD AUTO: 0.1 K/UL
BASOPHILS NFR BLD AUTO: 1 %
BILIRUB SERPL-MCNC: 0.4 MG/DL (ref 0.1–1)
BUN SERPL-MCNC: 26 MG/DL (ref 8–23)
CALCIUM SERPL-MCNC: 8.8 MG/DL (ref 8.7–10.5)
CHLORIDE SERPL-SCNC: 105 MMOL/L (ref 95–110)
CO2 SERPL-SCNC: 25 MMOL/L (ref 23–29)
CREAT SERPL-MCNC: 0.9 MG/DL (ref 0.5–1.4)
ERYTHROCYTE [DISTWIDTH] IN BLOOD BY AUTOMATED COUNT: 13.2 % (ref 11.5–14.5)
GFR SERPLBLD CREATININE-BSD FMLA CKD-EPI: >60 ML/MIN/1.73/M2
GLUCOSE SERPL-MCNC: 109 MG/DL (ref 70–110)
HCT VFR BLD AUTO: 34.8 % (ref 40–54)
HGB BLD-MCNC: 10.9 GM/DL (ref 14–18)
IMM GRANULOCYTES # BLD AUTO: 0.14 K/UL (ref 0–0.04)
IMM GRANULOCYTES NFR BLD AUTO: 1.4 % (ref 0–0.5)
LYMPHOCYTES # BLD AUTO: 1.93 K/UL (ref 1–4.8)
MAGNESIUM SERPL-MCNC: 1.9 MG/DL (ref 1.6–2.6)
MCH RBC QN AUTO: 31.1 PG (ref 27–31)
MCHC RBC AUTO-ENTMCNC: 31.3 G/DL (ref 32–36)
MCV RBC AUTO: 99 FL (ref 82–98)
NUCLEATED RBC (/100WBC) (OHS): 0 /100 WBC
PHOSPHATE SERPL-MCNC: 3.4 MG/DL (ref 2.7–4.5)
PLATELET # BLD AUTO: 277 K/UL (ref 150–450)
PMV BLD AUTO: 9.9 FL (ref 9.2–12.9)
POTASSIUM SERPL-SCNC: 4.1 MMOL/L (ref 3.5–5.1)
PROT SERPL-MCNC: 6.3 GM/DL (ref 6–8.4)
RBC # BLD AUTO: 3.51 M/UL (ref 4.6–6.2)
RELATIVE EOSINOPHIL (OHS): 3.9 %
RELATIVE LYMPHOCYTE (OHS): 19.6 % (ref 18–48)
RELATIVE MONOCYTE (OHS): 10.3 % (ref 4–15)
RELATIVE NEUTROPHIL (OHS): 63.8 % (ref 38–73)
SODIUM SERPL-SCNC: 136 MMOL/L (ref 136–145)
WBC # BLD AUTO: 9.84 K/UL (ref 3.9–12.7)

## 2025-05-08 PROCEDURE — 85025 COMPLETE CBC W/AUTO DIFF WBC: CPT | Mod: HCNC | Performed by: STUDENT IN AN ORGANIZED HEALTH CARE EDUCATION/TRAINING PROGRAM

## 2025-05-08 PROCEDURE — 25000003 PHARM REV CODE 250: Mod: HCNC

## 2025-05-08 PROCEDURE — 20600001 HC STEP DOWN PRIVATE ROOM: Mod: HCNC

## 2025-05-08 PROCEDURE — 83735 ASSAY OF MAGNESIUM: CPT | Mod: HCNC | Performed by: STUDENT IN AN ORGANIZED HEALTH CARE EDUCATION/TRAINING PROGRAM

## 2025-05-08 PROCEDURE — 25000003 PHARM REV CODE 250: Mod: HCNC | Performed by: PHYSICIAN ASSISTANT

## 2025-05-08 PROCEDURE — 25000003 PHARM REV CODE 250: Mod: HCNC | Performed by: STUDENT IN AN ORGANIZED HEALTH CARE EDUCATION/TRAINING PROGRAM

## 2025-05-08 PROCEDURE — 84100 ASSAY OF PHOSPHORUS: CPT | Mod: HCNC | Performed by: STUDENT IN AN ORGANIZED HEALTH CARE EDUCATION/TRAINING PROGRAM

## 2025-05-08 PROCEDURE — 25000003 PHARM REV CODE 250: Mod: HCNC | Performed by: HOSPITALIST

## 2025-05-08 PROCEDURE — 97110 THERAPEUTIC EXERCISES: CPT | Mod: HCNC,CQ

## 2025-05-08 PROCEDURE — 80053 COMPREHEN METABOLIC PANEL: CPT | Mod: HCNC | Performed by: STUDENT IN AN ORGANIZED HEALTH CARE EDUCATION/TRAINING PROGRAM

## 2025-05-08 PROCEDURE — 97116 GAIT TRAINING THERAPY: CPT | Mod: HCNC,CQ

## 2025-05-08 PROCEDURE — 36415 COLL VENOUS BLD VENIPUNCTURE: CPT | Mod: HCNC | Performed by: STUDENT IN AN ORGANIZED HEALTH CARE EDUCATION/TRAINING PROGRAM

## 2025-05-08 RX ADMIN — Medication 1 TABLET: at 09:05

## 2025-05-08 RX ADMIN — ACETAMINOPHEN 1000 MG: 500 TABLET ORAL at 08:05

## 2025-05-08 RX ADMIN — POLYETHYLENE GLYCOL 3350 17 G: 17 POWDER, FOR SOLUTION ORAL at 08:05

## 2025-05-08 RX ADMIN — ACETAMINOPHEN 1000 MG: 500 TABLET ORAL at 09:05

## 2025-05-08 RX ADMIN — OXYCODONE HYDROCHLORIDE 10 MG: 10 TABLET ORAL at 08:05

## 2025-05-08 RX ADMIN — APIXABAN 10 MG: 5 TABLET, FILM COATED ORAL at 09:05

## 2025-05-08 RX ADMIN — OXYCODONE 5 MG: 5 TABLET ORAL at 02:05

## 2025-05-08 RX ADMIN — SENNOSIDES 8.6 MG: 8.6 TABLET, FILM COATED ORAL at 08:05

## 2025-05-08 RX ADMIN — APIXABAN 5 MG: 5 TABLET, FILM COATED ORAL at 08:05

## 2025-05-08 RX ADMIN — SENNOSIDES 8.6 MG: 8.6 TABLET, FILM COATED ORAL at 09:05

## 2025-05-08 RX ADMIN — BUPROPION HYDROCHLORIDE 150 MG: 75 TABLET, FILM COATED ORAL at 08:05

## 2025-05-08 RX ADMIN — GABAPENTIN 300 MG: 300 CAPSULE ORAL at 09:05

## 2025-05-08 RX ADMIN — POLYETHYLENE GLYCOL 3350 17 G: 17 POWDER, FOR SOLUTION ORAL at 09:05

## 2025-05-08 RX ADMIN — FOLIC ACID 1 MG: 1 TABLET ORAL at 09:05

## 2025-05-08 RX ADMIN — MELATONIN TAB 3 MG 6 MG: 3 TAB at 08:05

## 2025-05-08 RX ADMIN — ACETAMINOPHEN 1000 MG: 500 TABLET ORAL at 03:05

## 2025-05-08 RX ADMIN — BUPROPION HYDROCHLORIDE 150 MG: 75 TABLET, FILM COATED ORAL at 09:05

## 2025-05-08 RX ADMIN — Medication 100 MG: at 09:05

## 2025-05-08 RX ADMIN — NIFEDIPINE 90 MG: 30 TABLET, FILM COATED, EXTENDED RELEASE ORAL at 09:05

## 2025-05-08 RX ADMIN — GABAPENTIN 300 MG: 300 CAPSULE ORAL at 08:05

## 2025-05-08 NOTE — ASSESSMENT & PLAN NOTE
RLE US showed a nonocclusive thrombus in the right common femoral vein   Started Heparin gtt on 4/18 once cleared from IR standpoint  Hg had been down-trending and dressings saturated with blood. - Hgb stabilized   If able to tolerate anticoagulation, will transition to oral DOAC when not needing further procedures. If not, will need to discuss IVC filter  General surgery consulted- hematoma evacuation performed at bedside  S/p  wound debridement and wound vac placement on 4/21. and 4/24 for wound debridement + vac reapplication  MM pain regimen   s/p STSG to right leg with wound vac placement 4/30 by plastics.   Transitioned to eliquis 05/01,hb stable.   Plastics removed wound vac  Pending insurance auth appeal for inpatient rehab

## 2025-05-08 NOTE — PROGRESS NOTES
Edmund García - Telemetry [facility]  Progress Note    Patient Name: Froylan Borja  MRN: [id number]  Patient Class: IP- Inpatient   Admission Date: 4/18/2025  Length of Stay: 20 days  Attending Physician: iLt Garcia DO  Primary Care Provider: Janki Tamez MD        Subjective     Principal Problem:Acute deep vein thrombosis (DVT) of right lower extremity        HPI:  Mr. Froylan Borja is a 71 y/o male with a PMHx alcohol abuse, cirrhosis, hepatocellular carcinoma, and HTN who presents for complaint of R leg pain and swelling. He reports yesterday he hit his R leg on the car door and shortly developed a bruise and pain to the R lateral leg. He tried tylenol at-home though pain, swelling, and bruising continued to worsen. Admits to paresthesias and numbness to the R foot as well as significant R foot pain with weight-bearing. Denies CP, palpitations, SOB, lightheadedness, dizziness, headaches, LOC, vomiting, diarrhea, abdominal pain, fever, chills. Reports some nausea following morphine and dilaudid for pain in ED. Of note, patient had angiogram y90 mapping study done with IR last Thursday (4/10/25). Has developed some bruising to the medial thigh and mild groin discomfort since procedure. Social history significant for daily alcohol consumption, 3-4 beers daily, last alcoholic beverage yesterday around 3PM.     In the ED, AF HDS. H&H 13.0/38.6 (BL~14.8), no leukocytosis. HypoNa 129, HyperK 5.7, CO2 20, Ca 8.6, Albumin 3.4, remainder of CMP unremarkable. PT/INR wnl at 10.9/1.0. LA 1.6. CPK wnl. Hep C Ab reactive, HCV RNA pending. EKG with normal sinus rhythm. R Lower Ext U/S w/ findings of nonocclusive thrombus in the right common femoral vein and right common femoral artery pseudoaneurysm. CTA Lower Ext pending. R lower extremity hematoma drained at bedside by general surgery with evacuation of 120 cc of hematoma. Compressive dressing applied. Given nebulized albuterol, lokelma, IV cefazolin, IV  dilaudid, IV morphine, IV zofran. Admitted to .     Overview/Hospital Course:  70 y.o M with HTN, alcohol/HepC cirrhosis c/b HCC s/p Y-90 mapping angiogram on 4/10 who presents with bruising and pain at angiogram site and RLE calf pain after he hit it on a car door. RLE U/S showed non-occlusive CFV thrombus and R CFA pseudoaneurysm. CTA re-demonstrated the psdueoaneurysm with active extravasation within the sac as well as a large R calf hematoma which GenSurg were consulted for and successfully drained (120ccs). Vascular Surgery were consulted, recommended IR guided percutaneous thrombin injection which IR were able to complete. Heparin gtt was started. Groin U/S done the following day showed continued thrombosis of the pseudoanurysm. He was taken to the OR on 4/21 for debridement and placement of wound vac. He was taken back to the OR on 4/24 for further debridement and wound vac change. OR 4/28 for vac reapplication. RLE wound exploration, debridement, possible split-thickness skin grafting versus skin substitute, wound VAC placement 4/30 with Plastics.  Resumed Eliquis on 05/01. Plastics removed wound vac; will not need on discharge    Interval History:  Seen and evaluated on step down unit  No acute events overnight    Clinical status stable, unchanged  No new complaints  Insurance denied for rehab; working on appeal  Medically ready for discharge    Objective:     Vital Signs (Most Recent):  Temp: 98.1 °F (36.7 °C) (05/08/25 1110)  Pulse: 90 (05/08/25 1110)  Resp: 18 (05/08/25 1110)  BP: 121/63 (05/08/25 1110)  SpO2: 97 % (05/08/25 1110) Vital Signs (24h Range):  Temp:  [97.3 °F (36.3 °C)-98.8 °F (37.1 °C)] 98.1 °F (36.7 °C)  Pulse:  [67-90] 90  Resp:  [14-20] 18  SpO2:  [96 %-99 %] 97 %  BP: (121-157)/(63-73) 121/63     Weight: 120 kg (264 lb 8.8 oz)  Body mass index is 33.07 kg/m².    Intake/Output Summary (Last 24 hours) at 5/8/2025 1159  Last data filed at 5/8/2025 0844  Gross per 24 hour   Intake 240 ml    Output 150 ml   Net 90 ml         Physical Exam  Cardiovascular:      Rate and Rhythm: Normal rate.      Pulses: Normal pulses.   Pulmonary:      Effort: No respiratory distress.      Breath sounds: Normal breath sounds. No wheezing.   Abdominal:      General: Bowel sounds are normal. There is no distension.      Palpations: Abdomen is soft.      Tenderness: There is no abdominal tenderness.   Skin:     Findings: Lesion (RLE) present.   Neurological:      Mental Status: He is alert and oriented to person, place, and time. Mental status is at baseline.               Significant Labs: All pertinent labs within the past 24 hours have been reviewed.    Significant Imaging: I have reviewed all pertinent imaging results/findings within the past 24 hours.      Assessment & Plan  Acute deep vein thrombosis (DVT) of right lower extremity  Hematoma  Leg wound, right  RLE US showed a nonocclusive thrombus in the right common femoral vein   Started Heparin gtt on 4/18 once cleared from IR standpoint  Hg had been down-trending and dressings saturated with blood. - Hgb stabilized   If able to tolerate anticoagulation, will transition to oral DOAC when not needing further procedures. If not, will need to discuss IVC filter  General surgery consulted- hematoma evacuation performed at bedside  S/p  wound debridement and wound vac placement on 4/21. and 4/24 for wound debridement + vac reapplication  MM pain regimen   s/p STSG to right leg with wound vac placement 4/30 by plastics.   Transitioned to eliquis 05/01,hb stable.   Plastics removed wound vac  Pending insurance auth appeal for inpatient rehab    Essential hypertension  Patient's blood pressure range in the last 24 hours was: BP  Min: 121/63  Max: 157/72.The patient's inpatient anti-hypertensive regimen is listed below:  Current Antihypertensives  NIFEdipine 24 hr tablet 90 mg, Daily, Oral  Hold ARB due to hyperkalemia    BP is uncontrolled, will adjust as follows: change  "to nifedipine    Alcohol use disorder, severe, dependence  daily drinker, last drink 4/17  3-4 beers daily, denies withdrawal symptoms  Continue thiamine, folic acid and multivitamin  CIWA per nursing    Obesity (BMI 30-39.9)  Body mass index is 33.07 kg/m². Morbid obesity complicates all aspects of disease management from diagnostic modalities to treatment. Weight loss encouraged and health benefits explained to patient.   Hyponatremia  Resolved    Pseudoaneurysm of femoral artery  RLE US showed a new right common femoral artery pseudoaneurysm   CTA RLE showed extravasation within the pseudoaneurysm sac  Vascular Surgery consulted- recommended IR guided thrombin injection. IR consulted, completed procedure on 4/18.   Groin u/s on 4/19 showed continued thrombosis of the pseudoaneurysm  Repeat u/s 4/29 stable, discussed with IR    Hyperkalemia  Resolved    VTE Risk Mitigation (From admission, onward)           Ordered     apixaban tablet 5 mg  2 times daily        Placed in "Followed by" Linked Group    05/06/25 1138     heparin 25,000 units in dextrose 5% 250 ml (100 units/mL) infusion MINIMAL INTENSITY nomogram - [facility]  Continuous        Question:  Begin at (units/kg/hr)  Answer:  12    04/25/25 0808     IP VTE HIGH RISK PATIENT  Once         04/18/25 0725     Reason for No Pharmacological VTE Prophylaxis  Once        Question:  Reasons:  Answer:  Risk of Bleeding  Comment:  will start anticoagulation once medically cleared    04/18/25 0725                    Discharge Planning   KENNETH: 5/9/2025     Code Status: Full Code   Medical Readiness for Discharge Date:   Discharge Plan A: Rehab   Discharge Delays: None known at this time            Please place Justification for SYED Garcia DO  Department of Hospital Medicine   Edmund García - Telemetry Stepdown    "

## 2025-05-08 NOTE — ASSESSMENT & PLAN NOTE
Patient's blood pressure range in the last 24 hours was: BP  Min: 121/63  Max: 157/72.The patient's inpatient anti-hypertensive regimen is listed below:  Current Antihypertensives  NIFEdipine 24 hr tablet 90 mg, Daily, Oral  Hold ARB due to hyperkalemia    BP is uncontrolled, will adjust as follows: change to nifedipine

## 2025-05-08 NOTE — PT/OT/SLP PROGRESS
Physical Therapy Treatment    Patient Name:  Froylan Borja   MRN:  [id number]    Recommendations:     Discharge Recommendations: High Intensity Therapy  Discharge Equipment Recommendations: wheelchair, bedside commode  Barriers to discharge: None    Assessment:     Froylan Borja is a 70 y.o. male admitted with a medical diagnosis of Acute deep vein thrombosis (DVT) of right lower extremity.  He presents with the following impairments/functional limitations: weakness, impaired endurance, impaired self care skills, impaired functional mobility, pain, decreased safety awareness, decreased lower extremity function, decreased upper extremity function, impaired skin, edema, impaired cardiopulmonary response to activity, orthopedic precautions Pt tolerated treatment session well today. Pt requiring little to no assistance for bed mobility, transfers and gait training. Pt was able to increase distance ambulated during today's session, while maintaining appropriate WB status. Pt with noted impaired endurance during ambulation, yet tolerated well. Patient remains appropriate for continued skilled services within the acute environment and goals remain appropriate.   .    Rehab Prognosis: Good; patient would benefit from acute skilled PT services to address these deficits and reach maximum level of function.    Recent Surgery: Procedure(s) (LRB):  RIGHT LOWER EXTREMITY SPLIT THICKNESS SKIN GRAFT APPLICATION (Right)  SURGICAL PROCUREMENT, GRAFT, SKIN (Right)  APPLICATION, WOUND VAC (Right)  IRRIGATION AND DEBRIDEMENT, LOWER EXTREMITY (Right) 8 Days Post-Op    Plan:     During this hospitalization, patient to be seen 4 x/week to address the identified rehab impairments via gait training, therapeutic activities, therapeutic exercises, neuromuscular re-education and progress toward the following goals:    Plan of Care Expires:  05/26/25    Subjective     Chief Complaint: None stated  Patient/Family Comments/goals: Pt agreeable to PT    Pain/Comfort:  Pain Rating 1:  (not rated)  Location - Side 1: Right  Location - Orientation 1: generalized  Location 1: leg  Pain Addressed 1: Reposition, Distraction  Pain Rating Post-Intervention 1:  (not rated)      Objective:     Communicated with Rn prior to session.  Patient found supine with  (no active lines) upon PT entry to room.     General Precautions: Standard, fall  Orthopedic Precautions: RLE toe touch weight bearing  Braces: N/A  Respiratory Status: Room air     Functional Mobility:  Bed Mobility:     Supine to Sit: stand by assistance    Transfers:     Sit to Stand x 3:  contact guard assistance with rolling walker  Gait: 20 ft x 2 + 5 ft CGA with RW   Pt was able to maintain WB status throughout     Pt performed 10 repetitions of seated B LE exercises consisting of: Marching, LAQ, ABD/ADD, heel raises, and toe raises.         AM-PAC 6 CLICK MOBILITY  Turning over in bed (including adjusting bedclothes, sheets and blankets)?: 3  Sitting down on and standing up from a chair with arms (e.g., wheelchair, bedside commode, etc.): 3  Moving from lying on back to sitting on the side of the bed?: 3  Moving to and from a bed to a chair (including a wheelchair)?: 3  Need to walk in hospital room?: 3  Climbing 3-5 steps with a railing?: 2  Basic Mobility Total Score: 17       Treatment & Education:  Therapist provided instruction and educated for safety during transfers and gait training. As well as proper body mechanics, energy conservation, and fall prevention strategies during tasks listed above, and the effects of prolonged immobility and the importance of performing EOB/OOB activity and exercises to promote healing and reduce recovery time. \    Patient left up in chair with all lines intact, call button in reach, and Rn notified..    GOALS:   Multidisciplinary Problems       Physical Therapy Goals          Problem: Physical Therapy    Goal Priority Disciplines Outcome Interventions   Physical Therapy  Goal     PT, PT/OT Progressing    Description: Goals to be met by:      Patient will increase functional independence with mobility by performin. Supine to sit with Modified [place]  2. Sit to supine with Modified [place]  3. Sit to stand transfer with contact guard assist and RW NWB RLE  4. Bed to chair transfer with Supervision using RW NWB RLE  5. Gait  x 50 feet with Stand-by Assistance using RW NWB RLE                         DME Justifications:   Froylan requires a commode for home use because he is confined to a single room.  Froylan Borja has a mobility limitation that significantly impairs his ability to participate in one or more mobility related activities of daily living (MRADLs) such as toileting, feeding, dressing, grooming, and bathing in customary locations in the home.  The mobility limitation cannot be sufficiently resolved by the use of a cane or walker.   The use of a manual wheelchair will significantly improve the patients ability to participate in MRADLS and the patient will use it on regular basis in the home.  Froylan Borja has expressed his willingness to use a manual wheelchair in the home. Patients upper body strength is sufficient for propulsion.  He also has a caregiver who is available, willing, and able to provide assistance with the wheelchair when needed.      Time Tracking:     PT Received On: 25  PT Start Time: 938     PT Stop Time: 1008  PT Total Time (min): 30 min     Billable Minutes: Gait Training 20 and Therapeutic Exercise 10    Treatment Type: Treatment  PT/PTA: PTA     Number of PTA visits since last PT visit: 2     2025

## 2025-05-08 NOTE — PLAN OF CARE
[facility] offered a predetermination p2p due 5/9 9am at [phone]. Called and spoke with Ronnell at [facility] and  a P2P was scheduled for 10:30am today.  MD notified.  10:18 AM  MD stated that [facility] has upheld their denial for rehab. [facility] will be filing an expedited appeal on the patient's behalf.      Hubert Guo, [facility]    [facility]  [phone]

## 2025-05-08 NOTE — SUBJECTIVE & OBJECTIVE
Interval History:  Seen and evaluated on step down unit  No acute events overnight    Clinical status stable, unchanged  No new complaints  Insurance denied for rehab; working on appeal  Medically ready for discharge    Objective:     Vital Signs (Most Recent):  Temp: 98.1 °F (36.7 °C) (05/08/25 1110)  Pulse: 90 (05/08/25 1110)  Resp: 18 (05/08/25 1110)  BP: 121/63 (05/08/25 1110)  SpO2: 97 % (05/08/25 1110) Vital Signs (24h Range):  Temp:  [97.3 °F (36.3 °C)-98.8 °F (37.1 °C)] 98.1 °F (36.7 °C)  Pulse:  [67-90] 90  Resp:  [14-20] 18  SpO2:  [96 %-99 %] 97 %  BP: (121-157)/(63-73) 121/63     Weight: 120 kg (264 lb 8.8 oz)  Body mass index is 33.07 kg/m².    Intake/Output Summary (Last 24 hours) at 5/8/2025 1159  Last data filed at 5/8/2025 0844  Gross per 24 hour   Intake 240 ml   Output 150 ml   Net 90 ml         Physical Exam  Cardiovascular:      Rate and Rhythm: Normal rate.      Pulses: Normal pulses.   Pulmonary:      Effort: No respiratory distress.      Breath sounds: Normal breath sounds. No wheezing.   Abdominal:      General: Bowel sounds are normal. There is no distension.      Palpations: Abdomen is soft.      Tenderness: There is no abdominal tenderness.   Skin:     Findings: Lesion (RLE) present.   Neurological:      Mental Status: He is alert and oriented to person, place, and time. Mental status is at baseline.               Significant Labs: All pertinent labs within the past 24 hours have been reviewed.    Significant Imaging: I have reviewed all pertinent imaging results/findings within the past 24 hours.

## 2025-05-09 LAB
ABSOLUTE EOSINOPHIL (OHS): 0.23 K/UL
ABSOLUTE MONOCYTE (OHS): 0.5 K/UL (ref 0.3–1)
ABSOLUTE NEUTROPHIL COUNT (OHS): 4.29 K/UL (ref 1.8–7.7)
ALBUMIN SERPL BCP-MCNC: 3.1 G/DL (ref 3.5–5.2)
ALP SERPL-CCNC: 133 UNIT/L (ref 40–150)
ALT SERPL W/O P-5'-P-CCNC: 49 UNIT/L (ref 10–44)
ANION GAP (OHS): 7 MMOL/L (ref 8–16)
AST SERPL-CCNC: 46 UNIT/L (ref 11–45)
BASOPHILS # BLD AUTO: 0.06 K/UL
BASOPHILS NFR BLD AUTO: 1 %
BILIRUB SERPL-MCNC: 0.4 MG/DL (ref 0.1–1)
BUN SERPL-MCNC: 21 MG/DL (ref 8–23)
CALCIUM SERPL-MCNC: 8.9 MG/DL (ref 8.7–10.5)
CHLORIDE SERPL-SCNC: 107 MMOL/L (ref 95–110)
CO2 SERPL-SCNC: 23 MMOL/L (ref 23–29)
CREAT SERPL-MCNC: 0.7 MG/DL (ref 0.5–1.4)
ERYTHROCYTE [DISTWIDTH] IN BLOOD BY AUTOMATED COUNT: 13 % (ref 11.5–14.5)
GFR SERPLBLD CREATININE-BSD FMLA CKD-EPI: >60 ML/MIN/1.73/M2
GLUCOSE SERPL-MCNC: 116 MG/DL (ref 70–110)
HCT VFR BLD AUTO: 35 % (ref 40–54)
HGB BLD-MCNC: 11.3 GM/DL (ref 14–18)
IMM GRANULOCYTES # BLD AUTO: 0.07 K/UL (ref 0–0.04)
IMM GRANULOCYTES NFR BLD AUTO: 1.1 % (ref 0–0.5)
LYMPHOCYTES # BLD AUTO: 1.1 K/UL (ref 1–4.8)
MAGNESIUM SERPL-MCNC: 1.9 MG/DL (ref 1.6–2.6)
MCH RBC QN AUTO: 31.2 PG (ref 27–31)
MCHC RBC AUTO-ENTMCNC: 32.3 G/DL (ref 32–36)
MCV RBC AUTO: 97 FL (ref 82–98)
NUCLEATED RBC (/100WBC) (OHS): 0 /100 WBC
PHOSPHATE SERPL-MCNC: 2.9 MG/DL (ref 2.7–4.5)
PLATELET # BLD AUTO: 272 K/UL (ref 150–450)
PMV BLD AUTO: 10.3 FL (ref 9.2–12.9)
POCT GLUCOSE: 104 MG/DL (ref 70–110)
POCT GLUCOSE: 112 MG/DL (ref 70–110)
POCT GLUCOSE: 112 MG/DL (ref 70–110)
POTASSIUM SERPL-SCNC: 4.3 MMOL/L (ref 3.5–5.1)
PROT SERPL-MCNC: 6.5 GM/DL (ref 6–8.4)
RBC # BLD AUTO: 3.62 M/UL (ref 4.6–6.2)
RELATIVE EOSINOPHIL (OHS): 3.7 %
RELATIVE LYMPHOCYTE (OHS): 17.6 % (ref 18–48)
RELATIVE MONOCYTE (OHS): 8 % (ref 4–15)
RELATIVE NEUTROPHIL (OHS): 68.6 % (ref 38–73)
SODIUM SERPL-SCNC: 137 MMOL/L (ref 136–145)
WBC # BLD AUTO: 6.25 K/UL (ref 3.9–12.7)

## 2025-05-09 PROCEDURE — 97116 GAIT TRAINING THERAPY: CPT | Mod: HCNC,CQ

## 2025-05-09 PROCEDURE — 25000003 PHARM REV CODE 250: Mod: HCNC | Performed by: HOSPITALIST

## 2025-05-09 PROCEDURE — 97535 SELF CARE MNGMENT TRAINING: CPT | Mod: HCNC,CO

## 2025-05-09 PROCEDURE — 25000003 PHARM REV CODE 250: Mod: HCNC | Performed by: PHYSICIAN ASSISTANT

## 2025-05-09 PROCEDURE — 83735 ASSAY OF MAGNESIUM: CPT | Mod: HCNC | Performed by: STUDENT IN AN ORGANIZED HEALTH CARE EDUCATION/TRAINING PROGRAM

## 2025-05-09 PROCEDURE — 25000003 PHARM REV CODE 250: Mod: HCNC

## 2025-05-09 PROCEDURE — 36415 COLL VENOUS BLD VENIPUNCTURE: CPT | Mod: HCNC | Performed by: STUDENT IN AN ORGANIZED HEALTH CARE EDUCATION/TRAINING PROGRAM

## 2025-05-09 PROCEDURE — 80053 COMPREHEN METABOLIC PANEL: CPT | Mod: HCNC | Performed by: STUDENT IN AN ORGANIZED HEALTH CARE EDUCATION/TRAINING PROGRAM

## 2025-05-09 PROCEDURE — 97110 THERAPEUTIC EXERCISES: CPT | Mod: HCNC,CQ

## 2025-05-09 PROCEDURE — 25000003 PHARM REV CODE 250: Mod: HCNC | Performed by: STUDENT IN AN ORGANIZED HEALTH CARE EDUCATION/TRAINING PROGRAM

## 2025-05-09 PROCEDURE — 84100 ASSAY OF PHOSPHORUS: CPT | Mod: HCNC | Performed by: STUDENT IN AN ORGANIZED HEALTH CARE EDUCATION/TRAINING PROGRAM

## 2025-05-09 PROCEDURE — 85025 COMPLETE CBC W/AUTO DIFF WBC: CPT | Mod: HCNC | Performed by: STUDENT IN AN ORGANIZED HEALTH CARE EDUCATION/TRAINING PROGRAM

## 2025-05-09 PROCEDURE — 20600001 HC STEP DOWN PRIVATE ROOM: Mod: HCNC

## 2025-05-09 RX ADMIN — GABAPENTIN 300 MG: 300 CAPSULE ORAL at 10:05

## 2025-05-09 RX ADMIN — OXYCODONE HYDROCHLORIDE 10 MG: 10 TABLET ORAL at 05:05

## 2025-05-09 RX ADMIN — NIFEDIPINE 90 MG: 30 TABLET, FILM COATED, EXTENDED RELEASE ORAL at 10:05

## 2025-05-09 RX ADMIN — FOLIC ACID 1 MG: 1 TABLET ORAL at 10:05

## 2025-05-09 RX ADMIN — ACETAMINOPHEN 1000 MG: 500 TABLET ORAL at 09:05

## 2025-05-09 RX ADMIN — ACETAMINOPHEN 1000 MG: 500 TABLET ORAL at 10:05

## 2025-05-09 RX ADMIN — GABAPENTIN 300 MG: 300 CAPSULE ORAL at 09:05

## 2025-05-09 RX ADMIN — SENNOSIDES 8.6 MG: 8.6 TABLET, FILM COATED ORAL at 09:05

## 2025-05-09 RX ADMIN — POLYETHYLENE GLYCOL 3350 17 G: 17 POWDER, FOR SOLUTION ORAL at 10:05

## 2025-05-09 RX ADMIN — OXYCODONE HYDROCHLORIDE 10 MG: 10 TABLET ORAL at 09:05

## 2025-05-09 RX ADMIN — BUPROPION HYDROCHLORIDE 150 MG: 75 TABLET, FILM COATED ORAL at 10:05

## 2025-05-09 RX ADMIN — Medication 1 TABLET: at 10:05

## 2025-05-09 RX ADMIN — POLYETHYLENE GLYCOL 3350 17 G: 17 POWDER, FOR SOLUTION ORAL at 09:05

## 2025-05-09 RX ADMIN — MELATONIN TAB 3 MG 6 MG: 3 TAB at 09:05

## 2025-05-09 RX ADMIN — SENNOSIDES 8.6 MG: 8.6 TABLET, FILM COATED ORAL at 10:05

## 2025-05-09 RX ADMIN — APIXABAN 5 MG: 5 TABLET, FILM COATED ORAL at 10:05

## 2025-05-09 RX ADMIN — BUPROPION HYDROCHLORIDE 150 MG: 75 TABLET, FILM COATED ORAL at 09:05

## 2025-05-09 RX ADMIN — Medication 100 MG: at 10:05

## 2025-05-09 RX ADMIN — ACETAMINOPHEN 1000 MG: 500 TABLET ORAL at 03:05

## 2025-05-09 RX ADMIN — APIXABAN 5 MG: 5 TABLET, FILM COATED ORAL at 09:05

## 2025-05-09 NOTE — ASSESSMENT & PLAN NOTE
Patient's blood pressure range in the last 24 hours was: BP  Min: 131/61  Max: 154/74.The patient's inpatient anti-hypertensive regimen is listed below:  Current Antihypertensives  NIFEdipine 24 hr tablet 90 mg, Daily, Oral  Hold ARB due to hyperkalemia    BP is uncontrolled, will adjust as follows: change to nifedipine

## 2025-05-09 NOTE — SUBJECTIVE & OBJECTIVE
Interval History:  Seen and evaluated on step down unit  No acute events overnight    Clinical status stable, unchanged  No new complaints    Objective:     Vital Signs (Most Recent):  Temp: 97.5 °F (36.4 °C) (05/09/25 1124)  Pulse: 85 (05/09/25 1124)  Resp: 20 (05/09/25 1124)  BP: 132/64 (05/09/25 1124)  SpO2: 99 % (05/09/25 1124) Vital Signs (24h Range):  Temp:  [97.5 °F (36.4 °C)-98.5 °F (36.9 °C)] 97.5 °F (36.4 °C)  Pulse:  [76-85] 85  Resp:  [16-20] 20  SpO2:  [98 %-99 %] 99 %  BP: (131-154)/(61-74) 132/64     Weight: 120 kg (264 lb 8.8 oz)  Body mass index is 33.07 kg/m².    Intake/Output Summary (Last 24 hours) at 5/9/2025 1303  Last data filed at 5/9/2025 0650  Gross per 24 hour   Intake 360 ml   Output 1450 ml   Net -1090 ml         Physical Exam  Cardiovascular:      Rate and Rhythm: Normal rate.      Pulses: Normal pulses.   Pulmonary:      Effort: No respiratory distress.      Breath sounds: Normal breath sounds. No wheezing.   Abdominal:      General: Bowel sounds are normal. There is no distension.      Palpations: Abdomen is soft.      Tenderness: There is no abdominal tenderness.   Skin:     Findings: Lesion (RLE) present.   Neurological:      Mental Status: He is alert and oriented to person, place, and time. Mental status is at baseline.               Significant Labs: All pertinent labs within the past 24 hours have been reviewed.    Significant Imaging: I have reviewed all pertinent imaging results/findings within the past 24 hours.

## 2025-05-09 NOTE — PT/OT/SLP PROGRESS
Physical Therapy Treatment    Patient Name:  Froylan Borja   MRN:  [id number]    Recommendations:     Discharge Recommendations: High Intensity Therapy  Discharge Equipment Recommendations: wheelchair, bedside commode  Barriers to discharge: None    Assessment:     Froylan Borja is a 70 y.o. male admitted with a medical diagnosis of Acute deep vein thrombosis (DVT) of right lower extremity.  He presents with the following impairments/functional limitations: weakness, impaired endurance, impaired self care skills, impaired functional mobility, pain, decreased safety awareness, decreased lower extremity function, decreased upper extremity function, impaired skin, impaired cardiopulmonary response to activity, orthopedic precautions Pt tolerated treatment session well today. Pt requiring little to no assistance for bed mobility, transfers and gait training. Pt was able to increase distance ambulated during today's session.   Patient remains appropriate for continued skilled services within the acute environment and goals remain appropriate.   .    Rehab Prognosis: Good; patient would benefit from acute skilled PT services to address these deficits and reach maximum level of function.    Recent Surgery: Procedure(s) (LRB):  RIGHT LOWER EXTREMITY SPLIT THICKNESS SKIN GRAFT APPLICATION (Right)  SURGICAL PROCUREMENT, GRAFT, SKIN (Right)  APPLICATION, WOUND VAC (Right)  IRRIGATION AND DEBRIDEMENT, LOWER EXTREMITY (Right) 9 Days Post-Op    Plan:     During this hospitalization, patient to be seen 4 x/week to address the identified rehab impairments via gait training, therapeutic activities, therapeutic exercises, neuromuscular re-education and progress toward the following goals:    Plan of Care Expires:  05/26/25    Subjective     Chief Complaint: fatigue in arms with ambulation   Patient/Family Comments/goals: Pt agreeable to PT   Pain/Comfort:  Pain Rating 1:  (not rated)  Location - Side 1: Right  Location - Orientation 1:  generalized  Location 1: leg  Pain Addressed 1: Reposition, Distraction  Pain Rating Post-Intervention 1:  (not rated)      Objective:     Communicated with Rn prior to session.  Patient found supine with  (no active lines) upon PT entry to room.     General Precautions: Standard, fall  Orthopedic Precautions: RLE toe touch weight bearing  Braces: N/A  Respiratory Status: Room air     Functional Mobility:  Bed Mobility:     Supine to Sit: stand by assistance    Transfers:     Sit to Stand x 2:  contact guard assistance with rolling walker  Gait: 30 ft + 15 ft CGA with RW   Pt with noted B UE fatigue during ambulation trials   Pt requiring seated rest break due to fatigue and potential for inability to tolerate WB status     Pt performed 10 repetitions of seated B LE exercises consisting of: Marching, LAQ, ABD/ADD, heel raises, and toe raises.         AM-PAC 6 CLICK MOBILITY  Turning over in bed (including adjusting bedclothes, sheets and blankets)?: 3  Sitting down on and standing up from a chair with arms (e.g., wheelchair, bedside commode, etc.): 3  Moving from lying on back to sitting on the side of the bed?: 3  Moving to and from a bed to a chair (including a wheelchair)?: 3  Need to walk in hospital room?: 3  Climbing 3-5 steps with a railing?: 2  Basic Mobility Total Score: 17       Treatment & Education:  Therapist provided instruction and educated for safety during transfers and gait training. As well as proper body mechanics, energy conservation, and fall prevention strategies during tasks listed above, and the effects of prolonged immobility and the importance of performing EOB/OOB activity and exercises to promote healing and reduce recovery time.       Patient left up in chair with all lines intact, call button in reach, and Rn notified..    GOALS:   Multidisciplinary Problems       Physical Therapy Goals          Problem: Physical Therapy    Goal Priority Disciplines Outcome Interventions   Physical  Therapy Goal     PT, PT/OT Progressing    Description: Goals to be met by:      Patient will increase functional independence with mobility by performin. Supine to sit with Modified [place]  2. Sit to supine with Modified [place]  3. Sit to stand transfer with contact guard assist and RW NWB RLE  4. Bed to chair transfer with Supervision using RW NWB RLE  5. Gait  x 50 feet with Stand-by Assistance using RW NWB RLE                         DME Justifications:   Froylan requires a commode for home use because he is confined to a single room.  Froylan Borja has a mobility limitation that significantly impairs his ability to participate in one or more mobility related activities of daily living (MRADLs) such as toileting, feeding, dressing, grooming, and bathing in customary locations in the home.  The mobility limitation cannot be sufficiently resolved by the use of a cane or walker.   The use of a manual wheelchair will significantly improve the patients ability to participate in MRADLS and the patient will use it on regular basis in the home.  Froylan Borja has expressed his willingness to use a manual wheelchair in the home. Patients upper body strength is sufficient for propulsion.  He also has a caregiver who is available, willing, and able to provide assistance with the wheelchair when needed.      Time Tracking:     PT Received On: 25  PT Start Time: 919     PT Stop Time: 944  PT Total Time (min): 25 min     Billable Minutes: Gait Training 15 and Therapeutic Exercise 10    Treatment Type: Treatment  PT/PTA: PTA     Number of PTA visits since last PT visit: 3     2025

## 2025-05-09 NOTE — PROGRESS NOTES
Edmund García - Telemetry [facility] Medicine  Progress Note    Patient Name: Froylan Borja  MRN: [id number]  Patient Class: IP- Inpatient   Admission Date: 4/18/2025  Length of Stay: 21 days  Attending Physician: Lit Garcia DO  Primary Care Provider: Janki Tamez MD        Subjective     Principal Problem:Acute deep vein thrombosis (DVT) of right lower extremity        HPI:  Mr. Froylan Borja is a 71 y/o male with a PMHx alcohol abuse, cirrhosis, hepatocellular carcinoma, and HTN who presents for complaint of R leg pain and swelling. He reports yesterday he hit his R leg on the car door and shortly developed a bruise and pain to the R lateral leg. He tried tylenol at-home though pain, swelling, and bruising continued to worsen. Admits to paresthesias and numbness to the R foot as well as significant R foot pain with weight-bearing. Denies CP, palpitations, SOB, lightheadedness, dizziness, headaches, LOC, vomiting, diarrhea, abdominal pain, fever, chills. Reports some nausea following morphine and dilaudid for pain in ED. Of note, patient had angiogram y90 mapping study done with IR last Thursday (4/10/25). Has developed some bruising to the medial thigh and mild groin discomfort since procedure. Social history significant for daily alcohol consumption, 3-4 beers daily, last alcoholic beverage yesterday around 3PM.     In the ED, AF HDS. H&H 13.0/38.6 (BL~14.8), no leukocytosis. HypoNa 129, HyperK 5.7, CO2 20, Ca 8.6, Albumin 3.4, remainder of CMP unremarkable. PT/INR wnl at 10.9/1.0. LA 1.6. CPK wnl. Hep C Ab reactive, HCV RNA pending. EKG with normal sinus rhythm. R Lower Ext U/S w/ findings of nonocclusive thrombus in the right common femoral vein and right common femoral artery pseudoaneurysm. CTA Lower Ext pending. R lower extremity hematoma drained at bedside by general surgery with evacuation of 120 cc of hematoma. Compressive dressing applied. Given nebulized albuterol, lokelma, IV cefazolin, IV  dilaudid, IV morphine, IV zofran. Admitted to .     Overview/Hospital Course:  70 y.o M with HTN, alcohol/HepC cirrhosis c/b HCC s/p Y-90 mapping angiogram on 4/10 who presents with bruising and pain at angiogram site and RLE calf pain after he hit it on a car door. RLE U/S showed non-occlusive CFV thrombus and R CFA pseudoaneurysm. CTA re-demonstrated the psdueoaneurysm with active extravasation within the sac as well as a large R calf hematoma which GenSurg were consulted for and successfully drained (120ccs). Vascular Surgery were consulted, recommended IR guided percutaneous thrombin injection which IR were able to complete. Heparin gtt was started. Groin U/S done the following day showed continued thrombosis of the pseudoanurysm. He was taken to the OR on 4/21 for debridement and placement of wound vac. He was taken back to the OR on 4/24 for further debridement and wound vac change. OR 4/28 for vac reapplication. RLE wound exploration, debridement, possible split-thickness skin grafting versus skin substitute, wound VAC placement 4/30 with Plastics.  Resumed Eliquis on 05/01. Plastics removed wound vac; will not need on discharge    Interval History:  Seen and evaluated on step down unit  No acute events overnight    Clinical status stable, unchanged  No new complaints    Objective:     Vital Signs (Most Recent):  Temp: 97.5 °F (36.4 °C) (05/09/25 1124)  Pulse: 85 (05/09/25 1124)  Resp: 20 (05/09/25 1124)  BP: 132/64 (05/09/25 1124)  SpO2: 99 % (05/09/25 1124) Vital Signs (24h Range):  Temp:  [97.5 °F (36.4 °C)-98.5 °F (36.9 °C)] 97.5 °F (36.4 °C)  Pulse:  [76-85] 85  Resp:  [16-20] 20  SpO2:  [98 %-99 %] 99 %  BP: (131-154)/(61-74) 132/64     Weight: 120 kg (264 lb 8.8 oz)  Body mass index is 33.07 kg/m².    Intake/Output Summary (Last 24 hours) at 5/9/2025 1303  Last data filed at 5/9/2025 0650  Gross per 24 hour   Intake 360 ml   Output 1450 ml   Net -1090 ml         Physical Exam  Cardiovascular:       Rate and Rhythm: Normal rate.      Pulses: Normal pulses.   Pulmonary:      Effort: No respiratory distress.      Breath sounds: Normal breath sounds. No wheezing.   Abdominal:      General: Bowel sounds are normal. There is no distension.      Palpations: Abdomen is soft.      Tenderness: There is no abdominal tenderness.   Skin:     Findings: Lesion (RLE) present.   Neurological:      Mental Status: He is alert and oriented to person, place, and time. Mental status is at baseline.               Significant Labs: All pertinent labs within the past 24 hours have been reviewed.    Significant Imaging: I have reviewed all pertinent imaging results/findings within the past 24 hours.      Assessment & Plan  Acute deep vein thrombosis (DVT) of right lower extremity  Hematoma  Leg wound, right  RLE US showed a nonocclusive thrombus in the right common femoral vein   Started Heparin gtt on 4/18 once cleared from IR standpoint  Hg had been down-trending and dressings saturated with blood. - Hgb stabilized   If able to tolerate anticoagulation, will transition to oral DOAC when not needing further procedures. If not, will need to discuss IVC filter  General surgery consulted- hematoma evacuation performed at bedside  S/p  wound debridement and wound vac placement on 4/21. and 4/24 for wound debridement + vac reapplication  MM pain regimen   s/p STSG to right leg with wound vac placement 4/30 by plastics.   Transitioned to eliquis 05/01,hb stable  Plastics removed wound vac  Pending insurance auth appeal for inpatient rehab    Essential hypertension  Patient's blood pressure range in the last 24 hours was: BP  Min: 131/61  Max: 154/74.The patient's inpatient anti-hypertensive regimen is listed below:  Current Antihypertensives  NIFEdipine 24 hr tablet 90 mg, Daily, Oral  Hold ARB due to hyperkalemia    BP is uncontrolled, will adjust as follows: change to nifedipine    Alcohol use disorder, severe, dependence  daily drinker,  "last drink 4/17  3-4 beers daily, denies withdrawal symptoms  Continue thiamine, folic acid and multivitamin  CIWA per nursing    Obesity (BMI 30-39.9)  Body mass index is 33.07 kg/m². Morbid obesity complicates all aspects of disease management from diagnostic modalities to treatment. Weight loss encouraged and health benefits explained to patient.   Hyponatremia  Resolved    Pseudoaneurysm of femoral artery  RLE US showed a new right common femoral artery pseudoaneurysm   CTA RLE showed extravasation within the pseudoaneurysm sac  Vascular Surgery consulted- recommended IR guided thrombin injection. IR consulted, completed procedure on 4/18.   Groin u/s on 4/19 showed continued thrombosis of the pseudoaneurysm  Repeat u/s 4/29 stable, discussed with IR    Hyperkalemia  Resolved    VTE Risk Mitigation (From admission, onward)           Ordered     apixaban tablet 5 mg  2 times daily        Placed in "Followed by" Linked Group    05/06/25 1138     heparin 25,000 units in dextrose 5% 250 ml (100 units/mL) infusion MINIMAL INTENSITY nomogram - [facility]  Continuous        Question:  Begin at (units/kg/hr)  Answer:  12    04/25/25 0808     IP VTE HIGH RISK PATIENT  Once         04/18/25 0725     Reason for No Pharmacological VTE Prophylaxis  Once        Question:  Reasons:  Answer:  Risk of Bleeding  Comment:  will start anticoagulation once medically cleared    04/18/25 0725                    Discharge Planning   KENNETH: 5/12/2025     Code Status: Full Code   Medical Readiness for Discharge Date:   Discharge Plan A: Rehab   Discharge Delays: None known at this time            Please place Justification for SYED Garcia DO  Department of Hospital Medicine   Edmund García - Telemetry Stepdown    "

## 2025-05-09 NOTE — ASSESSMENT & PLAN NOTE
RLE US showed a nonocclusive thrombus in the right common femoral vein   Started Heparin gtt on 4/18 once cleared from IR standpoint  Hg had been down-trending and dressings saturated with blood. - Hgb stabilized   If able to tolerate anticoagulation, will transition to oral DOAC when not needing further procedures. If not, will need to discuss IVC filter  General surgery consulted- hematoma evacuation performed at bedside  S/p  wound debridement and wound vac placement on 4/21. and 4/24 for wound debridement + vac reapplication  MM pain regimen   s/p STSG to right leg with wound vac placement 4/30 by plastics.   Transitioned to eliquis 05/01,hb stable  Plastics removed wound vac  Pending insurance auth appeal for inpatient rehab

## 2025-05-09 NOTE — PLAN OF CARE
Problem: Wound  Goal: Optimal Coping  Outcome: Progressing  Intervention: Support Patient and Family Response  Flowsheets (Taken 5/9/2025 0443)  Supportive Measures:   verbalization of feelings encouraged   active listening utilized  Family/Support System Care: involvement promoted  Goal: Absence of Infection Signs and Symptoms  Outcome: Progressing  Intervention: Prevent or Manage Infection  Flowsheets (Taken 5/9/2025 0443)  Fever Reduction/Comfort Measures:   lightweight bedding   lightweight clothing  Infection Management: aseptic technique maintained  Isolation Precautions: precautions maintained

## 2025-05-09 NOTE — PT/OT/SLP PROGRESS
"Occupational Therapy   Treatment    Name: Froylan Borja  MRN: [id number]  Admitting Diagnosis:  Acute deep vein thrombosis (DVT) of right lower extremity  9 Days Post-Op    Recommendations:     Discharge Recommendations: High Intensity Therapy  Discharge Equipment Recommendations:  bedside commode, wheelchair  Barriers to discharge:  Inaccessible home environment    Assessment:     Froylan Borja is a 70 y.o. male with a medical diagnosis of Acute deep vein thrombosis (DVT) of right lower extremity.  He presents with the following performance deficits affecting function are weakness, impaired endurance, impaired self care skills, impaired functional mobility, impaired cardiopulmonary response to activity, impaired balance, decreased lower extremity function, gait instability, impaired skin. Patient primarily limited by deconditioning and TTWBing precautions affecting activity tolerance and instability during standing ADLs making him a concern for fall risk at this time. However, patient made good progress during bathroom, standing ADLs today. Patient has demonstrated sufficient progression to warrant high intensity therapy evidenced by objectives noted below.    Rehab Prognosis:  Good; patient would benefit from acute skilled OT services to address these deficits and reach maximum level of function.       Plan:     Patient to be seen 4 x/week to address the above listed problems via self-care/home management, therapeutic activities, therapeutic exercises, neuromuscular re-education  Plan of Care Expires: 05/31/25  Plan of Care Reviewed with: patient    Subjective     Chief Complaint: "I was told it's like an old people's home and I don't want to do that."  Patient/Family Comments/goals: return to PLOF  Pain/Comfort:  Pain Rating 1: 0/10  Pain Rating Post-Intervention 1: 0/10    Objective:     Communicated with: nurse gonzales prior to session.  Patient found HOB elevated with  (no active lines) upon OT entry to room.  A client " care conference was completed by the OTR and the BARBA prior to treatment by the BARBA to discuss the patient's POC and current status.    General Precautions: Standard, fall    Orthopedic Precautions:RLE toe touch weight bearing  Braces: N/A  Respiratory Status: Room air     Occupational Performance:     Bed Mobility:    Patient completed Supine to Sit with supervision  Patient completed Sit to Supine with stand by assistance     Functional Mobility/Transfers:  Patient completed Sit <> Stand Transfer with contact guard assistance  with  rolling walker from EOB  From toilet with 3:1 raised toilet commode: CGA, RW  Patient completed Toilet Transfer Step Transfer technique with contact guard assistance with  rolling walker and 3:1 raised toilet commode  Functional Mobility: to/from bathroom 12 ft x2 using RW with SBA, increased time for mobility, good compliance with TTWB    Activities of Daily Living:  Upper Body Dressing: minimum assistance don gown as a robe seated EOB  Toileting: total assistance gown management and perirectal hygiene standing using RW post BM      [facility] 6 Click ADL: 18    Treatment & Education:  Extensive education provided on DME recs to optimize fall risk prevention as well as increase IND upon discharge (patient reports he may d/c home due to appeal denial). Education also provided on role of OT in acute care, IPR, and SNF settings. Patient very receptive towards education. Discussed current progress and importance of participating in progressive mobility with nurse assist over weekend to prevent deconditioning. Addressed all patient questions/concerns within BARBA scope of practice.     Patient left HOB elevated with call button in reach    GOALS:   Multidisciplinary Problems       Occupational Therapy Goals          Problem: Occupational Therapy    Goal Priority Disciplines Outcome Interventions   Occupational Therapy Goal     OT, PT/OT Progressing    Description: Goals to be met by: 5/31/2025      Patient will increase functional independence with ADLs by performing:    LE Dressing with Stand-by Assistance.  Grooming while standing with Set-up Assistance.  Toileting from toilet with Modified [place] for hygiene and clothing management.   Bathing from  shower chair/bench with Stand-by Assistance.  Stand pivot transfers with Stand-by Assistance and maintaining weight-bearing precaution(s).  Step transfer with Stand-by Assistance and maintaining weight-bearing precaution(s)  Upper extremity exercise program x10-15 reps per handout, with independence.                           DME Justifications:   Froylan requires a commode for home use because he is confined to a single room.  Froylan Borja has a mobility limitation that significantly impairs his ability to participate in one or more mobility related activities of daily living (MRADLs) such as toileting, feeding, dressing, grooming, and bathing in customary locations in the home.  The mobility limitation cannot be sufficiently resolved by the use of a cane or walker.   The use of a manual wheelchair will significantly improve the patients ability to participate in MRADLS and the patient will use it on regular basis in the home.  Froylan Borja has expressed his willingness to use a manual wheelchair in the home. Patients upper body strength is sufficient for propulsion.  He also has a caregiver who is available, willing, and able to provide assistance with the wheelchair when needed.      Time Tracking:     OT Date of Treatment: 05/09/25  OT Start Time: 1451  OT Stop Time: 1544  OT Total Time (min): 53 min    Billable Minutes:Self Care/Home Management 53    OT/MARQUITA: MARQUITA     Number of MARQUITA visits since last OT visit: 1    5/9/2025

## 2025-05-10 PROCEDURE — 25000003 PHARM REV CODE 250: Mod: HCNC

## 2025-05-10 PROCEDURE — 25000003 PHARM REV CODE 250: Mod: HCNC | Performed by: STUDENT IN AN ORGANIZED HEALTH CARE EDUCATION/TRAINING PROGRAM

## 2025-05-10 PROCEDURE — 25000003 PHARM REV CODE 250: Mod: HCNC | Performed by: PHYSICIAN ASSISTANT

## 2025-05-10 PROCEDURE — 20600001 HC STEP DOWN PRIVATE ROOM: Mod: HCNC

## 2025-05-10 PROCEDURE — 25000003 PHARM REV CODE 250: Mod: HCNC | Performed by: HOSPITALIST

## 2025-05-10 RX ADMIN — MELATONIN TAB 3 MG 6 MG: 3 TAB at 09:05

## 2025-05-10 RX ADMIN — ACETAMINOPHEN 1000 MG: 500 TABLET ORAL at 09:05

## 2025-05-10 RX ADMIN — GABAPENTIN 300 MG: 300 CAPSULE ORAL at 09:05

## 2025-05-10 RX ADMIN — OXYCODONE 5 MG: 5 TABLET ORAL at 01:05

## 2025-05-10 RX ADMIN — Medication 100 MG: at 09:05

## 2025-05-10 RX ADMIN — APIXABAN 5 MG: 5 TABLET, FILM COATED ORAL at 09:05

## 2025-05-10 RX ADMIN — BUPROPION HYDROCHLORIDE 150 MG: 75 TABLET, FILM COATED ORAL at 09:05

## 2025-05-10 RX ADMIN — ACETAMINOPHEN 1000 MG: 500 TABLET ORAL at 03:05

## 2025-05-10 RX ADMIN — OXYCODONE HYDROCHLORIDE 10 MG: 10 TABLET ORAL at 09:05

## 2025-05-10 RX ADMIN — Medication 1 TABLET: at 09:05

## 2025-05-10 RX ADMIN — FOLIC ACID 1 MG: 1 TABLET ORAL at 09:05

## 2025-05-10 RX ADMIN — NIFEDIPINE 90 MG: 30 TABLET, FILM COATED, EXTENDED RELEASE ORAL at 09:05

## 2025-05-10 RX ADMIN — SENNOSIDES 8.6 MG: 8.6 TABLET, FILM COATED ORAL at 09:05

## 2025-05-10 RX ADMIN — POLYETHYLENE GLYCOL 3350 17 G: 17 POWDER, FOR SOLUTION ORAL at 09:05

## 2025-05-10 NOTE — PLAN OF CARE
Problem: Wound  Goal: Optimal Pain Control and Function  Outcome: Progressing  Intervention: Prevent or Manage Pain  Flowsheets (Taken 5/10/2025 1728)  Pain Management Interventions:   pain management plan reviewed with patient/caregiver   pillow support provided   medication offered   Plan of care review with pt. Noam . PRN meds given for pain ; Wound care performed at bedside. I&O documented. Bed is locked and in low position, call light within reach.

## 2025-05-10 NOTE — ASSESSMENT & PLAN NOTE
Patient's blood pressure range in the last 24 hours was: BP  Min: 133/68  Max: 159/75.The patient's inpatient anti-hypertensive regimen is listed below:  Current Antihypertensives  NIFEdipine 24 hr tablet 90 mg, Daily, Oral  Hold ARB due to hyperkalemia    BP is uncontrolled, will adjust as follows: change to nifedipine

## 2025-05-10 NOTE — PLAN OF CARE
Problem: Adult Inpatient Plan of Care  Goal: Plan of Care Review  Outcome: Progressing  Flowsheets (Taken 5/9/2025 2356)  Plan of Care Reviewed With: patient  Goal: Patient-Specific Goal (Individualized)  Outcome: Progressing  Goal: Absence of Hospital-Acquired Illness or Injury  Outcome: Progressing  Intervention: Identify and Manage Fall Risk  Flowsheets (Taken 5/9/2025 2356)  Safety Promotion/Fall Prevention:   assistive device/personal item within reach   bed alarm set   commode/urinal/bedpan at bedside   Fall Risk reviewed with patient/family   instructed to call staff for mobility   lighting adjusted   medications reviewed   nonskid shoes/socks when out of bed   patient expresses understanding of fall risk and prevention   room near unit station   side rails raised x 2  Intervention: Prevent Skin Injury  Flowsheets (Taken 5/9/2025 2356)  Body Position: position changed independently  Skin Protection: incontinence pads utilized  Device Skin Pressure Protection: absorbent pad utilized/changed  Intervention: Prevent and Manage VTE (Venous Thromboembolism) Risk  Flowsheets (Taken 5/9/2025 2356)  VTE Prevention/Management: bleeding risk assessed  Intervention: Prevent Infection  Flowsheets (Taken 5/9/2025 2356)  Infection Prevention:   environmental surveillance performed   hand hygiene promoted   rest/sleep promoted   single patient room provided     Patient awake, alert , oriented.  Pain assessed, pt denies having pain. PRN pain medication administered as ordered.  Pt educated on safety, and medication use. Pt verbalized understanding.  Plan of care discussed with patient, patient verbalized understanding.  Call light and urinal within reach.  Bed alarm on.  Bed  low and locked.

## 2025-05-10 NOTE — PROGRESS NOTES
Edmund García - Telemetry [facility] Medicine  Progress Note    Patient Name: Froylan Borja  MRN: [id number]  Patient Class: IP- Inpatient   Admission Date: 4/18/2025  Length of Stay: 22 days  Attending Physician: Lit Garcia DO  Primary Care Provider: Janki Tamez MD        Subjective     Principal Problem:Acute deep vein thrombosis (DVT) of right lower extremity        HPI:  Mr. Froylan Borja is a 71 y/o male with a PMHx alcohol abuse, cirrhosis, hepatocellular carcinoma, and HTN who presents for complaint of R leg pain and swelling. He reports yesterday he hit his R leg on the car door and shortly developed a bruise and pain to the R lateral leg. He tried tylenol at-home though pain, swelling, and bruising continued to worsen. Admits to paresthesias and numbness to the R foot as well as significant R foot pain with weight-bearing. Denies CP, palpitations, SOB, lightheadedness, dizziness, headaches, LOC, vomiting, diarrhea, abdominal pain, fever, chills. Reports some nausea following morphine and dilaudid for pain in ED. Of note, patient had angiogram y90 mapping study done with IR last Thursday (4/10/25). Has developed some bruising to the medial thigh and mild groin discomfort since procedure. Social history significant for daily alcohol consumption, 3-4 beers daily, last alcoholic beverage yesterday around 3PM.     In the ED, AF HDS. H&H 13.0/38.6 (BL~14.8), no leukocytosis. HypoNa 129, HyperK 5.7, CO2 20, Ca 8.6, Albumin 3.4, remainder of CMP unremarkable. PT/INR wnl at 10.9/1.0. LA 1.6. CPK wnl. Hep C Ab reactive, HCV RNA pending. EKG with normal sinus rhythm. R Lower Ext U/S w/ findings of nonocclusive thrombus in the right common femoral vein and right common femoral artery pseudoaneurysm. CTA Lower Ext pending. R lower extremity hematoma drained at bedside by general surgery with evacuation of 120 cc of hematoma. Compressive dressing applied. Given nebulized albuterol, lokelma, IV cefazolin, IV  dilaudid, IV morphine, IV zofran. Admitted to .     Overview/Hospital Course:  70 y.o M with HTN, alcohol/HepC cirrhosis c/b HCC s/p Y-90 mapping angiogram on 4/10 who presents with bruising and pain at angiogram site and RLE calf pain after he hit it on a car door. RLE U/S showed non-occlusive CFV thrombus and R CFA pseudoaneurysm. CTA re-demonstrated the psdueoaneurysm with active extravasation within the sac as well as a large R calf hematoma which GenSurg were consulted for and successfully drained (120ccs). Vascular Surgery were consulted, recommended IR guided percutaneous thrombin injection which IR were able to complete. Heparin gtt was started. Groin U/S done the following day showed continued thrombosis of the pseudoanurysm. He was taken to the OR on 4/21 for debridement and placement of wound vac. He was taken back to the OR on 4/24 for further debridement and wound vac change. OR 4/28 for vac reapplication. RLE wound exploration, debridement, possible split-thickness skin grafting versus skin substitute, wound VAC placement 4/30 with Plastics.  Resumed Eliquis on 05/01. Plastics removed wound vac; will not need on discharge    Interval History:  Seen and evaluated on step down unit  No acute events overnight    Clinical status stable, unchanged  No new complaints    Objective:     Vital Signs (Most Recent):  Temp: 97.5 °F (36.4 °C) (05/10/25 1119)  Pulse: 79 (05/10/25 1119)  Resp: 18 (05/10/25 1315)  BP: (!) 150/70 (05/10/25 1119)  SpO2: 97 % (05/10/25 1119) Vital Signs (24h Range):  Temp:  [97.5 °F (36.4 °C)-98.5 °F (36.9 °C)] 97.5 °F (36.4 °C)  Pulse:  [73-95] 79  Resp:  [16-20] 18  SpO2:  [96 %-100 %] 97 %  BP: (133-159)/(66-75) 150/70     Weight: 120 kg (264 lb 8.8 oz)  Body mass index is 33.07 kg/m².    Intake/Output Summary (Last 24 hours) at 5/10/2025 1426  Last data filed at 5/10/2025 1313  Gross per 24 hour   Intake 120 ml   Output 1525 ml   Net -1405 ml         Physical Exam  Cardiovascular:       Rate and Rhythm: Normal rate.      Pulses: Normal pulses.   Pulmonary:      Effort: No respiratory distress.      Breath sounds: Normal breath sounds. No wheezing.   Abdominal:      General: Bowel sounds are normal. There is no distension.      Palpations: Abdomen is soft.      Tenderness: There is no abdominal tenderness.   Skin:     Findings: Lesion (RLE) present.   Neurological:      Mental Status: He is alert and oriented to person, place, and time. Mental status is at baseline.               Significant Labs: All pertinent labs within the past 24 hours have been reviewed.    Significant Imaging: I have reviewed all pertinent imaging results/findings within the past 24 hours.        Assessment & Plan  Acute deep vein thrombosis (DVT) of right lower extremity  Hematoma  Leg wound, right  RLE US showed a nonocclusive thrombus in the right common femoral vein   Started Heparin gtt on 4/18 once cleared from IR standpoint  Hg had been down-trending and dressings saturated with blood. - Hgb stabilized   If able to tolerate anticoagulation, will transition to oral DOAC when not needing further procedures. If not, will need to discuss IVC filter  General surgery consulted- hematoma evacuation performed at bedside  S/p  wound debridement and wound vac placement on 4/21. and 4/24 for wound debridement + vac reapplication  MM pain regimen   s/p STSG to right leg with wound vac placement 4/30 by plastics.   Transitioned to eliquis 05/01,hb stable  Plastics removed wound vac  Pending insurance auth appeal for inpatient rehab    Essential hypertension  Patient's blood pressure range in the last 24 hours was: BP  Min: 133/68  Max: 159/75.The patient's inpatient anti-hypertensive regimen is listed below:  Current Antihypertensives  NIFEdipine 24 hr tablet 90 mg, Daily, Oral  Hold ARB due to hyperkalemia    BP is uncontrolled, will adjust as follows: change to nifedipine    Alcohol use disorder, severe, dependence  daily  "drinker, last drink 4/17  3-4 beers daily, denies withdrawal symptoms  Continue thiamine, folic acid and multivitamin  CIWA per nursing    Obesity (BMI 30-39.9)  Body mass index is 33.07 kg/m². Morbid obesity complicates all aspects of disease management from diagnostic modalities to treatment. Weight loss encouraged and health benefits explained to patient.   Hyponatremia  Resolved    Pseudoaneurysm of femoral artery  RLE US showed a new right common femoral artery pseudoaneurysm   CTA RLE showed extravasation within the pseudoaneurysm sac  Vascular Surgery consulted- recommended IR guided thrombin injection. IR consulted, completed procedure on 4/18.   Groin u/s on 4/19 showed continued thrombosis of the pseudoaneurysm  Repeat u/s 4/29 stable, discussed with IR    Hyperkalemia  Resolved    VTE Risk Mitigation (From admission, onward)           Ordered     apixaban tablet 5 mg  2 times daily        Placed in "Followed by" Linked Group    05/06/25 1138     heparin 25,000 units in dextrose 5% 250 ml (100 units/mL) infusion MINIMAL INTENSITY nomogram - [facility]  Continuous        Question:  Begin at (units/kg/hr)  Answer:  12    04/25/25 0808     IP VTE HIGH RISK PATIENT  Once         04/18/25 0725     Reason for No Pharmacological VTE Prophylaxis  Once        Question:  Reasons:  Answer:  Risk of Bleeding  Comment:  will start anticoagulation once medically cleared    04/18/25 0725                    Discharge Planning   KENNETH: 5/12/2025     Code Status: Full Code   Medical Readiness for Discharge Date:   Discharge Plan A: Rehab   Discharge Delays: None known at this time            Please place Justification for SYED Garcia DO  Department of Hospital Medicine   Edmund García - Telemetry Stepdown    "

## 2025-05-11 PROCEDURE — 25000003 PHARM REV CODE 250: Mod: HCNC | Performed by: HOSPITALIST

## 2025-05-11 PROCEDURE — 25000003 PHARM REV CODE 250: Mod: HCNC

## 2025-05-11 PROCEDURE — 25000003 PHARM REV CODE 250: Mod: HCNC | Performed by: PHYSICIAN ASSISTANT

## 2025-05-11 PROCEDURE — 20600001 HC STEP DOWN PRIVATE ROOM: Mod: HCNC

## 2025-05-11 PROCEDURE — 25000003 PHARM REV CODE 250: Mod: HCNC | Performed by: STUDENT IN AN ORGANIZED HEALTH CARE EDUCATION/TRAINING PROGRAM

## 2025-05-11 RX ADMIN — GABAPENTIN 300 MG: 300 CAPSULE ORAL at 08:05

## 2025-05-11 RX ADMIN — SENNOSIDES 8.6 MG: 8.6 TABLET, FILM COATED ORAL at 08:05

## 2025-05-11 RX ADMIN — NIFEDIPINE 90 MG: 30 TABLET, FILM COATED, EXTENDED RELEASE ORAL at 08:05

## 2025-05-11 RX ADMIN — OXYCODONE 5 MG: 5 TABLET ORAL at 04:05

## 2025-05-11 RX ADMIN — OXYCODONE HYDROCHLORIDE 10 MG: 10 TABLET ORAL at 09:05

## 2025-05-11 RX ADMIN — ACETAMINOPHEN 1000 MG: 500 TABLET ORAL at 08:05

## 2025-05-11 RX ADMIN — FOLIC ACID 1 MG: 1 TABLET ORAL at 08:05

## 2025-05-11 RX ADMIN — BUPROPION HYDROCHLORIDE 150 MG: 75 TABLET, FILM COATED ORAL at 08:05

## 2025-05-11 RX ADMIN — MELATONIN TAB 3 MG 6 MG: 3 TAB at 08:05

## 2025-05-11 RX ADMIN — APIXABAN 5 MG: 5 TABLET, FILM COATED ORAL at 08:05

## 2025-05-11 RX ADMIN — Medication 1 TABLET: at 08:05

## 2025-05-11 RX ADMIN — Medication 100 MG: at 08:05

## 2025-05-11 RX ADMIN — ACETAMINOPHEN 1000 MG: 500 TABLET ORAL at 03:05

## 2025-05-11 NOTE — PLAN OF CARE
Problem: Adult Inpatient Plan of Care  Goal: Plan of Care Review  Outcome: Progressing  Flowsheets (Taken 5/10/2025 2218)  Plan of Care Reviewed With: patient  Goal: Patient-Specific Goal (Individualized)  Outcome: Progressing  Goal: Absence of Hospital-Acquired Illness or Injury  Outcome: Progressing  Intervention: Identify and Manage Fall Risk  Flowsheets (Taken 5/10/2025 2218)  Safety Promotion/Fall Prevention:   assistive device/personal item within reach   bed alarm set   commode/urinal/bedpan at bedside   Fall Risk reviewed with patient/family   instructed to call staff for mobility   lighting adjusted   medications reviewed   nonskid shoes/socks when out of bed   patient expresses understanding of fall risk and prevention   room near unit station   side rails raised x 2   side rails raised x 3  Intervention: Prevent Skin Injury  Flowsheets (Taken 5/10/2025 2218)  Body Position: position changed independently  Skin Protection: incontinence pads utilized  Device Skin Pressure Protection: absorbent pad utilized/changed  Intervention: Prevent and Manage VTE (Venous Thromboembolism) Risk  Flowsheets (Taken 5/10/2025 2218)  VTE Prevention/Management: bleeding risk assessed  Intervention: Prevent Infection  Flowsheets (Taken 5/10/2025 2218)  Infection Prevention:   environmental surveillance performed   rest/sleep promoted   hand hygiene promoted   single patient room provided         Patient awake, alert , oriented.  Pain assessed, pt denies having pain. PRN pain medication administered as ordered.  Pt educated on safety, and medication use. Pt verbalized understanding.  Plan of care discussed with patient, patient verbalized understanding.  Call light and urinal within reach.  Bed alarm on.  Bed  low and locked

## 2025-05-11 NOTE — PLAN OF CARE
Problem: Wound  Goal: Optimal Pain Control and Function  Outcome: Progressing  Intervention: Prevent or Manage Pain  Flowsheets (Taken 5/11/2025 1720)  Pain Management Interventions:   medication offered   pillow support provided   pain management plan reviewed with patient/caregiver  Plan of care review with pt. Noam . PRN meds given for pain ; Wound care performed at bedside. I&O documented. Bed is locked and in low position, call light within reach.

## 2025-05-11 NOTE — SUBJECTIVE & OBJECTIVE
Interval History:  Seen and evaluated on step down unit  No acute events overnight    Clinical status stable, unchanged  No new complaints    Objective:     Vital Signs (Most Recent):  Temp: 98 °F (36.7 °C) (05/11/25 1112)  Pulse: 81 (05/11/25 1112)  Resp: 18 (05/11/25 1112)  BP: (!) 150/70 (05/11/25 1112)  SpO2: 96 % (05/11/25 1112) Vital Signs (24h Range):  Temp:  [97.6 °F (36.4 °C)-98.5 °F (36.9 °C)] 98 °F (36.7 °C)  Pulse:  [80-85] 81  Resp:  [16-20] 18  SpO2:  [96 %-99 %] 96 %  BP: (130-159)/(68-76) 150/70     Weight: 120 kg (264 lb 8.8 oz)  Body mass index is 33.07 kg/m².    Intake/Output Summary (Last 24 hours) at 5/11/2025 1133  Last data filed at 5/11/2025 0741  Gross per 24 hour   Intake 150 ml   Output 1600 ml   Net -1450 ml         Physical Exam  Cardiovascular:      Rate and Rhythm: Normal rate.      Pulses: Normal pulses.   Pulmonary:      Effort: No respiratory distress.      Breath sounds: Normal breath sounds. No wheezing.   Abdominal:      General: Bowel sounds are normal. There is no distension.      Palpations: Abdomen is soft.      Tenderness: There is no abdominal tenderness.   Skin:     Findings: Lesion (RLE) present.   Neurological:      Mental Status: He is alert and oriented to person, place, and time. Mental status is at baseline.               Significant Labs: All pertinent labs within the past 24 hours have been reviewed.    Significant Imaging: I have reviewed all pertinent imaging results/findings within the past 24 hours.

## 2025-05-11 NOTE — ASSESSMENT & PLAN NOTE
Patient's blood pressure range in the last 24 hours was: BP  Min: 130/68  Max: 159/73.The patient's inpatient anti-hypertensive regimen is listed below:  Current Antihypertensives  NIFEdipine 24 hr tablet 90 mg, Daily, Oral  Hold ARB due to hyperkalemia    BP is uncontrolled, will adjust as follows: change to nifedipine

## 2025-05-11 NOTE — PROGRESS NOTES
Edmund García - Telemetry [facility] Medicine  Progress Note    Patient Name: Froylan Borja  MRN: [id number]  Patient Class: IP- Inpatient   Admission Date: 4/18/2025  Length of Stay: 23 days  Attending Physician: Lit Garcia DO  Primary Care Provider: Janki Tamez MD        Subjective     Principal Problem:Acute deep vein thrombosis (DVT) of right lower extremity        HPI:  Mr. Froylan Borja is a 71 y/o male with a PMHx alcohol abuse, cirrhosis, hepatocellular carcinoma, and HTN who presents for complaint of R leg pain and swelling. He reports yesterday he hit his R leg on the car door and shortly developed a bruise and pain to the R lateral leg. He tried tylenol at-home though pain, swelling, and bruising continued to worsen. Admits to paresthesias and numbness to the R foot as well as significant R foot pain with weight-bearing. Denies CP, palpitations, SOB, lightheadedness, dizziness, headaches, LOC, vomiting, diarrhea, abdominal pain, fever, chills. Reports some nausea following morphine and dilaudid for pain in ED. Of note, patient had angiogram y90 mapping study done with IR last Thursday (4/10/25). Has developed some bruising to the medial thigh and mild groin discomfort since procedure. Social history significant for daily alcohol consumption, 3-4 beers daily, last alcoholic beverage yesterday around 3PM.     In the ED, AF HDS. H&H 13.0/38.6 (BL~14.8), no leukocytosis. HypoNa 129, HyperK 5.7, CO2 20, Ca 8.6, Albumin 3.4, remainder of CMP unremarkable. PT/INR wnl at 10.9/1.0. LA 1.6. CPK wnl. Hep C Ab reactive, HCV RNA pending. EKG with normal sinus rhythm. R Lower Ext U/S w/ findings of nonocclusive thrombus in the right common femoral vein and right common femoral artery pseudoaneurysm. CTA Lower Ext pending. R lower extremity hematoma drained at bedside by general surgery with evacuation of 120 cc of hematoma. Compressive dressing applied. Given nebulized albuterol, lokelma, IV cefazolin, IV  dilaudid, IV morphine, IV zofran. Admitted to .     Overview/Hospital Course:  70 y.o M with HTN, alcohol/HepC cirrhosis c/b HCC s/p Y-90 mapping angiogram on 4/10 who presents with bruising and pain at angiogram site and RLE calf pain after he hit it on a car door. RLE U/S showed non-occlusive CFV thrombus and R CFA pseudoaneurysm. CTA re-demonstrated the psdueoaneurysm with active extravasation within the sac as well as a large R calf hematoma which GenSurg were consulted for and successfully drained (120ccs). Vascular Surgery were consulted, recommended IR guided percutaneous thrombin injection which IR were able to complete. Heparin gtt was started. Groin U/S done the following day showed continued thrombosis of the pseudoanurysm. He was taken to the OR on 4/21 for debridement and placement of wound vac. He was taken back to the OR on 4/24 for further debridement and wound vac change. OR 4/28 for vac reapplication. RLE wound exploration, debridement, possible split-thickness skin grafting versus skin substitute, wound VAC placement 4/30 with Plastics.  Resumed Eliquis on 05/01. Plastics removed wound vac; will not need on discharge    Interval History:  Seen and evaluated on step down unit  No acute events overnight    Clinical status stable, unchanged  No new complaints    Objective:     Vital Signs (Most Recent):  Temp: 98 °F (36.7 °C) (05/11/25 1112)  Pulse: 81 (05/11/25 1112)  Resp: 18 (05/11/25 1112)  BP: (!) 150/70 (05/11/25 1112)  SpO2: 96 % (05/11/25 1112) Vital Signs (24h Range):  Temp:  [97.6 °F (36.4 °C)-98.5 °F (36.9 °C)] 98 °F (36.7 °C)  Pulse:  [80-85] 81  Resp:  [16-20] 18  SpO2:  [96 %-99 %] 96 %  BP: (130-159)/(68-76) 150/70     Weight: 120 kg (264 lb 8.8 oz)  Body mass index is 33.07 kg/m².    Intake/Output Summary (Last 24 hours) at 5/11/2025 1133  Last data filed at 5/11/2025 0741  Gross per 24 hour   Intake 150 ml   Output 1600 ml   Net -1450 ml         Physical Exam  Cardiovascular:       Rate and Rhythm: Normal rate.      Pulses: Normal pulses.   Pulmonary:      Effort: No respiratory distress.      Breath sounds: Normal breath sounds. No wheezing.   Abdominal:      General: Bowel sounds are normal. There is no distension.      Palpations: Abdomen is soft.      Tenderness: There is no abdominal tenderness.   Skin:     Findings: Lesion (RLE) present.   Neurological:      Mental Status: He is alert and oriented to person, place, and time. Mental status is at baseline.               Significant Labs: All pertinent labs within the past 24 hours have been reviewed.    Significant Imaging: I have reviewed all pertinent imaging results/findings within the past 24 hours.      Assessment & Plan  Acute deep vein thrombosis (DVT) of right lower extremity  Hematoma  Leg wound, right  RLE US showed a nonocclusive thrombus in the right common femoral vein   Started Heparin gtt on 4/18 once cleared from IR standpoint  Hg had been down-trending and dressings saturated with blood. - Hgb stabilized   If able to tolerate anticoagulation, will transition to oral DOAC when not needing further procedures. If not, will need to discuss IVC filter  General surgery consulted- hematoma evacuation performed at bedside  S/p  wound debridement and wound vac placement on 4/21. and 4/24 for wound debridement + vac reapplication  MM pain regimen   s/p STSG to right leg with wound vac placement 4/30 by plastics.   Transitioned to eliquis 05/01,hb stable  Plastics removed wound vac  Pending insurance auth appeal for inpatient rehab    Essential hypertension  Patient's blood pressure range in the last 24 hours was: BP  Min: 130/68  Max: 159/73.The patient's inpatient anti-hypertensive regimen is listed below:  Current Antihypertensives  NIFEdipine 24 hr tablet 90 mg, Daily, Oral  Hold ARB due to hyperkalemia    BP is uncontrolled, will adjust as follows: change to nifedipine    Alcohol use disorder, severe, dependence  daily drinker,  "last drink 4/17  3-4 beers daily, denies withdrawal symptoms  Continue thiamine, folic acid and multivitamin  CIWA per nursing    Obesity (BMI 30-39.9)  Body mass index is 33.07 kg/m². Morbid obesity complicates all aspects of disease management from diagnostic modalities to treatment. Weight loss encouraged and health benefits explained to patient.   Hyponatremia  Resolved    Pseudoaneurysm of femoral artery  RLE US showed a new right common femoral artery pseudoaneurysm   CTA RLE showed extravasation within the pseudoaneurysm sac  Vascular Surgery consulted- recommended IR guided thrombin injection. IR consulted, completed procedure on 4/18.   Groin u/s on 4/19 showed continued thrombosis of the pseudoaneurysm  Repeat u/s 4/29 stable, discussed with IR    Hyperkalemia  Resolved    VTE Risk Mitigation (From admission, onward)           Ordered     apixaban tablet 5 mg  2 times daily        Placed in "Followed by" Linked Group    05/06/25 1138     heparin 25,000 units in dextrose 5% 250 ml (100 units/mL) infusion MINIMAL INTENSITY nomogram - [facility]  Continuous        Question:  Begin at (units/kg/hr)  Answer:  12    04/25/25 0808     IP VTE HIGH RISK PATIENT  Once         04/18/25 0725     Reason for No Pharmacological VTE Prophylaxis  Once        Question:  Reasons:  Answer:  Risk of Bleeding  Comment:  will start anticoagulation once medically cleared    04/18/25 0725                    Discharge Planning   KENNETH: 5/12/2025     Code Status: Full Code   Medical Readiness for Discharge Date:   Discharge Plan A: Rehab   Discharge Delays: None known at this time            Please place Justification for SYED Garcia DO  Department of Hospital Medicine   Edmund García - Telemetry Stepdown    "

## 2025-05-12 PROBLEM — D64.9 ANEMIA: Status: ACTIVE | Noted: 2025-05-12

## 2025-05-12 LAB
ERYTHROCYTE [DISTWIDTH] IN BLOOD BY AUTOMATED COUNT: 13.1 % (ref 11.5–14.5)
HCT VFR BLD AUTO: 35.1 % (ref 40–54)
HGB BLD-MCNC: 11.5 GM/DL (ref 14–18)
MCH RBC QN AUTO: 31.6 PG (ref 27–31)
MCHC RBC AUTO-ENTMCNC: 32.8 G/DL (ref 32–36)
MCV RBC AUTO: 96 FL (ref 82–98)
PLATELET # BLD AUTO: 267 K/UL (ref 150–450)
PMV BLD AUTO: 9.9 FL (ref 9.2–12.9)
RBC # BLD AUTO: 3.64 M/UL (ref 4.6–6.2)
WBC # BLD AUTO: 6.88 K/UL (ref 3.9–12.7)

## 2025-05-12 PROCEDURE — 25000003 PHARM REV CODE 250: Mod: HCNC

## 2025-05-12 PROCEDURE — 25000003 PHARM REV CODE 250: Mod: HCNC | Performed by: PHYSICIAN ASSISTANT

## 2025-05-12 PROCEDURE — 36415 COLL VENOUS BLD VENIPUNCTURE: CPT | Mod: HCNC

## 2025-05-12 PROCEDURE — 97116 GAIT TRAINING THERAPY: CPT | Mod: HCNC

## 2025-05-12 PROCEDURE — 97530 THERAPEUTIC ACTIVITIES: CPT | Mod: HCNC,CO

## 2025-05-12 PROCEDURE — 85027 COMPLETE CBC AUTOMATED: CPT | Mod: HCNC

## 2025-05-12 PROCEDURE — 97110 THERAPEUTIC EXERCISES: CPT | Mod: HCNC,CO

## 2025-05-12 PROCEDURE — 20600001 HC STEP DOWN PRIVATE ROOM: Mod: HCNC

## 2025-05-12 PROCEDURE — 25000003 PHARM REV CODE 250: Mod: HCNC | Performed by: STUDENT IN AN ORGANIZED HEALTH CARE EDUCATION/TRAINING PROGRAM

## 2025-05-12 PROCEDURE — 25000003 PHARM REV CODE 250: Mod: HCNC | Performed by: HOSPITALIST

## 2025-05-12 RX ADMIN — ACETAMINOPHEN 1000 MG: 500 TABLET ORAL at 08:05

## 2025-05-12 RX ADMIN — Medication 1 TABLET: at 08:05

## 2025-05-12 RX ADMIN — SENNOSIDES 8.6 MG: 8.6 TABLET, FILM COATED ORAL at 08:05

## 2025-05-12 RX ADMIN — APIXABAN 5 MG: 5 TABLET, FILM COATED ORAL at 08:05

## 2025-05-12 RX ADMIN — GABAPENTIN 300 MG: 300 CAPSULE ORAL at 08:05

## 2025-05-12 RX ADMIN — MELATONIN TAB 3 MG 6 MG: 3 TAB at 08:05

## 2025-05-12 RX ADMIN — Medication 100 MG: at 08:05

## 2025-05-12 RX ADMIN — BUPROPION HYDROCHLORIDE 150 MG: 75 TABLET, FILM COATED ORAL at 08:05

## 2025-05-12 RX ADMIN — ACETAMINOPHEN 1000 MG: 500 TABLET ORAL at 03:05

## 2025-05-12 RX ADMIN — NIFEDIPINE 90 MG: 30 TABLET, FILM COATED, EXTENDED RELEASE ORAL at 08:05

## 2025-05-12 RX ADMIN — OXYCODONE HYDROCHLORIDE 10 MG: 10 TABLET ORAL at 06:05

## 2025-05-12 RX ADMIN — POLYETHYLENE GLYCOL 3350 17 G: 17 POWDER, FOR SOLUTION ORAL at 08:05

## 2025-05-12 RX ADMIN — FOLIC ACID 1 MG: 1 TABLET ORAL at 08:05

## 2025-05-12 RX ADMIN — OXYCODONE HYDROCHLORIDE 10 MG: 10 TABLET ORAL at 10:05

## 2025-05-12 NOTE — PLAN OF CARE
05/12/25 1229   Post-Acute Status   Post-Acute Authorization Placement   Post-Acute Placement Status Referrals Sent   Discharge Delays None known at this time   Discharge Plan   Discharge Plan A Skilled Nursing Facility   Discharge Plan B Skilled Nursing Facility     Pt now wishing to go to SNF after talking with wife and MD.   Met with patient to review discharge recommendation of SNF and is agreeable to plan    Patient/family provided list of facilities in-network with patient's payor plan. Providers that are owned, operated, or affiliated with [facility] are included on the list.     Notified that referral sent to below listed facilities from in-network list based on proximity to home/family support:   [facility]  2.   [facility]  3.  4.  5. (can send more than 5)    Patient/family instructed to identify preference.    Preferred Facility: (if more than 1, listed in order of descending preference)  1.  OR  Patient has declined to select a preferred provider and elects placement with the first accepting in network provider that is available to provide services as ordered by the physician       If an additional preferred facility not listed above is identified, additional referral to be sent. If above facilities unable to accept, will send additional referrals to in-network providers.    Discharge Plan A and Plan B have been determined by review of patient's clinical status, future medical and therapeutic needs, and coverage/benefits for post-acute care in coordination with multidisciplinary team members.  Hubert Guo, [facility] [place]    [facility]  [phone]

## 2025-05-12 NOTE — PT/OT/SLP PROGRESS
"Occupational Therapy   Treatment    Name: Froylan Borja  MRN: [id number]  Admitting Diagnosis:  Acute deep vein thrombosis (DVT) of right lower extremity  12 Days Post-Op    Recommendations:     Discharge Recommendations: High Intensity Therapy  Discharge Equipment Recommendations:  bedside commode, wheelchair  Barriers to discharge:  Inaccessible home environment    Assessment:     Froylan Borja is a 70 y.o. male with a medical diagnosis of Acute deep vein thrombosis (DVT) of right lower extremity.  He presents with reported concern over discharge disposition and c/o shoulder joint and mm pain. Performance deficits affecting function are weakness, impaired endurance, impaired self care skills, impaired functional mobility, gait instability, decreased lower extremity function, impaired balance, decreased ROM.     Rehab Prognosis:  Good; patient would benefit from acute skilled OT services to address these deficits and reach maximum level of function.       Plan:     Patient to be seen 4 x/week to address the above listed problems via self-care/home management, therapeutic activities, therapeutic exercises, neuromuscular re-education  Plan of Care Expires: 05/31/25  Plan of Care Reviewed with: patient    Subjective     Chief Complaint: "My shoulders are feeling really sore."  Patient/Family Comments/goals: patient reported dilemma regarding going home and making sure his LE heals properly  Pain/Comfort:  Pain Rating 1: 0/10  Pain Rating Post-Intervention 1: 0/10    Objective:     Communicated with: nurse talib prior to session.  Patient found HOB elevated with  (no active lines) upon OT entry to room.    General Precautions: Standard, fall    Orthopedic Precautions:RLE toe touch weight bearing  Braces: N/A  Respiratory Status: Room air     Occupational Performance:     Bed Mobility:    Patient completed Supine to Sit with supervision  Patient completed Sit to Supine with supervision     Functional " Mobility/Transfers:  Functional Mobility: x4 lateral scoots along EOB to HOB with SPV    Activities of Daily Living:  Deferred. [place] [facility] [place]    Therapeutic Exercise:  Patient participated in BUE AROM seated EOB using RED resistance band (x12-15 reps) with focus on improving muscular endurance and restoring muscular strength required for increased activity tolerance and (I) during ADL tasks. Instruction and facilitation provided to maintain proper form and joint integrity required to maximize appropriate muscle recruitment. Rest breaks taken PRN  Shoulder horizontal abduction  Shoulder flexion  Biceps flexion  Triceps extension  Single arm scapular retraction  Patient completed L LE AROM hip flex, quad exten, hip abd, and R LE quad exten    [facility] 6 Click ADL: 18    Treatment & Education:  Patient reported concern and worrisome about decision of discharge disposition as it has been affecting his sleep hygiene. Patient's concerns validated. Discussed last toileting session and performance skills and assist level to complete task safely and with increased IND. Discussed patient's strengths and potential barriers in participating in ADLs and IADLs if he chooses to go home. Problem solved solutions together to ease his concerns and empower patient to make an informed decision about his care. Patient receptive and reported feeling less worried. Addressed all patient questions/concerns within BARBA scope of practice.     Patient left HOB elevated with call button in reach    GOALS:   Multidisciplinary Problems       Occupational Therapy Goals          Problem: Occupational Therapy    Goal Priority Disciplines Outcome Interventions   Occupational Therapy Goal     OT, PT/OT Progressing    Description: Goals to be met by: 5/31/2025     Patient will increase functional independence with ADLs by performing:    LE Dressing with Stand-by Assistance.  Grooming while standing with Set-up Assistance.  Toileting from  toilet with Modified [place] for hygiene and clothing management.   Bathing from  shower chair/bench with Stand-by Assistance.  Stand pivot transfers with Stand-by Assistance and maintaining weight-bearing precaution(s).  Step transfer with Stand-by Assistance and maintaining weight-bearing precaution(s)  Upper extremity exercise program x10-15 reps per handout, with independence.                           DME Justifications:   Froylan requires a commode for home use because he is confined to a single room.  Froylan Borja has a mobility limitation that significantly impairs his ability to participate in one or more mobility related activities of daily living (MRADLs) such as toileting, feeding, dressing, grooming, and bathing in customary locations in the home.  The mobility limitation cannot be sufficiently resolved by the use of a cane or walker.   The use of a manual wheelchair will significantly improve the patients ability to participate in MRADLS and the patient will use it on regular basis in the home.  Froylan Borja has expressed his willingness to use a manual wheelchair in the home. Patients upper body strength is sufficient for propulsion.  He also has a caregiver who is available, willing, and able to provide assistance with the wheelchair when needed.      Time Tracking:     OT Date of Treatment: 05/12/25  OT Start Time: 1540  OT Stop Time: 1623  OT Total Time (min): 43 min    Billable Minutes:Therapeutic Activity 18  Therapeutic Exercise 25    OT/MARQUITA: MARQUITA     Number of MARQUITA visits since last OT visit: 2    5/12/2025

## 2025-05-12 NOTE — ASSESSMENT & PLAN NOTE
RLE US showed a nonocclusive thrombus in the right common femoral vein   Started Heparin gtt on 4/18 once cleared from IR standpoint  Hg had been down-trending and dressings saturated with blood. - Hgb stabilized   If able to tolerate anticoagulation, will transition to oral DOAC when not needing further procedures. If not, will need to discuss IVC filter  General surgery consulted- hematoma evacuation performed at bedside  S/p  wound debridement and wound vac placement on 4/21. and 4/24 for wound debridement + vac reapplication  MM pain regimen   s/p STSG to right leg with wound vac placement 4/30 by plastics.   Transitioned to eliquis 05/01,hb stable  Plastics removed wound vac, does not need wound vac on discharge.     - rehab denied by insurance. Patient agreed to SNF placement

## 2025-05-12 NOTE — PROGRESS NOTES
[facility] - Telemetry [facility] Medicine  Progress Note    Patient Name: Froylan Borja  MRN: [id number]  Patient Class: IP- Inpatient   Admission Date: 4/18/2025  Length of Stay: 24 days  Attending Physician: Ry Dooley DO  Primary Care Provider: Janki Tamez MD        Subjective     Principal Problem:Acute deep vein thrombosis (DVT) of right lower extremity        HPI:  Mr. Froylan Borja is a 69 y/o male with a PMHx alcohol abuse, cirrhosis, hepatocellular carcinoma, and HTN who presents for complaint of R leg pain and swelling. He reports yesterday he hit his R leg on the car door and shortly developed a bruise and pain to the R lateral leg. He tried tylenol at-home though pain, swelling, and bruising continued to worsen. Admits to paresthesias and numbness to the R foot as well as significant R foot pain with weight-bearing. Denies CP, palpitations, SOB, lightheadedness, dizziness, headaches, LOC, vomiting, diarrhea, abdominal pain, fever, chills. Reports some nausea following morphine and dilaudid for pain in ED. Of note, patient had angiogram y90 mapping study done with IR last Thursday (4/10/25). Has developed some bruising to the medial thigh and mild groin discomfort since procedure. Social history significant for daily alcohol consumption, 3-4 beers daily, last alcoholic beverage yesterday around 3PM.     In the ED, AF HDS. H&H 13.0/38.6 (BL~14.8), no leukocytosis. HypoNa 129, HyperK 5.7, CO2 20, Ca 8.6, Albumin 3.4, remainder of CMP unremarkable. PT/INR wnl at 10.9/1.0. LA 1.6. CPK wnl. Hep C Ab reactive, HCV RNA pending. EKG with normal sinus rhythm. R Lower Ext U/S w/ findings of nonocclusive thrombus in the right common femoral vein and right common femoral artery pseudoaneurysm. CTA Lower Ext pending. R lower extremity hematoma drained at bedside by general surgery with evacuation of 120 cc of hematoma. Compressive dressing applied. Given nebulized albuterol, lokelma, IV cefazolin, IV  dilaudid, IV morphine, IV zofran. Admitted to .     Overview/Hospital Course:  70 y.o M with HTN, alcohol/HepC cirrhosis c/b HCC s/p Y-90 mapping angiogram on 4/10 who presents with bruising and pain at angiogram site and RLE calf pain after he hit it on a car door. RLE U/S showed non-occlusive CFV thrombus and R CFA pseudoaneurysm. CTA re-demonstrated the psdueoaneurysm with active extravasation within the sac as well as a large R calf hematoma which GenSurg were consulted for and successfully drained (120ccs). Vascular Surgery were consulted, recommended IR guided percutaneous thrombin injection which IR were able to complete. Heparin gtt was started. Groin U/S done the following day showed continued thrombosis of the pseudoanurysm. He was taken to the OR on 4/21 for debridement and placement of wound vac. He was taken back to the OR on 4/24 for further debridement and wound vac change. OR 4/28 for vac reapplication. RLE wound exploration, debridement, possible split-thickness skin grafting versus skin substitute, wound VAC placement 4/30 with Plastics.  Resumed Eliquis on 05/01. Plastics removed wound vac; will not need on discharge    Patient stable for discharge. Appeals for rehab denied. Plan for SNF placement at this time, patient in agreement after discussions to help him get his independence back    Interval History: NAEO. Still having some leg pain and weakness overall. Agreeable for SNF placement at this time    Review of Systems   Constitutional:  Negative for fatigue and fever.   Respiratory:  Negative for cough and shortness of breath.    Cardiovascular:  Negative for chest pain.   Gastrointestinal:  Negative for abdominal pain.   Musculoskeletal:         RLE pain, chronic from surgery     Objective:     Vital Signs (Most Recent):  Temp: 97.7 °F (36.5 °C) (05/12/25 1146)  Pulse: (!) 128 (05/12/25 1146)  Resp: 18 (05/12/25 1146)  BP: (!) 187/86 (05/12/25 1146)  SpO2: 99 % (05/12/25 1146) Vital Signs  (24h Range):  Temp:  [97.7 °F (36.5 °C)-98.5 °F (36.9 °C)] 97.7 °F (36.5 °C)  Pulse:  [] 128  Resp:  [16-18] 18  SpO2:  [97 %-99 %] 99 %  BP: (140-187)/(70-86) 187/86     Weight: 120 kg (264 lb 8.8 oz)  Body mass index is 33.07 kg/m².    Intake/Output Summary (Last 24 hours) at 5/12/2025 1430  Last data filed at 5/12/2025 0330  Gross per 24 hour   Intake 120 ml   Output 570 ml   Net -450 ml         Physical Exam  Constitutional:       Appearance: Normal appearance.   HENT:      Head: Normocephalic and atraumatic.   Cardiovascular:      Rate and Rhythm: Normal rate.      Pulses: Normal pulses.      Heart sounds: No murmur heard.  Pulmonary:      Effort: Pulmonary effort is normal. No respiratory distress.   Abdominal:      General: Abdomen is flat. Bowel sounds are normal. There is no distension.      Tenderness: There is no abdominal tenderness.   Skin:     General: Skin is warm and dry.   Neurological:      Mental Status: He is alert.               Significant Labs: All pertinent labs within the past 24 hours have been reviewed.    Significant Imaging: I have reviewed all pertinent imaging results/findings within the past 24 hours.      Assessment & Plan  Acute deep vein thrombosis (DVT) of right lower extremity  Hematoma  Leg wound, right  RLE US showed a nonocclusive thrombus in the right common femoral vein   Started Heparin gtt on 4/18 once cleared from IR standpoint  Hg had been down-trending and dressings saturated with blood. - Hgb stabilized   If able to tolerate anticoagulation, will transition to oral DOAC when not needing further procedures. If not, will need to discuss IVC filter  General surgery consulted- hematoma evacuation performed at bedside  S/p  wound debridement and wound vac placement on 4/21. and 4/24 for wound debridement + vac reapplication  MM pain regimen   s/p STSG to right leg with wound vac placement 4/30 by plastics.   Transitioned to eliquis 05/01,hb stable  Plastics removed  "wound vac, does not need wound vac on discharge.     - rehab denied by insurance. Patient agreed to SNF placement     Essential hypertension  Patient's blood pressure range in the last 24 hours was: BP  Min: 140/72  Max: 187/86.The patient's inpatient anti-hypertensive regimen is listed below:  Current Antihypertensives  NIFEdipine 24 hr tablet 90 mg, Daily, Oral  Hold ARB due to hyperkalemia    BP is uncontrolled, will adjust as follows: change to nifedipine    Alcohol use disorder, severe, dependence  daily drinker, last drink 4/17  3-4 beers daily, denies withdrawal symptoms  Continue thiamine, folic acid and multivitamin  CIWA per nursing    Obesity (BMI 30-39.9)  Body mass index is 33.07 kg/m². Morbid obesity complicates all aspects of disease management from diagnostic modalities to treatment. Weight loss encouraged and health benefits explained to patient.   Hyponatremia  Resolved    Pseudoaneurysm of femoral artery  RLE US showed a new right common femoral artery pseudoaneurysm   CTA RLE showed extravasation within the pseudoaneurysm sac  Vascular Surgery consulted- recommended IR guided thrombin injection. IR consulted, completed procedure on 4/18.   Groin u/s on 4/19 showed continued thrombosis of the pseudoaneurysm  Repeat u/s 4/29 stable, discussed with IR    Hyperkalemia  Resolved    Anemia  Anemia is likely due to acute blood loss which was from surgeries. Most recent hemoglobin and hematocrit are listed below.  Recent Labs     05/12/25  0601   HGB 11.5*   HCT 35.1*     Plan  - Monitor serial CBC: Daily  - Transfuse PRBC if patient becomes hemodynamically unstable, symptomatic or H/H drops below 7/21.  - Patient has not received any PRBC transfusions to date  - Patient's anemia is currently stable    VTE Risk Mitigation (From admission, onward)           Ordered     apixaban tablet 5 mg  2 times daily        Placed in "Followed by" Linked Group    05/06/25 1138     heparin 25,000 units in dextrose 5% " 250 ml (100 units/mL) infusion MINIMAL INTENSITY nomogram - [facility]  Continuous        Question:  Begin at (units/kg/hr)  Answer:  12    04/25/25 0808     IP VTE HIGH RISK PATIENT  Once         04/18/25 0725     Reason for No Pharmacological VTE Prophylaxis  Once        Question:  Reasons:  Answer:  Risk of Bleeding  Comment:  will start anticoagulation once medically cleared    04/18/25 0725                    Discharge Planning   KENNETH: 5/14/2025     Code Status: Full Code   Medical Readiness for Discharge Date:   Discharge Plan A: Skilled Nursing Facility   Discharge Delays: None known at this time            Please place Justification for DME        Ry Dooley DO  Department of Hospital Medicine   [facility] - Telemetry Stepdown

## 2025-05-12 NOTE — SUBJECTIVE & OBJECTIVE
Interval History: NAEO. Still having some leg pain and weakness overall. Agreeable for SNF placement at this time    Review of Systems   Constitutional:  Negative for fatigue and fever.   Respiratory:  Negative for cough and shortness of breath.    Cardiovascular:  Negative for chest pain.   Gastrointestinal:  Negative for abdominal pain.   Musculoskeletal:         RLE pain, chronic from surgery     Objective:     Vital Signs (Most Recent):  Temp: 97.7 °F (36.5 °C) (05/12/25 1146)  Pulse: (!) 128 (05/12/25 1146)  Resp: 18 (05/12/25 1146)  BP: (!) 187/86 (05/12/25 1146)  SpO2: 99 % (05/12/25 1146) Vital Signs (24h Range):  Temp:  [97.7 °F (36.5 °C)-98.5 °F (36.9 °C)] 97.7 °F (36.5 °C)  Pulse:  [] 128  Resp:  [16-18] 18  SpO2:  [97 %-99 %] 99 %  BP: (140-187)/(70-86) 187/86     Weight: 120 kg (264 lb 8.8 oz)  Body mass index is 33.07 kg/m².    Intake/Output Summary (Last 24 hours) at 5/12/2025 1430  Last data filed at 5/12/2025 0330  Gross per 24 hour   Intake 120 ml   Output 570 ml   Net -450 ml         Physical Exam  Constitutional:       Appearance: Normal appearance.   HENT:      Head: Normocephalic and atraumatic.   Cardiovascular:      Rate and Rhythm: Normal rate.      Pulses: Normal pulses.      Heart sounds: No murmur heard.  Pulmonary:      Effort: Pulmonary effort is normal. No respiratory distress.   Abdominal:      General: Abdomen is flat. Bowel sounds are normal. There is no distension.      Tenderness: There is no abdominal tenderness.   Skin:     General: Skin is warm and dry.   Neurological:      Mental Status: He is alert.               Significant Labs: All pertinent labs within the past 24 hours have been reviewed.    Significant Imaging: I have reviewed all pertinent imaging results/findings within the past 24 hours.

## 2025-05-12 NOTE — PLAN OF CARE
Problem: Adult Inpatient Plan of Care  Goal: Plan of Care Review  Outcome: Progressing  Flowsheets (Taken 5/11/2025 2245)  Plan of Care Reviewed With: patient     Problem: Fall Injury Risk  Goal: Absence of Fall and Fall-Related Injury  Outcome: Progressing  Intervention: Identify and Manage Contributors  Flowsheets (Taken 5/11/2025 2245)  Self-Care Promotion:   independence encouraged   BADL personal objects within reach  Medication Review/Management: medications reviewed  Intervention: Promote Injury-Free Environment  Flowsheets (Taken 5/11/2025 2245)  Safety Promotion/Fall Prevention:   assistive device/personal item within reach   bed alarm set   commode/urinal/bedpan at bedside   Fall Risk reviewed with patient/family   instructed to call staff for mobility   lighting adjusted   medications reviewed   nonskid shoes/socks when out of bed   patient expresses understanding of fall risk and prevention   room near unit station   side rails raised x 2       Patient awake, alert , oriented.  Pain assessed, pt denies having pain. PRN pain medication administered as ordered.  Pt educated on safety, and medication use. Pt verbalized understanding.  Plan of care discussed with patient, patient verbalized understanding.  Call light and urinal within reach.  Bed alarm on.  Bed  low and locked

## 2025-05-12 NOTE — PLAN OF CARE
Problem: Adult Inpatient Plan of Care  Goal: Absence of Hospital-Acquired Illness or Injury  Outcome: Progressing  Intervention: Identify and Manage Fall Risk  Flowsheets (Taken 5/12/2025 8558)  Safety Promotion/Fall Prevention:   assistive device/personal item within reach   Fall Risk reviewed with patient/family   bedside commode chair   medications reviewed   lighting adjusted   side rails raised x 2  Plan of care was reviewed with the pt. AAOx4. VSS. Pain management was reviewed with the pt. Wound care was done. Intake and output was documented. No major changes throughout the day. Safety precautions were in placed.

## 2025-05-12 NOTE — ASSESSMENT & PLAN NOTE
Anemia is likely due to acute blood loss which was from surgeries. Most recent hemoglobin and hematocrit are listed below.  Recent Labs     05/12/25  0601   HGB 11.5*   HCT 35.1*     Plan  - Monitor serial CBC: Daily  - Transfuse PRBC if patient becomes hemodynamically unstable, symptomatic or H/H drops below 7/21.  - Patient has not received any PRBC transfusions to date  - Patient's anemia is currently stable

## 2025-05-12 NOTE — ASSESSMENT & PLAN NOTE
Patient's blood pressure range in the last 24 hours was: BP  Min: 140/72  Max: 187/86.The patient's inpatient anti-hypertensive regimen is listed below:  Current Antihypertensives  NIFEdipine 24 hr tablet 90 mg, Daily, Oral  Hold ARB due to hyperkalemia    BP is uncontrolled, will adjust as follows: change to nifedipine

## 2025-05-12 NOTE — PLAN OF CARE
Received word that the patient's appeal of denial for rehab was upheld. Pt wishing to go home with HH. Asked MD for a W/C order. Discharge Plan A and Plan B have been determined by review of patient's clinical status, future medical and therapeutic needs, and coverage/benefits for post-acute care in coordination with multidisciplinary team members.  Hubert Guo, [facility]    [facility]  [phone]

## 2025-05-12 NOTE — PT/OT/SLP PROGRESS
"Physical Therapy Treatment    Patient Name:  Froylan Borja   MRN:  [id number]    Recommendations:     Discharge Recommendations: Low Intensity Therapy (with caregiver assist)  Discharge Equipment Recommendations: bedside commode, wheelchair  Barriers to discharge: Decreased caregiver support    Assessment:     Froylan Borja is a 70 y.o. male admitted with a medical diagnosis of Acute deep vein thrombosis (DVT) of right lower extremity.  He presents with the following impairments/functional limitations: weakness, impaired self care skills, impaired endurance, impaired functional mobility, gait instability, decreased lower extremity function, impaired skin, edema, orthopedic precautions. The patient's mobility is limited due to generalized weakness from prolonged bed rest and complex medical condition, R LE TTWBing precautions. He demo'd improvement in independence with sit to stand and increased gait tolerance. He ambulated 16' + 32' + 16' with bariatric RW and contact guard assist to stand by assistance. Patient currently demonstrates a need for low intensity therapy on a scheduled basis secondary to a decline in functional status due to injury and surgical procedure     Rehab Prognosis: Good; patient would benefit from acute skilled PT services to address these deficits and reach maximum level of function.    Recent Surgery: Procedure(s) (LRB):  RIGHT LOWER EXTREMITY SPLIT THICKNESS SKIN GRAFT APPLICATION (Right)  SURGICAL PROCUREMENT, GRAFT, SKIN (Right)  APPLICATION, WOUND VAC (Right)  IRRIGATION AND DEBRIDEMENT, LOWER EXTREMITY (Right) 12 Days Post-Op    Plan:     During this hospitalization, patient to be seen 4 x/week to address the identified rehab impairments via gait training, therapeutic activities, therapeutic exercises, neuromuscular re-education and progress toward the following goals:    Plan of Care Expires:  05/26/25    Subjective     Chief Complaint: "I do need help wiping after I go to the " "bathroom"  Patient/Family Comments/goals: return to PLOF  Pain/Comfort:  Pain Rating 1: 0/10      Objective:     Communicated with RN prior to session.  Patient found on bedside commode with  (no active lines) upon PT entry to room.     General Precautions: Standard, fall  Orthopedic Precautions: RLE toe touch weight bearing  Braces: N/A  Respiratory Status: Room air     Functional Mobility:    Bed Mobility  Deferred, up in chair at start and end of session   Transfers Sit to Stand:    -Rep 1 from [facility]: stand by assistance with [facility] RW  -Rep 2 and 3 from edge of bed with [facility] RW: contact guard assist, relying heavily on UE for support, good NWBing to R LE   Gait  Gait Distance: 16 +32 + 16 ft with [facility] RW (two seated breaks in between gait trials)  Assistance Level: contact guard assist to stand by assistance   Description: forward flexed posture, relying heavily on UE for support, short hopping steps,  decreased step length, slow deliberate gait speed           AM-PAC 6 CLICK MOBILITY  Turning over in bed (including adjusting bedclothes, sheets and blankets)?: 3  Sitting down on and standing up from a chair with arms (e.g., wheelchair, bedside commode, etc.): 3  Moving from lying on back to sitting on the side of the bed?: 3  Moving to and from a bed to a chair (including a wheelchair)?: 3  Need to walk in hospital room?: 3  Climbing 3-5 steps with a railing?: 2  Basic Mobility Total Score: 17       Treatment & Education:  Patient educated on:  -role of therapy  -goals of session  -PT POC  -benefits of out of bed mobility and consequences of immobility  -calling for staff assist to mobilize safely  Patient agreeable to mobilize with therapy.      Gait training: cued for upright posture, cued to relax his shoulders and for scapula depression and retraction, cued to ambulate inside RW [place], pacing for energy conservation , cued for sequencing with stepping and cued for TTWBIng to R LE- patient feels more comfortable " NWBing    Patient encouraged to ambulate, sit up in chair 3x/day to prevent deconditioning during hospitalization. Patient verbalized understanding and agreement to mobilize only with RN assist for safety.     Patient left up in chair with all lines intact and call button in reach..    GOALS:   Multidisciplinary Problems       Physical Therapy Goals          Problem: Physical Therapy    Goal Priority Disciplines Outcome Interventions   Physical Therapy Goal     PT, PT/OT Progressing    Description: Goals to be met by:      Patient will increase functional independence with mobility by performin. Supine to sit with Modified [place]  2. Sit to supine with Modified [place]  3. Sit to stand transfer with contact guard assist and RW NWB RLE  4. Bed to chair transfer with Supervision using RW NWB RLE  5. Gait  x 50 feet with Stand-by Assistance using RW NWB RLE                         DME Justifications:  No DME recommended requiring DME justifications    Time Tracking:     PT Received On: 25  PT Start Time: 1122     PT Stop Time: 1148  PT Total Time (min): 26 min     Billable Minutes: Gait Training 26    Treatment Type: Treatment  PT/PTA: PT     Number of PTA visits since last PT visit: 0     2025

## 2025-05-13 VITALS
OXYGEN SATURATION: 98 % | SYSTOLIC BLOOD PRESSURE: 139 MMHG | DIASTOLIC BLOOD PRESSURE: 66 MMHG | HEIGHT: 75 IN | WEIGHT: 264.56 LBS | HEART RATE: 84 BPM | BODY MASS INDEX: 32.89 KG/M2 | TEMPERATURE: 98 F | RESPIRATION RATE: 18 BRPM

## 2025-05-13 PROCEDURE — 25000003 PHARM REV CODE 250: Mod: HCNC | Performed by: STUDENT IN AN ORGANIZED HEALTH CARE EDUCATION/TRAINING PROGRAM

## 2025-05-13 PROCEDURE — 97110 THERAPEUTIC EXERCISES: CPT | Mod: HCNC,CO

## 2025-05-13 PROCEDURE — 25000003 PHARM REV CODE 250: Mod: HCNC

## 2025-05-13 PROCEDURE — 97535 SELF CARE MNGMENT TRAINING: CPT | Mod: HCNC,CO

## 2025-05-13 PROCEDURE — 25000003 PHARM REV CODE 250: Mod: HCNC | Performed by: PHYSICIAN ASSISTANT

## 2025-05-13 PROCEDURE — 25000003 PHARM REV CODE 250: Mod: HCNC | Performed by: HOSPITALIST

## 2025-05-13 PROCEDURE — 94761 N-INVAS EAR/PLS OXIMETRY MLT: CPT | Mod: HCNC

## 2025-05-13 RX ORDER — OXYCODONE HYDROCHLORIDE 10 MG/1
10 TABLET ORAL EVERY 4 HOURS PRN
Qty: 42 TABLET | Refills: 0 | Status: SHIPPED | OUTPATIENT
Start: 2025-05-13

## 2025-05-13 RX ORDER — NIFEDIPINE 90 MG/1
90 TABLET, EXTENDED RELEASE ORAL DAILY
Qty: 30 TABLET | Refills: 11 | Status: SHIPPED | OUTPATIENT
Start: 2025-05-14 | End: 2026-05-14

## 2025-05-13 RX ADMIN — OXYCODONE HYDROCHLORIDE 10 MG: 10 TABLET ORAL at 06:05

## 2025-05-13 RX ADMIN — Medication 1 TABLET: at 08:05

## 2025-05-13 RX ADMIN — NIFEDIPINE 90 MG: 30 TABLET, FILM COATED, EXTENDED RELEASE ORAL at 08:05

## 2025-05-13 RX ADMIN — ACETAMINOPHEN 1000 MG: 500 TABLET ORAL at 03:05

## 2025-05-13 RX ADMIN — GABAPENTIN 300 MG: 300 CAPSULE ORAL at 08:05

## 2025-05-13 RX ADMIN — ACETAMINOPHEN 1000 MG: 500 TABLET ORAL at 08:05

## 2025-05-13 RX ADMIN — FOLIC ACID 1 MG: 1 TABLET ORAL at 08:05

## 2025-05-13 RX ADMIN — SENNOSIDES 8.6 MG: 8.6 TABLET, FILM COATED ORAL at 08:05

## 2025-05-13 RX ADMIN — BUPROPION HYDROCHLORIDE 150 MG: 75 TABLET, FILM COATED ORAL at 08:05

## 2025-05-13 RX ADMIN — POLYETHYLENE GLYCOL 3350 17 G: 17 POWDER, FOR SOLUTION ORAL at 08:05

## 2025-05-13 RX ADMIN — OXYCODONE HYDROCHLORIDE 10 MG: 10 TABLET ORAL at 03:05

## 2025-05-13 RX ADMIN — APIXABAN 5 MG: 5 TABLET, FILM COATED ORAL at 08:05

## 2025-05-13 RX ADMIN — Medication 100 MG: at 08:05

## 2025-05-13 NOTE — DISCHARGE SUMMARY
[facility] - Telemetry [facility] Medicine  Discharge Summary      Patient Name: Froylan Borja  MRN: [id number]  HOWARD: [id number]  Patient Class: IP- Inpatient  Admission Date: 4/18/2025  Hospital Length of Stay: 25 days  Discharge Date and Time: 05/13/2025 4:47 PM  Attending Physician: Ry Dooley DO   Discharging Provider: Ry Dooley DO  Primary Care Provider: Janki Tamez MD  [facility] Medicine Team: [facility] MED R Ry Dooley DO  Primary Care Team: [facility] MED R    HPI:   Mr. Froylan Borja is a 69 y/o male with a PMHx alcohol abuse, cirrhosis, hepatocellular carcinoma, and HTN who presents for complaint of R leg pain and swelling. He reports yesterday he hit his R leg on the car door and shortly developed a bruise and pain to the R lateral leg. He tried tylenol at-home though pain, swelling, and bruising continued to worsen. Admits to paresthesias and numbness to the R foot as well as significant R foot pain with weight-bearing. Denies CP, palpitations, SOB, lightheadedness, dizziness, headaches, LOC, vomiting, diarrhea, abdominal pain, fever, chills. Reports some nausea following morphine and dilaudid for pain in ED. Of note, patient had angiogram y90 mapping study done with IR last Thursday (4/10/25). Has developed some bruising to the medial thigh and mild groin discomfort since procedure. Social history significant for daily alcohol consumption, 3-4 beers daily, last alcoholic beverage yesterday around 3PM.     In the ED, AF HDS. H&H 13.0/38.6 (BL~14.8), no leukocytosis. HypoNa 129, HyperK 5.7, CO2 20, Ca 8.6, Albumin 3.4, remainder of CMP unremarkable. PT/INR wnl at 10.9/1.0. LA 1.6. CPK wnl. Hep C Ab reactive, HCV RNA pending. EKG with normal sinus rhythm. R Lower Ext U/S w/ findings of nonocclusive thrombus in the right common femoral vein and right common femoral artery pseudoaneurysm. CTA Lower Ext pending. R lower extremity hematoma drained at bedside by general surgery with  evacuation of 120 cc of hematoma. Compressive dressing applied. Given nebulized albuterol, lokelma, IV cefazolin, IV dilaudid, IV morphine, IV zofran. Admitted to .     Procedure(s) (LRB):  RIGHT LOWER EXTREMITY SPLIT THICKNESS SKIN GRAFT APPLICATION (Right)  SURGICAL PROCUREMENT, GRAFT, SKIN (Right)  APPLICATION, WOUND VAC (Right)  IRRIGATION AND DEBRIDEMENT, LOWER EXTREMITY (Right)      Hospital Course:   70 y.o M with HTN, alcohol/HepC cirrhosis c/b HCC s/p Y-90 mapping angiogram on 4/10 who presents with bruising and pain at angiogram site and RLE calf pain after he hit it on a car door. RLE U/S showed non-occlusive CFV thrombus and R CFA pseudoaneurysm. CTA re-demonstrated the psdueoaneurysm with active extravasation within the sac as well as a large R calf hematoma which GenSurg were consulted for and successfully drained (120ccs). Vascular Surgery were consulted, recommended IR guided percutaneous thrombin injection which IR were able to complete. Heparin gtt was started. Groin U/S done the following day showed continued thrombosis of the pseudoanurysm. He was taken to the OR on 4/21 for debridement and placement of wound vac. He was taken back to the OR on 4/24 for further debridement and wound vac change. OR 4/28 for vac reapplication. RLE wound exploration, debridement, possible split-thickness skin grafting versus skin substitute, wound VAC placement 4/30 with Plastics.  Resumed Eliquis on 05/01. Plastics removed wound vac; will not need on discharge    Patient stable for discharge. Appeals for rehab denied. Plan for SNF placement at this time, patient in agreement after discussions to help him get his independence back      Patient declined SNF placement, will go home with home health at this time. Continue wound care, PT/OT at home. Wheelchair ordered. Needs close follow up with plastic surgery and PCP for ongoing care.       Plan of care reviewed with patient, in agreement with discharge plans.  Return precautions given      PE  No acute distress  Heart rrr  Lungs cta  Abdomen soft, nontender  RLE in ace bandage, clean and dry. Edema still present but resolving (2+). LLE without edema     Goals of Care Treatment Preferences:  Code Status: Full Code      SDOH Screening:  The patient was screened for utility difficulties, food insecurity, transport difficulties, housing insecurity, and interpersonal safety and there were no concerns identified this admission.     Consults:   Consults (From admission, onward)          Status Ordering Provider     Inpatient consult to Physical Medicine Rehab  Once        Provider:  (Not yet assigned)    Completed MEJIA GOLDBERG     Inpatient consult to Social Work/Case Management  Once        Provider:  (Not yet assigned)    Completed ORDERS, INSTANT     Inpatient consult to Interventional Radiology  Once        Provider:  (Not yet assigned)    Completed SELIN HAYNES     Inpatient consult to General surgery  Once        Provider:  (Not yet assigned)    Completed MADAY SINGH            Assessment & Plan  Acute deep vein thrombosis (DVT) of right lower extremity  Hematoma  Leg wound, right  RLE US showed a nonocclusive thrombus in the right common femoral vein   Started Heparin gtt on 4/18 once cleared from IR standpoint  Hg had been down-trending and dressings saturated with blood. - Hgb stabilized   If able to tolerate anticoagulation, will transition to oral DOAC when not needing further procedures. If not, will need to discuss IVC filter  General surgery consulted- hematoma evacuation performed at bedside  S/p  wound debridement and wound vac placement on 4/21. and 4/24 for wound debridement + vac reapplication  MM pain regimen   s/p STSG to right leg with wound vac placement 4/30 by plastics.   Transitioned to eliquis 05/01,hb stable  Plastics removed wound vac, does not need wound vac on discharge.     - rehab denied by insurance. Patient agreed to SNF  placement     Essential hypertension  Patient's blood pressure range in the last 24 hours was: BP  Min: 135/69  Max: 155/74.The patient's inpatient anti-hypertensive regimen is listed below:  Current Antihypertensives  NIFEdipine 24 hr tablet 90 mg, Daily, Oral  NIFEdipine (PROCARDIA-XL) 24 hr tablet, Daily, Oral  Hold ARB due to hyperkalemia    BP is uncontrolled, will adjust as follows: change to nifedipine    Alcohol use disorder, severe, dependence  daily drinker, last drink 4/17  3-4 beers daily, denies withdrawal symptoms  Continue thiamine, folic acid and multivitamin  CIWA per nursing    Obesity (BMI 30-39.9)  Body mass index is 33.07 kg/m². Morbid obesity complicates all aspects of disease management from diagnostic modalities to treatment. Weight loss encouraged and health benefits explained to patient.   Hyponatremia  Resolved    Pseudoaneurysm of femoral artery  RLE US showed a new right common femoral artery pseudoaneurysm   CTA RLE showed extravasation within the pseudoaneurysm sac  Vascular Surgery consulted- recommended IR guided thrombin injection. IR consulted, completed procedure on 4/18.   Groin u/s on 4/19 showed continued thrombosis of the pseudoaneurysm  Repeat u/s 4/29 stable, discussed with IR    Hyperkalemia  Resolved    Anemia  Anemia is likely due to acute blood loss which was from surgeries. Most recent hemoglobin and hematocrit are listed below.  Recent Labs     05/12/25  0601   HGB 11.5*   HCT 35.1*     Plan  - Monitor serial CBC: Daily  - Transfuse PRBC if patient becomes hemodynamically unstable, symptomatic or H/H drops below 7/21.  - Patient has not received any PRBC transfusions to date  - Patient's anemia is currently stable    Final Active Diagnoses:    Diagnosis Date Noted POA    PRINCIPAL PROBLEM:  Acute deep vein thrombosis (DVT) of right lower extremity [I82.401] 04/18/2025 Yes    Anemia [D64.9] 05/12/2025 Yes    Leg wound, right [S81.801A] 04/28/2025 Yes    Hematoma  "[T14.8XXA] 04/18/2025 Yes    Pain of right lower extremity [M79.604] 04/18/2025 Yes    Pseudoaneurysm of femoral artery [I72.4] 04/18/2025 Yes    Hyperkalemia [E87.5] 04/18/2025 Yes    Hyponatremia [E87.1] 11/03/2021 Yes    Obesity (BMI 30-39.9) [E66.9] 11/20/2019 Yes    Alcohol use disorder, severe, dependence [F10.20] 03/27/2017 Yes     Chronic    Essential hypertension [I10] 07/26/2015 Yes      Problems Resolved During this Admission:       Discharged Condition: fair    Disposition: Home or Self Care    Follow Up:   Follow-up Information       Janki Tamez MD Follow up in 1 week(s).    Specialty: Internal Medicine  Contact information:  [address]  [place] , [address]  [facility] [id number]  [phone]                           Patient Instructions:      WHEELCHAIR FOR HOME USE     Order Specific Question Answer Comments   Hours in W/C per day: 4    Type of Wheelchair: Standard    Size(Width): 18"(STD adult)    Leg Support: STD footrests    Lap Belt: Velcro    Accessories: Anti-tippers    Cushion: Basic    Reclining Back No    Height: 6' 3" (1.905 m)    Weight: 120 kg (264 lb 8.8 oz)    Does patient have medical equipment at home? walker, rolling    Length of need (1-99 months): 6    Please check all that apply: Patient mobility limitations cannot be sufficiently resolved by the use of other ambulatory therapies.    Please check all that apply: The patient requires the use of a w/c for activities of daily living within the Home.      WHEELCHAIR FOR HOME USE     Order Specific Question Answer Comments   Hours in W/C per day: 4    Type of Wheelchair: Standard    Size(Width): 20"    Leg Support: STD footrests    Lap Belt: Velcro    Accessories: Anti-tippers    Cushion: Basic    Justification for cushion: Prevent pressure ulcers    Reclining Back No    Height: 6' 3" (1.905 m)    Weight: 120 kg (264 lb 8.8 oz)    Does patient have medical equipment at home? walker, rolling    Length of need (1-99 " months): 6    Please check all that apply: Caregiver is capable and willing to operate wheelchair safely.      Ambulatory referral/consult to Plastic Surgery   Standing Status: Future   Referral Priority: Routine Referral Type: Consultation   Referral Reason: Specialty Services Required   Requested Specialty: Plastic Surgery   Number of Visits Requested: 1       Significant Diagnostic Studies: N/A    Pending Diagnostic Studies:       Procedure Component Value Units Date/Time    HCV Virus Hold Specimen [[id number]] Collected: 04/18/25 0112    Order Status: Sent Lab Status: No result     Specimen: Blood            Medications:  Reconciled Home Medications:      Medication List        PAUSE taking these medications      oxyCODONE-acetaminophen  mg per tablet  Wait to take this until your doctor or other care provider tells you to start again.  Commonly known as: PERCOCET  Take 1 tablet by mouth every 6 (six) hours as needed for Pain.     THERA-M 9 mg iron-400 mcg Tab tablet  Wait to take this until your doctor or other care provider tells you to start again.  Generic drug: multivit-iron-FA-calcium-mins  Take 1 tablet by mouth once daily.            START taking these medications      apixaban 5 mg Tab  Commonly known as: ELIQUIS  Take 1 tablet (5 mg total) by mouth 2 (two) times daily.     NIFEdipine 90 MG (OSM) 24 hr tablet  Commonly known as: PROCARDIA-XL  Take 1 tablet (90 mg total) by mouth once daily.  Start taking on: May 14, 2025     oxyCODONE 10 mg Tab immediate release tablet  Commonly known as: ROXICODONE  Take 1 tablet (10 mg total) by mouth every 4 (four) hours as needed for Pain.            CONTINUE taking these medications      buPROPion 150 MG TBSR 12 hr tablet  Commonly known as: WELLBUTRIN SR  Take 1 tablet (150 mg total) by mouth 2 (two) times daily.     gabapentin 300 MG capsule  Commonly known as: NEURONTIN  One in AM and two at bedtime            STOP taking these medications      amLODIPine  2.5 MG tablet  Commonly known as: NORVASC     losartan 100 MG tablet  Commonly known as: COZAAR              Indwelling Lines/Drains at time of discharge:   Lines/Drains/Airways       None                   Time spent on the discharge of patient: 48 minutes         Ry Dooley DO  Department of Hospital Medicine  [facility] - Telemetry Stepdown

## 2025-05-13 NOTE — PT/OT/SLP PROGRESS
"Occupational Therapy   Treatment    Name: Froylan Borja  MRN: [id number]  Admitting Diagnosis:  Acute deep vein thrombosis (DVT) of right lower extremity  13 Days Post-Op    Recommendations:     Discharge Recommendations: High Intensity Therapy  Discharge Equipment Recommendations:  bedside commode, wheelchair  Barriers to discharge:  Inaccessible home environment    Assessment:     Froylan Borja is a 70 y.o. male with a medical diagnosis of Acute deep vein thrombosis (DVT) of right lower extremity.  He presents with good motivation. Performance deficits affecting function are weakness, impaired endurance, impaired self care skills, impaired functional mobility, gait instability, impaired balance, decreased lower extremity function. Patient is progressing well with OT intervention.     Rehab Prognosis:  Good; patient would benefit from acute skilled OT services to address these deficits and reach maximum level of function.       Plan:     Patient to be seen 4 x/week to address the above listed problems via self-care/home management, therapeutic activities, therapeutic exercises, neuromuscular re-education  Plan of Care Expires: 05/31/25  Plan of Care Reviewed with: patient    Subjective     Chief Complaint: none  Patient/Family Comments/goals: "I think I just want to go home."  Pain/Comfort:  Pain Rating 1: 0/10  Pain Rating Post-Intervention 1: 0/10    Objective:     Communicated with: nurse talib  prior to session.  Patient found HOB elevated with  (no active lines) upon OT entry to room.    General Precautions: Standard, fall    Orthopedic Precautions:RLE toe touch weight bearing  Braces: N/A  Respiratory Status: Room air     Occupational Performance:     Bed Mobility:    Patient completed Supine to Sit with modified independence     Functional Mobility/Transfers:  Patient completed Sit <> Stand Transfer with supervision  with  rolling walker   Patient completed Bed > Chair Transfer using Step Transfer technique with " stand by assistance with rolling walker  Functional Mobility: 6 ft to bedside chair using RW with SBA    Activities of Daily Living:  Grooming: modified independence oral and facial hygiene seated EOB via tabletop  Lower Body Dressing: stand by assistance don L sock via figure 4 technique seated EOB    Therapeutic Exercise:  Patient participated in B UE AROM seated EOB using RED resistance band (x10 reps) with focus on improving muscular endurance and restoring muscular strength required for increased activity tolerance and (I) during ADL tasks. Instruction and facilitation provided to maintain proper form and joint integrity required to maximize appropriate muscle recruitment. Rest breaks taken PRN  Warm-up no band- shoulder flexion in scapular plane  Shoulder horizontal abduction  Shoulder flexion  Biceps flexion on L UE  Triceps extension on L UE   Single arm scapular retraction  Patient completed B LE AROM hip flex, quad exten, hip abd, heel and toe raises (as tolerated for R LE)    [facility] 6 Click ADL: 19    Treatment & Education:  Discussed current progress. Addressed all patient questions/concerns within BARBA scope of practice.     Patient left up in chair with all lines intact, call button in reach, and nurse notified    GOALS:   Multidisciplinary Problems       Occupational Therapy Goals          Problem: Occupational Therapy    Goal Priority Disciplines Outcome Interventions   Occupational Therapy Goal     OT, PT/OT Progressing    Description: Goals to be met by: 5/31/2025     Patient will increase functional independence with ADLs by performing:    LE Dressing with Stand-by Assistance.  Grooming while standing with Set-up Assistance.  Toileting from toilet with Modified [place] for hygiene and clothing management.   Bathing from  shower chair/bench with Stand-by Assistance.  Stand pivot transfers with Stand-by Assistance and maintaining weight-bearing precaution(s).  Step transfer with Stand-by  Assistance and maintaining weight-bearing precaution(s)  Upper extremity exercise program x10-15 reps per handout, with independence.                           DME Justifications:   Froylan requires a commode for home use because he is confined to a single room.  Froylan Borja has a mobility limitation that significantly impairs his ability to participate in one or more mobility related activities of daily living (MRADLs) such as toileting, feeding, dressing, grooming, and bathing in customary locations in the home.  The mobility limitation cannot be sufficiently resolved by the use of a cane or walker.   The use of a manual wheelchair will significantly improve the patients ability to participate in MRADLS and the patient will use it on regular basis in the home.  Froylan Borja has expressed his willingness to use a manual wheelchair in the home. Patients upper body strength is sufficient for propulsion.  He also has a caregiver who is available, willing, and able to provide assistance with the wheelchair when needed.      Time Tracking:     OT Date of Treatment: 05/13/25  OT Start Time: 1126  OT Stop Time: 1156  OT Total Time (min): 30 min    Billable Minutes:Self Care/Home Management 10  Therapeutic Exercise 20    OT/MARQUITA: MARQUITA     Number of MARQUITA visits since last OT visit: 3    5/13/2025

## 2025-05-13 NOTE — PLAN OF CARE
05/13/25 1304   Medicare Message   Important Message from Medicare regarding Discharge Appeal Rights Given to patient/caregiver;Explained to patient/caregiver;Signed/date by patient/caregiver   Date IMM was signed 05/13/25   Time IMM was signed 1258     Discharge Plan A and Plan B have been determined by review of patient's clinical status, future medical and therapeutic needs, and coverage/benefits for post-acute care in coordination with multidisciplinary team members.     Dru Torres LCSW [facility]    [phone]    Cyclophosphamide Counseling:  I discussed with the patient the risks of cyclophosphamide including but not limited to hair loss, hormonal abnormalities, decreased fertility, abdominal pain, diarrhea, nausea and vomiting, bone marrow suppression and infection. The patient understands that monitoring is required while taking this medication.

## 2025-05-13 NOTE — PLAN OF CARE
Pt has changed his mind and has decided to go home with HH. W.C will be sent to his home address. Informed the patient of his very limited HH companies due to his insurance. Pt voiced understanding. Also encouraged patient to sign up so he can utilize whatever VA benefits he has.  Met with patient to review discharge recommendation of HH and is agreeable to plan    Patient/family provided list of facilities in-network with patient's payor plan. Providers that are owned, operated, or affiliated with [facility] are included on the list.     Notified that referral sent to below listed facilities from in-network list based on proximity to home/family support:   [facility] - declined  2.   [facility] - declined  3.   [facility] - declined  4.   [facility]- can't see patient for sometime. Will try and do pT/OT earlier    3:38 PM  Referral sent to [facility] for home wound care. Pt notified of HH denials.    Patient/family instructed to identify preference.    Preferred Facility: (if more than 1, listed in order of descending preference)  1.  OR  Patient has declined to select a preferred provider and elects placement with the first accepting in network provider that is available to provide services as ordered by the physician       If an additional preferred facility not listed above is identified, additional referral to be sent. If above facilities unable to accept, will send additional referrals to in-network providers.    Discharge Plan A and Plan B have been determined by review of patient's clinical status, future medical and therapeutic needs, and coverage/benefits for post-acute care in coordination with multidisciplinary team members.  Hubert Guo, [facility]    [facility]  [phone]

## 2025-05-13 NOTE — PLAN OF CARE
Edmund García - Telemetry Stepdown      HOME HEALTH ORDERS  FACE TO FACE ENCOUNTER    Patient Name: Froylan Borja  YOB: 1955    PCP: Janki Tamez MD   PCP Address: [facility], [address] / [place] [address]  PCP Phone Number: [phone]  PCP Fax: [phone]    Encounter Date: 4/17/25    Admit to Home Health    Diagnoses:  Active Hospital Problems    Diagnosis  POA    *Acute deep vein thrombosis (DVT) of right lower extremity [I82.401]  Yes    Anemia [D64.9]  Yes    Leg wound, right [S81.801A]  Yes    Hematoma [T14.8XXA]  Yes    Pain of right lower extremity [M79.604]  Yes    Pseudoaneurysm of femoral artery [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       Follow Up Appointments:  Future Appointments   Date Time Provider Department Center   6/23/2025 10:20 AM Maddy Mcgee NP [facility] UROLOG Kory Rodriguez   7/24/2025  8:45 AM [facility] MASTER [facility] ULTR [facility]   7/24/2025 12:00 PM LAB, APPOINTMENT [facility] ARA [facility] LAB IM Edmund García PCW   7/31/2025 10:30 AM Maximiliano Morris MD [facility] HEPAT Edmund García       Allergies:  Review of patient's allergies indicates:   Allergen Reactions    Zoloft [sertraline] Other (See Comments)     hyponatremia    Codeine Itching     Intolerance- doesn't like the way it makes him feel       Medications: Review discharge medications with patient and family and provide education.    Current Medications[1]     Medication List        PAUSE taking these medications      oxyCODONE-acetaminophen  mg per tablet  Wait to take this until your doctor or other care provider tells you to start again.  Commonly known as: PERCOCET  Take 1 tablet by mouth every 6 (six) hours as needed for Pain.     THERA-M 9 mg iron-400 mcg  Tab tablet  Wait to take this until your doctor or other care provider tells you to start again.  Generic drug: multivit-iron-FA-calcium-mins  Take 1 tablet by mouth once daily.            START taking these medications      apixaban 5 mg Tab  Commonly known as: ELIQUIS  Take 1 tablet (5 mg total) by mouth 2 (two) times daily.     oxyCODONE 10 mg Tab immediate release tablet  Commonly known as: ROXICODONE  Take 1 tablet (10 mg total) by mouth every 4 (four) hours as needed for Pain.            CONTINUE taking these medications      amLODIPine 2.5 MG tablet  Commonly known as: NORVASC  Take 1 tablet (2.5 mg total) by mouth once daily.     buPROPion 150 MG TBSR 12 hr tablet  Commonly known as: WELLBUTRIN SR  Take 1 tablet (150 mg total) by mouth 2 (two) times daily.     gabapentin 300 MG capsule  Commonly known as: NEURONTIN  One in AM and two at bedtime     losartan 100 MG tablet  Commonly known as: COZAAR  Take 1 tablet (100 mg total) by mouth once daily.                I have seen and examined this patient within the last 30 days. My clinical findings that support the need for the home health skilled services and home bound status are the following:no   Weakness/numbness causing balance and gait disturbance due to Surgery making it taxing to leave home.     Diet:   regular diet    Labs:  Report Lab results to PCP.    Referrals/ Consults  Physical Therapy to evaluate and treat. Evaluate for home safety and equipment needs; Establish/upgrade home exercise program. Perform / instruct on therapeutic exercises, gait training, transfer training, and Range of Motion.  Occupational Therapy to evaluate and treat. Evaluate home environment for safety and equipment needs. Perform/Instruct on transfers, ADL training, ROM, and therapeutic exercises.  Aide to provide assistance with personal care, ADLs, and vital signs.    Activities:   activity as tolerated    Nursing:   Agency to admit patient within 24 hours of hospital  discharge unless specified on physician order or at patient request    SN to complete comprehensive assessment including routine vital signs. Instruct on disease process and s/s of complications to report to MD. Review/verify medication list sent home with the patient at time of discharge  and instruct patient/caregiver as needed. Frequency may be adjusted depending on start of care date.     Skilled nurse to perform up to 3 visits PRN for symptoms related to diagnosis    Notify MD if SBP > 160 or < 90; DBP > 90 or < 50; HR > 120 or < 50; Temp > 101; O2 < 88%; Other:       Ok to schedule additional visits based on staff availability and patient request on consecutive days within the home health episode.    When multiple disciplines ordered:    Start of Care occurs on Sunday - Wednesday schedule remaining discipline evaluations as ordered on separate consecutive days following the start of care.    Thursday SOC -schedule subsequent evaluations Friday and Monday the following week.     Friday - Saturday SOC - schedule subsequent discipline evaluations on consecutive days starting Monday of the following week.    For all post-discharge communication and subsequent orders please contact patient's primary care physician. If unable to reach primary care physician or do not receive response within 30 minutes, please contact PCP for clinical staff order clarification    Miscellaneous       Home Health Aide:  Nursing Three times weekly, Physical Therapy Three times weekly, and Occupational Therapy Three times weekly    Wound Care Orders  Right calf: please change RLE skin *graft* site dressing daily with Adaptic, Telfa, abd pads, and a light ace wrap. Right thigh: please make sure the tegaderm film over the RLE skin graft *donor* site is intact. Reinforce the abd pads as needed and apply kerlix and ace wrap. This dressing can be left intact and does not need to be changed regularly. Wound 04/21/25 Incision Right Leg Wound  04/30/25 0845 Graft Right Thigh #2     I certify that this patient is confined to his home and needs intermittent skilled nursing care, physical therapy, and occupational therapy.              [1]   Current Facility-Administered Medications   Medication Dose Route Frequency Provider Last Rate Last Admin    acetaminophen tablet 1,000 mg  1,000 mg Oral TID Remedios Goel PA-C   1,000 mg at 05/13/25 0814    albuterol-ipratropium 2.5 mg-0.5 mg/3 mL nebulizer solution 3 mL  3 mL Nebulization Q4H PRN Brittney Trujillo PA-C        apixaban tablet 5 mg  5 mg Oral BID Lit Garcia DO   5 mg at 05/13/25 0814    bisacodyL suppository 10 mg  10 mg Rectal Daily PRN Brittney Trujillo PA-C        buPROPion tablet 150 mg  150 mg Oral BID Brittney Trujillo PA-C   150 mg at 05/13/25 0814    dextrose 50% injection 12.5 g  12.5 g Intravenous PRN Brittney Trujillo PA-C        dextrose 50% injection 25 g  25 g Intravenous PRN Brittney Trujillo PA-C        folic acid tablet 1 mg  1 mg Oral Daily Brittney Trujillo PA-C   1 mg at 05/13/25 0814    gabapentin capsule 300 mg  300 mg Oral BID Brittney Trujillo PA-C   300 mg at 05/13/25 0814    glucagon (human recombinant) injection 1 mg  1 mg Intramuscular PRN Brittney Trujillo PA-C        glucose chewable tablet 16 g  16 g Oral PRN Brittney Trujillo PA-C        glucose chewable tablet 24 g  24 g Oral PRN Brittney Trujillo PA-C        HYDROmorphone injection 1 mg  1 mg Intravenous Q6H PRN Danay Guzman MD   1 mg at 05/07/25 2124    hydrOXYzine pamoate capsule 25 mg  25 mg Oral Q8H PRN Ramiro Silverman MD   25 mg at 04/30/25 0009    LORazepam tablet 2 mg  2 mg Oral Q4H PRN Danay Guzman MD        melatonin tablet 6 mg  6 mg Oral Nightly Ramiro Silverman MD   6 mg at 05/12/25 2031    multivitamin tablet  1 tablet Oral Daily Brittney Trujillo PA-C   1 tablet at 05/13/25 0814    NIFEdipine 24 hr tablet 90 mg  90 mg Oral Daily Tamra Nguyen MD   90 mg at 05/13/25 0814     ondansetron disintegrating tablet 8 mg  8 mg Oral Q8H PRN Brittney Trujillo PA-C   8 mg at 04/30/25 0009    oxyCODONE immediate release tablet 5 mg  5 mg Oral Q4H PRN Danay Guzman MD   5 mg at 05/11/25 1611    oxyCODONE immediate release tablet Tab 10 mg  10 mg Oral Q4H PRN Remedios Goel PA-C   10 mg at 05/13/25 0600    polyethylene glycol packet 17 g  17 g Oral BID Ramiro Silverman MD   17 g at 05/13/25 0814    promethazine tablet 25 mg  25 mg Oral Q6H PRN Brittney Trujillo PA-C        senna tablet 8.6 mg  8.6 mg Oral BID Ramiro Silverman MD   8.6 mg at 05/13/25 0814    sodium chloride 0.9% flush 10 mL  10 mL Intravenous PRN Brittney Trujillo PA-C        thiamine tablet 100 mg  100 mg Oral Daily Brittney Trujillo PA-C   100 mg at 05/13/25 0814

## 2025-05-13 NOTE — PLAN OF CARE
ROSELINE scheduled d/c transportation to home through [facility]. Patient is scheduled to be picked up at 3:30 pm. Pt to be transported via w/c van, room air.    SW in communication with nurse and medical team and advised of the above information.      Discharge Plan A and Plan B have been determined by review of patient's clinical status, future medical and therapeutic needs, and coverage/benefits for post-acute care in coordination with multidisciplinary team members.     Dru Torres LCSW [facility]    [phone]

## 2025-05-13 NOTE — ASSESSMENT & PLAN NOTE
Patient's blood pressure range in the last 24 hours was: BP  Min: 135/69  Max: 155/74.The patient's inpatient anti-hypertensive regimen is listed below:  Current Antihypertensives  NIFEdipine 24 hr tablet 90 mg, Daily, Oral  NIFEdipine (PROCARDIA-XL) 24 hr tablet, Daily, Oral  Hold ARB due to hyperkalemia    BP is uncontrolled, will adjust as follows: change to nifedipine

## 2025-05-13 NOTE — NURSING
Discharge instructions were reviewed with the pt. Questions were asked and answers were given. Pt left the floor via wheelchair with [place] off the unit.

## 2025-05-13 NOTE — ASSESSMENT & PLAN NOTE
RLE US showed a nonocclusive thrombus in the right common femoral vein   Started Heparin gtt on 4/18 once cleared from IR standpoint  Hg had been down-trending and dressings saturated with blood. - Hgb stabilized   If able to tolerate anticoagulation, will transition to oral DOAC when not needing further procedures. If not, will need to discuss IVC filter  General surgery consulted- hematoma evacuation performed at bedside  S/p  wound debridement and wound vac placement on 4/21. and 4/24 for wound debridement + vac reapplication  MM pain regimen   s/p STSG to right leg with wound vac placement 4/30 by plastics.   Transitioned to eliquis 05/01,hb stable  Plastics removed wound vac, does not need wound vac on discharge.     - rehab denied by insurance. Patient agreed to SNF placement      No

## 2025-05-13 NOTE — PROGRESS NOTES
"Edmund García - Telemetry Stepdown  Adult Nutrition  Progress Note    SUMMARY       Recommendations    --Continue Regular Diet as tolerated and clinically indicated-texture per SLP    --Nursing: please continue to document % meal eaten on flowsheets    --Encourage good intakes and provide feeding assistance as needed  --Recommend weekly weights    --Will order Reilly BID to provide 90 kcal, 2.5 g protein, 7 g L-Arginine, 7 g L-Glutamine per serving to aid in wound healing    Goals:   1.  75% nutritional needs met with diet/EN/PN during admission    2. Maintain dry weight during admission    3. Display s/s of wound healing during admission    Nutrition Goal Status: progressing towards goal  Communication of RD Recs: other (comment) (POC)    Nutrition Discharge Planning    Nutrition Discharge Planning: General healthy diet  Therapeutic diet (comments): Low Sodium      Reason for Assessment    Reason For Assessment: RD follow-up  Diagnosis: other (see comments) (DVT)  General Information Comments: Patient assessed today for follow up . Patient remains admitted for DVT.  Curretnly on a regular diet with good intakes documented (100%).  +BM 5/12.  No GI s/s noted.  Wound vac removed 4/28.  No new weight since 4/29.  New weight ordered by RD.    Nutrition/Diet History    Spiritual, Cultural Beliefs, Spiritism Practices, Values that Affect Care: no  Food Allergies: NKFA  Factors Affecting Nutritional Intake: None identified at this time    Anthropometrics    Height: 6' 3" (190.5 cm)  Height (inches): 75 in  Height Method: Stated  Weight: 120 kg (264 lb 8.8 oz)  Weight (lb): 264.55 lb  Weight Method: Stated  Ideal Body Weight (IBW), Male: 196 lb  % Ideal Body Weight, Male (lb): 134.97 %  BMI (Calculated): 33.1       Lab/Procedures/Meds    Pertinent Labs Reviewed: reviewed  Pertinent Labs Comments: AST 46 (H), ALT 49 (H)  Pertinent Medications Reviewed: reviewed  Pertinent Medications Comments: Folic acid, MVI, bowel reg, " thiamine    Estimated/Assessed Needs    Weight Used For Calorie Calculations: 120 kg (264 lb 8.8 oz)  Energy Calorie Requirements (kcal): 2160- 2400/day (18-20/kg CBW)     Protein Requirements: 134-178 g (1.5-2.0 g/kg)  Weight Used For Protein Calculations: 89.1 kg (196 lb 6.5 oz)        RDA Method (mL): 2160         Nutrition Prescription Ordered    Current Diet Order: Regular    Evaluation of Received Nutrient/Fluid Intake    I/O: -  Comments: -  Tolerance: tolerating  % Intake of Estimated Energy Needs: 75 - 100 %  % Meal Intake: 75 - 100 %    PES Statement  Inadequate protein energy intake (protein, vit/min) related to Wound healing as evidenced by  (s/p debridement)  Status:      Nutrition Risk    Level of Risk/Frequency of Follow-up: moderate     Monitor and Evaluation    Monitor and Evaluation: Protein intake, Food and beverage intake, Diet order, Weight, Skin     Nutrition Follow-Up    RD Follow-up?: Yes

## 2025-05-13 NOTE — PLAN OF CARE
Recommendations    --Continue Regular Diet as tolerated and clinically indicated-texture per SLP    --Nursing: please continue to document % meal eaten on flowsheets    --Encourage good intakes and provide feeding assistance as needed  --Recommend weekly weights    --Will order Reilly BID to provide 90 kcal, 2.5 g protein, 7 g L-Arginine, 7 g L-Glutamine per serving to aid in wound healing    Goals:   1.  75% nutritional needs met with diet/EN/PN during admission    2. Maintain dry weight during admission    3. Display s/s of wound healing during admission    Nutrition Goal Status: progressing towards goal

## 2025-05-13 NOTE — PLAN OF CARE
Patient stable, AOX4 , VSS. Pain management done. Safety measures ensured.call light within reach.bed in low position. No distress at night.

## 2025-05-14 NOTE — PLAN OF CARE
Edmund García - Telemetry Stepdown  Discharge Final Note    Primary Care Provider: Janki Tamez MD    Expected Discharge Date: 5/13/2025    Final Discharge Note (most recent)       Final Note - 05/14/25 0840          Final Note    Assessment Type Final Discharge Note     Anticipated Discharge Disposition Home-Health Care Svc        Post-Acute Status    Post-Acute Authorization Home Health     Home Health Status Set-up Complete/Auth obtained     Discharge Delays None known at this time                     Important Message from Medicare  Important Message from Medicare regarding Discharge Appeal Rights: Given to patient/caregiver, Explained to patient/caregiver, Signed/date by patient/caregiver     Date IMM was signed: 05/13/25  Time IMM was signed: 1258     Follow-up providers       Janki Tamez MD   Specialty: Internal Medicine   Relationship: PCP - General    [address], [address]  [facility] [address]   Phone: [phone]       Next Steps: Follow up in 1 week(s)              After-discharge care                Home Medical Care       *[facility]   Service: Home Health Services    [address], [address]  [place] [place] [address]   Phone: [phone]                             Pt D/c home with [facility]. Pt was denied inpt rehab by his insurance and chose not to go to SNF ( he has been there before and didn't have a good experience) Pt was accepted by [facility] and  they did get auth. Only 4 Hh are in network with patient's insurance. 3 denied and [facility] accepted but with a later date to see the patient. Did send out a referral to [facility]. Discharge Plan A and Plan B have been determined by review of patient's clinical status, future medical and therapeutic needs, and coverage/benefits for post-acute care in coordination with multidisciplinary team members.  Hubert Guo, [facility]    [facility]  [phone]

## 2025-05-15 ENCOUNTER — PATIENT OUTREACH (OUTPATIENT)
Dept: ADMINISTRATIVE | Facility: CLINIC | Age: 70
End: 2025-05-15
Payer: MEDICARE

## 2025-05-15 ENCOUNTER — TELEPHONE (OUTPATIENT)
Dept: SURGERY | Facility: CLINIC | Age: 70
End: 2025-05-15
Payer: MEDICARE

## 2025-05-15 NOTE — TELEPHONE ENCOUNTER
Spoke with pt. Pt would like to do VV HOSPF/U. Generated new code & sent to pt's mobile number. Pt to set up portal.     Pt's VV appt held for Mon, 5/19 @11a w/ WEI, NP

## 2025-05-15 NOTE — TELEPHONE ENCOUNTER
Returned call to  nurse who states that she had already spoke to Dr. Strong's nurse,Carlee and that everything was already addressed at this time.

## 2025-05-15 NOTE — PROGRESS NOTES
C3 nurse spoke with Froylan Borja for a TCC post hospital discharge follow up call. The patient does not have a scheduled HOSFU appointment with Janki Tamez MD within 5-7 days post hospital discharge date 05/13/25. C3 nurse was unable to schedule HOSFU appointment in [facility].    Message sent to PCP staff requesting they contact patient and schedule follow up appointment.

## 2025-05-15 NOTE — TELEPHONE ENCOUNTER
----- Message from Yoly sent at 5/15/2025  3:21 PM CDT -----  Regarding: pt advice  Contact: Juan dumont/[facility] beds at the [facility]  .Type:  Needs Medical AdviceWho Called: Juan dumont[facility] (please be specific): caller not sure who to make request to, but pt is out of all wound care supply's. Caller requesting to speak to someone to clarify directions as well. Pls call to discuss.  Would the patient rather a call back or a response via [facility]? Call Best Call Back Number: [phone] Information: n/a

## 2025-05-15 NOTE — PROGRESS NOTES
C3 nurse attempted to contact Froylan Borja for a TCC post hospital discharge follow up call. No answer. LVM requesting a callback at [phone].    The patient does not have a scheduled HOSFU appointment. Message sent to PCP's staff to assist with HOSFU appointment scheduling.

## 2025-05-19 ENCOUNTER — TELEPHONE (OUTPATIENT)
Dept: INTERNAL MEDICINE | Facility: CLINIC | Age: 70
End: 2025-05-19
Payer: MEDICARE

## 2025-05-19 NOTE — TELEPHONE ENCOUNTER
Pt informed he will have to reschedule hospital f/u appointment which can not be done virtually. Pt states doesn't have transportation at this time so will call to reschedule with Dr. Tamez.   Pt also states he was uninformed of his virtual appt that was scheduled.

## 2025-05-21 ENCOUNTER — PATIENT MESSAGE (OUTPATIENT)
Dept: INTERVENTIONAL RADIOLOGY/VASCULAR | Facility: CLINIC | Age: 70
End: 2025-05-21
Payer: MEDICARE

## 2025-05-22 NOTE — PHYSICIAN QUERY
Please clarify thrombus as it relates to the Y90 through his R femoral artery :  Complication of the procedure

## 2025-05-27 ENCOUNTER — OFFICE VISIT (OUTPATIENT)
Dept: PLASTIC SURGERY | Facility: CLINIC | Age: 70
End: 2025-05-27
Payer: MEDICARE

## 2025-05-27 DIAGNOSIS — S81.801A NON-HEALING WOUND OF LOWER EXTREMITY, RIGHT, INITIAL ENCOUNTER: ICD-10-CM

## 2025-05-27 DIAGNOSIS — S80.11XA LEG HEMATOMA, RIGHT, INITIAL ENCOUNTER: ICD-10-CM

## 2025-05-27 PROCEDURE — 3288F FALL RISK ASSESSMENT DOCD: CPT | Mod: CPTII,HCNC,S$GLB, | Performed by: SURGERY

## 2025-05-27 PROCEDURE — 4010F ACE/ARB THERAPY RXD/TAKEN: CPT | Mod: CPTII,HCNC,S$GLB, | Performed by: SURGERY

## 2025-05-27 PROCEDURE — 99999 PR PBB SHADOW E&M-EST. PATIENT-LVL III: CPT | Mod: PBBFAC,HCNC,, | Performed by: SURGERY

## 2025-05-27 PROCEDURE — 1159F MED LIST DOCD IN RCRD: CPT | Mod: CPTII,HCNC,S$GLB, | Performed by: SURGERY

## 2025-05-27 PROCEDURE — 1160F RVW MEDS BY RX/DR IN RCRD: CPT | Mod: CPTII,HCNC,S$GLB, | Performed by: SURGERY

## 2025-05-27 PROCEDURE — 1125F AMNT PAIN NOTED PAIN PRSNT: CPT | Mod: CPTII,HCNC,S$GLB, | Performed by: SURGERY

## 2025-05-27 PROCEDURE — 1101F PT FALLS ASSESS-DOCD LE1/YR: CPT | Mod: CPTII,HCNC,S$GLB, | Performed by: SURGERY

## 2025-05-27 PROCEDURE — 99024 POSTOP FOLLOW-UP VISIT: CPT | Mod: HCNC,S$GLB,, | Performed by: SURGERY

## 2025-05-27 PROCEDURE — 3044F HG A1C LEVEL LT 7.0%: CPT | Mod: CPTII,HCNC,S$GLB, | Performed by: SURGERY

## 2025-06-10 ENCOUNTER — TELEPHONE (OUTPATIENT)
Dept: ORTHOPEDICS | Facility: CLINIC | Age: 70
End: 2025-06-10
Payer: MEDICARE

## 2025-06-10 ENCOUNTER — PATIENT MESSAGE (OUTPATIENT)
Dept: INTERVENTIONAL RADIOLOGY/VASCULAR | Facility: HOSPITAL | Age: 70
End: 2025-06-10
Payer: MEDICARE

## 2025-06-10 NOTE — TELEPHONE ENCOUNTER
Let patient know that the appropriate provider will be contacting him to schedule an appointment. Patient confirmed understanding.    Copied from CRM #[id number]. Topic: Appointments - Appointment Scheduling  >> Christiano 10, 2025 12:19 PM Erlinda wrote:  Type:  Needs Medical Advice    Who Called: Froylan  Symptoms (please be specific): knee   How long has patient had these symptoms:  chronic  Pharmacy name and phone #:  n/a  Would the patient rather a call back or a response via [facility]? call  Best Call Back Number: [phone]  Additional Information: bilateral knee injection with Dr Dimitry Patel

## 2025-06-11 ENCOUNTER — OFFICE VISIT (OUTPATIENT)
Dept: INTERNAL MEDICINE | Facility: CLINIC | Age: 70
End: 2025-06-11
Payer: MEDICARE

## 2025-06-11 ENCOUNTER — TELEPHONE (OUTPATIENT)
Dept: ORTHOPEDICS | Facility: CLINIC | Age: 70
End: 2025-06-11
Payer: MEDICARE

## 2025-06-11 VITALS
WEIGHT: 262.13 LBS | OXYGEN SATURATION: 97 % | HEIGHT: 75 IN | SYSTOLIC BLOOD PRESSURE: 150 MMHG | BODY MASS INDEX: 32.59 KG/M2 | DIASTOLIC BLOOD PRESSURE: 60 MMHG | HEART RATE: 100 BPM

## 2025-06-11 DIAGNOSIS — F10.20 ALCOHOL USE DISORDER, SEVERE, DEPENDENCE: Chronic | ICD-10-CM

## 2025-06-11 DIAGNOSIS — S81.801D WOUND OF RIGHT LOWER EXTREMITY, SUBSEQUENT ENCOUNTER: ICD-10-CM

## 2025-06-11 DIAGNOSIS — R60.0 BILATERAL LEG EDEMA: ICD-10-CM

## 2025-06-11 DIAGNOSIS — Z86.19 HISTORY OF HEPATITIS C: ICD-10-CM

## 2025-06-11 DIAGNOSIS — I72.4 PSEUDOANEURYSM OF FEMORAL ARTERY: ICD-10-CM

## 2025-06-11 DIAGNOSIS — Z79.01 CHRONIC ANTICOAGULATION: ICD-10-CM

## 2025-06-11 DIAGNOSIS — K74.69 OTHER CIRRHOSIS OF LIVER: ICD-10-CM

## 2025-06-11 DIAGNOSIS — I10 ESSENTIAL HYPERTENSION: ICD-10-CM

## 2025-06-11 DIAGNOSIS — E55.9 MILD VITAMIN D DEFICIENCY: ICD-10-CM

## 2025-06-11 DIAGNOSIS — I82.411 ACUTE DEEP VEIN THROMBOSIS (DVT) OF FEMORAL VEIN OF RIGHT LOWER EXTREMITY: Primary | ICD-10-CM

## 2025-06-11 PROBLEM — F10.930 ALCOHOL WITHDRAWAL, UNCOMPLICATED: Status: RESOLVED | Noted: 2021-11-04 | Resolved: 2025-06-11

## 2025-06-11 PROBLEM — Z75.8 DISCHARGE PLANNING ISSUES: Status: RESOLVED | Noted: 2023-12-28 | Resolved: 2025-06-11

## 2025-06-11 PROBLEM — E87.1 HYPONATREMIA: Status: RESOLVED | Noted: 2021-11-03 | Resolved: 2025-06-11

## 2025-06-11 PROBLEM — M62.82 RHABDOMYOLYSIS: Status: RESOLVED | Noted: 2023-12-15 | Resolved: 2025-06-11

## 2025-06-11 PROBLEM — M00.9 SEPTIC ARTHRITIS OF RIGHT ANKLE: Status: RESOLVED | Noted: 2021-11-03 | Resolved: 2025-06-11

## 2025-06-11 PROBLEM — H10.9 BACTERIAL CONJUNCTIVITIS: Status: RESOLVED | Noted: 2021-11-03 | Resolved: 2025-06-11

## 2025-06-11 PROBLEM — R31.0 GROSS HEMATURIA: Status: RESOLVED | Noted: 2020-06-17 | Resolved: 2025-06-11

## 2025-06-11 PROCEDURE — 3044F HG A1C LEVEL LT 7.0%: CPT | Mod: CPTII,S$GLB,, | Performed by: INTERNAL MEDICINE

## 2025-06-11 PROCEDURE — 1125F AMNT PAIN NOTED PAIN PRSNT: CPT | Mod: CPTII,S$GLB,, | Performed by: INTERNAL MEDICINE

## 2025-06-11 PROCEDURE — 3008F BODY MASS INDEX DOCD: CPT | Mod: CPTII,S$GLB,, | Performed by: INTERNAL MEDICINE

## 2025-06-11 PROCEDURE — 1111F DSCHRG MED/CURRENT MED MERGE: CPT | Mod: CPTII,S$GLB,, | Performed by: INTERNAL MEDICINE

## 2025-06-11 PROCEDURE — 3288F FALL RISK ASSESSMENT DOCD: CPT | Mod: CPTII,S$GLB,, | Performed by: INTERNAL MEDICINE

## 2025-06-11 PROCEDURE — 99214 OFFICE O/P EST MOD 30 MIN: CPT | Mod: S$GLB,,, | Performed by: INTERNAL MEDICINE

## 2025-06-11 PROCEDURE — 99999 PR PBB SHADOW E&M-EST. PATIENT-LVL III: CPT | Mod: PBBFAC,,, | Performed by: INTERNAL MEDICINE

## 2025-06-11 PROCEDURE — 4010F ACE/ARB THERAPY RXD/TAKEN: CPT | Mod: CPTII,S$GLB,, | Performed by: INTERNAL MEDICINE

## 2025-06-11 PROCEDURE — 3078F DIAST BP <80 MM HG: CPT | Mod: CPTII,S$GLB,, | Performed by: INTERNAL MEDICINE

## 2025-06-11 PROCEDURE — 3077F SYST BP >= 140 MM HG: CPT | Mod: CPTII,S$GLB,, | Performed by: INTERNAL MEDICINE

## 2025-06-11 PROCEDURE — 1101F PT FALLS ASSESS-DOCD LE1/YR: CPT | Mod: CPTII,S$GLB,, | Performed by: INTERNAL MEDICINE

## 2025-06-11 RX ORDER — FUROSEMIDE 40 MG/1
40 TABLET ORAL DAILY PRN
Qty: 30 TABLET | Refills: 5 | Status: SHIPPED | OUTPATIENT
Start: 2025-06-11

## 2025-06-11 RX ORDER — CARVEDILOL 12.5 MG/1
12.5 TABLET ORAL 2 TIMES DAILY WITH MEALS
Qty: 180 TABLET | Refills: 1 | Status: SHIPPED | OUTPATIENT
Start: 2025-06-11

## 2025-06-11 RX ORDER — ASPIRIN 325 MG
50000 TABLET, DELAYED RELEASE (ENTERIC COATED) ORAL
Qty: 24 CAPSULE | Refills: 1 | Status: SHIPPED | OUTPATIENT
Start: 2025-06-12

## 2025-06-11 NOTE — TELEPHONE ENCOUNTER
Called and booked patient an appt for Bilat knee CSI with Fabricio on 6/13. Patient agreed to appt date/time    ----- Message from Travis Saucedo sent at 6/11/2025  9:16 AM CDT -----  Regarding: RE: Appointment Request  Please call pt and find out if he wants another CSI injection or have gel injections, If gel then have GERALD order gel, but make appts with patient still on the phone and then remember to assign the referral to the appts (that is what triggers pre service to work on auth)  ----- Message -----  From: Carolyn Castro, Patient Care Assistant  Sent: 6/11/2025   9:14 AM CDT  To: Francisco Monzon PA-C; Hamzah Werner#  Subject: Appointment Request                              This patient is looking for an appointment for knee injections. He was a previous patient of Dr. Patel.SummaryScheduling for an inejctionCommunicationPt's requesting a call back regarding being scheduled for an injection

## 2025-06-11 NOTE — TELEPHONE ENCOUNTER
Sent the message to providers to assist. I do not have access to schedule injection appointments.     Copied from CRM #[id number]. Topic: Appointments - Appointment Access  >> Jun 11, 2025  8:57 AM Brook wrote:  Pt's requesting a call back regarding being scheduled for an injection

## 2025-06-11 NOTE — PROGRESS NOTES
INTERNAL MEDICINE CLINIC - SAME DAY APPOINTMENT  Progress Note    PRESENTING HISTORY     PCP: Janki Tamez MD    Chief Complaint/Reason for Visit:  Post hospital follow up.       History of Present Illness & ROS : Mr. Froylan Borja is a 70 y.o. male.      Admission Date: 4/18/2025  Hospital Length of Stay: 25 days  Discharge Date and Time: 05/13/2025 4:47 PM  Attending Physician: Ry Dooley DO   Discharging Provider: Ry Dooley DO  Primary Care Provider: Janki Tamez MD  Hospital Medicine Team: [facility] Ry Dooley DO  Primary Care Team: [facility]     HPI:   Mr. Froylan Borja is a 71 y/o male with a PMHx alcohol abuse, cirrhosis, hepatocellular carcinoma, and HTN who presents for complaint of R leg pain and swelling. He reports yesterday he hit his R leg on the car door and shortly developed a bruise and pain to the R lateral leg. He tried tylenol at-home though pain, swelling, and bruising continued to worsen. Admits to paresthesias and numbness to the R foot as well as significant R foot pain with weight-bearing. Denies CP, palpitations, SOB, lightheadedness, dizziness, headaches, LOC, vomiting, diarrhea, abdominal pain, fever, chills. Reports some nausea following morphine and dilaudid for pain in ED. Of note, patient had angiogram y90 mapping study done with IR last Thursday (4/10/25). Has developed some bruising to the medial thigh and mild groin discomfort since procedure. Social history significant for daily alcohol consumption, 3-4 beers daily, last alcoholic beverage yesterday around 3PM.      In the ED, AF HDS. H&H 13.0/38.6 (BL~14.8), no leukocytosis. HypoNa 129, HyperK 5.7, CO2 20, Ca 8.6, Albumin 3.4, remainder of CMP unremarkable. PT/INR wnl at 10.9/1.0. LA 1.6. CPK wnl. Hep C Ab reactive, HCV RNA pending. EKG with normal sinus rhythm. R Lower Ext U/S w/ findings of nonocclusive thrombus in the right common femoral vein and right common femoral artery  pseudoaneurysm. CTA Lower Ext pending. R lower extremity hematoma drained at bedside by general surgery with evacuation of 120 cc of hematoma. Compressive dressing applied. Given nebulized albuterol, lokelma, IV cefazolin, IV dilaudid, IV morphine, IV zofran. Admitted to .      Procedure(s) (LRB):  RIGHT LOWER EXTREMITY SPLIT THICKNESS SKIN GRAFT APPLICATION (Right)  SURGICAL PROCUREMENT, GRAFT, SKIN (Right)  APPLICATION, WOUND VAC (Right)  IRRIGATION AND DEBRIDEMENT, LOWER EXTREMITY (Right)       Hospital Course:   70 y.o M with HTN, alcohol/HepC cirrhosis c/b HCC s/p Y-90 mapping angiogram on 4/10 who presents with bruising and pain at angiogram site and RLE calf pain after he hit it on a car door. RLE U/S showed non-occlusive CFV thrombus and R CFA pseudoaneurysm. CTA re-demonstrated the psdueoaneurysm with active extravasation within the sac as well as a large R calf hematoma which GenSurg were consulted for and successfully drained (120ccs). Vascular Surgery were consulted, recommended IR guided percutaneous thrombin injection which IR were able to complete. Heparin gtt was started. Groin U/S done the following day showed continued thrombosis of the pseudoanurysm. He was taken to the OR on 4/21 for debridement and placement of wound vac. He was taken back to the OR on 4/24 for further debridement and wound vac change. OR 4/28 for vac reapplication. RLE wound exploration, debridement, possible split-thickness skin grafting versus skin substitute, wound VAC placement 4/30 with Plastics.  Resumed Eliquis on 05/01. Plastics removed wound vac; will not need on discharge     Patient stable for discharge. Appeals for rehab denied. Plan for SNF placement at this time, patient in agreement after discussions to help him get his independence back     Patient declined SNF placement, will go home with home health at this time. Continue wound care, PT/OT at home. Wheelchair ordered. Needs close follow up with plastic  surgery and PCP for ongoing care.   _________________________    Today:    Last seen by Plastic Surgery 5-27-25. See media photos.    He complaints of bilateral LE edema since hospitalization.    No chest pain or SOB.    Still drinks alcohol       PAST HISTORY:     Past Medical History:   Diagnosis Date    Acute deep vein thrombosis (DVT) of right lower extremity 04/18/2025    Alcohol abuse     Alcohol withdrawal, uncomplicated 11/04/2021    Alcohol-induced polyneuropathy 04/11/2024    Anxiety     Cirrhosis     Diverticulosis: seen on colonoscopy 2014 07/01/2021    Glaucoma     Gross hematuria 06/17/2020    HCC (hepatocellular carcinoma) 01/30/2025    Hepatocellular carcinoma     History of hepatitis C, s/p successful Rx w/ SVR24 - 1/2017     genotype 1;  relapse following PegIFN+RBV+Victrelis; prior relapse following PegIFN+RBV S/p 12 weeks harvoni + RBV w/ SVR    Homelessness     Hypertension     Major depressive disorder in full remission 11/04/2021    Paresthesia     feet, bilaterally    Rhabdomyolysis 12/15/2023    Septic arthritis of right ankle 11/03/2021       Past Surgical History:   Procedure Laterality Date    APPLICATION OF SPLIT-THICKNESS SKIN GRAFT (STSG) TO LOWER EXTREMITY Right 4/30/2025    Procedure: RIGHT LOWER EXTREMITY SPLIT THICKNESS SKIN GRAFT APPLICATION;  Surgeon: Alli Strong MD;  Location: [address];  Service: Plastics;  Laterality: Right;    APPLICATION OF WOUND VACUUM-ASSISTED CLOSURE DEVICE Right 4/21/2025    Procedure: APPLICATION, WOUND VAC;  Surgeon: Estella Ramsay MD;  Location: [address];  Service: General;  Laterality: Right;    APPLICATION OF WOUND VACUUM-ASSISTED CLOSURE DEVICE Right 4/24/2025    Procedure: APPLICATION, WOUND VAC;  Surgeon: Estella Ramsay MD;  Location: [address];  Service: General;  Laterality: Right;    APPLICATION OF WOUND VACUUM-ASSISTED CLOSURE DEVICE Right 4/30/2025    Procedure: APPLICATION, WOUND VAC;  Surgeon:  Alli Strong MD;  Location: [facility] OR 2ND FLR;  Service: Plastics;  Laterality: Right;    COLONOSCOPY N/A 9/19/2024    Procedure: COLONOSCOPY;  Surgeon: Mo Patino MD;  Location: [facility] ENDO (4TH FLR);  Service: Endoscopy;  Laterality: N/A;  Ref by Dr. NIGHAT Tamez, PT/INR/CBC am procedure, Suprep, email - PC  9/16-lvm for pre call-tb-labs 8/22/24    ESOPHAGOGASTRODUODENOSCOPY N/A 9/19/2024    Procedure: EGD (ESOPHAGOGASTRODUODENOSCOPY);  Surgeon: Mo Patino MD;  Location: [facility] ENDO (4TH FLR);  Service: Endoscopy;  Laterality: N/A;    HARVESTING OF SKIN GRAFT Right 4/30/2025    Procedure: SURGICAL PROCUREMENT, GRAFT, SKIN;  Surgeon: Alli Strong MD;  [facility];  Service: Plastics;  Laterality: Right;    IRRIGATION AND DEBRIDEMENT OF LOWER EXTREMITY Right 4/30/2025    Procedure: IRRIGATION AND DEBRIDEMENT, LOWER EXTREMITY;  Surgeon: Alli Strong MD;  Location: [facility] OR 2ND FLR;  Service: Plastics;  Laterality: Right;    LIVER BIOPSY      REPLACEMENT OF WOUND VACUUM-ASSISTED CLOSURE DEVICE Right 4/24/2025    Procedure: REPLACEMENT, WOUND VAC;  Surgeon: Estella Ramsay MD;  [facility];  Service: General;  Laterality: Right;    REPLACEMENT OF WOUND VACUUM-ASSISTED CLOSURE DEVICE Right 4/28/2025    Procedure: REPLACEMENT, WOUND VAC;  Surgeon: Jose G Mackay MD;  [facility];  Service: General;  Laterality: Right;    WOUND DEBRIDEMENT Right 4/21/2025    Procedure: DEBRIDEMENT, WOUND;  Surgeon: Estella Ramsay MD;  Location: [facility] OR 2ND FLR;  Service: General;  Laterality: Right;       Family History   Problem Relation Name Age of Onset    Colonic polyp Mother 95     Cancer Mother 95         colon vs polyps, colectomy    Diabetes Mellitus Father      Hypertension Father      Cancer Father          ? CRC    Diabetes Father      Cancer Sister          ? CRC    Colonic polyp Brother      Cirrhosis Neg Hx         Social History      Socioeconomic History    Marital status:    Tobacco Use    Smoking status: Former     Types: Cigars    Smokeless tobacco: Never    Tobacco comments:     smokes cigars 20 per month   Substance and Sexual Activity    Alcohol use: Yes     Alcohol/week: 28.0 standard drinks of alcohol     Types: 28 Cans of beer per week     Comment: 2-3 beers daily    Drug use: No   Social History Narrative     (mental health issues), retired  at [employer]. No kids. 2 dogs. No exercise.     Social Drivers of Health     Financial Resource Strain: Low Risk  (4/21/2025)    Overall Financial Resource Strain (CARDIA)     Difficulty of Paying Living Expenses: Not very hard   Recent Concern: Financial Resource Strain - Medium Risk (4/19/2025)    Overall Financial Resource Strain (CARDIA)     Difficulty of Paying Living Expenses: Somewhat hard   Food Insecurity: No Food Insecurity (4/21/2025)    Hunger Vital Sign     Worried About Running Out of Food in the Last Year: Never true     Ran Out of Food in the Last Year: Never true   Transportation Needs: No Transportation Needs (4/21/2025)    PRAPARE - Transportation     Lack of Transportation (Medical): No     Lack of Transportation (Non-Medical): No   Physical Activity: Inactive (4/21/2025)    Exercise Vital Sign     Days of Exercise per Week: 0 days     Minutes of Exercise per Session: 0 min   Stress: Stress Concern Present (4/21/2025)    [employer] - Occupational Stress Questionnaire     Feeling of Stress : To some extent   Housing Stability: Low Risk  (4/21/2025)    Housing Stability Vital Sign     Unable to Pay for Housing in the Last Year: No     Homeless in the Last Year: No       MEDICATIONS & ALLERGIES:     Medications Ordered Prior to Encounter[1]     Review of patient's allergies indicates:   Allergen Reactions    Zoloft [sertraline] Other (See Comments)     hyponatremia    Codeine Itching     Intolerance- doesn't like the way it  makes him feel       Medications Reconciliation:   I have reconciled the patient's home medications with the patient/family. I have updated all changes.  Refer to After-Visit Medication List.    OBJECTIVE:     Vital Signs:  Vitals:    06/11/25 1056   BP: (!) 150/60   Pulse: 100     Wt Readings from Last 3 Encounters:   06/11/25 1056 118.9 kg (262 lb 2 oz)   04/29/25 1111 120 kg (264 lb 8.8 oz)   04/21/25 1400 120 kg (264 lb 8.8 oz)   04/18/25 0939 120 kg (264 lb 8.8 oz)   04/18/25 0000 117.9 kg (260 lb)   04/10/25 0707 117.9 kg (260 lb)     Body mass index is 32.76 kg/m².     Physical Exam:  General:  No distress.   HEENT: Head is normocephalic, atraumatic  Eyes: Clear conjunctiva.  Neck: Supple, symmetrical neck; trachea midline.  Lungs:  normal respiratory effort.  Cardiovascular: Heart with regular rate and rhythm.    Abdomen: Abdomen is soft, non-tender non-distended with normal bowel sounds.  Skin: Skin color, texture, turgor normal. No rashes.  Musculoskeletal: Normal gait.   Psychiatric: Normal affect. Alert.        Bilateral LE pitting edema below knees.    Laboratory  Lab Results   Component Value Date    WBC 6.88 05/12/2025    HGB 11.5 (L) 05/12/2025    HCT 35.1 (L) 05/12/2025     05/12/2025    CHOL 184 01/15/2025    TRIG 65 01/15/2025    HDL 80 (H) 01/15/2025    ALT 49 (H) 05/09/2025    AST 46 (H) 05/09/2025     05/09/2025    K 4.3 05/09/2025     05/09/2025    CREATININE 0.7 05/09/2025    BUN 21 05/09/2025    CO2 23 05/09/2025    TSH 3.456 01/15/2025    PSA 5.77 (H) 03/22/2025    INR 1.0 04/25/2025    HGBA1C 4.7 01/15/2025       ASSESSMENT & PLAN:     Acute deep vein thrombosis (DVT) of femoral vein of right lower extremity  Wound of right lower extremity, subsequent encounter  Pseudoaneurysm of femoral artery  Chronic anticoagulation  RLE US 4-2025 showed a nonocclusive thrombus in the right common femoral vein   General surgery consulted- hematoma evacuation performed at bedside  S/p   wound debridement and wound vac placement on 4/21. and 4/24 for wound debridement + vac reapplication  s/p STSG to right leg with wound vac placement 4/30 by plastics.   Transitioned to eliquis 05/01/25  Plastics removed wound vac, does not need wound vac on discharge.     - Wound has healed.    Now on apixaban for anticoagulation.    History of hepatitis C, s/p successful Rx w/ SVR24 - 1/2017  Alcohol use disorder, severe, dependence  Other cirrhosis of liver  Hepatology 1-30-25  Froylan Borja is a 69 y.o. male withHepatocellular Carcinoma and Cirrhosis     Well-compensated cirrhosis secondary to history of hepatitis-C as well as ongoing alcohol use disorder.     Sub 2 cm liver lesion with enhancing characteristics consistent with HCC.    Bilateral leg edema  - Likely related to cirrhosis plus nifedipine (new Rx)    Plan: DC Nifedipine.  -     furosemide (LASIX) 40 MG tablet; Take 1 tablet (40 mg total) by mouth daily as needed (edema).  Dispense: 30 tablet; Refill: 5    Essential hypertension  - Losartan caused hyperkalemia.    Nifedipine worsens LE edema.      Plan:  -     carvediloL (COREG) 12.5 MG tablet; Take 1 tablet (12.5 mg total) by mouth 2 (two) times daily with meals.  Dispense: 180 tablet; Refill: 1    Mild vitamin D deficiency  -     cholecalciferol, vitamin D3, 1,250 mcg (50,000 unit) capsule; Take 1 capsule (50,000 Units total) by mouth twice a week.  Dispense: 24 capsule; Refill: 1    Scheduled Follow-up :  Future Appointments   Date Time Provider Department Center   6/13/2025 11:30 AM Fabricio Ware PA-C [facility] ORTHO [facility]   6/16/2025  7:00 AM [facility] IR5 CT-[id number] LIMIT 500 LBS [facility] RAD IR [facility]   6/23/2025 10:20 AM Maddy Mcgee NP [facility] UROLOG [facility]   7/17/2025 11:00 AM Janki Tamez MD [facility] IM [facility]   7/24/2025  8:45 AM [facility] OIC-US1 MASTER [facility] ULTR IC [facility]   7/24/2025 12:00 PM [facility]   7/31/2025 10:30 AM  Maximiliano Morris MD [facility]       After Visit Medication List :     Medication List            Accurate as of June 11, 2025 11:18 AM. If you have any questions, ask your nurse or doctor.                START taking these medications      carvediloL 12.5 MG tablet  Commonly known as: COREG  Take 1 tablet (12.5 mg total) by mouth 2 (two) times daily with meals.  Started by: Pardeep Alcantara MD     cholecalciferol (vitamin D3) 1,250 mcg (50,000 unit) capsule  Take 1 capsule (50,000 Units total) by mouth twice a week.  Start taking on: June 12, 2025  Started by: Pardeep Alcantara MD     furosemide 40 MG tablet  Commonly known as: LASIX  Take 1 tablet (40 mg total) by mouth daily as needed (edema).  Started by: Pardeep Alcantara MD            CONTINUE taking these medications      apixaban 5 mg Tab  Commonly known as: ELIQUIS  Take 1 tablet (5 mg total) by mouth 2 (two) times daily.     buPROPion 150 MG TBSR 12 hr tablet  Commonly known as: WELLBUTRIN SR  Take 1 tablet (150 mg total) by mouth 2 (two) times daily.     gabapentin 300 MG capsule  Commonly known as: NEURONTIN  One in AM and two at bedtime            STOP taking these medications      NIFEdipine 90 MG (OSM) 24 hr tablet  Commonly known as: PROCARDIA-XL  Stopped by: Pardeep Alcantara MD     oxyCODONE 10 mg Tab immediate release tablet  Commonly known as: ROXICODONE  Stopped by: Pardeep Alcantara MD     oxyCODONE-acetaminophen  mg per tablet  Commonly known as: PERCOCET  Stopped by: Pardeep Alcantara MD     THERA-M 9 mg iron-400 mcg Tab tablet  Generic drug: multivit-iron-FA-calcium-mins  Stopped by: Pardeep Alcantara MD               Where to Get Your Medications        These medications were sent to [id number] - [place] [address] [facility]  [place] [address]      Phone: [phone]   carvediloL 12.5 MG tablet  cholecalciferol (vitamin D3) 1,250 mcg (50,000 unit) capsule  furosemide  40 MG tablet         Signing Physician:  Pardeep Alcantara MD         [1]   Current Outpatient Medications on File Prior to Visit   Medication Sig Dispense Refill    apixaban (ELIQUIS) 5 mg Tab Take 1 tablet (5 mg total) by mouth 2 (two) times daily. 60 tablet 0    buPROPion (WELLBUTRIN SR) 150 MG TBSR 12 hr tablet Take 1 tablet (150 mg total) by mouth 2 (two) times daily. 180 tablet 1    gabapentin (NEURONTIN) 300 MG capsule One in AM and two at bedtime 90 capsule 6    [DISCONTINUED] multivitamin Tab Take 1 tablet by mouth once daily. (Patient not taking: Reported on 5/15/2025) 60 tablet 0    [DISCONTINUED] NIFEdipine (PROCARDIA-XL) 90 MG (OSM) 24 hr tablet Take 1 tablet (90 mg total) by mouth once daily. 30 tablet 11    [DISCONTINUED] oxyCODONE (ROXICODONE) 10 mg Tab immediate release tablet Take 1 tablet (10 mg total) by mouth every 4 (four) hours as needed for Pain. (Patient not taking: Reported on 5/15/2025) 42 tablet 0    [DISCONTINUED] oxyCODONE-acetaminophen (PERCOCET)  mg per tablet Take 1 tablet by mouth every 6 (six) hours as needed for Pain. (Patient not taking: Reported on 5/15/2025) 5 tablet 0     No current facility-administered medications on file prior to visit.

## 2025-06-12 DIAGNOSIS — Z00.6 RESEARCH STUDY PATIENT: Primary | ICD-10-CM

## 2025-06-13 ENCOUNTER — OFFICE VISIT (OUTPATIENT)
Dept: ORTHOPEDICS | Facility: CLINIC | Age: 70
End: 2025-06-13
Payer: MEDICARE

## 2025-06-13 ENCOUNTER — ANESTHESIA EVENT (OUTPATIENT)
Dept: INTERVENTIONAL RADIOLOGY/VASCULAR | Facility: HOSPITAL | Age: 70
End: 2025-06-13
Payer: MEDICARE

## 2025-06-13 ENCOUNTER — HOSPITAL ENCOUNTER (OUTPATIENT)
Dept: RADIOLOGY | Facility: HOSPITAL | Age: 70
Discharge: HOME | End: 2025-06-13
Payer: MEDICARE

## 2025-06-13 ENCOUNTER — RESEARCH ENCOUNTER (OUTPATIENT)
Dept: RESEARCH | Facility: HOSPITAL | Age: 70
End: 2025-06-13
Payer: MEDICARE

## 2025-06-13 DIAGNOSIS — M17.0 PRIMARY OSTEOARTHRITIS OF BOTH KNEES: Primary | ICD-10-CM

## 2025-06-13 DIAGNOSIS — M17.0 PRIMARY OSTEOARTHRITIS OF BOTH KNEES: ICD-10-CM

## 2025-06-13 PROCEDURE — 73564 X-RAY EXAM KNEE 4 OR MORE: CPT | Mod: TC,50,HCNC

## 2025-06-13 PROCEDURE — 73564 X-RAY EXAM KNEE 4 OR MORE: CPT | Mod: 26,50,HCNC, | Performed by: RADIOLOGY

## 2025-06-13 PROCEDURE — 99999 PR PBB SHADOW E&M-EST. PATIENT-LVL II: CPT | Mod: PBBFAC,HCNC,,

## 2025-06-13 RX ORDER — TRIAMCINOLONE ACETONIDE 40 MG/ML
80 INJECTION, SUSPENSION INTRA-ARTICULAR; INTRAMUSCULAR ONCE
Status: COMPLETED | OUTPATIENT
Start: 2025-06-13 | End: 2025-06-13

## 2025-06-13 RX ADMIN — TRIAMCINOLONE ACETONIDE 80 MG: 40 INJECTION, SUSPENSION INTRA-ARTICULAR; INTRAMUSCULAR at 11:06

## 2025-06-13 NOTE — PROGRESS NOTES
RESEARCH STUDY SPECIMEN COLLECTION ENCOUNTER  ORGAN TRANSPLANT  [facility] [place]    Study Title: Role of Tumor-Induced Immune Tolerance in the Patient Response to Locoregional Therapy: Implications in Assessment Risk of Hepatocellular Carcinoma Recurrence Following Liver Transplantation    IRB #: [id number].B    IRB Approval Date: 6/8/2016    : Eduardo Li MD  Sub-investigator: Javier Mcmullen, PhD    Patient Number: [id number]    In accordance with the study protocol, [facility] Lab orders were placed and follow-up specimens were collected on (date: 06/13/2025) in:  [facility] LAB VNP: YES/NO: Yes  [facility] LABTX: YES/NO: No  [facility] LAB IM: YES/NO: No  Other [facility] location: YES/NO: No   Location: N/A    A  was used to transport blood specimens to [facility] for processing: YES/NO: No  Blood specimens were transported to [facility] for processing: YES/NO: Yes    Alan Martin  Admin Research- Liver Transplant

## 2025-06-13 NOTE — PROGRESS NOTES
Froylan Borja is a 70 y.o. male former patient of Dr. Patel with known history of bilateral knee osteoarthritis being managed conservatively with intermittent intraarticular corticosteroid injections the last of which administered by Dr. Patel on 10/30/2024 with satisfactory relief. He presents today requesting repeat bilateral knee injections.     Of note, he has had interval right gastroc flap procedure and bilateral chronic LE venous stasis changes.     Bilat knees:   0-110     Repeat x-rays reveal no acute fractures or dislocations. Severe tricompartmental osteoarthritic changes with progression when compared to previous imaging.     Knee Injection Procedure Note  Diagnosis: bilateral knee degenerative arthritis  Indications: bilateral knee pain  Procedure Details: Verbal consent was obtained for the procedure. The injection site was identified and the skin was prepared with alcohol. The bilateral knee was injected from an anterolateral approach with 1 ml of Kenalog and 4 ml Lidocaine under sterile technique using a 22 gauge needle. The needle was removed and the area cleansed and dressed.  Complications:  Patient tolerated the procedure well.    he was advised to rest the knee today, using ice and elevation as needed for comfort and swelling.Immediate relief of the knee pain may be short lived and secondary to the lidocaine. he may have an increase in discomfort tonight followed by steady improvement over the next several days. It may take 1-2 weeks following the injection to get the full benefit of the medication.     - He may follow up as needed for new or worsening symptoms.

## 2025-06-16 ENCOUNTER — HOSPITAL ENCOUNTER (OUTPATIENT)
Dept: INTERVENTIONAL RADIOLOGY/VASCULAR | Facility: HOSPITAL | Age: 70
Discharge: HOME | End: 2025-06-16
Attending: FAMILY MEDICINE | Admitting: STUDENT IN AN ORGANIZED HEALTH CARE EDUCATION/TRAINING PROGRAM
Payer: MEDICARE

## 2025-06-16 ENCOUNTER — ANESTHESIA (OUTPATIENT)
Dept: INTERVENTIONAL RADIOLOGY/VASCULAR | Facility: HOSPITAL | Age: 70
End: 2025-06-16
Payer: MEDICARE

## 2025-06-16 DIAGNOSIS — C22.0 HCC (HEPATOCELLULAR CARCINOMA): Primary | ICD-10-CM

## 2025-06-16 PROCEDURE — 63600175 PHARM REV CODE 636 W HCPCS: Mod: HCNC | Performed by: NURSE ANESTHETIST, CERTIFIED REGISTERED

## 2025-06-16 PROCEDURE — D9220A PRA ANESTHESIA: Mod: HCNC,ANES,, | Performed by: ANESTHESIOLOGY

## 2025-06-16 PROCEDURE — 37000008 HC ANESTHESIA 1ST 15 MINUTES: Mod: HCNC

## 2025-06-16 PROCEDURE — 25000003 PHARM REV CODE 250: Mod: HCNC | Performed by: ANESTHESIOLOGY

## 2025-06-16 PROCEDURE — D9220A PRA ANESTHESIA: Mod: HCNC,CRNA,, | Performed by: NURSE ANESTHETIST, CERTIFIED REGISTERED

## 2025-06-16 PROCEDURE — 63600175 PHARM REV CODE 636 W HCPCS: Mod: HCNC | Performed by: ANESTHESIOLOGY

## 2025-06-16 PROCEDURE — 77013 CT GUIDE FOR TISSUE ABLATION: CPT | Mod: TC,HCNC

## 2025-06-16 PROCEDURE — 25000003 PHARM REV CODE 250: Mod: HCNC | Performed by: NURSE ANESTHETIST, CERTIFIED REGISTERED

## 2025-06-16 PROCEDURE — 37000009 HC ANESTHESIA EA ADD 15 MINS: Mod: HCNC

## 2025-06-16 PROCEDURE — C2618 PROBE/NEEDLE, CRYO: HCPCS | Mod: HCNC

## 2025-06-16 RX ORDER — GLUCAGON 1 MG
1 KIT INJECTION
Status: DISCONTINUED | OUTPATIENT
Start: 2025-06-16 | End: 2025-06-17 | Stop reason: HOSPADM

## 2025-06-16 RX ORDER — FENTANYL CITRATE 50 UG/ML
INJECTION, SOLUTION INTRAMUSCULAR; INTRAVENOUS
Status: DISCONTINUED | OUTPATIENT
Start: 2025-06-16 | End: 2025-06-16

## 2025-06-16 RX ORDER — HYDROMORPHONE HYDROCHLORIDE 1 MG/ML
0.2 INJECTION, SOLUTION INTRAMUSCULAR; INTRAVENOUS; SUBCUTANEOUS EVERY 5 MIN PRN
Refills: 0 | Status: DISCONTINUED | OUTPATIENT
Start: 2025-06-16 | End: 2025-06-17 | Stop reason: HOSPADM

## 2025-06-16 RX ORDER — OXYCODONE AND ACETAMINOPHEN 5; 325 MG/1; MG/1
1 TABLET ORAL EVERY 8 HOURS PRN
Qty: 9 TABLET | Refills: 0 | Status: SHIPPED | OUTPATIENT
Start: 2025-06-16 | End: 2025-06-19

## 2025-06-16 RX ORDER — LIDOCAINE HYDROCHLORIDE 20 MG/ML
INJECTION INTRAVENOUS
Status: DISCONTINUED | OUTPATIENT
Start: 2025-06-16 | End: 2025-06-16

## 2025-06-16 RX ORDER — LIDOCAINE HYDROCHLORIDE 10 MG/ML
1 INJECTION, SOLUTION EPIDURAL; INFILTRATION; INTRACAUDAL; PERINEURAL ONCE
Status: DISCONTINUED | OUTPATIENT
Start: 2025-06-16 | End: 2025-06-17 | Stop reason: HOSPADM

## 2025-06-16 RX ORDER — OXYCODONE AND ACETAMINOPHEN 5; 325 MG/1; MG/1
1 TABLET ORAL
Refills: 0 | Status: DISCONTINUED | OUTPATIENT
Start: 2025-06-16 | End: 2025-06-17 | Stop reason: HOSPADM

## 2025-06-16 RX ORDER — HALOPERIDOL LACTATE 5 MG/ML
0.5 INJECTION, SOLUTION INTRAMUSCULAR EVERY 10 MIN PRN
Status: DISCONTINUED | OUTPATIENT
Start: 2025-06-16 | End: 2025-06-17 | Stop reason: HOSPADM

## 2025-06-16 RX ORDER — SODIUM CHLORIDE 9 MG/ML
INJECTION, SOLUTION INTRAVENOUS CONTINUOUS
Status: DISCONTINUED | OUTPATIENT
Start: 2025-06-16 | End: 2025-06-17 | Stop reason: HOSPADM

## 2025-06-16 RX ORDER — PROPOFOL 10 MG/ML
VIAL (ML) INTRAVENOUS
Status: DISCONTINUED | OUTPATIENT
Start: 2025-06-16 | End: 2025-06-16

## 2025-06-16 RX ORDER — DEXAMETHASONE SODIUM PHOSPHATE 4 MG/ML
INJECTION, SOLUTION INTRA-ARTICULAR; INTRALESIONAL; INTRAMUSCULAR; INTRAVENOUS; SOFT TISSUE
Status: DISCONTINUED | OUTPATIENT
Start: 2025-06-16 | End: 2025-06-16

## 2025-06-16 RX ORDER — HYDRALAZINE HYDROCHLORIDE 20 MG/ML
10 INJECTION INTRAMUSCULAR; INTRAVENOUS ONCE
Status: COMPLETED | OUTPATIENT
Start: 2025-06-16 | End: 2025-06-16

## 2025-06-16 RX ORDER — MIDAZOLAM HYDROCHLORIDE 1 MG/ML
INJECTION INTRAMUSCULAR; INTRAVENOUS
Status: DISCONTINUED | OUTPATIENT
Start: 2025-06-16 | End: 2025-06-16

## 2025-06-16 RX ORDER — ONDANSETRON 4 MG/1
4 TABLET, FILM COATED ORAL EVERY 8 HOURS PRN
Qty: 9 TABLET | Refills: 0 | Status: SHIPPED | OUTPATIENT
Start: 2025-06-16 | End: 2025-06-19

## 2025-06-16 RX ORDER — ROCURONIUM BROMIDE 10 MG/ML
INJECTION, SOLUTION INTRAVENOUS
Status: DISCONTINUED | OUTPATIENT
Start: 2025-06-16 | End: 2025-06-16

## 2025-06-16 RX ORDER — ONDANSETRON HYDROCHLORIDE 2 MG/ML
4 INJECTION, SOLUTION INTRAVENOUS DAILY PRN
Status: DISCONTINUED | OUTPATIENT
Start: 2025-06-16 | End: 2025-06-17 | Stop reason: HOSPADM

## 2025-06-16 RX ORDER — VASOPRESSIN 20 [USP'U]/ML
INJECTION, SOLUTION INTRAMUSCULAR; SUBCUTANEOUS
Status: DISCONTINUED | OUTPATIENT
Start: 2025-06-16 | End: 2025-06-16

## 2025-06-16 RX ORDER — ONDANSETRON HYDROCHLORIDE 2 MG/ML
INJECTION, SOLUTION INTRAVENOUS
Status: DISCONTINUED | OUTPATIENT
Start: 2025-06-16 | End: 2025-06-16

## 2025-06-16 RX ORDER — SODIUM CHLORIDE 0.9 % (FLUSH) 0.9 %
3 SYRINGE (ML) INJECTION
Status: DISCONTINUED | OUTPATIENT
Start: 2025-06-16 | End: 2025-06-17 | Stop reason: HOSPADM

## 2025-06-16 RX ADMIN — HYDROMORPHONE HYDROCHLORIDE 0.2 MG: 1 INJECTION, SOLUTION INTRAMUSCULAR; INTRAVENOUS; SUBCUTANEOUS at 10:06

## 2025-06-16 RX ADMIN — SODIUM CHLORIDE: 0.9 INJECTION, SOLUTION INTRAVENOUS at 06:06

## 2025-06-16 RX ADMIN — DEXAMETHASONE SODIUM PHOSPHATE 4 MG: 4 INJECTION, SOLUTION INTRAMUSCULAR; INTRAVENOUS at 07:06

## 2025-06-16 RX ADMIN — SUGAMMADEX 200 MG: 100 INJECTION, SOLUTION INTRAVENOUS at 07:06

## 2025-06-16 RX ADMIN — ROCURONIUM BROMIDE 10 MG: 10 INJECTION, SOLUTION INTRAVENOUS at 07:06

## 2025-06-16 RX ADMIN — PROPOFOL 140 MG: 10 INJECTION, EMULSION INTRAVENOUS at 07:06

## 2025-06-16 RX ADMIN — OXYCODONE HYDROCHLORIDE AND ACETAMINOPHEN 1 TABLET: 5; 325 TABLET ORAL at 08:06

## 2025-06-16 RX ADMIN — FENTANYL CITRATE 50 MCG: 50 INJECTION, SOLUTION INTRAMUSCULAR; INTRAVENOUS at 07:06

## 2025-06-16 RX ADMIN — MIDAZOLAM HYDROCHLORIDE 2 MG: 1 INJECTION, SOLUTION INTRAMUSCULAR; INTRAVENOUS at 06:06

## 2025-06-16 RX ADMIN — ROCURONIUM BROMIDE 40 MG: 10 INJECTION, SOLUTION INTRAVENOUS at 07:06

## 2025-06-16 RX ADMIN — LIDOCAINE HYDROCHLORIDE 100 MG: 20 INJECTION INTRAVENOUS at 07:06

## 2025-06-16 RX ADMIN — VASOPRESSIN 2 UNITS: 20 INJECTION INTRAVENOUS at 07:06

## 2025-06-16 RX ADMIN — ONDANSETRON 4 MG: 2 INJECTION INTRAMUSCULAR; INTRAVENOUS at 07:06

## 2025-06-16 RX ADMIN — HYDRALAZINE HYDROCHLORIDE 10 MG: 20 INJECTION, SOLUTION INTRAMUSCULAR; INTRAVENOUS at 09:06

## 2025-06-16 NOTE — ANESTHESIA PREPROCEDURE EVALUATION
06/16/2025  Froylan Borja is a 70 y.o., male.    Scheduled providers: Dayan Marks MD; Pk Louis Jr., MD; Yuan Mancia CRNA   Procedure: IR RF ABLATION LIVER TUMORS (XPD)   Diagnosis:      HCC (hepatocellular carcinoma) [C22.0]       Pre-operative evaluation for * No procedures listed *      Encounter Diagnosis   Name Primary?    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  No date: Anxiety  No date: Cirrhosis  07/01/2021: Diverticulosis: seen on colonoscopy 2014  No date: Glaucoma  06/17/2020: Gross hematuria  01/30/2025: HCC (hepatocellular carcinoma)  No date: Hepatocellular carcinoma  No date: History of hepatitis C, s/p successful Rx w/ SVR24 - 1/2017      Comment:  genotype 1;  relapse following PegIFN+RBV+Victrelis;                prior relapse following PegIFN+RBV S/p 12 weeks harvoni +               RBV w/ SVR  No date: Homelessness  No date: Hypertension  11/04/2021: Major depressive disorder in full remission  No date: Paresthesia      Comment:  feet, bilaterally  12/15/2023: Rhabdomyolysis  11/03/2021: Septic arthritis of right ankle    Past Surgical History:   Procedure Laterality Date    APPLICATION OF SPLIT-THICKNESS SKIN GRAFT (STSG) TO LOWER EXTREMITY Right 4/30/2025    Procedure: RIGHT LOWER EXTREMITY SPLIT THICKNESS SKIN GRAFT APPLICATION;   Surgeon: Alli Strong MD;  Location: [facility] OR 2ND FLR;  Service: Plastics;  Laterality: Right;    APPLICATION OF WOUND VACUUM-ASSISTED CLOSURE DEVICE Right 4/21/2025    Procedure: APPLICATION, WOUND VAC;  Surgeon: Estella Ramsay MD;  Location: [facility] OR 2ND FLR;  Service: General;  Laterality: Right;    APPLICATION OF WOUND VACUUM-ASSISTED CLOSURE DEVICE Right 4/24/2025    Procedure: APPLICATION, WOUND VAC;  Surgeon: Estella Ramsay MD;  Location: [facility] OR 2ND FLR;  Service: General;  Laterality: Right;    APPLICATION OF WOUND VACUUM-ASSISTED CLOSURE DEVICE Right 4/30/2025    Procedure: APPLICATION, WOUND VAC;  Surgeon: Alli Strong MD;  Location: [facility] OR 2ND FLR;  Service: Plastics;  Laterality: Right;    COLONOSCOPY N/A 9/19/2024    Procedure: COLONOSCOPY;  Surgeon: Mo Patino MD;  Location: [facility] ENDO (4TH FLR);  Service: Endoscopy;  Laterality: N/A;  Ref by Dr. NIGHAT Tamez, PT/INR/CBC am procedure, Suprep, email - PC  9/16-lvm for pre call-tb-labs 8/22/24    ESOPHAGOGASTRODUODENOSCOPY N/A 9/19/2024    Procedure: EGD (ESOPHAGOGASTRODUODENOSCOPY);  Surgeon: Mo Patino MD;  Location: [facility] ENDO (4TH FLR);  Service: Endoscopy;  Laterality: N/A;    HARVESTING OF SKIN GRAFT Right 4/30/2025    Procedure: SURGICAL PROCUREMENT, GRAFT, SKIN;  Surgeon: Alli Strong MD;  Location: [facility] OR 2ND FLR;  Service: Plastics;  Laterality: Right;    IRRIGATION AND DEBRIDEMENT OF LOWER EXTREMITY Right 4/30/2025    Procedure: IRRIGATION AND DEBRIDEMENT, LOWER EXTREMITY;  Surgeon: Alli Strong MD;  Location: [facility] OR 2ND FLR;  Service: Plastics;  Laterality: Right;    LIVER BIOPSY      REPLACEMENT OF WOUND VACUUM-ASSISTED CLOSURE DEVICE Right 4/24/2025    Procedure: REPLACEMENT, WOUND VAC;  Surgeon: Estella Ramsay MD;  Location: [facility] OR 2ND FLR;  Service: General;  Laterality: Right;    REPLACEMENT OF WOUND VACUUM-ASSISTED CLOSURE DEVICE Right 4/28/2025    Procedure:  "REPLACEMENT, WOUND VAC;  Surgeon: Jose G Mackay MD;  Location: [facility] OR [address] [place] [address];  Service: General;  Laterality: Right;    WOUND DEBRIDEMENT Right 4/21/2025    Procedure: DEBRIDEMENT, WOUND;  Surgeon: Estella Ramsay MD;  [facility];  Service: General;  Laterality: Right;       Tobacco Use History[3]    Social History     Substance and Sexual Activity   Alcohol Use Yes    Alcohol/week: 28.0 standard drinks of alcohol    Types: 28 Cans of beer per week    Comment: 2-3 beers daily       Physical Activity: Inactive (4/21/2025)    Exercise Vital Sign     Days of Exercise per Week: 0 days     Minutes of Exercise per Session: 0 min       No birth history on file.  Recent Labs     06/13/25  0957   HCT 40.0     Recent Labs     06/13/25  0957        Recent Labs     06/13/25  0957   K 4.1     Recent Labs     06/13/25  0957   CREATININE 1.4     Recent Labs     06/13/25  0957   *     No results for input(s): "PT" in the last 72 hours.  Vitals:    06/16/25 0558 06/16/25 0615   BP: (!) 171/80 (!) 178/82   BP Location: Right arm Left arm   Patient Position: Lying Lying   Pulse: 64    Resp: 16    Temp: 36.4 °C (97.5 °F)    SpO2: 97%    Weight: 118.4 kg (261 lb)    Height: 6' 3" (1.905 m)                        Pre-op Assessment          Review of Systems  Anesthesia Hx:  No problems with previous Anesthesia                Hematology/Oncology:  Hematology Normal                  Hematology Comments: DVT on month ago, no PE, LAST eliquis Friday am    Current/Recent Cancer. (this HCCA, no mets, diagnosed 4 months ago)                Cardiovascular:     Denies Pacemaker. Hypertension, well controlled   Denies MI.     Denies CABG/stent.    Denies Angina.                                    Pulmonary:  Pulmonary Normal   Denies COPD.  Denies Asthma.   Denies Shortness of breath.                  Renal/:   Denies Chronic Renal Disease.                Hepatic/GI:      Liver disease: HCCA. Hepatitis " (treated 6 years ago), C              Neurological:  Denies TIA.  Denies CVA.    Denies Seizures.                                Endocrine:  Denies Diabetes.               Physical Exam  General: Well nourished, Cooperative, Alert and Oriented    Airway:  Mallampati: II   Mouth Opening: Normal  TM Distance: Normal  Tongue: Normal  Neck ROM: Normal ROM        Anesthesia Plan  Type of Anesthesia, risks & benefits discussed:    Anesthesia Type: Gen ETT  Intra-op Monitoring Plan: Standard ASA Monitors  Post Op Pain Control Plan: multimodal analgesia and IV/PO Opioids PRN  Induction:  IV  Informed Consent: Informed consent signed with the Patient and all parties understand the risks and agree with anesthesia plan.  All questions answered.   ASA Score: 3  Day of Surgery Review of History & Physical: H&P Update referred to the surgeon/provider.    Ready For Surgery From Anesthesia Perspective.     .    Date/Time: 4/30/2025 8:13 AM     Performed by: Lesley Borrero CRNA  Authorized by: Efrain Hansen MD    Intubation:     Induction:  Intravenous    Intubated:  Postinduction    Mask Ventilation:  Easy with oral airway    Attempts:  1    Attempted By:  CRNA    Method of Intubation:  Video laryngoscopy    Blade:  Bundy 3    Laryngeal View Grade: Grade I - full view of cords      Difficult Airway Encountered?: No      Complications:  None    Airway Device:  Oral endotracheal tube    Airway Device Size:  7.5    Style/Cuff Inflation:  Cuffed (inflated to minimal occlusive pressure)    Inflation Amount (mL):  10    Tube secured:  23    Secured at:  The lips    Placement Verified By:  Capnometry    Complicating Factors:  None    Findings Post-Intubation:  BS equal bilateral and atraumatic/condition of teeth unchanged       Latest Reference Range & Units 05/01/25 12:59   PTT 21.0 - 32.0 seconds 40.4 (H)   (H): Data is abnormally high     Latest Reference Range & Units 04/20/25 12:02   BNP 0 - 99 pg/mL 25       Vent.  Rate :  83 BPM     Atrial Rate :  83 BPM      P-R Int : 158 ms          QRS Dur :  82 ms       QT Int : 372 ms       P-R-T Axes :  42  36  39 degrees     QTcB Int : 437 ms     Normal sinus rhythm   Abnormal R wave progression in the precordial leads - Cannot rule out   Anterior infarct ,age undetermined but doubt possibly lead placement   Abnormal ECG   When compared with ECG of 15-Dec-2023 14:09,   Minimal criteria for Anterior infarct are now possible   Confirmed by Froylan Pineda ([id number]) on 4/18/2025 9:13:03 AM       4/2025  Left Ventricle The left ventricle is normal in size. Normal wall thickness. Normal wall motion. There is normal systolic function with a visually estimated ejection fraction of 65 - 70%. There is normal diastolic function.   Right Ventricle The right ventricle is normal in size. Wall thickness is normal. Systolic function is normal.   Left Atrium Normal left atrial size.   Right Atrium Normal right atrial size.   Aortic Valve The aortic valve is a trileaflet valve. There is mild aortic valve sclerosis. There is normal leaflet mobility. Aortic valve peak velocity is 1.4 m/s. Mean gradient is 5 mmHg.   Mitral Valve The mitral valve is structurally normal. There is normal leaflet mobility.   Tricuspid Valve The tricuspid valve is structurally normal. There is normal leaflet mobility. There is trace regurgitation.   Pulmonic Valve Not well visualized due to poor acoustic window.   IVC/SVC Normal venous pressure at 3 mmHg.   Ascending Aorta The Aortic root is normal in size measuring 3.85 cm. The Ascending aorta is normal measuring 3.39 cm.   Pericardium and Other Findings There is no pericardial effusion.          [1] (Not in a hospital admission)  [2]   Current Outpatient Medications on File Prior to Encounter   Medication Sig Dispense Refill    buPROPion (WELLBUTRIN SR) 150 MG TBSR 12 hr tablet Take 1 tablet (150 mg total) by mouth 2 (two) times daily. 180 tablet 1    carvediloL (COREG) 12.5 MG  tablet Take 1 tablet (12.5 mg total) by mouth 2 (two) times daily with meals. 180 tablet 1    cholecalciferol, vitamin D3, 1,250 mcg (50,000 unit) capsule Take 1 capsule (50,000 Units total) by mouth twice a week. 24 capsule 1    furosemide (LASIX) 40 MG tablet Take 1 tablet (40 mg total) by mouth daily as needed (edema). 30 tablet 5    gabapentin (NEURONTIN) 300 MG capsule One in AM and two at bedtime 90 capsule 6    apixaban (ELIQUIS) 5 mg Tab Take 1 tablet (5 mg total) by mouth 2 (two) times daily. 60 tablet 0     No current facility-administered medications on file prior to encounter.   [3]   Social History  Tobacco Use   Smoking Status Former    Types: Cigars   Smokeless Tobacco Never   Tobacco Comments    smokes cigars 20 per month

## 2025-06-16 NOTE — PLAN OF CARE
Pt arrived to IR room 173/CT w/ RN & CRNA. AAO x4. Name//allergies/procedure verified. Pt will be monitored by RN & CRNA @ bedside throughout procedure/sedation. Sedation/airway/vs per anesthesia team.

## 2025-06-16 NOTE — PLAN OF CARE
Pt to be transferred to PACU 5 for 3 hr recovery. Pt remains under the care of anesthesia team for transfer.

## 2025-06-16 NOTE — BRIEF OP NOTE
Radiology Post-Procedure Note    Pre Op Diagnosis: hcc    Post Op Diagnosis: same    Procedure: liver ablation     Procedure performed by: Dayan Marks MD    Written Informed Consent Obtained: Yes    Specimen Removed: NO    Estimated Blood Loss: less than 50     Findings:   Using US and CT guidance mircowave ablation of the segment 5 liver lesion.      Patient tolerated procedure well.    Dayan Marks MD  Interventional Radiology

## 2025-06-16 NOTE — ANESTHESIA POSTPROCEDURE EVALUATION
Anesthesia Post Evaluation    Patient: Froylan Borja    Procedure(s) Performed: * No procedures listed *    Final Anesthesia Type: general      Patient location during evaluation: PACU  Patient participation: Yes- Able to Participate  Level of consciousness: awake and alert and oriented  Post-procedure vital signs: reviewed and stable  Pain management: adequate  Airway patency: patent    PONV status at discharge: No PONV  Anesthetic complications: no      Cardiovascular status: stable  Respiratory status: unassisted, spontaneous ventilation and room air  Hydration status: euvolemic  Follow-up not needed.              Vitals Value Taken Time   /84 06/16/25 09:19   Temp 36.3 °C (97.3 °F) 06/16/25 08:15   Pulse 57 06/16/25 09:24   Resp 12 06/16/25 09:24   SpO2 96 % 06/16/25 09:24   Vitals shown include unfiled device data.      No case tracking events are documented in the log.      Pain/Matt Score: Pain Rating Prior to Med Admin: 5 (6/16/2025  8:42 AM)  Matt Score: 10 (6/16/2025  8:30 AM)

## 2025-06-16 NOTE — ANESTHESIA PROCEDURE NOTES
Intubation    Date/Time: 6/16/2025 7:12 AM    Performed by: Yuan Mnacia CRNA  Authorized by: Pk Louis Jr., MD    Intubation:     Induction:  Intravenous    Intubated:  Postinduction    Mask Ventilation:  Easy mask    Attempts:  1    Attempted By:  CRNA    Method of Intubation:  Video laryngoscopy    Blade:  Bundy 3    Laryngeal View Grade: Grade IIA - cords partially seen      Difficult Airway Encountered?: No      Complications:  None    Airway Device:  Oral endotracheal tube    Airway Device Size:  7.5    Style/Cuff Inflation:  Cuffed    Inflation Amount (mL):  10    Tube secured:  20    Secured at:  The teeth    Placement Verified By:  Capnometry    Complicating Factors:  None    Findings Post-Intubation:  BS equal bilateral and atraumatic/condition of teeth unchanged

## 2025-06-16 NOTE — TRANSFER OF CARE
"Anesthesia Transfer of Care Note    Patient: Froylan Borja    Procedure(s) Performed: * No procedures listed *    Patient location: PACU    Anesthesia Type: general    Transport from OR: Transported from OR on 6-10 L/min O2 by face mask with adequate spontaneous ventilation    Post pain: adequate analgesia    Post assessment: no apparent anesthetic complications and tolerated procedure well    Post vital signs: stable    Level of consciousness: awake and alert    Nausea/Vomiting: no nausea/vomiting    Complications: none    Transfer of care protocol was followed    Last vitals: Visit Vitals  BP (!) 178/82 (BP Location: Left arm, Patient Position: Lying)   Pulse 64   Temp 36.4 °C (97.5 °F)   Resp 16   Ht 6' 3" (1.905 m)   Wt 118.4 kg (261 lb)   SpO2 97%   BMI 32.62 kg/m²     "

## 2025-06-16 NOTE — NURSING TRANSFER
Nursing Transfer Note      6/16/2025   9:26 AM    Nurse giving handoff:chelsie scruggs   Nurse receiving handoff:[facility] RN     Reason patient is being transferred: post procedure     Transfer To: [facility] 31    Transfer via stretcher    Transported by rn  Any special needs or follow-up needed: routine      Chart send with patient: Yes    Notified: spouse    Patient reassessed at:9:15

## 2025-06-16 NOTE — DISCHARGE INSTRUCTIONS
ABLATION DISCHARGE EDUCATION   Information:   An ablation is a procedure in which a needle is placed through the skin into a mass within the body, and either heat, cold, or alcohol is used to kill tumor or nerve tissue.    What should I expect after the ablation?      There may be some soreness around the biopsy site.    You may return to work after 24 hours unless your primary doctor instructs you otherwise.    You do not have any diet restrictions because of this procedure but should continue any that were given to you by any other doctors.    Continue all previously prescribed medications with the exception of Coumadin/warfarin which should be resumed after 24 hours. Pradaxa, Xarelto, Eliquis or Savaysa can be resumed after 48 hours.   You may be prescribed an antibiotic to take for at least 5 days post op. You will also receive a prescription for a pain medication and potentially antinausea medication. Please take as prescribed    Bathing & Wound Care:    You may shower after 24 hours; do not tub bath or submerge in water for 3 days (bath tub, hot tub, swimming pool, river or any other body of water).    Dressing to stay on 2-3 days (clean and dry)    If the biopsy site(s) become red, tender, swollen, or starts to drain, contact us.    Avoid heavy lifting and strenuous activity for 3 days.     Follow-up visit information:   Repeat imaging will be performed in about a month, and you will have a  clinic visit with either the Interventional radiology department or at your ordering providers office. If you are not contacted within a month to set up follow up imaging or a clinic visit, please call the Interventional radiology office to set up an appointment to review the results.     Emergencies  Occasionally, a situation will require prompt attention and an emergency room visit is necessary:    Sudden chest pain, shortness of breath, or fainting    Increasing redness, swelling or drainage from the access site     Increasing pain not relieved by medication    Bleeding or drainage from the needle site that is saturating the dressing    You have shaking, chills and/or a temperature over 100.3°F    New, sudden difficulty breathing    Drop in blood pressure, and/or light-headed feeling    Interventional Radiology Clinic   For complications   [phone]. Monday - Friday, 8:00 am - 4:00 pm    [phone] After hours and on holidays. Ask to speak with the interventional radiologist on call.     For Scheduling   [phone] Monday - Friday, 8:00 am - 4:00 pm

## 2025-06-16 NOTE — H&P
Radiology History & Physical      SUBJECTIVE:     Chief Complaint: HCC    History of Present Illness:  Froylan Borja is a 70 y.o. male who presents for microwave ablation of HCC.    Past Medical History:   Diagnosis Date    Acute deep vein thrombosis (DVT) of right lower extremity 04/18/2025    Alcohol abuse     Alcohol withdrawal, uncomplicated 11/04/2021    Alcohol-induced polyneuropathy 04/11/2024    Anxiety     Cirrhosis     Diverticulosis: seen on colonoscopy 2014 07/01/2021    Glaucoma     Gross hematuria 06/17/2020    HCC (hepatocellular carcinoma) 01/30/2025    Hepatocellular carcinoma     History of hepatitis C, s/p successful Rx w/ SVR24 - 1/2017     genotype 1;  relapse following PegIFN+RBV+Victrelis; prior relapse following PegIFN+RBV S/p 12 weeks harvoni + RBV w/ SVR    Homelessness     Hypertension     Major depressive disorder in full remission 11/04/2021    Paresthesia     feet, bilaterally    Rhabdomyolysis 12/15/2023    Septic arthritis of right ankle 11/03/2021     Past Surgical History:   Procedure Laterality Date    APPLICATION OF SPLIT-THICKNESS SKIN GRAFT (STSG) TO LOWER EXTREMITY Right 4/30/2025    Procedure: RIGHT LOWER EXTREMITY SPLIT THICKNESS SKIN GRAFT APPLICATION;  Surgeon: Alli Strong MD;  Location: [facility] OR [address] [place] [address];  Service: Plastics;  Laterality: Right;    APPLICATION OF WOUND VACUUM-ASSISTED CLOSURE DEVICE Right 4/21/2025    Procedure: APPLICATION, WOUND VAC;  Surgeon: Estella Ramsay MD;  Location: [facility] OR [address] [place] [address];  Service: General;  Laterality: Right;    APPLICATION OF WOUND VACUUM-ASSISTED CLOSURE DEVICE Right 4/24/2025    Procedure: APPLICATION, WOUND VAC;  Surgeon: Estella Ramsay MD;  [facility];  Service: General;  Laterality: Right;    APPLICATION OF WOUND VACUUM-ASSISTED CLOSURE DEVICE Right 4/30/2025    Procedure: APPLICATION, WOUND VAC;  Surgeon: Alli Strong MD;  Location: [facility] OR [address] [place] [address];  Service: Plastics;  Laterality: Right;     COLONOSCOPY N/A 9/19/2024    Procedure: COLONOSCOPY;  Surgeon: Mo Patino MD;  Location: [facility] ENDO (4TH FLR);  Service: Endoscopy;  Laterality: N/A;  Ref by Dr. NIGHAT Tamez, PT/INR/CBC am procedure, Suprep, email - PC  9/16-lvm for pre call-tb-labs 8/22/24    ESOPHAGOGASTRODUODENOSCOPY N/A 9/19/2024    Procedure: EGD (ESOPHAGOGASTRODUODENOSCOPY);  Surgeon: Mo Patino MD;  Location: [facility] ENDO (4TH FLR);  Service: Endoscopy;  Laterality: N/A;    HARVESTING OF SKIN GRAFT Right 4/30/2025    Procedure: SURGICAL PROCUREMENT, GRAFT, SKIN;  Surgeon: Alli Strong MD;  Location: [facility] OR 2ND FLR;  Service: Plastics;  Laterality: Right;    IRRIGATION AND DEBRIDEMENT OF LOWER EXTREMITY Right 4/30/2025    Procedure: IRRIGATION AND DEBRIDEMENT, LOWER EXTREMITY;  Surgeon: Alli Strong MD;  Location: [facility] OR 2ND FLR;  Service: Plastics;  Laterality: Right;    LIVER BIOPSY      REPLACEMENT OF WOUND VACUUM-ASSISTED CLOSURE DEVICE Right 4/24/2025    Procedure: REPLACEMENT, WOUND VAC;  Surgeon: Estella Ramsay MD;  [facility];  Service: General;  Laterality: Right;    REPLACEMENT OF WOUND VACUUM-ASSISTED CLOSURE DEVICE Right 4/28/2025    Procedure: REPLACEMENT, WOUND VAC;  Surgeon: Jose G Mackay MD;  [facility];  Service: General;  Laterality: Right;    WOUND DEBRIDEMENT Right 4/21/2025    Procedure: DEBRIDEMENT, WOUND;  Surgeon: Estella Ramsay MD;  [facility];  Service: General;  Laterality: Right;       Home Meds:   Prior to Admission medications    Medication Sig Start Date End Date Taking? Authorizing Provider   buPROPion (WELLBUTRIN SR) 150 MG TBSR 12 hr tablet Take 1 tablet (150 mg total) by mouth 2 (two) times daily. 12/26/24 6/16/25 Yes Janki Tamez MD   carvediloL (COREG) 12.5 MG tablet Take 1 tablet (12.5 mg total) by mouth 2 (two) times daily with meals. 6/11/25  Yes Pardeep Alcantara MD   cholecalciferol, vitamin D3, 1,250  mcg (50,000 unit) capsule Take 1 capsule (50,000 Units total) by mouth twice a week. 6/12/25  Yes Pardeep Alcantara MD   furosemide (LASIX) 40 MG tablet Take 1 tablet (40 mg total) by mouth daily as needed (edema). 6/11/25  Yes Pardeep Alcantara MD   gabapentin (NEURONTIN) 300 MG capsule One in AM and two at bedtime 12/26/24  Yes Janki Tamez MD   apixaban (ELIQUIS) 5 mg Tab Take 1 tablet (5 mg total) by mouth 2 (two) times daily. 5/13/25   Ry Dooley DO     Anticoagulants/Antiplatelets: reviewed    Allergies:   Review of patient's allergies indicates:   Allergen Reactions    Zoloft [sertraline] Other (See Comments)     hyponatremia    Codeine Itching     Intolerance- doesn't like the way it makes him feel     Sedation History:  no adverse reactions    Review of Systems:   Hematological: no known coagulopathies  Respiratory: no shortness of breath  Cardiovascular: no chest pain  Gastrointestinal: no abdominal pain  Genito-Urinary: no dysuria  Musculoskeletal: negative  Neurological: no TIA or stroke symptoms         OBJECTIVE:     Vital Signs (Most Recent)  Temp: 97.5 °F (36.4 °C) (06/16/25 0558)  Pulse: 64 (06/16/25 0558)  Resp: 16 (06/16/25 0558)  BP: (!) 178/82 (06/16/25 0615)  SpO2: 97 % (06/16/25 0558)    Physical Exam:  ASA: per anesthesia  Mallampati: per anesthesia    General: no acute distress  Mental Status: alert and oriented to person, place and time  HEENT: normocephalic, atraumatic  Chest: unlabored breathing  Abdomen: nondistended  Extremity: moves all extremities    Laboratory  Lab Results   Component Value Date    INR 1.0 06/13/2025       Lab Results   Component Value Date    WBC 7.86 06/13/2025    HGB 12.8 (L) 06/13/2025    HCT 40.0 06/13/2025    MCV 94 06/13/2025     06/13/2025      Lab Results   Component Value Date     (H) 06/13/2025     06/13/2025    K 4.1 06/13/2025    CL 99 06/13/2025    CO2 28 06/13/2025    BUN 18 06/13/2025    CREATININE 1.4 06/13/2025     CALCIUM 9.0 06/13/2025    MG 1.9 05/09/2025    ALT 15 06/13/2025    AST 21 06/13/2025    ALBUMIN 3.9 06/13/2025    BILITOT 0.6 06/13/2025    BILIDIR 0.3 04/09/2025       ASSESSMENT/PLAN:     Sedation Plan: per anesthesia  Patient will undergo MWA of HCC.    Teo Major MD  Department of Radiology, PGY-3

## 2025-06-16 NOTE — PLAN OF CARE
Pt is being discharged to care of his spouse. Pt is awake and alert w/ minimal complaints of pain. Pain managed w/ PRN meds. VSS on RA. Pt tolerating PO intake. Discharge teaching done with patient and family (written and verbal), verbalized understanding. Medications delivered to bedside. Pt transported to car via wheelchair by pts family.

## 2025-06-17 VITALS
HEART RATE: 61 BPM | DIASTOLIC BLOOD PRESSURE: 79 MMHG | SYSTOLIC BLOOD PRESSURE: 162 MMHG | TEMPERATURE: 97 F | WEIGHT: 261 LBS | BODY MASS INDEX: 32.45 KG/M2 | RESPIRATION RATE: 24 BRPM | HEIGHT: 75 IN | OXYGEN SATURATION: 98 %

## 2025-06-17 NOTE — PROGRESS NOTES
Plastic & Reconstructive Surgery  Progress Note     S:  1 mo s/p surgical prep RLE wound w/ STSG, WV, and splint.      Overall doing well.  No pain  No f/c  No drainage  No major issues.      O: There were no vitals taken for this visit.   Gen:  NAD, A&O x3  R leg: graft with 100% take; no exposed critical structures; donor site healing without issue. No hematoma / seroma.     A/P: 70 y.o. male  1 mo s/p surgical prep RLE wound w/ STSG, WV, and splint.      Overall the patient has done very well.  The graft and donor sites are healing well.  There is no evidence of infection.  I reminded the patient that the scar will take 6 months to 1 year to fully mature and advised to continue applying sun screen if outside.  cont to apply light layer of vaseline over both sites. The patient verbalized understanding and agreement with this plan. All questions were answered.        Follow up:  prn  Restriction: wbat     I spent 15 minutes on this patient encounter which included review of medical records.  Over half the time was spent with the patient face to face discussing the treatment plan, counseling and coordinating care.     Alli Strong MD  06/17/2025 8:50 AM     This document was transcribed using voice recognition software without a human . It may contain typographical, grammatical, and/or syntax errors.

## 2025-06-26 ENCOUNTER — OFFICE VISIT (OUTPATIENT)
Dept: UROLOGY | Facility: CLINIC | Age: 70
End: 2025-06-26
Payer: MEDICARE

## 2025-06-26 VITALS
HEIGHT: 75 IN | SYSTOLIC BLOOD PRESSURE: 169 MMHG | WEIGHT: 254.19 LBS | DIASTOLIC BLOOD PRESSURE: 87 MMHG | HEART RATE: 68 BPM | BODY MASS INDEX: 31.61 KG/M2

## 2025-06-26 DIAGNOSIS — E66.811 CLASS 1 OBESITY WITH SERIOUS COMORBIDITY AND BODY MASS INDEX (BMI) OF 31.0 TO 31.9 IN ADULT, UNSPECIFIED OBESITY TYPE: ICD-10-CM

## 2025-06-26 DIAGNOSIS — N52.01 ERECTILE DYSFUNCTION DUE TO ARTERIAL INSUFFICIENCY: ICD-10-CM

## 2025-06-26 DIAGNOSIS — N13.8 BPH WITH URINARY OBSTRUCTION: ICD-10-CM

## 2025-06-26 DIAGNOSIS — N40.1 BPH WITH URINARY OBSTRUCTION: ICD-10-CM

## 2025-06-26 DIAGNOSIS — R97.20 ELEVATED PSA: Primary | ICD-10-CM

## 2025-06-26 PROCEDURE — 3044F HG A1C LEVEL LT 7.0%: CPT | Mod: CPTII,S$GLB,, | Performed by: NURSE PRACTITIONER

## 2025-06-26 PROCEDURE — 1160F RVW MEDS BY RX/DR IN RCRD: CPT | Mod: CPTII,S$GLB,, | Performed by: NURSE PRACTITIONER

## 2025-06-26 PROCEDURE — 3079F DIAST BP 80-89 MM HG: CPT | Mod: CPTII,S$GLB,, | Performed by: NURSE PRACTITIONER

## 2025-06-26 PROCEDURE — 99215 OFFICE O/P EST HI 40 MIN: CPT | Mod: S$GLB,,, | Performed by: NURSE PRACTITIONER

## 2025-06-26 PROCEDURE — 99999 PR PBB SHADOW E&M-EST. PATIENT-LVL III: CPT | Mod: PBBFAC,,, | Performed by: NURSE PRACTITIONER

## 2025-06-26 PROCEDURE — G2211 COMPLEX E/M VISIT ADD ON: HCPCS | Mod: S$GLB,,, | Performed by: NURSE PRACTITIONER

## 2025-06-26 PROCEDURE — 4010F ACE/ARB THERAPY RXD/TAKEN: CPT | Mod: CPTII,S$GLB,, | Performed by: NURSE PRACTITIONER

## 2025-06-26 PROCEDURE — 1159F MED LIST DOCD IN RCRD: CPT | Mod: CPTII,S$GLB,, | Performed by: NURSE PRACTITIONER

## 2025-06-26 PROCEDURE — 1101F PT FALLS ASSESS-DOCD LE1/YR: CPT | Mod: CPTII,S$GLB,, | Performed by: NURSE PRACTITIONER

## 2025-06-26 PROCEDURE — 3008F BODY MASS INDEX DOCD: CPT | Mod: CPTII,S$GLB,, | Performed by: NURSE PRACTITIONER

## 2025-06-26 PROCEDURE — 3077F SYST BP >= 140 MM HG: CPT | Mod: CPTII,S$GLB,, | Performed by: NURSE PRACTITIONER

## 2025-06-26 PROCEDURE — 1126F AMNT PAIN NOTED NONE PRSNT: CPT | Mod: CPTII,S$GLB,, | Performed by: NURSE PRACTITIONER

## 2025-06-26 PROCEDURE — 3288F FALL RISK ASSESSMENT DOCD: CPT | Mod: CPTII,S$GLB,, | Performed by: NURSE PRACTITIONER

## 2025-06-26 NOTE — PATIENT INSTRUCTIONS
Penile Self-Injection Procedure  Self-injection is a good option for many men with erectile dysfunction (ED). A tiny needle is used to inject medication into the penis. This helps your penis get hard and stay that way long enough for sex. And sex and orgasm will feel as good as always. You may be nervous about doing self-injection at first. But with practice, it will get easier. Your healthcare provider will show you how to do self-injection the first time. The simple steps are outlined on this sheet.  Preparing for Injection  Wash your hands well with soap and water.  Prepare the medication (if needed).  Sit or  a comfortable position in a warm, well-lit room. If you need to, sit or  front of a mirror.  Find an injection site on one side of your penis, in a place with no visible veins. (Dont inject into the top, bottom, or head of the penis.)  Clean the injection site with an alcohol swab. Grasp the head of your penis firmly with your thumb and forefinger (dont just pinch the skin). Stretch the penis so the skin on the shaft is taut.  Injecting the Medication  Rest your penis against your inner thigh and pull it gently toward your knee. Dont twist or rotate it. This way youll be sure to inject the medication into the spot you chose and cleaned before.  Hold the syringe between your thumb and fingers, like youre holding a pen. Rest your forearm on your thigh for support.  Insert the needle at a 90-degree angle (perpendicular) to the shaft. Do this quickly to reduce discomfort. (The needle should go in easily. If it doesnt, stop right away.)  Move your thumb to the plunger. Press down to inject the medication, counting to 5.  Remove the needle and dispose of it safely.     The injection site can be in any part of the shaded area.     Insert the needle straight into the penis.    Gaining an Erection  Apply pressure to the injection site for a few minutes. This prevents swelling and bruising and  helps spread the medication.   Stand up. This may help your erection develop. Foreplay often helps, too.  Your penis should become firm within 10-20 minutes. The erection will last long enough for sex, and maybe longer.  Call Your Doctor If You Have:   An erection that lasts longer than 3-4  hours  Bleeding or bruising  Severe pain  Scarring or curvature of the penis       © [id number] [facility], [address] [place] [address], [place] [address]. All rights reserved. This information is not intended as a substitute for professional medical care. Always follow your healthcare professional's instructions.

## 2025-06-26 NOTE — PROGRESS NOTES
CHIEF COMPLAINT:    Froylan Borja is a 70 y.o. male presents today for Follow Up Visit    HISTORY OF PRESENTING ILLINESS:    Froylan Borja is a 70 y.o. male who male who is an established patient in our clinic. He has a history of BPH, Bulbourethral stricture. Hematuria with a negative workup in 2020, hypogonadism, scrotal cyst. & ED >1 year.  He has history of Cirrhosis/Hep C   He was last seen in clinic 03/22/2025 with Dr. Lara.   They discussed ED. He had failed Viagra. Rx for Cialis 20mg given.   PSA at visit was 6.1 from 01/15/2025. Repeated was 5.77.   Prostate MRI was ordered and scheduled for 04/24/2025.     He is here today for a 3 month f/u visit.   Reports that he never tried the Cialis. 'Can't say it works or not'.   The Viagra I had given him did not.     We discussed his PSA results and reason for MRI.           REVIEW OF SYSTEMS:  Review of Systems   Constitutional: Negative.  Negative for chills and fever.   Eyes:  Negative for double vision.   Respiratory:  Negative for cough and shortness of breath.    Cardiovascular:  Negative for chest pain and palpitations.   Gastrointestinal:  Negative for abdominal pain, constipation, diarrhea, nausea and vomiting.   Genitourinary:  Negative for dysuria, hematuria and urgency.        See HPI   Musculoskeletal:  Negative for falls.   Neurological:  Negative for dizziness and seizures.   Endo/Heme/Allergies:  Negative for polydipsia.         PATIENT HISTORY:    Past Medical History:   Diagnosis Date    Acute deep vein thrombosis (DVT) of right lower extremity 04/18/2025    Alcohol abuse     Alcohol withdrawal, uncomplicated 11/04/2021    Alcohol-induced polyneuropathy 04/11/2024    Anxiety     Cirrhosis     Diverticulosis: seen on colonoscopy 2014 07/01/2021    Glaucoma     Gross hematuria 06/17/2020    HCC (hepatocellular carcinoma) 01/30/2025    Hepatocellular carcinoma     History of hepatitis C, s/p successful Rx w/ SVR24 - 1/2017     genotype 1;  relapse  following PegIFN+RBV+Victrelis; prior relapse following PegIFN+RBV S/p 12 weeks harvoni + RBV w/ SVR    Homelessness     Hypertension     Major depressive disorder in full remission 11/04/2021    Paresthesia     feet, bilaterally    Rhabdomyolysis 12/15/2023    Septic arthritis of right ankle 11/03/2021       Past Surgical History:   Procedure Laterality Date    APPLICATION OF SPLIT-THICKNESS SKIN GRAFT (STSG) TO LOWER EXTREMITY Right 4/30/2025    Procedure: RIGHT LOWER EXTREMITY SPLIT THICKNESS SKIN GRAFT APPLICATION;  Surgeon: Alli Strong MD;  Location: [facility] OR 2ND FLR;  Service: Plastics;  Laterality: Right;    APPLICATION OF WOUND VACUUM-ASSISTED CLOSURE DEVICE Right 4/21/2025    Procedure: APPLICATION, WOUND VAC;  Surgeon: Estella Ramsay MD;  Location: [facility] OR 2ND FLR;  Service: General;  Laterality: Right;    APPLICATION OF WOUND VACUUM-ASSISTED CLOSURE DEVICE Right 4/24/2025    Procedure: APPLICATION, WOUND VAC;  Surgeon: Estella Ramsay MD;  Location: [facility] OR 2ND FLR;  Service: General;  Laterality: Right;    APPLICATION OF WOUND VACUUM-ASSISTED CLOSURE DEVICE Right 4/30/2025    Procedure: APPLICATION, WOUND VAC;  Surgeon: Alli Strong MD;  Location: [facility] OR 2ND FLR;  Service: Plastics;  Laterality: Right;    COLONOSCOPY N/A 9/19/2024    Procedure: COLONOSCOPY;  Surgeon: Mo Patino MD;  Location: [facility] ENDO (4TH FLR);  Service: Endoscopy;  Laterality: N/A;  Ref by Dr. NIGHAT Tamez, PT/INR/CBC am procedure, Suprep, email - PC  9/16-lvm for pre call-tb-labs 8/22/24    ESOPHAGOGASTRODUODENOSCOPY N/A 9/19/2024    Procedure: EGD (ESOPHAGOGASTRODUODENOSCOPY);  Surgeon: Mo Patino MD;  Location: [facility] ENDO (4TH FLR);  Service: Endoscopy;  Laterality: N/A;    HARVESTING OF SKIN GRAFT Right 4/30/2025    Procedure: SURGICAL PROCUREMENT, GRAFT, SKIN;  Surgeon: Alli Strong MD;  Location: [facility] OR 2ND FLR;  Service: Plastics;  Laterality: Right;    IRRIGATION AND  DEBRIDEMENT OF LOWER EXTREMITY Right 4/30/2025    Procedure: IRRIGATION AND DEBRIDEMENT, LOWER EXTREMITY;  Surgeon: Alli Strong MD;  [facility];  Service: Plastics;  Laterality: Right;    LIVER BIOPSY      REPLACEMENT OF WOUND VACUUM-ASSISTED CLOSURE DEVICE Right 4/24/2025    Procedure: REPLACEMENT, WOUND VAC;  Surgeon: Estella Ramsay MD;  Location: [facility] OR 2ND FLR;  Service: General;  Laterality: Right;    REPLACEMENT OF WOUND VACUUM-ASSISTED CLOSURE DEVICE Right 4/28/2025    Procedure: REPLACEMENT, WOUND VAC;  Surgeon: Jose G Mackay MD;  Location: [facility] OR 2ND FLR;  Service: General;  Laterality: Right;    WOUND DEBRIDEMENT Right 4/21/2025    Procedure: DEBRIDEMENT, WOUND;  Surgeon: Estella Ramsay MD;  [facility];  Service: General;  Laterality: Right;       Family History   Problem Relation Name Age of Onset    Colonic polyp Mother 95     Cancer Mother 95         colon vs polyps, colectomy    Diabetes Mellitus Father      Hypertension Father      Cancer Father          ? CRC    Diabetes Father      Cancer Sister          ? CRC    Colonic polyp Brother      Cirrhosis Neg Hx         Social History[1]    Allergies:  Zoloft [sertraline] and Codeine    Medications:  Current Medications[2]    PHYSICAL EXAMINATION:  Physical Exam  Vitals and nursing note reviewed.   Constitutional:       General: He is awake.      Appearance: Normal appearance. He is obese.   HENT:      Head: Normocephalic.      Right Ear: External ear normal.      Left Ear: External ear normal.      Nose: Nose normal.   Cardiovascular:      Rate and Rhythm: Normal rate.   Pulmonary:      Effort: Pulmonary effort is normal. No respiratory distress.   Abdominal:      Tenderness: There is no right CVA tenderness or left CVA tenderness.   Musculoskeletal:         General: Normal range of motion.      Cervical back: Normal range of motion.   Skin:     General: Skin is warm and dry.   Neurological:       General: No focal deficit present.      Mental Status: He is alert and oriented to person, place, and time.   Psychiatric:         Mood and Affect: Mood normal.         Behavior: Behavior is cooperative.           LABS:        Lab Results   Component Value Date    PSA 5.77 (H) 03/22/2025    PSA 2.8 04/17/2024    PSA 3.5 07/01/2021    PSADIAG 6.1 (H) 01/15/2025       Lab Results   Component Value Date    CREATININE 1.4 06/13/2025    EGFRNORACEVR 54 (L) 06/13/2025               IMPRESSION:    Encounter Diagnoses   Name Primary?    Elevated PSA Yes    BPH with urinary obstruction     Erectile dysfunction due to arterial insufficiency     Class 1 obesity with serious comorbidity and body mass index (BMI) of 31.0 to 31.9 in adult, unspecified obesity type          Assessment:       1. Elevated PSA    2. BPH with urinary obstruction    3. Erectile dysfunction due to arterial insufficiency    4. Class 1 obesity with serious comorbidity and body mass index (BMI) of 31.0 to 31.9 in adult, unspecified obesity type        Plan:         I spent a total of 40 minutes on the day of the visit. This includes face to face time and non-face to face time preparing to see the patient (eg, review of tests), obtaining and/or reviewing separately obtained history, documenting clinical information in the electronic or other health record, independently interpreting results and communicating results to the patient/family/caregiver, or care coordinator.  We discussed his ED and the contributory factors.  Importance to continue follow up with PCP for underlying medical conditions causing ED. We discussed the physiological as well as his psychological aspects that contribute to ED.  Reviewed the 1st, 2nd, & 3rd line therapies for ED.  The benefits, expectations risks, se of the different therapies.  He is going to try the Cialis.  If Cialis not working then will try ICI.   Educational materials given.  Reviewed his prostate cancer risk, reason  for MRI.  We rescheduled his Prostate MRI.   F/u based on results.    Recommended lifestyle modifications with a proper, healthy diet, good hydration but during the day. Reducing bladder irritants.   Benefits of regular exercise and weight loss; keeping a healthy weight.   .     The visit today is associated with current or anticipated ongoing medical care related to this patient's single serious condition/complex condition.                [1]   Social History  Socioeconomic History    Marital status:    Tobacco Use    Smoking status: Former     Types: Cigars    Smokeless tobacco: Never    Tobacco comments:     smokes cigars 20 per month   Substance and Sexual Activity    Alcohol use: Yes     Alcohol/week: 28.0 standard drinks of alcohol     Types: 28 Cans of beer per week     Comment: 2-3 beers daily    Drug use: No   Social History Narrative     (mental health issues), retired  at [employer]. No kids. 2 dogs. No exercise.     Social Drivers of Health     Financial Resource Strain: Low Risk  (4/21/2025)    Overall Financial Resource Strain (CARDIA)     Difficulty of Paying Living Expenses: Not very hard   Recent Concern: Financial Resource Strain - Medium Risk (4/19/2025)    Overall Financial Resource Strain (CARDIA)     Difficulty of Paying Living Expenses: Somewhat hard   Food Insecurity: No Food Insecurity (4/21/2025)    Hunger Vital Sign     Worried About Running Out of Food in the Last Year: Never true     Ran Out of Food in the Last Year: Never true   Transportation Needs: No Transportation Needs (4/21/2025)    PRAPARE - Transportation     Lack of Transportation (Medical): No     Lack of Transportation (Non-Medical): No   Physical Activity: Inactive (4/21/2025)    Exercise Vital Sign     Days of Exercise per Week: 0 days     Minutes of Exercise per Session: 0 min   Stress: Stress Concern Present (4/21/2025)    [employer] [place] [employer] - Occupational Stress Questionnaire      Feeling of Stress : To some extent   Housing Stability: Low Risk  (4/21/2025)    Housing Stability Vital Sign     Unable to Pay for Housing in the Last Year: No     Homeless in the Last Year: No   [2]   Current Outpatient Medications:     apixaban (ELIQUIS) 5 mg Tab, Take 1 tablet (5 mg total) by mouth 2 (two) times daily., Disp: 60 tablet, Rfl: 0    buPROPion (WELLBUTRIN SR) 150 MG TBSR 12 hr tablet, Take 1 tablet (150 mg total) by mouth 2 (two) times daily., Disp: 180 tablet, Rfl: 1    carvediloL (COREG) 12.5 MG tablet, Take 1 tablet (12.5 mg total) by mouth 2 (two) times daily with meals., Disp: 180 tablet, Rfl: 1    cholecalciferol, vitamin D3, 1,250 mcg (50,000 unit) capsule, Take 1 capsule (50,000 Units total) by mouth twice a week., Disp: 24 capsule, Rfl: 1    furosemide (LASIX) 40 MG tablet, Take 1 tablet (40 mg total) by mouth daily as needed (edema)., Disp: 30 tablet, Rfl: 5    gabapentin (NEURONTIN) 300 MG capsule, One in AM and two at bedtime, Disp: 90 capsule, Rfl: 6

## 2025-07-09 ENCOUNTER — OFFICE VISIT (OUTPATIENT)
Dept: INTERNAL MEDICINE | Facility: CLINIC | Age: 70
End: 2025-07-09
Payer: MEDICARE

## 2025-07-09 VITALS
HEIGHT: 75 IN | HEART RATE: 75 BPM | BODY MASS INDEX: 31.64 KG/M2 | OXYGEN SATURATION: 99 % | DIASTOLIC BLOOD PRESSURE: 76 MMHG | SYSTOLIC BLOOD PRESSURE: 164 MMHG | WEIGHT: 254.44 LBS

## 2025-07-09 DIAGNOSIS — R60.0 BILATERAL LEG EDEMA: ICD-10-CM

## 2025-07-09 DIAGNOSIS — I10 ESSENTIAL HYPERTENSION: ICD-10-CM

## 2025-07-09 DIAGNOSIS — Z79.01 CURRENT LONG-TERM USE OF ANTICOAGULANT MEDICATION WITH HISTORY OF DEEP VENOUS THROMBOSIS (DVT): Primary | ICD-10-CM

## 2025-07-09 DIAGNOSIS — I72.4 PSEUDOANEURYSM OF FEMORAL ARTERY: ICD-10-CM

## 2025-07-09 DIAGNOSIS — Z86.718 CURRENT LONG-TERM USE OF ANTICOAGULANT MEDICATION WITH HISTORY OF DEEP VENOUS THROMBOSIS (DVT): Primary | ICD-10-CM

## 2025-07-09 DIAGNOSIS — E55.9 MILD VITAMIN D DEFICIENCY: ICD-10-CM

## 2025-07-09 DIAGNOSIS — Z86.19 HISTORY OF HEPATITIS C: ICD-10-CM

## 2025-07-09 DIAGNOSIS — K74.69 OTHER CIRRHOSIS OF LIVER: ICD-10-CM

## 2025-07-09 DIAGNOSIS — I82.511 CHRONIC DEEP VEIN THROMBOSIS (DVT) OF FEMORAL VEIN OF RIGHT LOWER EXTREMITY: ICD-10-CM

## 2025-07-09 PROBLEM — S81.801A LEG WOUND, RIGHT: Status: RESOLVED | Noted: 2025-04-28 | Resolved: 2025-07-09

## 2025-07-09 PROCEDURE — 99999 PR PBB SHADOW E&M-EST. PATIENT-LVL III: CPT | Mod: PBBFAC,,, | Performed by: INTERNAL MEDICINE

## 2025-07-09 RX ORDER — CARVEDILOL 25 MG/1
25 TABLET ORAL 2 TIMES DAILY WITH MEALS
Qty: 180 TABLET | Refills: 3 | Status: SHIPPED | OUTPATIENT
Start: 2025-07-09

## 2025-07-09 NOTE — PROGRESS NOTES
INTERNAL MEDICINE CLINIC - SAME DAY APPOINTMENT  Progress Note    PRESENTING HISTORY     PCP: Janki Tamez MD    Chief Complaint/Reason for Visit:     Chief Complaint   Patient presents with    Follow-up     Return to work check up      Admission Date: 4/18/2025  Hospital Length of Stay: 25 days  Discharge Date and Time: 05/13/2025 4:47 PM  Procedure(s) (LRB):  RIGHT LOWER EXTREMITY SPLIT THICKNESS SKIN GRAFT APPLICATION (Right)  SURGICAL PROCUREMENT, GRAFT, SKIN (Right)  APPLICATION, WOUND VAC (Right)  IRRIGATION AND DEBRIDEMENT, LOWER EXTREMITY (Righ    Today:  No chest pain or SOB.  No leg swelling.    PAST HISTORY:     Past Medical History:   Diagnosis Date    Acute deep vein thrombosis (DVT) of right lower extremity 04/18/2025    Alcohol abuse     Alcohol withdrawal, uncomplicated 11/04/2021    Alcohol-induced polyneuropathy 04/11/2024    Anxiety     Cirrhosis     Diverticulosis: seen on colonoscopy 2014 07/01/2021    Glaucoma     Gross hematuria 06/17/2020    HCC (hepatocellular carcinoma) 01/30/2025    Hepatocellular carcinoma     History of hepatitis C, s/p successful Rx w/ SVR24 - 1/2017     genotype 1;  relapse following PegIFN+RBV+Victrelis; prior relapse following PegIFN+RBV S/p 12 weeks harvoni + RBV w/ SVR    Homelessness     Hypertension     Major depressive disorder in full remission 11/04/2021    Paresthesia     feet, bilaterally    Rhabdomyolysis 12/15/2023    Septic arthritis of right ankle 11/03/2021       Past Surgical History:   Procedure Laterality Date    APPLICATION OF SPLIT-THICKNESS SKIN GRAFT (STSG) TO LOWER EXTREMITY Right 4/30/2025    Procedure: RIGHT LOWER EXTREMITY SPLIT THICKNESS SKIN GRAFT APPLICATION;  Surgeon: Alli Strong MD;  Location: [facility] OR [address] [place] [address];  Service: Plastics;  Laterality: Right;    APPLICATION OF WOUND VACUUM-ASSISTED CLOSURE DEVICE Right 4/21/2025    Procedure: APPLICATION, WOUND VAC;  Surgeon: Estella Ramsay MD;  Location: [facility] OR [address] [place] [address];   Service: General;  Laterality: Right;    APPLICATION OF WOUND VACUUM-ASSISTED CLOSURE DEVICE Right 4/24/2025    Procedure: APPLICATION, WOUND VAC;  Surgeon: Estella Ramsay MD;  Location: [facility] OR 2ND FLR;  Service: General;  Laterality: Right;    APPLICATION OF WOUND VACUUM-ASSISTED CLOSURE DEVICE Right 4/30/2025    Procedure: APPLICATION, WOUND VAC;  Surgeon: Alli Strong MD;  Location: [facility] OR 2ND FLR;  Service: Plastics;  Laterality: Right;    COLONOSCOPY N/A 9/19/2024    Procedure: COLONOSCOPY;  Surgeon: Mo Patino MD;  Location: [facility] ENDO (4TH FLR);  Service: Endoscopy;  Laterality: N/A;  Ref by Dr. NIGHAT Tamez, PT/INR/CBC am procedure, Suprep, email - PC  9/16-lvm for pre call-tb-labs 8/22/24    ESOPHAGOGASTRODUODENOSCOPY N/A 9/19/2024    Procedure: EGD (ESOPHAGOGASTRODUODENOSCOPY);  Surgeon: Mo Patino MD;  Location: [facility] ENDO (4TH FLR);  Service: Endoscopy;  Laterality: N/A;    HARVESTING OF SKIN GRAFT Right 4/30/2025    Procedure: SURGICAL PROCUREMENT, GRAFT, SKIN;  Surgeon: Alli Strong MD;  Location: [facility] OR 2ND FLR;  Service: Plastics;  Laterality: Right;    IRRIGATION AND DEBRIDEMENT OF LOWER EXTREMITY Right 4/30/2025    Procedure: IRRIGATION AND DEBRIDEMENT, LOWER EXTREMITY;  Surgeon: Alli Strong MD;  Location: [facility] OR 2ND FLR;  Service: Plastics;  Laterality: Right;    LIVER BIOPSY      REPLACEMENT OF WOUND VACUUM-ASSISTED CLOSURE DEVICE Right 4/24/2025    Procedure: REPLACEMENT, WOUND VAC;  Surgeon: Estella Ramsay MD;  Location: [facility] OR 2ND FLR;  Service: General;  Laterality: Right;    REPLACEMENT OF WOUND VACUUM-ASSISTED CLOSURE DEVICE Right 4/28/2025    Procedure: REPLACEMENT, WOUND VAC;  Surgeon: Jose G Mackay MD;  Location: [facility] OR 2ND FLR;  Service: General;  Laterality: Right;    WOUND DEBRIDEMENT Right 4/21/2025    Procedure: DEBRIDEMENT, WOUND;  Surgeon: Estella Ramsay MD;  Location: [facility] OR 2ND FLR;  Service: General;   Laterality: Right;       Family History   Problem Relation Name Age of Onset    Colonic polyp Mother 95     Cancer Mother 95         colon vs polyps, colectomy    Diabetes Mellitus Father      Hypertension Father      Cancer Father          ? CRC    Diabetes Father      Cancer Sister          ? CRC    Colonic polyp Brother      Cirrhosis Neg Hx         Social History     Socioeconomic History    Marital status:    Tobacco Use    Smoking status: Former     Types: Cigars    Smokeless tobacco: Never    Tobacco comments:     smokes cigars 20 per month   Substance and Sexual Activity    Alcohol use: Yes     Alcohol/week: 28.0 standard drinks of alcohol     Types: 28 Cans of beer per week     Comment: 2-3 beers daily    Drug use: No   Social History Narrative     (mental health issues), retired  at [employer]. No kids. 2 dogs. No exercise.     Social Drivers of Health     Financial Resource Strain: Low Risk  (4/21/2025)    Overall Financial Resource Strain (CARDIA)     Difficulty of Paying Living Expenses: Not very hard   Recent Concern: Financial Resource Strain - Medium Risk (4/19/2025)    Overall Financial Resource Strain (CARDIA)     Difficulty of Paying Living Expenses: Somewhat hard   Food Insecurity: No Food Insecurity (4/21/2025)    Hunger Vital Sign     Worried About Running Out of Food in the Last Year: Never true     Ran Out of Food in the Last Year: Never true   Transportation Needs: No Transportation Needs (4/21/2025)    PRAPARE - Transportation     Lack of Transportation (Medical): No     Lack of Transportation (Non-Medical): No   Physical Activity: Inactive (4/21/2025)    Exercise Vital Sign     Days of Exercise per Week: 0 days     Minutes of Exercise per Session: 0 min   Stress: Stress Concern Present (4/21/2025)    [employer] [place] [employer] - Occupational Stress Questionnaire     Feeling of Stress : To some extent   Housing Stability: Low Risk  (4/21/2025)    Housing  Stability Vital Sign     Unable to Pay for Housing in the Last Year: No     Homeless in the Last Year: No       MEDICATIONS & ALLERGIES:     Medications Ordered Prior to Encounter[1]     Review of patient's allergies indicates:   Allergen Reactions    Zoloft [sertraline] Other (See Comments)     hyponatremia    Codeine Itching     Intolerance- doesn't like the way it makes him feel       Medications Reconciliation:   I have reconciled the patient's home medications with the patient/family. I have updated all changes.  Refer to After-Visit Medication List.    OBJECTIVE:     Vital Signs:  Vitals:    07/09/25 1336   BP: (!) 164/76   Pulse: 75     Wt Readings from Last 3 Encounters:   07/09/25 1336 115.4 kg (254 lb 6.6 oz)   06/26/25 1325 115.3 kg (254 lb 3.1 oz)   06/16/25 0558 118.4 kg (261 lb)     Body mass index is 31.8 kg/m².     Physical Exam:  General:  No distress.   HEENT: Head is normocephalic, atraumatic; ears are normal.    Eyes: Clear conjunctiva.  Neck: Supple, symmetrical neck; trachea midline.  Lungs: Clear to auscultation bilaterally and normal respiratory effort.  Cardiovascular: Heart with regular rate and rhythm.    Extremities: No LE edema.    Abdomen: Abdomen is soft, non-tender non-distended with normal bowel sounds.  Skin: Skin color, texture, turgor normal. No rashes.  Musculoskeletal: Normal gait.   Neurologic: Normal strength and tone. No focal numbness or weakness.   Psychiatric: Normal affect. Alert.        Laboratory  Lab Results   Component Value Date    WBC 7.86 06/13/2025    HGB 12.8 (L) 06/13/2025    HCT 40.0 06/13/2025     06/13/2025    CHOL 184 01/15/2025    TRIG 65 01/15/2025    HDL 80 (H) 01/15/2025    ALT 15 06/13/2025    AST 21 06/13/2025     06/13/2025    K 4.1 06/13/2025    CL 99 06/13/2025    CREATININE 1.4 06/13/2025    BUN 18 06/13/2025    CO2 28 06/13/2025    TSH 3.456 01/15/2025    PSA 5.77 (H) 03/22/2025    INR 1.0 06/13/2025    HGBA1C 4.7 01/15/2025          ASSESSMENT & PLAN:     Chronic deep vein thrombosis (DVT) of femoral vein of right lower extremity  Current long-term use of anticoagulant medication with history of deep venous thrombosis (DVT)  Pseudoaneurysm of femoral artery    RLE US 4-2025 showed a nonocclusive thrombus in the right common femoral vein   General surgery consulted- hematoma evacuation performed at bedside  S/p  wound debridement and wound vac placement on 4/21. and 4/24 for wound debridement + vac reapplication  s/p STSG to right leg with wound vac placement 4/30 by plastics.   Transitioned to eliquis 05/01/25  Plastics removed wound vac, does not need wound vac on discharge.      - Wound has healed.    Now on apixaban for anticoagulation.    Plan:  -     apixaban (ELIQUIS) 5 mg Tab; Take 1 tablet (5 mg total) by mouth 2 (two) times daily.  Dispense: 60 tablet; Refill: 5  -     US Lower Extremity Veins Bilateral; Future; Expected date: 07/09/2025       History of hepatitis C, s/p successful Rx w/ SVR24 - 1/2017  Other cirrhosis of liver  Hepatology 1-30-25  Froylan Borja is a 69 y.o. male withHepatocellular Carcinoma and Cirrhosis               Well-compensated cirrhosis secondary to history of hepatitis-C as well as ongoing alcohol use disorder.              Sub 2 cm liver lesion with enhancing characteristics consistent with HCC.     Bilateral leg edema - resolved   - Likely related to cirrhosis plus nifedipine (new Rx)    No swelling for over a week.    On:  -     furosemide (LASIX) 40 MG tablet; Take 1 tablet (40 mg total) by mouth daily as needed (edema).        Essential hypertension  - Losartan caused hyperkalemia.    Nifedipine worsens LE edema.       Plan: Increase Coreg to 25 mg BID for better control.    -     carvediloL (COREG) 25 MG tablet; Take 1 tablet (25 mg total) by mouth 2 (two) times daily with meals.  Dispense: 180 tablet; Refill: 3     Mild vitamin D deficiency  -     cholecalciferol, vitamin D3, 1,250 mcg (50,000  unit) capsule; Take 1 capsule (50,000 Units total) by mouth twice a week.      Scheduled Follow-up :  Future Appointments   Date Time Provider Department Center   7/13/2025 10:30 AM [facility]MRI2 [facility]   7/17/2025 10:30 AM LAB, [place] [facility] [place]   7/17/2025 11:00 AM Janki Tamez MD [facility] IM [place]   7/22/2025 11:30 AM [facility] INTERVENTIONAL RADIOLOGY [facility] RAD IR [address]   7/24/2025  8:45 AM [facility] OIC-US1 MASTER [facility] ULTR [facility]   7/24/2025 12:00 PM LAB, APPOINTMENT [facility] INTMED [facility] LAB IM [address] [facility]   7/31/2025 10:30 AM Maximiliano Morris MD [facility] HEPAT [address]       After Visit Medication List :     Medication List            Accurate as of July 9, 2025  1:56 PM. If you have any questions, ask your nurse or doctor.                CHANGE how you take these medications      carvediloL 25 MG tablet  Commonly known as: COREG  Take 1 tablet (25 mg total) by mouth 2 (two) times daily with meals.  What changed:   medication strength  how much to take  Changed by: Pardeep Alcantara MD            CONTINUE taking these medications      apixaban 5 mg Tab  Commonly known as: ELIQUIS  Take 1 tablet (5 mg total) by mouth 2 (two) times daily.     buPROPion 150 MG TBSR 12 hr tablet  Commonly known as: WELLBUTRIN SR  Take 1 tablet (150 mg total) by mouth 2 (two) times daily.     cholecalciferol (vitamin D3) 1,250 mcg (50,000 unit) capsule  Take 1 capsule (50,000 Units total) by mouth twice a week.     furosemide 40 MG tablet  Commonly known as: LASIX  Take 1 tablet (40 mg total) by mouth daily as needed (edema).     gabapentin 300 MG capsule  Commonly known as: NEURONTIN  One in AM and two at bedtime               Where to Get Your Medications        These medications were sent to [id number] - [place] [address] AT [facility] [address] [place] [address]      Phone: [phone]   apixaban 5 mg Tab  carvediloL 25 MG  tablet         Signing Physician:  Pardeep Alcantraa MD         [1]   Current Outpatient Medications on File Prior to Visit   Medication Sig Dispense Refill    buPROPion (WELLBUTRIN SR) 150 MG TBSR 12 hr tablet Take 1 tablet (150 mg total) by mouth 2 (two) times daily. 180 tablet 1    cholecalciferol, vitamin D3, 1,250 mcg (50,000 unit) capsule Take 1 capsule (50,000 Units total) by mouth twice a week. 24 capsule 1    furosemide (LASIX) 40 MG tablet Take 1 tablet (40 mg total) by mouth daily as needed (edema). 30 tablet 5    gabapentin (NEURONTIN) 300 MG capsule One in AM and two at bedtime 90 capsule 6    [DISCONTINUED] apixaban (ELIQUIS) 5 mg Tab Take 1 tablet (5 mg total) by mouth 2 (two) times daily. 60 tablet 0    [DISCONTINUED] carvediloL (COREG) 12.5 MG tablet Take 1 tablet (12.5 mg total) by mouth 2 (two) times daily with meals. 180 tablet 1     No current facility-administered medications on file prior to visit.

## 2025-07-10 DIAGNOSIS — Z00.6 RESEARCH STUDY PATIENT: Primary | ICD-10-CM

## 2025-07-19 ENCOUNTER — HOSPITAL ENCOUNTER (OUTPATIENT)
Dept: RADIOLOGY | Facility: HOSPITAL | Age: 70
Discharge: HOME | End: 2025-07-19
Attending: INTERNAL MEDICINE
Payer: MEDICARE

## 2025-07-19 DIAGNOSIS — Z86.718 CURRENT LONG-TERM USE OF ANTICOAGULANT MEDICATION WITH HISTORY OF DEEP VENOUS THROMBOSIS (DVT): ICD-10-CM

## 2025-07-19 DIAGNOSIS — Z79.01 CURRENT LONG-TERM USE OF ANTICOAGULANT MEDICATION WITH HISTORY OF DEEP VENOUS THROMBOSIS (DVT): ICD-10-CM

## 2025-07-19 DIAGNOSIS — I82.511 CHRONIC DEEP VEIN THROMBOSIS (DVT) OF FEMORAL VEIN OF RIGHT LOWER EXTREMITY: ICD-10-CM

## 2025-07-19 PROCEDURE — 93970 EXTREMITY STUDY: CPT | Mod: TC,HCNC

## 2025-07-24 ENCOUNTER — HOSPITAL ENCOUNTER (OUTPATIENT)
Dept: RADIOLOGY | Facility: HOSPITAL | Age: 70
Discharge: HOME | End: 2025-07-24
Attending: INTERNAL MEDICINE
Payer: MEDICARE

## 2025-07-24 ENCOUNTER — DOCUMENT SCAN (OUTPATIENT)
Dept: HOME HEALTH SERVICES | Facility: HOSPITAL | Age: 70
End: 2025-07-24
Payer: MEDICARE

## 2025-07-24 DIAGNOSIS — K74.69 OTHER CIRRHOSIS OF LIVER: ICD-10-CM

## 2025-07-24 PROCEDURE — 76700 US EXAM ABDOM COMPLETE: CPT | Mod: TC,HCNC

## 2025-07-25 ENCOUNTER — LAB VISIT (OUTPATIENT)
Dept: LAB | Facility: HOSPITAL | Age: 70
End: 2025-07-25
Attending: INTERNAL MEDICINE
Payer: MEDICARE

## 2025-07-25 ENCOUNTER — RESEARCH ENCOUNTER (OUTPATIENT)
Dept: RESEARCH | Facility: HOSPITAL | Age: 70
End: 2025-07-25
Payer: MEDICARE

## 2025-07-25 DIAGNOSIS — Z00.6 RESEARCH STUDY PATIENT: ICD-10-CM

## 2025-07-25 DIAGNOSIS — K74.69 OTHER CIRRHOSIS OF LIVER: ICD-10-CM

## 2025-07-25 LAB
ABSOLUTE EOSINOPHIL (OHS): 0.11 K/UL
ABSOLUTE MONOCYTE (OHS): 0.62 K/UL (ref 0.3–1)
ABSOLUTE NEUTROPHIL COUNT (OHS): 5.23 K/UL (ref 1.8–7.7)
AFP SERPL-MCNC: 2 NG/ML
ALBUMIN SERPL BCP-MCNC: 3.4 G/DL (ref 3.5–5.2)
ALP SERPL-CCNC: 71 UNIT/L (ref 40–150)
ALT SERPL W/O P-5'-P-CCNC: 18 UNIT/L (ref 0–55)
ANION GAP (OHS): 7 MMOL/L (ref 8–16)
AST SERPL-CCNC: 21 UNIT/L (ref 0–50)
BASOPHILS # BLD AUTO: 0.09 K/UL
BASOPHILS NFR BLD AUTO: 1.2 %
BILIRUB SERPL-MCNC: 0.4 MG/DL (ref 0.1–1)
BUN SERPL-MCNC: 11 MG/DL (ref 8–23)
CALCIUM SERPL-MCNC: 8.8 MG/DL (ref 8.7–10.5)
CHLORIDE SERPL-SCNC: 109 MMOL/L (ref 95–110)
CO2 SERPL-SCNC: 23 MMOL/L (ref 23–29)
CREAT SERPL-MCNC: 1.1 MG/DL (ref 0.5–1.4)
ERYTHROCYTE [DISTWIDTH] IN BLOOD BY AUTOMATED COUNT: 14.3 % (ref 11.5–14.5)
GFR SERPLBLD CREATININE-BSD FMLA CKD-EPI: >60 ML/MIN/1.73/M2
GLUCOSE SERPL-MCNC: 92 MG/DL (ref 70–110)
HCT VFR BLD AUTO: 40.6 % (ref 40–54)
HGB BLD-MCNC: 13 GM/DL (ref 14–18)
IMM GRANULOCYTES # BLD AUTO: 0.08 K/UL (ref 0–0.04)
IMM GRANULOCYTES NFR BLD AUTO: 1.1 % (ref 0–0.5)
INR PPP: 1 (ref 0.8–1.2)
LYMPHOCYTES # BLD AUTO: 1.41 K/UL (ref 1–4.8)
MCH RBC QN AUTO: 30.3 PG (ref 27–31)
MCHC RBC AUTO-ENTMCNC: 32 G/DL (ref 32–36)
MCV RBC AUTO: 95 FL (ref 82–98)
NUCLEATED RBC (/100WBC) (OHS): 0 /100 WBC
PLATELET # BLD AUTO: 215 K/UL (ref 150–450)
PMV BLD AUTO: 10.3 FL (ref 9.2–12.9)
POTASSIUM SERPL-SCNC: 5.2 MMOL/L (ref 3.5–5.1)
PROT SERPL-MCNC: 6.3 GM/DL (ref 6–8.4)
PROTHROMBIN TIME: 11.1 SECONDS (ref 9–12.5)
RBC # BLD AUTO: 4.29 M/UL (ref 4.6–6.2)
RELATIVE EOSINOPHIL (OHS): 1.5 %
RELATIVE LYMPHOCYTE (OHS): 18.7 % (ref 18–48)
RELATIVE MONOCYTE (OHS): 8.2 % (ref 4–15)
RELATIVE NEUTROPHIL (OHS): 69.3 % (ref 38–73)
SODIUM SERPL-SCNC: 139 MMOL/L (ref 136–145)
WBC # BLD AUTO: 7.54 K/UL (ref 3.9–12.7)

## 2025-07-25 PROCEDURE — 85610 PROTHROMBIN TIME: CPT | Mod: HCNC

## 2025-07-25 PROCEDURE — 36415 COLL VENOUS BLD VENIPUNCTURE: CPT | Mod: HCNC

## 2025-07-25 PROCEDURE — 82105 ALPHA-FETOPROTEIN SERUM: CPT | Mod: HCNC

## 2025-07-25 PROCEDURE — 80053 COMPREHEN METABOLIC PANEL: CPT | Mod: HCNC

## 2025-07-25 PROCEDURE — 85025 COMPLETE CBC W/AUTO DIFF WBC: CPT | Mod: HCNC

## 2025-07-25 NOTE — PROGRESS NOTES
RESEARCH STUDY SPECIMEN COLLECTION ENCOUNTER  ORGAN TRANSPLANT  [facility] [address]    Study Title: Role of Tumor-Induced Immune Tolerance in the Patient Response to Locoregional Therapy: Implications in Assessment Risk of Hepatocellular Carcinoma Recurrence Following Liver Transplantation    IRB #: [id number]    IRB Approval Date: 6/8/2016    : Eduardo Li MD  Sub-investigator: Javier Mcmullen, PhD    Patient Number: [id number]    In accordance with the study protocol, [facility] orders were placed and follow-up specimens were collected on (date: 07/25/2025) in:  [facility] LAB VNP: YES/NO: No  [facility] LABTX: YES/NO: No  [facility] IM: YES/NO: Yes  Other [facility] location: YES/NO: No   Location: N/A    A  was used to transport blood specimens to [facility] for processing: YES/NO: No  Blood specimens were transported to [facility] for processing: YES/NO: Yes    Alan Martin  Admin Research- Liver Transplant

## 2025-07-27 ENCOUNTER — HOSPITAL ENCOUNTER (OUTPATIENT)
Dept: RADIOLOGY | Facility: HOSPITAL | Age: 70
Discharge: HOME | End: 2025-07-27
Attending: FAMILY MEDICINE
Payer: MEDICARE

## 2025-07-27 DIAGNOSIS — C22.0 HCC (HEPATOCELLULAR CARCINOMA): ICD-10-CM

## 2025-07-27 PROCEDURE — 74183 MRI ABD W/O CNTR FLWD CNTR: CPT | Mod: 26,HCNC,, | Performed by: RADIOLOGY

## 2025-07-27 PROCEDURE — 74183 MRI ABD W/O CNTR FLWD CNTR: CPT | Mod: TC,HCNC

## 2025-07-27 PROCEDURE — 25500020 PHARM REV CODE 255: Mod: HCNC | Performed by: FAMILY MEDICINE

## 2025-07-27 PROCEDURE — A9585 GADOBUTROL INJECTION: HCPCS | Mod: HCNC | Performed by: FAMILY MEDICINE

## 2025-07-27 RX ORDER — GADOBUTROL 604.72 MG/ML
10 INJECTION INTRAVENOUS
Status: COMPLETED | OUTPATIENT
Start: 2025-07-27 | End: 2025-07-27

## 2025-07-27 RX ADMIN — GADOBUTROL 10 ML: 604.72 INJECTION INTRAVENOUS at 09:07

## 2025-07-29 ENCOUNTER — OFFICE VISIT (OUTPATIENT)
Dept: INTERVENTIONAL RADIOLOGY/VASCULAR | Facility: CLINIC | Age: 70
End: 2025-07-29
Payer: MEDICARE

## 2025-07-29 VITALS
BODY MASS INDEX: 30.94 KG/M2 | HEART RATE: 62 BPM | SYSTOLIC BLOOD PRESSURE: 151 MMHG | WEIGHT: 247.56 LBS | DIASTOLIC BLOOD PRESSURE: 83 MMHG

## 2025-07-29 DIAGNOSIS — C22.0 HCC (HEPATOCELLULAR CARCINOMA): Primary | ICD-10-CM

## 2025-07-29 PROCEDURE — 3079F DIAST BP 80-89 MM HG: CPT | Mod: CPTII,HCNC,S$GLB, | Performed by: FAMILY MEDICINE

## 2025-07-29 PROCEDURE — 3077F SYST BP >= 140 MM HG: CPT | Mod: CPTII,HCNC,S$GLB, | Performed by: FAMILY MEDICINE

## 2025-07-29 PROCEDURE — 1159F MED LIST DOCD IN RCRD: CPT | Mod: CPTII,HCNC,S$GLB, | Performed by: FAMILY MEDICINE

## 2025-07-29 PROCEDURE — 3008F BODY MASS INDEX DOCD: CPT | Mod: CPTII,HCNC,S$GLB, | Performed by: FAMILY MEDICINE

## 2025-07-29 PROCEDURE — 4010F ACE/ARB THERAPY RXD/TAKEN: CPT | Mod: CPTII,HCNC,S$GLB, | Performed by: FAMILY MEDICINE

## 2025-07-29 PROCEDURE — 99999 PR PBB SHADOW E&M-EST. PATIENT-LVL III: CPT | Mod: PBBFAC,HCNC,, | Performed by: FAMILY MEDICINE

## 2025-07-29 PROCEDURE — 3044F HG A1C LEVEL LT 7.0%: CPT | Mod: CPTII,HCNC,S$GLB, | Performed by: FAMILY MEDICINE

## 2025-07-29 PROCEDURE — 99213 OFFICE O/P EST LOW 20 MIN: CPT | Mod: HCNC,S$GLB,, | Performed by: FAMILY MEDICINE

## 2025-07-29 PROCEDURE — 1160F RVW MEDS BY RX/DR IN RCRD: CPT | Mod: CPTII,HCNC,S$GLB, | Performed by: FAMILY MEDICINE
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
## 2025-07-30 ENCOUNTER — EXTERNAL HOME HEALTH (OUTPATIENT)
Dept: HOME HEALTH SERVICES | Facility: HOSPITAL | Age: 70
End: 2025-07-30
Payer: MEDICARE

## 2025-07-31 ENCOUNTER — OFFICE VISIT (OUTPATIENT)
Dept: HEPATOLOGY | Facility: CLINIC | Age: 70
End: 2025-07-31
Attending: INTERNAL MEDICINE
Payer: MEDICARE

## 2025-07-31 VITALS
DIASTOLIC BLOOD PRESSURE: 73 MMHG | OXYGEN SATURATION: 98 % | HEIGHT: 75 IN | WEIGHT: 248 LBS | TEMPERATURE: 98 F | BODY MASS INDEX: 30.84 KG/M2 | SYSTOLIC BLOOD PRESSURE: 156 MMHG | RESPIRATION RATE: 18 BRPM | HEART RATE: 89 BPM

## 2025-07-31 DIAGNOSIS — Z86.19 HISTORY OF HEPATITIS C: ICD-10-CM

## 2025-07-31 DIAGNOSIS — C22.0 HCC (HEPATOCELLULAR CARCINOMA): Primary | ICD-10-CM

## 2025-07-31 DIAGNOSIS — K74.69 OTHER CIRRHOSIS OF LIVER: ICD-10-CM

## 2025-07-31 PROCEDURE — 99999 PR PBB SHADOW E&M-EST. PATIENT-LVL III: CPT | Mod: PBBFAC,HCNC,, | Performed by: INTERNAL MEDICINE

## 2025-07-31 NOTE — PROGRESS NOTES
HEPATOLOGY FOLLOW UP    Referring Physician:   Current Corresponding Physician: Dr. Janki Tamez    Froylan Borja is here for follow up of Cirrhosis and Hepatocellular Carcinoma      HPI  This 70-year-old has reasonably well-compensated cirrhosis secondary to history of hepatitis-C as well as ongoing alcohol use disorder.  Earlier this year he was found to have hepatocellular cancer and underwent transarterial radioembolization.  His follow-up MRI showed a complete response.  He has no symptoms of chronic liver disease.  His MRI was reviewed at our multidisciplinary interventional radiology conference.  We will continue to monitor his liver with cross-sectional imaging every 3 months.  He will receive further local regional therapy as indicated.  Outpatient Encounter Medications as of 7/31/2025   Medication Sig Dispense Refill    apixaban (ELIQUIS) 5 mg Tab Take 1 tablet (5 mg total) by mouth 2 (two) times daily. 60 tablet 5    carvediloL (COREG) 25 MG tablet Take 1 tablet (25 mg total) by mouth 2 (two) times daily with meals. 180 tablet 3    cholecalciferol, vitamin D3, 1,250 mcg (50,000 unit) capsule Take 1 capsule (50,000 Units total) by mouth twice a week. 24 capsule 1    furosemide (LASIX) 40 MG tablet Take 1 tablet (40 mg total) by mouth daily as needed (edema). 30 tablet 5    gabapentin (NEURONTIN) 300 MG capsule One in AM and two at bedtime 90 capsule 6    buPROPion (WELLBUTRIN SR) 150 MG TBSR 12 hr tablet Take 1 tablet (150 mg total) by mouth 2 (two) times daily. 180 tablet 1    [DISCONTINUED] apixaban (ELIQUIS) 5 mg Tab Take 1 tablet (5 mg total) by mouth 2 (two) times daily. 60 tablet 0    [DISCONTINUED] carvediloL (COREG) 12.5 MG tablet Take 1 tablet (12.5 mg total) by mouth 2 (two) times daily with meals. 180 tablet 1     No facility-administered encounter medications on file as of 7/31/2025.     Review of patient's allergies indicates:   Allergen Reactions    Zoloft [sertraline] Other (See Comments)      hyponatremia    Codeine Itching     Intolerance- doesn't like the way it makes him feel     Past Medical History:   Diagnosis Date    Acute deep vein thrombosis (DVT) of right lower extremity 04/18/2025    Alcohol abuse     Alcohol withdrawal, uncomplicated 11/04/2021    Alcohol-induced polyneuropathy 04/11/2024    Anxiety     Cirrhosis     Diverticulosis: seen on colonoscopy 2014 07/01/2021    Glaucoma     Gross hematuria 06/17/2020    HCC (hepatocellular carcinoma) 01/30/2025    Hepatocellular carcinoma     History of hepatitis C, s/p successful Rx w/ SVR24 - 1/2017     genotype 1;  relapse following PegIFN+RBV+Victrelis; prior relapse following PegIFN+RBV S/p 12 weeks harvoni + RBV w/ SVR    Homelessness     Hypertension     Major depressive disorder in full remission 11/04/2021    Paresthesia     feet, bilaterally    Rhabdomyolysis 12/15/2023    Septic arthritis of right ankle 11/03/2021       Review of Systems   Constitutional:  Negative for activity change, appetite change, chills, fatigue and unexpected weight change.   HENT:  Negative for congestion, facial swelling and tinnitus.    Eyes:  Negative for visual disturbance.   Respiratory:  Negative for cough, shortness of breath and wheezing.    Cardiovascular:  Negative for chest pain and palpitations.   Gastrointestinal:  Negative for abdominal distention.   Genitourinary:  Negative for dysuria.   Musculoskeletal:  Negative for arthralgias, joint swelling and myalgias.   Neurological:  Negative for syncope and headaches.   Hematological:  Does not bruise/bleed easily.   Psychiatric/Behavioral:  Negative for confusion.      Vitals:    07/31/25 1005   BP: (!) 156/73   Pulse: 89   Resp: 18   Temp: 98 °F (36.7 °C)       Physical Exam  Constitutional:       Appearance: He is well-developed.   Eyes:      General: No scleral icterus.  Cardiovascular:      Rate and Rhythm: Normal rate and regular rhythm.      Heart sounds: Normal heart sounds.   Pulmonary:       Effort: Pulmonary effort is normal. No respiratory distress.      Breath sounds: Normal breath sounds. No wheezing.   Abdominal:      General: Bowel sounds are normal. There is no distension.      Palpations: Abdomen is soft. There is no mass.      Tenderness: There is no abdominal tenderness. There is no rebound.   Musculoskeletal:         General: Normal range of motion.   Lymphadenopathy:      Cervical: No cervical adenopathy.   Skin:     General: Skin is warm and dry.   Neurological:      Mental Status: He is alert and oriented to person, place, and time.       MELD 3.0: 7 at 7/25/2025  9:06 AM  MELD-Na: 7 at 7/25/2025  9:06 AM  Calculated from:  Serum Creatinine: 1.1 mg/dL at 7/25/2025  9:06 AM  Serum Sodium: 139 mmol/L (Using max of 137 mmol/L) at 7/25/2025  9:06 AM  Total Bilirubin: 0.4 mg/dL (Using min of 1 mg/dL) at 7/25/2025  9:06 AM  Serum Albumin: 3.4 g/dL at 7/25/2025  9:06 AM  INR(ratio): 1 at 7/25/2025  9:06 AM  Age at listing (hypothetical): 70 years  Sex: Male at 7/25/2025  9:06 AM      Lab Results   Component Value Date    GLU 92 07/25/2025     (H) 01/15/2025    BUN 11 07/25/2025    CREATININE 1.1 07/25/2025    CALCIUM 8.8 07/25/2025    CALCIUM 9.5 01/15/2025     07/25/2025     (L) 01/15/2025    K 5.2 (H) 07/25/2025    K 5.3 (H) 01/15/2025     07/25/2025    CL 99 01/15/2025    PROT 6.3 07/25/2025    PROT 7.8 01/15/2025    CO2 23 07/25/2025    CO2 26 01/15/2025    ANIONGAP 7 (L) 07/25/2025    WBC 7.54 07/25/2025    RBC 4.29 (L) 07/25/2025    RBC 4.86 01/15/2025    HGB 13.0 (L) 07/25/2025    HGB 16.0 01/15/2025    HCT 40.6 07/25/2025    HCT 47.5 01/15/2025    HCT 38 09/19/2024    MCV 95 07/25/2025    MCV 98 01/15/2025    MCH 30.3 07/25/2025    MCHC 32.0 07/25/2025    MCHC 33.7 01/15/2025     Lab Results   Component Value Date    RDW 14.3 07/25/2025    RDW 12.6 01/15/2025     07/25/2025     01/15/2025    MPV 10.3 07/25/2025    GRAN 5.0 01/15/2025    GRAN 65.4  01/15/2025    LYMPH 18.7 07/25/2025    LYMPH 1.41 07/25/2025    LYMPH 1.5 01/15/2025    LYMPH 19.7 01/15/2025    MONO 8.2 07/25/2025    MONO 0.62 07/25/2025    MONO 0.6 01/15/2025    MONO 8.3 01/15/2025    EOSINOPHIL 4.2 01/15/2025    BASOPHIL 1.2 07/25/2025    BASOPHIL 0.09 07/25/2025    BASOPHIL 1.4 01/15/2025    EOS 1.5 07/25/2025    EOS 0.11 07/25/2025    EOS 0.3 01/15/2025    BASO 0.11 01/15/2025    APTT 40.4 (H) 05/01/2025    APTT 23.0 02/22/2008    GROUPTRH A NEG 04/20/2025    GROUPTRH A NEG 05/01/2014    ABDIRAHMAN Negative 09/27/2012    BNP 25 04/20/2025    CHOL 184 01/15/2025    TRIG 65 01/15/2025    HDL 80 (H) 01/15/2025    CHOLHDL 43.5 01/15/2025    TOTALCHOLEST 2.3 01/15/2025    ALBUMIN 3.4 (L) 07/25/2025    ALBUMIN 4.0 01/15/2025    BILIDIR 0.3 04/09/2025    BILIDIR 0.3 12/23/2023    AST 21 07/25/2025    AST 38 01/15/2025    ALT 18 07/25/2025    ALT 37 01/15/2025    ALKPHOS 71 07/25/2025    ALKPHOS 97 01/15/2025    MG 1.9 05/09/2025    LABPROT 10.9 01/15/2025    INR 1.0 07/25/2025    INR 1.0 01/15/2025    PSA 5.77 (H) 03/22/2025    PSA 2.8 04/17/2024       Assessment and Plan:  Patient Active Problem List   Diagnosis    Glaucoma    History of hepatitis C, s/p successful Rx w/ SVR24 - 1/2017    Cirrhosis    Anxiety    Erectile dysfunction    Essential hypertension    Alcohol use disorder, severe, dependence    Obesity (BMI 30-39.9)    Diverticulosis: seen on colonoscopy 2014    Major depressive disorder in full remission    Impaired functional mobility and endurance    Nocturnal hypoxia    Primary osteoarthritis of right knee    Primary osteoarthritis of left knee    Debility    Irritant contact dermatitis due to fecal, urinary or dual incontinence    Alcohol-induced polyneuropathy    Calcified granuloma of lung    Purpura    HCC (hepatocellular carcinoma)    Benign prostatic hyperplasia    Pain following surgery or procedure    Hematoma    Pain of right lower extremity    Current long-term use of  anticoagulant medication with history of deep venous thrombosis (DVT)    Pseudoaneurysm of femoral artery    Hyperkalemia    Anemia    Bilateral leg edema    Mild vitamin D deficiency    Chronic deep vein thrombosis (DVT) of femoral vein of right lower extremity     Froylan Borja is a 70 y.o. male withCirrhosis and Hepatocellular Carcinoma    Impression:    Well-compensated cirrhosis secondary to history of hepatitis-C as well as ongoing alcohol use disorder status post radioembolization for hepatocellular cancer with complete response.     Plan:  We will repeat an MRI in 3 months' time which will be reviewed at our multidisciplinary radiology conference.  He will return to clinic in 6 months time.

## 2025-08-19 ENCOUNTER — OFFICE VISIT (OUTPATIENT)
Dept: INTERNAL MEDICINE | Facility: CLINIC | Age: 70
End: 2025-08-19
Payer: MEDICARE

## 2025-08-19 VITALS
BODY MASS INDEX: 30.94 KG/M2 | OXYGEN SATURATION: 97 % | WEIGHT: 248.88 LBS | DIASTOLIC BLOOD PRESSURE: 74 MMHG | SYSTOLIC BLOOD PRESSURE: 122 MMHG | HEIGHT: 75 IN | HEART RATE: 67 BPM

## 2025-08-19 DIAGNOSIS — E55.9 MILD VITAMIN D DEFICIENCY: ICD-10-CM

## 2025-08-19 DIAGNOSIS — Z86.19 HISTORY OF HEPATITIS C: ICD-10-CM

## 2025-08-19 DIAGNOSIS — Z12.11 SCREENING FOR COLON CANCER: Primary | ICD-10-CM

## 2025-08-19 DIAGNOSIS — C22.0 HCC (HEPATOCELLULAR CARCINOMA): ICD-10-CM

## 2025-08-19 DIAGNOSIS — I82.511 CHRONIC DEEP VEIN THROMBOSIS (DVT) OF FEMORAL VEIN OF RIGHT LOWER EXTREMITY: ICD-10-CM

## 2025-08-19 RX ORDER — BUPROPION HYDROCHLORIDE 150 MG/1
150 TABLET, EXTENDED RELEASE ORAL 2 TIMES DAILY
Qty: 180 TABLET | Refills: 3 | Status: SHIPPED | OUTPATIENT
Start: 2025-08-19 | End: 2026-08-14

## 2025-08-19 RX ORDER — ASPIRIN 325 MG
50000 TABLET, DELAYED RELEASE (ENTERIC COATED) ORAL
Qty: 24 CAPSULE | Refills: 1 | Status: SHIPPED | OUTPATIENT
Start: 2025-08-21

## 2025-08-22 ENCOUNTER — DOCUMENT SCAN (OUTPATIENT)
Dept: HOME HEALTH SERVICES | Facility: HOSPITAL | Age: 70
End: 2025-08-22

## 2025-08-22 ENCOUNTER — HOSPITAL ENCOUNTER (OUTPATIENT)
Dept: RADIOLOGY | Facility: HOSPITAL | Age: 70
Discharge: HOME | End: 2025-08-22
Attending: NURSE PRACTITIONER
Payer: MEDICARE

## 2025-08-22 ENCOUNTER — DOCUMENT SCAN (OUTPATIENT)
Dept: HOME HEALTH SERVICES | Facility: HOSPITAL | Age: 70
End: 2025-08-22
Payer: MEDICARE

## 2025-08-22 ENCOUNTER — TELEPHONE (OUTPATIENT)
Dept: ENDOSCOPY | Facility: HOSPITAL | Age: 70
End: 2025-08-22

## 2025-08-22 ENCOUNTER — PATIENT MESSAGE (OUTPATIENT)
Dept: UROLOGY | Facility: CLINIC | Age: 70
End: 2025-08-22
Payer: MEDICARE

## 2025-08-22 ENCOUNTER — CLINICAL SUPPORT (OUTPATIENT)
Dept: ENDOSCOPY | Facility: HOSPITAL | Age: 70
End: 2025-08-22
Attending: INTERNAL MEDICINE
Payer: MEDICARE

## 2025-08-22 DIAGNOSIS — N13.8 BPH WITH URINARY OBSTRUCTION: ICD-10-CM

## 2025-08-22 DIAGNOSIS — N40.1 BPH WITH URINARY OBSTRUCTION: ICD-10-CM

## 2025-08-22 DIAGNOSIS — R97.20 ELEVATED PSA: Primary | ICD-10-CM

## 2025-08-22 DIAGNOSIS — R97.20 ELEVATED PSA: ICD-10-CM

## 2025-08-22 DIAGNOSIS — Z12.11 SCREENING FOR COLON CANCER: Primary | ICD-10-CM

## 2025-08-22 DIAGNOSIS — Z12.11 ENCOUNTER FOR SCREENING COLONOSCOPY: Primary | ICD-10-CM

## 2025-08-22 DIAGNOSIS — N52.01 ERECTILE DYSFUNCTION DUE TO ARTERIAL INSUFFICIENCY: ICD-10-CM

## 2025-08-22 PROCEDURE — A9585 GADOBUTROL INJECTION: HCPCS | Mod: HCNC | Performed by: NURSE PRACTITIONER

## 2025-08-22 PROCEDURE — 72197 MRI PELVIS W/O & W/DYE: CPT | Mod: 26,HCNC,, | Performed by: STUDENT IN AN ORGANIZED HEALTH CARE EDUCATION/TRAINING PROGRAM

## 2025-08-22 PROCEDURE — 25500020 PHARM REV CODE 255: Mod: HCNC | Performed by: NURSE PRACTITIONER

## 2025-08-22 PROCEDURE — 72197 MRI PELVIS W/O & W/DYE: CPT | Mod: TC,HCNC

## 2025-08-22 RX ORDER — GADOBUTROL 604.72 MG/ML
10 INJECTION INTRAVENOUS
Status: COMPLETED | OUTPATIENT
Start: 2025-08-22 | End: 2025-08-22

## 2025-08-22 RX ORDER — POLYETHYLENE GLYCOL 3350, SODIUM SULFATE ANHYDROUS, SODIUM BICARBONATE, SODIUM CHLORIDE, POTASSIUM CHLORIDE 236; 22.74; 6.74; 5.86; 2.97 G/4L; G/4L; G/4L; G/4L; G/4L
4 POWDER, FOR SOLUTION ORAL ONCE
Qty: 4000 ML | Refills: 0 | Status: SHIPPED | OUTPATIENT
Start: 2025-08-22 | End: 2025-08-22

## 2025-08-22 RX ADMIN — GADOBUTROL 10 ML: 604.72 INJECTION INTRAVENOUS at 09:08

## (undated) DEVICE — DRESSING INFOVAC MED RND BLK

## (undated) DEVICE — MANIFOLD 4 PORT

## (undated) DEVICE — BANDAGE ELAS SOFTWRAP ST 6X5YD

## (undated) DEVICE — CANISTER INFOV.A.C WOUND 500ML

## (undated) DEVICE — Device

## (undated) DEVICE — GOWN AERO CHROME W/ TOWEL XL

## (undated) DEVICE — SOL NACL IRR 1000ML BTL

## (undated) DEVICE — SYR 10CC LUER LOCK

## (undated) DEVICE — TRAY MINOR GEN SURG OMC

## (undated) DEVICE — DRAPE T EXTRM SURG 121X128X90

## (undated) DEVICE — DRESSING GAUZE XEROFORM 5X9

## (undated) DEVICE — ELECTRODE MEGADYNE RETURN DUAL

## (undated) DEVICE — ADHESIVE MASTISOL VIAL 48/BX

## (undated) DEVICE — MARKER FN REG DUAL UTIL RULER

## (undated) DEVICE — PENCIL ROCKER SWITCH 10FT CORD

## (undated) DEVICE — ELECTRODE REM PLYHSV RETURN 9

## (undated) DEVICE — BANDAGE ROLL COTTN 4.5INX4.1YD

## (undated) DEVICE — COVER PROXIMA MAYO STAND

## (undated) DEVICE — REMOVER STAPLE SKIN STERILE

## (undated) DEVICE — STAPLER SKIN ROTATING HEAD

## (undated) DEVICE — SOL VASHE INSTILLATION WND 16

## (undated) DEVICE — DRESSING INFOVAC LARGE BLK

## (undated) DEVICE — DRAPE LAP T SHT W/ INSTR PAD

## (undated) DEVICE — DRESSING TRANS 4X4 TEGADERM

## (undated) DEVICE — BANDAGE ELAS SOFTWRAP ST 4X5YD

## (undated) DEVICE — SPONGE LAP 18X18 PREWASHED

## (undated) DEVICE — PAD ELECTROSURGICAL SPL W/CORD

## (undated) DEVICE — GOWN POLY REINF BRTH SLV XL

## (undated) DEVICE — DRESSING XEROFORM STRL 4X3

## (undated) DEVICE — GOWN SURGICAL X-LARGE

## (undated) DEVICE — SOL IRR NACL .9% 3000ML

## (undated) DEVICE — BLADE SURG CARBON STEEL SZ11

## (undated) DEVICE — MINERAL OIL 10ML MURI LUBE

## (undated) DEVICE — STOCKINET TUBULAR 1 PLY 6X60IN

## (undated) DEVICE — DRAPE HALF SURGICAL 40X58IN